# Patient Record
Sex: FEMALE | Race: BLACK OR AFRICAN AMERICAN | Employment: OTHER | URBAN - METROPOLITAN AREA
[De-identification: names, ages, dates, MRNs, and addresses within clinical notes are randomized per-mention and may not be internally consistent; named-entity substitution may affect disease eponyms.]

---

## 2017-01-10 ENCOUNTER — TELEPHONE (OUTPATIENT)
Dept: PULMONOLOGY | Age: 67
End: 2017-01-10

## 2017-01-12 PROBLEM — J44.1 COPD EXACERBATION (HCC): Status: ACTIVE | Noted: 2017-01-12

## 2017-01-16 ENCOUNTER — CARE COORDINATION (OUTPATIENT)
Dept: CARE COORDINATION | Age: 67
End: 2017-01-16

## 2017-01-20 RX ORDER — AMITRIPTYLINE HYDROCHLORIDE 150 MG/1
75 TABLET, FILM COATED ORAL NIGHTLY
Qty: 45 TABLET | Refills: 2 | Status: SHIPPED | OUTPATIENT
Start: 2017-01-20 | End: 2017-10-04 | Stop reason: SDUPTHER

## 2017-02-06 ENCOUNTER — OFFICE VISIT (OUTPATIENT)
Dept: INTERNAL MEDICINE CLINIC | Age: 67
End: 2017-02-06

## 2017-02-06 VITALS
BODY MASS INDEX: 26.17 KG/M2 | WEIGHT: 134 LBS | DIASTOLIC BLOOD PRESSURE: 68 MMHG | OXYGEN SATURATION: 93 % | SYSTOLIC BLOOD PRESSURE: 102 MMHG | HEART RATE: 90 BPM

## 2017-02-06 DIAGNOSIS — J44.9 CHRONIC OBSTRUCTIVE PULMONARY DISEASE, UNSPECIFIED COPD TYPE (HCC): Primary | ICD-10-CM

## 2017-02-06 DIAGNOSIS — D50.0 IRON DEFICIENCY ANEMIA DUE TO CHRONIC BLOOD LOSS: ICD-10-CM

## 2017-02-06 PROBLEM — J44.1 COPD EXACERBATION (HCC): Status: RESOLVED | Noted: 2017-01-12 | Resolved: 2017-02-06

## 2017-02-06 LAB
BASOPHILS ABSOLUTE: 0.1 K/UL (ref 0–0.2)
BASOPHILS RELATIVE PERCENT: 0.7 %
EOSINOPHILS ABSOLUTE: 0.2 K/UL (ref 0–0.6)
EOSINOPHILS RELATIVE PERCENT: 2.9 %
FERRITIN: 463.3 NG/ML (ref 15–150)
HCT VFR BLD CALC: 35.7 % (ref 36–48)
HEMOGLOBIN: 11.1 G/DL (ref 12–16)
LYMPHOCYTES ABSOLUTE: 3.4 K/UL (ref 1–5.1)
LYMPHOCYTES RELATIVE PERCENT: 45.3 %
MCH RBC QN AUTO: 21 PG (ref 26–34)
MCHC RBC AUTO-ENTMCNC: 31.1 G/DL (ref 31–36)
MCV RBC AUTO: 67.6 FL (ref 80–100)
MONOCYTES ABSOLUTE: 0.2 K/UL (ref 0–1.3)
MONOCYTES RELATIVE PERCENT: 2.6 %
NEUTROPHILS ABSOLUTE: 3.6 K/UL (ref 1.7–7.7)
NEUTROPHILS RELATIVE PERCENT: 48.5 %
PDW BLD-RTO: 19 % (ref 12.4–15.4)
PLATELET # BLD: 258 K/UL (ref 135–450)
PMV BLD AUTO: 7.8 FL (ref 5–10.5)
RBC # BLD: 5.28 M/UL (ref 4–5.2)
WBC # BLD: 7.5 K/UL (ref 4–11)

## 2017-02-06 PROCEDURE — 99214 OFFICE O/P EST MOD 30 MIN: CPT | Performed by: INTERNAL MEDICINE

## 2017-02-06 ASSESSMENT — ENCOUNTER SYMPTOMS
SHORTNESS OF BREATH: 1
COUGH: 1

## 2017-02-08 ENCOUNTER — OFFICE VISIT (OUTPATIENT)
Dept: PULMONOLOGY | Age: 67
End: 2017-02-08

## 2017-02-08 VITALS
SYSTOLIC BLOOD PRESSURE: 120 MMHG | HEART RATE: 97 BPM | DIASTOLIC BLOOD PRESSURE: 85 MMHG | HEIGHT: 60 IN | BODY MASS INDEX: 26.31 KG/M2 | WEIGHT: 134 LBS | OXYGEN SATURATION: 95 % | RESPIRATION RATE: 18 BRPM

## 2017-02-08 DIAGNOSIS — J43.2 CENTRILOBULAR EMPHYSEMA (HCC): ICD-10-CM

## 2017-02-08 DIAGNOSIS — J84.10 PULMONARY FIBROSIS (HCC): ICD-10-CM

## 2017-02-08 DIAGNOSIS — J44.9 COPD, SEVERE (HCC): ICD-10-CM

## 2017-02-08 DIAGNOSIS — J96.11 CHRONIC RESPIRATORY FAILURE WITH HYPOXIA (HCC): Primary | ICD-10-CM

## 2017-02-08 DIAGNOSIS — Z09 HOSPITAL DISCHARGE FOLLOW-UP: ICD-10-CM

## 2017-02-08 PROCEDURE — 99214 OFFICE O/P EST MOD 30 MIN: CPT | Performed by: INTERNAL MEDICINE

## 2017-02-08 RX ORDER — ALBUTEROL SULFATE 2.5 MG/3ML
2.5 SOLUTION RESPIRATORY (INHALATION) EVERY 6 HOURS PRN
Qty: 120 EACH | Refills: 3 | Status: SHIPPED | OUTPATIENT
Start: 2017-02-08 | End: 2017-10-25 | Stop reason: SDUPTHER

## 2017-02-08 RX ORDER — ALBUTEROL SULFATE 2.5 MG/3ML
SOLUTION RESPIRATORY (INHALATION)
Qty: 75 ML | Refills: 3 | Status: CANCELLED | OUTPATIENT
Start: 2017-02-08

## 2017-02-08 RX ORDER — BUDESONIDE AND FORMOTEROL FUMARATE DIHYDRATE 160; 4.5 UG/1; UG/1
2 AEROSOL RESPIRATORY (INHALATION) 2 TIMES DAILY
Qty: 1 INHALER | Refills: 6 | Status: SHIPPED | OUTPATIENT
Start: 2017-02-08 | End: 2017-08-23 | Stop reason: SDUPTHER

## 2017-02-08 RX ORDER — ALBUTEROL SULFATE 90 UG/1
2 AEROSOL, METERED RESPIRATORY (INHALATION) EVERY 6 HOURS PRN
Qty: 1 INHALER | Refills: 3 | Status: SHIPPED | OUTPATIENT
Start: 2017-02-08 | End: 2018-03-15 | Stop reason: SDUPTHER

## 2017-02-15 ENCOUNTER — HOSPITAL ENCOUNTER (OUTPATIENT)
Dept: PULMONOLOGY | Age: 67
Discharge: HOME | End: 2017-02-15
Attending: INTERNAL MEDICINE | Admitting: INTERNAL MEDICINE

## 2017-02-15 ENCOUNTER — TELEPHONE (OUTPATIENT)
Dept: INTERNAL MEDICINE CLINIC | Age: 67
End: 2017-02-15

## 2017-02-15 VITALS — HEART RATE: 79 BPM | OXYGEN SATURATION: 98 %

## 2017-02-15 RX ORDER — ALBUTEROL SULFATE 90 UG/1
4 AEROSOL, METERED RESPIRATORY (INHALATION) ONCE
Status: COMPLETED | OUTPATIENT
Start: 2017-02-15 | End: 2017-02-15

## 2017-02-15 RX ADMIN — ALBUTEROL SULFATE 4 PUFF: 90 AEROSOL, METERED RESPIRATORY (INHALATION) at 08:22

## 2017-03-02 DIAGNOSIS — M54.50 CHRONIC BILATERAL LOW BACK PAIN WITHOUT SCIATICA: ICD-10-CM

## 2017-03-02 DIAGNOSIS — G89.29 CHRONIC BILATERAL LOW BACK PAIN WITHOUT SCIATICA: ICD-10-CM

## 2017-03-06 RX ORDER — DULOXETIN HYDROCHLORIDE 30 MG/1
30 CAPSULE, DELAYED RELEASE ORAL DAILY
Qty: 30 CAPSULE | Refills: 3 | Status: SHIPPED | OUTPATIENT
Start: 2017-03-06 | End: 2017-12-18

## 2017-03-16 ENCOUNTER — HOSPITAL ENCOUNTER (OUTPATIENT)
Dept: CARDIAC REHAB | Age: 67
Discharge: HOME | End: 2017-03-31
Admitting: INTERNAL MEDICINE

## 2017-05-08 ENCOUNTER — OFFICE VISIT (OUTPATIENT)
Dept: INTERNAL MEDICINE CLINIC | Age: 67
End: 2017-05-08

## 2017-05-08 VITALS
SYSTOLIC BLOOD PRESSURE: 114 MMHG | HEART RATE: 75 BPM | OXYGEN SATURATION: 93 % | WEIGHT: 128 LBS | DIASTOLIC BLOOD PRESSURE: 68 MMHG | BODY MASS INDEX: 25 KG/M2

## 2017-05-08 DIAGNOSIS — J44.9 CHRONIC OBSTRUCTIVE PULMONARY DISEASE, UNSPECIFIED COPD TYPE (HCC): Primary | ICD-10-CM

## 2017-05-08 DIAGNOSIS — E55.9 VITAMIN D DEFICIENCY: ICD-10-CM

## 2017-05-08 DIAGNOSIS — K50.113 CROHN'S DISEASE OF LARGE INTESTINE WITH FISTULA (HCC): Chronic | ICD-10-CM

## 2017-05-08 DIAGNOSIS — M81.0 OSTEOPOROSIS: ICD-10-CM

## 2017-05-08 DIAGNOSIS — D50.0 IRON DEFICIENCY ANEMIA DUE TO CHRONIC BLOOD LOSS: ICD-10-CM

## 2017-05-08 LAB
BASOPHILS ABSOLUTE: 0 K/UL (ref 0–0.2)
BASOPHILS RELATIVE PERCENT: 0.5 %
EOSINOPHILS ABSOLUTE: 0.2 K/UL (ref 0–0.6)
EOSINOPHILS RELATIVE PERCENT: 1.8 %
FERRITIN: 358.5 NG/ML (ref 15–150)
HCT VFR BLD CALC: 36.3 % (ref 36–48)
HEMOGLOBIN: 11.2 G/DL (ref 12–16)
LYMPHOCYTES ABSOLUTE: 3.8 K/UL (ref 1–5.1)
LYMPHOCYTES RELATIVE PERCENT: 40.7 %
MCH RBC QN AUTO: 19.6 PG (ref 26–34)
MCHC RBC AUTO-ENTMCNC: 30.9 G/DL (ref 31–36)
MCV RBC AUTO: 63.5 FL (ref 80–100)
MONOCYTES ABSOLUTE: 0.4 K/UL (ref 0–1.3)
MONOCYTES RELATIVE PERCENT: 4.7 %
NEUTROPHILS ABSOLUTE: 4.9 K/UL (ref 1.7–7.7)
NEUTROPHILS RELATIVE PERCENT: 52.3 %
PDW BLD-RTO: 16.3 % (ref 12.4–15.4)
PLATELET # BLD: 387 K/UL (ref 135–450)
PMV BLD AUTO: 8.5 FL (ref 5–10.5)
RBC # BLD: 5.72 M/UL (ref 4–5.2)
VITAMIN D 25-HYDROXY: 17.7 NG/ML
WBC # BLD: 9.3 K/UL (ref 4–11)

## 2017-05-08 PROCEDURE — 99214 OFFICE O/P EST MOD 30 MIN: CPT | Performed by: INTERNAL MEDICINE

## 2017-05-08 PROCEDURE — 36415 COLL VENOUS BLD VENIPUNCTURE: CPT | Performed by: INTERNAL MEDICINE

## 2017-05-08 RX ORDER — ERGOCALCIFEROL (VITAMIN D2) 1250 MCG
50000 CAPSULE ORAL WEEKLY
Qty: 12 CAPSULE | Refills: 3 | Status: SHIPPED | OUTPATIENT
Start: 2017-05-08 | End: 2017-06-22 | Stop reason: ALTCHOICE

## 2017-05-08 RX ORDER — ALENDRONATE SODIUM 70 MG/1
70 TABLET ORAL
Qty: 12 TABLET | Refills: 3 | Status: SHIPPED | OUTPATIENT
Start: 2017-05-08 | End: 2018-05-07 | Stop reason: SDUPTHER

## 2017-05-09 LAB — HEMOGLOBIN ELECTROPHORESIS: NORMAL

## 2017-05-10 LAB
HGB ELECTROPHORESIS INTERP: NORMAL
SOLUBLE TRANSFERRIN RECEPT: 2.4 MG/L (ref 1.9–4.4)

## 2017-05-12 PROBLEM — D58.2 HEMOGLOBIN C TRAIT (HCC): Status: ACTIVE | Noted: 2017-05-12

## 2017-05-22 ENCOUNTER — OFFICE VISIT (OUTPATIENT)
Dept: INTERNAL MEDICINE CLINIC | Age: 67
End: 2017-05-22

## 2017-05-22 VITALS
HEART RATE: 92 BPM | DIASTOLIC BLOOD PRESSURE: 82 MMHG | OXYGEN SATURATION: 90 % | BODY MASS INDEX: 25.8 KG/M2 | WEIGHT: 131.4 LBS | HEIGHT: 60 IN | SYSTOLIC BLOOD PRESSURE: 122 MMHG | TEMPERATURE: 98.1 F

## 2017-05-22 DIAGNOSIS — J43.2 CENTRILOBULAR EMPHYSEMA (HCC): Primary | ICD-10-CM

## 2017-05-22 PROCEDURE — 94640 AIRWAY INHALATION TREATMENT: CPT | Performed by: INTERNAL MEDICINE

## 2017-05-22 PROCEDURE — 99214 OFFICE O/P EST MOD 30 MIN: CPT | Performed by: INTERNAL MEDICINE

## 2017-05-22 RX ORDER — PREDNISONE 20 MG/1
20 TABLET ORAL DAILY
Qty: 7 TABLET | Refills: 0 | Status: SHIPPED | OUTPATIENT
Start: 2017-05-22 | End: 2017-05-29

## 2017-05-22 RX ORDER — IPRATROPIUM BROMIDE AND ALBUTEROL SULFATE 2.5; .5 MG/3ML; MG/3ML
1 SOLUTION RESPIRATORY (INHALATION) ONCE
Status: COMPLETED | OUTPATIENT
Start: 2017-05-22 | End: 2017-05-22

## 2017-05-22 RX ADMIN — IPRATROPIUM BROMIDE AND ALBUTEROL SULFATE 1 AMPULE: 2.5; .5 SOLUTION RESPIRATORY (INHALATION) at 15:48

## 2017-05-24 ASSESSMENT — ENCOUNTER SYMPTOMS
STRIDOR: 0
COUGH: 1
EYE REDNESS: 0
SHORTNESS OF BREATH: 1
EYE PAIN: 0

## 2017-06-13 ENCOUNTER — TELEPHONE (OUTPATIENT)
Dept: INTERNAL MEDICINE CLINIC | Age: 67
End: 2017-06-13

## 2017-06-22 ENCOUNTER — OFFICE VISIT (OUTPATIENT)
Dept: INTERNAL MEDICINE CLINIC | Age: 67
End: 2017-06-22

## 2017-06-22 VITALS
OXYGEN SATURATION: 93 % | WEIGHT: 128 LBS | SYSTOLIC BLOOD PRESSURE: 124 MMHG | DIASTOLIC BLOOD PRESSURE: 72 MMHG | HEART RATE: 90 BPM | BODY MASS INDEX: 25 KG/M2

## 2017-06-22 DIAGNOSIS — M81.0 OSTEOPOROSIS OF MULTIPLE SITES: Primary | ICD-10-CM

## 2017-06-22 DIAGNOSIS — K50.113 CROHN'S DISEASE OF LARGE INTESTINE WITH FISTULA (HCC): Chronic | ICD-10-CM

## 2017-06-22 PROCEDURE — 99214 OFFICE O/P EST MOD 30 MIN: CPT | Performed by: INTERNAL MEDICINE

## 2017-06-22 ASSESSMENT — ENCOUNTER SYMPTOMS
COUGH: 0
BACK PAIN: 1
CHOKING: 0

## 2017-08-23 DIAGNOSIS — K50.113 CROHN'S DISEASE OF LARGE INTESTINE WITH FISTULA (HCC): Chronic | ICD-10-CM

## 2017-08-23 RX ORDER — BUDESONIDE AND FORMOTEROL FUMARATE DIHYDRATE 160; 4.5 UG/1; UG/1
2 AEROSOL RESPIRATORY (INHALATION) 2 TIMES DAILY
Qty: 1 INHALER | Refills: 6 | Status: SHIPPED | OUTPATIENT
Start: 2017-08-23 | End: 2018-05-02 | Stop reason: ALTCHOICE

## 2017-08-28 ENCOUNTER — OFFICE VISIT (OUTPATIENT)
Dept: PULMONOLOGY | Age: 67
End: 2017-08-28

## 2017-08-28 VITALS
HEIGHT: 60 IN | RESPIRATION RATE: 18 BRPM | WEIGHT: 132 LBS | OXYGEN SATURATION: 97 % | SYSTOLIC BLOOD PRESSURE: 155 MMHG | HEART RATE: 82 BPM | DIASTOLIC BLOOD PRESSURE: 91 MMHG | BODY MASS INDEX: 25.91 KG/M2

## 2017-08-28 DIAGNOSIS — Z87.891 PERSONAL HISTORY OF TOBACCO USE: ICD-10-CM

## 2017-08-28 DIAGNOSIS — J84.10 PULMONARY FIBROSIS (HCC): ICD-10-CM

## 2017-08-28 DIAGNOSIS — J44.9 COPD, SEVERE (HCC): ICD-10-CM

## 2017-08-28 DIAGNOSIS — J96.11 CHRONIC RESPIRATORY FAILURE WITH HYPOXIA (HCC): Primary | ICD-10-CM

## 2017-08-28 PROCEDURE — 99214 OFFICE O/P EST MOD 30 MIN: CPT | Performed by: INTERNAL MEDICINE

## 2017-10-05 RX ORDER — AMITRIPTYLINE HYDROCHLORIDE 150 MG/1
75 TABLET, FILM COATED ORAL NIGHTLY
Qty: 45 TABLET | Refills: 2 | Status: SHIPPED | OUTPATIENT
Start: 2017-10-05 | End: 2018-03-04 | Stop reason: SDUPTHER

## 2017-10-09 ENCOUNTER — HOSPITAL ENCOUNTER (OUTPATIENT)
Dept: OTHER | Age: 67
Discharge: HOME | End: 2017-10-09
Attending: INTERNAL MEDICINE | Admitting: INTERNAL MEDICINE

## 2017-10-09 LAB
FERRITIN: 353.3 NG/ML (ref 15–150)
IRON SATURATION: 29 % (ref 15–50)
IRON: 101 UG/DL (ref 37–145)
TOTAL IRON BINDING CAPACITY: 347 UG/DL (ref 260–445)

## 2017-10-10 LAB
ATYPICAL LYMPHOCYTE RELATIVE PERCENT: 1 % (ref 0–6)
BANDED NEUTROPHILS RELATIVE PERCENT: 1 % (ref 0–7)
BASOPHILS ABSOLUTE: 0 K/UL (ref 0–0.2)
BASOPHILS RELATIVE PERCENT: 0 %
EOSINOPHILS ABSOLUTE: 0.1 K/UL (ref 0–0.6)
EOSINOPHILS RELATIVE PERCENT: 1 %
HCT VFR BLD CALC: 38.7 % (ref 36–48)
HEMATOLOGY PATH CONSULT: NO
HEMOGLOBIN: 12 G/DL (ref 12–16)
HYPOCHROMIA: ABNORMAL
LYMPHOCYTES ABSOLUTE: 4.5 K/UL (ref 1–5.1)
LYMPHOCYTES RELATIVE PERCENT: 37 %
MCH RBC QN AUTO: 20.4 PG (ref 26–34)
MCHC RBC AUTO-ENTMCNC: 31 G/DL (ref 31–36)
MCV RBC AUTO: 65.7 FL (ref 80–100)
MONOCYTES ABSOLUTE: 0.7 K/UL (ref 0–1.3)
MONOCYTES RELATIVE PERCENT: 6 %
NEUTROPHILS ABSOLUTE: 6.5 K/UL (ref 1.7–7.7)
NEUTROPHILS RELATIVE PERCENT: 54 %
PDW BLD-RTO: 16.2 % (ref 12.4–15.4)
PLATELET # BLD: 294 K/UL (ref 135–450)
PLATELET SLIDE REVIEW: ABNORMAL
PMV BLD AUTO: 9 FL (ref 5–10.5)
RBC # BLD: 5.89 M/UL (ref 4–5.2)
TARGET CELLS: ABNORMAL
TEAR DROP CELLS: ABNORMAL
WBC # BLD: 11.8 K/UL (ref 4–11)

## 2017-10-23 ENCOUNTER — TELEPHONE (OUTPATIENT)
Dept: PULMONOLOGY | Age: 67
End: 2017-10-23

## 2017-10-25 ENCOUNTER — HOSPITAL ENCOUNTER (OUTPATIENT)
Dept: OTHER | Age: 67
Discharge: HOME | End: 2017-10-25
Attending: INTERNAL MEDICINE | Admitting: INTERNAL MEDICINE

## 2017-10-25 ENCOUNTER — OFFICE VISIT (OUTPATIENT)
Dept: PULMONOLOGY | Age: 67
End: 2017-10-25

## 2017-10-25 VITALS
TEMPERATURE: 96.9 F | SYSTOLIC BLOOD PRESSURE: 134 MMHG | HEART RATE: 82 BPM | RESPIRATION RATE: 18 BRPM | DIASTOLIC BLOOD PRESSURE: 84 MMHG | HEIGHT: 60 IN | WEIGHT: 131 LBS | OXYGEN SATURATION: 97 % | BODY MASS INDEX: 25.72 KG/M2

## 2017-10-25 DIAGNOSIS — J21.8 ACUTE BRONCHIOLITIS DUE TO OTHER SPECIFIED ORGANISMS: ICD-10-CM

## 2017-10-25 DIAGNOSIS — J44.1 COPD WITH ACUTE EXACERBATION (HCC): Primary | ICD-10-CM

## 2017-10-25 PROCEDURE — 3017F COLORECTAL CA SCREEN DOC REV: CPT | Performed by: INTERNAL MEDICINE

## 2017-10-25 PROCEDURE — G8399 PT W/DXA RESULTS DOCUMENT: HCPCS | Performed by: INTERNAL MEDICINE

## 2017-10-25 PROCEDURE — G8417 CALC BMI ABV UP PARAM F/U: HCPCS | Performed by: INTERNAL MEDICINE

## 2017-10-25 PROCEDURE — G8427 DOCREV CUR MEDS BY ELIG CLIN: HCPCS | Performed by: INTERNAL MEDICINE

## 2017-10-25 PROCEDURE — 99214 OFFICE O/P EST MOD 30 MIN: CPT | Performed by: INTERNAL MEDICINE

## 2017-10-25 PROCEDURE — G8926 SPIRO NO PERF OR DOC: HCPCS | Performed by: INTERNAL MEDICINE

## 2017-10-25 PROCEDURE — 1123F ACP DISCUSS/DSCN MKR DOCD: CPT | Performed by: INTERNAL MEDICINE

## 2017-10-25 PROCEDURE — G8484 FLU IMMUNIZE NO ADMIN: HCPCS | Performed by: INTERNAL MEDICINE

## 2017-10-25 PROCEDURE — 3014F SCREEN MAMMO DOC REV: CPT | Performed by: INTERNAL MEDICINE

## 2017-10-25 PROCEDURE — 1036F TOBACCO NON-USER: CPT | Performed by: INTERNAL MEDICINE

## 2017-10-25 PROCEDURE — 3023F SPIROM DOC REV: CPT | Performed by: INTERNAL MEDICINE

## 2017-10-25 PROCEDURE — 1090F PRES/ABSN URINE INCON ASSESS: CPT | Performed by: INTERNAL MEDICINE

## 2017-10-25 PROCEDURE — 4040F PNEUMOC VAC/ADMIN/RCVD: CPT | Performed by: INTERNAL MEDICINE

## 2017-10-25 RX ORDER — ALBUTEROL SULFATE 2.5 MG/3ML
2.5 SOLUTION RESPIRATORY (INHALATION) EVERY 6 HOURS PRN
Qty: 120 EACH | Refills: 3 | Status: SHIPPED | OUTPATIENT
Start: 2017-10-25 | End: 2018-02-18 | Stop reason: SDUPTHER

## 2017-10-25 RX ORDER — LEVOFLOXACIN 500 MG/1
500 TABLET, FILM COATED ORAL DAILY
Qty: 10 TABLET | Refills: 0 | Status: SHIPPED | OUTPATIENT
Start: 2017-10-25 | End: 2017-11-04

## 2017-10-25 RX ORDER — PREDNISONE 10 MG/1
TABLET ORAL
Qty: 30 TABLET | Refills: 0 | Status: SHIPPED | OUTPATIENT
Start: 2017-10-25 | End: 2017-12-18

## 2017-10-25 NOTE — PROGRESS NOTES
capsule Take 1 capsule by mouth daily 30 capsule 3    albuterol sulfate  (90 BASE) MCG/ACT inhaler Inhale 2 puffs into the lungs every 6 hours as needed for Wheezing 1 Inhaler 3    albuterol (PROVENTIL) (2.5 MG/3ML) 0.083% nebulizer solution Take 3 mLs by nebulization every 6 hours as needed for Wheezing Dx: COPD      ICD-10: J44.9 120 each 3    ferrous sulfate (DARON-JORGE) 325 (65 FE) MG tablet Take 1 tablet by mouth daily (with breakfast) 30 tablet 0    guaiFENesin (MUCINEX) 600 MG extended release tablet Take 1 tablet by mouth 2 times daily 30 tablet 0    potassium chloride (KLOR-CON M20) 20 MEQ extended release tablet Take 1 tablet by mouth 2 times daily 60 tablet 5    Misc. Devices (ROLLER WALKER) MISC 1 each by Does not apply route daily Please dispense a walker with a seat 1 each 0    lactobacillus (BACID) TABS Take 1 tablet by mouth daily 30 tablet 0    Adalimumab (HUMIRA) 20 MG/0.4ML KIT Inject 1 vial into the skin every 14 days Next dose on Monday December 5, 2016      aspirin 81 MG chewable tablet Take 81 mg by mouth daily. Last dose 3-24 30 tablet 0     No current facility-administered medications for this visit.         Family History   Problem Relation Age of Onset    Heart Disease Mother     High Blood Pressure Mother     High Cholesterol Mother     Early Death Mother 36     MI   [de-identified] / Stillbirths Mother     Heart Disease Father     High Blood Pressure Father     High Cholesterol Father     Stroke Father 79    High Blood Pressure Sister     High Blood Pressure Brother      Social History     Social History    Marital status: Single     Spouse name: N/A    Number of children: 2    Years of education: 15     Occupational History    unemployed      disability [facility] [employer]     Social History Main Topics    Smoking status: Former Smoker     Packs/day: 0.25     Years: 30.00     Types: Cigarettes     Quit date: 2/26/2015    Smokeless tobacco: Never Used      Comment: started smoking at age 26 / smoked up to 9 cigarettes a day     Alcohol use No    Drug use: No    Sexual activity: Not Currently     Other Topics Concern    Not on file     Social History Narrative    No narrative on file         Review of Systems   Constitutional: Negative. Negative for fever, chills, diaphoresis, activity change, appetite change, fatigue and unexpected weight change. HENT: Negative. Negative for hearing loss, ear pain, nosebleeds, congestion, facial swelling, rhinorrhea, sneezing, neck pain, neck stiffness, postnasal drip, sinus pressure and ear discharge. Eyes: Negative. Negative for photophobia, pain, discharge, redness, itching and visual disturbance. Respiratory: As per HPI  Cardiovascular: Negative. Negative for chest pain, palpitations and leg swelling. Gastrointestinal: Negative. Negative for abdominal pain, blood in stool, abdominal distention and anal bleeding. Genitourinary: Negative. Negative for dysuria, urgency, frequency, hematuria, decreased urine volume, enuresis and difficulty urinating. Musculoskeletal: Negative. Negative for myalgias, back pain, joint swelling, arthralgias and gait problem. Skin: Negative. Negative for color change and pallor. Neurological: Negative. Negative for dizziness, tremors, seizures, syncope, facial asymmetry, speech difficulty, weakness, light-headedness, numbness and headaches. Hematological: Negative. Negative for adenopathy. Psychiatric/Behavioral: Negative. Negative for hallucinations, behavioral problems, confusion and agitation. The patient is not nervous/anxious and is not hyperactive. Blood pressure 134/84, pulse 82, temperature 96.9 °F (36.1 °C), temperature source Oral, resp. rate 18, height 5' (1.524 m), weight 131 lb (59.4 kg), SpO2 97 %, not currently breastfeeding. Physical Exam   Constitutional: Oriented. Well-developed and well-nourished. No distress.    HENT:   Head: Normocephalic and

## 2017-10-25 NOTE — COMMUNICATION BODY
Assessment:     Assessment:    1. COPD with acute exacerbation ([facility] [address]     2. Acute bronchiolitis due to other specified organisms             Plan:     Plan:    1. She has an exacerbation and probably bronchitis  2. I will order CXR now and start prednisone taper course with abx  3. Instructed to use neb tx qid, go to ED if not improved  4. Continue current inhaled regimen: Symbicort 160 BID, Spiriva daily and Albuterol.    5. RTC in 1 month

## 2017-10-26 ENCOUNTER — OFFICE VISIT (OUTPATIENT)
Dept: INTERNAL MEDICINE CLINIC | Age: 67
End: 2017-10-26

## 2017-10-26 VITALS
WEIGHT: 135 LBS | SYSTOLIC BLOOD PRESSURE: 138 MMHG | DIASTOLIC BLOOD PRESSURE: 86 MMHG | BODY MASS INDEX: 26.37 KG/M2 | OXYGEN SATURATION: 89 % | HEART RATE: 130 BPM

## 2017-10-26 DIAGNOSIS — D58.2 HEMOGLOBIN C TRAIT (HCC): ICD-10-CM

## 2017-10-26 DIAGNOSIS — J44.9 COPD, SEVERE (HCC): Primary | ICD-10-CM

## 2017-10-26 DIAGNOSIS — D56.3 ALPHA THALASSEMIA MINOR: ICD-10-CM

## 2017-10-26 PROBLEM — D50.0 IRON DEFICIENCY ANEMIA DUE TO CHRONIC BLOOD LOSS: Status: RESOLVED | Noted: 2017-02-06 | Resolved: 2017-10-26

## 2017-10-26 PROCEDURE — G8417 CALC BMI ABV UP PARAM F/U: HCPCS | Performed by: INTERNAL MEDICINE

## 2017-10-26 PROCEDURE — 3014F SCREEN MAMMO DOC REV: CPT | Performed by: INTERNAL MEDICINE

## 2017-10-26 PROCEDURE — 1036F TOBACCO NON-USER: CPT | Performed by: INTERNAL MEDICINE

## 2017-10-26 PROCEDURE — 99214 OFFICE O/P EST MOD 30 MIN: CPT | Performed by: INTERNAL MEDICINE

## 2017-10-26 PROCEDURE — 4040F PNEUMOC VAC/ADMIN/RCVD: CPT | Performed by: INTERNAL MEDICINE

## 2017-10-26 PROCEDURE — G8484 FLU IMMUNIZE NO ADMIN: HCPCS | Performed by: INTERNAL MEDICINE

## 2017-10-26 PROCEDURE — 3017F COLORECTAL CA SCREEN DOC REV: CPT | Performed by: INTERNAL MEDICINE

## 2017-10-26 PROCEDURE — G8427 DOCREV CUR MEDS BY ELIG CLIN: HCPCS | Performed by: INTERNAL MEDICINE

## 2017-10-26 PROCEDURE — 1123F ACP DISCUSS/DSCN MKR DOCD: CPT | Performed by: INTERNAL MEDICINE

## 2017-10-26 PROCEDURE — 1090F PRES/ABSN URINE INCON ASSESS: CPT | Performed by: INTERNAL MEDICINE

## 2017-10-26 PROCEDURE — 3023F SPIROM DOC REV: CPT | Performed by: INTERNAL MEDICINE

## 2017-10-26 PROCEDURE — G8399 PT W/DXA RESULTS DOCUMENT: HCPCS | Performed by: INTERNAL MEDICINE

## 2017-10-26 PROCEDURE — G8926 SPIRO NO PERF OR DOC: HCPCS | Performed by: INTERNAL MEDICINE

## 2017-10-26 ASSESSMENT — ENCOUNTER SYMPTOMS
PHOTOPHOBIA: 0
COUGH: 1
SHORTNESS OF BREATH: 1
EYE REDNESS: 0
EYE PAIN: 0
STRIDOR: 0

## 2017-10-26 NOTE — PROGRESS NOTES
Subjective:      Patient ID: Waleska Culp is a 78 y.o. female. HPI patient comes in for copd and anemia with microcytosis    COPD: Patient is experiencing and exacerbation in her copd. She saw Dr. Darling Hendricks who put her on antibiotics and steroids. Her symptoms started about 1 week ago with cough, wheezing and myalgias. She waited 5 days prior to contacting  Her physicians. She has been taking her symbicort and spiriva without difficulty. She notes some improvement with the antibiotics and steroids. She currently has no fever. Anemia: patient had a history of anemia with microcyctosis. She had an electrophoresis done which showed c-trait and possible thalassemia. She is scheduled to see hematology today. She is currently on iron but would like to stop it. She has an elevated ferriting. Review of Systems   Constitutional: Positive for chills and fatigue. Eyes: Negative for photophobia, pain and redness. Respiratory: Positive for cough and shortness of breath. Negative for stridor. Genitourinary: Negative for enuresis and flank pain.       Past Medical History:   Diagnosis Date    Bullous emphysema ([address]     COPD (chronic obstructive pulmonary disease) ([address]     PFT done 7 years ago   Teena Red Crohn's disease ([address]     Crohn's disease    Depression 1/30/2011    pt states resolved as of 3-26-12    DVT (deep venous thrombosis) ([address]     2012; first ocurrence     Kidney disease     Osteoporosis     Peripheral neuropathy (HCC)     neuropathy in legs    Pneumonia     Postmenopausal     LMP in  40's    Pulmonary embolism ([address]     2012; first ocurrence    Sepsis ([address]     Syringomyelia ([address]      Past Surgical History:   Procedure Laterality Date    ABDOMEN SURGERY      BACK SURGERY      shunt to drain CSF    BRAIN SURGERY      crainotomy- remove fluid    CHOLECYSTECTOMY      COLON SURGERY      resection    COLONOSCOPY  12/5/11    BIOPSY AND POLYPECTOMY    COLONOSCOPY  4-2-2012    and ablation ERBE/APC    SHOULDER SURGERY      L      Family History   Problem Relation Age of Onset    Heart Disease Mother     High Blood Pressure Mother     High Cholesterol Mother     Early Death Mother 36     MI   Guerry Leann / Stillbirths Mother     Heart Disease Father     High Blood Pressure Father     High Cholesterol Father     Stroke Father 79    High Blood Pressure Sister     High Blood Pressure Brother      Social History     Social History    Marital status: Single     Spouse name: N/A    Number of children: 2    Years of education: 15     Occupational History    unemployed      disability [employer]     Social History Main Topics    Smoking status: Former Smoker     Packs/day: 0.25     Years: 30.00     Types: Cigarettes     Quit date: 2/26/2015    Smokeless tobacco: Never Used      Comment: started smoking at age 26 / smoked up to 9 cigarettes a day     Alcohol use No    Drug use: No    Sexual activity: Not Currently     Other Topics Concern    Not on file     Social History Narrative    No narrative on file      Vitals:    10/26/17 0847   BP: 138/86   Pulse: 130   SpO2: (!) 89%   Weight: 135 lb (61.2 kg)      Wt Readings from Last 3 Encounters:   10/26/17 135 lb (61.2 kg)   10/25/17 131 lb (59.4 kg)   08/28/17 132 lb (59.9 kg)     BP Readings from Last 3 Encounters:   10/26/17 138/86   10/25/17 134/84   08/28/17 (!) 155/91     Body mass index is 26.37 kg/m². Facility age limit for growth percentiles is 20 years. Objective:   Physical Exam   Constitutional: She is oriented to person, place, and time. HENT:   Right Ear: External ear normal.   Left Ear: External ear normal.   Mouth/Throat: No oropharyngeal exudate. Eyes: EOM are normal. Pupils are equal, round, and reactive to light. Right eye exhibits no discharge. Left eye exhibits no discharge. Very pale sclera   Neck: Normal range of motion. Neck supple. No JVD present. No tracheal deviation present. No thyromegaly present. Cardiovascular: Normal rate, regular rhythm and normal heart sounds. Exam reveals no friction rub. No murmur heard. Pulmonary/Chest: She has decreased breath sounds in the right upper field and the left upper field. She has wheezes in the right middle field, the right lower field and the left middle field. She has no rhonchi. She has no rales. Abdominal: Soft. Bowel sounds are normal. She exhibits no distension. There is no tenderness. There is no rebound. Musculoskeletal: Normal range of motion. She exhibits no edema. Neurological: She is alert and oriented to person, place, and time. No cranial nerve deficit. Lab Review   Hospital Outpatient Visit on 10/09/2017   Component Date Value    Iron 10/09/2017 101     TIBC 10/09/2017 347     Iron Saturation 10/09/2017 29     WBC 10/10/2017 11.8*    RBC 10/10/2017 5.89*    Hemoglobin 10/10/2017 12.0     Hematocrit 10/10/2017 38.7     MCV 10/10/2017 65.7*    MCH 10/10/2017 20.4*    MCHC 10/10/2017 31.0     RDW 10/10/2017 16.2*    Platelets 85/51/8516 294     MPV 10/10/2017 9.0     PLATELET SLIDE REVIEW 10/10/2017 Clumped     Path Consult 10/10/2017 No     Neutrophils % 10/10/2017 54.0     Lymphocytes % 10/10/2017 37.0     Monocytes % 10/10/2017 6.0     Eosinophils % 10/10/2017 1.0     Basophils % 10/10/2017 0.0     Neutrophils # 10/10/2017 6.5     Lymphocytes # 10/10/2017 4.5     Monocytes # 10/10/2017 0.7     Eosinophils # 10/10/2017 0.1     Basophils # 10/10/2017 0.0     Bands Relative 10/10/2017 1     Atypical Lymphocytes Rel* 10/10/2017 1     Hypochromia 10/10/2017 Occasional*    Target Cells 10/10/2017 Occasional*    Tear Drop Cells 10/10/2017 Occasional*    Ferritin 10/09/2017 353.3*     Assessment:      The primary encounter diagnosis was COPD, severe ([address] Diagnoses of Alpha thalassemia minor and Hemoglobin C trait (HCC) were also pertinent to this visit. microcytosis could be secondary to alpha thalassemia.  She has an elevated ferritin and and normal soluble transferin receptor - doubt if iron deficiency. Copd not at goal      Plan:      1. Continue symbicort and spiriva  2. Follow-up with hematology on microcytosis  3. Continue prednisone and antibiotics  4. Stop the iron  5. Flu shot at next visit  6. Return in about 3 weeks (around 11/16/2017) for copd / flu vaccine.

## 2017-11-01 ENCOUNTER — HOSPITAL ENCOUNTER (OUTPATIENT)
Dept: OTHER | Age: 67
Discharge: HOME | End: 2017-11-30
Attending: INTERNAL MEDICINE | Admitting: INTERNAL MEDICINE

## 2017-11-16 ENCOUNTER — OFFICE VISIT (OUTPATIENT)
Dept: INTERNAL MEDICINE CLINIC | Age: 67
End: 2017-11-16

## 2017-11-16 VITALS
DIASTOLIC BLOOD PRESSURE: 84 MMHG | OXYGEN SATURATION: 97 % | BODY MASS INDEX: 26.17 KG/M2 | WEIGHT: 134 LBS | SYSTOLIC BLOOD PRESSURE: 122 MMHG | HEART RATE: 83 BPM

## 2017-11-16 DIAGNOSIS — Z23 NEED FOR INFLUENZA VACCINATION: ICD-10-CM

## 2017-11-16 DIAGNOSIS — J44.9 COPD, SEVERE (HCC): Primary | ICD-10-CM

## 2017-11-16 PROCEDURE — G8399 PT W/DXA RESULTS DOCUMENT: HCPCS | Performed by: INTERNAL MEDICINE

## 2017-11-16 PROCEDURE — 90662 IIV NO PRSV INCREASED AG IM: CPT | Performed by: INTERNAL MEDICINE

## 2017-11-16 PROCEDURE — G8926 SPIRO NO PERF OR DOC: HCPCS | Performed by: INTERNAL MEDICINE

## 2017-11-16 PROCEDURE — 4040F PNEUMOC VAC/ADMIN/RCVD: CPT | Performed by: INTERNAL MEDICINE

## 2017-11-16 PROCEDURE — G8484 FLU IMMUNIZE NO ADMIN: HCPCS | Performed by: INTERNAL MEDICINE

## 2017-11-16 PROCEDURE — 99213 OFFICE O/P EST LOW 20 MIN: CPT | Performed by: INTERNAL MEDICINE

## 2017-11-16 PROCEDURE — 1036F TOBACCO NON-USER: CPT | Performed by: INTERNAL MEDICINE

## 2017-11-16 PROCEDURE — G8417 CALC BMI ABV UP PARAM F/U: HCPCS | Performed by: INTERNAL MEDICINE

## 2017-11-16 PROCEDURE — 3014F SCREEN MAMMO DOC REV: CPT | Performed by: INTERNAL MEDICINE

## 2017-11-16 PROCEDURE — G8427 DOCREV CUR MEDS BY ELIG CLIN: HCPCS | Performed by: INTERNAL MEDICINE

## 2017-11-16 PROCEDURE — 1090F PRES/ABSN URINE INCON ASSESS: CPT | Performed by: INTERNAL MEDICINE

## 2017-11-16 PROCEDURE — 3017F COLORECTAL CA SCREEN DOC REV: CPT | Performed by: INTERNAL MEDICINE

## 2017-11-16 PROCEDURE — 1123F ACP DISCUSS/DSCN MKR DOCD: CPT | Performed by: INTERNAL MEDICINE

## 2017-11-16 PROCEDURE — G0008 ADMIN INFLUENZA VIRUS VAC: HCPCS | Performed by: INTERNAL MEDICINE

## 2017-11-16 PROCEDURE — 3023F SPIROM DOC REV: CPT | Performed by: INTERNAL MEDICINE

## 2017-11-16 NOTE — PATIENT INSTRUCTIONS
Vaccine Information Sheet, \"Influenza - Inactivated\"  given to Shane Wilkerson, or parent/legal guardian of  Shane Wilkerson and verbalized understanding. Patient responses:    Have you ever had a reaction to a flu vaccine? No  Are you able to eat eggs without adverse effects? Yes  Do you have any current illness? No  Have you ever had Guillian [place] Syndrome? No    Flu vaccine given per order. Please see immunization tab.

## 2017-11-16 NOTE — PROGRESS NOTES
Subjective:      Patient ID: Mandi Corado is a 78 y.o. female. HPI patient comes in for copd:    COPD:  Current treatment includes combined beta agonist/steroid inhaler, anticholinergic inhaler. Residual symptoms: dyspnea after 1 blocks. She denies cough, purulent sputum, wheezing. She requires her rescue inhaler 1 time(s) per month.       Review of Systems   Past Medical History:   Diagnosis Date    Bullous emphysema ([address]     COPD (chronic obstructive pulmonary disease) ([address]     PFT done 7 years ago   [facility] Crohn's disease ([address]     Crohn's disease    Depression 1/30/2011    pt states resolved as of 3-26-12    DVT (deep venous thrombosis) ([facility] [address]     2012; first ocurrence     Kidney disease     Osteoporosis     Peripheral neuropathy (HCC)     neuropathy in legs    Pneumonia     Postmenopausal     LMP in  40's    Pulmonary embolism ([address]     2012; first ocurrence    Sepsis ([address]     Syringomyelia ([address]      Past Surgical History:   Procedure Laterality Date    ABDOMEN SURGERY      BACK SURGERY      shunt to drain CSF    BRAIN SURGERY      crainotomy- remove fluid    CHOLECYSTECTOMY      COLON SURGERY      resection    COLONOSCOPY  12/5/11    BIOPSY AND POLYPECTOMY    COLONOSCOPY  4-2-2012    and ablation ERBE/APC    SHOULDER SURGERY      L      Family History   Problem Relation Age of Onset    Heart Disease Mother     High Blood Pressure Mother     High Cholesterol Mother     Early Death Mother 36     MI   [facility] Miscarriages / Stillbirths Mother     Heart Disease Father     High Blood Pressure Father     High Cholesterol Father     Stroke Father 79    High Blood Pressure Sister     High Blood Pressure Brother      Social History     Social History    Marital status: Single     Spouse name: N/A    Number of children: 2    Years of education: 15     Occupational History    unemployed      disability [employer]     Social History Main Topics    Smoking status: Former Smoker

## 2017-11-26 PROBLEM — J44.1 COPD EXACERBATION (HCC): Status: ACTIVE | Noted: 2017-11-26

## 2017-12-01 ENCOUNTER — HOSPITAL ENCOUNTER (OUTPATIENT)
Dept: OTHER | Age: 67
Discharge: HOME | End: 2017-12-31
Attending: INTERNAL MEDICINE | Admitting: INTERNAL MEDICINE

## 2017-12-07 ENCOUNTER — OFFICE VISIT (OUTPATIENT)
Dept: PULMONOLOGY | Age: 67
End: 2017-12-07

## 2017-12-07 ENCOUNTER — HOSPITAL ENCOUNTER (OUTPATIENT)
Dept: MAMMOGRAPHY | Age: 67
Discharge: HOME | End: 2017-12-07
Attending: INTERNAL MEDICINE | Admitting: INTERNAL MEDICINE

## 2017-12-07 VITALS
RESPIRATION RATE: 18 BRPM | BODY MASS INDEX: 27.09 KG/M2 | HEIGHT: 60 IN | DIASTOLIC BLOOD PRESSURE: 83 MMHG | WEIGHT: 138 LBS | HEART RATE: 87 BPM | OXYGEN SATURATION: 97 % | SYSTOLIC BLOOD PRESSURE: 151 MMHG

## 2017-12-07 DIAGNOSIS — Z12.39 BREAST CANCER SCREENING: ICD-10-CM

## 2017-12-07 DIAGNOSIS — Z09 HOSPITAL DISCHARGE FOLLOW-UP: Primary | ICD-10-CM

## 2017-12-07 DIAGNOSIS — K44.9 HIATAL HERNIA: ICD-10-CM

## 2017-12-07 DIAGNOSIS — J96.11 CHRONIC RESPIRATORY FAILURE WITH HYPOXIA (HCC): ICD-10-CM

## 2017-12-07 DIAGNOSIS — J44.9 COPD, SEVERE (HCC): ICD-10-CM

## 2017-12-07 PROCEDURE — 3014F SCREEN MAMMO DOC REV: CPT | Performed by: INTERNAL MEDICINE

## 2017-12-07 PROCEDURE — 4040F PNEUMOC VAC/ADMIN/RCVD: CPT | Performed by: INTERNAL MEDICINE

## 2017-12-07 PROCEDURE — 1036F TOBACCO NON-USER: CPT | Performed by: INTERNAL MEDICINE

## 2017-12-07 PROCEDURE — 3017F COLORECTAL CA SCREEN DOC REV: CPT | Performed by: INTERNAL MEDICINE

## 2017-12-07 PROCEDURE — 1123F ACP DISCUSS/DSCN MKR DOCD: CPT | Performed by: INTERNAL MEDICINE

## 2017-12-07 PROCEDURE — 99214 OFFICE O/P EST MOD 30 MIN: CPT | Performed by: INTERNAL MEDICINE

## 2017-12-07 PROCEDURE — G8417 CALC BMI ABV UP PARAM F/U: HCPCS | Performed by: INTERNAL MEDICINE

## 2017-12-07 PROCEDURE — G8484 FLU IMMUNIZE NO ADMIN: HCPCS | Performed by: INTERNAL MEDICINE

## 2017-12-07 PROCEDURE — 1090F PRES/ABSN URINE INCON ASSESS: CPT | Performed by: INTERNAL MEDICINE

## 2017-12-07 PROCEDURE — 1111F DSCHRG MED/CURRENT MED MERGE: CPT | Performed by: INTERNAL MEDICINE

## 2017-12-07 PROCEDURE — G8427 DOCREV CUR MEDS BY ELIG CLIN: HCPCS | Performed by: INTERNAL MEDICINE

## 2017-12-07 PROCEDURE — G8399 PT W/DXA RESULTS DOCUMENT: HCPCS | Performed by: INTERNAL MEDICINE

## 2017-12-07 PROCEDURE — G8926 SPIRO NO PERF OR DOC: HCPCS | Performed by: INTERNAL MEDICINE

## 2017-12-07 PROCEDURE — 3023F SPIROM DOC REV: CPT | Performed by: INTERNAL MEDICINE

## 2017-12-07 NOTE — LETTER
Pulmonary, Critical Care & Sleep  [address], [address] [facility] [address] [address]  Phone: [phone]  Fax: [phone]      December 7, 2017       Patient: Lucy Blas   MR Number: [id number]   YOB: 1950   Date of Visit: 12/7/2017       Dear  [address]:    I saw Mrs. Karen Moore today for [place] visit. Below are the relevant portions of my assessment and plan of care. Assessment:    1. Hospital discharge follow-up     2. Chronic respiratory failure with hypoxia (HCC)     3. COPD, severe ([address]     4. Hiatal hernia       Plan:    1. I reviewed her hospital stay and treatment provided  2. I reviewed her CAT scan of the chest and explained findings on the monitor  3. I think her recurrent episodes are mainly secondary to aspiration related to her large hiatal hernia  4. I gave her instructions again how to follow and prevent hiatal hernia related to aspiration  5. Continue current inhaled regimen: Symbicort 160 BID, Spiriva daily and Albuterol. 6. If her prednisone tapered dose as well  7. To restart pulmonary rehab  8. RTC in 1 month    If you have questions, please do not hesitate to call me. I look forward to following Misti Bey along with you.       Sincerely,        Krystyna Srivastava MD    CC providers:  Cyril Rich, [address] [facility] [address]  VIA [facility] [address]

## 2017-12-07 NOTE — PROGRESS NOTES
Luis Antonio García is 78 y.o. female here for follow-up visit from recent hospital admission with history of COPD and chronic respiratory failure  Was admitted last month since her last visit with another episode of COPD exacerbation and acute bronchitis  She continues to have recurrent chest congestion and productive cough but denies any fever, chills, hemoptysis or diaphoresis  She is finishing her prednisone taper still and done with her antibiotic course  She is on 2 L of oxygen that she uses on an as-needed basis    She was seen initially for Pulmonary Medicine consultation referred by Dr. Mali Chinchilla for evaluation of COPD  She has hx of COPD and continued to smoke until 1-2015   She is on Symbicort 160 BID, Spiriva daily and Albuterol. She was unable to stay on Daliresp due to insurance coverage  She is still with significant second hand smoking exposure  Her last PFT 2-2017 showed significant improvement  Her CT chest during hospital stay was read as:  EXAMINATION:   CTA OF THE CHEST 11/27/2017 2:38 pm       TECHNIQUE:   CTA of the chest was performed after the administration of intravenous   contrast.  Multiplanar reformatted images are provided for review.  MIP   images are provided for review.  Dose modulation, iterative reconstruction,   and/or weight based adjustment of the mA/kV was utilized to reduce the   radiation dose to as low as reasonably achievable.       COMPARISON:   01/12/2017       HISTORY:   ORDERING PHYSICIAN PROVIDED HISTORY: sob, hx blood clot, pain in left back   and shoulder, rule out PE   TECHNOLOGIST PROVIDED HISTORY:   Technologist Provided Reason for Exam: sob, hx blood clot, pain in left back   and shoulder, rule out PE   Acuity: Unknown   Type of Encounter: Unknown       FINDINGS:   Pulmonary Arteries: Pulmonary arteries are adequately opacified for   evaluation.  No evidence of intraluminal filling defect to suggest pulmonary   embolism.  Main pulmonary artery is normal in caliber.     Mediastinum: Normal cardiac size.  No significant mediastinal, hilar or   axillary lymphadenopathy.  Large densely calcified mediastinal and right   hilar lymph nodes noted.  Sliding hiatal hernia noted.  Thyroid gland grossly   normal.       Lungs/pleura: Moderate generalized centrilobular emphysema.  Some reticular   interstitial densities at lung bases representing fibrosis.  No focal   consolidation.  There are no significant nodules or masses.  No pleural   effusion or pneumothorax.       Upper Abdomen: Cholecystectomy and left renal cysts noted.       Soft Tissues/Bones: No acute abnormality of the bones.  The superficial soft   tissues show no significant abnormalities.           Impression   1. No evidence for acute pulmonary embolism. 2. Moderate centrilobular emphysema with bibasilar fibrotic changes stable.    3. No evidence for pneumonia.               Past Medical History:   Diagnosis Date    Bullous emphysema (HCC)     COPD (chronic obstructive pulmonary disease) ([facility] [address]     PFT done 7 years ago   [place] Crohn's disease ([facility] [address]     Crohn's disease    Depression 1/30/2011    pt states resolved as of 3-26-12    DVT (deep venous thrombosis) ([facility] [address]     2012; first ocurrence     Kidney disease     Osteoporosis     Peripheral neuropathy (HCC)     neuropathy in legs    Pneumonia     Postmenopausal     LMP in  40's    Pulmonary embolism ([address]     2012; first ocurrence    Sepsis ([address]     Syringomyelia ([address]      Current Outpatient Prescriptions   Medication Sig Dispense Refill    predniSONE (DELTASONE) 10 MG tablet Take 1 tablet by mouth See Admin Instructions for 12 days 4 tabs daily for 3 days, 3 tabs daily for 3 days,  2 tabs daily for 3 tabs and then 1 tab daily x 3 days 30 tablet 0    omeprazole (PRILOSEC) 20 MG delayed release capsule Take 20 mg by mouth Daily      predniSONE (DELTASONE) 10 MG tablet Take 40 mg daily x 3 days, 30 mg daily x 3 days, 20 mg daily x 3 days, 10 mg daily x 3 days then stop 30 tablet 0    albuterol (PROVENTIL) (2.5 MG/3ML) 0.083% nebulizer solution Take 3 mLs by nebulization every 6 hours as needed for Wheezing Dx: COPD      ICD-10: J44.9 120 each 3    amitriptyline (ELAVIL) 150 MG tablet Take 0.5 tablets by mouth nightly 45 tablet 2    tiotropium (SPIRIVA RESPIMAT) 2.5 MCG/ACT AERS inhaler Inhale 2 puffs into the lungs daily Please instruct on use. 1 Inhaler 6    budesonide-formoterol (SYMBICORT) 160-4.5 MCG/ACT AERO Inhale 2 puffs into the lungs 2 times daily 1 Inhaler 6    Calcium Carb-Cholecalciferol (CALTRATE 600+D) 600-800 MG-UNIT TABS Take 2 tablets by mouth daily Osteoporosis and Crohn Disease 60 tablet 11    Lift Chair MISC by Does not apply route Please dispense a Lift Chair 1 each 0    alendronate (FOSAMAX) 70 MG tablet Take 1 tablet by mouth every 7 days 12 tablet 3    DULoxetine (CYMBALTA) 30 MG extended release capsule Take 1 capsule by mouth daily 30 capsule 3    albuterol sulfate  (90 BASE) MCG/ACT inhaler Inhale 2 puffs into the lungs every 6 hours as needed for Wheezing 1 Inhaler 3    guaiFENesin (MUCINEX) 600 MG extended release tablet Take 1 tablet by mouth 2 times daily 30 tablet 0    potassium chloride (KLOR-CON M20) 20 MEQ extended release tablet Take 1 tablet by mouth 2 times daily 60 tablet 5    Misc. Devices (ROLLER WALKER) MISC 1 each by Does not apply route daily Please dispense a walker with a seat 1 each 0    lactobacillus (BACID) TABS Take 1 tablet by mouth daily 30 tablet 0    Adalimumab (HUMIRA) 20 MG/0.4ML KIT Inject 1 vial into the skin every 14 days Next dose on Monday December 5, 2016      aspirin 81 MG chewable tablet Take 81 mg by mouth daily. Last dose 3-24 30 tablet 0     No current facility-administered medications for this visit.         Family History   Problem Relation Age of Onset    Heart Disease Mother     High Blood Pressure Mother     High Cholesterol Mother     Early Death Mother 36     MI    Miscarriages

## 2017-12-18 ENCOUNTER — TELEPHONE (OUTPATIENT)
Dept: PULMONOLOGY | Age: 67
End: 2017-12-18

## 2017-12-18 PROBLEM — A41.9 SEPSIS (HCC): Status: ACTIVE | Noted: 2017-12-18

## 2017-12-18 NOTE — TELEPHONE ENCOUNTER
Patient nurse called back, patient is getting worse and doesn't feel like she can wait and doesn't want to go to er.

## 2017-12-18 NOTE — TELEPHONE ENCOUNTER
Patient called stated she is having difficulty breathing, she has pain in rib cage, inhalers not working and nebulizer not working, patient can't completes entences without having sob

## 2017-12-20 ENCOUNTER — TELEPHONE (OUTPATIENT)
Dept: INTERNAL MEDICINE CLINIC | Age: 67
End: 2017-12-20

## 2017-12-20 RX ORDER — POTASSIUM CHLORIDE 20 MEQ/1
20 TABLET, EXTENDED RELEASE ORAL 2 TIMES DAILY
Qty: 60 TABLET | Refills: 5 | Status: CANCELLED | OUTPATIENT
Start: 2017-12-20

## 2017-12-29 PROBLEM — I26.99 PULMONARY EMBOLISM ON RIGHT (HCC): Status: ACTIVE | Noted: 2017-12-29

## 2017-12-30 PROBLEM — I82.4Z3 ACUTE DEEP VEIN THROMBOSIS (DVT) OF DISTAL VEIN OF BOTH LOWER EXTREMITIES (HCC): Status: ACTIVE | Noted: 2017-12-30

## 2017-12-30 PROBLEM — I26.99 PULMONARY EMBOLISM ON RIGHT (HCC): Status: RESOLVED | Noted: 2017-12-29 | Resolved: 2017-12-30

## 2018-01-01 ENCOUNTER — HOSPITAL ENCOUNTER (OUTPATIENT)
Dept: OTHER | Age: 68
Discharge: HOME | End: 2018-01-31
Attending: INTERNAL MEDICINE | Admitting: INTERNAL MEDICINE

## 2018-01-04 ENCOUNTER — OFFICE VISIT (OUTPATIENT)
Dept: INTERNAL MEDICINE CLINIC | Age: 68
End: 2018-01-04

## 2018-01-04 VITALS
DIASTOLIC BLOOD PRESSURE: 78 MMHG | SYSTOLIC BLOOD PRESSURE: 124 MMHG | TEMPERATURE: 97.5 F | WEIGHT: 136 LBS | HEART RATE: 94 BPM | BODY MASS INDEX: 26.56 KG/M2 | OXYGEN SATURATION: 95 %

## 2018-01-04 DIAGNOSIS — I26.99 OTHER ACUTE PULMONARY EMBOLISM WITHOUT ACUTE COR PULMONALE (HCC): Primary | ICD-10-CM

## 2018-01-04 PROCEDURE — 3017F COLORECTAL CA SCREEN DOC REV: CPT | Performed by: NURSE PRACTITIONER

## 2018-01-04 PROCEDURE — 99214 OFFICE O/P EST MOD 30 MIN: CPT | Performed by: NURSE PRACTITIONER

## 2018-01-04 PROCEDURE — 4040F PNEUMOC VAC/ADMIN/RCVD: CPT | Performed by: NURSE PRACTITIONER

## 2018-01-04 PROCEDURE — 1123F ACP DISCUSS/DSCN MKR DOCD: CPT | Performed by: NURSE PRACTITIONER

## 2018-01-04 PROCEDURE — 1111F DSCHRG MED/CURRENT MED MERGE: CPT | Performed by: NURSE PRACTITIONER

## 2018-01-04 PROCEDURE — G8484 FLU IMMUNIZE NO ADMIN: HCPCS | Performed by: NURSE PRACTITIONER

## 2018-01-04 PROCEDURE — 1090F PRES/ABSN URINE INCON ASSESS: CPT | Performed by: NURSE PRACTITIONER

## 2018-01-04 PROCEDURE — 3014F SCREEN MAMMO DOC REV: CPT | Performed by: NURSE PRACTITIONER

## 2018-01-04 PROCEDURE — G8399 PT W/DXA RESULTS DOCUMENT: HCPCS | Performed by: NURSE PRACTITIONER

## 2018-01-04 PROCEDURE — 1036F TOBACCO NON-USER: CPT | Performed by: NURSE PRACTITIONER

## 2018-01-04 PROCEDURE — G8417 CALC BMI ABV UP PARAM F/U: HCPCS | Performed by: NURSE PRACTITIONER

## 2018-01-04 PROCEDURE — G8427 DOCREV CUR MEDS BY ELIG CLIN: HCPCS | Performed by: NURSE PRACTITIONER

## 2018-01-04 ASSESSMENT — PATIENT HEALTH QUESTIONNAIRE - PHQ9
2. FEELING DOWN, DEPRESSED OR HOPELESS: 0
1. LITTLE INTEREST OR PLEASURE IN DOING THINGS: 0
SUM OF ALL RESPONSES TO PHQ QUESTIONS 1-9: 0
1. LITTLE INTEREST OR PLEASURE IN DOING THINGS: 0
SUM OF ALL RESPONSES TO PHQ QUESTIONS 1-9: 0
SUM OF ALL RESPONSES TO PHQ9 QUESTIONS 1 & 2: 0
SUM OF ALL RESPONSES TO PHQ9 QUESTIONS 1 & 2: 0
2. FEELING DOWN, DEPRESSED OR HOPELESS: 0

## 2018-01-04 NOTE — PROGRESS NOTES
\[facility] [place] [facility]\"" Keenan TENORIO   Home Medication Instructions HAROON:    Printed on:01/04/18 1214   Medication Information                      Adalimumab (HUMIRA) 20 MG/0.4ML KIT  Inject 1 vial into the skin every 14 days              albuterol (PROVENTIL) (2.5 MG/3ML) 0.083% nebulizer solution  Take 3 mLs by nebulization every 6 hours as needed for Wheezing Dx: COPD      ICD-10: J44.9             albuterol sulfate  (90 BASE) MCG/ACT inhaler  Inhale 2 puffs into the lungs every 6 hours as needed for Wheezing             alendronate (FOSAMAX) 70 MG tablet  Take 1 tablet by mouth every 7 days             amitriptyline (ELAVIL) 150 MG tablet  Take 0.5 tablets by mouth nightly             aspirin 81 MG chewable tablet  Take 81 mg by mouth daily. Last dose 3-24             budesonide-formoterol (SYMBICORT) 160-4.5 MCG/ACT AERO  Inhale 2 puffs into the lungs 2 times daily             guaiFENesin (MUCINEX) 600 MG extended release tablet  Take 1 tablet by mouth 2 times daily             lactobacillus (BACID) TABS  Take 1 tablet by mouth daily             Lift Chair MISC  by Does not apply route Please dispense a Lift Chair             Misc. Devices (ROLLER WALKER) MISC  1 each by Does not apply route daily Please dispense a walker with a seat             omeprazole (PRILOSEC) 20 MG delayed release capsule  Take 20 mg by mouth Daily             potassium chloride (KLOR-CON M20) 20 MEQ extended release tablet  Take 1 tablet by mouth 2 times daily             rivaroxaban 15 & 20 MG Starter Pack  15 mg PO bid X 21 days then 20 mg daily on Day 22             tiotropium (SPIRIVA RESPIMAT) 2.5 MCG/ACT AERS inhaler  Inhale 2 puffs into the lungs daily Please instruct on use.              vitamin D (ERGOCALCIFEROL) 04829 units capsule  Take 1 capsule by mouth once a week                   Medications marked \"taking\" at this time  Outpatient Prescriptions Marked as Taking for the 1/4/18 encounter (Office Visit) with Stefanie Nichole Silvana, CNP   Medication Sig Dispense Refill    rivaroxaban 15 & 20 MG Starter Pack 15 mg PO bid X 21 days then 20 mg daily on Day 22 1 Package 0    vitamin D (ERGOCALCIFEROL) 86383 units capsule Take 1 capsule by mouth once a week 30 capsule 0    omeprazole (PRILOSEC) 20 MG delayed release capsule Take 20 mg by mouth Daily      albuterol (PROVENTIL) (2.5 MG/3ML) 0.083% nebulizer solution Take 3 mLs by nebulization every 6 hours as needed for Wheezing Dx: COPD      ICD-10: J44.9 120 each 3    amitriptyline (ELAVIL) 150 MG tablet Take 0.5 tablets by mouth nightly 45 tablet 2    tiotropium (SPIRIVA RESPIMAT) 2.5 MCG/ACT AERS inhaler Inhale 2 puffs into the lungs daily Please instruct on use. 1 Inhaler 6    budesonide-formoterol (SYMBICORT) 160-4.5 MCG/ACT AERO Inhale 2 puffs into the lungs 2 times daily 1 Inhaler 6    Lift Chair MISC by Does not apply route Please dispense a Lift Chair 1 each 0    alendronate (FOSAMAX) 70 MG tablet Take 1 tablet by mouth every 7 days 12 tablet 3    albuterol sulfate  (90 BASE) MCG/ACT inhaler Inhale 2 puffs into the lungs every 6 hours as needed for Wheezing 1 Inhaler 3    guaiFENesin (MUCINEX) 600 MG extended release tablet Take 1 tablet by mouth 2 times daily 30 tablet 0    potassium chloride (KLOR-CON M20) 20 MEQ extended release tablet Take 1 tablet by mouth 2 times daily 60 tablet 5    Misc. Devices (ROLLER WALKER) MISC 1 each by Does not apply route daily Please dispense a walker with a seat 1 each 0    lactobacillus (BACID) TABS Take 1 tablet by mouth daily 30 tablet 0    Adalimumab (HUMIRA) 20 MG/0.4ML KIT Inject 1 vial into the skin every 14 days       aspirin 81 MG chewable tablet Take 81 mg by mouth daily.  Last dose 3-24 30 tablet 0        Medications patient taking as of now reconciled against medications ordered at time of hospital discharge ASA removed    Vitals:    01/04/18 1142   BP: 124/78   Pulse: 94   Temp: 97.5 °F (36.4 °C)   TempSrc: Oral

## 2018-01-04 NOTE — PATIENT INSTRUCTIONS
Start exercising walk hallway twice a day, increase as fatigue decreases  Increase water intake  Eat 3 meals a day

## 2018-01-15 ENCOUNTER — OFFICE VISIT (OUTPATIENT)
Dept: INTERNAL MEDICINE CLINIC | Age: 68
End: 2018-01-15

## 2018-01-15 VITALS
HEART RATE: 84 BPM | BODY MASS INDEX: 26.83 KG/M2 | RESPIRATION RATE: 16 BRPM | SYSTOLIC BLOOD PRESSURE: 122 MMHG | WEIGHT: 137.4 LBS | DIASTOLIC BLOOD PRESSURE: 68 MMHG | OXYGEN SATURATION: 88 %

## 2018-01-15 DIAGNOSIS — J96.11 CHRONIC RESPIRATORY FAILURE WITH HYPOXIA (HCC): ICD-10-CM

## 2018-01-15 DIAGNOSIS — J44.9 COPD, SEVERE (HCC): Primary | ICD-10-CM

## 2018-01-15 DIAGNOSIS — I26.99 BILATERAL PULMONARY EMBOLISM (HCC): ICD-10-CM

## 2018-01-15 PROBLEM — J44.1 COPD EXACERBATION (HCC): Status: RESOLVED | Noted: 2017-11-26 | Resolved: 2018-01-15

## 2018-01-15 PROCEDURE — G8399 PT W/DXA RESULTS DOCUMENT: HCPCS | Performed by: INTERNAL MEDICINE

## 2018-01-15 PROCEDURE — 99214 OFFICE O/P EST MOD 30 MIN: CPT | Performed by: INTERNAL MEDICINE

## 2018-01-15 PROCEDURE — G8417 CALC BMI ABV UP PARAM F/U: HCPCS | Performed by: INTERNAL MEDICINE

## 2018-01-15 PROCEDURE — 3014F SCREEN MAMMO DOC REV: CPT | Performed by: INTERNAL MEDICINE

## 2018-01-15 PROCEDURE — 1090F PRES/ABSN URINE INCON ASSESS: CPT | Performed by: INTERNAL MEDICINE

## 2018-01-15 PROCEDURE — 1123F ACP DISCUSS/DSCN MKR DOCD: CPT | Performed by: INTERNAL MEDICINE

## 2018-01-15 PROCEDURE — 4040F PNEUMOC VAC/ADMIN/RCVD: CPT | Performed by: INTERNAL MEDICINE

## 2018-01-15 PROCEDURE — G8427 DOCREV CUR MEDS BY ELIG CLIN: HCPCS | Performed by: INTERNAL MEDICINE

## 2018-01-15 PROCEDURE — 3023F SPIROM DOC REV: CPT | Performed by: INTERNAL MEDICINE

## 2018-01-15 PROCEDURE — G8926 SPIRO NO PERF OR DOC: HCPCS | Performed by: INTERNAL MEDICINE

## 2018-01-15 PROCEDURE — G8484 FLU IMMUNIZE NO ADMIN: HCPCS | Performed by: INTERNAL MEDICINE

## 2018-01-15 PROCEDURE — 1111F DSCHRG MED/CURRENT MED MERGE: CPT | Performed by: INTERNAL MEDICINE

## 2018-01-15 PROCEDURE — 1036F TOBACCO NON-USER: CPT | Performed by: INTERNAL MEDICINE

## 2018-01-15 PROCEDURE — 3017F COLORECTAL CA SCREEN DOC REV: CPT | Performed by: INTERNAL MEDICINE

## 2018-01-15 ASSESSMENT — ENCOUNTER SYMPTOMS
SHORTNESS OF BREATH: 1
EYE REDNESS: 0
EYE PAIN: 0
CHOKING: 0
COUGH: 0
WHEEZING: 1

## 2018-01-15 NOTE — PROGRESS NOTES
crainotomy- remove fluid    CHOLECYSTECTOMY      COLON SURGERY      resection    COLONOSCOPY  12/5/11    BIOPSY AND POLYPECTOMY    COLONOSCOPY  4-2-2012    and ablation ERBE/APC    SHOULDER SURGERY      L      Family History   Problem Relation Age of Onset    Heart Disease Mother     High Blood Pressure Mother     High Cholesterol Mother     Early Death Mother 36     MI   [de-identified] / Stillbirths Mother     Heart Disease Father     High Blood Pressure Father     High Cholesterol Father     Stroke Father 79    High Blood Pressure Sister     High Blood Pressure Brother      Social History     Social History    Marital status: Single     Spouse name: N/A    Number of children: 2    Years of education: 15     Occupational History    unemployed      disability [employer]     Social History Main Topics    Smoking status: Former Smoker     Packs/day: 0.25     Years: 30.00     Types: Cigarettes     Quit date: 2/26/2015    Smokeless tobacco: Never Used      Comment: started smoking at age 26 / smoked up to 9 cigarettes a day     Alcohol use No    Drug use: No    Sexual activity: Not Currently     Other Topics Concern    Not on file     Social History Narrative    No narrative on file      Vitals:    01/15/18 0955   BP: 122/68   Site: Left Arm   Position: Sitting   Pulse: 84   Resp: 16   SpO2: (!) 88%   Weight: 137 lb 6.4 oz (62.3 kg)      Wt Readings from Last 3 Encounters:   01/15/18 137 lb 6.4 oz (62.3 kg)   01/04/18 136 lb (61.7 kg)   12/31/17 140 lb 12.8 oz (63.9 kg)     BP Readings from Last 3 Encounters:   01/15/18 122/68   01/04/18 124/78   12/31/17 99/65     Body mass index is 26.83 kg/m². Facility age limit for growth percentiles is 20 years. Objective:   Physical Exam   Constitutional: She appears well-nourished. HENT:   Right Ear: External ear normal.   Left Ear: External ear normal.   Mouth/Throat: No oropharyngeal exudate.    Eyes: EOM are normal. Pupils are equal, round, and reactive to light. Right eye exhibits no discharge. Left eye exhibits no discharge. Neck: Normal range of motion. No JVD present. No tracheal deviation present. No thyromegaly present. Cardiovascular: Normal rate, regular rhythm and normal heart sounds. No murmur heard. Pulmonary/Chest: She has decreased breath sounds in the right upper field, the right middle field, the left upper field and the left middle field. She has wheezes in the right upper field and the left upper field. She has no rhonchi. She has no rales. Musculoskeletal: Normal range of motion. She exhibits no edema. Assessment:      The primary encounter diagnosis was COPD, severe ([address] Diagnoses of Bilateral pulmonary embolism (HCC) and Chronic respiratory failure with hypoxia ([address] were also pertinent to this visit. Plan:      1. Follow-up with Dr. Wilfred Hinkle  2. Get ct scan of chest prior to visit with dr. Breanna Chase  3. Take the spiriva and symbicort  4. Use albuterol as needed  5.   Continue the xarelto 20 mg (we are out of samples)

## 2018-01-17 ENCOUNTER — HOSPITAL ENCOUNTER (OUTPATIENT)
Dept: CT IMAGING | Age: 68
Discharge: HOME | End: 2018-01-17
Attending: INTERNAL MEDICINE | Admitting: INTERNAL MEDICINE

## 2018-01-17 DIAGNOSIS — Z87.891 PERSONAL HISTORY OF NICOTINE DEPENDENCE: ICD-10-CM

## 2018-01-17 DIAGNOSIS — Z87.891 PERSONAL HISTORY OF TOBACCO USE: ICD-10-CM

## 2018-01-18 ENCOUNTER — OFFICE VISIT (OUTPATIENT)
Dept: PULMONOLOGY | Age: 68
End: 2018-01-18

## 2018-01-18 VITALS
RESPIRATION RATE: 18 BRPM | BODY MASS INDEX: 26.9 KG/M2 | WEIGHT: 137 LBS | OXYGEN SATURATION: 91 % | SYSTOLIC BLOOD PRESSURE: 135 MMHG | HEART RATE: 92 BPM | HEIGHT: 60 IN | DIASTOLIC BLOOD PRESSURE: 83 MMHG

## 2018-01-18 DIAGNOSIS — Z09 HOSPITAL DISCHARGE FOLLOW-UP: Primary | ICD-10-CM

## 2018-01-18 DIAGNOSIS — R91.1 PULMONARY NODULE: ICD-10-CM

## 2018-01-18 DIAGNOSIS — J96.11 CHRONIC RESPIRATORY FAILURE WITH HYPOXIA (HCC): ICD-10-CM

## 2018-01-18 DIAGNOSIS — J44.9 COPD, SEVERE (HCC): ICD-10-CM

## 2018-01-18 DIAGNOSIS — I26.99 BILATERAL PULMONARY EMBOLISM (HCC): ICD-10-CM

## 2018-01-18 PROCEDURE — 3023F SPIROM DOC REV: CPT | Performed by: INTERNAL MEDICINE

## 2018-01-18 PROCEDURE — 4040F PNEUMOC VAC/ADMIN/RCVD: CPT | Performed by: INTERNAL MEDICINE

## 2018-01-18 PROCEDURE — G8399 PT W/DXA RESULTS DOCUMENT: HCPCS | Performed by: INTERNAL MEDICINE

## 2018-01-18 PROCEDURE — 1090F PRES/ABSN URINE INCON ASSESS: CPT | Performed by: INTERNAL MEDICINE

## 2018-01-18 PROCEDURE — 1036F TOBACCO NON-USER: CPT | Performed by: INTERNAL MEDICINE

## 2018-01-18 PROCEDURE — G8427 DOCREV CUR MEDS BY ELIG CLIN: HCPCS | Performed by: INTERNAL MEDICINE

## 2018-01-18 PROCEDURE — 99214 OFFICE O/P EST MOD 30 MIN: CPT | Performed by: INTERNAL MEDICINE

## 2018-01-18 PROCEDURE — 1123F ACP DISCUSS/DSCN MKR DOCD: CPT | Performed by: INTERNAL MEDICINE

## 2018-01-18 PROCEDURE — 3014F SCREEN MAMMO DOC REV: CPT | Performed by: INTERNAL MEDICINE

## 2018-01-18 PROCEDURE — 1111F DSCHRG MED/CURRENT MED MERGE: CPT | Performed by: INTERNAL MEDICINE

## 2018-01-18 PROCEDURE — G8484 FLU IMMUNIZE NO ADMIN: HCPCS | Performed by: INTERNAL MEDICINE

## 2018-01-18 PROCEDURE — G8417 CALC BMI ABV UP PARAM F/U: HCPCS | Performed by: INTERNAL MEDICINE

## 2018-01-18 PROCEDURE — G8926 SPIRO NO PERF OR DOC: HCPCS | Performed by: INTERNAL MEDICINE

## 2018-01-18 PROCEDURE — 3017F COLORECTAL CA SCREEN DOC REV: CPT | Performed by: INTERNAL MEDICINE

## 2018-01-18 NOTE — PROGRESS NOTES
 vitamin D (ERGOCALCIFEROL) 20548 units capsule Take 1 capsule by mouth once a week 30 capsule 0    omeprazole (PRILOSEC) 20 MG delayed release capsule Take 20 mg by mouth Daily      albuterol (PROVENTIL) (2.5 MG/3ML) 0.083% nebulizer solution Take 3 mLs by nebulization every 6 hours as needed for Wheezing Dx: COPD      ICD-10: J44.9 120 each 3    amitriptyline (ELAVIL) 150 MG tablet Take 0.5 tablets by mouth nightly 45 tablet 2    tiotropium (SPIRIVA RESPIMAT) 2.5 MCG/ACT AERS inhaler Inhale 2 puffs into the lungs daily Please instruct on use. 1 Inhaler 6    budesonide-formoterol (SYMBICORT) 160-4.5 MCG/ACT AERO Inhale 2 puffs into the lungs 2 times daily 1 Inhaler 6    Lift Chair MISC by Does not apply route Please dispense a Lift Chair 1 each 0    alendronate (FOSAMAX) 70 MG tablet Take 1 tablet by mouth every 7 days 12 tablet 3    albuterol sulfate  (90 BASE) MCG/ACT inhaler Inhale 2 puffs into the lungs every 6 hours as needed for Wheezing 1 Inhaler 3    guaiFENesin (MUCINEX) 600 MG extended release tablet Take 1 tablet by mouth 2 times daily 30 tablet 0    potassium chloride (KLOR-CON M20) 20 MEQ extended release tablet Take 1 tablet by mouth 2 times daily 60 tablet 5    Misc. Devices (ROLLER WALKER) MISC 1 each by Does not apply route daily Please dispense a walker with a seat 1 each 0    lactobacillus (BACID) TABS Take 1 tablet by mouth daily 30 tablet 0    Adalimumab (HUMIRA) 20 MG/0.4ML KIT Inject 1 vial into the skin every 14 days        No current facility-administered medications for this visit.         Family History   Problem Relation Age of Onset    Heart Disease Mother     High Blood Pressure Mother     High Cholesterol Mother     Early Death Mother 36     MI   [de-identified] / Stillbirths Mother     Heart Disease Father     High Blood Pressure Father     High Cholesterol Father     Stroke Father 79    High Blood Pressure Sister     High Blood Pressure Brother repeat CT scan of the chest in 6 months  4. She is on Xarelto she probably will stay on it indefinitely due to recurrent episodes of PE, explained to her side effects and risk of bleeding  5. Continue current inhaled regimen: Symbicort 160 BID, Spiriva daily and Albuterol. 6.  I reminded her to follow hiatal hernia instructions  7. To restart pulmonary rehab  8.  RTC in 6 months, sooner if needed

## 2018-01-18 NOTE — COMMUNICATION BODY
Assessment:     Assessment:    1. Hospital discharge follow-up     2. Chronic respiratory failure with hypoxia (HCC)     3. COPD, severe ([address]     4. Bilateral pulmonary embolism ([address]     5. Pulmonary nodule  CT Chest WO Contrast           Plan:     Plan:    1. I reviewed her hospital stay and treatment provided  2. I reviewed her Last CAT scan of the chest and explained findings on the monitor  3. Will plan repeat CT scan of the chest in 6 months  4. She is on Xarelto she probably will stay on it indefinitely due to recurrent episodes of PE, explained to her side effects and risk of bleeding  5. Continue current inhaled regimen: Symbicort 160 BID, Spiriva daily and Albuterol. 6.  I reminded her to follow hiatal hernia instructions  7. To restart pulmonary rehab  8.  RTC in 6 months, sooner if needed

## 2018-01-22 ENCOUNTER — TELEPHONE (OUTPATIENT)
Dept: PULMONOLOGY | Age: 68
End: 2018-01-22

## 2018-01-22 NOTE — TELEPHONE ENCOUNTER
Mrs. Keaton Cintron called from [facility] regarding a pre-authorization for Low Dose Ct scan for cancer screen ing.  [phone]

## 2018-01-24 ENCOUNTER — TELEPHONE (OUTPATIENT)
Dept: PULMONOLOGY | Age: 68
End: 2018-01-24

## 2018-02-01 ENCOUNTER — HOSPITAL ENCOUNTER (OUTPATIENT)
Dept: OTHER | Age: 68
Discharge: HOME | End: 2018-02-28
Attending: INTERNAL MEDICINE | Admitting: INTERNAL MEDICINE

## 2018-02-15 ENCOUNTER — TELEPHONE (OUTPATIENT)
Dept: PULMONOLOGY | Age: 68
End: 2018-02-15

## 2018-02-19 RX ORDER — ALBUTEROL SULFATE 2.5 MG/3ML
2.5 SOLUTION RESPIRATORY (INHALATION) EVERY 6 HOURS PRN
Qty: 360 ML | Refills: 6 | Status: SHIPPED | OUTPATIENT
Start: 2018-02-19 | End: 2019-08-13 | Stop reason: SDUPTHER

## 2018-03-01 ENCOUNTER — HOSPITAL ENCOUNTER (OUTPATIENT)
Dept: OTHER | Age: 68
Discharge: HOME | End: 2018-03-31
Attending: INTERNAL MEDICINE | Admitting: INTERNAL MEDICINE

## 2018-03-05 RX ORDER — AMITRIPTYLINE HYDROCHLORIDE 150 MG/1
75 TABLET, FILM COATED ORAL NIGHTLY
Qty: 45 TABLET | Refills: 2 | Status: SHIPPED | OUTPATIENT
Start: 2018-03-05 | End: 2018-11-25 | Stop reason: SDUPTHER

## 2018-03-15 ENCOUNTER — OFFICE VISIT (OUTPATIENT)
Dept: INTERNAL MEDICINE CLINIC | Age: 68
End: 2018-03-15

## 2018-03-15 VITALS
TEMPERATURE: 97.9 F | SYSTOLIC BLOOD PRESSURE: 142 MMHG | DIASTOLIC BLOOD PRESSURE: 92 MMHG | OXYGEN SATURATION: 95 % | BODY MASS INDEX: 26.76 KG/M2 | WEIGHT: 137 LBS | HEART RATE: 79 BPM

## 2018-03-15 DIAGNOSIS — J44.1 ACUTE EXACERBATION OF CHRONIC OBSTRUCTIVE PULMONARY DISEASE (COPD) (HCC): Primary | ICD-10-CM

## 2018-03-15 PROCEDURE — G8427 DOCREV CUR MEDS BY ELIG CLIN: HCPCS | Performed by: INTERNAL MEDICINE

## 2018-03-15 PROCEDURE — G8399 PT W/DXA RESULTS DOCUMENT: HCPCS | Performed by: INTERNAL MEDICINE

## 2018-03-15 PROCEDURE — 3017F COLORECTAL CA SCREEN DOC REV: CPT | Performed by: INTERNAL MEDICINE

## 2018-03-15 PROCEDURE — 1036F TOBACCO NON-USER: CPT | Performed by: INTERNAL MEDICINE

## 2018-03-15 PROCEDURE — 1123F ACP DISCUSS/DSCN MKR DOCD: CPT | Performed by: INTERNAL MEDICINE

## 2018-03-15 PROCEDURE — G8417 CALC BMI ABV UP PARAM F/U: HCPCS | Performed by: INTERNAL MEDICINE

## 2018-03-15 PROCEDURE — G8926 SPIRO NO PERF OR DOC: HCPCS | Performed by: INTERNAL MEDICINE

## 2018-03-15 PROCEDURE — 4040F PNEUMOC VAC/ADMIN/RCVD: CPT | Performed by: INTERNAL MEDICINE

## 2018-03-15 PROCEDURE — 3023F SPIROM DOC REV: CPT | Performed by: INTERNAL MEDICINE

## 2018-03-15 PROCEDURE — G8482 FLU IMMUNIZE ORDER/ADMIN: HCPCS | Performed by: INTERNAL MEDICINE

## 2018-03-15 PROCEDURE — 3014F SCREEN MAMMO DOC REV: CPT | Performed by: INTERNAL MEDICINE

## 2018-03-15 PROCEDURE — 99214 OFFICE O/P EST MOD 30 MIN: CPT | Performed by: INTERNAL MEDICINE

## 2018-03-15 PROCEDURE — 1090F PRES/ABSN URINE INCON ASSESS: CPT | Performed by: INTERNAL MEDICINE

## 2018-03-15 RX ORDER — PREDNISONE 20 MG/1
20 TABLET ORAL DAILY
Qty: 5 TABLET | Refills: 0 | Status: SHIPPED | OUTPATIENT
Start: 2018-03-15 | End: 2018-03-20

## 2018-03-15 RX ORDER — ALBUTEROL SULFATE 90 UG/1
2 AEROSOL, METERED RESPIRATORY (INHALATION) EVERY 6 HOURS PRN
Qty: 1 INHALER | Refills: 3 | Status: SHIPPED | OUTPATIENT
Start: 2018-03-15 | End: 2020-09-14

## 2018-03-15 ASSESSMENT — ENCOUNTER SYMPTOMS
DIFFICULTY BREATHING: 1
WHEEZING: 1

## 2018-03-15 ASSESSMENT — COPD QUESTIONNAIRES: COPD: 1

## 2018-03-15 NOTE — PROGRESS NOTES
 High Blood Pressure Sister     High Blood Pressure Brother      Social History     Social History    Marital status: Single     Spouse name: N/A    Number of children: 2    Years of education: 15     Occupational History    unemployed      disability [employer]     Social History Main Topics    Smoking status: Former Smoker     Packs/day: 0.25     Years: 30.00     Types: Cigarettes     Quit date: 2/26/2015    Smokeless tobacco: Never Used      Comment: started smoking at age 26 / smoked up to 9 cigarettes a day     Alcohol use No    Drug use: No    Sexual activity: Not Currently     Other Topics Concern    Not on file     Social History Narrative    No narrative on file      Vitals:    03/15/18 0840 03/15/18 0845   BP: (!) 144/90 (!) 142/92   Site: Right Arm Right Arm   Position: Sitting Sitting   Cuff Size: Medium Adult Medium Adult   Pulse: 79    Temp: 97.9 °F (36.6 °C)    TempSrc: Oral    SpO2: 95%    Weight: 137 lb (62.1 kg)       Wt Readings from Last 3 Encounters:   03/15/18 137 lb (62.1 kg)   01/18/18 137 lb (62.1 kg)   01/15/18 137 lb 6.4 oz (62.3 kg)     BP Readings from Last 3 Encounters:   03/15/18 (!) 142/92   01/18/18 135/83   01/15/18 122/68     Body mass index is 26.76 kg/m². Facility age limit for growth percentiles is 20 years. Objective:   Physical Exam   Constitutional: She appears well-nourished. HENT:   Right Ear: External ear normal.   Left Ear: External ear normal.   Eyes: EOM are normal. Pupils are equal, round, and reactive to light. Right eye exhibits no discharge. Left eye exhibits no discharge. Neck: Normal range of motion. No JVD present. No tracheal deviation present. No thyromegaly present. Cardiovascular: Normal rate, regular rhythm and normal heart sounds. Exam reveals no friction rub. No murmur heard. Pulmonary/Chest: She has no decreased breath sounds. She has no wheezes. She has rhonchi in the right lower field and the left lower field.

## 2018-03-27 ENCOUNTER — OFFICE VISIT (OUTPATIENT)
Dept: INTERNAL MEDICINE CLINIC | Age: 68
End: 2018-03-27

## 2018-03-27 VITALS
DIASTOLIC BLOOD PRESSURE: 88 MMHG | OXYGEN SATURATION: 90 % | SYSTOLIC BLOOD PRESSURE: 138 MMHG | HEART RATE: 82 BPM | BODY MASS INDEX: 27.15 KG/M2 | WEIGHT: 139 LBS

## 2018-03-27 DIAGNOSIS — J44.9 COPD, SEVERE (HCC): Primary | ICD-10-CM

## 2018-03-27 DIAGNOSIS — S39.012D STRAIN OF LUMBAR REGION, SUBSEQUENT ENCOUNTER: ICD-10-CM

## 2018-03-27 PROCEDURE — G8399 PT W/DXA RESULTS DOCUMENT: HCPCS | Performed by: INTERNAL MEDICINE

## 2018-03-27 PROCEDURE — G8482 FLU IMMUNIZE ORDER/ADMIN: HCPCS | Performed by: INTERNAL MEDICINE

## 2018-03-27 PROCEDURE — G8926 SPIRO NO PERF OR DOC: HCPCS | Performed by: INTERNAL MEDICINE

## 2018-03-27 PROCEDURE — 1090F PRES/ABSN URINE INCON ASSESS: CPT | Performed by: INTERNAL MEDICINE

## 2018-03-27 PROCEDURE — 99214 OFFICE O/P EST MOD 30 MIN: CPT | Performed by: INTERNAL MEDICINE

## 2018-03-27 PROCEDURE — 1036F TOBACCO NON-USER: CPT | Performed by: INTERNAL MEDICINE

## 2018-03-27 PROCEDURE — 1123F ACP DISCUSS/DSCN MKR DOCD: CPT | Performed by: INTERNAL MEDICINE

## 2018-03-27 PROCEDURE — 3023F SPIROM DOC REV: CPT | Performed by: INTERNAL MEDICINE

## 2018-03-27 PROCEDURE — G8417 CALC BMI ABV UP PARAM F/U: HCPCS | Performed by: INTERNAL MEDICINE

## 2018-03-27 PROCEDURE — 3017F COLORECTAL CA SCREEN DOC REV: CPT | Performed by: INTERNAL MEDICINE

## 2018-03-27 PROCEDURE — 3014F SCREEN MAMMO DOC REV: CPT | Performed by: INTERNAL MEDICINE

## 2018-03-27 PROCEDURE — 4040F PNEUMOC VAC/ADMIN/RCVD: CPT | Performed by: INTERNAL MEDICINE

## 2018-03-27 PROCEDURE — G8427 DOCREV CUR MEDS BY ELIG CLIN: HCPCS | Performed by: INTERNAL MEDICINE

## 2018-03-27 RX ORDER — TIZANIDINE 4 MG/1
4 TABLET ORAL 3 TIMES DAILY
Qty: 30 TABLET | Refills: 0 | Status: SHIPPED | OUTPATIENT
Start: 2018-03-27 | End: 2018-08-01 | Stop reason: SDUPTHER

## 2018-03-27 NOTE — PROGRESS NOTES
Subjective:      Patient ID: Ethel Dow is a 78 y.o. female. HPI  Patient comes in for copd:    COPD:  Current treatment includes combined beta agonist/steroid inhaler, anticholinergic inhaler. Residual symptoms: dyspnea after 1/2 blocks and dyspnea after 5 flights of stairs. She denies fever, nasal congestion, clear nasal discharge, sneezing. She requires her rescue inhaler 2 time(s) per day. Back:  Patient has lumbar paraspinal tenderness. The pain has been going on about 1 month. Back pain was better on the steroids. Now, states pain is slightly improved. She is on a blood thinner and cannot take nsaids.       Review of Systems   Past Medical History:   Diagnosis Date    Bullous emphysema ([address]     COPD (chronic obstructive pulmonary disease) ([address]     PFT done 7 years ago   [facility] Crohn's disease ([facility]     Crohn's disease    Depression 1/30/2011    pt states resolved as of 3-26-12    DVT (deep venous thrombosis) ([address]     2012; first ocurrence     Hiatal hernia     Kidney disease     Osteoporosis     Peripheral neuropathy (HCC)     neuropathy in legs    Pneumonia     Postmenopausal     LMP in  40's    Pulmonary embolism ([address]     2012; first ocurrence    Sepsis ([address]     Syringomyelia ([address]      Past Surgical History:   Procedure Laterality Date    ABDOMEN SURGERY      BACK SURGERY      shunt to drain CSF    BRAIN SURGERY      crainotomy- remove fluid    CHOLECYSTECTOMY      COLON SURGERY      resection    COLONOSCOPY  12/5/11    BIOPSY AND POLYPECTOMY    COLONOSCOPY  4-2-2012    and ablation ERBE/APC    SHOULDER SURGERY      L      Family History   Problem Relation Age of Onset    Heart Disease Mother     High Blood Pressure Mother     High Cholesterol Mother     Early Death Mother 36     [facility] Miscarriages / Stillbirths Mother     Heart Disease Father     High Blood Pressure Father     High Cholesterol Father     Stroke Father 79    High Blood Pressure Sister     High Blood Pressure Brother      Social History     Social History    Marital status: Single     Spouse name: N/A    Number of children: 2    Years of education: 15     Occupational History    unemployed      disability [employer]     Social History Main Topics    Smoking status: Former Smoker     Packs/day: 0.25     Years: 30.00     Types: Cigarettes     Quit date: 2/26/2015    Smokeless tobacco: Never Used      Comment: started smoking at age 26 / smoked up to 9 cigarettes a day     Alcohol use No    Drug use: No    Sexual activity: Not Currently     Other Topics Concern    Not on file     Social History Narrative    No narrative on file      Vitals:    03/27/18 0819   BP: 138/88   Site: Left Arm   Position: Sitting   Cuff Size: Medium Adult   Pulse: 82   SpO2: 90%   Weight: 139 lb (63 kg)      Wt Readings from Last 3 Encounters:   03/27/18 139 lb (63 kg)   03/15/18 137 lb (62.1 kg)   01/18/18 137 lb (62.1 kg)     BP Readings from Last 3 Encounters:   03/27/18 138/88   03/15/18 (!) 142/92   01/18/18 135/83     Body mass index is 27.15 kg/m². Facility age limit for growth percentiles is 20 years. Objective:   Physical Exam   Constitutional: She is oriented to person, place, and time. She appears well-nourished. No distress. HENT:   Right Ear: External ear normal.   Left Ear: External ear normal.   Eyes: EOM are normal. Pupils are equal, round, and reactive to light. Right eye exhibits no discharge. Left eye exhibits no discharge. Neck: Normal range of motion. Neck supple. No JVD present. No tracheal deviation present. No thyromegaly present. Cardiovascular: Normal rate, regular rhythm and normal heart sounds. No murmur heard. Pulmonary/Chest: She has decreased breath sounds in the right upper field, the right middle field, the left upper field and the left middle field. She has no wheezes. She has no rhonchi. Neurological: She is alert and oriented to person, place, and time.  No cranial nerve

## 2018-04-01 ENCOUNTER — HOSPITAL ENCOUNTER (OUTPATIENT)
Dept: OTHER | Age: 68
Discharge: HOME | End: 2018-04-30
Attending: INTERNAL MEDICINE | Admitting: INTERNAL MEDICINE

## 2018-04-20 DIAGNOSIS — K50.113 CROHN'S DISEASE OF LARGE INTESTINE WITH FISTULA (HCC): Chronic | ICD-10-CM

## 2018-04-24 PROBLEM — J44.1 COPD EXACERBATION (HCC): Status: ACTIVE | Noted: 2018-04-24

## 2018-04-26 ENCOUNTER — TELEPHONE (OUTPATIENT)
Dept: PULMONOLOGY | Age: 68
End: 2018-04-26

## 2018-05-01 ENCOUNTER — HOSPITAL ENCOUNTER (OUTPATIENT)
Dept: OTHER | Age: 68
Discharge: HOME | End: 2018-05-31
Attending: INTERNAL MEDICINE | Admitting: INTERNAL MEDICINE

## 2018-05-02 ENCOUNTER — OFFICE VISIT (OUTPATIENT)
Dept: PULMONOLOGY | Age: 68
End: 2018-05-02

## 2018-05-02 ENCOUNTER — TELEPHONE (OUTPATIENT)
Dept: PULMONOLOGY | Age: 68
End: 2018-05-02

## 2018-05-02 VITALS
SYSTOLIC BLOOD PRESSURE: 145 MMHG | DIASTOLIC BLOOD PRESSURE: 90 MMHG | WEIGHT: 140 LBS | RESPIRATION RATE: 18 BRPM | BODY MASS INDEX: 27.48 KG/M2 | OXYGEN SATURATION: 92 % | HEART RATE: 94 BPM | HEIGHT: 60 IN

## 2018-05-02 DIAGNOSIS — Z09 HOSPITAL DISCHARGE FOLLOW-UP: Primary | ICD-10-CM

## 2018-05-02 DIAGNOSIS — J43.2 CENTRILOBULAR EMPHYSEMA (HCC): ICD-10-CM

## 2018-05-02 DIAGNOSIS — R06.02 SHORTNESS OF BREATH: ICD-10-CM

## 2018-05-02 DIAGNOSIS — J44.9 COPD, SEVERE (HCC): ICD-10-CM

## 2018-05-02 DIAGNOSIS — K44.9 HIATAL HERNIA: ICD-10-CM

## 2018-05-02 PROCEDURE — G8399 PT W/DXA RESULTS DOCUMENT: HCPCS | Performed by: INTERNAL MEDICINE

## 2018-05-02 PROCEDURE — 99214 OFFICE O/P EST MOD 30 MIN: CPT | Performed by: INTERNAL MEDICINE

## 2018-05-02 PROCEDURE — 1123F ACP DISCUSS/DSCN MKR DOCD: CPT | Performed by: INTERNAL MEDICINE

## 2018-05-02 PROCEDURE — 1036F TOBACCO NON-USER: CPT | Performed by: INTERNAL MEDICINE

## 2018-05-02 PROCEDURE — G8417 CALC BMI ABV UP PARAM F/U: HCPCS | Performed by: INTERNAL MEDICINE

## 2018-05-02 PROCEDURE — 3023F SPIROM DOC REV: CPT | Performed by: INTERNAL MEDICINE

## 2018-05-02 PROCEDURE — 1090F PRES/ABSN URINE INCON ASSESS: CPT | Performed by: INTERNAL MEDICINE

## 2018-05-02 PROCEDURE — 4040F PNEUMOC VAC/ADMIN/RCVD: CPT | Performed by: INTERNAL MEDICINE

## 2018-05-02 PROCEDURE — G8427 DOCREV CUR MEDS BY ELIG CLIN: HCPCS | Performed by: INTERNAL MEDICINE

## 2018-05-02 PROCEDURE — 1111F DSCHRG MED/CURRENT MED MERGE: CPT | Performed by: INTERNAL MEDICINE

## 2018-05-02 PROCEDURE — 3017F COLORECTAL CA SCREEN DOC REV: CPT | Performed by: INTERNAL MEDICINE

## 2018-05-02 PROCEDURE — G8926 SPIRO NO PERF OR DOC: HCPCS | Performed by: INTERNAL MEDICINE

## 2018-05-02 RX ORDER — ARFORMOTEROL TARTRATE 15 UG/2ML
1 SOLUTION RESPIRATORY (INHALATION) 2 TIMES DAILY
Qty: 120 ML | Refills: 6 | Status: SHIPPED | OUTPATIENT
Start: 2018-05-02 | End: 2018-09-25

## 2018-05-02 RX ORDER — BUDESONIDE 0.5 MG/2ML
500 INHALANT ORAL 2 TIMES DAILY
Qty: 60 AMPULE | Refills: 6 | Status: SHIPPED | OUTPATIENT
Start: 2018-05-02 | End: 2019-01-28 | Stop reason: ALTCHOICE

## 2018-05-04 ENCOUNTER — TELEPHONE (OUTPATIENT)
Dept: PULMONOLOGY | Age: 68
End: 2018-05-04

## 2018-05-07 DIAGNOSIS — M81.0 OSTEOPOROSIS: ICD-10-CM

## 2018-05-07 RX ORDER — ALENDRONATE SODIUM 70 MG/1
70 TABLET ORAL
Qty: 12 TABLET | Refills: 3 | Status: SHIPPED | OUTPATIENT
Start: 2018-05-07 | End: 2019-04-02

## 2018-05-25 ENCOUNTER — HOSPITAL ENCOUNTER (OUTPATIENT)
Dept: NON INVASIVE DIAGNOSTICS | Age: 68
Discharge: HOME | End: 2018-05-25

## 2018-05-25 DIAGNOSIS — R06.02 SHORTNESS OF BREATH: ICD-10-CM

## 2018-05-25 LAB
LV EF: 55 %
LVEF MODALITY: NORMAL

## 2018-06-01 ENCOUNTER — HOSPITAL ENCOUNTER (OUTPATIENT)
Dept: OTHER | Age: 68
Discharge: HOME | End: 2018-06-30
Attending: INTERNAL MEDICINE | Admitting: INTERNAL MEDICINE

## 2018-07-16 ENCOUNTER — HOSPITAL ENCOUNTER (OUTPATIENT)
Dept: OTHER | Age: 68
Discharge: HOME | End: 2018-07-31
Attending: INTERNAL MEDICINE | Admitting: INTERNAL MEDICINE

## 2018-07-30 ENCOUNTER — OFFICE VISIT (OUTPATIENT)
Dept: PULMONOLOGY | Age: 68
End: 2018-07-30

## 2018-07-30 VITALS
OXYGEN SATURATION: 95 % | WEIGHT: 133 LBS | BODY MASS INDEX: 26.11 KG/M2 | HEART RATE: 86 BPM | SYSTOLIC BLOOD PRESSURE: 125 MMHG | DIASTOLIC BLOOD PRESSURE: 87 MMHG | HEIGHT: 60 IN | RESPIRATION RATE: 18 BRPM

## 2018-07-30 DIAGNOSIS — J43.2 CENTRILOBULAR EMPHYSEMA (HCC): ICD-10-CM

## 2018-07-30 DIAGNOSIS — K44.9 HIATAL HERNIA: ICD-10-CM

## 2018-07-30 DIAGNOSIS — J44.9 COPD, SEVERE (HCC): Primary | ICD-10-CM

## 2018-07-30 PROCEDURE — 4040F PNEUMOC VAC/ADMIN/RCVD: CPT | Performed by: INTERNAL MEDICINE

## 2018-07-30 PROCEDURE — 3023F SPIROM DOC REV: CPT | Performed by: INTERNAL MEDICINE

## 2018-07-30 PROCEDURE — G8399 PT W/DXA RESULTS DOCUMENT: HCPCS | Performed by: INTERNAL MEDICINE

## 2018-07-30 PROCEDURE — 3017F COLORECTAL CA SCREEN DOC REV: CPT | Performed by: INTERNAL MEDICINE

## 2018-07-30 PROCEDURE — 1036F TOBACCO NON-USER: CPT | Performed by: INTERNAL MEDICINE

## 2018-07-30 PROCEDURE — G8417 CALC BMI ABV UP PARAM F/U: HCPCS | Performed by: INTERNAL MEDICINE

## 2018-07-30 PROCEDURE — 1101F PT FALLS ASSESS-DOCD LE1/YR: CPT | Performed by: INTERNAL MEDICINE

## 2018-07-30 PROCEDURE — 1123F ACP DISCUSS/DSCN MKR DOCD: CPT | Performed by: INTERNAL MEDICINE

## 2018-07-30 PROCEDURE — 1090F PRES/ABSN URINE INCON ASSESS: CPT | Performed by: INTERNAL MEDICINE

## 2018-07-30 PROCEDURE — 99213 OFFICE O/P EST LOW 20 MIN: CPT | Performed by: INTERNAL MEDICINE

## 2018-07-30 PROCEDURE — G8926 SPIRO NO PERF OR DOC: HCPCS | Performed by: INTERNAL MEDICINE

## 2018-07-30 PROCEDURE — G8427 DOCREV CUR MEDS BY ELIG CLIN: HCPCS | Performed by: INTERNAL MEDICINE

## 2018-07-30 RX ORDER — BUDESONIDE AND FORMOTEROL FUMARATE DIHYDRATE 160; 4.5 UG/1; UG/1
2 AEROSOL RESPIRATORY (INHALATION) 2 TIMES DAILY
COMMUNITY
End: 2018-09-21 | Stop reason: SDUPTHER

## 2018-07-30 NOTE — LETTER
[facility], [address] [facility], [address] [address]  Phone: [phone]  Fax: [phone]      July 30, 2018       Patient: Jose Cardenas   MR Number: [id number]   YOB: 1950   Date of Visit: 7/30/2018       Dear Severo Mannan, MD      I saw Mrs. Cris Magaña today for follow-up visit. Below are the relevant portions of my assessment and plan of care. Assessment:     Diagnosis Orders   1. COPD, severe ([address]     2. Centrilobular emphysema ([address]     3. Hiatal hernia           Plan:    1. She has been doing well over the last 3 months  2. She was unable to switch Symbicort to Pulmicort/Perforomist due to insurance coverage  3. Plan for repeat CT scan of the chest in December 2018 in regard to the nodule  4. She is on Xarelto she probably will stay on it indefinitely due to recurrent episodes of PE  5. Her inhaled regimen will be Symbicort 160 twice a day, Spiriva daily and Albuterol HFA when necessary  6. I encouraged her to follow hiatal hernia instructions  7. Use albuterol nebulizer treatment twice a day and as needed  8. RTC in 6 months      If you have questions, please do not hesitate to call me. I look forward to following Kathy Estrella along with you.     Sincerely,        Jasbir Chavez MD    CC providers:  Jacky Samson, [address] [place] [id number]  VIA [facility] [place]

## 2018-07-30 NOTE — PROGRESS NOTES
Jose Roche is 78 y.o. female here for follow-up visit, COPD and chronic respiratory failure  She denies any hospitalization due to respiratory illness or exacerbation since last visit 3 months ago  She was unable to m  She denies any productive cough, fever, chills, diaphoresis or hemoptysis  Her CT scan of the chest showed no active pneumonia or PE  CT was read as  EXAMINATION:   CT OF THE CHEST WITHOUT CONTRAST 4/24/2018 4:35 pm       TECHNIQUE:   CT of the chest was performed without the administration of intravenous   contrast. Multiplanar reformatted images are provided for review. Dose   modulation, iterative reconstruction, and/or weight based adjustment of the   mA/kV was utilized to reduce the radiation dose to as low as reasonably   achievable.       COMPARISON:   01/17/2018       HISTORY:   ORDERING PHYSICIAN PROVIDED HISTORY: sob   TECHNOLOGIST PROVIDED HISTORY:   Technologist Provided Reason for Exam: sob   Acuity: Unknown   Type of Encounter: Unknown       FINDINGS:   Mediastinum: Visualized portions of the thyroid gland are unremarkable. There is a conventional branching pattern to the great vessels.  Densely   calcified mediastinal and hilar nodes are identified.  The heart is normal in   size without pericardial fluid collection.       Lungs/pleura: Centrilobular and paraseptal emphysematous changes are   identified with bilateral upper lobe predominance.  Mild subpleural   atelectasis/scarring predominantly within the right lung base.  No discrete   pleural effusion, focal airspace consolidation, or pneumothorax.  Central   airways are patent and clear.  1.1 cm juxtapleural nodule within the lingula   remains essentially unchanged as compared to prior examination.       Upper Abdomen: Limited evaluation of the upper abdomen is unremarkable.       Soft Tissues/Bones: Exaggerated kyphotic curvature of the thoracic spine.    Linear hyperdense structure within the spinal canal at the midthoracic spine,   possibly representing retained lead from spinal stimulator device placement.           Impression   Stable 1.1 cm lobe juxtapleural lingular nodular opacity.  Follow-up as   described on previous CT report of 01/17/2018.       Paraseptal and centrilobular emphysematous changes.  No focal airspace   consolidation, pleural effusion, or pneumothorax.       Previous granulomatous exposure.       Linear hyperdense structure within the spinal canal within the mid thoracic   spine, similar to previous examination, likely representing retained L   catheter from previous device placement.  Correlation for clinical history of   spinal device placement suggested.           He has been on Xarelto for recurrent pulmonary embolus  She was seen initially for Pulmonary Medicine consultation referred by Dr. Chencho Srivastava for evaluation of COPD  She has hx of COPD and continued to smoke until 1-2015   She is on Spiriva daily and Albuterol.  She was unable to stay on Daliresp due to insurance coverage  She is still with significant second hand smoking exposure  Her last PFT 2-2017 showed significant improvement    Past Medical History:   Diagnosis Date    Bullous emphysema ([address]     COPD (chronic obstructive pulmonary disease) ([address]     PFT done 7 years ago   Gianna Loja Crohn's disease ([address]     Crohn's disease    Depression 1/30/2011    pt states resolved as of 3-26-12    DVT (deep venous thrombosis) ([address]     2012; first ocurrence     Hiatal hernia     Kidney disease     Osteoporosis     Peripheral neuropathy ([address]     neuropathy in legs    Pneumonia     Postmenopausal     LMP in  42's    Pulmonary embolism ([address]     2012; first ocurrence    Sepsis ([address]     Syringomyelia ([address]      Current Outpatient Prescriptions   Medication Sig Dispense Refill    alendronate (FOSAMAX) 70 MG tablet TAKE 1 TABLET BY MOUTH EVERY 7 DAYS 12 tablet 3    budesonide (PULMICORT) 0.5 MG/2ML nebulizer suspension Take 2 mLs by nebulization 2 times daily Dx: COPD   ICD-10: J44.9 60 ampule 6    Arformoterol Tartrate (BROVANA) 15 MCG/2ML NEBU Take 1 ampule by nebulization 2 times daily Dx: COPD   ICD-10: J44.9 120 mL 6    tiotropium (SPIRIVA RESPIMAT) 2.5 MCG/ACT AERS inhaler Inhale 2 puffs into the lungs daily Please instruct on use. 1 Inhaler 5    tiZANidine (ZANAFLEX) 4 MG tablet Take 1 tablet by mouth 3 times daily 30 tablet 0    albuterol sulfate  (90 Base) MCG/ACT inhaler Inhale 2 puffs into the lungs every 6 hours as needed for Wheezing 1 Inhaler 3    amitriptyline (ELAVIL) 150 MG tablet TAKE 0.5 TABLETS BY MOUTH NIGHTLY 45 tablet 2    albuterol (PROVENTIL) (2.5 MG/3ML) 0.083% nebulizer solution TAKE 3 MLS BY NEBULIZATION EVERY 6 HOURS AS NEEDED FOR WHEEZING DX: COPD ICD-10: J44.9 360 mL 6    rivaroxaban (XARELTO) 20 MG TABS tablet Take 1 tablet by mouth every 24 hours Patient is not a coumadin candidate. 30 tablet 11    vitamin D (ERGOCALCIFEROL) 05474 units capsule Take 1 capsule by mouth once a week 30 capsule 0    omeprazole (PRILOSEC) 20 MG delayed release capsule Take 20 mg by mouth Daily      Lift Chair MISC by Does not apply route Please dispense a Lift Chair 1 each 0    guaiFENesin (MUCINEX) 600 MG extended release tablet Take 1 tablet by mouth 2 times daily 30 tablet 0    potassium chloride (KLOR-CON M20) 20 MEQ extended release tablet Take 1 tablet by mouth 2 times daily 60 tablet 5    Misc. Devices (ROLLER WALKER) MISC 1 each by Does not apply route daily Please dispense a walker with a seat 1 each 0    lactobacillus (BACID) TABS Take 1 tablet by mouth daily 30 tablet 0    Adalimumab (HUMIRA) 20 MG/0.4ML KIT Inject 1 vial into the skin every 14 days        No current facility-administered medications for this visit.         Family History   Problem Relation Age of Onset    Heart Disease Mother     High Blood Pressure Mother     High Cholesterol Mother     Early Death Mother 36        MI    Hood Starr / Mauro

## 2018-08-01 ENCOUNTER — TELEPHONE (OUTPATIENT)
Dept: INTERNAL MEDICINE CLINIC | Age: 68
End: 2018-08-01

## 2018-08-01 ENCOUNTER — OFFICE VISIT (OUTPATIENT)
Dept: INTERNAL MEDICINE CLINIC | Age: 68
End: 2018-08-01

## 2018-08-01 ENCOUNTER — HOSPITAL ENCOUNTER (OUTPATIENT)
Dept: OTHER | Age: 68
Discharge: HOME | End: 2018-08-31
Attending: INTERNAL MEDICINE | Admitting: INTERNAL MEDICINE

## 2018-08-01 VITALS
WEIGHT: 133 LBS | HEART RATE: 86 BPM | OXYGEN SATURATION: 93 % | BODY MASS INDEX: 25.97 KG/M2 | SYSTOLIC BLOOD PRESSURE: 130 MMHG | DIASTOLIC BLOOD PRESSURE: 76 MMHG

## 2018-08-01 DIAGNOSIS — Z23 NEED FOR SHINGLES VACCINE: ICD-10-CM

## 2018-08-01 DIAGNOSIS — J43.2 CENTRILOBULAR EMPHYSEMA (HCC): Primary | ICD-10-CM

## 2018-08-01 DIAGNOSIS — S39.012D STRAIN OF LUMBAR REGION, SUBSEQUENT ENCOUNTER: ICD-10-CM

## 2018-08-01 DIAGNOSIS — Z23 NEED FOR DIPHTHERIA-TETANUS-PERTUSSIS (TDAP) VACCINE, ADULT/ADOLESCENT: ICD-10-CM

## 2018-08-01 DIAGNOSIS — M81.0 OSTEOPOROSIS OF MULTIPLE SITES: ICD-10-CM

## 2018-08-01 PROBLEM — J44.1 COPD EXACERBATION (HCC): Status: RESOLVED | Noted: 2018-04-24 | Resolved: 2018-08-01

## 2018-08-01 PROCEDURE — G8427 DOCREV CUR MEDS BY ELIG CLIN: HCPCS | Performed by: INTERNAL MEDICINE

## 2018-08-01 PROCEDURE — 1090F PRES/ABSN URINE INCON ASSESS: CPT | Performed by: INTERNAL MEDICINE

## 2018-08-01 PROCEDURE — G8926 SPIRO NO PERF OR DOC: HCPCS | Performed by: INTERNAL MEDICINE

## 2018-08-01 PROCEDURE — 1123F ACP DISCUSS/DSCN MKR DOCD: CPT | Performed by: INTERNAL MEDICINE

## 2018-08-01 PROCEDURE — 1036F TOBACCO NON-USER: CPT | Performed by: INTERNAL MEDICINE

## 2018-08-01 PROCEDURE — 1101F PT FALLS ASSESS-DOCD LE1/YR: CPT | Performed by: INTERNAL MEDICINE

## 2018-08-01 PROCEDURE — 3023F SPIROM DOC REV: CPT | Performed by: INTERNAL MEDICINE

## 2018-08-01 PROCEDURE — 4040F PNEUMOC VAC/ADMIN/RCVD: CPT | Performed by: INTERNAL MEDICINE

## 2018-08-01 PROCEDURE — G8399 PT W/DXA RESULTS DOCUMENT: HCPCS | Performed by: INTERNAL MEDICINE

## 2018-08-01 PROCEDURE — 99214 OFFICE O/P EST MOD 30 MIN: CPT | Performed by: INTERNAL MEDICINE

## 2018-08-01 PROCEDURE — G8417 CALC BMI ABV UP PARAM F/U: HCPCS | Performed by: INTERNAL MEDICINE

## 2018-08-01 PROCEDURE — 3017F COLORECTAL CA SCREEN DOC REV: CPT | Performed by: INTERNAL MEDICINE

## 2018-08-01 RX ORDER — ERGOCALCIFEROL (VITAMIN D2) 1250 MCG
50000 CAPSULE ORAL WEEKLY
Qty: 30 CAPSULE | Refills: 0 | Status: SHIPPED | OUTPATIENT
Start: 2018-08-01 | End: 2018-08-01 | Stop reason: ALTCHOICE

## 2018-08-01 ASSESSMENT — ENCOUNTER SYMPTOMS
BACK PAIN: 1
COUGH: 1
SHORTNESS OF BREATH: 1

## 2018-08-01 NOTE — TELEPHONE ENCOUNTER
tiZANidine (ZANAFLEX) 4 MG tablet     Pt ask for a refill. Pharmacy [facility]    Pt states Dr. Maryuri Cintron wanted her to get her shingles shot at [facility] but they are out of the medication.

## 2018-08-01 NOTE — PROGRESS NOTES
Subjective:      Patient ID: Jessica Benavides is a 78 y.o. female. HPI  Patient comes in for copd and osteoporosis:    COPD:  Current treatment includes combined beta agonist/steroid inhaler, anticholinergic inhaler. Residual symptoms: chronic dyspnea and dyspnea after 1/2 blocks. She denies cough, fever, sinus pressure, sore throat. She requires her rescue inhaler 2 time(s) per day. Osteoporosis/osteopenia:  Current pharmacologic therapy and date started Fosamax (alendronate)- 70 mg. Medication side effects: GI symptoms- reflux. Current calcium intake is at least 1200 mg/day from diet and supplements: no - 0. She is currently taking 0IU/day of supplemental vitamin D. Regular weight-bearing exercise: no.    Vit D, 25-Hydroxy (ng/mL)   Date Value   05/08/2017 17.7 (L)       Review of Systems   Respiratory: Positive for cough and shortness of breath. Musculoskeletal: Positive for back pain.       Past Medical History:   Diagnosis Date    Bullous emphysema (HCC)     COPD (chronic obstructive pulmonary disease) ([facility]     PFT done 7 years ago   [address] [place] [address] Crohn's disease ([address]     Crohn's disease    Depression 1/30/2011    pt states resolved as of 3-26-12    DVT (deep venous thrombosis) ([address]     2012; first ocurrence     Hiatal hernia     Kidney disease     Osteoporosis     Peripheral neuropathy (HCC)     neuropathy in legs    Pneumonia     Postmenopausal     LMP in  40's    Pulmonary embolism ([address]     2012; first ocurrence    Sepsis ([address]     Syringomyelia ([address]      Past Surgical History:   Procedure Laterality Date    ABDOMEN SURGERY      BACK SURGERY      shunt to drain CSF    BRAIN SURGERY      crainotomy- remove fluid    CHOLECYSTECTOMY      COLON SURGERY      resection    COLONOSCOPY  12/5/11    BIOPSY AND POLYPECTOMY    COLONOSCOPY  4-2-2012    and ablation ERBE/APC    SHOULDER SURGERY      L      Family History   Problem Relation Age of Onset    Heart Disease Mother     High Blood decreased breath sounds in the right upper field, the right middle field, the left upper field, the left middle field and the left lower field. Abdominal: Soft. Bowel sounds are normal. She exhibits no distension. There is no tenderness. There is no rebound and no guarding. DEXA scan, most current DEXA scan T-score is minus 3.1 and 3.6 in both hips and -2.8 in the lumbar spine  (reviewed from 201)      Assessment/Plan:  Shade Bernabe was seen today for copd. Diagnoses and all orders for this visit:    Centrilobular emphysema ([address]- stable  -  Continue symbicort and spiriva  -  Appreciate input from Dr. Lacy Ennis  -  Follow-up ct scan for nodule  -    Osteoporosis of multiple sites-worsening  -     Discontinue: ergocalciferol (ERGOCALCIFEROL) 17259 units capsule; Take 1 capsule by mouth once a week  -     DEXA BONE DENSITY 2 SITES; Future  -     vitamin D (CHOLECALCIFEROL) 1000 UNIT TABS tablet; Take 2 tablets by mouth daily  -     Recommend resistance exercises    Need for diphtheria-tetanus-pertussis (Tdap) vaccine, adult/adolescent  -     Tdap (ADACEL) 5-2-15.5 LF-MCG/0.5 injection; Inject 0.5 mLs into the muscle once for 1 dose    Need for shingles vaccine  -     zoster recombinant adjuvanted vaccine (SHINGRIX) 50 MCG SUSR injection; Inject 0.5 mLs into the muscle once for 1 dose      Return in about 3 months (around 11/1/2018) for copd / chronic anticoagulation 30 min.

## 2018-08-02 RX ORDER — TIZANIDINE 4 MG/1
4 TABLET ORAL 3 TIMES DAILY
Qty: 30 TABLET | Refills: 0 | Status: SHIPPED | OUTPATIENT
Start: 2018-08-02 | End: 2018-11-05 | Stop reason: SDUPTHER

## 2018-08-06 NOTE — TELEPHONE ENCOUNTER
Pt notified she can check other pharmacies for the vaccine and her medication was sent to her pharmacy.

## 2018-08-13 ENCOUNTER — HOSPITAL ENCOUNTER (OUTPATIENT)
Dept: GENERAL RADIOLOGY | Age: 68
Discharge: HOME | End: 2018-08-13
Attending: INTERNAL MEDICINE | Admitting: INTERNAL MEDICINE

## 2018-08-13 DIAGNOSIS — M81.0 AGE-RELATED OSTEOPOROSIS WITHOUT CURRENT PATHOLOGICAL FRACTURE: ICD-10-CM

## 2018-08-13 DIAGNOSIS — M81.0 OSTEOPOROSIS OF MULTIPLE SITES: ICD-10-CM

## 2018-08-22 ENCOUNTER — TELEPHONE (OUTPATIENT)
Dept: INTERNAL MEDICINE CLINIC | Age: 68
End: 2018-08-22

## 2018-08-23 ENCOUNTER — OFFICE VISIT (OUTPATIENT)
Dept: INTERNAL MEDICINE CLINIC | Age: 68
End: 2018-08-23

## 2018-08-23 VITALS
BODY MASS INDEX: 25.78 KG/M2 | DIASTOLIC BLOOD PRESSURE: 88 MMHG | OXYGEN SATURATION: 94 % | SYSTOLIC BLOOD PRESSURE: 134 MMHG | HEART RATE: 85 BPM | WEIGHT: 132 LBS

## 2018-08-23 DIAGNOSIS — R07.9 CHEST PAIN, UNSPECIFIED TYPE: ICD-10-CM

## 2018-08-23 DIAGNOSIS — M25.511 ACUTE PAIN OF RIGHT SHOULDER: ICD-10-CM

## 2018-08-23 DIAGNOSIS — J44.9 COPD, SEVERE (HCC): Primary | ICD-10-CM

## 2018-08-23 PROCEDURE — 4040F PNEUMOC VAC/ADMIN/RCVD: CPT | Performed by: INTERNAL MEDICINE

## 2018-08-23 PROCEDURE — G8427 DOCREV CUR MEDS BY ELIG CLIN: HCPCS | Performed by: INTERNAL MEDICINE

## 2018-08-23 PROCEDURE — G8399 PT W/DXA RESULTS DOCUMENT: HCPCS | Performed by: INTERNAL MEDICINE

## 2018-08-23 PROCEDURE — 1036F TOBACCO NON-USER: CPT | Performed by: INTERNAL MEDICINE

## 2018-08-23 PROCEDURE — 1123F ACP DISCUSS/DSCN MKR DOCD: CPT | Performed by: INTERNAL MEDICINE

## 2018-08-23 PROCEDURE — 3017F COLORECTAL CA SCREEN DOC REV: CPT | Performed by: INTERNAL MEDICINE

## 2018-08-23 PROCEDURE — 93000 ELECTROCARDIOGRAM COMPLETE: CPT | Performed by: INTERNAL MEDICINE

## 2018-08-23 PROCEDURE — S8110 PEAK EXPIRATORY FLOW RATE (P: HCPCS | Performed by: INTERNAL MEDICINE

## 2018-08-23 PROCEDURE — G8417 CALC BMI ABV UP PARAM F/U: HCPCS | Performed by: INTERNAL MEDICINE

## 2018-08-23 PROCEDURE — 1090F PRES/ABSN URINE INCON ASSESS: CPT | Performed by: INTERNAL MEDICINE

## 2018-08-23 PROCEDURE — 1101F PT FALLS ASSESS-DOCD LE1/YR: CPT | Performed by: INTERNAL MEDICINE

## 2018-08-23 PROCEDURE — 94640 AIRWAY INHALATION TREATMENT: CPT | Performed by: INTERNAL MEDICINE

## 2018-08-23 PROCEDURE — 99214 OFFICE O/P EST MOD 30 MIN: CPT | Performed by: INTERNAL MEDICINE

## 2018-08-23 PROCEDURE — 3023F SPIROM DOC REV: CPT | Performed by: INTERNAL MEDICINE

## 2018-08-23 PROCEDURE — G8926 SPIRO NO PERF OR DOC: HCPCS | Performed by: INTERNAL MEDICINE

## 2018-08-23 RX ORDER — PREDNISONE 50 MG/1
50 TABLET ORAL DAILY
Qty: 5 TABLET | Refills: 0 | Status: SHIPPED | OUTPATIENT
Start: 2018-08-23 | End: 2018-09-17 | Stop reason: SDUPTHER

## 2018-08-23 RX ORDER — ACETAMINOPHEN AND CODEINE PHOSPHATE 300; 30 MG/1; MG/1
1 TABLET ORAL EVERY 8 HOURS PRN
Qty: 9 TABLET | Refills: 0 | Status: SHIPPED | OUTPATIENT
Start: 2018-08-23 | End: 2018-08-26

## 2018-08-23 RX ORDER — IPRATROPIUM BROMIDE AND ALBUTEROL SULFATE 2.5; .5 MG/3ML; MG/3ML
1 SOLUTION RESPIRATORY (INHALATION) ONCE
Status: COMPLETED | OUTPATIENT
Start: 2018-08-23 | End: 2018-08-23

## 2018-08-23 RX ADMIN — IPRATROPIUM BROMIDE AND ALBUTEROL SULFATE 1 AMPULE: 2.5; .5 SOLUTION RESPIRATORY (INHALATION) at 15:24

## 2018-08-23 RX ADMIN — IPRATROPIUM BROMIDE AND ALBUTEROL SULFATE 1 AMPULE: 2.5; .5 SOLUTION RESPIRATORY (INHALATION) at 15:29

## 2018-08-23 ASSESSMENT — ENCOUNTER SYMPTOMS
SWOLLEN GLANDS: 0
SHORTNESS OF BREATH: 1

## 2018-08-23 NOTE — PROGRESS NOTES
nebulization 2 times daily Dx: COPD   ICD-10: J44.9 Yes Enrrique Denise MD   tiotropium (SPIRIVA RESPIMAT) 2.5 MCG/ACT AERS inhaler Inhale 2 puffs into the lungs daily Please instruct on use. Yes Enrrique Denise MD   albuterol sulfate  (90 Base) MCG/ACT inhaler Inhale 2 puffs into the lungs every 6 hours as needed for Wheezing Yes Jose Paez MD   amitriptyline (ELAVIL) 150 MG tablet TAKE 0.5 TABLETS BY MOUTH NIGHTLY Yes Jose Paez MD   albuterol (PROVENTIL) (2.5 MG/3ML) 0.083% nebulizer solution TAKE 3 MLS BY NEBULIZATION EVERY 6 HOURS AS NEEDED FOR WHEEZING DX: COPD ICD-10: J44.9 Yes Enrrique Denise MD   rivaroxaban (XARELTO) 20 MG TABS tablet Take 1 tablet by mouth every 24 hours Patient is not a coumadin candidate. Yes Jose Paez MD   omeprazole (PRILOSEC) 20 MG delayed release capsule Take 20 mg by mouth Daily Yes Historical Provider, MD   Lift Chair [id number] [facility] by Does not apply route Please dispense a Lift Chair Yes Jose Paez MD   guaiFENesin (MUCINEX) 600 MG extended release tablet Take 1 tablet by mouth 2 times daily Yes James Beauchamp MD   potassium chloride (KLOR-CON M20) 20 MEQ extended release tablet Take 1 tablet by mouth 2 times daily Yes Jose Paez MD   Misc.  Devices (ROLLER [place]) MISC 1 each by Does not apply route daily Please dispense a walker with a seat Yes Jose Paez MD   lactobacillus (BACID) TABS Take 1 tablet by mouth daily Yes Corey Koenig MD   Adalimumab (HUMIRA) 20 MG/0.4ML KIT Inject 1 vial into the skin every 14 days  Yes Historical Provider, MD        Social History   Substance Use Topics    Smoking status: Former Smoker     Packs/day: 0.25     Years: 30.00     Types: Cigarettes     Quit date: 2/26/2015    Smokeless tobacco: Never Used      Comment: started smoking at age 26 / smoked up to 9 cigarettes a day     Alcohol use No        Vitals:    08/23/18 1305   BP: 134/88   Site: Left Arm   Position: Sitting   Cuff Size: Medium Adult   Pulse: 85   SpO2: 94%   Weight: 132 lb (59.9 kg)     Estimated body mass index is 25.78 kg/m² as calculated from the following:    Height as of 7/30/18: 5' (1.524 m). Weight as of this encounter: 132 lb (59.9 kg). Physical Exam   Cardiovascular: Normal rate, regular rhythm and normal heart sounds. Pulmonary/Chest: She has decreased breath sounds in the right upper field, the right middle field, the right lower field, the left upper field, the left middle field and the left lower field. She has wheezes in the right upper field and the left upper field. She has no rhonchi. She has no rales. Musculoskeletal:        Left shoulder: She exhibits decreased range of motion, tenderness and spasm. She exhibits no swelling, no effusion and no laceration. Arms:        Ecg: nsr    ASSESSMENT/PLAN:  1. COPD, severe ([address]  worseing  - OH PEAK EXPIRATORY FLOW RATE (P  - ipratropium-albuterol (DUONEB) nebulizer solution 1 ampule; Take 3 mLs by nebulization once    2. Chest pain, unspecified type  new  - EKG 12 Lead      Return in about 5 days (around 8/28/2018) for breathing / shoulder pain. An electronic signature was used to authenticate this note.     --Tobi Servin MD on 8/23/2018 at 3:25 PM

## 2018-08-27 ENCOUNTER — OFFICE VISIT (OUTPATIENT)
Dept: INTERNAL MEDICINE CLINIC | Age: 68
End: 2018-08-27

## 2018-08-27 VITALS
OXYGEN SATURATION: 90 % | HEART RATE: 82 BPM | DIASTOLIC BLOOD PRESSURE: 80 MMHG | WEIGHT: 135 LBS | BODY MASS INDEX: 26.37 KG/M2 | SYSTOLIC BLOOD PRESSURE: 126 MMHG

## 2018-08-27 DIAGNOSIS — J43.2 CENTRILOBULAR EMPHYSEMA (HCC): Primary | ICD-10-CM

## 2018-08-27 PROCEDURE — 4040F PNEUMOC VAC/ADMIN/RCVD: CPT | Performed by: INTERNAL MEDICINE

## 2018-08-27 PROCEDURE — G8926 SPIRO NO PERF OR DOC: HCPCS | Performed by: INTERNAL MEDICINE

## 2018-08-27 PROCEDURE — 1123F ACP DISCUSS/DSCN MKR DOCD: CPT | Performed by: INTERNAL MEDICINE

## 2018-08-27 PROCEDURE — 3017F COLORECTAL CA SCREEN DOC REV: CPT | Performed by: INTERNAL MEDICINE

## 2018-08-27 PROCEDURE — G8399 PT W/DXA RESULTS DOCUMENT: HCPCS | Performed by: INTERNAL MEDICINE

## 2018-08-27 PROCEDURE — 1101F PT FALLS ASSESS-DOCD LE1/YR: CPT | Performed by: INTERNAL MEDICINE

## 2018-08-27 PROCEDURE — G8417 CALC BMI ABV UP PARAM F/U: HCPCS | Performed by: INTERNAL MEDICINE

## 2018-08-27 PROCEDURE — G8427 DOCREV CUR MEDS BY ELIG CLIN: HCPCS | Performed by: INTERNAL MEDICINE

## 2018-08-27 PROCEDURE — 1036F TOBACCO NON-USER: CPT | Performed by: INTERNAL MEDICINE

## 2018-08-27 PROCEDURE — 3023F SPIROM DOC REV: CPT | Performed by: INTERNAL MEDICINE

## 2018-08-27 PROCEDURE — 99212 OFFICE O/P EST SF 10 MIN: CPT | Performed by: INTERNAL MEDICINE

## 2018-08-27 PROCEDURE — 1090F PRES/ABSN URINE INCON ASSESS: CPT | Performed by: INTERNAL MEDICINE

## 2018-08-30 NOTE — PROGRESS NOTES
by mouth Daily Yes Historical Provider, MD   Lift Chair [address] [facility] by Does not apply route Please dispense a Lift Chair Yes Yazmin Davis MD   guaiFENesin (MUCINEX) 600 MG extended release tablet Take 1 tablet by mouth 2 times daily Yes Russ Anne MD   potassium chloride (KLOR-CON M20) 20 MEQ extended release tablet Take 1 tablet by mouth 2 times daily Yes Yazmin Davis MD   Misc. Devices (ROLLER [place]) MISC 1 each by Does not apply route daily Please dispense a walker with a seat Yes Yazmin Davis MD   lactobacillus (BACID) TABS Take 1 tablet by mouth daily Yes Rose Rios MD   Adalimumab (HUMIRA) 20 MG/0.4ML KIT Inject 1 vial into the skin every 14 days  Yes Historical Provider, MD        Social History   Substance Use Topics    Smoking status: Former Smoker     Packs/day: 0.25     Years: 30.00     Types: Cigarettes     Quit date: 2/26/2015    Smokeless tobacco: Never Used      Comment: started smoking at age 26 / smoked up to 9 cigarettes a day     Alcohol use No        Vitals:    08/27/18 1142   BP: 126/80   Site: Left Arm   Position: Sitting   Cuff Size: Medium Adult   Pulse: 82   SpO2: 90%   Weight: 135 lb (61.2 kg)     Estimated body mass index is 26.37 kg/m² as calculated from the following:    Height as of 7/30/18: 5' (1.524 m). Weight as of this encounter: 135 lb (61.2 kg). Physical Exam   Constitutional: She appears well-nourished. No distress. HENT:   Right Ear: External ear normal.   Left Ear: External ear normal.   Mouth/Throat: No oropharyngeal exudate. Eyes: Pupils are equal, round, and reactive to light. EOM are normal. Right eye exhibits no discharge. Left eye exhibits no discharge. Neck: No JVD present. No tracheal deviation present. No thyromegaly present. Cardiovascular: Normal rate, regular rhythm and normal heart sounds. No murmur heard. Pulmonary/Chest: Effort normal and breath sounds normal. No respiratory distress. She has no wheezes. She has no rales. ASSESSMENT/PLAN:  1. Centrilobular emphysema ([address]  Improved  -  Continue medication      Return for Previously scheduled appt. An electronic signature was used to authenticate this note.     --Raisa Ha MD on 8/29/2018 at 10:30 PM

## 2018-09-17 ENCOUNTER — OFFICE VISIT (OUTPATIENT)
Dept: INTERNAL MEDICINE CLINIC | Age: 68
End: 2018-09-17

## 2018-09-17 VITALS
BODY MASS INDEX: 25.72 KG/M2 | DIASTOLIC BLOOD PRESSURE: 74 MMHG | HEART RATE: 95 BPM | SYSTOLIC BLOOD PRESSURE: 110 MMHG | RESPIRATION RATE: 16 BRPM | OXYGEN SATURATION: 97 % | WEIGHT: 131 LBS | HEIGHT: 60 IN

## 2018-09-17 DIAGNOSIS — Z86.711 HISTORY OF PULMONARY EMBOLISM: ICD-10-CM

## 2018-09-17 DIAGNOSIS — I42.9 CARDIOMYOPATHY, UNSPECIFIED TYPE (HCC): ICD-10-CM

## 2018-09-17 DIAGNOSIS — J44.1 COPD WITH ACUTE EXACERBATION (HCC): Primary | ICD-10-CM

## 2018-09-17 PROCEDURE — G8427 DOCREV CUR MEDS BY ELIG CLIN: HCPCS | Performed by: INTERNAL MEDICINE

## 2018-09-17 PROCEDURE — G8399 PT W/DXA RESULTS DOCUMENT: HCPCS | Performed by: INTERNAL MEDICINE

## 2018-09-17 PROCEDURE — 99214 OFFICE O/P EST MOD 30 MIN: CPT | Performed by: INTERNAL MEDICINE

## 2018-09-17 PROCEDURE — 1101F PT FALLS ASSESS-DOCD LE1/YR: CPT | Performed by: INTERNAL MEDICINE

## 2018-09-17 PROCEDURE — 1036F TOBACCO NON-USER: CPT | Performed by: INTERNAL MEDICINE

## 2018-09-17 PROCEDURE — 3017F COLORECTAL CA SCREEN DOC REV: CPT | Performed by: INTERNAL MEDICINE

## 2018-09-17 PROCEDURE — 3023F SPIROM DOC REV: CPT | Performed by: INTERNAL MEDICINE

## 2018-09-17 PROCEDURE — 4040F PNEUMOC VAC/ADMIN/RCVD: CPT | Performed by: INTERNAL MEDICINE

## 2018-09-17 PROCEDURE — G8926 SPIRO NO PERF OR DOC: HCPCS | Performed by: INTERNAL MEDICINE

## 2018-09-17 PROCEDURE — 1090F PRES/ABSN URINE INCON ASSESS: CPT | Performed by: INTERNAL MEDICINE

## 2018-09-17 PROCEDURE — 1123F ACP DISCUSS/DSCN MKR DOCD: CPT | Performed by: INTERNAL MEDICINE

## 2018-09-17 PROCEDURE — G8417 CALC BMI ABV UP PARAM F/U: HCPCS | Performed by: INTERNAL MEDICINE

## 2018-09-17 RX ORDER — MONTELUKAST SODIUM 4 MG/1
TABLET, CHEWABLE ORAL
COMMUNITY
Start: 2018-09-07 | End: 2019-04-02

## 2018-09-17 RX ORDER — PREDNISONE 50 MG/1
50 TABLET ORAL DAILY
Qty: 5 TABLET | Refills: 0 | Status: SHIPPED | OUTPATIENT
Start: 2018-09-17 | End: 2018-09-22

## 2018-09-17 RX ORDER — IPRATROPIUM BROMIDE AND ALBUTEROL SULFATE 2.5; .5 MG/3ML; MG/3ML
1 SOLUTION RESPIRATORY (INHALATION) EVERY 4 HOURS
Qty: 360 ML | Refills: 3 | Status: SHIPPED | OUTPATIENT
Start: 2018-09-17 | End: 2019-01-28 | Stop reason: ALTCHOICE

## 2018-09-17 ASSESSMENT — ENCOUNTER SYMPTOMS
VOMITING: 0
ABDOMINAL PAIN: 0
HEMOPTYSIS: 0
SORE THROAT: 0
SWOLLEN GLANDS: 0
WHEEZING: 1
ORTHOPNEA: 0
SPUTUM PRODUCTION: 0
SHORTNESS OF BREATH: 1

## 2018-09-17 NOTE — PROGRESS NOTES
Subjective:      Patient ID: Cyndy Lee is a 78 y.o. female. Shortness of Breath   This is a new problem. The current episode started in the past 7 days. The problem occurs constantly. The problem has been gradually worsening. Associated symptoms include chest pain, a rash and wheezing. Pertinent negatives include no abdominal pain, claudication, coryza, ear pain, fever, headaches, hemoptysis, leg pain, leg swelling, neck pain, orthopnea, PND, sore throat, sputum production, swollen glands, syncope or vomiting. The symptoms are aggravated by any activity. Associated symptoms comments: Chest tightness and wheezing causing back to ache. The patient has no known risk factors for DVT/PE. She has tried ipratropium inhalers and beta agonist inhalers for the symptoms. Dizziness   Associated symptoms include chest pain and a rash. Pertinent negatives include no abdominal pain, fever, headaches, neck pain, sore throat, swollen glands or vomiting. Pharyngitis   Associated symptoms include chest pain and a rash. Pertinent negatives include no abdominal pain, fever, headaches, neck pain, sore throat, swollen glands or vomiting. no improvement with breathing treatments. She has been unbale to lay flat at night. She reports chest tightness. PAtient has been taking her Xarelto. She has a history of PE. Review of Systems   Constitutional: Negative for fever. HENT: Negative for ear pain and sore throat. Respiratory: Positive for shortness of breath and wheezing. Negative for hemoptysis and sputum production. Cardiovascular: Positive for chest pain. Negative for orthopnea, claudication, leg swelling, syncope and PND. Gastrointestinal: Negative for abdominal pain and vomiting. Musculoskeletal: Negative for neck pain. Skin: Positive for rash. Neurological: Positive for dizziness. Negative for headaches.      @DOS@    No Known Allergies    Current Outpatient Prescriptions   Medication Sig Dispense Refill

## 2018-09-20 ENCOUNTER — TELEPHONE (OUTPATIENT)
Dept: INTERNAL MEDICINE CLINIC | Age: 68
End: 2018-09-20

## 2018-09-20 NOTE — TELEPHONE ENCOUNTER
Patient missed appointment with Dr. Sutton Getting on Tuesday    Patient stated she was waiting on phone call from office informing patient to come in and she never received an call/    Patient states she is still coughing heavy and does she need to be seen    Offered patient appointment and was asked did you get that information from my doctor and that she wants to enjoy her birthday    Please advise

## 2018-09-21 RX ORDER — BUDESONIDE AND FORMOTEROL FUMARATE DIHYDRATE 160; 4.5 UG/1; UG/1
2 AEROSOL RESPIRATORY (INHALATION) 2 TIMES DAILY
Qty: 1 INHALER | Refills: 3 | Status: SHIPPED | OUTPATIENT
Start: 2018-09-21 | End: 2019-11-13 | Stop reason: SDUPTHER

## 2018-09-25 ENCOUNTER — OFFICE VISIT (OUTPATIENT)
Dept: INTERNAL MEDICINE CLINIC | Age: 68
End: 2018-09-25
Payer: MEDICAID

## 2018-09-25 ENCOUNTER — HOSPITAL ENCOUNTER (OUTPATIENT)
Age: 68
Discharge: HOME | End: 2018-09-25
Payer: MEDICAID

## 2018-09-25 VITALS
SYSTOLIC BLOOD PRESSURE: 144 MMHG | HEART RATE: 88 BPM | WEIGHT: 134 LBS | BODY MASS INDEX: 26.17 KG/M2 | TEMPERATURE: 98.1 F | DIASTOLIC BLOOD PRESSURE: 90 MMHG | OXYGEN SATURATION: 92 %

## 2018-09-25 DIAGNOSIS — J44.9 COPD, SEVERE (HCC): ICD-10-CM

## 2018-09-25 DIAGNOSIS — J18.9 PNEUMONIA OF RIGHT LOWER LOBE DUE TO INFECTIOUS ORGANISM: Primary | ICD-10-CM

## 2018-09-25 PROCEDURE — 1036F TOBACCO NON-USER: CPT | Performed by: INTERNAL MEDICINE

## 2018-09-25 PROCEDURE — 3017F COLORECTAL CA SCREEN DOC REV: CPT | Performed by: INTERNAL MEDICINE

## 2018-09-25 PROCEDURE — 3023F SPIROM DOC REV: CPT | Performed by: INTERNAL MEDICINE

## 2018-09-25 PROCEDURE — G8417 CALC BMI ABV UP PARAM F/U: HCPCS | Performed by: INTERNAL MEDICINE

## 2018-09-25 PROCEDURE — 99214 OFFICE O/P EST MOD 30 MIN: CPT | Performed by: INTERNAL MEDICINE

## 2018-09-25 PROCEDURE — G8427 DOCREV CUR MEDS BY ELIG CLIN: HCPCS | Performed by: INTERNAL MEDICINE

## 2018-09-25 PROCEDURE — G8399 PT W/DXA RESULTS DOCUMENT: HCPCS | Performed by: INTERNAL MEDICINE

## 2018-09-25 PROCEDURE — 1123F ACP DISCUSS/DSCN MKR DOCD: CPT | Performed by: INTERNAL MEDICINE

## 2018-09-25 PROCEDURE — G8926 SPIRO NO PERF OR DOC: HCPCS | Performed by: INTERNAL MEDICINE

## 2018-09-25 PROCEDURE — 4040F PNEUMOC VAC/ADMIN/RCVD: CPT | Performed by: INTERNAL MEDICINE

## 2018-09-25 PROCEDURE — 1090F PRES/ABSN URINE INCON ASSESS: CPT | Performed by: INTERNAL MEDICINE

## 2018-09-25 PROCEDURE — 1101F PT FALLS ASSESS-DOCD LE1/YR: CPT | Performed by: INTERNAL MEDICINE

## 2018-09-25 RX ORDER — LEVOFLOXACIN 500 MG/1
500 TABLET, FILM COATED ORAL DAILY
Qty: 7 TABLET | Refills: 0 | Status: SHIPPED | OUTPATIENT
Start: 2018-09-25 | End: 2018-10-02

## 2018-09-25 ASSESSMENT — ENCOUNTER SYMPTOMS
EYE PAIN: 0
COUGH: 1
EYE REDNESS: 0
SHORTNESS OF BREATH: 1

## 2018-09-25 NOTE — PROGRESS NOTES
2018     Amna Youngblood (:  1950) is a 80 y.o. female, here for evaluation of the following medical concerns:    HPI  COPD:  Current treatment includes short-acting beta agonist inhaler, combined beta agonist/steroid inhaler, oral steroid- 50 mg. Residual symptoms: chronic dyspnea, non-productive cough and wheezing. She denies purulent nasal discharge, sinus pressure, sore throat, weight loss. She requires her rescue inhaler 4 time(s) per day. PNA: patient has a persistent cough. She has completed the steroids but still has a cough and fatigue. She does not have a fever. Review of Systems   Eyes: Negative for pain and redness. Respiratory: Positive for cough and shortness of breath. Prior to Visit Medications    Medication Sig Taking? Authorizing Provider   levofloxacin (LEVAQUIN) 500 MG tablet Take 1 tablet by mouth daily for 7 days Yes Janie Hutchison MD   budesonide-formoterol (SYMBICORT) 160-4.5 MCG/ACT AERO Inhale 2 puffs into the lungs 2 times daily Yes Rc Padilla MD   colestipol (COLESTID) 1 g tablet TAKE 1 TABLET BY MOUTH 2 (TWO) TIMES DAILY. Yes Historical Provider, MD   ipratropium-albuterol (DUONEB) 0.5-2.5 (3) MG/3ML SOLN nebulizer solution Inhale 3 mLs into the lungs every 4 hours Yes Redia Dakin, MD   tiZANidine (ZANAFLEX) 4 MG tablet Take 1 tablet by mouth 3 times daily Yes Janie Hutchison MD   vitamin D (CHOLECALCIFEROL) 1000 UNIT TABS tablet Take 2 tablets by mouth daily Yes Janie Hutchison MD   alendronate (FOSAMAX) 70 MG tablet TAKE 1 TABLET BY MOUTH EVERY 7 DAYS Yes Janie Hutchison MD   budesonide (PULMICORT) 0.5 MG/2ML nebulizer suspension Take 2 mLs by nebulization 2 times daily Dx: COPD   ICD-10: J44.9 Yes Rc Padilla MD   tiotropium (SPIRIVA RESPIMAT) 2.5 MCG/ACT AERS inhaler Inhale 2 puffs into the lungs daily Please instruct on use.  Yes Rc Padilla MD   albuterol sulfate  (90 Base) MCG/ACT inhaler Inhale 2 134 lb (60.8 kg). Physical Exam   Constitutional: She appears well-nourished. No distress. HENT:   Right Ear: External ear normal.   Left Ear: External ear normal.   Mouth/Throat: No oropharyngeal exudate. Eyes: Right eye exhibits no discharge. Left eye exhibits no discharge. Neck: Normal range of motion. Neck supple. No JVD present. No tracheal deviation present. No thyromegaly present. Cardiovascular: Normal rate, regular rhythm and normal heart sounds. Exam reveals no friction rub. No murmur heard. Pulmonary/Chest: She has no wheezes. She has rhonchi in the right lower field. She has rales in the right lower field. ASSESSMENT/PLAN:  1. Pneumonia of right lower lobe due to infectious organism ([address]  new  - levofloxacin (LEVAQUIN) 500 MG tablet; Take 1 tablet by mouth daily for 7 days  Dispense: 7 tablet; Refill: 0  - XR CHEST STANDARD (2 VW); Future    2. COPD, severe ([address]  Worsening  -  Continue inhales  -  Hold on steroids for now      Return in about 4 days (around 9/29/2018) for f/u pneumonia. An electronic signature was used to authenticate this note.     --Fayetta Crigler, MD on 9/25/2018 at 6:42 PM

## 2018-09-28 ENCOUNTER — OFFICE VISIT (OUTPATIENT)
Dept: INTERNAL MEDICINE CLINIC | Age: 68
End: 2018-09-28
Payer: MEDICAID

## 2018-09-28 ENCOUNTER — HOSPITAL ENCOUNTER (OUTPATIENT)
Dept: GENERAL RADIOLOGY | Age: 68
Discharge: HOME | End: 2018-09-28
Payer: MEDICAID

## 2018-09-28 ENCOUNTER — HOSPITAL ENCOUNTER (OUTPATIENT)
Age: 68
Discharge: HOME | End: 2018-09-28
Payer: MEDICAID

## 2018-09-28 VITALS
BODY MASS INDEX: 26.56 KG/M2 | SYSTOLIC BLOOD PRESSURE: 124 MMHG | HEART RATE: 92 BPM | OXYGEN SATURATION: 93 % | WEIGHT: 136 LBS | DIASTOLIC BLOOD PRESSURE: 82 MMHG

## 2018-09-28 DIAGNOSIS — J18.9 PNEUMONIA OF RIGHT LOWER LOBE DUE TO INFECTIOUS ORGANISM: ICD-10-CM

## 2018-09-28 DIAGNOSIS — J18.9 PNEUMONIA OF RIGHT LOWER LOBE DUE TO INFECTIOUS ORGANISM: Primary | ICD-10-CM

## 2018-09-28 PROCEDURE — G8399 PT W/DXA RESULTS DOCUMENT: HCPCS | Performed by: INTERNAL MEDICINE

## 2018-09-28 PROCEDURE — 4040F PNEUMOC VAC/ADMIN/RCVD: CPT | Performed by: INTERNAL MEDICINE

## 2018-09-28 PROCEDURE — 99212 OFFICE O/P EST SF 10 MIN: CPT | Performed by: INTERNAL MEDICINE

## 2018-09-28 PROCEDURE — G8427 DOCREV CUR MEDS BY ELIG CLIN: HCPCS | Performed by: INTERNAL MEDICINE

## 2018-09-28 PROCEDURE — 3017F COLORECTAL CA SCREEN DOC REV: CPT | Performed by: INTERNAL MEDICINE

## 2018-09-28 PROCEDURE — 1036F TOBACCO NON-USER: CPT | Performed by: INTERNAL MEDICINE

## 2018-09-28 PROCEDURE — 1101F PT FALLS ASSESS-DOCD LE1/YR: CPT | Performed by: INTERNAL MEDICINE

## 2018-09-28 PROCEDURE — G8417 CALC BMI ABV UP PARAM F/U: HCPCS | Performed by: INTERNAL MEDICINE

## 2018-09-28 PROCEDURE — 71046 X-RAY EXAM CHEST 2 VIEWS: CPT

## 2018-09-28 PROCEDURE — 1123F ACP DISCUSS/DSCN MKR DOCD: CPT | Performed by: INTERNAL MEDICINE

## 2018-09-28 PROCEDURE — 1090F PRES/ABSN URINE INCON ASSESS: CPT | Performed by: INTERNAL MEDICINE

## 2018-10-23 ENCOUNTER — HOSPITAL ENCOUNTER (OUTPATIENT)
Age: 68
Discharge: HOME | End: 2018-10-23
Payer: MEDICAID

## 2018-10-23 LAB
BASOPHILS ABSOLUTE: 0.2 K/UL (ref 0–0.2)
BASOPHILS RELATIVE PERCENT: 2 %
EOSINOPHILS ABSOLUTE: 0.3 K/UL (ref 0–0.6)
EOSINOPHILS RELATIVE PERCENT: 3 %
FERRITIN: 123.9 NG/ML (ref 15–150)
HCT VFR BLD CALC: 35.5 % (ref 36–48)
HEMATOLOGY PATH CONSULT: NO
HEMOGLOBIN: 11.1 G/DL (ref 12–16)
IRON SATURATION: 27 % (ref 15–50)
IRON: 98 UG/DL (ref 37–145)
LYMPHOCYTES ABSOLUTE: 3 K/UL (ref 1–5.1)
LYMPHOCYTES RELATIVE PERCENT: 28 %
MCH RBC QN AUTO: 19.6 PG (ref 26–34)
MCHC RBC AUTO-ENTMCNC: 31.1 G/DL (ref 31–36)
MCV RBC AUTO: 62.9 FL (ref 80–100)
MONOCYTES ABSOLUTE: 0.5 K/UL (ref 0–1.3)
MONOCYTES RELATIVE PERCENT: 5 %
NEUTROPHILS ABSOLUTE: 6.6 K/UL (ref 1.7–7.7)
NEUTROPHILS RELATIVE PERCENT: 62 %
PDW BLD-RTO: 16.3 % (ref 12.4–15.4)
PLATELET # BLD: 401 K/UL (ref 135–450)
PLATELET SLIDE REVIEW: ADEQUATE
PMV BLD AUTO: 7.7 FL (ref 5–10.5)
RBC # BLD: 5.65 M/UL (ref 4–5.2)
SLIDE REVIEW: ABNORMAL
TOTAL IRON BINDING CAPACITY: 359 UG/DL (ref 260–445)
WBC # BLD: 10.6 K/UL (ref 4–11)

## 2018-10-23 PROCEDURE — 83540 ASSAY OF IRON: CPT

## 2018-10-23 PROCEDURE — 82728 ASSAY OF FERRITIN: CPT

## 2018-10-23 PROCEDURE — 83550 IRON BINDING TEST: CPT

## 2018-10-23 PROCEDURE — 85025 COMPLETE CBC W/AUTO DIFF WBC: CPT

## 2018-10-23 PROCEDURE — 36415 COLL VENOUS BLD VENIPUNCTURE: CPT

## 2018-10-28 DIAGNOSIS — K50.113 CROHN'S DISEASE OF LARGE INTESTINE WITH FISTULA (HCC): Chronic | ICD-10-CM

## 2018-10-29 RX ORDER — TIOTROPIUM BROMIDE INHALATION SPRAY 3.12 UG/1
2 SPRAY, METERED RESPIRATORY (INHALATION) DAILY
Qty: 1 INHALER | Refills: 3 | Status: SHIPPED | OUTPATIENT
Start: 2018-10-29 | End: 2018-11-05 | Stop reason: ALTCHOICE

## 2018-11-05 ENCOUNTER — OFFICE VISIT (OUTPATIENT)
Dept: INTERNAL MEDICINE CLINIC | Age: 68
End: 2018-11-05
Payer: MEDICAID

## 2018-11-05 VITALS
BODY MASS INDEX: 26.17 KG/M2 | OXYGEN SATURATION: 95 % | SYSTOLIC BLOOD PRESSURE: 120 MMHG | WEIGHT: 134 LBS | DIASTOLIC BLOOD PRESSURE: 76 MMHG | HEART RATE: 80 BPM

## 2018-11-05 DIAGNOSIS — S39.012D STRAIN OF LUMBAR REGION, SUBSEQUENT ENCOUNTER: ICD-10-CM

## 2018-11-05 DIAGNOSIS — Z23 NEED FOR INFLUENZA VACCINATION: ICD-10-CM

## 2018-11-05 DIAGNOSIS — J43.2 CENTRILOBULAR EMPHYSEMA (HCC): Primary | ICD-10-CM

## 2018-11-05 PROCEDURE — G8427 DOCREV CUR MEDS BY ELIG CLIN: HCPCS | Performed by: INTERNAL MEDICINE

## 2018-11-05 PROCEDURE — 3017F COLORECTAL CA SCREEN DOC REV: CPT | Performed by: INTERNAL MEDICINE

## 2018-11-05 PROCEDURE — G8417 CALC BMI ABV UP PARAM F/U: HCPCS | Performed by: INTERNAL MEDICINE

## 2018-11-05 PROCEDURE — 90662 IIV NO PRSV INCREASED AG IM: CPT | Performed by: INTERNAL MEDICINE

## 2018-11-05 PROCEDURE — 1090F PRES/ABSN URINE INCON ASSESS: CPT | Performed by: INTERNAL MEDICINE

## 2018-11-05 PROCEDURE — 1123F ACP DISCUSS/DSCN MKR DOCD: CPT | Performed by: INTERNAL MEDICINE

## 2018-11-05 PROCEDURE — G0008 ADMIN INFLUENZA VIRUS VAC: HCPCS | Performed by: INTERNAL MEDICINE

## 2018-11-05 PROCEDURE — 99214 OFFICE O/P EST MOD 30 MIN: CPT | Performed by: INTERNAL MEDICINE

## 2018-11-05 PROCEDURE — G8482 FLU IMMUNIZE ORDER/ADMIN: HCPCS | Performed by: INTERNAL MEDICINE

## 2018-11-05 PROCEDURE — 3023F SPIROM DOC REV: CPT | Performed by: INTERNAL MEDICINE

## 2018-11-05 PROCEDURE — 1036F TOBACCO NON-USER: CPT | Performed by: INTERNAL MEDICINE

## 2018-11-05 PROCEDURE — 1101F PT FALLS ASSESS-DOCD LE1/YR: CPT | Performed by: INTERNAL MEDICINE

## 2018-11-05 PROCEDURE — G8926 SPIRO NO PERF OR DOC: HCPCS | Performed by: INTERNAL MEDICINE

## 2018-11-05 PROCEDURE — 4040F PNEUMOC VAC/ADMIN/RCVD: CPT | Performed by: INTERNAL MEDICINE

## 2018-11-05 PROCEDURE — G8399 PT W/DXA RESULTS DOCUMENT: HCPCS | Performed by: INTERNAL MEDICINE

## 2018-11-05 RX ORDER — TIZANIDINE 4 MG/1
4 TABLET ORAL 3 TIMES DAILY
Qty: 30 TABLET | Refills: 0 | Status: SHIPPED | OUTPATIENT
Start: 2018-11-05 | End: 2020-07-13 | Stop reason: SDUPTHER

## 2018-11-05 ASSESSMENT — ENCOUNTER SYMPTOMS
COUGH: 1
SHORTNESS OF BREATH: 1
EYE REDNESS: 0
EYE PAIN: 0

## 2018-11-26 RX ORDER — AMITRIPTYLINE HYDROCHLORIDE 150 MG/1
75 TABLET, FILM COATED ORAL NIGHTLY
Qty: 45 TABLET | Refills: 2 | Status: SHIPPED | OUTPATIENT
Start: 2018-11-26 | End: 2019-08-25 | Stop reason: SDUPTHER

## 2018-12-14 DIAGNOSIS — I26.99 BILATERAL PULMONARY EMBOLISM (HCC): ICD-10-CM

## 2018-12-14 RX ORDER — RIVAROXABAN 20 MG/1
20 TABLET, FILM COATED ORAL EVERY 24 HOURS
Qty: 30 TABLET | Refills: 6 | Status: ON HOLD | OUTPATIENT
Start: 2018-12-14 | End: 2019-01-24 | Stop reason: HOSPADM

## 2019-01-07 ENCOUNTER — TELEPHONE (OUTPATIENT)
Dept: INTERNAL MEDICINE CLINIC | Age: 69
End: 2019-01-07

## 2019-01-11 ENCOUNTER — OFFICE VISIT (OUTPATIENT)
Dept: INTERNAL MEDICINE CLINIC | Age: 69
End: 2019-01-11
Payer: MEDICAID

## 2019-01-11 VITALS
HEART RATE: 80 BPM | BODY MASS INDEX: 25.58 KG/M2 | SYSTOLIC BLOOD PRESSURE: 138 MMHG | WEIGHT: 131 LBS | DIASTOLIC BLOOD PRESSURE: 62 MMHG

## 2019-01-11 DIAGNOSIS — L03.114 CELLULITIS OF LEFT ARM: Primary | ICD-10-CM

## 2019-01-11 PROCEDURE — 99213 OFFICE O/P EST LOW 20 MIN: CPT | Performed by: INTERNAL MEDICINE

## 2019-01-11 PROCEDURE — 1123F ACP DISCUSS/DSCN MKR DOCD: CPT | Performed by: INTERNAL MEDICINE

## 2019-01-11 PROCEDURE — 4040F PNEUMOC VAC/ADMIN/RCVD: CPT | Performed by: INTERNAL MEDICINE

## 2019-01-11 PROCEDURE — G8399 PT W/DXA RESULTS DOCUMENT: HCPCS | Performed by: INTERNAL MEDICINE

## 2019-01-11 PROCEDURE — G8417 CALC BMI ABV UP PARAM F/U: HCPCS | Performed by: INTERNAL MEDICINE

## 2019-01-11 PROCEDURE — G8427 DOCREV CUR MEDS BY ELIG CLIN: HCPCS | Performed by: INTERNAL MEDICINE

## 2019-01-11 PROCEDURE — 1036F TOBACCO NON-USER: CPT | Performed by: INTERNAL MEDICINE

## 2019-01-11 PROCEDURE — 1090F PRES/ABSN URINE INCON ASSESS: CPT | Performed by: INTERNAL MEDICINE

## 2019-01-11 PROCEDURE — 1101F PT FALLS ASSESS-DOCD LE1/YR: CPT | Performed by: INTERNAL MEDICINE

## 2019-01-11 PROCEDURE — 3017F COLORECTAL CA SCREEN DOC REV: CPT | Performed by: INTERNAL MEDICINE

## 2019-01-11 PROCEDURE — G8482 FLU IMMUNIZE ORDER/ADMIN: HCPCS | Performed by: INTERNAL MEDICINE

## 2019-01-11 RX ORDER — SULFAMETHOXAZOLE AND TRIMETHOPRIM 800; 160 MG/1; MG/1
1 TABLET ORAL 2 TIMES DAILY
Qty: 14 TABLET | Refills: 0 | Status: SHIPPED | OUTPATIENT
Start: 2019-01-11 | End: 2019-01-18

## 2019-01-11 ASSESSMENT — PATIENT HEALTH QUESTIONNAIRE - PHQ9
SUM OF ALL RESPONSES TO PHQ QUESTIONS 1-9: 0
1. LITTLE INTEREST OR PLEASURE IN DOING THINGS: 0
2. FEELING DOWN, DEPRESSED OR HOPELESS: 0
SUM OF ALL RESPONSES TO PHQ QUESTIONS 1-9: 0
SUM OF ALL RESPONSES TO PHQ9 QUESTIONS 1 & 2: 0

## 2019-01-11 ASSESSMENT — ENCOUNTER SYMPTOMS
SHORTNESS OF BREATH: 0
SORE THROAT: 0
COUGH: 0

## 2019-01-20 ENCOUNTER — APPOINTMENT (OUTPATIENT)
Dept: CT IMAGING | Age: 69
DRG: 175 | End: 2019-01-20
Payer: MEDICARE

## 2019-01-20 ENCOUNTER — HOSPITAL ENCOUNTER (INPATIENT)
Age: 69
LOS: 4 days | Discharge: NURSING, REHAB OR LONG-TERM CARE | DRG: 175 | End: 2019-01-24
Attending: EMERGENCY MEDICINE | Admitting: INTERNAL MEDICINE
Payer: MEDICARE

## 2019-01-20 ENCOUNTER — APPOINTMENT (OUTPATIENT)
Dept: GENERAL RADIOLOGY | Age: 69
DRG: 175 | End: 2019-01-20
Payer: MEDICARE

## 2019-01-20 DIAGNOSIS — N17.9 ACUTE KIDNEY INJURY (HCC): ICD-10-CM

## 2019-01-20 DIAGNOSIS — R06.09 DOE (DYSPNEA ON EXERTION): Primary | ICD-10-CM

## 2019-01-20 DIAGNOSIS — R07.89 CHEST DISCOMFORT: ICD-10-CM

## 2019-01-20 DIAGNOSIS — Z86.711 HX PULMONARY EMBOLISM: ICD-10-CM

## 2019-01-20 DIAGNOSIS — E87.6 HYPOKALEMIA: ICD-10-CM

## 2019-01-20 PROBLEM — R06.02 SOB (SHORTNESS OF BREATH): Status: ACTIVE | Noted: 2019-01-20

## 2019-01-20 LAB
A/G RATIO: 1 (ref 1.1–2.2)
ALBUMIN SERPL-MCNC: 4.6 G/DL (ref 3.4–5)
ALP BLD-CCNC: 125 U/L (ref 40–129)
ALT SERPL-CCNC: 11 U/L (ref 10–40)
ANION GAP SERPL CALCULATED.3IONS-SCNC: 16 MMOL/L (ref 3–16)
ANISOCYTOSIS: ABNORMAL
APTT: 30.7 SEC (ref 26–36)
AST SERPL-CCNC: 15 U/L (ref 15–37)
BASOPHILS ABSOLUTE: 0 K/UL (ref 0–0.2)
BASOPHILS RELATIVE PERCENT: 0 %
BILIRUB SERPL-MCNC: 0.4 MG/DL (ref 0–1)
BUN BLDV-MCNC: 26 MG/DL (ref 7–20)
CALCIUM SERPL-MCNC: 10 MG/DL (ref 8.3–10.6)
CHLORIDE BLD-SCNC: 108 MMOL/L (ref 99–110)
CO2: 18 MMOL/L (ref 21–32)
CREAT SERPL-MCNC: 2.4 MG/DL (ref 0.6–1.2)
EKG ATRIAL RATE: 84 BPM
EKG DIAGNOSIS: NORMAL
EKG P AXIS: 43 DEGREES
EKG P-R INTERVAL: 124 MS
EKG Q-T INTERVAL: 348 MS
EKG QRS DURATION: 92 MS
EKG QTC CALCULATION (BAZETT): 411 MS
EKG R AXIS: 10 DEGREES
EKG T AXIS: 90 DEGREES
EKG VENTRICULAR RATE: 84 BPM
EOSINOPHILS ABSOLUTE: 0.2 K/UL (ref 0–0.6)
EOSINOPHILS RELATIVE PERCENT: 2 %
GFR AFRICAN AMERICAN: 24
GFR NON-AFRICAN AMERICAN: 20
GLOBULIN: 4.7 G/DL
GLUCOSE BLD-MCNC: 108 MG/DL (ref 70–99)
HCT VFR BLD CALC: 37.7 % (ref 36–48)
HEMOGLOBIN: 12.2 G/DL (ref 12–16)
HYPOCHROMIA: ABNORMAL
INR BLD: 0.91 (ref 0.86–1.14)
LYMPHOCYTES ABSOLUTE: 1.5 K/UL (ref 1–5.1)
LYMPHOCYTES RELATIVE PERCENT: 17 %
MAGNESIUM: 2.5 MG/DL (ref 1.8–2.4)
MCH RBC QN AUTO: 19.7 PG (ref 26–34)
MCHC RBC AUTO-ENTMCNC: 32.3 G/DL (ref 31–36)
MCV RBC AUTO: 61 FL (ref 80–100)
MICROCYTES: ABNORMAL
MONOCYTES ABSOLUTE: 0.5 K/UL (ref 0–1.3)
MONOCYTES RELATIVE PERCENT: 6 %
NEUTROPHILS ABSOLUTE: 6.8 K/UL (ref 1.7–7.7)
NEUTROPHILS RELATIVE PERCENT: 75 %
OVALOCYTES: ABNORMAL
PDW BLD-RTO: 16.2 % (ref 12.4–15.4)
PLATELET # BLD: 484 K/UL (ref 135–450)
PLATELET SLIDE REVIEW: ABNORMAL
PMV BLD AUTO: 7.5 FL (ref 5–10.5)
POLYCHROMASIA: ABNORMAL
POTASSIUM REFLEX MAGNESIUM: 2.4 MMOL/L (ref 3.5–5.1)
PRO-BNP: 272 PG/ML (ref 0–124)
PROTHROMBIN TIME: 10.4 SEC (ref 9.8–13)
RBC # BLD: 6.18 M/UL (ref 4–5.2)
SLIDE REVIEW: ABNORMAL
SODIUM BLD-SCNC: 142 MMOL/L (ref 136–145)
TARGET CELLS: ABNORMAL
TEAR DROP CELLS: ABNORMAL
TOTAL PROTEIN: 9.3 G/DL (ref 6.4–8.2)
TROPONIN: 0.01 NG/ML
TROPONIN: <0.01 NG/ML
TROPONIN: <0.01 NG/ML
WBC # BLD: 9 K/UL (ref 4–11)

## 2019-01-20 PROCEDURE — 83735 ASSAY OF MAGNESIUM: CPT

## 2019-01-20 PROCEDURE — 2580000003 HC RX 258: Performed by: PHYSICIAN ASSISTANT

## 2019-01-20 PROCEDURE — 83880 ASSAY OF NATRIURETIC PEPTIDE: CPT

## 2019-01-20 PROCEDURE — 6370000000 HC RX 637 (ALT 250 FOR IP): Performed by: INTERNAL MEDICINE

## 2019-01-20 PROCEDURE — 2700000000 HC OXYGEN THERAPY PER DAY

## 2019-01-20 PROCEDURE — 6360000002 HC RX W HCPCS: Performed by: PHYSICIAN ASSISTANT

## 2019-01-20 PROCEDURE — 6360000002 HC RX W HCPCS: Performed by: NURSE PRACTITIONER

## 2019-01-20 PROCEDURE — 6370000000 HC RX 637 (ALT 250 FOR IP): Performed by: PHYSICIAN ASSISTANT

## 2019-01-20 PROCEDURE — 96374 THER/PROPH/DIAG INJ IV PUSH: CPT

## 2019-01-20 PROCEDURE — 84484 ASSAY OF TROPONIN QUANT: CPT

## 2019-01-20 PROCEDURE — 6360000002 HC RX W HCPCS: Performed by: INTERNAL MEDICINE

## 2019-01-20 PROCEDURE — 94640 AIRWAY INHALATION TREATMENT: CPT

## 2019-01-20 PROCEDURE — 85610 PROTHROMBIN TIME: CPT

## 2019-01-20 PROCEDURE — 1200000000 HC SEMI PRIVATE

## 2019-01-20 PROCEDURE — 71046 X-RAY EXAM CHEST 2 VIEWS: CPT

## 2019-01-20 PROCEDURE — 99285 EMERGENCY DEPT VISIT HI MDM: CPT

## 2019-01-20 PROCEDURE — 93010 ELECTROCARDIOGRAM REPORT: CPT | Performed by: INTERNAL MEDICINE

## 2019-01-20 PROCEDURE — 85730 THROMBOPLASTIN TIME PARTIAL: CPT

## 2019-01-20 PROCEDURE — 36415 COLL VENOUS BLD VENIPUNCTURE: CPT

## 2019-01-20 PROCEDURE — 94760 N-INVAS EAR/PLS OXIMETRY 1: CPT

## 2019-01-20 PROCEDURE — 93005 ELECTROCARDIOGRAM TRACING: CPT | Performed by: PHYSICIAN ASSISTANT

## 2019-01-20 PROCEDURE — 96375 TX/PRO/DX INJ NEW DRUG ADDON: CPT

## 2019-01-20 PROCEDURE — 85025 COMPLETE CBC W/AUTO DIFF WBC: CPT

## 2019-01-20 PROCEDURE — 80053 COMPREHEN METABOLIC PANEL: CPT

## 2019-01-20 RX ORDER — CHOLESTYRAMINE 4 G/9G
4 POWDER, FOR SUSPENSION ORAL DAILY
Status: DISCONTINUED | OUTPATIENT
Start: 2019-01-20 | End: 2019-01-24 | Stop reason: HOSPADM

## 2019-01-20 RX ORDER — ALBUTEROL SULFATE 2.5 MG/3ML
2.5 SOLUTION RESPIRATORY (INHALATION) EVERY 6 HOURS PRN
Status: DISCONTINUED | OUTPATIENT
Start: 2019-01-20 | End: 2019-01-24 | Stop reason: HOSPADM

## 2019-01-20 RX ORDER — TIZANIDINE 4 MG/1
4 TABLET ORAL 3 TIMES DAILY
Status: DISCONTINUED | OUTPATIENT
Start: 2019-01-20 | End: 2019-01-24 | Stop reason: HOSPADM

## 2019-01-20 RX ORDER — ACETAMINOPHEN 325 MG/1
650 TABLET ORAL EVERY 4 HOURS PRN
Status: DISCONTINUED | OUTPATIENT
Start: 2019-01-20 | End: 2019-01-24 | Stop reason: HOSPADM

## 2019-01-20 RX ORDER — BUDESONIDE 0.5 MG/2ML
500 INHALANT ORAL 2 TIMES DAILY
Status: DISCONTINUED | OUTPATIENT
Start: 2019-01-20 | End: 2019-01-21

## 2019-01-20 RX ORDER — PANTOPRAZOLE SODIUM 40 MG/1
40 TABLET, DELAYED RELEASE ORAL
Status: DISCONTINUED | OUTPATIENT
Start: 2019-01-21 | End: 2019-01-24 | Stop reason: HOSPADM

## 2019-01-20 RX ORDER — ONDANSETRON 2 MG/ML
4 INJECTION INTRAMUSCULAR; INTRAVENOUS ONCE
Status: COMPLETED | OUTPATIENT
Start: 2019-01-20 | End: 2019-01-20

## 2019-01-20 RX ORDER — ALBUTEROL SULFATE 90 UG/1
2 AEROSOL, METERED RESPIRATORY (INHALATION) EVERY 6 HOURS PRN
Status: DISCONTINUED | OUTPATIENT
Start: 2019-01-20 | End: 2019-01-24 | Stop reason: HOSPADM

## 2019-01-20 RX ORDER — ASPIRIN 81 MG/1
324 TABLET, CHEWABLE ORAL ONCE
Status: COMPLETED | OUTPATIENT
Start: 2019-01-20 | End: 2019-01-20

## 2019-01-20 RX ORDER — POTASSIUM CHLORIDE 20 MEQ/1
60 TABLET, EXTENDED RELEASE ORAL ONCE
Status: COMPLETED | OUTPATIENT
Start: 2019-01-20 | End: 2019-01-20

## 2019-01-20 RX ORDER — IPRATROPIUM BROMIDE AND ALBUTEROL SULFATE 2.5; .5 MG/3ML; MG/3ML
1 SOLUTION RESPIRATORY (INHALATION) EVERY 4 HOURS
Status: DISCONTINUED | OUTPATIENT
Start: 2019-01-20 | End: 2019-01-20

## 2019-01-20 RX ORDER — ONDANSETRON 4 MG/1
4 TABLET, ORALLY DISINTEGRATING ORAL EVERY 8 HOURS PRN
Status: DISCONTINUED | OUTPATIENT
Start: 2019-01-20 | End: 2019-01-24 | Stop reason: HOSPADM

## 2019-01-20 RX ORDER — ONDANSETRON 2 MG/ML
4 INJECTION INTRAMUSCULAR; INTRAVENOUS EVERY 6 HOURS PRN
Status: DISCONTINUED | OUTPATIENT
Start: 2019-01-20 | End: 2019-01-20

## 2019-01-20 RX ORDER — SODIUM CHLORIDE 0.9 % (FLUSH) 0.9 %
10 SYRINGE (ML) INJECTION EVERY 12 HOURS SCHEDULED
Status: DISCONTINUED | OUTPATIENT
Start: 2019-01-20 | End: 2019-01-24 | Stop reason: HOSPADM

## 2019-01-20 RX ORDER — IPRATROPIUM BROMIDE AND ALBUTEROL SULFATE 2.5; .5 MG/3ML; MG/3ML
1 SOLUTION RESPIRATORY (INHALATION) 3 TIMES DAILY
Status: DISCONTINUED | OUTPATIENT
Start: 2019-01-20 | End: 2019-01-23

## 2019-01-20 RX ORDER — POTASSIUM CHLORIDE 20 MEQ/1
40 TABLET, EXTENDED RELEASE ORAL ONCE
Status: DISCONTINUED | OUTPATIENT
Start: 2019-01-20 | End: 2019-01-20

## 2019-01-20 RX ORDER — MORPHINE SULFATE 4 MG/ML
4 INJECTION, SOLUTION INTRAMUSCULAR; INTRAVENOUS ONCE
Status: COMPLETED | OUTPATIENT
Start: 2019-01-20 | End: 2019-01-20

## 2019-01-20 RX ORDER — SODIUM CHLORIDE 0.9 % (FLUSH) 0.9 %
10 SYRINGE (ML) INJECTION PRN
Status: DISCONTINUED | OUTPATIENT
Start: 2019-01-20 | End: 2019-01-24 | Stop reason: HOSPADM

## 2019-01-20 RX ADMIN — Medication 2 PUFF: at 21:27

## 2019-01-20 RX ADMIN — VITAMIN D, TAB 1000IU (100/BT) 2000 UNITS: 25 TAB at 14:05

## 2019-01-20 RX ADMIN — BUDESONIDE 500 MCG: 0.5 SUSPENSION RESPIRATORY (INHALATION) at 21:27

## 2019-01-20 RX ADMIN — APIXABAN 10 MG: 5 TABLET, FILM COATED ORAL at 21:29

## 2019-01-20 RX ADMIN — AMITRIPTYLINE HYDROCHLORIDE 75 MG: 25 TABLET, FILM COATED ORAL at 21:29

## 2019-01-20 RX ADMIN — NITROGLYCERIN 0.5 INCH: 20 OINTMENT TOPICAL at 08:18

## 2019-01-20 RX ADMIN — ONDANSETRON 4 MG: 4 TABLET, ORALLY DISINTEGRATING ORAL at 21:49

## 2019-01-20 RX ADMIN — TIZANIDINE 4 MG: 4 TABLET ORAL at 14:02

## 2019-01-20 RX ADMIN — ASPIRIN 81 MG 324 MG: 81 TABLET ORAL at 08:19

## 2019-01-20 RX ADMIN — ACETAMINOPHEN 650 MG: 325 TABLET, FILM COATED ORAL at 14:02

## 2019-01-20 RX ADMIN — MORPHINE SULFATE 4 MG: 4 INJECTION INTRAVENOUS at 08:35

## 2019-01-20 RX ADMIN — IPRATROPIUM BROMIDE AND ALBUTEROL SULFATE 3 ML: .5; 3 SOLUTION RESPIRATORY (INHALATION) at 16:10

## 2019-01-20 RX ADMIN — CHOLESTYRAMINE 4 G: 4 POWDER, FOR SUSPENSION ORAL at 14:02

## 2019-01-20 RX ADMIN — IPRATROPIUM BROMIDE AND ALBUTEROL SULFATE 3 ML: .5; 3 SOLUTION RESPIRATORY (INHALATION) at 21:27

## 2019-01-20 RX ADMIN — TIZANIDINE 4 MG: 4 TABLET ORAL at 21:29

## 2019-01-20 RX ADMIN — POTASSIUM CHLORIDE: 2 INJECTION, SOLUTION, CONCENTRATE INTRAVENOUS at 10:01

## 2019-01-20 RX ADMIN — APIXABAN 10 MG: 5 TABLET, FILM COATED ORAL at 10:42

## 2019-01-20 RX ADMIN — POTASSIUM CHLORIDE 60 MEQ: 1500 TABLET, EXTENDED RELEASE ORAL at 15:15

## 2019-01-20 RX ADMIN — ONDANSETRON 4 MG: 2 INJECTION INTRAMUSCULAR; INTRAVENOUS at 08:35

## 2019-01-20 ASSESSMENT — PAIN DESCRIPTION - LOCATION
LOCATION: BACK
LOCATION: BACK

## 2019-01-20 ASSESSMENT — ENCOUNTER SYMPTOMS
DIARRHEA: 0
CHEST TIGHTNESS: 0
NAUSEA: 0
COUGH: 1
CONSTIPATION: 0
COLOR CHANGE: 0
STRIDOR: 0
SHORTNESS OF BREATH: 1
WHEEZING: 0
VOMITING: 0
ABDOMINAL PAIN: 0

## 2019-01-20 ASSESSMENT — PAIN SCALES - GENERAL
PAINLEVEL_OUTOF10: 8
PAINLEVEL_OUTOF10: 5
PAINLEVEL_OUTOF10: 9
PAINLEVEL_OUTOF10: 7

## 2019-01-20 ASSESSMENT — PAIN DESCRIPTION - PAIN TYPE: TYPE: ACUTE PAIN

## 2019-01-20 ASSESSMENT — PAIN DESCRIPTION - PROGRESSION: CLINICAL_PROGRESSION: GRADUALLY IMPROVING

## 2019-01-21 ENCOUNTER — APPOINTMENT (OUTPATIENT)
Dept: ULTRASOUND IMAGING | Age: 69
DRG: 175 | End: 2019-01-21
Payer: MEDICARE

## 2019-01-21 LAB
ANION GAP SERPL CALCULATED.3IONS-SCNC: 13 MMOL/L (ref 3–16)
BUN BLDV-MCNC: 29 MG/DL (ref 7–20)
C DIFFICILE TOXIN, EIA: NORMAL
CALCIUM SERPL-MCNC: 8.5 MG/DL (ref 8.3–10.6)
CHLORIDE BLD-SCNC: 114 MMOL/L (ref 99–110)
CO2: 15 MMOL/L (ref 21–32)
CREAT SERPL-MCNC: 2.3 MG/DL (ref 0.6–1.2)
CREATININE URINE: 86.5 MG/DL (ref 28–259)
EOSINOPHIL,URINE: NORMAL
GFR AFRICAN AMERICAN: 26
GFR NON-AFRICAN AMERICAN: 21
GLUCOSE BLD-MCNC: 91 MG/DL (ref 70–99)
LEFT VENTRICULAR EJECTION FRACTION HIGH VALUE: 60 %
LEFT VENTRICULAR EJECTION FRACTION MODE: NORMAL
LV EF: 60 %
LV EF: 60 %
LVEF MODALITY: NORMAL
MAGNESIUM: 2.3 MG/DL (ref 1.8–2.4)
POTASSIUM REFLEX MAGNESIUM: 3.2 MMOL/L (ref 3.5–5.1)
PROCALCITONIN: 0.27 NG/ML (ref 0–0.15)
PROTEIN PROTEIN: 39 MG/DL
SODIUM BLD-SCNC: 142 MMOL/L (ref 136–145)
SODIUM URINE: 68 MMOL/L

## 2019-01-21 PROCEDURE — 87493 C DIFF AMPLIFIED PROBE: CPT

## 2019-01-21 PROCEDURE — 94640 AIRWAY INHALATION TREATMENT: CPT

## 2019-01-21 PROCEDURE — 6360000002 HC RX W HCPCS: Performed by: INTERNAL MEDICINE

## 2019-01-21 PROCEDURE — 83935 ASSAY OF URINE OSMOLALITY: CPT

## 2019-01-21 PROCEDURE — 1200000000 HC SEMI PRIVATE

## 2019-01-21 PROCEDURE — 97165 OT EVAL LOW COMPLEX 30 MIN: CPT

## 2019-01-21 PROCEDURE — 97530 THERAPEUTIC ACTIVITIES: CPT

## 2019-01-21 PROCEDURE — 87486 CHLMYD PNEUM DNA AMP PROBE: CPT

## 2019-01-21 PROCEDURE — 6370000000 HC RX 637 (ALT 250 FOR IP): Performed by: INTERNAL MEDICINE

## 2019-01-21 PROCEDURE — 84300 ASSAY OF URINE SODIUM: CPT

## 2019-01-21 PROCEDURE — 76770 US EXAM ABDO BACK WALL COMP: CPT

## 2019-01-21 PROCEDURE — 87449 NOS EACH ORGANISM AG IA: CPT

## 2019-01-21 PROCEDURE — 82570 ASSAY OF URINE CREATININE: CPT

## 2019-01-21 PROCEDURE — 87633 RESP VIRUS 12-25 TARGETS: CPT

## 2019-01-21 PROCEDURE — 87798 DETECT AGENT NOS DNA AMP: CPT

## 2019-01-21 PROCEDURE — 83735 ASSAY OF MAGNESIUM: CPT

## 2019-01-21 PROCEDURE — 84156 ASSAY OF PROTEIN URINE: CPT

## 2019-01-21 PROCEDURE — 94760 N-INVAS EAR/PLS OXIMETRY 1: CPT

## 2019-01-21 PROCEDURE — 93306 TTE W/DOPPLER COMPLETE: CPT

## 2019-01-21 PROCEDURE — 87205 SMEAR GRAM STAIN: CPT

## 2019-01-21 PROCEDURE — 80048 BASIC METABOLIC PNL TOTAL CA: CPT

## 2019-01-21 PROCEDURE — 84145 PROCALCITONIN (PCT): CPT

## 2019-01-21 PROCEDURE — 97535 SELF CARE MNGMENT TRAINING: CPT

## 2019-01-21 PROCEDURE — 6370000000 HC RX 637 (ALT 250 FOR IP): Performed by: PHYSICIAN ASSISTANT

## 2019-01-21 PROCEDURE — 87324 CLOSTRIDIUM AG IA: CPT

## 2019-01-21 PROCEDURE — 97161 PT EVAL LOW COMPLEX 20 MIN: CPT

## 2019-01-21 PROCEDURE — 36415 COLL VENOUS BLD VENIPUNCTURE: CPT

## 2019-01-21 PROCEDURE — 2700000000 HC OXYGEN THERAPY PER DAY

## 2019-01-21 PROCEDURE — 99223 1ST HOSP IP/OBS HIGH 75: CPT | Performed by: INTERNAL MEDICINE

## 2019-01-21 PROCEDURE — 87581 M.PNEUMON DNA AMP PROBE: CPT

## 2019-01-21 RX ORDER — POTASSIUM CHLORIDE 20 MEQ/1
60 TABLET, EXTENDED RELEASE ORAL ONCE
Status: COMPLETED | OUTPATIENT
Start: 2019-01-21 | End: 2019-01-21

## 2019-01-21 RX ADMIN — IPRATROPIUM BROMIDE AND ALBUTEROL SULFATE 3 ML: .5; 3 SOLUTION RESPIRATORY (INHALATION) at 10:41

## 2019-01-21 RX ADMIN — CHOLESTYRAMINE 4 G: 4 POWDER, FOR SUSPENSION ORAL at 09:34

## 2019-01-21 RX ADMIN — APIXABAN 10 MG: 5 TABLET, FILM COATED ORAL at 22:27

## 2019-01-21 RX ADMIN — ACETAMINOPHEN 650 MG: 325 TABLET, FILM COATED ORAL at 15:25

## 2019-01-21 RX ADMIN — VITAMIN D, TAB 1000IU (100/BT) 2000 UNITS: 25 TAB at 09:34

## 2019-01-21 RX ADMIN — POTASSIUM CHLORIDE 60 MEQ: 1500 TABLET, EXTENDED RELEASE ORAL at 15:26

## 2019-01-21 RX ADMIN — TIZANIDINE 4 MG: 4 TABLET ORAL at 15:18

## 2019-01-21 RX ADMIN — APIXABAN 10 MG: 5 TABLET, FILM COATED ORAL at 09:34

## 2019-01-21 RX ADMIN — AMITRIPTYLINE HYDROCHLORIDE 75 MG: 25 TABLET, FILM COATED ORAL at 22:27

## 2019-01-21 RX ADMIN — TIZANIDINE 4 MG: 4 TABLET ORAL at 09:34

## 2019-01-21 RX ADMIN — Medication 2 PUFF: at 10:42

## 2019-01-21 RX ADMIN — TIZANIDINE 4 MG: 4 TABLET ORAL at 22:27

## 2019-01-21 RX ADMIN — BUDESONIDE 500 MCG: 0.5 SUSPENSION RESPIRATORY (INHALATION) at 10:41

## 2019-01-21 RX ADMIN — PANTOPRAZOLE SODIUM 40 MG: 40 TABLET, DELAYED RELEASE ORAL at 06:08

## 2019-01-21 ASSESSMENT — PAIN DESCRIPTION - PAIN TYPE
TYPE: ACUTE PAIN
TYPE: ACUTE PAIN

## 2019-01-21 ASSESSMENT — PAIN SCALES - GENERAL
PAINLEVEL_OUTOF10: 0
PAINLEVEL_OUTOF10: 6
PAINLEVEL_OUTOF10: 6
PAINLEVEL_OUTOF10: 0
PAINLEVEL_OUTOF10: 8

## 2019-01-21 ASSESSMENT — PAIN DESCRIPTION - LOCATION
LOCATION: BACK;SHOULDER
LOCATION: BACK;SHOULDER

## 2019-01-21 ASSESSMENT — PAIN DESCRIPTION - ORIENTATION
ORIENTATION: LEFT
ORIENTATION: LEFT

## 2019-01-22 LAB
ANION GAP SERPL CALCULATED.3IONS-SCNC: 14 MMOL/L (ref 3–16)
BUN BLDV-MCNC: 28 MG/DL (ref 7–20)
CALCIUM SERPL-MCNC: 8.9 MG/DL (ref 8.3–10.6)
CHLORIDE BLD-SCNC: 112 MMOL/L (ref 99–110)
CLOSTRIDIUM DIFFICILE DNA AMPLIFICATION: ABNORMAL
CLOSTRIDIUM DIFFICILE DNA AMPLIFICATION: ABNORMAL
CO2: 15 MMOL/L (ref 21–32)
CREAT SERPL-MCNC: 2 MG/DL (ref 0.6–1.2)
GFR AFRICAN AMERICAN: 30
GFR NON-AFRICAN AMERICAN: 25
GLUCOSE BLD-MCNC: 126 MG/DL (ref 70–99)
MAGNESIUM: 2.1 MG/DL (ref 1.8–2.4)
ORGANISM: ABNORMAL
OSMOLALITY URINE: 336 MOSM/KG (ref 390–1070)
POTASSIUM SERPL-SCNC: 3 MMOL/L (ref 3.5–5.1)
REPORT: NORMAL
RESPIRATORY PANEL PCR: NORMAL
SODIUM BLD-SCNC: 141 MMOL/L (ref 136–145)

## 2019-01-22 PROCEDURE — APPSS45 APP SPLIT SHARED TIME 31-45 MINUTES: Performed by: NURSE PRACTITIONER

## 2019-01-22 PROCEDURE — 99233 SBSQ HOSP IP/OBS HIGH 50: CPT | Performed by: INTERNAL MEDICINE

## 2019-01-22 PROCEDURE — 2700000000 HC OXYGEN THERAPY PER DAY

## 2019-01-22 PROCEDURE — 6360000002 HC RX W HCPCS: Performed by: NURSE PRACTITIONER

## 2019-01-22 PROCEDURE — 6370000000 HC RX 637 (ALT 250 FOR IP): Performed by: INTERNAL MEDICINE

## 2019-01-22 PROCEDURE — 36415 COLL VENOUS BLD VENIPUNCTURE: CPT

## 2019-01-22 PROCEDURE — 1200000000 HC SEMI PRIVATE

## 2019-01-22 PROCEDURE — 6370000000 HC RX 637 (ALT 250 FOR IP): Performed by: PHYSICIAN ASSISTANT

## 2019-01-22 PROCEDURE — 94640 AIRWAY INHALATION TREATMENT: CPT

## 2019-01-22 PROCEDURE — 80048 BASIC METABOLIC PNL TOTAL CA: CPT

## 2019-01-22 PROCEDURE — 83735 ASSAY OF MAGNESIUM: CPT

## 2019-01-22 PROCEDURE — 2580000003 HC RX 258: Performed by: INTERNAL MEDICINE

## 2019-01-22 PROCEDURE — APPNB30 APP NON BILLABLE TIME 0-30 MINS: Performed by: NURSE PRACTITIONER

## 2019-01-22 PROCEDURE — 99222 1ST HOSP IP/OBS MODERATE 55: CPT | Performed by: SURGERY

## 2019-01-22 RX ORDER — METHYLPREDNISOLONE SODIUM SUCCINATE 40 MG/ML
40 INJECTION, POWDER, LYOPHILIZED, FOR SOLUTION INTRAMUSCULAR; INTRAVENOUS EVERY 6 HOURS
Status: ACTIVE | OUTPATIENT
Start: 2019-01-22 | End: 2019-01-23

## 2019-01-22 RX ORDER — ACETAMINOPHEN 80 MG
TABLET,CHEWABLE ORAL
Status: COMPLETED
Start: 2019-01-22 | End: 2019-01-22

## 2019-01-22 RX ORDER — POTASSIUM CHLORIDE 20 MEQ/1
60 TABLET, EXTENDED RELEASE ORAL ONCE
Status: COMPLETED | OUTPATIENT
Start: 2019-01-22 | End: 2019-01-22

## 2019-01-22 RX ORDER — PREDNISONE 20 MG/1
20 TABLET ORAL 2 TIMES DAILY
Status: DISCONTINUED | OUTPATIENT
Start: 2019-01-23 | End: 2019-01-24 | Stop reason: HOSPADM

## 2019-01-22 RX ORDER — LEVOFLOXACIN 500 MG/1
500 TABLET, FILM COATED ORAL DAILY
Status: DISCONTINUED | OUTPATIENT
Start: 2019-01-22 | End: 2019-01-24 | Stop reason: HOSPADM

## 2019-01-22 RX ORDER — METHYLPREDNISOLONE SODIUM SUCCINATE 40 MG/ML
40 INJECTION, POWDER, LYOPHILIZED, FOR SOLUTION INTRAMUSCULAR; INTRAVENOUS EVERY 6 HOURS
Status: DISCONTINUED | OUTPATIENT
Start: 2019-01-22 | End: 2019-01-22

## 2019-01-22 RX ADMIN — TIZANIDINE 4 MG: 4 TABLET ORAL at 09:52

## 2019-01-22 RX ADMIN — TIZANIDINE 4 MG: 4 TABLET ORAL at 21:16

## 2019-01-22 RX ADMIN — Medication: at 22:05

## 2019-01-22 RX ADMIN — IPRATROPIUM BROMIDE AND ALBUTEROL SULFATE 3 ML: .5; 3 SOLUTION RESPIRATORY (INHALATION) at 08:35

## 2019-01-22 RX ADMIN — Medication 10 ML: at 21:16

## 2019-01-22 RX ADMIN — LEVOFLOXACIN 500 MG: 500 TABLET, FILM COATED ORAL at 14:14

## 2019-01-22 RX ADMIN — APIXABAN 10 MG: 5 TABLET, FILM COATED ORAL at 09:52

## 2019-01-22 RX ADMIN — APIXABAN 10 MG: 5 TABLET, FILM COATED ORAL at 21:16

## 2019-01-22 RX ADMIN — VANCOMYCIN HYDROCHLORIDE 125 MG: KIT at 22:05

## 2019-01-22 RX ADMIN — VANCOMYCIN HYDROCHLORIDE 125 MG: KIT at 15:45

## 2019-01-22 RX ADMIN — PANTOPRAZOLE SODIUM 40 MG: 40 TABLET, DELAYED RELEASE ORAL at 07:28

## 2019-01-22 RX ADMIN — POTASSIUM CHLORIDE 60 MEQ: 1500 TABLET, EXTENDED RELEASE ORAL at 14:14

## 2019-01-22 RX ADMIN — IPRATROPIUM BROMIDE AND ALBUTEROL SULFATE 3 ML: .5; 3 SOLUTION RESPIRATORY (INHALATION) at 20:22

## 2019-01-22 RX ADMIN — VITAMIN D, TAB 1000IU (100/BT) 2000 UNITS: 25 TAB at 09:52

## 2019-01-22 RX ADMIN — Medication 2 PUFF: at 08:35

## 2019-01-22 RX ADMIN — Medication 2 PUFF: at 20:22

## 2019-01-22 RX ADMIN — IPRATROPIUM BROMIDE AND ALBUTEROL SULFATE 3 ML: .5; 3 SOLUTION RESPIRATORY (INHALATION) at 15:12

## 2019-01-22 RX ADMIN — TIZANIDINE 4 MG: 4 TABLET ORAL at 14:14

## 2019-01-22 RX ADMIN — ONDANSETRON 4 MG: 4 TABLET, ORALLY DISINTEGRATING ORAL at 09:55

## 2019-01-22 RX ADMIN — AMITRIPTYLINE HYDROCHLORIDE 75 MG: 25 TABLET, FILM COATED ORAL at 21:17

## 2019-01-22 ASSESSMENT — PAIN SCALES - GENERAL
PAINLEVEL_OUTOF10: 0
PAINLEVEL_OUTOF10: 6
PAINLEVEL_OUTOF10: 0
PAINLEVEL_OUTOF10: 4

## 2019-01-22 ASSESSMENT — PAIN DESCRIPTION - LOCATION
LOCATION: BACK
LOCATION: BACK

## 2019-01-22 ASSESSMENT — PAIN DESCRIPTION - PAIN TYPE
TYPE: ACUTE PAIN
TYPE: ACUTE PAIN

## 2019-01-23 LAB
ANION GAP SERPL CALCULATED.3IONS-SCNC: 11 MMOL/L (ref 3–16)
BUN BLDV-MCNC: 25 MG/DL (ref 7–20)
CALCIUM SERPL-MCNC: 8.7 MG/DL (ref 8.3–10.6)
CHLORIDE BLD-SCNC: 116 MMOL/L (ref 99–110)
CO2: 13 MMOL/L (ref 21–32)
CREAT SERPL-MCNC: 1.9 MG/DL (ref 0.6–1.2)
GFR AFRICAN AMERICAN: 32
GFR NON-AFRICAN AMERICAN: 26
GLUCOSE BLD-MCNC: 95 MG/DL (ref 70–99)
POTASSIUM SERPL-SCNC: 4.1 MMOL/L (ref 3.5–5.1)
SODIUM BLD-SCNC: 140 MMOL/L (ref 136–145)

## 2019-01-23 PROCEDURE — 6370000000 HC RX 637 (ALT 250 FOR IP): Performed by: INTERNAL MEDICINE

## 2019-01-23 PROCEDURE — 94760 N-INVAS EAR/PLS OXIMETRY 1: CPT

## 2019-01-23 PROCEDURE — 6370000000 HC RX 637 (ALT 250 FOR IP): Performed by: PHYSICIAN ASSISTANT

## 2019-01-23 PROCEDURE — 99232 SBSQ HOSP IP/OBS MODERATE 35: CPT | Performed by: INTERNAL MEDICINE

## 2019-01-23 PROCEDURE — APPNB30 APP NON BILLABLE TIME 0-30 MINS: Performed by: NURSE PRACTITIONER

## 2019-01-23 PROCEDURE — 1200000000 HC SEMI PRIVATE

## 2019-01-23 PROCEDURE — APPSS15 APP SPLIT SHARED TIME 0-15 MINUTES: Performed by: NURSE PRACTITIONER

## 2019-01-23 PROCEDURE — 94640 AIRWAY INHALATION TREATMENT: CPT

## 2019-01-23 PROCEDURE — 80048 BASIC METABOLIC PNL TOTAL CA: CPT

## 2019-01-23 PROCEDURE — 99232 SBSQ HOSP IP/OBS MODERATE 35: CPT | Performed by: SURGERY

## 2019-01-23 PROCEDURE — 2700000000 HC OXYGEN THERAPY PER DAY

## 2019-01-23 PROCEDURE — 2580000003 HC RX 258: Performed by: INTERNAL MEDICINE

## 2019-01-23 PROCEDURE — 36415 COLL VENOUS BLD VENIPUNCTURE: CPT

## 2019-01-23 RX ORDER — IPRATROPIUM BROMIDE AND ALBUTEROL SULFATE 2.5; .5 MG/3ML; MG/3ML
1 SOLUTION RESPIRATORY (INHALATION) 2 TIMES DAILY
Status: DISCONTINUED | OUTPATIENT
Start: 2019-01-23 | End: 2019-01-24 | Stop reason: HOSPADM

## 2019-01-23 RX ADMIN — Medication 2 PUFF: at 21:32

## 2019-01-23 RX ADMIN — VANCOMYCIN HYDROCHLORIDE 125 MG: KIT at 16:35

## 2019-01-23 RX ADMIN — IPRATROPIUM BROMIDE AND ALBUTEROL SULFATE 3 ML: .5; 3 SOLUTION RESPIRATORY (INHALATION) at 08:23

## 2019-01-23 RX ADMIN — APIXABAN 10 MG: 5 TABLET, FILM COATED ORAL at 09:33

## 2019-01-23 RX ADMIN — TIZANIDINE 4 MG: 4 TABLET ORAL at 09:33

## 2019-01-23 RX ADMIN — Medication 2 PUFF: at 08:24

## 2019-01-23 RX ADMIN — CHOLESTYRAMINE 4 G: 4 POWDER, FOR SUSPENSION ORAL at 09:33

## 2019-01-23 RX ADMIN — TIZANIDINE 4 MG: 4 TABLET ORAL at 14:36

## 2019-01-23 RX ADMIN — PANTOPRAZOLE SODIUM 40 MG: 40 TABLET, DELAYED RELEASE ORAL at 06:59

## 2019-01-23 RX ADMIN — PREDNISONE 20 MG: 20 TABLET ORAL at 20:49

## 2019-01-23 RX ADMIN — TIZANIDINE 4 MG: 4 TABLET ORAL at 20:49

## 2019-01-23 RX ADMIN — ACETAMINOPHEN 650 MG: 325 TABLET, FILM COATED ORAL at 14:41

## 2019-01-23 RX ADMIN — APIXABAN 10 MG: 5 TABLET, FILM COATED ORAL at 20:49

## 2019-01-23 RX ADMIN — VANCOMYCIN HYDROCHLORIDE 125 MG: KIT at 04:49

## 2019-01-23 RX ADMIN — Medication 10 ML: at 20:50

## 2019-01-23 RX ADMIN — LEVOFLOXACIN 500 MG: 500 TABLET, FILM COATED ORAL at 09:32

## 2019-01-23 RX ADMIN — VANCOMYCIN HYDROCHLORIDE 125 MG: KIT at 21:01

## 2019-01-23 RX ADMIN — VANCOMYCIN HYDROCHLORIDE 125 MG: KIT at 11:01

## 2019-01-23 RX ADMIN — AMITRIPTYLINE HYDROCHLORIDE 75 MG: 25 TABLET, FILM COATED ORAL at 20:49

## 2019-01-23 RX ADMIN — IPRATROPIUM BROMIDE AND ALBUTEROL SULFATE 3 ML: .5; 3 SOLUTION RESPIRATORY (INHALATION) at 21:32

## 2019-01-23 RX ADMIN — PREDNISONE 20 MG: 20 TABLET ORAL at 11:02

## 2019-01-23 RX ADMIN — VITAMIN D, TAB 1000IU (100/BT) 2000 UNITS: 25 TAB at 09:32

## 2019-01-23 ASSESSMENT — PAIN SCALES - GENERAL
PAINLEVEL_OUTOF10: 4
PAINLEVEL_OUTOF10: 0
PAINLEVEL_OUTOF10: 7
PAINLEVEL_OUTOF10: 6
PAINLEVEL_OUTOF10: 7
PAINLEVEL_OUTOF10: 7
PAINLEVEL_OUTOF10: 0
PAINLEVEL_OUTOF10: 0
PAINLEVEL_OUTOF10: 6
PAINLEVEL_OUTOF10: 8

## 2019-01-23 ASSESSMENT — PAIN DESCRIPTION - PAIN TYPE: TYPE: ACUTE PAIN

## 2019-01-23 ASSESSMENT — PAIN DESCRIPTION - LOCATION: LOCATION: BACK

## 2019-01-24 VITALS
DIASTOLIC BLOOD PRESSURE: 69 MMHG | OXYGEN SATURATION: 100 % | TEMPERATURE: 98 F | SYSTOLIC BLOOD PRESSURE: 125 MMHG | RESPIRATION RATE: 16 BRPM | HEIGHT: 60 IN | WEIGHT: 138.4 LBS | BODY MASS INDEX: 27.17 KG/M2 | HEART RATE: 110 BPM

## 2019-01-24 LAB
ANION GAP SERPL CALCULATED.3IONS-SCNC: 13 MMOL/L (ref 3–16)
BUN BLDV-MCNC: 22 MG/DL (ref 7–20)
CALCIUM SERPL-MCNC: 9.2 MG/DL (ref 8.3–10.6)
CHLORIDE BLD-SCNC: 112 MMOL/L (ref 99–110)
CO2: 16 MMOL/L (ref 21–32)
CREAT SERPL-MCNC: 1.6 MG/DL (ref 0.6–1.2)
GFR AFRICAN AMERICAN: 39
GFR NON-AFRICAN AMERICAN: 32
GLUCOSE BLD-MCNC: 175 MG/DL (ref 70–99)
POTASSIUM SERPL-SCNC: 4.1 MMOL/L (ref 3.5–5.1)
SODIUM BLD-SCNC: 141 MMOL/L (ref 136–145)

## 2019-01-24 PROCEDURE — 6370000000 HC RX 637 (ALT 250 FOR IP): Performed by: INTERNAL MEDICINE

## 2019-01-24 PROCEDURE — 94640 AIRWAY INHALATION TREATMENT: CPT

## 2019-01-24 PROCEDURE — 2580000003 HC RX 258: Performed by: INTERNAL MEDICINE

## 2019-01-24 PROCEDURE — 94760 N-INVAS EAR/PLS OXIMETRY 1: CPT

## 2019-01-24 PROCEDURE — 99231 SBSQ HOSP IP/OBS SF/LOW 25: CPT | Performed by: SURGERY

## 2019-01-24 PROCEDURE — 36415 COLL VENOUS BLD VENIPUNCTURE: CPT

## 2019-01-24 PROCEDURE — 2700000000 HC OXYGEN THERAPY PER DAY

## 2019-01-24 PROCEDURE — 80048 BASIC METABOLIC PNL TOTAL CA: CPT

## 2019-01-24 PROCEDURE — 6370000000 HC RX 637 (ALT 250 FOR IP): Performed by: PHYSICIAN ASSISTANT

## 2019-01-24 RX ORDER — LEVOFLOXACIN 500 MG/1
500 TABLET, FILM COATED ORAL DAILY
Qty: 10 TABLET | Refills: 0 | Status: SHIPPED | OUTPATIENT
Start: 2019-01-24 | End: 2019-02-03

## 2019-01-24 RX ORDER — PREDNISONE 10 MG/1
TABLET ORAL
Qty: 18 TABLET | Refills: 0 | Status: SHIPPED | OUTPATIENT
Start: 2019-01-24 | End: 2019-04-02

## 2019-01-24 RX ADMIN — Medication 10 ML: at 10:24

## 2019-01-24 RX ADMIN — PREDNISONE 20 MG: 20 TABLET ORAL at 10:24

## 2019-01-24 RX ADMIN — VANCOMYCIN HYDROCHLORIDE 125 MG: KIT at 04:31

## 2019-01-24 RX ADMIN — TIZANIDINE 4 MG: 4 TABLET ORAL at 10:24

## 2019-01-24 RX ADMIN — VITAMIN D, TAB 1000IU (100/BT) 2000 UNITS: 25 TAB at 10:24

## 2019-01-24 RX ADMIN — LEVOFLOXACIN 500 MG: 500 TABLET, FILM COATED ORAL at 10:24

## 2019-01-24 RX ADMIN — APIXABAN 10 MG: 5 TABLET, FILM COATED ORAL at 10:24

## 2019-01-24 RX ADMIN — PANTOPRAZOLE SODIUM 40 MG: 40 TABLET, DELAYED RELEASE ORAL at 08:01

## 2019-01-24 RX ADMIN — Medication 2 PUFF: at 08:50

## 2019-01-24 RX ADMIN — VANCOMYCIN HYDROCHLORIDE 125 MG: KIT at 10:24

## 2019-01-24 RX ADMIN — ACETAMINOPHEN 650 MG: 325 TABLET, FILM COATED ORAL at 03:12

## 2019-01-24 RX ADMIN — IPRATROPIUM BROMIDE AND ALBUTEROL SULFATE 3 ML: .5; 3 SOLUTION RESPIRATORY (INHALATION) at 08:50

## 2019-01-24 ASSESSMENT — PAIN SCALES - GENERAL
PAINLEVEL_OUTOF10: 7
PAINLEVEL_OUTOF10: 0

## 2019-01-28 ENCOUNTER — OFFICE VISIT (OUTPATIENT)
Dept: PULMONOLOGY | Age: 69
End: 2019-01-28
Payer: MEDICAID

## 2019-01-28 VITALS
OXYGEN SATURATION: 95 % | HEIGHT: 60 IN | WEIGHT: 134 LBS | DIASTOLIC BLOOD PRESSURE: 84 MMHG | SYSTOLIC BLOOD PRESSURE: 133 MMHG | HEART RATE: 88 BPM | BODY MASS INDEX: 26.31 KG/M2 | RESPIRATION RATE: 18 BRPM

## 2019-01-28 DIAGNOSIS — J44.9 COPD, SEVERE (HCC): ICD-10-CM

## 2019-01-28 DIAGNOSIS — R06.02 SOB (SHORTNESS OF BREATH): ICD-10-CM

## 2019-01-28 DIAGNOSIS — Z86.711 HISTORY OF PULMONARY EMBOLISM: ICD-10-CM

## 2019-01-28 DIAGNOSIS — Z09 HOSPITAL DISCHARGE FOLLOW-UP: Primary | ICD-10-CM

## 2019-01-28 PROCEDURE — G8417 CALC BMI ABV UP PARAM F/U: HCPCS | Performed by: INTERNAL MEDICINE

## 2019-01-28 PROCEDURE — 1090F PRES/ABSN URINE INCON ASSESS: CPT | Performed by: INTERNAL MEDICINE

## 2019-01-28 PROCEDURE — 99214 OFFICE O/P EST MOD 30 MIN: CPT | Performed by: INTERNAL MEDICINE

## 2019-01-28 PROCEDURE — G8482 FLU IMMUNIZE ORDER/ADMIN: HCPCS | Performed by: INTERNAL MEDICINE

## 2019-01-28 PROCEDURE — 4040F PNEUMOC VAC/ADMIN/RCVD: CPT | Performed by: INTERNAL MEDICINE

## 2019-01-28 PROCEDURE — 1036F TOBACCO NON-USER: CPT | Performed by: INTERNAL MEDICINE

## 2019-01-28 PROCEDURE — G8926 SPIRO NO PERF OR DOC: HCPCS | Performed by: INTERNAL MEDICINE

## 2019-01-28 PROCEDURE — 1111F DSCHRG MED/CURRENT MED MERGE: CPT | Performed by: INTERNAL MEDICINE

## 2019-01-28 PROCEDURE — 1123F ACP DISCUSS/DSCN MKR DOCD: CPT | Performed by: INTERNAL MEDICINE

## 2019-01-28 PROCEDURE — G8399 PT W/DXA RESULTS DOCUMENT: HCPCS | Performed by: INTERNAL MEDICINE

## 2019-01-28 PROCEDURE — G8427 DOCREV CUR MEDS BY ELIG CLIN: HCPCS | Performed by: INTERNAL MEDICINE

## 2019-01-28 PROCEDURE — 3023F SPIROM DOC REV: CPT | Performed by: INTERNAL MEDICINE

## 2019-01-28 PROCEDURE — 1101F PT FALLS ASSESS-DOCD LE1/YR: CPT | Performed by: INTERNAL MEDICINE

## 2019-01-28 PROCEDURE — 3017F COLORECTAL CA SCREEN DOC REV: CPT | Performed by: INTERNAL MEDICINE

## 2019-01-29 ENCOUNTER — TELEPHONE (OUTPATIENT)
Dept: PULMONOLOGY | Age: 69
End: 2019-01-29

## 2019-02-11 ENCOUNTER — HOSPITAL ENCOUNTER (OUTPATIENT)
Dept: CT IMAGING | Age: 69
Discharge: HOME | End: 2019-02-11
Payer: MEDICAID

## 2019-02-11 DIAGNOSIS — R06.02 SOB (SHORTNESS OF BREATH): ICD-10-CM

## 2019-02-11 PROCEDURE — 71250 CT THORAX DX C-: CPT

## 2019-02-12 ENCOUNTER — OFFICE VISIT (OUTPATIENT)
Dept: SURGERY | Age: 69
End: 2019-02-12
Payer: MEDICAID

## 2019-02-12 VITALS — BODY MASS INDEX: 27.54 KG/M2 | SYSTOLIC BLOOD PRESSURE: 127 MMHG | WEIGHT: 141 LBS | DIASTOLIC BLOOD PRESSURE: 88 MMHG

## 2019-02-12 DIAGNOSIS — L98.492 SKIN ULCER OF SHOULDER WITH FAT LAYER EXPOSED (HCC): Primary | ICD-10-CM

## 2019-02-12 PROCEDURE — 1036F TOBACCO NON-USER: CPT | Performed by: SURGERY

## 2019-02-12 PROCEDURE — 4040F PNEUMOC VAC/ADMIN/RCVD: CPT | Performed by: SURGERY

## 2019-02-12 PROCEDURE — 99213 OFFICE O/P EST LOW 20 MIN: CPT | Performed by: SURGERY

## 2019-02-12 PROCEDURE — G8417 CALC BMI ABV UP PARAM F/U: HCPCS | Performed by: SURGERY

## 2019-02-12 PROCEDURE — 1101F PT FALLS ASSESS-DOCD LE1/YR: CPT | Performed by: SURGERY

## 2019-02-12 PROCEDURE — 3017F COLORECTAL CA SCREEN DOC REV: CPT | Performed by: SURGERY

## 2019-02-12 PROCEDURE — G8427 DOCREV CUR MEDS BY ELIG CLIN: HCPCS | Performed by: SURGERY

## 2019-02-12 PROCEDURE — G8482 FLU IMMUNIZE ORDER/ADMIN: HCPCS | Performed by: SURGERY

## 2019-02-12 PROCEDURE — G8399 PT W/DXA RESULTS DOCUMENT: HCPCS | Performed by: SURGERY

## 2019-02-12 PROCEDURE — 1090F PRES/ABSN URINE INCON ASSESS: CPT | Performed by: SURGERY

## 2019-02-12 PROCEDURE — 1123F ACP DISCUSS/DSCN MKR DOCD: CPT | Performed by: SURGERY

## 2019-02-12 PROCEDURE — 1111F DSCHRG MED/CURRENT MED MERGE: CPT | Performed by: SURGERY

## 2019-02-14 ENCOUNTER — TELEPHONE (OUTPATIENT)
Dept: SURGERY | Age: 69
End: 2019-02-14

## 2019-02-18 ENCOUNTER — TELEPHONE (OUTPATIENT)
Dept: INTERNAL MEDICINE CLINIC | Age: 69
End: 2019-02-18

## 2019-02-18 ENCOUNTER — OFFICE VISIT (OUTPATIENT)
Dept: PULMONOLOGY | Age: 69
End: 2019-02-18
Payer: MEDICAID

## 2019-02-18 ENCOUNTER — TELEPHONE (OUTPATIENT)
Dept: SURGERY | Age: 69
End: 2019-02-18

## 2019-02-18 ENCOUNTER — TELEPHONE (OUTPATIENT)
Dept: PULMONOLOGY | Age: 69
End: 2019-02-18

## 2019-02-18 VITALS
OXYGEN SATURATION: 96 % | HEART RATE: 93 BPM | HEIGHT: 60 IN | WEIGHT: 133 LBS | SYSTOLIC BLOOD PRESSURE: 138 MMHG | RESPIRATION RATE: 18 BRPM | DIASTOLIC BLOOD PRESSURE: 90 MMHG | BODY MASS INDEX: 26.11 KG/M2

## 2019-02-18 DIAGNOSIS — J84.10 PULMONARY FIBROSIS (HCC): ICD-10-CM

## 2019-02-18 DIAGNOSIS — Z01.811 PRE-OPERATIVE RESPIRATORY EXAMINATION: Primary | ICD-10-CM

## 2019-02-18 DIAGNOSIS — J44.9 COPD, SEVERE (HCC): ICD-10-CM

## 2019-02-18 DIAGNOSIS — J43.2 CENTRILOBULAR EMPHYSEMA (HCC): ICD-10-CM

## 2019-02-18 DIAGNOSIS — Z86.711 HX OF PULMONARY EMBOLUS: ICD-10-CM

## 2019-02-18 PROCEDURE — G8399 PT W/DXA RESULTS DOCUMENT: HCPCS | Performed by: INTERNAL MEDICINE

## 2019-02-18 PROCEDURE — G8427 DOCREV CUR MEDS BY ELIG CLIN: HCPCS | Performed by: INTERNAL MEDICINE

## 2019-02-18 PROCEDURE — 1036F TOBACCO NON-USER: CPT | Performed by: INTERNAL MEDICINE

## 2019-02-18 PROCEDURE — 1123F ACP DISCUSS/DSCN MKR DOCD: CPT | Performed by: INTERNAL MEDICINE

## 2019-02-18 PROCEDURE — 3017F COLORECTAL CA SCREEN DOC REV: CPT | Performed by: INTERNAL MEDICINE

## 2019-02-18 PROCEDURE — 1090F PRES/ABSN URINE INCON ASSESS: CPT | Performed by: INTERNAL MEDICINE

## 2019-02-18 PROCEDURE — G8926 SPIRO NO PERF OR DOC: HCPCS | Performed by: INTERNAL MEDICINE

## 2019-02-18 PROCEDURE — G8482 FLU IMMUNIZE ORDER/ADMIN: HCPCS | Performed by: INTERNAL MEDICINE

## 2019-02-18 PROCEDURE — 1101F PT FALLS ASSESS-DOCD LE1/YR: CPT | Performed by: INTERNAL MEDICINE

## 2019-02-18 PROCEDURE — 4040F PNEUMOC VAC/ADMIN/RCVD: CPT | Performed by: INTERNAL MEDICINE

## 2019-02-18 PROCEDURE — 99214 OFFICE O/P EST MOD 30 MIN: CPT | Performed by: INTERNAL MEDICINE

## 2019-02-18 PROCEDURE — 1111F DSCHRG MED/CURRENT MED MERGE: CPT | Performed by: INTERNAL MEDICINE

## 2019-02-18 PROCEDURE — 3023F SPIROM DOC REV: CPT | Performed by: INTERNAL MEDICINE

## 2019-02-18 PROCEDURE — G8417 CALC BMI ABV UP PARAM F/U: HCPCS | Performed by: INTERNAL MEDICINE

## 2019-02-18 RX ORDER — SULFAMETHOXAZOLE AND TRIMETHOPRIM 800; 160 MG/1; MG/1
1 TABLET ORAL 2 TIMES DAILY
Qty: 14 TABLET | Refills: 1 | Status: SHIPPED | OUTPATIENT
Start: 2019-02-18 | End: 2019-02-25

## 2019-02-20 ENCOUNTER — OFFICE VISIT (OUTPATIENT)
Dept: INTERNAL MEDICINE CLINIC | Age: 69
End: 2019-02-20
Payer: MEDICAID

## 2019-02-20 VITALS
WEIGHT: 131 LBS | BODY MASS INDEX: 25.58 KG/M2 | OXYGEN SATURATION: 94 % | DIASTOLIC BLOOD PRESSURE: 70 MMHG | SYSTOLIC BLOOD PRESSURE: 134 MMHG | HEART RATE: 56 BPM

## 2019-02-20 DIAGNOSIS — R22.32 MASS OF SKIN OF LEFT SHOULDER: Primary | ICD-10-CM

## 2019-02-20 DIAGNOSIS — Z01.818 PRE-OP EXAM: ICD-10-CM

## 2019-02-20 PROCEDURE — 4040F PNEUMOC VAC/ADMIN/RCVD: CPT | Performed by: NURSE PRACTITIONER

## 2019-02-20 PROCEDURE — 1101F PT FALLS ASSESS-DOCD LE1/YR: CPT | Performed by: NURSE PRACTITIONER

## 2019-02-20 PROCEDURE — 1123F ACP DISCUSS/DSCN MKR DOCD: CPT | Performed by: NURSE PRACTITIONER

## 2019-02-20 PROCEDURE — 93000 ELECTROCARDIOGRAM COMPLETE: CPT | Performed by: NURSE PRACTITIONER

## 2019-02-20 PROCEDURE — 3017F COLORECTAL CA SCREEN DOC REV: CPT | Performed by: NURSE PRACTITIONER

## 2019-02-20 PROCEDURE — G8427 DOCREV CUR MEDS BY ELIG CLIN: HCPCS | Performed by: NURSE PRACTITIONER

## 2019-02-20 PROCEDURE — 1090F PRES/ABSN URINE INCON ASSESS: CPT | Performed by: NURSE PRACTITIONER

## 2019-02-20 PROCEDURE — 1111F DSCHRG MED/CURRENT MED MERGE: CPT | Performed by: NURSE PRACTITIONER

## 2019-02-20 PROCEDURE — G8417 CALC BMI ABV UP PARAM F/U: HCPCS | Performed by: NURSE PRACTITIONER

## 2019-02-20 PROCEDURE — 99214 OFFICE O/P EST MOD 30 MIN: CPT | Performed by: NURSE PRACTITIONER

## 2019-02-20 PROCEDURE — 1036F TOBACCO NON-USER: CPT | Performed by: NURSE PRACTITIONER

## 2019-02-20 PROCEDURE — G8399 PT W/DXA RESULTS DOCUMENT: HCPCS | Performed by: NURSE PRACTITIONER

## 2019-02-20 PROCEDURE — G8482 FLU IMMUNIZE ORDER/ADMIN: HCPCS | Performed by: NURSE PRACTITIONER

## 2019-02-27 ENCOUNTER — HOSPITAL ENCOUNTER (OUTPATIENT)
Age: 69
Setting detail: OUTPATIENT SURGERY
Discharge: HOME | End: 2019-02-27
Attending: SURGERY | Admitting: SURGERY
Payer: MEDICAID

## 2019-02-27 ENCOUNTER — ANESTHESIA (OUTPATIENT)
Dept: OPERATING ROOM | Age: 69
End: 2019-02-27
Payer: MEDICAID

## 2019-02-27 ENCOUNTER — ANESTHESIA EVENT (OUTPATIENT)
Dept: OPERATING ROOM | Age: 69
End: 2019-02-27
Payer: MEDICAID

## 2019-02-27 VITALS
OXYGEN SATURATION: 97 % | RESPIRATION RATE: 8 BRPM | DIASTOLIC BLOOD PRESSURE: 77 MMHG | SYSTOLIC BLOOD PRESSURE: 113 MMHG

## 2019-02-27 VITALS
HEART RATE: 103 BPM | HEIGHT: 60 IN | OXYGEN SATURATION: 94 % | SYSTOLIC BLOOD PRESSURE: 144 MMHG | BODY MASS INDEX: 24.74 KG/M2 | WEIGHT: 126 LBS | DIASTOLIC BLOOD PRESSURE: 89 MMHG | TEMPERATURE: 98.2 F | RESPIRATION RATE: 16 BRPM

## 2019-02-27 DIAGNOSIS — S41.002A SHOULDER WOUND, LEFT, INITIAL ENCOUNTER: ICD-10-CM

## 2019-02-27 DIAGNOSIS — L98.492 SKIN ULCER OF SHOULDER WITH FAT LAYER EXPOSED (HCC): Primary | ICD-10-CM

## 2019-02-27 LAB
ANION GAP SERPL CALCULATED.3IONS-SCNC: 15 MMOL/L (ref 3–16)
BUN BLDV-MCNC: 22 MG/DL (ref 7–20)
CALCIUM SERPL-MCNC: 8.6 MG/DL (ref 8.3–10.6)
CHLORIDE BLD-SCNC: 108 MMOL/L (ref 99–110)
CO2: 15 MMOL/L (ref 21–32)
CREAT SERPL-MCNC: 1.6 MG/DL (ref 0.6–1.2)
GFR AFRICAN AMERICAN: 39
GFR NON-AFRICAN AMERICAN: 32
GLUCOSE BLD-MCNC: 106 MG/DL (ref 70–99)
POTASSIUM SERPL-SCNC: 3.3 MMOL/L (ref 3.5–5.1)
SODIUM BLD-SCNC: 138 MMOL/L (ref 136–145)

## 2019-02-27 PROCEDURE — 6360000002 HC RX W HCPCS: Performed by: SURGERY

## 2019-02-27 PROCEDURE — 88305 TISSUE EXAM BY PATHOLOGIST: CPT

## 2019-02-27 PROCEDURE — 6360000002 HC RX W HCPCS: Performed by: ANESTHESIOLOGY

## 2019-02-27 PROCEDURE — 11043 DBRDMT MUSC&/FSCA 1ST 20/<: CPT | Performed by: SURGERY

## 2019-02-27 PROCEDURE — 2580000003 HC RX 258: Performed by: ANESTHESIOLOGY

## 2019-02-27 PROCEDURE — 36415 COLL VENOUS BLD VENIPUNCTURE: CPT

## 2019-02-27 PROCEDURE — 2580000003 HC RX 258: Performed by: SURGERY

## 2019-02-27 PROCEDURE — 6360000002 HC RX W HCPCS: Performed by: NURSE ANESTHETIST, CERTIFIED REGISTERED

## 2019-02-27 PROCEDURE — 3700000000 HC ANESTHESIA ATTENDED CARE: Performed by: SURGERY

## 2019-02-27 PROCEDURE — 2709999900 HC NON-CHARGEABLE SUPPLY: Performed by: SURGERY

## 2019-02-27 PROCEDURE — 3700000001 HC ADD 15 MINUTES (ANESTHESIA): Performed by: SURGERY

## 2019-02-27 PROCEDURE — 7100000010 HC PHASE II RECOVERY - FIRST 15 MIN: Performed by: SURGERY

## 2019-02-27 PROCEDURE — 3600000012 HC SURGERY LEVEL 2 ADDTL 15MIN: Performed by: SURGERY

## 2019-02-27 PROCEDURE — 2500000003 HC RX 250 WO HCPCS: Performed by: SURGERY

## 2019-02-27 PROCEDURE — 7100000011 HC PHASE II RECOVERY - ADDTL 15 MIN: Performed by: SURGERY

## 2019-02-27 PROCEDURE — 7100000000 HC PACU RECOVERY - FIRST 15 MIN: Performed by: SURGERY

## 2019-02-27 PROCEDURE — 80048 BASIC METABOLIC PNL TOTAL CA: CPT

## 2019-02-27 PROCEDURE — 2500000003 HC RX 250 WO HCPCS: Performed by: NURSE ANESTHETIST, CERTIFIED REGISTERED

## 2019-02-27 PROCEDURE — 3600000002 HC SURGERY LEVEL 2 BASE: Performed by: SURGERY

## 2019-02-27 PROCEDURE — 11046 DBRDMT MUSC&/FSCA EA ADDL: CPT | Performed by: SURGERY

## 2019-02-27 PROCEDURE — 7100000001 HC PACU RECOVERY - ADDTL 15 MIN: Performed by: SURGERY

## 2019-02-27 RX ORDER — ONDANSETRON 2 MG/ML
INJECTION INTRAMUSCULAR; INTRAVENOUS PRN
Status: DISCONTINUED | OUTPATIENT
Start: 2019-02-27 | End: 2019-02-27 | Stop reason: SDUPTHER

## 2019-02-27 RX ORDER — SODIUM CHLORIDE 0.9 % (FLUSH) 0.9 %
10 SYRINGE (ML) INJECTION EVERY 12 HOURS SCHEDULED
Status: DISCONTINUED | OUTPATIENT
Start: 2019-02-27 | End: 2019-02-27 | Stop reason: HOSPADM

## 2019-02-27 RX ORDER — LABETALOL HYDROCHLORIDE 5 MG/ML
5 INJECTION, SOLUTION INTRAVENOUS EVERY 10 MIN PRN
Status: DISCONTINUED | OUTPATIENT
Start: 2019-02-27 | End: 2019-02-27 | Stop reason: HOSPADM

## 2019-02-27 RX ORDER — DEXAMETHASONE SODIUM PHOSPHATE 4 MG/ML
INJECTION, SOLUTION INTRA-ARTICULAR; INTRALESIONAL; INTRAMUSCULAR; INTRAVENOUS; SOFT TISSUE PRN
Status: DISCONTINUED | OUTPATIENT
Start: 2019-02-27 | End: 2019-02-27 | Stop reason: SDUPTHER

## 2019-02-27 RX ORDER — ONDANSETRON 2 MG/ML
4 INJECTION INTRAMUSCULAR; INTRAVENOUS
Status: DISCONTINUED | OUTPATIENT
Start: 2019-02-27 | End: 2019-02-27 | Stop reason: HOSPADM

## 2019-02-27 RX ORDER — CEFAZOLIN SODIUM 2 G/100ML
2 INJECTION, SOLUTION INTRAVENOUS
Status: COMPLETED | OUTPATIENT
Start: 2019-02-27 | End: 2019-02-27

## 2019-02-27 RX ORDER — MAGNESIUM HYDROXIDE 1200 MG/15ML
LIQUID ORAL CONTINUOUS PRN
Status: COMPLETED | OUTPATIENT
Start: 2019-02-27 | End: 2019-02-27

## 2019-02-27 RX ORDER — SODIUM CHLORIDE 0.9 % (FLUSH) 0.9 %
10 SYRINGE (ML) INJECTION PRN
Status: DISCONTINUED | OUTPATIENT
Start: 2019-02-27 | End: 2019-02-27 | Stop reason: HOSPADM

## 2019-02-27 RX ORDER — HYDROMORPHONE HCL 110MG/55ML
0.3 PATIENT CONTROLLED ANALGESIA SYRINGE INTRAVENOUS EVERY 5 MIN PRN
Status: DISCONTINUED | OUTPATIENT
Start: 2019-02-27 | End: 2019-02-27 | Stop reason: HOSPADM

## 2019-02-27 RX ORDER — BUPIVACAINE HYDROCHLORIDE AND EPINEPHRINE 5; 5 MG/ML; UG/ML
INJECTION, SOLUTION PERINEURAL
Status: COMPLETED | OUTPATIENT
Start: 2019-02-27 | End: 2019-02-27

## 2019-02-27 RX ORDER — SODIUM CHLORIDE, SODIUM LACTATE, POTASSIUM CHLORIDE, CALCIUM CHLORIDE 600; 310; 30; 20 MG/100ML; MG/100ML; MG/100ML; MG/100ML
INJECTION, SOLUTION INTRAVENOUS CONTINUOUS
Status: DISCONTINUED | OUTPATIENT
Start: 2019-02-27 | End: 2019-02-27 | Stop reason: HOSPADM

## 2019-02-27 RX ORDER — LIDOCAINE HYDROCHLORIDE 20 MG/ML
INJECTION, SOLUTION INFILTRATION; PERINEURAL PRN
Status: DISCONTINUED | OUTPATIENT
Start: 2019-02-27 | End: 2019-02-27 | Stop reason: SDUPTHER

## 2019-02-27 RX ORDER — PROPOFOL 10 MG/ML
INJECTION, EMULSION INTRAVENOUS PRN
Status: DISCONTINUED | OUTPATIENT
Start: 2019-02-27 | End: 2019-02-27 | Stop reason: SDUPTHER

## 2019-02-27 RX ORDER — HYDRALAZINE HYDROCHLORIDE 20 MG/ML
5 INJECTION INTRAMUSCULAR; INTRAVENOUS EVERY 10 MIN PRN
Status: DISCONTINUED | OUTPATIENT
Start: 2019-02-27 | End: 2019-02-27 | Stop reason: HOSPADM

## 2019-02-27 RX ORDER — LIDOCAINE HYDROCHLORIDE 10 MG/ML
1 INJECTION, SOLUTION EPIDURAL; INFILTRATION; INTRACAUDAL; PERINEURAL
Status: DISCONTINUED | OUTPATIENT
Start: 2019-02-27 | End: 2019-02-27 | Stop reason: HOSPADM

## 2019-02-27 RX ORDER — FENTANYL CITRATE 50 UG/ML
INJECTION, SOLUTION INTRAMUSCULAR; INTRAVENOUS PRN
Status: DISCONTINUED | OUTPATIENT
Start: 2019-02-27 | End: 2019-02-27 | Stop reason: SDUPTHER

## 2019-02-27 RX ORDER — FENTANYL CITRATE 50 UG/ML
25 INJECTION, SOLUTION INTRAMUSCULAR; INTRAVENOUS EVERY 5 MIN PRN
Status: DISCONTINUED | OUTPATIENT
Start: 2019-02-27 | End: 2019-02-27 | Stop reason: HOSPADM

## 2019-02-27 RX ORDER — HYDROCODONE BITARTRATE AND ACETAMINOPHEN 5; 325 MG/1; MG/1
1 TABLET ORAL EVERY 4 HOURS PRN
Qty: 25 TABLET | Refills: 0 | Status: SHIPPED | OUTPATIENT
Start: 2019-02-27 | End: 2019-03-06

## 2019-02-27 RX ADMIN — DEXAMETHASONE SODIUM PHOSPHATE 4 MG: 4 INJECTION, SOLUTION INTRAMUSCULAR; INTRAVENOUS at 08:34

## 2019-02-27 RX ADMIN — FENTANYL CITRATE 25 MCG: 50 INJECTION INTRAMUSCULAR; INTRAVENOUS at 09:42

## 2019-02-27 RX ADMIN — PHENYLEPHRINE HYDROCHLORIDE 50 MCG: 10 INJECTION INTRAVENOUS at 08:37

## 2019-02-27 RX ADMIN — CEFAZOLIN SODIUM 2 G: 2 INJECTION, SOLUTION INTRAVENOUS at 08:04

## 2019-02-27 RX ADMIN — PHENYLEPHRINE HYDROCHLORIDE 100 MCG: 10 INJECTION INTRAVENOUS at 08:50

## 2019-02-27 RX ADMIN — PHENYLEPHRINE HYDROCHLORIDE 50 MCG: 10 INJECTION INTRAVENOUS at 08:33

## 2019-02-27 RX ADMIN — PHENYLEPHRINE HYDROCHLORIDE 100 MCG: 10 INJECTION INTRAVENOUS at 08:55

## 2019-02-27 RX ADMIN — LIDOCAINE HYDROCHLORIDE 75 MG: 20 INJECTION, SOLUTION INFILTRATION; PERINEURAL at 08:26

## 2019-02-27 RX ADMIN — SODIUM CHLORIDE, POTASSIUM CHLORIDE, SODIUM LACTATE AND CALCIUM CHLORIDE: 600; 310; 30; 20 INJECTION, SOLUTION INTRAVENOUS at 09:04

## 2019-02-27 RX ADMIN — FENTANYL CITRATE 50 MCG: 50 INJECTION, SOLUTION INTRAMUSCULAR; INTRAVENOUS at 08:26

## 2019-02-27 RX ADMIN — PROPOFOL 150 MG: 10 INJECTION, EMULSION INTRAVENOUS at 08:26

## 2019-02-27 RX ADMIN — PHENYLEPHRINE HYDROCHLORIDE 50 MCG: 10 INJECTION INTRAVENOUS at 08:40

## 2019-02-27 RX ADMIN — SODIUM CHLORIDE, POTASSIUM CHLORIDE, SODIUM LACTATE AND CALCIUM CHLORIDE: 600; 310; 30; 20 INJECTION, SOLUTION INTRAVENOUS at 08:09

## 2019-02-27 RX ADMIN — ONDANSETRON 4 MG: 2 INJECTION INTRAMUSCULAR; INTRAVENOUS at 08:34

## 2019-02-27 RX ADMIN — PHENYLEPHRINE HYDROCHLORIDE 100 MCG: 10 INJECTION INTRAVENOUS at 09:02

## 2019-02-27 RX ADMIN — PHENYLEPHRINE HYDROCHLORIDE 100 MCG: 10 INJECTION INTRAVENOUS at 09:04

## 2019-02-27 RX ADMIN — PHENYLEPHRINE HYDROCHLORIDE 50 MCG: 10 INJECTION INTRAVENOUS at 08:45

## 2019-02-27 RX ADMIN — FENTANYL CITRATE 25 MCG: 50 INJECTION INTRAMUSCULAR; INTRAVENOUS at 09:35

## 2019-02-27 RX ADMIN — PHENYLEPHRINE HYDROCHLORIDE 100 MCG: 10 INJECTION INTRAVENOUS at 09:00

## 2019-02-27 ASSESSMENT — PULMONARY FUNCTION TESTS
PIF_VALUE: 2
PIF_VALUE: 2
PIF_VALUE: 15
PIF_VALUE: 2
PIF_VALUE: 1
PIF_VALUE: 7
PIF_VALUE: 1
PIF_VALUE: 38
PIF_VALUE: 3
PIF_VALUE: 14
PIF_VALUE: 2
PIF_VALUE: 15
PIF_VALUE: 2
PIF_VALUE: 16
PIF_VALUE: 15
PIF_VALUE: 1
PIF_VALUE: 15
PIF_VALUE: 2
PIF_VALUE: 14
PIF_VALUE: 1
PIF_VALUE: 15
PIF_VALUE: 15
PIF_VALUE: 26
PIF_VALUE: 1
PIF_VALUE: 27
PIF_VALUE: 2
PIF_VALUE: 22
PIF_VALUE: 2
PIF_VALUE: 0
PIF_VALUE: 15
PIF_VALUE: 21
PIF_VALUE: 12
PIF_VALUE: 14
PIF_VALUE: 38
PIF_VALUE: 1
PIF_VALUE: 16
PIF_VALUE: 22
PIF_VALUE: 2
PIF_VALUE: 2
PIF_VALUE: 4
PIF_VALUE: 41
PIF_VALUE: 1
PIF_VALUE: 15
PIF_VALUE: 1
PIF_VALUE: 15
PIF_VALUE: 16
PIF_VALUE: 16
PIF_VALUE: 15
PIF_VALUE: 16
PIF_VALUE: 1
PIF_VALUE: 3
PIF_VALUE: 2
PIF_VALUE: 21
PIF_VALUE: 2
PIF_VALUE: 14
PIF_VALUE: 1
PIF_VALUE: 36
PIF_VALUE: 17
PIF_VALUE: 15
PIF_VALUE: 15
PIF_VALUE: 16
PIF_VALUE: 17
PIF_VALUE: 2

## 2019-02-27 ASSESSMENT — PAIN SCALES - GENERAL
PAINLEVEL_OUTOF10: 7
PAINLEVEL_OUTOF10: 6

## 2019-02-27 ASSESSMENT — ENCOUNTER SYMPTOMS: SHORTNESS OF BREATH: 1

## 2019-02-27 ASSESSMENT — PAIN DESCRIPTION - PAIN TYPE: TYPE: SURGICAL PAIN

## 2019-03-01 ENCOUNTER — TELEPHONE (OUTPATIENT)
Dept: SURGERY | Age: 69
End: 2019-03-01

## 2019-03-01 DIAGNOSIS — Z09 FOLLOW-UP EXAMINATION: Primary | ICD-10-CM

## 2019-03-01 RX ORDER — OXYCODONE HYDROCHLORIDE 5 MG/1
5 TABLET ORAL EVERY 4 HOURS PRN
Qty: 20 TABLET | Refills: 0 | Status: SHIPPED | OUTPATIENT
Start: 2019-03-01 | End: 2019-03-08

## 2019-03-03 RX ORDER — OXYCODONE HYDROCHLORIDE AND ACETAMINOPHEN 5; 325 MG/1; MG/1
1 TABLET ORAL EVERY 6 HOURS PRN
Qty: 20 TABLET | Refills: 0 | OUTPATIENT
Start: 2019-03-03 | End: 2019-03-08

## 2019-03-06 ENCOUNTER — OFFICE VISIT (OUTPATIENT)
Dept: INTERNAL MEDICINE CLINIC | Age: 69
End: 2019-03-06
Payer: MEDICAID

## 2019-03-06 VITALS
HEART RATE: 90 BPM | DIASTOLIC BLOOD PRESSURE: 60 MMHG | WEIGHT: 129 LBS | SYSTOLIC BLOOD PRESSURE: 102 MMHG | BODY MASS INDEX: 25.19 KG/M2

## 2019-03-06 DIAGNOSIS — K50.811 CROHN'S DISEASE OF BOTH SMALL AND LARGE INTESTINE WITH RECTAL BLEEDING (HCC): Primary | Chronic | ICD-10-CM

## 2019-03-06 DIAGNOSIS — J44.1 COPD WITH ACUTE EXACERBATION (HCC): ICD-10-CM

## 2019-03-06 DIAGNOSIS — J44.9 COPD, SEVERE (HCC): ICD-10-CM

## 2019-03-06 DIAGNOSIS — N18.4 STAGE 4 CHRONIC KIDNEY DISEASE (HCC): ICD-10-CM

## 2019-03-06 PROCEDURE — 99214 OFFICE O/P EST MOD 30 MIN: CPT | Performed by: INTERNAL MEDICINE

## 2019-03-06 PROCEDURE — G8427 DOCREV CUR MEDS BY ELIG CLIN: HCPCS | Performed by: INTERNAL MEDICINE

## 2019-03-06 PROCEDURE — 1123F ACP DISCUSS/DSCN MKR DOCD: CPT | Performed by: INTERNAL MEDICINE

## 2019-03-06 PROCEDURE — G8482 FLU IMMUNIZE ORDER/ADMIN: HCPCS | Performed by: INTERNAL MEDICINE

## 2019-03-06 PROCEDURE — 1090F PRES/ABSN URINE INCON ASSESS: CPT | Performed by: INTERNAL MEDICINE

## 2019-03-06 PROCEDURE — 3017F COLORECTAL CA SCREEN DOC REV: CPT | Performed by: INTERNAL MEDICINE

## 2019-03-06 PROCEDURE — 1036F TOBACCO NON-USER: CPT | Performed by: INTERNAL MEDICINE

## 2019-03-06 PROCEDURE — G8417 CALC BMI ABV UP PARAM F/U: HCPCS | Performed by: INTERNAL MEDICINE

## 2019-03-06 PROCEDURE — G8399 PT W/DXA RESULTS DOCUMENT: HCPCS | Performed by: INTERNAL MEDICINE

## 2019-03-06 PROCEDURE — G8926 SPIRO NO PERF OR DOC: HCPCS | Performed by: INTERNAL MEDICINE

## 2019-03-06 PROCEDURE — 1101F PT FALLS ASSESS-DOCD LE1/YR: CPT | Performed by: INTERNAL MEDICINE

## 2019-03-06 PROCEDURE — 3023F SPIROM DOC REV: CPT | Performed by: INTERNAL MEDICINE

## 2019-03-06 PROCEDURE — 4040F PNEUMOC VAC/ADMIN/RCVD: CPT | Performed by: INTERNAL MEDICINE

## 2019-03-06 RX ORDER — PREDNISONE 20 MG/1
40 TABLET ORAL DAILY
Qty: 10 TABLET | Refills: 0 | Status: SHIPPED | OUTPATIENT
Start: 2019-03-06 | End: 2019-03-11

## 2019-03-06 ASSESSMENT — ENCOUNTER SYMPTOMS
WHEEZING: 1
SHORTNESS OF BREATH: 1
ANAL BLEEDING: 0
EYE REDNESS: 0
EYE PAIN: 0
DIARRHEA: 1

## 2019-03-12 ENCOUNTER — OFFICE VISIT (OUTPATIENT)
Dept: SURGERY | Age: 69
End: 2019-03-12

## 2019-03-12 VITALS — WEIGHT: 141 LBS | BODY MASS INDEX: 27.54 KG/M2 | DIASTOLIC BLOOD PRESSURE: 88 MMHG | SYSTOLIC BLOOD PRESSURE: 127 MMHG

## 2019-03-12 DIAGNOSIS — L98.492 SKIN ULCER OF SHOULDER WITH FAT LAYER EXPOSED (HCC): Primary | ICD-10-CM

## 2019-03-12 PROCEDURE — 99024 POSTOP FOLLOW-UP VISIT: CPT | Performed by: SURGERY

## 2019-03-18 ENCOUNTER — HOSPITAL ENCOUNTER (OUTPATIENT)
Age: 69
Discharge: HOME | End: 2019-03-18
Payer: MEDICAID

## 2019-03-18 LAB
ANION GAP SERPL CALCULATED.3IONS-SCNC: 16 MMOL/L (ref 3–16)
ANISOCYTOSIS: ABNORMAL
BACTERIA: ABNORMAL /HPF
BASOPHILS ABSOLUTE: 0.1 K/UL (ref 0–0.2)
BASOPHILS RELATIVE PERCENT: 1 %
BILIRUBIN URINE: NEGATIVE
BLOOD, URINE: ABNORMAL
BUN BLDV-MCNC: 9 MG/DL (ref 7–20)
C-REACTIVE PROTEIN: 2.1 MG/L (ref 0–5.1)
CALCIUM SERPL-MCNC: 8.2 MG/DL (ref 8.3–10.6)
CHLORIDE BLD-SCNC: 103 MMOL/L (ref 99–110)
CLARITY: ABNORMAL
CO2: 21 MMOL/L (ref 21–32)
COLOR: YELLOW
CREAT SERPL-MCNC: 1.4 MG/DL (ref 0.6–1.2)
CREATININE URINE: 43.3 MG/DL (ref 28–259)
EOSINOPHILS ABSOLUTE: 0.3 K/UL (ref 0–0.6)
EOSINOPHILS RELATIVE PERCENT: 3 %
EPITHELIAL CELLS, UA: 7 /HPF (ref 0–5)
FERRITIN: 106.1 NG/ML (ref 15–150)
GFR AFRICAN AMERICAN: 45
GFR NON-AFRICAN AMERICAN: 37
GLUCOSE BLD-MCNC: 83 MG/DL (ref 70–99)
GLUCOSE URINE: NEGATIVE MG/DL
HCT VFR BLD CALC: 33.4 % (ref 36–48)
HEMATOLOGY PATH CONSULT: NO
HEMOGLOBIN: 10.6 G/DL (ref 12–16)
HYALINE CASTS: 4 /LPF (ref 0–8)
HYPOCHROMIA: ABNORMAL
KETONES, URINE: NEGATIVE MG/DL
LEUKOCYTE ESTERASE, URINE: ABNORMAL
LYMPHOCYTES ABSOLUTE: 3.5 K/UL (ref 1–5.1)
LYMPHOCYTES RELATIVE PERCENT: 35 %
MCH RBC QN AUTO: 20.3 PG (ref 26–34)
MCHC RBC AUTO-ENTMCNC: 31.6 G/DL (ref 31–36)
MCV RBC AUTO: 64.4 FL (ref 80–100)
MICROCYTES: ABNORMAL
MICROSCOPIC EXAMINATION: YES
MONOCYTES ABSOLUTE: 0.5 K/UL (ref 0–1.3)
MONOCYTES RELATIVE PERCENT: 5 %
NEUTROPHILS ABSOLUTE: 5.5 K/UL (ref 1.7–7.7)
NEUTROPHILS RELATIVE PERCENT: 56 %
NITRITE, URINE: NEGATIVE
PDW BLD-RTO: 17.6 % (ref 12.4–15.4)
PH UA: 6.5 (ref 5–8)
PLATELET # BLD: 421 K/UL (ref 135–450)
PMV BLD AUTO: 7.8 FL (ref 5–10.5)
POIKILOCYTES: ABNORMAL
POTASSIUM SERPL-SCNC: 3.2 MMOL/L (ref 3.5–5.1)
PROTEIN PROTEIN: 11 MG/DL
PROTEIN UA: NEGATIVE MG/DL
RBC # BLD: 5.19 M/UL (ref 4–5.2)
RBC UA: 2 /HPF (ref 0–4)
SEDIMENTATION RATE, ERYTHROCYTE: 24 MM/HR (ref 0–30)
SODIUM BLD-SCNC: 140 MMOL/L (ref 136–145)
SPECIFIC GRAVITY UA: 1.01 (ref 1–1.03)
TARGET CELLS: ABNORMAL
TEAR DROP CELLS: ABNORMAL
URINE TYPE: ABNORMAL
UROBILINOGEN, URINE: 0.2 E.U./DL
WBC # BLD: 9.9 K/UL (ref 4–11)
WBC UA: 35 /HPF (ref 0–5)

## 2019-03-18 PROCEDURE — 85025 COMPLETE CBC W/AUTO DIFF WBC: CPT

## 2019-03-18 PROCEDURE — 82728 ASSAY OF FERRITIN: CPT

## 2019-03-18 PROCEDURE — 85652 RBC SED RATE AUTOMATED: CPT

## 2019-03-18 PROCEDURE — 82570 ASSAY OF URINE CREATININE: CPT

## 2019-03-18 PROCEDURE — 36415 COLL VENOUS BLD VENIPUNCTURE: CPT

## 2019-03-18 PROCEDURE — 84156 ASSAY OF PROTEIN URINE: CPT

## 2019-03-18 PROCEDURE — 81001 URINALYSIS AUTO W/SCOPE: CPT

## 2019-03-18 PROCEDURE — 80048 BASIC METABOLIC PNL TOTAL CA: CPT

## 2019-03-18 PROCEDURE — 86140 C-REACTIVE PROTEIN: CPT

## 2019-03-29 ENCOUNTER — TELEPHONE (OUTPATIENT)
Dept: INTERNAL MEDICINE CLINIC | Age: 69
End: 2019-03-29

## 2019-03-29 RX ORDER — CEFDINIR 300 MG/1
300 CAPSULE ORAL DAILY
Qty: 7 CAPSULE | Refills: 0 | Status: SHIPPED | OUTPATIENT
Start: 2019-03-29 | End: 2019-04-02

## 2019-04-02 ENCOUNTER — HOSPITAL ENCOUNTER (INPATIENT)
Age: 69
LOS: 4 days | Discharge: HOME HEALTH | DRG: 871 | End: 2019-04-06
Attending: EMERGENCY MEDICINE | Admitting: INTERNAL MEDICINE
Payer: MEDICAID

## 2019-04-02 ENCOUNTER — APPOINTMENT (OUTPATIENT)
Dept: GENERAL RADIOLOGY | Age: 69
DRG: 871 | End: 2019-04-02
Payer: MEDICAID

## 2019-04-02 ENCOUNTER — APPOINTMENT (OUTPATIENT)
Dept: CT IMAGING | Age: 69
DRG: 871 | End: 2019-04-02
Payer: MEDICAID

## 2019-04-02 DIAGNOSIS — M81.0 OSTEOPOROSIS: ICD-10-CM

## 2019-04-02 DIAGNOSIS — R06.02 SHORTNESS OF BREATH: Primary | ICD-10-CM

## 2019-04-02 DIAGNOSIS — N17.9 AKI (ACUTE KIDNEY INJURY) (HCC): ICD-10-CM

## 2019-04-02 DIAGNOSIS — J44.1 COPD EXACERBATION (HCC): ICD-10-CM

## 2019-04-02 DIAGNOSIS — E87.6 HYPOKALEMIA: ICD-10-CM

## 2019-04-02 DIAGNOSIS — D64.9 ANEMIA, UNSPECIFIED TYPE: ICD-10-CM

## 2019-04-02 PROBLEM — J18.9 HCAP (HEALTHCARE-ASSOCIATED PNEUMONIA): Status: ACTIVE | Noted: 2019-04-02

## 2019-04-02 LAB
A/G RATIO: 0.9 (ref 1.1–2.2)
ALBUMIN SERPL-MCNC: 3.1 G/DL (ref 3.4–5)
ALP BLD-CCNC: 88 U/L (ref 40–129)
ALT SERPL-CCNC: 8 U/L (ref 10–40)
ANION GAP SERPL CALCULATED.3IONS-SCNC: 17 MMOL/L (ref 3–16)
ANISOCYTOSIS: ABNORMAL
APTT: 33.2 SEC (ref 26–36)
AST SERPL-CCNC: 10 U/L (ref 15–37)
BANDED NEUTROPHILS RELATIVE PERCENT: 7 % (ref 0–7)
BASOPHILS ABSOLUTE: 0 K/UL (ref 0–0.2)
BASOPHILS RELATIVE PERCENT: 0 %
BILIRUB SERPL-MCNC: 0.4 MG/DL (ref 0–1)
BUN BLDV-MCNC: 19 MG/DL (ref 7–20)
CALCIUM SERPL-MCNC: 7.9 MG/DL (ref 8.3–10.6)
CHLORIDE BLD-SCNC: 108 MMOL/L (ref 99–110)
CO2: 12 MMOL/L (ref 21–32)
CREAT SERPL-MCNC: 2 MG/DL (ref 0.6–1.2)
EOSINOPHILS ABSOLUTE: 0 K/UL (ref 0–0.6)
EOSINOPHILS RELATIVE PERCENT: 0 %
GFR AFRICAN AMERICAN: 30
GFR NON-AFRICAN AMERICAN: 25
GLOBULIN: 3.4 G/DL
GLUCOSE BLD-MCNC: 110 MG/DL (ref 70–99)
HCT VFR BLD CALC: 34 % (ref 36–48)
HEMATOLOGY PATH CONSULT: NO
HEMOGLOBIN: 10.6 G/DL (ref 12–16)
HYPOCHROMIA: ABNORMAL
INR BLD: 1.32 (ref 0.86–1.14)
LACTIC ACID: 0.8 MMOL/L (ref 0.4–2)
LACTIC ACID: 2.3 MMOL/L (ref 0.4–2)
LYMPHOCYTES ABSOLUTE: 1.1 K/UL (ref 1–5.1)
LYMPHOCYTES RELATIVE PERCENT: 7 %
MAGNESIUM: 1.9 MG/DL (ref 1.8–2.4)
MCH RBC QN AUTO: 20 PG (ref 26–34)
MCHC RBC AUTO-ENTMCNC: 31.1 G/DL (ref 31–36)
MCV RBC AUTO: 64.3 FL (ref 80–100)
MICROCYTES: ABNORMAL
MONOCYTES ABSOLUTE: 0 K/UL (ref 0–1.3)
MONOCYTES RELATIVE PERCENT: 0 %
NEUTROPHILS ABSOLUTE: 14.6 K/UL (ref 1.7–7.7)
NEUTROPHILS RELATIVE PERCENT: 86 %
PDW BLD-RTO: 17.3 % (ref 12.4–15.4)
PLATELET # BLD: 342 K/UL (ref 135–450)
PLATELET SLIDE REVIEW: ADEQUATE
PMV BLD AUTO: 7.7 FL (ref 5–10.5)
POLYCHROMASIA: ABNORMAL
POTASSIUM REFLEX MAGNESIUM: 3.1 MMOL/L (ref 3.5–5.1)
POTASSIUM SERPL-SCNC: 3.4 MMOL/L (ref 3.5–5.1)
PRO-BNP: 545 PG/ML (ref 0–124)
PROTHROMBIN TIME: 15.1 SEC (ref 9.8–13)
RBC # BLD: 5.3 M/UL (ref 4–5.2)
SCHISTOCYTES: ABNORMAL
SLIDE REVIEW: ABNORMAL
SODIUM BLD-SCNC: 137 MMOL/L (ref 136–145)
SPHEROCYTES: ABNORMAL
TARGET CELLS: ABNORMAL
TEAR DROP CELLS: ABNORMAL
TOTAL PROTEIN: 6.5 G/DL (ref 6.4–8.2)
TROPONIN: <0.01 NG/ML
WBC # BLD: 15.7 K/UL (ref 4–11)

## 2019-04-02 PROCEDURE — 2500000003 HC RX 250 WO HCPCS: Performed by: EMERGENCY MEDICINE

## 2019-04-02 PROCEDURE — 6370000000 HC RX 637 (ALT 250 FOR IP): Performed by: INTERNAL MEDICINE

## 2019-04-02 PROCEDURE — 36415 COLL VENOUS BLD VENIPUNCTURE: CPT

## 2019-04-02 PROCEDURE — 94640 AIRWAY INHALATION TREATMENT: CPT

## 2019-04-02 PROCEDURE — APPNB30 APP NON BILLABLE TIME 0-30 MINS: Performed by: NURSE PRACTITIONER

## 2019-04-02 PROCEDURE — 1200000000 HC SEMI PRIVATE

## 2019-04-02 PROCEDURE — 83605 ASSAY OF LACTIC ACID: CPT

## 2019-04-02 PROCEDURE — 6370000000 HC RX 637 (ALT 250 FOR IP): Performed by: EMERGENCY MEDICINE

## 2019-04-02 PROCEDURE — 99232 SBSQ HOSP IP/OBS MODERATE 35: CPT | Performed by: SURGERY

## 2019-04-02 PROCEDURE — 83735 ASSAY OF MAGNESIUM: CPT

## 2019-04-02 PROCEDURE — 85025 COMPLETE CBC W/AUTO DIFF WBC: CPT

## 2019-04-02 PROCEDURE — 84132 ASSAY OF SERUM POTASSIUM: CPT

## 2019-04-02 PROCEDURE — 2580000003 HC RX 258: Performed by: EMERGENCY MEDICINE

## 2019-04-02 PROCEDURE — 6360000002 HC RX W HCPCS: Performed by: EMERGENCY MEDICINE

## 2019-04-02 PROCEDURE — 2700000000 HC OXYGEN THERAPY PER DAY

## 2019-04-02 PROCEDURE — 6360000002 HC RX W HCPCS: Performed by: INTERNAL MEDICINE

## 2019-04-02 PROCEDURE — APPSS30 APP SPLIT SHARED TIME 16-30 MINUTES: Performed by: NURSE PRACTITIONER

## 2019-04-02 PROCEDURE — 85610 PROTHROMBIN TIME: CPT

## 2019-04-02 PROCEDURE — 2580000003 HC RX 258: Performed by: INTERNAL MEDICINE

## 2019-04-02 PROCEDURE — 71045 X-RAY EXAM CHEST 1 VIEW: CPT

## 2019-04-02 PROCEDURE — 80053 COMPREHEN METABOLIC PANEL: CPT

## 2019-04-02 PROCEDURE — 93005 ELECTROCARDIOGRAM TRACING: CPT | Performed by: EMERGENCY MEDICINE

## 2019-04-02 PROCEDURE — 87040 BLOOD CULTURE FOR BACTERIA: CPT

## 2019-04-02 PROCEDURE — 96375 TX/PRO/DX INJ NEW DRUG ADDON: CPT

## 2019-04-02 PROCEDURE — 96361 HYDRATE IV INFUSION ADD-ON: CPT

## 2019-04-02 PROCEDURE — 85730 THROMBOPLASTIN TIME PARTIAL: CPT

## 2019-04-02 PROCEDURE — 94760 N-INVAS EAR/PLS OXIMETRY 1: CPT

## 2019-04-02 PROCEDURE — 83880 ASSAY OF NATRIURETIC PEPTIDE: CPT

## 2019-04-02 PROCEDURE — 74176 CT ABD & PELVIS W/O CONTRAST: CPT

## 2019-04-02 PROCEDURE — 99285 EMERGENCY DEPT VISIT HI MDM: CPT

## 2019-04-02 PROCEDURE — 96374 THER/PROPH/DIAG INJ IV PUSH: CPT

## 2019-04-02 PROCEDURE — 84484 ASSAY OF TROPONIN QUANT: CPT

## 2019-04-02 RX ORDER — OXYCODONE HYDROCHLORIDE AND ACETAMINOPHEN 5; 325 MG/1; MG/1
1 TABLET ORAL ONCE
Status: COMPLETED | OUTPATIENT
Start: 2019-04-02 | End: 2019-04-02

## 2019-04-02 RX ORDER — LIDOCAINE HYDROCHLORIDE 10 MG/ML
5 INJECTION, SOLUTION EPIDURAL; INFILTRATION; INTRACAUDAL; PERINEURAL ONCE
Status: DISCONTINUED | OUTPATIENT
Start: 2019-04-02 | End: 2019-04-06 | Stop reason: HOSPADM

## 2019-04-02 RX ORDER — SODIUM CHLORIDE 0.9 % (FLUSH) 0.9 %
10 SYRINGE (ML) INJECTION PRN
Status: DISCONTINUED | OUTPATIENT
Start: 2019-04-02 | End: 2019-04-06 | Stop reason: HOSPADM

## 2019-04-02 RX ORDER — POTASSIUM CHLORIDE 750 MG/1
40 TABLET, FILM COATED, EXTENDED RELEASE ORAL ONCE
Status: COMPLETED | OUTPATIENT
Start: 2019-04-02 | End: 2019-04-02

## 2019-04-02 RX ORDER — IPRATROPIUM BROMIDE AND ALBUTEROL SULFATE 2.5; .5 MG/3ML; MG/3ML
1 SOLUTION RESPIRATORY (INHALATION) ONCE
Status: COMPLETED | OUTPATIENT
Start: 2019-04-02 | End: 2019-04-02

## 2019-04-02 RX ORDER — SODIUM CHLORIDE 0.9 % (FLUSH) 0.9 %
10 SYRINGE (ML) INJECTION EVERY 12 HOURS SCHEDULED
Status: DISCONTINUED | OUTPATIENT
Start: 2019-04-02 | End: 2019-04-06 | Stop reason: HOSPADM

## 2019-04-02 RX ORDER — GUAIFENESIN 600 MG/1
600 TABLET, EXTENDED RELEASE ORAL 2 TIMES DAILY
Status: DISCONTINUED | OUTPATIENT
Start: 2019-04-02 | End: 2019-04-06 | Stop reason: HOSPADM

## 2019-04-02 RX ORDER — TIZANIDINE 4 MG/1
2 TABLET ORAL EVERY 8 HOURS PRN
Status: DISCONTINUED | OUTPATIENT
Start: 2019-04-02 | End: 2019-04-06 | Stop reason: HOSPADM

## 2019-04-02 RX ORDER — 0.9 % SODIUM CHLORIDE 0.9 %
30 INTRAVENOUS SOLUTION INTRAVENOUS ONCE
Status: COMPLETED | OUTPATIENT
Start: 2019-04-02 | End: 2019-04-02

## 2019-04-02 RX ORDER — 0.9 % SODIUM CHLORIDE 0.9 %
1000 INTRAVENOUS SOLUTION INTRAVENOUS ONCE
Status: COMPLETED | OUTPATIENT
Start: 2019-04-02 | End: 2019-04-02

## 2019-04-02 RX ORDER — METHYLPREDNISOLONE SODIUM SUCCINATE 40 MG/ML
40 INJECTION, POWDER, LYOPHILIZED, FOR SOLUTION INTRAMUSCULAR; INTRAVENOUS EVERY 6 HOURS
Status: DISCONTINUED | OUTPATIENT
Start: 2019-04-02 | End: 2019-04-05

## 2019-04-02 RX ORDER — PANTOPRAZOLE SODIUM 40 MG/1
40 TABLET, DELAYED RELEASE ORAL
Status: DISCONTINUED | OUTPATIENT
Start: 2019-04-03 | End: 2019-04-06 | Stop reason: HOSPADM

## 2019-04-02 RX ORDER — DIPHENHYDRAMINE HYDROCHLORIDE 50 MG/ML
25 INJECTION INTRAMUSCULAR; INTRAVENOUS ONCE
Status: COMPLETED | OUTPATIENT
Start: 2019-04-02 | End: 2019-04-02

## 2019-04-02 RX ORDER — ACETAMINOPHEN 325 MG/1
650 TABLET ORAL EVERY 4 HOURS PRN
Status: DISCONTINUED | OUTPATIENT
Start: 2019-04-02 | End: 2019-04-06 | Stop reason: HOSPADM

## 2019-04-02 RX ORDER — METHYLPREDNISOLONE SODIUM SUCCINATE 125 MG/2ML
125 INJECTION, POWDER, LYOPHILIZED, FOR SOLUTION INTRAMUSCULAR; INTRAVENOUS ONCE
Status: COMPLETED | OUTPATIENT
Start: 2019-04-02 | End: 2019-04-02

## 2019-04-02 RX ORDER — ONDANSETRON 2 MG/ML
4 INJECTION INTRAMUSCULAR; INTRAVENOUS EVERY 6 HOURS PRN
Status: DISCONTINUED | OUTPATIENT
Start: 2019-04-02 | End: 2019-04-06 | Stop reason: HOSPADM

## 2019-04-02 RX ORDER — MORPHINE SULFATE 4 MG/ML
4 INJECTION, SOLUTION INTRAMUSCULAR; INTRAVENOUS ONCE
Status: COMPLETED | OUTPATIENT
Start: 2019-04-02 | End: 2019-04-02

## 2019-04-02 RX ORDER — IPRATROPIUM BROMIDE AND ALBUTEROL SULFATE 2.5; .5 MG/3ML; MG/3ML
1 SOLUTION RESPIRATORY (INHALATION)
Status: DISCONTINUED | OUTPATIENT
Start: 2019-04-02 | End: 2019-04-06 | Stop reason: HOSPADM

## 2019-04-02 RX ORDER — POTASSIUM CHLORIDE 750 MG/1
10 TABLET, FILM COATED, EXTENDED RELEASE ORAL 2 TIMES DAILY
COMMUNITY
Start: 2019-03-21 | End: 2020-01-27 | Stop reason: DRUGHIGH

## 2019-04-02 RX ORDER — VANCOMYCIN HYDROCHLORIDE 1 G/200ML
15 INJECTION, SOLUTION INTRAVENOUS EVERY 24 HOURS
Status: DISCONTINUED | OUTPATIENT
Start: 2019-04-02 | End: 2019-04-04

## 2019-04-02 RX ADMIN — VANCOMYCIN HYDROCHLORIDE 1000 MG: 1 INJECTION, SOLUTION INTRAVENOUS at 14:29

## 2019-04-02 RX ADMIN — TIZANIDINE 2 MG: 4 TABLET ORAL at 14:39

## 2019-04-02 RX ADMIN — IPRATROPIUM BROMIDE AND ALBUTEROL SULFATE 1 AMPULE: .5; 3 SOLUTION RESPIRATORY (INHALATION) at 16:56

## 2019-04-02 RX ADMIN — SODIUM CHLORIDE 1920 ML: 9 INJECTION, SOLUTION INTRAVENOUS at 08:46

## 2019-04-02 RX ADMIN — AMITRIPTYLINE HYDROCHLORIDE 75 MG: 50 TABLET, FILM COATED ORAL at 21:40

## 2019-04-02 RX ADMIN — IPRATROPIUM BROMIDE AND ALBUTEROL SULFATE 1 AMPULE: .5; 3 SOLUTION RESPIRATORY (INHALATION) at 21:10

## 2019-04-02 RX ADMIN — IPRATROPIUM BROMIDE AND ALBUTEROL SULFATE 1 AMPULE: .5; 3 SOLUTION RESPIRATORY (INHALATION) at 07:07

## 2019-04-02 RX ADMIN — METHYLPREDNISOLONE SODIUM SUCCINATE 40 MG: 40 INJECTION, POWDER, FOR SOLUTION INTRAMUSCULAR; INTRAVENOUS at 14:28

## 2019-04-02 RX ADMIN — Medication 10 ML: at 21:42

## 2019-04-02 RX ADMIN — Medication 10 ML: at 14:29

## 2019-04-02 RX ADMIN — ACETAMINOPHEN 650 MG: 325 TABLET, FILM COATED ORAL at 21:40

## 2019-04-02 RX ADMIN — FAMOTIDINE 20 MG: 10 INJECTION, SOLUTION INTRAVENOUS at 06:55

## 2019-04-02 RX ADMIN — METHYLPREDNISOLONE SODIUM SUCCINATE 125 MG: 125 INJECTION, POWDER, FOR SOLUTION INTRAMUSCULAR; INTRAVENOUS at 06:48

## 2019-04-02 RX ADMIN — VITAMIN D, TAB 1000IU (100/BT) 2000 UNITS: 25 TAB at 14:28

## 2019-04-02 RX ADMIN — CEFEPIME HYDROCHLORIDE 2 G: 2 INJECTION, POWDER, FOR SOLUTION INTRAVENOUS at 10:24

## 2019-04-02 RX ADMIN — APIXABAN 5 MG: 5 TABLET, FILM COATED ORAL at 21:41

## 2019-04-02 RX ADMIN — Medication 10 ML: at 14:30

## 2019-04-02 RX ADMIN — APIXABAN 5 MG: 5 TABLET, FILM COATED ORAL at 14:29

## 2019-04-02 RX ADMIN — GUAIFENESIN 600 MG: 600 TABLET, EXTENDED RELEASE ORAL at 14:28

## 2019-04-02 RX ADMIN — OXYCODONE AND ACETAMINOPHEN 1 TABLET: 5; 325 TABLET ORAL at 09:17

## 2019-04-02 RX ADMIN — IPRATROPIUM BROMIDE AND ALBUTEROL SULFATE 1 AMPULE: .5; 3 SOLUTION RESPIRATORY (INHALATION) at 12:53

## 2019-04-02 RX ADMIN — SODIUM CHLORIDE 1000 ML: 9 INJECTION, SOLUTION INTRAVENOUS at 06:51

## 2019-04-02 RX ADMIN — MORPHINE SULFATE 4 MG: 4 INJECTION INTRAVENOUS at 06:05

## 2019-04-02 RX ADMIN — POTASSIUM CHLORIDE 40 MEQ: 750 TABLET, FILM COATED, EXTENDED RELEASE ORAL at 09:17

## 2019-04-02 RX ADMIN — DIPHENHYDRAMINE HYDROCHLORIDE 25 MG: 50 INJECTION, SOLUTION INTRAMUSCULAR; INTRAVENOUS at 06:52

## 2019-04-02 RX ADMIN — GUAIFENESIN 600 MG: 600 TABLET, EXTENDED RELEASE ORAL at 21:41

## 2019-04-02 ASSESSMENT — PAIN SCALES - GENERAL
PAINLEVEL_OUTOF10: 2
PAINLEVEL_OUTOF10: 4
PAINLEVEL_OUTOF10: 9
PAINLEVEL_OUTOF10: 8
PAINLEVEL_OUTOF10: 9
PAINLEVEL_OUTOF10: 5
PAINLEVEL_OUTOF10: 10

## 2019-04-02 ASSESSMENT — PAIN DESCRIPTION - LOCATION
LOCATION: BACK
LOCATION: BACK

## 2019-04-02 ASSESSMENT — PAIN DESCRIPTION - PAIN TYPE
TYPE: ACUTE PAIN
TYPE: ACUTE PAIN

## 2019-04-02 NOTE — CONSULTS
Pharmacy Note  Vancomycin Consult    Lata Evangelista is a 80 y.o. female started on Vancomycin for Pneumonia; consult received from Dr. Mary Cruz to manage therapy. Also receiving the following antibiotics: cefepime. Patient Active Problem List   Diagnosis    Crohn disease ([facility] [address]    Depression    Osteoarthritis    Lupus anticoagulant disorder ([facility] [address]    Dysphagia    Syringomyelia (HCC)    Gastritis and gastroduodenitis    Skin ulcer of shoulder ([facility] [address]    Shortness of breath    Anemia    Centrilobular emphysema (HCC)    Pulmonary fibrosis (HCC)    Acute on chronic respiratory failure with hypoxemia (HCC)    Hiatal hernia    COPD, severe (HCC)    Alpha thalassemia minor    Acute on chronic respiratory failure with hypoxia (HCC)    Bronchiectasis with acute exacerbation (HCC)    Hemoglobin C trait (HCC)    Osteoporosis of multiple sites    Gastroesophageal reflux disease without esophagitis    Sepsis ([facility] [address]    Bilateral pulmonary embolism (HCC)    Acute deep vein thrombosis (DVT) of distal vein of both lower extremities (HCC)    History of pulmonary embolism    SOB (shortness of breath)    Shoulder wound, left, initial encounter    Hx pulmonary embolism    HCAP (healthcare-associated pneumonia)       Allergies:  Patient has no known allergies. Temp max: Afebrile    Recent Labs     04/02/19  0610   BUN 19       Recent Labs     04/02/19  0610   CREATININE 2.0*       Recent Labs     04/02/19  0611   WBC 15.7*       No intake or output data in the 24 hours ending 04/02/19 4426    Culture Date      Source                       Results  pending    Ht Readings from Last 1 Encounters:   04/02/19 5' (1.524 m)        Wt Readings from Last 1 Encounters:   04/02/19 141 lb (64 kg)         Body mass index is 27.54 kg/m². Estimated Creatinine Clearance: 23 mL/min (A) (based on SCr of 2 mg/dL (H)). Goal Trough Level: 15-20 mcg/mL    Assessment/Plan:  Will initiate vancomycin 1000 mg IV every 24 hours.   Timing of trough level will be determined based on culture results, renal function, and clinical response. Thank you for the consult. Will continue to follow.      Niko Pantoja, PharmD   [facility]  [id number]

## 2019-04-02 NOTE — ED NOTES
Dr Verenice Acosta notified lactic 2.3 and pt triggering sepsis screening. States no repeat lactic at this time.      Clyde López RN  04/02/19 7787

## 2019-04-02 NOTE — PROGRESS NOTES
Communication noted in Epic for 5T RN to notify nephrology of IV access need. Please call PICC team when approval is obtained.

## 2019-04-02 NOTE — H&P
not drink alcohol. Family History:       Problem Relation Age of Onset    Heart Disease Mother     High Blood Pressure Mother     High Cholesterol Mother     Early Death Mother 36        MI    Heart Disease Father     High Blood Pressure Father     High Cholesterol Father     Stroke Father 79    High Blood Pressure Sister     High Blood Pressure Brother      REVIEW OF SYSTEMS:  Positive for  Sob and as noted in the HPI. All other systems reviewed and negative. PHYSICAL EXAM:    Vitals:  /73   Pulse 103   Temp 98.9 °F (37.2 °C) (Oral)   Resp 22   Ht 5' (1.524 m)   Wt 141 lb (64 kg)   SpO2 98%   BMI 27.54 kg/m²     General appearance: No apparent distress appears stated age and cooperative. HEENT Normal cephalic, atraumatic without obvious deformity. Pupils equal, round, and reactive to light. Extra ocular muscles intact. Conjunctivae/corneas clear. Neck: Supple, No jugular venous distention/bruits. Trachea midline without thyromegaly or adenopathy with full range of motion. Lungs: Clear to auscultation, bilaterally without Rales/Wheezes/Rhonchi with good respiratory effort. Heart: Regular rate and rhythm with Normal S1/S2 without murmurs, rubs or gallops, point of maximum impulse non-displaced  Abdomen: Soft, non-tender or non-distended without rigidity or guarding and positive bowel sounds all four quadrants. Extremities: No clubbing, cyanosis, or edema bilaterally. Full range of motion without deformity and normal gait intact. Skin: Skin color, texture, turgor normal.  No rashes or lesions. Neurologic: Alert and oriented X 3, neurovascularly intact with sensory/motor intact upper extremities/lower extremities, bilaterally. Cranial nerves: II-XII intact, grossly non-focal.  Mental status: Alert, oriented, thought content appropriate.   Capillary Refill: Acceptable  < 3 seconds  Peripheral Pulses: +3 Easily felt, not easily obliterated with pressure       DATA:  CT Chest abdomen:  Bandlike and consolidative opacity in the lung bases may reflect atelectasis   or pneumonia.  There is no pleural effusion.  Background of emphysema.       No acute findings in the abdomen or pelvis.       Submucosal fat proliferation in the colon, which can be seen in setting of   obesity and chronic inflammatory bowel disease.  No pericolonic fat stranding   to suggest acute inflammation.        ASSESSMENT AND PLAN:      Acute hypoxic resp failure   Acute COPD exacerbation vs. HCAP  Cont nebs, steroids and abx  F/u blood cx, urine legionella and strep antigen     MARIS on CKD  Nephrology consulted     Hx of DVT  Cont eliquis      Muscle spams   Cont tizanidine     DVT Prophylaxis: eliquis  Diet: DIET GENERAL;  Code Status: Full Code  PT/OT Eval Status: eval and treat      Dispo - INPATIENT

## 2019-04-02 NOTE — ED NOTES
Saline lock inserted with morphine given for c/o back and abdominal pain. Some blood drawn and sent to lab. Unsuccessful on straight stick to obtain other lab orders - phlebotomy notified.      Tristan Higgins RN  04/02/19 0577

## 2019-04-02 NOTE — CONSULTS
Consult received. D/w RN   Has MARIS on CKD 3   No PICC line   Check urine lytes   Recommend to dose adjust all medications  based on renal functions  AVOID NSAIDs  Avoid Nephrotoxins  Monitor I/O  Dose vancomycin based on levels.   Full consult to follow

## 2019-04-02 NOTE — ED PROVIDER NOTES
[facility] Emergency Department      Pt Name: Flavia Tee  MRN: [id number]  [place] 1950  Date of evaluation: 4/2/2019  Provider: Maribell Pruett MD  I took over this patient's care from the leaving physician at approximately 7 am.  I was to check the pending results and finalize the disposition. 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 She has a past surgical history that includes shoulder surgery; back surgery; brain surgery; Colon surgery; Colonoscopy (12/5/11); Colonoscopy (4-2-2012); Cholecystectomy; Abdomen surgery; and Biopsy shoulder (Left, 2/27/2019). Vitals:  Blood pressure 110/58, pulse 100, temperature 97.7 °F (36.5 °C), temperature source Infrared, resp. rate 17, height 5' (1.524 m), weight 141 lb (64 kg), SpO2 97 %  Constitutional:  80 y.o. female alert, cooperative  HENT:  Atraumatic, mucous membranes moist  Eyes:   Conjunctiva clear, no icterus  Neck:  Supple, no visible JVD, no signs of injury  Cardiovascular:  Regular, no rubs, no discernible murmur  Thorax & Lungs:  No accessory muscle usage, diminished BS  Abdomen:  Soft, non distended, NT  Back:  No deformity  Genitalia:  Deferred  Rectal:  Deferred  Extremities:  No cyanosis, no edema  Skin:  Warm, dry  Neurologic:  Alert, no slurred speech  Psychiatric:  Affect appropriate    DIAGNOSTIC RESULTS:  Labs resulted at the time of this note reviewed.    Labs Reviewed   LACTIC ACID, PLASMA - Abnormal; Notable for the following components:       Result Value    Lactic Acid 2.3 (*)     All other components within normal limits    Narrative:     Performed at:  [facility] [address], [facility] [employer]   Phone [phone]   CBC WITH AUTO DIFFERENTIAL - Abnormal; Notable for the following components:    WBC 15.7 (*)     RBC 5.30 (*)     Hemoglobin 10.6 (*)     Hematocrit 34.0 (*)     MCV 64.3 (*)     MCH 20.0 (*)     RDW 17.3 (*)     Neutrophils # 14.6 (*)     Anisocytosis 1+ (*)     Microcytes 2+ (*)     Polychromasia Occasional (*)     Hypochromia 1+ (*)     Schistocytes Occasional (*)     Spherocytes Occasional (*)     Target Cells 1+ (*)     Tear Drop Cells Occasional (*)     All other components within normal limits    Narrative:     Performed at:  [facility] [address], [facility] [employer]   Phone [phone]   COMPREHENSIVE METABOLIC PANEL W/ REFLEX TO MG FOR LOW K - Abnormal; Notable for the following components:    Potassium reflex Magnesium 3.1 (*)     CO2 12 (*)     Anion Gap 17 (*)     Glucose 110 (*)     CREATININE 2.0 (*)     GFR Non- 25 (*)     GFR African American 30 (*)     Calcium 7.9 (*)     Alb 3.1 (*)     Albumin/Globulin Ratio 0.9 (*)     ALT 8 (*)     AST 10 (*)     All other components within normal limits    Narrative:     CALL  [facility] tel. [phone],  Chemistry results called to and read back by Lucien Blas RN, 04/02/2019  06:39, by Danitza Benavidez  Chemistry results called to and read back by Dennie,Cahil RN, 04/02/2019  06:37, by Danitza Benavidez  Performed at:  [facility] [address], [facility] [employer]   Phone [phone]   BRAIN NATRIURETIC PEPTIDE - Abnormal; Notable for the following components:    Pro- (*)     All other components within normal limits    Narrative:     Bridegr Auguste  [facility] tel. [phone],  Chemistry results called to and read back by Lucien Blas RN, 04/02/2019  06:39, by Danitza Benavidez  Chemistry results called to and read back by Dennie,Cahil RN, 04/02/2019  06:37, by Dinesh Cortés  Performed at:  [facility] [address], [place], [facility] [employer]   Phone [phone]   PROTIME-INR - Abnormal; Notable for the following components:    Protime 15.1 (*)     INR 1.32 (*)     All other components within normal limits    Narrative:     Performed at:  [facility] [address], [place], [facility] [employer]   Phone [phone]   CULTURE BLOOD #2   CULTURE BLOOD #1   LEGIONELLA ANTIGEN, URINE   STREP PNEUMONIAE ANTIGEN   TROPONIN    Narrative:     Inés Goddard  [facility] tel. [phone],  Chemistry results called to and read back by Adama Hawkins RN, 04/02/2019  06:39, by Dinesh Cortés  Chemistry results called to and read back by Dennie,Cahil RN, 04/02/2019  06:37, by Dinesh Cortés  Performed at:  [facility] [address], [place], [facility] [employer]   Phone [phone]   APTT    Narrative:     Performed at:  [facility] [address], [place], [facility] [employer]   Phone [phone]   MAGNESIUM    Narrative:     Inés Goddard  [facility] tel. [phone],  Chemistry results called to and read back by Adama Hawkins RN, 04/02/2019  06:39, by Dinesh Cortés  Chemistry results called to and read back by Dennie,Cahil RN, 04/02/2019  06:37, by Dinesh Cortés  Performed at:  [facility] [address], [place], [facility] [employer]   Phone [phone]   LACTIC ACID, PLASMA   POTASSIUM     Previous BUN/crea:    BUN   Date/Time Value Ref Range Status   04/02/2019 06:10 AM 19 7 - 20 mg/dL Final   03/18/2019 10:16 AM 9 7 - 20 mg/dL Final   02/27/2019 07:54 AM 22 (H) 7 - 20 mg/dL Final     CREATININE   Date/Time Value Ref Range Status   04/02/2019 06:10 AM 2.0 (H) 0.6 - 1.2 mg/dL Final   03/18/2019 10:16 AM 1.4 (H) 0.6 - 1.2 mg/dL Final   02/27/2019 07:54 AM 1.6 (H) 0.6 - 1.2 mg/dL Final     Previous HGB:    Hemoglobin   Date/Time Value Ref Range Status   04/02/2019 06:11 AM 10.6 (L) 12.0 - 16.0 g/dL Final   03/18/2019 10:16 AM 10.6 (L) 12.0 - 16.0 g/dL Final   01/20/2019 08:04 AM 12.2 12.0 - 16.0 g/dL Final     RADIOLOGY:  Plain x-rays were viewed by me: Xr Chest Portable    Result Date: 4/2/2019  EXAMINATION: SINGLE XRAY VIEW OF THE CHEST 4/2/2019 5:25 am COMPARISON: Frontal and lateral views of the chest 01/20/2019. CT thorax 02/11/2019. HISTORY: ORDERING SYSTEM PROVIDED HISTORY: SOB TECHNOLOGIST PROVIDED HISTORY: Reason for exam:->SOB FINDINGS: The cardiopericardial silhouette is stable in size and configuration. Calcified mediastinal lymph nodes are redemonstrated. The lungs are again noted to be emphysematous without pneumothorax, pleural effusion or new focal airspace opacity. No acute cardiopulmonary disease or significant interval change from prior study 01/20/2019. Ct Chest Abdomen Pelvis Wo Contrast    Result Date: 4/2/2019  EXAMINATION: CT OF THE CHEST, ABDOMEN, AND PELVIS WITHOUT CONTRAST 4/2/2019 7:39 am TECHNIQUE: CT of the chest, abdomen and pelvis was performed without the administration of intravenous contrast. Multiplanar reformatted images are provided for review. Dose modulation, iterative reconstruction, and/or weight based adjustment of the mA/kV was utilized to reduce the radiation dose to as low as reasonably achievable. COMPARISON: CT chest 02/11/2019. CT abdomen and pelvis 02/02/2011. HISTORY: ORDERING SYSTEM PROVIDED HISTORY: SOB with hypoxia, low back and LLQ abd pain TECHNOLOGIST PROVIDED HISTORY: No IV contrast due to renal function Reason for exam:->SOB with hypoxia, low back and LLQ abd pain Additional Contrast?->None Ordering Physician Provided Reason for Exam: SOB with hypoxia, low back and LLQ abd pain Acuity: Unknown Type of Exam: Unknown FINDINGS: Chest: Mediastinum: The thoracic aorta is normal caliber. There is no cardiomegaly or pericardial effusion. There is a small sliding-type hiatal hernia.   The esophagus is otherwise grossly unremarkable. Calcified lymph nodes consistent with healed granulomatous disease. Lungs/pleura: The central airways are patent. There is mild basilar bronchiectasis. Extensive emphysema. Patchy bandlike and consolidative opacity in the lung bases. There is no pleural effusion. Soft Tissues/Bones: Unchanged abandoned catheter within the thoracic spinal canal.  There are no suspicious osseous lesions. Abdomen/Pelvis: Organs: Evaluation of the intra-abdominal solid and hollow viscera is limited without IV contrast. No focal liver lesion is visible. Mild biliary ductal dilation. Previous cholecystectomy. There is no splenomegaly. The adrenal glands are unremarkable without evidence of mass. The pancreas is grossly unremarkable in noncontrast appearance. There is no hydronephrosis, hydroureter, or evidence of urolithiasis. The urinary bladder is grossly unremarkable. GI/Bowel: Submucosal fat proliferation within the colon. Previous right colonic resection. Small sliding-type hiatal hernia. There is no small bowel dilation or evidence of obstruction. Pelvis: The ovaries are grossly unremarkable. The uterus appears surgically absent. There is no evidence of pelvic free fluid. There is no pelvic adenopathy. Peritoneum/Retroperitoneum: There is no ascites. There is no adenopathy. There is no pneumoperitoneum. The abdominal aorta is normal caliber and without evidence of aneurysm. Bones/Soft Tissues: Diffuse osteopenia. Bandlike and consolidative opacity in the lung bases may reflect atelectasis or pneumonia. There is no pleural effusion. Background of emphysema. No acute findings in the abdomen or pelvis. Submucosal fat proliferation in the colon, which can be seen in setting of obesity and chronic inflammatory bowel disease. No pericolonic fat stranding to suggest acute inflammation.       ED COURSE:    Medications administered:  Medications   cefepime (MAXIPIME) 2 g IVPB minibag (has no administration in time range)   vancomycin (VANCOCIN) 1000 mg in dextrose 5% 200 mL IVPB (has no administration in time range)   methylPREDNISolone sodium (SOLU-MEDROL) injection 40 mg (has no administration in time range)   ipratropium-albuterol (DUONEB) nebulizer solution 1 ampule (has no administration in time range)   morphine injection 4 mg (4 mg Intravenous Given 4/2/19 0605)   methylPREDNISolone sodium (SOLU-MEDROL) injection 125 mg (125 mg Intravenous Given 4/2/19 0648)   ipratropium-albuterol (DUONEB) nebulizer solution 1 ampule (1 ampule Inhalation Given 4/2/19 0707)   famotidine (PEPCID) injection 20 mg (20 mg Intravenous Given 4/2/19 0655)   diphenhydrAMINE (BENADRYL) injection 25 mg (25 mg Intravenous Given 4/2/19 0652)   0.9 % sodium chloride bolus (0 mLs Intravenous Stopped 4/2/19 0758)   0.9 % sodium chloride bolus (1,920 mLs Intravenous New Bag 4/2/19 0846)   oxyCODONE-acetaminophen (PERCOCET) 5-325 MG per tablet 1 tablet (1 tablet Oral Given 4/2/19 0917)   potassium chloride (KLOR-CON) extended release tablet 40 mEq (40 mEq Oral Given 4/2/19 0917)     CRITICAL CARE:  Total critical care time of 31 minutes (excludes any time for procedures). This includes but not limited to vital sign monitoring, medication, clinical response to medications, interpretation of diagnostics, review of nursing notes, pertinent record review, discussions about patient condition, consultation time, documentation time. Critical care due to the patient's respiratory distress, possible sepsis.     EKG:  Read by me in the absence of a cardiologist shows:  Sinus rhythm, normal rate, normal conduction intervals, normal axis, no acute injury pattern, NSSTTWA, no major change from prior study     EKG#2 shows Sinus rhythm, normal rate, normal conduction intervals, normal axis, no acute injury pattern, NSSTTWA, more motion artifact than first EKG    Vitals:    04/02/19 0730 04/02/19 0755 04/02/19 0800 04/02/19 0830 BP: 116/67 (!) 106/49 120/70 (!) 110/58   Pulse: 104 104 103 100   Resp: 18 18 17 17   Temp:       TempSrc:       SpO2: 97% 98% 93% 97%   Weight:       Height:         SEP-1 CORE MEASURE DATA  Classification: sepsis  Amount of fluids ordered: at least 30mL/kg based on entered actual weight at time of triage  Time at which sepsis was identified: 0900  Broad-spectrum antibiotics chosen: rocephin/zith based on sepsis order-set for a suspected source of: Pulmonary - Community Acquired  Repeat lactate level: pending  On reassessment after treatment:  Updated vital signs: BP (!) 110/58   Pulse 100   Temp 97.7 °F (36.5 °C) (Infrared)   Resp 17   Ht 5' (1.524 m)   Wt 141 lb (64 kg)   SpO2 97%   BMI 27.54 kg/m²   Cardiopulmonary examination: regular, relaxed wob, Capillary refill: brisk, Peripheral pulses: strong, Skin examination: warm     PROCEDURES:  None    CONSULTATIONS:  Hospitalist     MEDICAL DECISION MAKING: Jackelin Joseph is a 80 y.o. female who presented because of shortness of breath. After treatment, she is breathing more comfortably. She has chronic respiratory failure on home oxygen. She is noted to be acidotic and has worsened kidney function from her baseline chronic kidney disease. She is mentating normally. Her markers were positive for the possibility of sepsis so she is receiving antibiotics for possible pulmonary source and received IV hydration. I discussed the case in full with the admitting physician. FINAL IMPRESSION:    1. Shortness of breath    2. COPD exacerbation ([facility] [address]    3. MARIS (acute kidney injury) ([facility] [address]    4. Hypokalemia    5.  Anemia, unspecified type         Geneva Morrison MD  04/02/19 7680

## 2019-04-02 NOTE — PROGRESS NOTES
Spoke with Dr Carol Ball who doesn't want PICC line at this time unless she needs long term antibiotics. Pt complain of muscle cramps in back PRN flexeril given see MAR.

## 2019-04-02 NOTE — ED NOTES
shunt to drain CSF    BIOPSY SHOULDER Left 2019    EXCISION OF LEFT SHOULDER MASS performed by Rosie Norman MD at [address]      crainotomy- remove fluid ? V-P SHUNT    CHOLECYSTECTOMY      COLON SURGERY      resection X2    COLONOSCOPY  11    BIOPSY AND POLYPECTOMY    COLONOSCOPY  2012    and ablation ERBE/APC    SHOULDER SURGERY      L      Family History   Problem Relation Age of Onset    Heart Disease Mother     High Blood Pressure Mother     High Cholesterol Mother     Early Death Mother 36        MI    Heart Disease Father     High Blood Pressure Father     High Cholesterol Father     Stroke Father 79    High Blood Pressure Sister     High Blood Pressure Brother      Social History     Socioeconomic History    Marital status: Single     Spouse name: Not on file    Number of children: 2    Years of education: 15    Highest education level: Not on file   Occupational History    Occupation: unemployed     Comment: disability syrinogmyelia   Social Needs    Financial resource strain: Not on file    Food insecurity:     Worry: Not on file     Inability: Not on file    Transportation needs:     Medical: Not on file     Non-medical: Not on file   Tobacco Use    Smoking status: Former Smoker     Packs/day: 0.25     Years: 30.00     Pack years: 7.50     Types: Cigarettes     Last attempt to quit: 2015     Years since quittin.0    Smokeless tobacco: Never Used    Tobacco comment: started smoking at age 26 / smoked up to 9 cigarettes a day    Substance and Sexual Activity    Alcohol use: No    Drug use: No    Sexual activity: Not Currently   Lifestyle    Physical activity:     Days per week: Not on file     Minutes per session: Not on file    Stress: Not on file   Relationships    Social connections:     Talks on phone: Not on file     Gets together: Not on file     Attends [employer] service: Not on file     Active member of club or organization: Not on file     Attends meetings of clubs or organizations: Not on file     Relationship status: Not on file    Intimate partner violence:     Fear of current or ex partner: Not on file     Emotionally abused: Not on file     Physically abused: Not on file     Forced sexual activity: Not on file   Other Topics Concern    Not on file   Social History Narrative    Not on file     Current Facility-Administered Medications   Medication Dose Route Frequency Provider Last Rate Last Dose    0.9 % sodium chloride bolus  1,000 mL Intravenous Once Shena Matos MD 1,000 mL/hr at 04/02/19 0651 1,000 mL at 04/02/19 6376     Current Outpatient Medications   Medication Sig Dispense Refill    apixaban (ELIQUIS) 5 MG TABS tablet Take 2 tablets by mouth 2 times daily (Patient taking differently: Take 5 mg by mouth 2 times daily ) 60 tablet 2    tiotropium (SPIRIVA RESPIMAT) 2.5 MCG/ACT AERS inhaler Inhale 2 puffs into the lungs daily 1 Inhaler 6    amitriptyline (ELAVIL) 150 MG tablet TAKE 0.5 TABLETS BY MOUTH NIGHTLY 45 tablet 2    budesonide-formoterol (SYMBICORT) 160-4.5 MCG/ACT AERO Inhale 2 puffs into the lungs 2 times daily 1 Inhaler 3    colestipol (COLESTID) 1 g tablet TAKE 1 TABLET BY MOUTH 2 (TWO) TIMES DAILY.       vitamin D (CHOLECALCIFEROL) 1000 UNIT TABS tablet Take 2 tablets by mouth daily (Patient taking differently: Take 2,000 Units by mouth daily Hasn't been taking) 60 tablet 11    alendronate (FOSAMAX) 70 MG tablet TAKE 1 TABLET BY MOUTH EVERY 7 DAYS 12 tablet 3    albuterol sulfate  (90 Base) MCG/ACT inhaler Inhale 2 puffs into the lungs every 6 hours as needed for Wheezing 1 Inhaler 3    albuterol (PROVENTIL) (2.5 MG/3ML) 0.083% nebulizer solution TAKE 3 MLS BY NEBULIZATION EVERY 6 HOURS AS NEEDED FOR WHEEZING DX: COPD ICD-10: J44.9 360 mL 6    omeprazole (PRILOSEC) 20 MG delayed release capsule Take 20 mg by mouth Daily Takes as needed     Intel Alter Eco Chair MISC by Does not apply route Please dispense a Lift Chair 1 each 0    guaiFENesin (MUCINEX) 600 MG extended release tablet Take 1 tablet by mouth 2 times daily 30 tablet 0    Misc. Devices (ROLLER WALKER) MISC 1 each by Does not apply route daily Please dispense a walker with a seat 1 each 0    Adalimumab (HUMIRA) 20 MG/0.4ML KIT Inject 1 vial into the skin every 14 days       cefdinir (OMNICEF) 300 MG capsule Take 1 capsule by mouth daily for 7 days GFR=45 7 capsule 0    predniSONE (DELTASONE) 10 MG tablet 3 tabs by mouth daily for 3 days, then 2 tabs by mouth daily for 3 days, then 1 tab daily for 3 days then stop (Patient taking differently: 3 tabs by mouth daily for 3 days, then 2 tabs by mouth daily for 3 days, then 1 tab daily for 3 days then stop COMPLETED) 18 tablet 0    tiZANidine (ZANAFLEX) 4 MG tablet Take 1 tablet by mouth 3 times daily 30 tablet 0     No Known Allergies    Nursing Notes Reviewed    ROS:  General: no fevers/chills  HEENT: no congestion, no sore throat, no ear pain  Eyes: no discharge, no vision changes  Cardiac: + chest pain, no palpitations  Pulmonary: + SOB, + cough  GI: no N/V/D, no abdominal pain  : no dysuria  Musculoskeletal: no joint pain or swelling, + low back pain  Skin: no rash or unusual bruising  Neurologic: no headaches, + dizziness    Physical Exam:  ED Triage Vitals   Enc Vitals Group      BP 04/02/19 0520 114/76      Pulse 04/02/19 0520 105      Resp 04/02/19 0520 20      Temp 04/02/19 0515 97.7 °F (36.5 °C)      Temp Source 04/02/19 0515 Infrared      SpO2 04/02/19 0520 100 %      Weight 04/02/19 0520 141 lb (64 kg)      Height 04/02/19 0520 5' (1.524 m)      Head Circumference --       Peak Flow --       Pain Score --       Pain Loc --       Pain Edu? --       Excl. in [address]? --      My pulse oximetry interpretation is which is within the normal range    GENERAL APPEARANCE: Awake and alert. Cooperative. Appears to be in pain  HEAD: Normocephalic. Atraumatic.   EYES: PERRL, normal CO2 12 (LL) 21 - 32 mmol/L    Anion Gap 17 (H) 3 - 16    Glucose 110 (H) 70 - 99 mg/dL    BUN 19 7 - 20 mg/dL    CREATININE 2.0 (H) 0.6 - 1.2 mg/dL    GFR Non-African American 25 (A) >60    GFR  30 (A) >60    Calcium 7.9 (L) 8.3 - 10.6 mg/dL    Total Protein 6.5 6.4 - 8.2 g/dL    Alb 3.1 (L) 3.4 - 5.0 g/dL    Albumin/Globulin Ratio 0.9 (L) 1.1 - 2.2    Total Bilirubin 0.4 0.0 - 1.0 mg/dL    Alkaline Phosphatase 88 40 - 129 U/L    ALT 8 (L) 10 - 40 U/L    AST 10 (L) 15 - 37 U/L    Globulin 3.4 g/dL   Troponin   Result Value Ref Range    Troponin <0.01 <0.01 ng/mL   Brain Natriuretic Peptide   Result Value Ref Range    Pro- (H) 0 - 124 pg/mL   Protime-INR   Result Value Ref Range    Protime 15.1 (H) 9.8 - 13.0 sec    INR 1.32 (H) 0.86 - 1.14   APTT   Result Value Ref Range    aPTT 33.2 26.0 - 36.0 sec   Magnesium   Result Value Ref Range    Magnesium 1.90 1.80 - 2.40 mg/dL   EKG 12 Lead   Result Value Ref Range    Ventricular Rate 109 BPM    Atrial Rate 109 BPM    P-R Interval 100 ms    QRS Duration 74 ms    Q-T Interval 382 ms    QTc Calculation (Bazett) 514 ms    P Axis 118 degrees    R Axis 31 degrees    T Axis 63 degrees    Diagnosis       Sinus tachycardia with short PRST & T wave abnormality, consider lateral ischemiaAbnormal ECG   EKG 12 Lead   Result Value Ref Range    Ventricular Rate 102 BPM    Atrial Rate 102 BPM    P-R Interval 142 ms    QRS Duration 76 ms    Q-T Interval 372 ms    QTc Calculation (Bazett) 484 ms    P Axis 34 degrees    R Axis 14 degrees    T Axis 53 degrees    Diagnosis       Sinus tachycardiaNonspecific ST and T wave abnormalityAbnormal ECG        Radiographs:  [x] Radiologist's Report Reviewed:   XR CHEST PORTABLE (Final result)   Result time 04/02/19 05:36:52   Final result by Ora Velazco MD (04/02/19 05:36:52)                Impression:    No acute cardiopulmonary disease or significant interval change from prior  study 01/20/2019                  EKG: (All EKG's are interpreted by myself in the absence of a cardiologist) sinus tachycardia at 109 bpm.  Short CO interval.  Prolonged QT interval corrected at 514 ms. Significant artifact with nonspecific ST-T wave changes noted. No significant change except increase in rate as compared to January 20, 2019. Repeat EKG shows nonspecific ST-T wave changes and is unchanged. MDM:  79-year-old female who presents with chest pain and shortness of breath. Consider COPD exacerbation, pneumonia, pneumothorax, PE, myocardial infarction. EKG on arrival shows nonspecific changes. She is not in respiratory distress on arrival but does appear uncomfortable. Given morphine for pain control. Chest x-ray is unremarkable. Patient does have acute on chronic renal insufficiency. Also noted have an elevated lactic acid and white blood cell count without any clear evidence of infection. On reevaluation initially patient reported feeling much better. She then later reported to the nurse she was having more trouble breathing. I reassessed the patient. She still has equal breath sounds bilaterally but has minimal amount of upper airway stridor. She is given Solu-Medrol, Pepcid and Benadryl. She does have some erythema to her neck and upper chest as well but no clear urticaria. CT chest, abdomen and pelvis will be performed. This is pending at time of transfer care to Dr. Darryl Hutchinson. Clinical Impression:  1. Shortness of breath          Disposition referral (if applicable):  No follow-up provider specified. Disposition medications (if applicable):  New Prescriptions    No medications on file         Comment: Please note this report has been produced using speech recognition software and may contain errors related to that system including errors in grammar, punctuation, and spelling, as well as words and phrases that may be inappropriate.  If there are any questions or concerns please feel free to contact the dictating physician for clarification      MD Mendy Henriquez MD  04/02/19 0539

## 2019-04-02 NOTE — PROGRESS NOTES
Pt seen in ed, admission completed. Pt seen by dr Darryle Menghini for left posterior wound on shoulder. Per pt she was suppose to see him in office today at [phone]. Notified dr Dora Treviño of wound. Pt is on home o2 at [facility]. Pt is alert and oriented x4.

## 2019-04-02 NOTE — ED NOTES
Pt updated on plan of care. Patient resting comfortably with no signs of distress. Denies any needs at this time. Bed locked and in lowest position with both side rails raised. Call light within reach.  Patient brought warm blanket at patient's request.     Chris Mcpherson RN  04/02/19 0155

## 2019-04-02 NOTE — ED NOTES
Pt back from Ct. Back in bed and back on monitor. Patient resting comfortably with no signs of distress. Denies any needs at this time. Bed locked and in lowest position with both side rails raised. Call light within reach.      Clyde López RN  04/02/19 8929

## 2019-04-02 NOTE — ED NOTES
Report given to [facility]. Pt alert and oriented and shows no signs of distress at time of transfer to [facility]. Pt has cefepime at time of transfer.  Pt taken upstairs via bed by LEILANI campoverde RN  04/02/19 8790

## 2019-04-02 NOTE — ED NOTES
Bed: 02  Expected date:   Expected time:   Means of arrival:   Comments:  [facility]     Valerie Mary, [address] [facility]  04/02/19 7977

## 2019-04-02 NOTE — ED NOTES
Pt difficult stick. Unable to obtain blood culture from [facility]. LEILANI Howard unsuccessful at straight sticking pt for blood. Lab called, states unable to send anyone down to collect blood \"for about an hour. \"     Vi Ward RN  04/02/19 9419

## 2019-04-02 NOTE — PLAN OF CARE
[address] and Laparoscopic [facility]        Progress Note    Patient Name: Georgette Alvarez  MRN: [id number]  YOB: 1950  Date of Evaluation: 2019    Chief Complaint: SOB    Subjective:  Patient presented to the emergency department early this morning with complaints of SOB and was subsequently diagnosed with pneumonia. She underwent an excision of chronic left should wound and debridement of surrounding tissue on 2019 with Dr. Yang Gupta. She was scheduled to see Dr. Yang Gupta in the office today for her second follow up visit since her procedure, thus we were asked to evaluate. She denies any new problems with the wound. She denies pain, drainage, or issues with bleeding. She has a home health nurse come to her house a couple times per week to change the dressings, and a neighbor helps her on the days the nurse is not there. 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 Nonsmoker. No history of diabetes. She does have COPD and reports intermittent steroid use during exacerbations.       Vital Signs:  Patient Vitals for the past 24 hrs:   BP Temp Temp src Pulse Resp SpO2 Height Weight   19 1057 118/73 98.9 °F (37.2 °C) Oral 103 22 98 % -- --   19 1030 (!) 118/56 -- -- 106 22 -- -- --   19 1000 120/73 -- -- 104 17 97 % -- --   19 0830 (!) 110/58 -- -- 100 17 97 % -- --   19 0800 120/70 -- -- 103 17 93 % -- --   19 0755 (!) 106/49 -- -- 104 18 98 % -- --   19 0730 116/67 -- -- 104 18 97 % -- --   19 0707 -- -- -- -- 19 97 % -- --   19 0700 94/61 -- -- 99 19 98 % -- --   19 0630 90/70 -- -- 101 17 97 % -- --   19 0520 114/76 -- -- 105 20 100 % 5' (1.524 m) 141 lb (64 kg)   19 0515 -- 97.7 °F (36.5 °C) Infrared -- -- -- -- --      TEMPERATURE HISTORY 24H: Temp (24hrs), Av.3 °F (36.8 °C), Min:97.7 °F (36.5 °C), Max:98.9 °F (37.2 °C)    BLOOD PRESSURE HISTORY: Systolic (28FIY), ECA:771 , Min:90 , Max:120 Diastolic (70VDZ), VQU:65, Min:49, Max:76      Intake/Output:  No intake/output data recorded. No intake/output data recorded. Drain/tube Output:       Physical Exam:  General: awake, alert, oriented to  person, place, time  Lungs: mild expiratory wheeze more prominent on the left than right, unlabored  Heart: RRR  Abdomen: soft, nontender, nondistended, no masses or organomegaly   Skin/Wound: left should wound healing well, no drainage, no bleeding, no signs of infection, nontender    Labs:  CBC:    Recent Labs     04/02/19  0611   WBC 15.7*   HGB 10.6*   HCT 34.0*        BMP:    Recent Labs     04/02/19  0610 04/02/19  1248     --    K 3.1* 3.4*     --    CO2 12*  --    BUN 19  --    CREATININE 2.0*  --    GLUCOSE 110*  --      Hepatic:   Recent Labs     04/02/19  0610   AST 10*   ALT 8*   BILITOT 0.4   ALKPHOS 88     Amylase: No results for input(s): AMYLASE in the last 72 hours. Lipase: No results for input(s): LIPASE in the last 72 hours. Mag:      Recent Labs     04/02/19  0610   MG 1.90      Phos:   No results for input(s): PHOS in the last 72 hours. Coags:   Recent Labs     04/02/19  0630   INR 1.32*   APTT 33.2       Cultures:  Anaerobic culture  No results for input(s): LABANAE in the last 72 hours. Blood culture  No results for input(s): BC in the last 72 hours. Blood culture 2  No results for input(s): BLOODCULT2 in the last 72 hours. Body fluid culture  No results for input(s): BLOODCULT2 in the last 72 hours. Surgical culture  No results for input(s): CXSURG in the last 72 hours. Fecal occult  No results for input(s): OCCULTBLDFEC in the last 72 hours. Gram stain  No results for input(s): LABGRAM in the last 72 hours. Stool culture 1  No results for input(s): CXST in the last 72 hours. Stool culture 2  No results for input(s): STOOLCULT2 in the last 72 hours. Urine culture  No results for input(s): LABURIN in the last 72 hours.     Wound abscess  No results for input(s): WNDABS in the last 72 hours. Pathology:  NA    Imaging:  I have personally reviewed the following films:    Xr Chest Portable    Result Date: 4/2/2019  EXAMINATION: SINGLE XRAY VIEW OF THE CHEST 4/2/2019 5:25 am COMPARISON: Frontal and lateral views of the chest 01/20/2019. CT thorax 02/11/2019. HISTORY: ORDERING SYSTEM PROVIDED HISTORY: SOB TECHNOLOGIST PROVIDED HISTORY: Reason for exam:->SOB FINDINGS: The cardiopericardial silhouette is stable in size and configuration. Calcified mediastinal lymph nodes are redemonstrated. The lungs are again noted to be emphysematous without pneumothorax, pleural effusion or new focal airspace opacity. No acute cardiopulmonary disease or significant interval change from prior study 01/20/2019. Ct Chest Abdomen Pelvis Wo Contrast    Result Date: 4/2/2019  EXAMINATION: CT OF THE CHEST, ABDOMEN, AND PELVIS WITHOUT CONTRAST 4/2/2019 7:39 am TECHNIQUE: CT of the chest, abdomen and pelvis was performed without the administration of intravenous contrast. Multiplanar reformatted images are provided for review. Dose modulation, iterative reconstruction, and/or weight based adjustment of the mA/kV was utilized to reduce the radiation dose to as low as reasonably achievable. COMPARISON: CT chest 02/11/2019. CT abdomen and pelvis 02/02/2011. HISTORY: ORDERING SYSTEM PROVIDED HISTORY: SOB with hypoxia, low back and LLQ abd pain TECHNOLOGIST PROVIDED HISTORY: No IV contrast due to renal function Reason for exam:->SOB with hypoxia, low back and LLQ abd pain Additional Contrast?->None Ordering Physician Provided Reason for Exam: SOB with hypoxia, low back and LLQ abd pain Acuity: Unknown Type of Exam: Unknown FINDINGS: Chest: Mediastinum: The thoracic aorta is normal caliber. There is no cardiomegaly or pericardial effusion. There is a small sliding-type hiatal hernia. The esophagus is otherwise grossly unremarkable.   Calcified lymph nodes consistent with healed granulomatous disease. Lungs/pleura: The central airways are patent. There is mild basilar bronchiectasis. Extensive emphysema. Patchy bandlike and consolidative opacity in the lung bases. There is no pleural effusion. Soft Tissues/Bones: Unchanged abandoned catheter within the thoracic spinal canal.  There are no suspicious osseous lesions. Abdomen/Pelvis: Organs: Evaluation of the intra-abdominal solid and hollow viscera is limited without IV contrast. No focal liver lesion is visible. Mild biliary ductal dilation. Previous cholecystectomy. There is no splenomegaly. The adrenal glands are unremarkable without evidence of mass. The pancreas is grossly unremarkable in noncontrast appearance. There is no hydronephrosis, hydroureter, or evidence of urolithiasis. The urinary bladder is grossly unremarkable. GI/Bowel: Submucosal fat proliferation within the colon. Previous right colonic resection. Small sliding-type hiatal hernia. There is no small bowel dilation or evidence of obstruction. Pelvis: The ovaries are grossly unremarkable. The uterus appears surgically absent. There is no evidence of pelvic free fluid. There is no pelvic adenopathy. Peritoneum/Retroperitoneum: There is no ascites. There is no adenopathy. There is no pneumoperitoneum. The abdominal aorta is normal caliber and without evidence of aneurysm. Bones/Soft Tissues: Diffuse osteopenia. Bandlike and consolidative opacity in the lung bases may reflect atelectasis or pneumonia. There is no pleural effusion. Background of emphysema. No acute findings in the abdomen or pelvis. Submucosal fat proliferation in the colon, which can be seen in setting of obesity and chronic inflammatory bowel disease. No pericolonic fat stranding to suggest acute inflammation.        Scheduled Meds:   amitriptyline  75 mg Oral Nightly    apixaban  5 mg Oral BID    guaiFENesin  600 mg Oral BID    [START ON 4/3/2019] pantoprazole  40 mg Oral QAM AC  vitamin D  2,000 Units Oral Daily    sodium chloride flush  10 mL Intravenous 2 times per day    cefepime  2 g Intravenous Q24H    vancomycin  15 mg/kg Intravenous Q24H    methylPREDNISolone  40 mg Intravenous Q6H    ipratropium-albuterol  1 ampule Inhalation Q4H WA    lidocaine 1 % injection  5 mL Intradermal Once    sodium chloride flush  10 mL Intravenous 2 times per day     Continuous Infusions:  PRN Meds:.tiZANidine, sodium chloride flush, magnesium hydroxide, ondansetron, sodium chloride flush      Assessment:  80 y.o. female admitted with   1. Shortness of breath    2. COPD exacerbation ([facility] [address]    3. MARIS (acute kidney injury) ([facility] [address]    4. Hypokalemia    5. Anemia, unspecified type        Status-post excision of chronic wound and debridement of surrounding tissue, left shoulder, skin, subcutaneous tissue, muscle and fascial layer, 10 cm x 11 cm in size on 2/27/2019 for left shoulder mass, chronic open wound of shoulder region      Plan:  1. No signs of infection, wound healing well; continue local care with wet-to-dry dressing changes daily and PRN  2. Diet as tolerated  3. Antibiotics for pneumonia, not needed for left should wound  4. Activity as tolerated  5. Pulmonary toilet, incentive spirometry  6. PRN analgesics and antiemetics--minimizing narcotics as tolerated  7. DVT prophylaxis defer to primary team  8. Management of medical comorbid etiologies per primary team and consulting services    EDUCATION:  Educated patient on plan of care and disease process--all questions answered. Plans discussed with patient and nursing. Reviewed and discussed with Dr. Erasmo Diaz. Signed:  GUS Mueller - CNP  4/2/2019 3:21 PM     Pt seen and examined with NP  See full note above  Wound on left shoulder is looking good, site without infection , drainage, or cellulitis. Has filled to surface, and is beginning to get epidermis around the edges. Will continue local care; Kumar Diop / Marivel daily.    Danita Ousmane

## 2019-04-03 LAB
ANION GAP SERPL CALCULATED.3IONS-SCNC: 16 MMOL/L (ref 3–16)
BASOPHILS ABSOLUTE: 0 K/UL (ref 0–0.2)
BASOPHILS RELATIVE PERCENT: 0.3 %
BUN BLDV-MCNC: 24 MG/DL (ref 7–20)
CALCIUM SERPL-MCNC: 7.6 MG/DL (ref 8.3–10.6)
CHLORIDE BLD-SCNC: 113 MMOL/L (ref 99–110)
CO2: 14 MMOL/L (ref 21–32)
CREAT SERPL-MCNC: 2.2 MG/DL (ref 0.6–1.2)
EKG ATRIAL RATE: 102 BPM
EKG ATRIAL RATE: 109 BPM
EKG DIAGNOSIS: NORMAL
EKG DIAGNOSIS: NORMAL
EKG P AXIS: 118 DEGREES
EKG P AXIS: 34 DEGREES
EKG P-R INTERVAL: 100 MS
EKG P-R INTERVAL: 142 MS
EKG Q-T INTERVAL: 372 MS
EKG Q-T INTERVAL: 382 MS
EKG QRS DURATION: 74 MS
EKG QRS DURATION: 76 MS
EKG QTC CALCULATION (BAZETT): 484 MS
EKG QTC CALCULATION (BAZETT): 514 MS
EKG R AXIS: 14 DEGREES
EKG R AXIS: 31 DEGREES
EKG T AXIS: 53 DEGREES
EKG T AXIS: 63 DEGREES
EKG VENTRICULAR RATE: 102 BPM
EKG VENTRICULAR RATE: 109 BPM
EOSINOPHILS ABSOLUTE: 0 K/UL (ref 0–0.6)
EOSINOPHILS RELATIVE PERCENT: 0.2 %
GFR AFRICAN AMERICAN: 27
GFR NON-AFRICAN AMERICAN: 22
GLUCOSE BLD-MCNC: 120 MG/DL (ref 70–99)
HCT VFR BLD CALC: 25.6 % (ref 36–48)
HEMATOLOGY PATH CONSULT: NO
HEMOGLOBIN: 8.1 G/DL (ref 12–16)
LYMPHOCYTES ABSOLUTE: 0.9 K/UL (ref 1–5.1)
LYMPHOCYTES RELATIVE PERCENT: 5.4 %
MCH RBC QN AUTO: 20 PG (ref 26–34)
MCHC RBC AUTO-ENTMCNC: 31.8 G/DL (ref 31–36)
MCV RBC AUTO: 62.9 FL (ref 80–100)
MONOCYTES ABSOLUTE: 0.4 K/UL (ref 0–1.3)
MONOCYTES RELATIVE PERCENT: 2.2 %
NEUTROPHILS ABSOLUTE: 15 K/UL (ref 1.7–7.7)
NEUTROPHILS RELATIVE PERCENT: 91.9 %
PDW BLD-RTO: 17.4 % (ref 12.4–15.4)
PLATELET # BLD: 355 K/UL (ref 135–450)
PMV BLD AUTO: 7.4 FL (ref 5–10.5)
POTASSIUM REFLEX MAGNESIUM: 3.7 MMOL/L (ref 3.5–5.1)
RBC # BLD: 4.06 M/UL (ref 4–5.2)
SODIUM BLD-SCNC: 143 MMOL/L (ref 136–145)
WBC # BLD: 16.3 K/UL (ref 4–11)

## 2019-04-03 PROCEDURE — 85025 COMPLETE CBC W/AUTO DIFF WBC: CPT

## 2019-04-03 PROCEDURE — 6370000000 HC RX 637 (ALT 250 FOR IP): Performed by: INTERNAL MEDICINE

## 2019-04-03 PROCEDURE — 2700000000 HC OXYGEN THERAPY PER DAY

## 2019-04-03 PROCEDURE — 94760 N-INVAS EAR/PLS OXIMETRY 1: CPT

## 2019-04-03 PROCEDURE — 99231 SBSQ HOSP IP/OBS SF/LOW 25: CPT | Performed by: SURGERY

## 2019-04-03 PROCEDURE — 36415 COLL VENOUS BLD VENIPUNCTURE: CPT

## 2019-04-03 PROCEDURE — 97535 SELF CARE MNGMENT TRAINING: CPT

## 2019-04-03 PROCEDURE — 97161 PT EVAL LOW COMPLEX 20 MIN: CPT

## 2019-04-03 PROCEDURE — APPSS15 APP SPLIT SHARED TIME 0-15 MINUTES: Performed by: NURSE PRACTITIONER

## 2019-04-03 PROCEDURE — 80048 BASIC METABOLIC PNL TOTAL CA: CPT

## 2019-04-03 PROCEDURE — 2500000003 HC RX 250 WO HCPCS: Performed by: INTERNAL MEDICINE

## 2019-04-03 PROCEDURE — 94640 AIRWAY INHALATION TREATMENT: CPT

## 2019-04-03 PROCEDURE — APPNB30 APP NON BILLABLE TIME 0-30 MINS: Performed by: NURSE PRACTITIONER

## 2019-04-03 PROCEDURE — 2580000003 HC RX 258: Performed by: INTERNAL MEDICINE

## 2019-04-03 PROCEDURE — 97530 THERAPEUTIC ACTIVITIES: CPT

## 2019-04-03 PROCEDURE — 6360000002 HC RX W HCPCS: Performed by: INTERNAL MEDICINE

## 2019-04-03 PROCEDURE — 6370000000 HC RX 637 (ALT 250 FOR IP): Performed by: NURSE PRACTITIONER

## 2019-04-03 PROCEDURE — 1200000000 HC SEMI PRIVATE

## 2019-04-03 PROCEDURE — 97165 OT EVAL LOW COMPLEX 30 MIN: CPT

## 2019-04-03 PROCEDURE — 87449 NOS EACH ORGANISM AG IA: CPT

## 2019-04-03 PROCEDURE — 93010 ELECTROCARDIOGRAM REPORT: CPT | Performed by: INTERNAL MEDICINE

## 2019-04-03 RX ORDER — HYDROXYZINE HYDROCHLORIDE 10 MG/1
25 TABLET, FILM COATED ORAL 3 TIMES DAILY PRN
Status: DISCONTINUED | OUTPATIENT
Start: 2019-04-03 | End: 2019-04-06 | Stop reason: HOSPADM

## 2019-04-03 RX ORDER — POTASSIUM CHLORIDE 20 MEQ/1
20 TABLET, EXTENDED RELEASE ORAL ONCE
Status: COMPLETED | OUTPATIENT
Start: 2019-04-03 | End: 2019-04-03

## 2019-04-03 RX ORDER — DIAPER,BRIEF,INFANT-TODD,DISP
EACH MISCELLANEOUS 2 TIMES DAILY PRN
Status: DISCONTINUED | OUTPATIENT
Start: 2019-04-03 | End: 2019-04-06 | Stop reason: HOSPADM

## 2019-04-03 RX ORDER — ALBUTEROL SULFATE 90 UG/1
2 AEROSOL, METERED RESPIRATORY (INHALATION) EVERY 6 HOURS PRN
Status: DISCONTINUED | OUTPATIENT
Start: 2019-04-03 | End: 2019-04-06 | Stop reason: HOSPADM

## 2019-04-03 RX ORDER — OXYCODONE HYDROCHLORIDE 5 MG/1
5 TABLET ORAL EVERY 4 HOURS PRN
Status: DISCONTINUED | OUTPATIENT
Start: 2019-04-03 | End: 2019-04-06 | Stop reason: HOSPADM

## 2019-04-03 RX ORDER — BACITRACIN ZINC AND POLYMYXIN B SULFATE 500; 1000 [USP'U]/G; [USP'U]/G
OINTMENT TOPICAL DAILY
Status: DISCONTINUED | OUTPATIENT
Start: 2019-04-03 | End: 2019-04-06 | Stop reason: HOSPADM

## 2019-04-03 RX ADMIN — PANTOPRAZOLE SODIUM 40 MG: 40 TABLET, DELAYED RELEASE ORAL at 06:42

## 2019-04-03 RX ADMIN — VANCOMYCIN HYDROCHLORIDE 1000 MG: 1 INJECTION, SOLUTION INTRAVENOUS at 11:32

## 2019-04-03 RX ADMIN — IPRATROPIUM BROMIDE AND ALBUTEROL SULFATE 1 AMPULE: .5; 3 SOLUTION RESPIRATORY (INHALATION) at 20:54

## 2019-04-03 RX ADMIN — HYDROCORTISONE: 1 CREAM TOPICAL at 21:30

## 2019-04-03 RX ADMIN — Medication 10 ML: at 09:11

## 2019-04-03 RX ADMIN — GUAIFENESIN 600 MG: 600 TABLET, EXTENDED RELEASE ORAL at 09:11

## 2019-04-03 RX ADMIN — METHYLPREDNISOLONE SODIUM SUCCINATE 40 MG: 40 INJECTION, POWDER, FOR SOLUTION INTRAMUSCULAR; INTRAVENOUS at 02:33

## 2019-04-03 RX ADMIN — BACITRACIN ZINC AND POLYMYXIN B SULFATE 28.3 G: at 10:40

## 2019-04-03 RX ADMIN — SODIUM BICARBONATE: 84 INJECTION, SOLUTION INTRAVENOUS at 11:32

## 2019-04-03 RX ADMIN — APIXABAN 5 MG: 5 TABLET, FILM COATED ORAL at 09:11

## 2019-04-03 RX ADMIN — GUAIFENESIN 600 MG: 600 TABLET, EXTENDED RELEASE ORAL at 21:34

## 2019-04-03 RX ADMIN — IPRATROPIUM BROMIDE AND ALBUTEROL SULFATE 1 AMPULE: .5; 3 SOLUTION RESPIRATORY (INHALATION) at 12:08

## 2019-04-03 RX ADMIN — Medication 10 ML: at 21:36

## 2019-04-03 RX ADMIN — CEFEPIME HYDROCHLORIDE 2 G: 2 INJECTION, POWDER, FOR SOLUTION INTRAVENOUS at 09:11

## 2019-04-03 RX ADMIN — Medication 10 ML: at 00:20

## 2019-04-03 RX ADMIN — VITAMIN D, TAB 1000IU (100/BT) 2000 UNITS: 25 TAB at 09:11

## 2019-04-03 RX ADMIN — IPRATROPIUM BROMIDE AND ALBUTEROL SULFATE 1 AMPULE: .5; 3 SOLUTION RESPIRATORY (INHALATION) at 16:02

## 2019-04-03 RX ADMIN — METHYLPREDNISOLONE SODIUM SUCCINATE 40 MG: 40 INJECTION, POWDER, FOR SOLUTION INTRAMUSCULAR; INTRAVENOUS at 21:34

## 2019-04-03 RX ADMIN — POTASSIUM CHLORIDE 20 MEQ: 1500 TABLET, EXTENDED RELEASE ORAL at 11:40

## 2019-04-03 RX ADMIN — HYDROCORTISONE: 1 CREAM TOPICAL at 11:33

## 2019-04-03 RX ADMIN — Medication 10 ML: at 21:44

## 2019-04-03 RX ADMIN — IPRATROPIUM BROMIDE AND ALBUTEROL SULFATE 1 AMPULE: .5; 3 SOLUTION RESPIRATORY (INHALATION) at 08:22

## 2019-04-03 RX ADMIN — Medication 10 ML: at 09:12

## 2019-04-03 RX ADMIN — METHYLPREDNISOLONE SODIUM SUCCINATE 40 MG: 40 INJECTION, POWDER, FOR SOLUTION INTRAMUSCULAR; INTRAVENOUS at 14:44

## 2019-04-03 RX ADMIN — OXYCODONE HYDROCHLORIDE 5 MG: 5 TABLET ORAL at 10:39

## 2019-04-03 RX ADMIN — AMITRIPTYLINE HYDROCHLORIDE 75 MG: 50 TABLET, FILM COATED ORAL at 21:34

## 2019-04-03 RX ADMIN — Medication 10 ML: at 21:35

## 2019-04-03 RX ADMIN — METHYLPREDNISOLONE SODIUM SUCCINATE 40 MG: 40 INJECTION, POWDER, FOR SOLUTION INTRAMUSCULAR; INTRAVENOUS at 09:10

## 2019-04-03 RX ADMIN — Medication 2 PUFF: at 20:54

## 2019-04-03 RX ADMIN — HYDROXYZINE HYDROCHLORIDE 25 MG: 10 TABLET ORAL at 11:33

## 2019-04-03 RX ADMIN — Medication 2 PUFF: at 12:08

## 2019-04-03 RX ADMIN — APIXABAN 5 MG: 5 TABLET, FILM COATED ORAL at 21:34

## 2019-04-03 ASSESSMENT — PAIN DESCRIPTION - PAIN TYPE
TYPE: ACUTE PAIN

## 2019-04-03 ASSESSMENT — PAIN SCALES - GENERAL
PAINLEVEL_OUTOF10: 9
PAINLEVEL_OUTOF10: 8
PAINLEVEL_OUTOF10: 7
PAINLEVEL_OUTOF10: 9
PAINLEVEL_OUTOF10: 9
PAINLEVEL_OUTOF10: 8

## 2019-04-03 ASSESSMENT — PAIN DESCRIPTION - ORIENTATION
ORIENTATION: LOWER
ORIENTATION: LOWER

## 2019-04-03 ASSESSMENT — PAIN DESCRIPTION - LOCATION
LOCATION: BACK

## 2019-04-03 NOTE — CARE COORDINATION
Discharge Planning Assessment  RN/SW discharge planner met with patient/(and family member) to discuss reason for admission, current living situation, and potential needs at the time of discharge    Demographics/Insurance verified Yes    Current type of dwellinnd floor apt    Living arrangements:home alone-neighbors check on her frequently    Level of function/Support: has assist from aides for housekeeping, otherwise independent w/ADL's    PCP: Merrell Goltz    Last Visit to PCP:/03/15/19-has April appt scheduled    DME: has walker and cane that she uses occassionally    Active with any community resources/agencies/skilled home care: pt is active w/[facility] for [employer] for [employer], [facility] and [facility]. Pt is also active w/[facility] for [facility].    Medication compliance issues: stated no issues-compliant    Financial issues that could impact healthcare: stated no issues    Transportation at the time of discharge:likely family-will notify if needs assist    Tentative discharge plan: home w/services resumed. [facility] following and aware of pt's admit. [facility] CM wishes to be notified also of pt's [id number]. Pt will notify Sw if she needs assist getting transportation back home.     Electronically signed by NAEEM Miller on 4/3/2019 at 4:01 PM

## 2019-04-03 NOTE — PROGRESS NOTES
Bedside report received from [facility]. Pt lying in bed with no s/s pain or distress. Pt reminded to put on oxygen cannula. No needs at this time. Call light within reach.   .Electronically signed by Yahaira Bhakta RN on 4/2/2019 at 8:08 PM

## 2019-04-03 NOTE — PROGRESS NOTES
Bedside report received from Asuncion Lam, [address] [place] [address]. Pt lying in bed with no s/s pain or distress. Pt on phone and smiling. No needs at this time. Call light within reach.   .Electronically signed by Lindy Nolan RN on 4/3/2019 at 7:43 PM
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
## 2019-04-03 NOTE — CONSULTS
Nephrology Consult Note      Patient's Name: Ankit Ortiz  9:51 AM  4/3/2019    Reason for Consult:  MARIS on CKD 3 , metabolic disorders      Requesting Physician:  Alexa Gaona MD      Chief Complaint:    Chief Complaint   Patient presents with    Shortness of Breath     [facility] transported pt stating that pt has had trouble breathing today, did a breathing treatment today at 130 no relief, EMS states pt was 90% RA, other VSS. EMS gave another breathing treatment, pt states she got a little relief from it. History of Present iIlness:    Ankit Ortiz is a 80 y.o. female with h/o emphysema, CKD 3, HTN , Crohns ds , PE, peripheral neuropathy who was having difficulty breathing at home and called EMS. Got breathing treatment by EMS and then in Er. She felt slightly better . She has been admitted now for treatment of pneumonia. She has h/o crohns ds and watery diarrhea. No dysuria    Creatinine is elevated above her baseline   No nausea     On humira for crohns ds.     Past Medical History:   Diagnosis Date    Bullous emphysema (HCC)     CKD (chronic kidney disease) stage 3, GFR 30-59 ml/min [facility])     Clostridium difficile carrier 01/21/2019    COPD (chronic obstructive pulmonary disease) ([address]     PFT done 7 years ago   Amanda Chu Crohn's disease ([address]     Crohn's disease    Depression 1/30/2011    pt states resolved as of 3-26-12    DVT (deep venous thrombosis) ([address]     2012; first ocurrence     Hiatal hernia     Hx of blood clots     Kidney disease     Kidney insufficiency     Osteoporosis     Peripheral neuropathy     neuropathy in legs    Pneumonia     Postmenopausal     LMP in  40's    Pulmonary embolism ([address]     2012; first ocurrence    Sepsis ([address]     Syringomyelia ([address]        Past Surgical History:   Procedure Laterality Date    ABDOMEN SURGERY      BACK SURGERY      shunt to drain CSF    BIOPSY SHOULDER Left 2/27/2019    EXCISION OF LEFT SHOULDER MASS performed by Yadiel Baker MD at [facility]      crainotomy- remove fluid ? V-P SHUNT    CHOLECYSTECTOMY      COLON SURGERY      resection X2    COLONOSCOPY  12/5/11    BIOPSY AND POLYPECTOMY    COLONOSCOPY  4-2-2012    and ablation ERBE/APC    SHOULDER SURGERY      L        Family History   Problem Relation Age of Onset    Heart Disease Mother     High Blood Pressure Mother     High Cholesterol Mother     Early Death Mother 36        MI    Heart Disease Father     High Blood Pressure Father     High Cholesterol Father     Stroke Father 79    High Blood Pressure Sister     High Blood Pressure Brother         reports that she quit smoking about 4 years ago. Her smoking use included cigarettes. She has a 7.50 pack-year smoking history. She has never used smokeless tobacco. She reports that she does not drink alcohol or use drugs. Allergies:  Patient has no known allergies.     Current Medications:      bacitracin-polymyxin b (POLYSPORIN) ointment Daily   oxyCODONE (ROXICODONE) immediate release tablet 5 mg Q4H PRN   tiZANidine (ZANAFLEX) tablet 2 mg Q8H PRN   amitriptyline (ELAVIL) tablet 75 mg Nightly   apixaban (ELIQUIS) tablet 5 mg BID   guaiFENesin (MUCINEX) extended release tablet 600 mg BID   pantoprazole (PROTONIX) tablet 40 mg QAM AC   vitamin D (CHOLECALCIFEROL) tablet 2,000 Units Daily   sodium chloride flush 0.9 % injection 10 mL 2 times per day   sodium chloride flush 0.9 % injection 10 mL PRN   magnesium hydroxide (MILK OF MAGNESIA) 400 MG/5ML suspension 30 mL Daily PRN   ondansetron (ZOFRAN) injection 4 mg Q6H PRN   cefepime (MAXIPIME) 2 g IVPB minibag Q24H   vancomycin (VANCOCIN) 1000 mg in dextrose 5% 200 mL IVPB Q24H   methylPREDNISolone sodium (SOLU-MEDROL) injection 40 mg Q6H   ipratropium-albuterol (DUONEB) nebulizer solution 1 ampule Q4H WA   lidocaine PF 1 % injection 5 mL Once   sodium chloride flush 0.9 % injection 10 mL 2 times per day   sodium chloride flush 0.9 % injection 10 mL PRN   acetaminophen (TYLENOL) tablet 650 mg Q4H PRN       Review of Systems:   14 point ROS obtained but were negative except mentioned in HPI      Physical exam:     Vitals:  BP (!) 164/69   Pulse 106   Temp 97.8 °F (36.6 °C) (Oral)   Resp 18   Ht 5' (1.524 m)   Wt 133 lb 4.8 oz (60.5 kg)   SpO2 100%   BMI 26.03 kg/m²   Constitutional:  OAA X3 NAD  Skin: no rash, turgor wnl  Heent:  eomi, mmm  Neck: no bruits or jvd noted  Cardiovascular:  S1, S2 without m/r/g  Respiratory: CTA B without w/r/r  Abdomen:  +bs, soft, nt, nd  Ext: no lower extremity edema  Psychiatric: mood and affect appropriate  Musculoskeletal:  Rom, muscular strength intact    Labs:  CBC:   Recent Labs     04/02/19  0611 04/03/19  0546   WBC 15.7* 16.3*   HGB 10.6* 8.1*    355     BMP:  Recent Labs     04/02/19  0610 04/02/19  1248 04/03/19  0546     --  143   K 3.1* 3.4* 3.7     --  113*   CO2 12*  --  14*   BUN 19  --  24*   CREATININE 2.0*  --  2.2*   GLUCOSE 110*  --  120*     Ca/Mg/Phos: Recent Labs     04/02/19  0610 04/03/19  0546   CALCIUM 7.9* 7.6*   MG 1.90  --      Hepatic: Recent Labs     04/02/19  0610   AST 10*   ALT 8*   BILITOT 0.4   ALKPHOS 88     Troponin:   Recent Labs     04/02/19  0610   TROPONINI <0.01     BNP: No results for input(s): BNP in the last 72 hours. Lipids: No results for input(s): CHOL, TRIG, HDL, LDLCALC, LABVLDL in the last 72 hours. ABGs: No results for input(s): PHART, PO2ART, UFH9EFO in the last 72 hours. INR:   Recent Labs     04/02/19  0630   INR 1.32*     UA:No results for input(s): Lisseth Head, GLUCOSEU, BILIRUBINUR, Cheron Doll, BLOODU, PHUR, PROTEINU, UROBILINOGEN, NITRU, LEUKOCYTESUR, Lizzette Crest in the last 72 hours. Urine Microscopic: No results for input(s): LABCAST, BACTERIA, COMU, HYALCAST, WBCUA, RBCUA, EPIU in the last 72 hours. Urine Culture: No results for input(s): LABURIN in the last 72 hours.   Urine Chemistry: No results for

## 2019-04-03 NOTE — PROGRESS NOTES
[facility] and Laparoscopic Surgery        Progress Note    Patient Name: Jorge Mario  MRN: [id number]  YOB: 1950  Date of Evaluation: 4/3/2019    Chief Complaint: Left shoulder wound    Subjective:  No acute events overnight  Denies pain to left shoulder wound  No significant drainage or bleeding reported      Vital Signs:  Patient Vitals for the past 24 hrs:   BP Temp Temp src Pulse Resp SpO2 Weight   19 1209 -- -- -- -- 18 100 % --   19 0907 (!) 164/69 97.8 °F (36.6 °C) Oral 106 18 100 % --   19 0822 -- -- -- -- 18 100 % --   19 0608 -- -- -- -- -- -- 133 lb 4.8 oz (60.5 kg)   19 0055 108/68 97.7 °F (36.5 °C) Oral 106 18 97 % --   19 2110 -- -- -- -- 20 100 % --   19 2030 128/83 97.5 °F (36.4 °C) Oral 104 21 100 % --   19 1746 135/70 97.5 °F (36.4 °C) Oral 101 22 100 % --      TEMPERATURE HISTORY 24H: Temp (24hrs), Av.6 °F (36.4 °C), Min:97.5 °F (36.4 °C), Max:97.8 °F (36.6 °C)    BLOOD PRESSURE HISTORY: Systolic (49IWF), TKS:241 , Min:90 , PPV:673    Diastolic (34MUX), CWV:08, Min:49, Max:83      Intake/Output:  No intake/output data recorded. No intake/output data recorded. Drain/tube Output:       Physical Exam:  General: awake, alert, oriented to  person, place, time  Lungs: unlabored respirations  Skin/Wound: left shoulder wound healing well, no drainage, no bleeding, no signs of infection, nontender    Labs:  CBC:    Recent Labs     19  0611 19  0546   WBC 15.7* 16.3*   HGB 10.6* 8.1*   HCT 34.0* 25.6*    355     BMP:    Recent Labs     19  0610 19  1248 19  0546     --  143   K 3.1* 3.4* 3.7     --  113*   CO2 12*  --  14*   BUN 19  --  24*   CREATININE 2.0*  --  2.2*   GLUCOSE 110*  --  120*     Hepatic:   Recent Labs     19  0610   AST 10*   ALT 8*   BILITOT 0.4   ALKPHOS 88     Amylase: No results for input(s): AMYLASE in the last 72 hours. Lipase:  No results for input(s): LIPASE in the last 72 hours. Mag:      Recent Labs     04/02/19  0610   MG 1.90      Phos:   No results for input(s): PHOS in the last 72 hours. Coags:   Recent Labs     04/02/19  0630   INR 1.32*   APTT 33.2       Cultures:  Anaerobic culture  No results for input(s): LABANAE in the last 72 hours. Blood culture  Recent Labs     04/02/19  1000   BC No Growth to date. Any change in status will be called. Blood culture 2  Recent Labs     04/02/19  1018   BLOODCULT2 No Growth to date. Any change in status will be called. Body fluid culture  Recent Labs     04/02/19  1018   BLOODCULT2 No Growth to date. Any change in status will be called. Surgical culture  No results for input(s): CXSURG in the last 72 hours. Fecal occult  No results for input(s): OCCULTBLDFEC in the last 72 hours. Gram stain  No results for input(s): LABGRAM in the last 72 hours. Stool culture 1  No results for input(s): CXST in the last 72 hours. Stool culture 2  No results for input(s): STOOLCULT2 in the last 72 hours. Urine culture  No results for input(s): LABURIN in the last 72 hours. Wound abscess  No results for input(s): WNDABS in the last 72 hours. Pathology:  NA    Imaging:  I have personally reviewed the following films:    Xr Chest Portable    Result Date: 4/2/2019  EXAMINATION: SINGLE XRAY VIEW OF THE CHEST 4/2/2019 5:25 am COMPARISON: Frontal and lateral views of the chest 01/20/2019. CT thorax 02/11/2019. HISTORY: ORDERING SYSTEM PROVIDED HISTORY: SOB TECHNOLOGIST PROVIDED HISTORY: Reason for exam:->SOB FINDINGS: The cardiopericardial silhouette is stable in size and configuration. Calcified mediastinal lymph nodes are redemonstrated. The lungs are again noted to be emphysematous without pneumothorax, pleural effusion or new focal airspace opacity. No acute cardiopulmonary disease or significant interval change from prior study 01/20/2019.      Ct Chest Abdomen Pelvis Wo Contrast    Result Date: 4/2/2019  EXAMINATION: CT OF THE CHEST, ABDOMEN, AND PELVIS WITHOUT CONTRAST 4/2/2019 7:39 am TECHNIQUE: CT of the chest, abdomen and pelvis was performed without the administration of intravenous contrast. Multiplanar reformatted images are provided for review. Dose modulation, iterative reconstruction, and/or weight based adjustment of the mA/kV was utilized to reduce the radiation dose to as low as reasonably achievable. COMPARISON: CT chest 02/11/2019. CT abdomen and pelvis 02/02/2011. HISTORY: ORDERING SYSTEM PROVIDED HISTORY: SOB with hypoxia, low back and LLQ abd pain TECHNOLOGIST PROVIDED HISTORY: No IV contrast due to renal function Reason for exam:->SOB with hypoxia, low back and LLQ abd pain Additional Contrast?->None Ordering Physician Provided Reason for Exam: SOB with hypoxia, low back and LLQ abd pain Acuity: Unknown Type of Exam: Unknown FINDINGS: Chest: Mediastinum: The thoracic aorta is normal caliber. There is no cardiomegaly or pericardial effusion. There is a small sliding-type hiatal hernia. The esophagus is otherwise grossly unremarkable. Calcified lymph nodes consistent with healed granulomatous disease. Lungs/pleura: The central airways are patent. There is mild basilar bronchiectasis. Extensive emphysema. Patchy bandlike and consolidative opacity in the lung bases. There is no pleural effusion. Soft Tissues/Bones: Unchanged abandoned catheter within the thoracic spinal canal.  There are no suspicious osseous lesions. Abdomen/Pelvis: Organs: Evaluation of the intra-abdominal solid and hollow viscera is limited without IV contrast. No focal liver lesion is visible. Mild biliary ductal dilation. Previous cholecystectomy. There is no splenomegaly. The adrenal glands are unremarkable without evidence of mass. The pancreas is grossly unremarkable in noncontrast appearance. There is no hydronephrosis, hydroureter, or evidence of urolithiasis.  The breath    2. COPD exacerbation ([facility]    3. MARIS (acute kidney injury) ([facility]    4. Hypokalemia    5. Anemia, unspecified type        Status-post excision of chronic wound and debridement of surrounding tissue, left shoulder, skin, subcutaneous tissue, muscle and fascial layer, 10 cm x 11 cm in size on 2/27/2019 for left shoulder mass, chronic open wound of shoulder region  Pneumonia       Plan:  1. Continue local wound care to left shoulder with polysporin and adaptic daily and PRN  2. Diet as tolerated  3. Antibiotics for pneumonia, not needed for left shoulder wound  4. Activity as tolerated  5. Pulmonary toilet, incentive spirometry  6. PRN analgesics and antiemetics--minimizing narcotics as tolerated  7. DVT prophylaxis defer to primary team  8. Management of medical comorbid etiologies per primary team and consulting services    EDUCATION:  Educated patient on plan of care and disease process--all questions answered. Plans discussed with patient and nursing. Reviewed and discussed with Dr. Yung Shah.       Signed:  GUS Garcia - CNP  4/3/2019 1:49 PM     Surg Staff:  Pt seen and examined with NP  See full note above  Doing well with O2  Wound clean, will change to PSO, Adaptic daily    Floridalma Martinez

## 2019-04-03 NOTE — PROGRESS NOTES
Spoke with patient regarding home care. Patient agreeable to resume home care with [facility] @ discharge.

## 2019-04-03 NOTE — DISCHARGE INSTR - COC
Continuity of Care Form    Patient Name: Michelet Payne   :  1950  MRN:  [id number]    Admit date:  2019  Discharge date:  2019    Code Status Order: Full Code   Advance Directives:   Advance Care Flowsheet Documentation     Date/Time Healthcare Directive Type of Healthcare Directive Copy in [address] Agent's Name Healthcare Agent's Phone Number    [phone]  Yes, patient has an advance directive for healthcare treatment  Durable power of  for health care;Living will  Yes, copy in chart  --  Inés Humphrey  --          Admitting Physician:  Nenita Lacey MD  PCP: Kate Parra MD    Discharging Nurse: [facility] Unit/Room#: 0HY-7272/8399-83  Discharging Unit Phone Number: [phone]    Emergency Contact:   Extended Emergency Contact Information  Primary Emergency Contact: Haydee Simonnash [address] Phone: [phone]  Relation: Brother/Sister  Secondary Emergency Contact: Leonid Pratima [address] Phone: [phone]  Relation: Domestic Partner    Past Surgical History:  Past Surgical History:   Procedure Laterality Date    ABDOMEN SURGERY      BACK SURGERY      shunt to drain CSF    BIOPSY SHOULDER Left 2019    EXCISION OF LEFT SHOULDER MASS performed by Mily Durham MD at [address] [place] [address]      crainotomy- remove fluid ? V-P SHUNT    CHOLECYSTECTOMY      COLON SURGERY      resection X2    COLONOSCOPY  11    BIOPSY AND POLYPECTOMY    COLONOSCOPY  2012    and ablation ERBE/APC    SHOULDER SURGERY      L        Immunization History:   Immunization History   Administered Date(s) Administered    DT 2010    Influenza Virus Vaccine 10/01/2012, 10/20/2015    Influenza Whole 10/01/2011    Influenza, High Dose (Fluzone 65 yrs and older) 10/06/2014, 10/19/2016, 2017, 2018    Pneumococcal 13-valent Conjugate (Amberly Heaps) 2015  Pneumococcal Conjugate 7-valent 02/07/2005, 10/01/2011    Pneumococcal Polysaccharide ([id number]) 06/15/2016    Zoster Live (Zostavax) 07/01/2013       Active Problems:  Patient Active Problem List   Diagnosis Code    Crohn disease ([facility] K50.90    Depression F32.9    Osteoarthritis M19.90    Lupus anticoagulant disorder ([facility] D68.62    Dysphagia R13.10    Syringomyelia ([facility] G95.0    Gastritis and gastroduodenitis K29.70, K29.90    Skin ulcer of shoulder ([facility] L98.499    Shortness of breath R06.02    Anemia D64.9    Centrilobular emphysema ([facility]) J43.2    Pulmonary fibrosis ([facility]) J84.10    Acute on chronic respiratory failure with hypoxemia ([facility]) J96.21    Hiatal hernia K44.9    COPD, severe ([facility]) J44.9    Alpha thalassemia minor D56.3    Acute on chronic respiratory failure with hypoxia ([facility]) J96.21    Bronchiectasis with acute exacerbation ([facility]) J47.1    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    Osteoporosis of multiple sites M81.0    Gastroesophageal reflux disease without esophagitis K21.9    Sepsis ([facility]) A41.9    Bilateral pulmonary embolism ([facility]) I26.99    Acute deep vein thrombosis (DVT) of distal vein of both lower extremities ([facility]) I82.4Z3    History of pulmonary embolism Z86.711    SOB (shortness of breath) R06.02    Wound of left shoulder S41.002A    Hx pulmonary embolism Z86.711    HCAP (healthcare-associated pneumonia) J18.9       Isolation/Infection:   Isolation          C Diff Contact            Nurse Assessment:  Last Vital Signs: /68   Pulse 106   Temp 97.7 °F (36.5 °C) (Oral)   Resp 18   Ht 5' (1.524 m)   Wt 133 lb 4.8 oz (60.5 kg)   SpO2 100%   BMI 26.03 kg/m²     Last documented pain score (0-10 scale): Pain Level: 8  Last Weight:   Wt Readings from Last 1 Encounters:   04/03/19 133 lb 4.8 oz (60.5 kg)     Mental Status:  oriented and alert    IV Access:  - None    Nursing Mobility/ADLs:  Walking   Independent  Transfer  Independent  Bathing Independent  Dressing  Independent  Toileting  Independent  Feeding  Independent  Med Admin  Independent  Med Delivery   whole    Wound Care Documentation and Therapy:        Elimination:  Continence:   · Bowel: Yes  · Bladder: Yes  Urinary Catheter: None   Colostomy/Ileostomy/Ileal Conduit: No       Date of Last BM: 04/05/2019  No intake or output data in the 24 hours ending 04/03/19 0836  No intake/output data recorded. Safety Concerns:     None    Impairments/Disabilities:      None    Nutrition Therapy:  Current Nutrition Therapy:   - Oral Diet:  General    Routes of Feeding: Oral  Liquids: No Restrictions  Daily Fluid Restriction: no  Last Modified Barium Swallow with Video (Video Swallowing Test): not done    Treatments at the Time of Hospital Discharge:   Oxygen Therapy:  is on oxygen at 2 L/min per nasal cannula. Ventilator:    - No ventilator support      Patient's personal belongings (please select all that are sent with patient):  please see belongings list    RN SIGNATURE:  Electronically signed by Christ Winkler RN on 4/6/19 at 1:47 PM    CASE MANAGEMENT/SOCIAL WORK SECTION    Inpatient Status Date: 04/02/19    Readmission Risk Assessment Score:  Readmission Risk              Risk of Unplanned Readmission:        27         Discharging to Facility/ Agency   Name: Elda Rosado will call for Appointment  Phone: [phone]  Fax: [phone]    / signature: Electronically signed by NAEEM Adam on 4/3/2019 at 4:02 PM      PHYSICIAN SECTION    Prognosis: Fair    Condition at Discharge: Stable    Rehab Potential (if transferring to Rehab): Fair    Recommended Labs or Other Treatments After Discharge: none    Physician Certification: I certify the above information and transfer of Jodi Collet  is necessary for the continuing treatment of the diagnosis listed and that she requires Home Care for less 30 days.      Update Admission H&P: No change in H&P    PHYSICIAN SIGNATURE:  Electronically signed by Rizwana Josue MD on 4/6/19 at 2:10 PM

## 2019-04-03 NOTE — PROGRESS NOTES
[address] [phone] was active with this pt prior to admit. Discharge planner notified. Will follow.
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
## 2019-04-04 LAB
ANION GAP SERPL CALCULATED.3IONS-SCNC: 14 MMOL/L (ref 3–16)
ANISOCYTOSIS: ABNORMAL
BASOPHILS ABSOLUTE: 0 K/UL (ref 0–0.2)
BASOPHILS RELATIVE PERCENT: 0 %
BUN BLDV-MCNC: 26 MG/DL (ref 7–20)
CALCIUM SERPL-MCNC: 7.9 MG/DL (ref 8.3–10.6)
CHLORIDE BLD-SCNC: 112 MMOL/L (ref 99–110)
CO2: 17 MMOL/L (ref 21–32)
CREAT SERPL-MCNC: 2.3 MG/DL (ref 0.6–1.2)
DOHLE BODIES: PRESENT
EOSINOPHILS ABSOLUTE: 0 K/UL (ref 0–0.6)
EOSINOPHILS RELATIVE PERCENT: 0 %
GFR AFRICAN AMERICAN: 26
GFR NON-AFRICAN AMERICAN: 21
GLUCOSE BLD-MCNC: 123 MG/DL (ref 70–99)
HCT VFR BLD CALC: 25.5 % (ref 36–48)
HEMATOLOGY PATH CONSULT: NO
HEMOGLOBIN: 8 G/DL (ref 12–16)
HYPOCHROMIA: ABNORMAL
L. PNEUMOPHILA SEROGP 1 UR AG: NORMAL
LYMPHOCYTES ABSOLUTE: 0.7 K/UL (ref 1–5.1)
LYMPHOCYTES RELATIVE PERCENT: 4 %
MCH RBC QN AUTO: 19.7 PG (ref 26–34)
MCHC RBC AUTO-ENTMCNC: 31.5 G/DL (ref 31–36)
MCV RBC AUTO: 62.4 FL (ref 80–100)
MICROCYTES: ABNORMAL
MONOCYTES ABSOLUTE: 0.3 K/UL (ref 0–1.3)
MONOCYTES RELATIVE PERCENT: 2 %
NEUTROPHILS ABSOLUTE: 16.3 K/UL (ref 1.7–7.7)
NEUTROPHILS RELATIVE PERCENT: 94 %
PDW BLD-RTO: 17.2 % (ref 12.4–15.4)
PLATELET # BLD: 397 K/UL (ref 135–450)
PLATELET SLIDE REVIEW: ADEQUATE
PMV BLD AUTO: 7.6 FL (ref 5–10.5)
POTASSIUM SERPL-SCNC: 3.8 MMOL/L (ref 3.5–5.1)
RBC # BLD: 4.08 M/UL (ref 4–5.2)
SCHISTOCYTES: ABNORMAL
SLIDE REVIEW: ABNORMAL
SODIUM BLD-SCNC: 143 MMOL/L (ref 136–145)
STREP PNEUMONIAE ANTIGEN, URINE: NORMAL
TARGET CELLS: ABNORMAL
TEAR DROP CELLS: ABNORMAL
TOXIC GRANULATION: PRESENT
VANCOMYCIN TROUGH: 31.1 UG/ML (ref 10–20)
WBC # BLD: 17.3 K/UL (ref 4–11)

## 2019-04-04 PROCEDURE — 36415 COLL VENOUS BLD VENIPUNCTURE: CPT

## 2019-04-04 PROCEDURE — 2580000003 HC RX 258: Performed by: INTERNAL MEDICINE

## 2019-04-04 PROCEDURE — 80048 BASIC METABOLIC PNL TOTAL CA: CPT

## 2019-04-04 PROCEDURE — 6370000000 HC RX 637 (ALT 250 FOR IP): Performed by: INTERNAL MEDICINE

## 2019-04-04 PROCEDURE — 6360000002 HC RX W HCPCS: Performed by: INTERNAL MEDICINE

## 2019-04-04 PROCEDURE — 94760 N-INVAS EAR/PLS OXIMETRY 1: CPT

## 2019-04-04 PROCEDURE — 85025 COMPLETE CBC W/AUTO DIFF WBC: CPT

## 2019-04-04 PROCEDURE — 99231 SBSQ HOSP IP/OBS SF/LOW 25: CPT | Performed by: SURGERY

## 2019-04-04 PROCEDURE — 80202 ASSAY OF VANCOMYCIN: CPT

## 2019-04-04 PROCEDURE — 97116 GAIT TRAINING THERAPY: CPT

## 2019-04-04 PROCEDURE — 2500000003 HC RX 250 WO HCPCS: Performed by: INTERNAL MEDICINE

## 2019-04-04 PROCEDURE — 94640 AIRWAY INHALATION TREATMENT: CPT

## 2019-04-04 PROCEDURE — 97530 THERAPEUTIC ACTIVITIES: CPT

## 2019-04-04 PROCEDURE — 1200000000 HC SEMI PRIVATE

## 2019-04-04 PROCEDURE — 2700000000 HC OXYGEN THERAPY PER DAY

## 2019-04-04 RX ORDER — VANCOMYCIN HYDROCHLORIDE 1 G/200ML
15 INJECTION, SOLUTION INTRAVENOUS EVERY 24 HOURS
Status: DISCONTINUED | OUTPATIENT
Start: 2019-04-05 | End: 2019-04-05

## 2019-04-04 RX ORDER — FUROSEMIDE 10 MG/ML
20 INJECTION INTRAMUSCULAR; INTRAVENOUS ONCE
Status: COMPLETED | OUTPATIENT
Start: 2019-04-04 | End: 2019-04-05

## 2019-04-04 RX ADMIN — Medication 10 ML: at 04:09

## 2019-04-04 RX ADMIN — SODIUM BICARBONATE: 84 INJECTION, SOLUTION INTRAVENOUS at 07:14

## 2019-04-04 RX ADMIN — Medication 2 PUFF: at 21:20

## 2019-04-04 RX ADMIN — IPRATROPIUM BROMIDE AND ALBUTEROL SULFATE 1 AMPULE: .5; 3 SOLUTION RESPIRATORY (INHALATION) at 21:20

## 2019-04-04 RX ADMIN — VANCOMYCIN HYDROCHLORIDE 1000 MG: 1 INJECTION, SOLUTION INTRAVENOUS at 08:30

## 2019-04-04 RX ADMIN — GUAIFENESIN 600 MG: 600 TABLET, EXTENDED RELEASE ORAL at 21:56

## 2019-04-04 RX ADMIN — Medication 10 ML: at 21:58

## 2019-04-04 RX ADMIN — APIXABAN 5 MG: 5 TABLET, FILM COATED ORAL at 21:56

## 2019-04-04 RX ADMIN — Medication 10 ML: at 21:57

## 2019-04-04 RX ADMIN — IPRATROPIUM BROMIDE AND ALBUTEROL SULFATE 1 AMPULE: .5; 3 SOLUTION RESPIRATORY (INHALATION) at 12:00

## 2019-04-04 RX ADMIN — METHYLPREDNISOLONE SODIUM SUCCINATE 40 MG: 40 INJECTION, POWDER, FOR SOLUTION INTRAMUSCULAR; INTRAVENOUS at 08:23

## 2019-04-04 RX ADMIN — PANTOPRAZOLE SODIUM 40 MG: 40 TABLET, DELAYED RELEASE ORAL at 05:51

## 2019-04-04 RX ADMIN — METHYLPREDNISOLONE SODIUM SUCCINATE 40 MG: 40 INJECTION, POWDER, FOR SOLUTION INTRAMUSCULAR; INTRAVENOUS at 21:56

## 2019-04-04 RX ADMIN — Medication 10 ML: at 08:23

## 2019-04-04 RX ADMIN — IPRATROPIUM BROMIDE AND ALBUTEROL SULFATE 1 AMPULE: .5; 3 SOLUTION RESPIRATORY (INHALATION) at 08:36

## 2019-04-04 RX ADMIN — APIXABAN 5 MG: 5 TABLET, FILM COATED ORAL at 08:22

## 2019-04-04 RX ADMIN — VITAMIN D, TAB 1000IU (100/BT) 2000 UNITS: 25 TAB at 08:22

## 2019-04-04 RX ADMIN — METHYLPREDNISOLONE SODIUM SUCCINATE 40 MG: 40 INJECTION, POWDER, FOR SOLUTION INTRAMUSCULAR; INTRAVENOUS at 04:09

## 2019-04-04 RX ADMIN — CEFEPIME HYDROCHLORIDE 2 G: 2 INJECTION, POWDER, FOR SOLUTION INTRAVENOUS at 11:26

## 2019-04-04 RX ADMIN — AMITRIPTYLINE HYDROCHLORIDE 75 MG: 50 TABLET, FILM COATED ORAL at 21:56

## 2019-04-04 RX ADMIN — Medication 2 PUFF: at 08:35

## 2019-04-04 RX ADMIN — IPRATROPIUM BROMIDE AND ALBUTEROL SULFATE 1 AMPULE: .5; 3 SOLUTION RESPIRATORY (INHALATION) at 16:29

## 2019-04-04 RX ADMIN — GUAIFENESIN 600 MG: 600 TABLET, EXTENDED RELEASE ORAL at 08:22

## 2019-04-04 RX ADMIN — HYDROCORTISONE: 1 CREAM TOPICAL at 06:33

## 2019-04-04 RX ADMIN — BACITRACIN ZINC AND POLYMYXIN B SULFATE 28.3 G: at 08:17

## 2019-04-04 RX ADMIN — METHYLPREDNISOLONE SODIUM SUCCINATE 40 MG: 40 INJECTION, POWDER, FOR SOLUTION INTRAMUSCULAR; INTRAVENOUS at 16:02

## 2019-04-04 ASSESSMENT — PAIN SCALES - GENERAL
PAINLEVEL_OUTOF10: 0

## 2019-04-04 NOTE — PROGRESS NOTES
Bedside rounding completed with Pancho Feldman RN. Pt denies needs at this time. White board updated. Call light in hand and pt verbalizes correct use. All needs within reach. Will continue to monitor.  Electronically signed by Cali Alfred RN on 4/4/2019 at 7:14 AM

## 2019-04-04 NOTE — PROGRESS NOTES
Department of Internal Medicine  General Internal Medicine   Progress Note    CC: sob     SUBJECTIVE:   Pt is feeling better. SOB has improved. Bringing up some mucus. No new complaints.      OBJECTIVE      Medications    Current Facility-Administered Medications: [START ON 4/5/2019] vancomycin (VANCOCIN) 1000 mg in dextrose 5% 200 mL IVPB, 15 mg/kg, Intravenous, Q24H  bacitracin-polymyxin b (POLYSPORIN) ointment, , Topical, Daily  oxyCODONE (ROXICODONE) immediate release tablet 5 mg, 5 mg, Oral, Q4H PRN  sodium bicarbonate 75 mEq in sodium chloride 0.45 % 1,000 mL infusion, , Intravenous, Continuous  hydrocortisone 1 % cream, , Topical, BID PRN  hydrOXYzine (ATARAX) tablet 25 mg, 25 mg, Oral, TID PRN  albuterol sulfate  (90 Base) MCG/ACT inhaler 2 puff, 2 puff, Inhalation, Q6H PRN  mometasone-formoterol (DULERA) 100-5 MCG/ACT inhaler 2 puff, 2 puff, Inhalation, BID  tiZANidine (ZANAFLEX) tablet 2 mg, 2 mg, Oral, Q8H PRN  amitriptyline (ELAVIL) tablet 75 mg, 75 mg, Oral, Nightly  apixaban (ELIQUIS) tablet 5 mg, 5 mg, Oral, BID  guaiFENesin (MUCINEX) extended release tablet 600 mg, 600 mg, Oral, BID  pantoprazole (PROTONIX) tablet 40 mg, 40 mg, Oral, QAM AC  vitamin D (CHOLECALCIFEROL) tablet 2,000 Units, 2,000 Units, Oral, Daily  sodium chloride flush 0.9 % injection 10 mL, 10 mL, Intravenous, 2 times per day  sodium chloride flush 0.9 % injection 10 mL, 10 mL, Intravenous, PRN  magnesium hydroxide (MILK OF MAGNESIA) 400 MG/5ML suspension 30 mL, 30 mL, Oral, Daily PRN  ondansetron (ZOFRAN) injection 4 mg, 4 mg, Intravenous, Q6H PRN  cefepime (MAXIPIME) 2 g IVPB minibag, 2 g, Intravenous, Q24H  methylPREDNISolone sodium (SOLU-MEDROL) injection 40 mg, 40 mg, Intravenous, Q6H  ipratropium-albuterol (DUONEB) nebulizer solution 1 ampule, 1 ampule, Inhalation, Q4H WA  lidocaine PF 1 % injection 5 mL, 5 mL, Intradermal, Once  sodium chloride flush 0.9 % injection 10 mL, 10 mL, Intravenous, 2 times per day  sodium chloride flush 0.9 % injection 10 mL, 10 mL, Intravenous, PRN  acetaminophen (TYLENOL) tablet 650 mg, 650 mg, Oral, Q4H PRN  Physical    VITALS:  /65   Pulse 104   Temp 98.2 °F (36.8 °C) (Oral)   Resp 18   Ht 5' (1.524 m)   Wt 137 lb 12.8 oz (62.5 kg)   SpO2 98%   BMI 26.91 kg/m²   General appearance: No apparent distress appears stated age and cooperative. HEENT Normal cephalic, atraumatic without obvious deformity.  Pupils equal, round, and reactive to light.  Extra ocular muscles intact.  Conjunctivae/corneas clear. Neck: Supple, No jugular venous distention/bruits.  Trachea midline without thyromegaly or adenopathy with full range of motion. Lungs: Clear to auscultation, bilaterally without Rales/Wheezes/Rhonchi with good respiratory effort. Heart: Regular rate and rhythm with Normal S1/S2 without murmurs, rubs or gallops, point of maximum impulse non-displaced  Abdomen: Soft, non-tender or non-distended without rigidity or guarding and positive bowel sounds all four quadrants. Extremities: No clubbing, cyanosis, or edema bilaterally.  Full range of motion without deformity and normal gait intact. Skin: Skin color, texture, turgor normal.  No rashes or lesions. Neurologic: Alert and oriented X 3, neurovascularly intact with sensory/motor intact upper extremities/lower extremities, bilaterally.  Cranial nerves: II-XII intact, grossly non-focal.  Mental status: Alert, oriented, thought content appropriate.   Capillary Refill: Acceptable  < 3 seconds  Peripheral Pulses: +3 Easily felt, not easily obliterated with pressure     DATA:  CT Chest abdomen:  Bandlike and consolidative opacity in the lung bases may reflect atelectasis   or pneumonia.  There is no pleural effusion.  Background of emphysema.       No acute findings in the abdomen or pelvis.       Submucosal fat proliferation in the colon, which can be seen in setting of   obesity and chronic inflammatory bowel disease.  No pericolonic fat stranding   to suggest acute inflammation.         ASSESSMENT AND PLAN:       Acute hypoxic resp failure   Acute COPD exacerbation vs. HCAP  Cont nebs, steroids and abx  F/u blood cx, urine legionella and strep antigen      MARIS on CKD stage 3   Cr stable   Urine lytes ordered   Nephrology following      Hx of DVT  Cont eliquis      Muscle spams   Cont tizanidine      DVT Prophylaxis: eliquis  Diet: DIET GENERAL   Code Status: Full Code  PT/OT Eval Status: eval and treat

## 2019-04-04 NOTE — PROGRESS NOTES
Bedside report received from [place], [facility]. Pt sitting up in bed, SOB, as she just returned from bathroom. O2 increased to 3L for recovery. Family at bedside. No needs at this time. Call light within reach.   .Electronically signed by Amberly Valera RN on 4/4/2019 at 7:23 PM

## 2019-04-04 NOTE — PROGRESS NOTES
Nephrology Note          History of Present iIlness:    Tra Dow is a 80 y.o. female with h/o emphysema, CKD 3, HTN , Crohns ds , PE, peripheral neuropathy who was having difficulty breathing at home and called EMS. Got breathing treatment by EMS and then in Er. She felt slightly better . She has been admitted now for treatment of pneumonia. She has h/o crohns ds and watery diarrhea. No dysuria    Creatinine is elevated above her baseline   No nausea     On humira for crohns ds. Interval HX     Feels slightly better   No SOB   Good UO  Creat stable      Past Medical History:   Diagnosis Date    Bullous emphysema (HCC)     CKD (chronic kidney disease) stage 3, GFR 30-59 ml/min ([facility])     Clostridium difficile carrier 01/21/2019    COPD (chronic obstructive pulmonary disease) ([address]     PFT done 7 years ago   Carmela Cameron Crohn's disease ([address]     Crohn's disease    Depression 1/30/2011    pt states resolved as of 3-26-12    DVT (deep venous thrombosis) ([address]     2012; first ocurrence     Hiatal hernia     Hx of blood clots     Kidney disease     Kidney insufficiency     Osteoporosis     Peripheral neuropathy     neuropathy in legs    Pneumonia     Postmenopausal     LMP in  40's    Pulmonary embolism ([address]     2012; first ocurrence    Sepsis ([address]     Syringomyelia ([address]        Past Surgical History:   Procedure Laterality Date    ABDOMEN SURGERY      BACK SURGERY      shunt to drain CSF    BIOPSY SHOULDER Left 2/27/2019    EXCISION OF LEFT SHOULDER MASS performed by Fe Garcia MD at [address]      crainotomy- remove fluid ? V-P SHUNT    CHOLECYSTECTOMY      COLON SURGERY      resection X2    COLONOSCOPY  12/5/11    BIOPSY AND POLYPECTOMY    COLONOSCOPY  4-2-2012    and ablation ERBE/APC    SHOULDER SURGERY      L        Family History   Problem Relation Age of Onset    Heart Disease Mother     High Blood Pressure Mother     High Cholesterol Mother    Carmela Cameron Early Death Mother 36        MI    Heart Disease Father     High Blood Pressure Father     High Cholesterol Father     Stroke Father 79    High Blood Pressure Sister     High Blood Pressure Brother        Current Medications:      bacitracin-polymyxin b (POLYSPORIN) ointment Daily   oxyCODONE (ROXICODONE) immediate release tablet 5 mg Q4H PRN   sodium bicarbonate 75 mEq in sodium chloride 0.45 % 1,000 mL infusion Continuous   hydrocortisone 1 % cream BID PRN   hydrOXYzine (ATARAX) tablet 25 mg TID PRN   albuterol sulfate  (90 Base) MCG/ACT inhaler 2 puff Q6H PRN   mometasone-formoterol (DULERA) 100-5 MCG/ACT inhaler 2 puff BID   tiZANidine (ZANAFLEX) tablet 2 mg Q8H PRN   amitriptyline (ELAVIL) tablet 75 mg Nightly   apixaban (ELIQUIS) tablet 5 mg BID   guaiFENesin (MUCINEX) extended release tablet 600 mg BID   pantoprazole (PROTONIX) tablet 40 mg QAM AC   vitamin D (CHOLECALCIFEROL) tablet 2,000 Units Daily   sodium chloride flush 0.9 % injection 10 mL 2 times per day   sodium chloride flush 0.9 % injection 10 mL PRN   magnesium hydroxide (MILK OF MAGNESIA) 400 MG/5ML suspension 30 mL Daily PRN   ondansetron (ZOFRAN) injection 4 mg Q6H PRN   cefepime (MAXIPIME) 2 g IVPB minibag Q24H   vancomycin (VANCOCIN) 1000 mg in dextrose 5% 200 mL IVPB Q24H   methylPREDNISolone sodium (SOLU-MEDROL) injection 40 mg Q6H   ipratropium-albuterol (DUONEB) nebulizer solution 1 ampule Q4H WA   lidocaine PF 1 % injection 5 mL Once   sodium chloride flush 0.9 % injection 10 mL 2 times per day   sodium chloride flush 0.9 % injection 10 mL PRN   acetaminophen (TYLENOL) tablet 650 mg Q4H PRN         Physical exam:     Vitals:  BP (!) 149/84   Pulse 104   Temp 97.6 °F (36.4 °C) (Oral)   Resp 18   Ht 5' (1.524 m)   Wt 137 lb 12.8 oz (62.5 kg)   SpO2 98%   BMI 26.91 kg/m²   Constitutional:  OAA X3 NAD  Skin: no rash, turgor wnl  Heent:  eomi, mmm  Neck: no bruits or jvd noted  Cardiovascular:  S1, S2 without m/r/g  Respiratory: CTA B without w/r/r  Abdomen:  +bs, soft, nt, nd  Ext: no lower extremity edema  Psychiatric: mood and affect appropriate  Musculoskeletal:  Rom, muscular strength intact    Labs:  CBC:   Recent Labs     04/02/19  0611 04/03/19  0546 04/04/19  0549   WBC 15.7* 16.3* 17.3*   HGB 10.6* 8.1* 8.0*    355 397     BMP:    Recent Labs     04/02/19  0610 04/02/19  1248 04/03/19  0546 04/04/19  0549     --  143 143   K 3.1* 3.4* 3.7 3.8     --  113* 112*   CO2 12*  --  14* 17*   BUN 19  --  24* 26*   CREATININE 2.0*  --  2.2* 2.3*   GLUCOSE 110*  --  120* 123*     Ca/Mg/Phos:   Recent Labs     04/02/19  0610 04/03/19  0546 04/04/19  0549   CALCIUM 7.9* 7.6* 7.9*   MG 1.90  --   --      Hepatic:   Recent Labs     04/02/19  0610   AST 10*   ALT 8*   BILITOT 0.4   ALKPHOS 88     Troponin:   Recent Labs     04/02/19  0610   TROPONINI <0.01     BNP: No results for input(s): BNP in the last 72 hours. Lipids: No results for input(s): CHOL, TRIG, HDL, LDLCALC, LABVLDL in the last 72 hours. ABGs: No results for input(s): PHART, PO2ART, [id number] in the last 72 hours. INR:   Recent Labs     04/02/19  0630   INR 1.32*     UA:No results for input(s): Keshav Paradise, GLUCOSEU, BILIRUBINUR, José Miguel Caity, BLOODU, PHUR, PROTEINU, UROBILINOGEN, NITRU, LEUKOCYTESUR, Kendell Dimple in the last 72 hours. Urine Microscopic: No results for input(s): LABCAST, BACTERIA, COMU, HYALCAST, WBCUA, RBCUA, EPIU in the last 72 hours. Urine Culture: No results for input(s): LABURIN in the last 72 hours. Urine Chemistry: No results for input(s): Pedro Mote, PROTEINUR, NAUR in the last 72 hours. IMAGING:  CT CHEST ABDOMEN PELVIS WO CONTRAST   Final Result   Bandlike and consolidative opacity in the lung bases may reflect atelectasis   or pneumonia. There is no pleural effusion. Background of emphysema. No acute findings in the abdomen or pelvis.       Submucosal fat proliferation in the colon, which can be seen in setting of   obesity and chronic inflammatory bowel disease. No pericolonic fat stranding   to suggest acute inflammation. XR CHEST PORTABLE   Final Result   No acute cardiopulmonary disease or significant interval change from prior   study 01/20/2019. I/O last 3 completed shifts: In: 697 [I.V.:697]  Out: -       Assessment/Plan :      1. MARIS on CKD stage 3  Creatinine stable   MARIS likely ATN 2/2 hemodynamic changes    BP better now   Maintain SBP > 100 mm HG. Will get urine studies       2. Anion gap acidosis . HCO level improved    Will add bicarb drip. 3.  crohns ds. Has chronic diarrhea     4.  Pneumonia/ COPD. abx per primary attending     Monitor electrolytes and replace as needed                Thank you for allowing us to participate in care of Snow Abrams         Electronically signed by: Thuy Robin MD, 4/4/2019, 8:46 AM      [facility] [address]  Office : [phone]  Fax :[phone]

## 2019-04-04 NOTE — PROGRESS NOTES
Head to toe assessment complete. Vital signs obtained. Pt resting in bed at this time. Drsg to left shoulder changed. Pt tolerated well. AM medication administered. Pt tolerated well. Pt denies pain. Call light in hand. Pt verbalizes correct use. Will continue to monitor.  Electronically signed by Erma Patel RN on 4/4/2019 at 8:41 AM

## 2019-04-04 NOTE — PROGRESS NOTES
Physical Therapy  Facility/Department: [address] ONCOLOGY  Daily Treatment Note  NAME: Bronson Aceves  : 1950  MRN: [id number]    Date of Service: 2019    Discharge Recommendations:    Bronson Aceves scored a 19/ on the AM-PAC short mobility form. Current research shows that an AM-PAC score of 18 or greater is typically associated with a discharge to the patient's home setting. Based on the patients AM-PAC score and their current functional mobility deficits, it is recommended that the patient have 2-3 sessions per week of Physical Therapy at d/c to increase the patients independence. HOME HEALTH CARE: LEVEL 1 STANDARD    - Initial home health evaluation to occur within 24-48 hours, in patient home   - Therapy to evaluate with goal of regaining prior level of functioning   - Therapy to evaluate if patient has [address] needs for personal care    PT Equipment Recommendations  Equipment Needed: Yes  Other: Reacher, shower chair    Patient Diagnosis(es): The primary encounter diagnosis was Shortness of breath. Diagnoses of COPD exacerbation ([address] MARIS (acute kidney injury) ([address] Hypokalemia, and Anemia, unspecified type were also pertinent to this visit. 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 has a past surgical history that includes shoulder surgery; back surgery; brain surgery; Colon surgery; Colonoscopy (11); Colonoscopy (2012); Cholecystectomy;  Abdomen surgery; and Biopsy shoulder (Left, returning to room, pt reported feeling SOB and wanting her O2 back on. After seated rest and with 2L of O2 back on, SpO2 was 96%. Stairs/Curb  Stairs?: No     Balance  Posture: Good  Sitting - Static: Good  Sitting - Dynamic: Good  Standing - Static: Good  Standing - Dynamic: Good;-  Exercises  Hip Flexion: x10; given handout and instructed to do throughout day   Knee Long Arc Quad: given handout and instructed to do throughout day   Ankle Pumps: given handout and instructed to do throughout day   Comments: x10 scapular squeezes: given handout and instructed to do throughout day; pillow hip adduction squeezes: given handout and instructed to do throughout day                         Assessment   Body structures, Functions, Activity limitations: Decreased balance;Decreased endurance;Decreased strength  Assessment: Pt presented with improved tolerance for exercise today. Pt still has decreased endurance and was very fatigued after ambulation and 2 exercises. Pt would continue to benefit from skilled physical therapy to continue to return to baseline function. Treatment Diagnosis: Decreased endurance   Prognosis: Good  Patient Education: reason for PT eval, discharge planning  REQUIRES PT FOLLOW UP: Yes  Activity Tolerance  Activity Tolerance: Patient Tolerated treatment well;Patient limited by fatigue       AM-PAC Score  AM-PAC Inpatient Mobility Raw Score : 19  AM-PAC Inpatient T-Scale Score : 45.44  Mobility Inpatient CMS 0-100% Score: 41.77  Mobility Inpatient CMS G-Code Modifier : CK          Goals  Short term goals  Time Frame for Short term goals: To achieve by discharge   Short term goal 1: Pt will complete bed mobility with mod I  Short term goal 2: Pt will transfer with mod I  Short term goal 3: Pt will ambulate 100 ft with LRAD with SBA: goal met 4/4  Short term goal 4: Pt will complete 1 flight of stairs with SBA  Short term goal 5: Pt to ambulate 150 ft with no AD and supervision.    Patient Goals Patient goals : To return home   **1 goal met this date    Plan    Plan  Times per week: 3-5x/wk  Times per day: Daily  Current Treatment Recommendations: Strengthening, ROM, Balance Training, Functional Mobility Training, Transfer Training, Endurance Training, Gait Training, Stair training  Safety Devices  Type of devices: All fall risk precautions in place, Left in chair, Call light within reach(Pt refused chair alarm; it was not placed upon arrival)  Restraints  Initially in place: No     Therapy Time   Individual Concurrent Group Co-treatment   Time In 1336         Time Out 1359         Minutes 23         Timed Code Treatment Minutes: [place], SPT  PT providing direct supervision during session and assisting in making skilled judgements throughout session.   Xiomy Beavers, PT, DPT, [id number]

## 2019-04-04 NOTE — PROGRESS NOTES
04/02/19  1248 04/03/19  0546 04/04/19  0549     --  143 143   K 3.1* 3.4* 3.7 3.8     --  113* 112*   CO2 12*  --  14* 17*   BUN 19  --  24* 26*   CREATININE 2.0*  --  2.2* 2.3*   GLUCOSE 110*  --  120* 123*     Hepatic:   Recent Labs     04/02/19  0610   AST 10*   ALT 8*   BILITOT 0.4   ALKPHOS 88     Amylase: No results for input(s): AMYLASE in the last 72 hours. Lipase: No results for input(s): LIPASE in the last 72 hours. Mag:      Recent Labs     04/02/19  0610   MG 1.90      Phos:   No results for input(s): PHOS in the last 72 hours. Coags:   Recent Labs     04/02/19  0630   INR 1.32*   APTT 33.2       Cultures:  Anaerobic culture  No results for input(s): LABANAE in the last 72 hours. Blood culture  Recent Labs     04/02/19  1000   BC No Growth to date. Any change in status will be called. Blood culture 2  Recent Labs     04/02/19  1018   BLOODCULT2 No Growth to date. Any change in status will be called. Body fluid culture  Recent Labs     04/02/19  1018   BLOODCULT2 No Growth to date. Any change in status will be called. Surgical culture  No results for input(s): CXSURG in the last 72 hours. Fecal occult  No results for input(s): OCCULTBLDFEC in the last 72 hours. Gram stain  No results for input(s): LABGRAM in the last 72 hours. Stool culture 1  No results for input(s): CXST in the last 72 hours. Stool culture 2  No results for input(s): STOOLCULT2 in the last 72 hours. Urine culture  No results for input(s): LABURIN in the last 72 hours. Wound abscess  No results for input(s): WNDABS in the last 72 hours. Pathology:  NA    Imaging:  I have personally reviewed the following films:    Xr Chest Portable    Result Date: 4/2/2019  EXAMINATION: SINGLE XRAY VIEW OF THE CHEST 4/2/2019 5:25 am COMPARISON: Frontal and lateral views of the chest 01/20/2019. CT thorax 02/11/2019.  HISTORY: ORDERING SYSTEM PROVIDED HISTORY: SOB TECHNOLOGIST PROVIDED HISTORY: Reason for exam:->SOB FINDINGS: The cardiopericardial silhouette is stable in size and configuration. Calcified mediastinal lymph nodes are redemonstrated. The lungs are again noted to be emphysematous without pneumothorax, pleural effusion or new focal airspace opacity. No acute cardiopulmonary disease or significant interval change from prior study 01/20/2019. Ct Chest Abdomen Pelvis Wo Contrast    Result Date: 4/2/2019  EXAMINATION: CT OF THE CHEST, ABDOMEN, AND PELVIS WITHOUT CONTRAST 4/2/2019 7:39 am TECHNIQUE: CT of the chest, abdomen and pelvis was performed without the administration of intravenous contrast. Multiplanar reformatted images are provided for review. Dose modulation, iterative reconstruction, and/or weight based adjustment of the mA/kV was utilized to reduce the radiation dose to as low as reasonably achievable. COMPARISON: CT chest 02/11/2019. CT abdomen and pelvis 02/02/2011. HISTORY: ORDERING SYSTEM PROVIDED HISTORY: SOB with hypoxia, low back and LLQ abd pain TECHNOLOGIST PROVIDED HISTORY: No IV contrast due to renal function Reason for exam:->SOB with hypoxia, low back and LLQ abd pain Additional Contrast?->None Ordering Physician Provided Reason for Exam: SOB with hypoxia, low back and LLQ abd pain Acuity: Unknown Type of Exam: Unknown FINDINGS: Chest: Mediastinum: The thoracic aorta is normal caliber. There is no cardiomegaly or pericardial effusion. There is a small sliding-type hiatal hernia. The esophagus is otherwise grossly unremarkable. Calcified lymph nodes consistent with healed granulomatous disease. Lungs/pleura: The central airways are patent. There is mild basilar bronchiectasis. Extensive emphysema. Patchy bandlike and consolidative opacity in the lung bases. There is no pleural effusion. Soft Tissues/Bones: Unchanged abandoned catheter within the thoracic spinal canal.  There are no suspicious osseous lesions.  Abdomen/Pelvis: Organs: Evaluation of the intra-abdominal solid and hollow viscera is limited without IV contrast. No focal liver lesion is visible. Mild biliary ductal dilation. Previous cholecystectomy. There is no splenomegaly. The adrenal glands are unremarkable without evidence of mass. The pancreas is grossly unremarkable in noncontrast appearance. There is no hydronephrosis, hydroureter, or evidence of urolithiasis. The urinary bladder is grossly unremarkable. GI/Bowel: Submucosal fat proliferation within the colon. Previous right colonic resection. Small sliding-type hiatal hernia. There is no small bowel dilation or evidence of obstruction. Pelvis: The ovaries are grossly unremarkable. The uterus appears surgically absent. There is no evidence of pelvic free fluid. There is no pelvic adenopathy. Peritoneum/Retroperitoneum: There is no ascites. There is no adenopathy. There is no pneumoperitoneum. The abdominal aorta is normal caliber and without evidence of aneurysm. Bones/Soft Tissues: Diffuse osteopenia. Bandlike and consolidative opacity in the lung bases may reflect atelectasis or pneumonia. There is no pleural effusion. Background of emphysema. No acute findings in the abdomen or pelvis. Submucosal fat proliferation in the colon, which can be seen in setting of obesity and chronic inflammatory bowel disease. No pericolonic fat stranding to suggest acute inflammation.        Scheduled Meds:   [START ON 4/5/2019] vancomycin  15 mg/kg Intravenous Q24H    bacitracin-polymyxin b   Topical Daily    mometasone-formoterol  2 puff Inhalation BID    amitriptyline  75 mg Oral Nightly    apixaban  5 mg Oral BID    guaiFENesin  600 mg Oral BID    pantoprazole  40 mg Oral QAM AC    vitamin D  2,000 Units Oral Daily    sodium chloride flush  10 mL Intravenous 2 times per day    cefepime  2 g Intravenous Q24H    methylPREDNISolone  40 mg Intravenous Q6H    ipratropium-albuterol  1 ampule Inhalation Q4H WA    lidocaine 1 % injection

## 2019-04-04 NOTE — PROGRESS NOTES
Message sent to hospitalist to request spiriva be added as pt takes this at home and is requesting it to be added.   .Electronically signed by Tobi Vernon RN on 4/3/2019 at 9:16 PM

## 2019-04-04 NOTE — PROGRESS NOTES
Informed pharmacy of elevated vanc trough.  Electronically signed by Asiya Hernandez RN on 4/4/2019 at 11:29 AM

## 2019-04-05 LAB
ANION GAP SERPL CALCULATED.3IONS-SCNC: 15 MMOL/L (ref 3–16)
ANISOCYTOSIS: ABNORMAL
BANDED NEUTROPHILS RELATIVE PERCENT: 3 % (ref 0–7)
BASOPHILS ABSOLUTE: 0 K/UL (ref 0–0.2)
BASOPHILS RELATIVE PERCENT: 0 %
BUN BLDV-MCNC: 27 MG/DL (ref 7–20)
CALCIUM SERPL-MCNC: 8 MG/DL (ref 8.3–10.6)
CHLORIDE BLD-SCNC: 104 MMOL/L (ref 99–110)
CO2: 20 MMOL/L (ref 21–32)
CREAT SERPL-MCNC: 2 MG/DL (ref 0.6–1.2)
DOHLE BODIES: PRESENT
EOSINOPHILS ABSOLUTE: 0 K/UL (ref 0–0.6)
EOSINOPHILS RELATIVE PERCENT: 0 %
GFR AFRICAN AMERICAN: 30
GFR NON-AFRICAN AMERICAN: 25
GLUCOSE BLD-MCNC: 117 MG/DL (ref 70–99)
HCT VFR BLD CALC: 26.1 % (ref 36–48)
HEMOGLOBIN: 8.3 G/DL (ref 12–16)
HYPOCHROMIA: ABNORMAL
LYMPHOCYTES ABSOLUTE: 2.4 K/UL (ref 1–5.1)
LYMPHOCYTES RELATIVE PERCENT: 13 %
MCH RBC QN AUTO: 19.6 PG (ref 26–34)
MCHC RBC AUTO-ENTMCNC: 31.7 G/DL (ref 31–36)
MCV RBC AUTO: 61.8 FL (ref 80–100)
MICROCYTES: ABNORMAL
MONOCYTES ABSOLUTE: 0.2 K/UL (ref 0–1.3)
MONOCYTES RELATIVE PERCENT: 1 %
MYELOCYTE PERCENT: 2 %
NEUTROPHILS ABSOLUTE: 15.9 K/UL (ref 1.7–7.7)
NEUTROPHILS RELATIVE PERCENT: 81 %
NUCLEATED RED BLOOD CELLS: 3 /100 WBC
PDW BLD-RTO: 17.1 % (ref 12.4–15.4)
PLATELET # BLD: 421 K/UL (ref 135–450)
PLATELET SLIDE REVIEW: ADEQUATE
PMV BLD AUTO: 7.5 FL (ref 5–10.5)
POTASSIUM SERPL-SCNC: 3.4 MMOL/L (ref 3.5–5.1)
RBC # BLD: 4.23 M/UL (ref 4–5.2)
SCHISTOCYTES: ABNORMAL
SLIDE REVIEW: ABNORMAL
SODIUM BLD-SCNC: 139 MMOL/L (ref 136–145)
TARGET CELLS: ABNORMAL
VANCOMYCIN TROUGH: 26.4 UG/ML (ref 10–20)
WBC # BLD: 18.5 K/UL (ref 4–11)

## 2019-04-05 PROCEDURE — 2580000003 HC RX 258: Performed by: INTERNAL MEDICINE

## 2019-04-05 PROCEDURE — 1200000000 HC SEMI PRIVATE

## 2019-04-05 PROCEDURE — 80202 ASSAY OF VANCOMYCIN: CPT

## 2019-04-05 PROCEDURE — 36415 COLL VENOUS BLD VENIPUNCTURE: CPT

## 2019-04-05 PROCEDURE — 6370000000 HC RX 637 (ALT 250 FOR IP): Performed by: INTERNAL MEDICINE

## 2019-04-05 PROCEDURE — 2580000003 HC RX 258

## 2019-04-05 PROCEDURE — 6360000002 HC RX W HCPCS: Performed by: INTERNAL MEDICINE

## 2019-04-05 PROCEDURE — 94760 N-INVAS EAR/PLS OXIMETRY 1: CPT

## 2019-04-05 PROCEDURE — 85025 COMPLETE CBC W/AUTO DIFF WBC: CPT

## 2019-04-05 PROCEDURE — 94640 AIRWAY INHALATION TREATMENT: CPT

## 2019-04-05 PROCEDURE — 2700000000 HC OXYGEN THERAPY PER DAY

## 2019-04-05 PROCEDURE — 80048 BASIC METABOLIC PNL TOTAL CA: CPT

## 2019-04-05 RX ORDER — PREDNISONE 20 MG/1
40 TABLET ORAL DAILY
Status: DISCONTINUED | OUTPATIENT
Start: 2019-04-05 | End: 2019-04-06 | Stop reason: HOSPADM

## 2019-04-05 RX ORDER — POTASSIUM CHLORIDE 20 MEQ/1
20 TABLET, EXTENDED RELEASE ORAL ONCE
Status: COMPLETED | OUTPATIENT
Start: 2019-04-05 | End: 2019-04-05

## 2019-04-05 RX ORDER — SODIUM CHLORIDE 9 MG/ML
INJECTION, SOLUTION INTRAVENOUS
Status: COMPLETED
Start: 2019-04-05 | End: 2019-04-05

## 2019-04-05 RX ADMIN — PREDNISONE 40 MG: 20 TABLET ORAL at 10:33

## 2019-04-05 RX ADMIN — SODIUM CHLORIDE: 9 INJECTION, SOLUTION INTRAVENOUS at 11:15

## 2019-04-05 RX ADMIN — OXYCODONE HYDROCHLORIDE 5 MG: 5 TABLET ORAL at 21:22

## 2019-04-05 RX ADMIN — Medication 10 ML: at 10:34

## 2019-04-05 RX ADMIN — METHYLPREDNISOLONE SODIUM SUCCINATE 40 MG: 40 INJECTION, POWDER, FOR SOLUTION INTRAMUSCULAR; INTRAVENOUS at 03:41

## 2019-04-05 RX ADMIN — VITAMIN D, TAB 1000IU (100/BT) 2000 UNITS: 25 TAB at 10:33

## 2019-04-05 RX ADMIN — GUAIFENESIN 600 MG: 600 TABLET, EXTENDED RELEASE ORAL at 21:22

## 2019-04-05 RX ADMIN — ACETAMINOPHEN 650 MG: 325 TABLET, FILM COATED ORAL at 09:15

## 2019-04-05 RX ADMIN — CEFEPIME HYDROCHLORIDE 2 G: 2 INJECTION, POWDER, FOR SOLUTION INTRAVENOUS at 10:34

## 2019-04-05 RX ADMIN — Medication 2 PUFF: at 19:27

## 2019-04-05 RX ADMIN — APIXABAN 5 MG: 5 TABLET, FILM COATED ORAL at 10:33

## 2019-04-05 RX ADMIN — IPRATROPIUM BROMIDE AND ALBUTEROL SULFATE 1 AMPULE: .5; 3 SOLUTION RESPIRATORY (INHALATION) at 15:12

## 2019-04-05 RX ADMIN — IPRATROPIUM BROMIDE AND ALBUTEROL SULFATE 1 AMPULE: .5; 3 SOLUTION RESPIRATORY (INHALATION) at 08:03

## 2019-04-05 RX ADMIN — APIXABAN 5 MG: 5 TABLET, FILM COATED ORAL at 21:22

## 2019-04-05 RX ADMIN — FUROSEMIDE 20 MG: 10 INJECTION, SOLUTION INTRAMUSCULAR; INTRAVENOUS at 00:55

## 2019-04-05 RX ADMIN — AMITRIPTYLINE HYDROCHLORIDE 75 MG: 50 TABLET, FILM COATED ORAL at 21:22

## 2019-04-05 RX ADMIN — Medication 1 DROP: at 17:51

## 2019-04-05 RX ADMIN — ACETAMINOPHEN 650 MG: 325 TABLET, FILM COATED ORAL at 03:47

## 2019-04-05 RX ADMIN — Medication 2 PUFF: at 08:03

## 2019-04-05 RX ADMIN — POTASSIUM CHLORIDE 20 MEQ: 1500 TABLET, EXTENDED RELEASE ORAL at 13:42

## 2019-04-05 RX ADMIN — Medication 10 ML: at 21:23

## 2019-04-05 RX ADMIN — PANTOPRAZOLE SODIUM 40 MG: 40 TABLET, DELAYED RELEASE ORAL at 05:24

## 2019-04-05 RX ADMIN — Medication 10 ML: at 00:56

## 2019-04-05 RX ADMIN — OXYCODONE HYDROCHLORIDE 5 MG: 5 TABLET ORAL at 13:42

## 2019-04-05 RX ADMIN — HYDROCORTISONE: 1 CREAM TOPICAL at 05:25

## 2019-04-05 RX ADMIN — IPRATROPIUM BROMIDE AND ALBUTEROL SULFATE 1 AMPULE: .5; 3 SOLUTION RESPIRATORY (INHALATION) at 19:27

## 2019-04-05 RX ADMIN — BACITRACIN ZINC AND POLYMYXIN B SULFATE 28.3 G: at 10:43

## 2019-04-05 RX ADMIN — GUAIFENESIN 600 MG: 600 TABLET, EXTENDED RELEASE ORAL at 10:33

## 2019-04-05 RX ADMIN — IPRATROPIUM BROMIDE AND ALBUTEROL SULFATE 1 AMPULE: .5; 3 SOLUTION RESPIRATORY (INHALATION) at 11:39

## 2019-04-05 ASSESSMENT — PAIN SCALES - GENERAL
PAINLEVEL_OUTOF10: 8
PAINLEVEL_OUTOF10: 8
PAINLEVEL_OUTOF10: 7
PAINLEVEL_OUTOF10: 8
PAINLEVEL_OUTOF10: 8
PAINLEVEL_OUTOF10: 0
PAINLEVEL_OUTOF10: 0

## 2019-04-05 NOTE — PROGRESS NOTES
Patient given oxycodone for pain. Potassium replaced per orders. Patient complaining of \"my eyes being dry and crusting a lot. \" Page sent to attending provider. No further needs expressed. Call light within reach. Will continue to monitor.

## 2019-04-05 NOTE — PROGRESS NOTES
Department of Internal Medicine  General Internal Medicine   Progress Note    CC: sob     SUBJECTIVE:   Pt is feeling better. SOB has improved. Bringing up some mucus. No new complaints.      OBJECTIVE      Medications    Current Facility-Administered Medications: predniSONE (DELTASONE) tablet 40 mg, 40 mg, Oral, Daily  vancomycin (VANCOCIN) intermittent dosing (placeholder), , Other, RX Placeholder  bacitracin-polymyxin b (POLYSPORIN) ointment, , Topical, Daily  oxyCODONE (ROXICODONE) immediate release tablet 5 mg, 5 mg, Oral, Q4H PRN  hydrocortisone 1 % cream, , Topical, BID PRN  hydrOXYzine (ATARAX) tablet 25 mg, 25 mg, Oral, TID PRN  albuterol sulfate  (90 Base) MCG/ACT inhaler 2 puff, 2 puff, Inhalation, Q6H PRN  mometasone-formoterol (DULERA) 100-5 MCG/ACT inhaler 2 puff, 2 puff, Inhalation, BID  tiZANidine (ZANAFLEX) tablet 2 mg, 2 mg, Oral, Q8H PRN  amitriptyline (ELAVIL) tablet 75 mg, 75 mg, Oral, Nightly  apixaban (ELIQUIS) tablet 5 mg, 5 mg, Oral, BID  guaiFENesin (MUCINEX) extended release tablet 600 mg, 600 mg, Oral, BID  pantoprazole (PROTONIX) tablet 40 mg, 40 mg, Oral, QAM AC  vitamin D (CHOLECALCIFEROL) tablet 2,000 Units, 2,000 Units, Oral, Daily  sodium chloride flush 0.9 % injection 10 mL, 10 mL, Intravenous, 2 times per day  sodium chloride flush 0.9 % injection 10 mL, 10 mL, Intravenous, PRN  magnesium hydroxide (MILK OF MAGNESIA) 400 MG/5ML suspension 30 mL, 30 mL, Oral, Daily PRN  ondansetron (ZOFRAN) injection 4 mg, 4 mg, Intravenous, Q6H PRN  cefepime (MAXIPIME) 2 g IVPB minibag, 2 g, Intravenous, Q24H  ipratropium-albuterol (DUONEB) nebulizer solution 1 ampule, 1 ampule, Inhalation, Q4H WA  lidocaine PF 1 % injection 5 mL, 5 mL, Intradermal, Once  sodium chloride flush 0.9 % injection 10 mL, 10 mL, Intravenous, 2 times per day  sodium chloride flush 0.9 % injection 10 mL, 10 mL, Intravenous, PRN  acetaminophen (TYLENOL) tablet 650 mg, 650 mg, Oral, Q4H PRN  Physical    VITALS:  BP nebs and abx  Switch iv solumedrol to po prednisone today   F/u blood cx, urine legionella and strep antigen      MARIS on CKD stage 3   Cr stable   Urine lytes ordered   Nephrology following     Elevated Bp  Discussed with Dr. Ana Rosa Randhawa. Might be 2/2 solumedrol.  Will monitor on po prednisone today  Consider starting antihypertensive tomorrow if continues to be high      Hx of DVT  Cont eliquis      Muscle spams   Cont tizanidine     Sepsis present on admission  Now resolved, suspected causative organism unknown at this point     DVT Prophylaxis: eliquis  Diet: DIET GENERAL   Code Status: Full Code  PT/OT Eval Status: eval and treat

## 2019-04-05 NOTE — PROGRESS NOTES
Routine VSS. Visitor at bedside. Scheduled medications administered. Dinner set up for patient. No further needs expressed. Call light within reach. Will continue to monitor.

## 2019-04-05 NOTE — PROGRESS NOTES
Nephrology Note          History of Present iIlness:    Israel Chand is a 80 y.o. female with h/o emphysema, CKD 3, HTN , Crohns ds , PE, peripheral neuropathy who was having difficulty breathing at home and called EMS. Got breathing treatment by EMS and then in Er. She felt slightly better . She has been admitted now for treatment of pneumonia. She has h/o crohns ds and watery diarrhea. No dysuria    Creatinine is elevated above her baseline   No nausea     On humira for crohns ds. Interval HX     last night had more SOB   Gave lasix 20 mg iv x 1 last night   No SOB now   BP is elevated   Good UO  Creatinine better       Past Medical History:   Diagnosis Date    Bullous emphysema (HCC)     CKD (chronic kidney disease) stage 3, GFR 30-59 ml/min ([facility])     Clostridium difficile carrier 01/21/2019    COPD (chronic obstructive pulmonary disease) ([address]     PFT done 7 years ago   [facility] Crohn's disease ([address]     Crohn's disease    Depression 1/30/2011    pt states resolved as of 3-26-12    DVT (deep venous thrombosis) ([address]     2012; first ocurrence     Hiatal hernia     Hx of blood clots     Kidney disease     Kidney insufficiency     Osteoporosis     Peripheral neuropathy     neuropathy in legs    Pneumonia     Postmenopausal     LMP in  40's    Pulmonary embolism ([address]     2012; first ocurrence    Sepsis ([address]     Syringomyelia ([address]        Past Surgical History:   Procedure Laterality Date    ABDOMEN SURGERY      BACK SURGERY      shunt to drain CSF    BIOPSY SHOULDER Left 2/27/2019    EXCISION OF LEFT SHOULDER MASS performed by Cory Brock MD at [address]      crainotomy- remove fluid ? V-P SHUNT    CHOLECYSTECTOMY      COLON SURGERY      resection X2    COLONOSCOPY  12/5/11    BIOPSY AND POLYPECTOMY    COLONOSCOPY  4-2-2012    and ablation ERBE/APC    SHOULDER SURGERY      L        Family History   Problem Relation Age of Onset    Heart Disease Mother  High Blood Pressure Mother     High Cholesterol Mother     Early Death Mother 36        MI    Heart Disease Father     High Blood Pressure Father     High Cholesterol Father     Stroke Father 79    High Blood Pressure Sister     High Blood Pressure Brother        Current Medications:      predniSONE (DELTASONE) tablet 40 mg Daily   vancomycin (VANCOCIN) 1000 mg in dextrose 5% 200 mL IVPB Q24H   bacitracin-polymyxin b (POLYSPORIN) ointment Daily   oxyCODONE (ROXICODONE) immediate release tablet 5 mg Q4H PRN   hydrocortisone 1 % cream BID PRN   hydrOXYzine (ATARAX) tablet 25 mg TID PRN   albuterol sulfate  (90 Base) MCG/ACT inhaler 2 puff Q6H PRN   mometasone-formoterol (DULERA) 100-5 MCG/ACT inhaler 2 puff BID   tiZANidine (ZANAFLEX) tablet 2 mg Q8H PRN   amitriptyline (ELAVIL) tablet 75 mg Nightly   apixaban (ELIQUIS) tablet 5 mg BID   guaiFENesin (MUCINEX) extended release tablet 600 mg BID   pantoprazole (PROTONIX) tablet 40 mg QAM AC   vitamin D (CHOLECALCIFEROL) tablet 2,000 Units Daily   sodium chloride flush 0.9 % injection 10 mL 2 times per day   sodium chloride flush 0.9 % injection 10 mL PRN   magnesium hydroxide (MILK OF MAGNESIA) 400 MG/5ML suspension 30 mL Daily PRN   ondansetron (ZOFRAN) injection 4 mg Q6H PRN   cefepime (MAXIPIME) 2 g IVPB minibag Q24H   ipratropium-albuterol (DUONEB) nebulizer solution 1 ampule Q4H WA   lidocaine PF 1 % injection 5 mL Once   sodium chloride flush 0.9 % injection 10 mL 2 times per day   sodium chloride flush 0.9 % injection 10 mL PRN   acetaminophen (TYLENOL) tablet 650 mg Q4H PRN         Physical exam:     Vitals:  BP (!) 168/99   Pulse 93   Temp 97.8 °F (36.6 °C) (Oral)   Resp 16   Ht 5' (1.524 m)   Wt 138 lb 11.2 oz (62.9 kg)   SpO2 100%   BMI 27.09 kg/m²   Constitutional:  OAA X3 NAD  Skin: no rash, turgor wnl  Heent:  eomi, mmm  Neck: no bruits or jvd noted  Cardiovascular:  S1, S2 without m/r/g  Respiratory: CTA B without w/r/r  Abdomen: +bs, soft, nt, nd  Ext: no lower extremity edema  Psychiatric: mood and affect appropriate  Musculoskeletal:  Rom, muscular strength intact    Labs:  CBC:   Recent Labs     04/03/19  0546 04/04/19  0549 04/05/19  0613   WBC 16.3* 17.3* 18.5*   HGB 8.1* 8.0* 8.3*    397 421     BMP:    Recent Labs     04/03/19  0546 04/04/19  0549 04/05/19  0613    143 139   K 3.7 3.8 3.4*   * 112* 104   CO2 14* 17* 20*   BUN 24* 26* 27*   CREATININE 2.2* 2.3* 2.0*   GLUCOSE 120* 123* 117*     Ca/Mg/Phos:   Recent Labs     04/03/19  0546 04/04/19  0549 04/05/19  0613   CALCIUM 7.6* 7.9* 8.0*     Hepatic:   No results for input(s): AST, ALT, ALB, BILITOT, ALKPHOS in the last 72 hours. Troponin:   No results for input(s): TROPONINI in the last 72 hours. BNP: No results for input(s): BNP in the last 72 hours. Lipids: No results for input(s): CHOL, TRIG, HDL, LDLCALC, LABVLDL in the last 72 hours. ABGs: No results for input(s): PHART, PO2ART, KAZ3HQX in the last 72 hours. INR:   No results for input(s): INR in the last 72 hours. UA:No results for input(s): Marcine Leyland, GLUCOSEU, BILIRUBINUR, [place], BLOODU, PHUR, PROTEINU, UROBILINOGEN, NITRU, LEUKOCYTESUR, LABMICR, URINETYPE in the last 72 hours. Urine Microscopic: No results for input(s): LABCAST, BACTERIA, COMU, HYALCAST, WBCUA, RBCUA, EPIU in the last 72 hours. Urine Culture: No results for input(s): LABURIN in the last 72 hours. Urine Chemistry: No results for input(s): Edwin Gema, PROTEINUR, NAUR in the last 72 hours. IMAGING:  CT CHEST ABDOMEN PELVIS WO CONTRAST   Final Result   Bandlike and consolidative opacity in the lung bases may reflect atelectasis   or pneumonia. There is no pleural effusion. Background of emphysema. No acute findings in the abdomen or pelvis. Submucosal fat proliferation in the colon, which can be seen in setting of   obesity and chronic inflammatory bowel disease.   No pericolonic fat stranding   to suggest acute inflammation. XR CHEST PORTABLE   Final Result   No acute cardiopulmonary disease or significant interval change from prior   study 01/20/2019. I/O last 3 completed shifts: In: 8971 [P.O.:480; I.V.:881]  Out: 1400 [Urine:1400]      Assessment/Plan :      1. MARIS on CKD stage 3  Creatinine trended down. MARIS likely ATN 2/2 hemodynamic changes    BP better now   Maintain SBP > 100 mm HG. Will get urine studies       2. Anion gap acidosis . HCO level improved   Monitor     3.  crohns ds. Has chronic diarrhea     4. Pneumonia/ COPD. abx per primary attending     5. HTN. likely BP elevated from solumedrol. D/w Dr. Holly Bragg .  Consider decreasing dose of steroids       Monitor electrolytes and replace as needed                Thank you for allowing us to participate in care of Maxine Lemos         Electronically signed by: Rachell Parry MD, 4/5/2019, 9:47 AM      [facility] [address]  Office : [phone]  Fax :[phone]

## 2019-04-05 NOTE — PROGRESS NOTES
Pt BP very high this AM (181/113). Pt checked multiple times on same arm normally taken with same cuff. Pt BP checked manually and 180/115. Message sent to hospitalist.  [facility] said to give tylenol and check manually (tylenol given at 0345) and manual BP as above, 180/115. Message sent to hosp regarding manual BP and no new orders. Will continue to monitor.  .Electronically signed by Mounika Cameron RN on 4/5/2019 at 6:05 AM

## 2019-04-05 NOTE — PROGRESS NOTES
Patient asleep. Resting in bed, does not appear to be in distress. No further needs expressed. Call light within reach. Will continue to monitor.

## 2019-04-05 NOTE — PROGRESS NOTES
Pt reporting pain a 7/10 in back of her head, but refused oxycodone. Requesting tylenol. Tylenol administered. Patient resting in bed. Stable at this time. No further needs expressed. Call light within reach. Will continue to monitor.    Gown and new underwear given to patient per request.

## 2019-04-05 NOTE — PROGRESS NOTES
Bedside report completed with Pancho Feldman RN. Call light within reach,pt resting in bed. Oxygen in place. Does not appear to be in distress.

## 2019-04-05 NOTE — PROGRESS NOTES
Pt complaining of increased shortness of breath. B/P of 158/88. Spoke with nephro, Dr Mira Gonzalez. He ordered sodium bicarb stopped and 1 dose of 20 mg lasix. Will continue to monitor.

## 2019-04-05 NOTE — CONSULTS
Pharmacy dosing vancomycin for pneumonia. Patient currently receiving vancomycin 1000mg q24h. Trough 26.4. Dose held appropriately by RN. Hold vancomycin for now. Check random level tomorrow am labs. Resume vancomycin when appropriate, placeholder ordered.      Theresa SagastumeD, BCPS

## 2019-04-06 VITALS
WEIGHT: 136.6 LBS | HEART RATE: 115 BPM | DIASTOLIC BLOOD PRESSURE: 86 MMHG | RESPIRATION RATE: 20 BRPM | TEMPERATURE: 98.6 F | SYSTOLIC BLOOD PRESSURE: 136 MMHG | OXYGEN SATURATION: 95 % | BODY MASS INDEX: 26.82 KG/M2 | HEIGHT: 60 IN

## 2019-04-06 LAB
ANION GAP SERPL CALCULATED.3IONS-SCNC: 15 MMOL/L (ref 3–16)
ANISOCYTOSIS: ABNORMAL
BASOPHILS ABSOLUTE: 0 K/UL (ref 0–0.2)
BASOPHILS RELATIVE PERCENT: 0 %
BUN BLDV-MCNC: 28 MG/DL (ref 7–20)
CALCIUM SERPL-MCNC: 8 MG/DL (ref 8.3–10.6)
CHLORIDE BLD-SCNC: 104 MMOL/L (ref 99–110)
CO2: 23 MMOL/L (ref 21–32)
CREAT SERPL-MCNC: 1.8 MG/DL (ref 0.6–1.2)
DOHLE BODIES: PRESENT
EOSINOPHILS ABSOLUTE: 0 K/UL (ref 0–0.6)
EOSINOPHILS RELATIVE PERCENT: 0 %
GFR AFRICAN AMERICAN: 34
GFR NON-AFRICAN AMERICAN: 28
GLUCOSE BLD-MCNC: 84 MG/DL (ref 70–99)
HCT VFR BLD CALC: 27.3 % (ref 36–48)
HEMATOLOGY PATH CONSULT: NO
HEMOGLOBIN: 8.6 G/DL (ref 12–16)
HYPOCHROMIA: ABNORMAL
LYMPHOCYTES ABSOLUTE: 3 K/UL (ref 1–5.1)
LYMPHOCYTES RELATIVE PERCENT: 16 %
MCH RBC QN AUTO: 19.6 PG (ref 26–34)
MCHC RBC AUTO-ENTMCNC: 31.4 G/DL (ref 31–36)
MCV RBC AUTO: 62.4 FL (ref 80–100)
MICROCYTES: ABNORMAL
MONOCYTES ABSOLUTE: 2.1 K/UL (ref 0–1.3)
MONOCYTES RELATIVE PERCENT: 11 %
NEUTROPHILS ABSOLUTE: 13.7 K/UL (ref 1.7–7.7)
NEUTROPHILS RELATIVE PERCENT: 73 %
PDW BLD-RTO: 17.2 % (ref 12.4–15.4)
PLATELET # BLD: 413 K/UL (ref 135–450)
PLATELET SLIDE REVIEW: ADEQUATE
PMV BLD AUTO: 7.1 FL (ref 5–10.5)
POTASSIUM SERPL-SCNC: 3.8 MMOL/L (ref 3.5–5.1)
RBC # BLD: 4.38 M/UL (ref 4–5.2)
SCHISTOCYTES: ABNORMAL
SLIDE REVIEW: ABNORMAL
SODIUM BLD-SCNC: 142 MMOL/L (ref 136–145)
TARGET CELLS: ABNORMAL
TEAR DROP CELLS: ABNORMAL
VANCOMYCIN RANDOM: 17.8 UG/ML
WBC # BLD: 18.7 K/UL (ref 4–11)

## 2019-04-06 PROCEDURE — 6370000000 HC RX 637 (ALT 250 FOR IP): Performed by: INTERNAL MEDICINE

## 2019-04-06 PROCEDURE — 94640 AIRWAY INHALATION TREATMENT: CPT

## 2019-04-06 PROCEDURE — 6360000002 HC RX W HCPCS: Performed by: INTERNAL MEDICINE

## 2019-04-06 PROCEDURE — 94760 N-INVAS EAR/PLS OXIMETRY 1: CPT

## 2019-04-06 PROCEDURE — 85025 COMPLETE CBC W/AUTO DIFF WBC: CPT

## 2019-04-06 PROCEDURE — 2580000003 HC RX 258: Performed by: INTERNAL MEDICINE

## 2019-04-06 PROCEDURE — 80202 ASSAY OF VANCOMYCIN: CPT

## 2019-04-06 PROCEDURE — 80048 BASIC METABOLIC PNL TOTAL CA: CPT

## 2019-04-06 PROCEDURE — 2700000000 HC OXYGEN THERAPY PER DAY

## 2019-04-06 PROCEDURE — 36415 COLL VENOUS BLD VENIPUNCTURE: CPT

## 2019-04-06 RX ORDER — PREDNISONE 20 MG/1
40 TABLET ORAL DAILY
Qty: 8 TABLET | Refills: 0 | Status: SHIPPED | OUTPATIENT
Start: 2019-04-07 | End: 2019-04-11

## 2019-04-06 RX ORDER — FUROSEMIDE 20 MG/1
20 TABLET ORAL DAILY
Status: DISCONTINUED | OUTPATIENT
Start: 2019-04-06 | End: 2019-04-06

## 2019-04-06 RX ORDER — DIAPER,BRIEF,INFANT-TODD,DISP
EACH MISCELLANEOUS
Qty: 1 TUBE | Refills: 1 | Status: SHIPPED | OUTPATIENT
Start: 2019-04-06 | End: 2019-04-13

## 2019-04-06 RX ORDER — AMLODIPINE BESYLATE 5 MG/1
5 TABLET ORAL DAILY
Qty: 30 TABLET | Refills: 3 | Status: SHIPPED | OUTPATIENT
Start: 2019-04-07 | End: 2019-04-15 | Stop reason: SINTOL

## 2019-04-06 RX ORDER — BACITRACIN ZINC AND POLYMYXIN B SULFATE 500; 1000 [USP'U]/G; [USP'U]/G
OINTMENT TOPICAL
Qty: 1 TUBE | Refills: 1 | Status: SHIPPED | OUTPATIENT
Start: 2019-04-06 | End: 2019-04-13

## 2019-04-06 RX ORDER — AMLODIPINE BESYLATE 5 MG/1
5 TABLET ORAL DAILY
Status: DISCONTINUED | OUTPATIENT
Start: 2019-04-06 | End: 2019-04-06 | Stop reason: HOSPADM

## 2019-04-06 RX ORDER — LEVOFLOXACIN 500 MG/1
500 TABLET, FILM COATED ORAL DAILY
Qty: 7 TABLET | Refills: 0 | Status: SHIPPED | OUTPATIENT
Start: 2019-04-06 | End: 2019-04-11

## 2019-04-06 RX ORDER — ALENDRONATE SODIUM 70 MG/1
70 TABLET ORAL
Qty: 12 TABLET | Refills: 3 | Status: SHIPPED | OUTPATIENT
Start: 2019-04-06 | End: 2021-07-28 | Stop reason: SDUPTHER

## 2019-04-06 RX ADMIN — Medication 1 DROP: at 09:15

## 2019-04-06 RX ADMIN — CEFEPIME HYDROCHLORIDE 2 G: 2 INJECTION, POWDER, FOR SOLUTION INTRAVENOUS at 10:38

## 2019-04-06 RX ADMIN — Medication 1 DROP: at 13:05

## 2019-04-06 RX ADMIN — IPRATROPIUM BROMIDE AND ALBUTEROL SULFATE 1 AMPULE: .5; 3 SOLUTION RESPIRATORY (INHALATION) at 11:56

## 2019-04-06 RX ADMIN — VITAMIN D, TAB 1000IU (100/BT) 2000 UNITS: 25 TAB at 09:12

## 2019-04-06 RX ADMIN — PREDNISONE 40 MG: 20 TABLET ORAL at 09:11

## 2019-04-06 RX ADMIN — APIXABAN 5 MG: 5 TABLET, FILM COATED ORAL at 09:11

## 2019-04-06 RX ADMIN — Medication 2 PUFF: at 07:30

## 2019-04-06 RX ADMIN — BACITRACIN ZINC AND POLYMYXIN B SULFATE 28.3 G: at 13:05

## 2019-04-06 RX ADMIN — GUAIFENESIN 600 MG: 600 TABLET, EXTENDED RELEASE ORAL at 09:11

## 2019-04-06 RX ADMIN — AMLODIPINE BESYLATE 5 MG: 5 TABLET ORAL at 09:12

## 2019-04-06 RX ADMIN — Medication 10 ML: at 09:15

## 2019-04-06 RX ADMIN — VANCOMYCIN HYDROCHLORIDE 750 MG: 750 INJECTION, POWDER, LYOPHILIZED, FOR SOLUTION INTRAVENOUS at 13:05

## 2019-04-06 RX ADMIN — IPRATROPIUM BROMIDE AND ALBUTEROL SULFATE 1 AMPULE: .5; 3 SOLUTION RESPIRATORY (INHALATION) at 07:30

## 2019-04-06 RX ADMIN — Medication 10 ML: at 13:06

## 2019-04-06 NOTE — DISCHARGE SUMMARY
Neurovascularly intact without any focal sensory/motor deficits. Cranial nerves: II-XII intact, grossly non-focal.  Psychiatric:  Alert and oriented, thought content appropriate, normal insight  Capillary Refill: Brisk,< 3 seconds   Peripheral Pulses: +2 palpable, equal bilaterally       Labs: For convenience and continuity at follow-up the following most recent labs are provided:      CBC:    Lab Results   Component Value Date    WBC 18.7 04/06/2019    HGB 8.6 04/06/2019    HCT 27.3 04/06/2019     04/06/2019       Renal:    Lab Results   Component Value Date     04/06/2019    K 3.8 04/06/2019    K 3.7 04/03/2019     04/06/2019    CO2 23 04/06/2019    BUN 28 04/06/2019    CREATININE 1.8 04/06/2019    CALCIUM 8.0 04/06/2019    PHOS 3.4 11/17/2016         Significant Diagnostic Studies    Radiology:   CT CHEST ABDOMEN PELVIS WO CONTRAST   Final Result   Bandlike and consolidative opacity in the lung bases may reflect atelectasis   or pneumonia. There is no pleural effusion. Background of emphysema. No acute findings in the abdomen or pelvis. Submucosal fat proliferation in the colon, which can be seen in setting of   obesity and chronic inflammatory bowel disease. No pericolonic fat stranding   to suggest acute inflammation. XR CHEST PORTABLE   Final Result   No acute cardiopulmonary disease or significant interval change from prior   study 01/20/2019.                 Consults:     IP CONSULT TO HOSPITALIST  IP CONSULT TO PHARMACY  IP CONSULT TO GENERAL SURGERY  IP CONSULT TO SOCIAL WORK  IP CONSULT TO NEPHROLOGY  IP CONSULT TO NEPHROLOGY  IP CONSULT TO HOME CARE NEEDS    Disposition:  home    Condition at Discharge: Stable    Discharge Instructions/Follow-up:  PCP in one week     Code Status:  Full Code     Activity: activity as tolerated    Diet: cardiac diet      Discharge Medications:     Current Discharge Medication List           Details   amLODIPine (NORVASC) 5 MG tablet by Does not apply route Please dispense a Lift Chair  Qty: 1 each, Refills: 0      guaiFENesin (MUCINEX) 600 MG extended release tablet Take 1 tablet by mouth 2 times daily  Qty: 30 tablet, Refills: 0      Misc. Devices ([employer]) MISC 1 each by Does not apply route daily Please dispense a walker with a seat  Qty: 1 each, Refills: 0      Adalimumab (HUMIRA) 20 MG/0.4ML KIT Inject 1 vial into the skin every 14 days       tiZANidine (ZANAFLEX) 4 MG tablet Take 1 tablet by mouth 3 times daily  Qty: 30 tablet, Refills: 0    Associated Diagnoses: Strain of lumbar region, subsequent encounter             Time Spent on discharge is more than 24  in the examination, evaluation, counseling and review of medications and discharge plan. Signed:    Candida Lei MD   4/6/2019      Thank you Meet Willis MD for the opportunity to be involved in this patient's care. If you have any questions or concerns please feel free to contact me at [phone].

## 2019-04-06 NOTE — PROGRESS NOTES
Nephrology Note      Chief Complaint  Patient presents with  Shortness of Breath   EMS transported pt stating that pt has had trouble breathing today, did a breathing treatment today at 130 no relief, EMS states pt was 90% RA, other VSS. History of Present iIlness:    Tra Dow is a 80 y.o. female with h/o emphysema, CKD 3, HTN , Crohns ds , PE, peripheral neuropathy who was having difficulty breathing at home and called EMS. Got breathing treatment by EMS and then in Er. She felt slightly better . She has been admitted now for treatment of pneumonia. She has h/o crohns ds and watery diarrhea. No dysuria    Creatinine is elevated above her baseline   No nausea     On humira for crohns ds. Interval HX        No SOB now   BP is better controlled but still elevated   Good UO  Creatinine better       Past Medical History:   Diagnosis Date    Bullous emphysema (HCC)     CKD (chronic kidney disease) stage 3, GFR 30-59 ml/min [facility]     Clostridium difficile carrier 01/21/2019    COPD (chronic obstructive pulmonary disease) ([address]     PFT done 7 years ago   Carmela Cameron Crohn's disease ([address]     Crohn's disease    Depression 1/30/2011    pt states resolved as of 3-26-12    DVT (deep venous thrombosis) ([address]     2012; first ocurrence     Hiatal hernia     Hx of blood clots     Kidney disease     Kidney insufficiency     Osteoporosis     Peripheral neuropathy     neuropathy in legs    Pneumonia     Postmenopausal     LMP in  40's    Pulmonary embolism ([address]     2012; first ocurrence    Sepsis ([address]     Syringomyelia ([address]        Past Surgical History:   Procedure Laterality Date    ABDOMEN SURGERY      BACK SURGERY      shunt to drain CSF    BIOPSY SHOULDER Left 2/27/2019    EXCISION OF LEFT SHOULDER MASS performed by Fe Garcia MD at [address]      crainotomy- remove fluid ? V-P SHUNT    CHOLECYSTECTOMY      COLON SURGERY      resection X2    COLONOSCOPY 12/5/11    BIOPSY AND POLYPECTOMY    COLONOSCOPY  4-2-2012    and ablation ERBE/APC    SHOULDER SURGERY      L        Family History   Problem Relation Age of Onset    Heart Disease Mother     High Blood Pressure Mother     High Cholesterol Mother     Early Death Mother 36        MI    Heart Disease Father     High Blood Pressure Father     High Cholesterol Father     Stroke Father 79    High Blood Pressure Sister     High Blood Pressure Brother        Current Medications:      amLODIPine (NORVASC) tablet 5 mg Daily   predniSONE (DELTASONE) tablet 40 mg Daily   vancomycin (VANCOCIN) intermittent dosing (placeholder) RX Placeholder   naphazoline-glycerin (CLEAR EYES REDNESS RELIEF) 0.012-0.2 % ophthalmic solution 1 drop TID   bacitracin-polymyxin b (POLYSPORIN) ointment Daily   oxyCODONE (ROXICODONE) immediate release tablet 5 mg Q4H PRN   hydrocortisone 1 % cream BID PRN   hydrOXYzine (ATARAX) tablet 25 mg TID PRN   albuterol sulfate  (90 Base) MCG/ACT inhaler 2 puff Q6H PRN   mometasone-formoterol (DULERA) 100-5 MCG/ACT inhaler 2 puff BID   tiZANidine (ZANAFLEX) tablet 2 mg Q8H PRN   amitriptyline (ELAVIL) tablet 75 mg Nightly   apixaban (ELIQUIS) tablet 5 mg BID   guaiFENesin (MUCINEX) extended release tablet 600 mg BID   pantoprazole (PROTONIX) tablet 40 mg QAM AC   vitamin D (CHOLECALCIFEROL) tablet 2,000 Units Daily   sodium chloride flush 0.9 % injection 10 mL 2 times per day   sodium chloride flush 0.9 % injection 10 mL PRN   magnesium hydroxide (MILK OF MAGNESIA) 400 MG/5ML suspension 30 mL Daily PRN   ondansetron (ZOFRAN) injection 4 mg Q6H PRN   cefepime (MAXIPIME) 2 g IVPB minibag Q24H   ipratropium-albuterol (DUONEB) nebulizer solution 1 ampule Q4H WA   lidocaine PF 1 % injection 5 mL Once   sodium chloride flush 0.9 % injection 10 mL 2 times per day   sodium chloride flush 0.9 % injection 10 mL PRN   acetaminophen (TYLENOL) tablet 650 mg Q4H PRN         Physical exam:     Vitals:  BP (!) 147/87   Pulse 110   Temp 97.8 °F (36.6 °C) (Oral)   Resp 18   Ht 5' (1.524 m)   Wt 136 lb 9.6 oz (62 kg)   SpO2 98%   BMI 26.68 kg/m²   Constitutional:  OAA X3 NAD  Skin: no rash, turgor wnl  Heent:  eomi, mmm  Neck: no bruits or jvd noted  Cardiovascular:  S1, S2 without m/r/g  Respiratory: CTA B without w/r/r  Abdomen:  +bs, soft, nt, nd  Ext: no lower extremity edema  Psychiatric: mood and affect appropriate  Musculoskeletal:  Rom, muscular strength intact    Labs:  CBC:   Recent Labs     04/04/19  0549 04/05/19  0613 04/06/19  0708   WBC 17.3* 18.5* 18.7*   HGB 8.0* 8.3* 8.6*    421 413     BMP:    Recent Labs     04/04/19  0549 04/05/19  0613 04/06/19  0708    139 142   K 3.8 3.4* 3.8   * 104 104   CO2 17* 20* 23   BUN 26* 27* 28*   CREATININE 2.3* 2.0* 1.8*   GLUCOSE 123* 117* 84     Ca/Mg/Phos:   Recent Labs     04/04/19  0549 04/05/19  0613 04/06/19  0708   CALCIUM 7.9* 8.0* 8.0*     Hepatic:   No results for input(s): AST, ALT, ALB, BILITOT, ALKPHOS in the last 72 hours. Troponin:   No results for input(s): TROPONINI in the last 72 hours. BNP: No results for input(s): BNP in the last 72 hours. Lipids: No results for input(s): CHOL, TRIG, HDL, LDLCALC, LABVLDL in the last 72 hours. ABGs: No results for input(s): PHART, PO2ART, AKB4XSO in the last 72 hours. INR:   No results for input(s): INR in the last 72 hours. UA:No results for input(s): Forestine Loffler, GLUCOSEU, BILIRUBINUR, David Collie, BLOODU, PHUR, PROTEINU, UROBILINOGEN, NITRU, LEUKOCYTESUR, LABMICR, URINETYPE in the last 72 hours. Urine Microscopic: No results for input(s): LABCAST, BACTERIA, COMU, HYALCAST, WBCUA, RBCUA, EPIU in the last 72 hours. Urine Culture: No results for input(s): LABURIN in the last 72 hours. Urine Chemistry: No results for input(s): Suellen Current, PROTEINUR, NAUR in the last 72 hours.              IMAGING:  CT CHEST ABDOMEN PELVIS WO CONTRAST   Final Result   Bandlike and consolidative opacity in the lung bases may reflect atelectasis   or pneumonia. There is no pleural effusion. Background of emphysema. No acute findings in the abdomen or pelvis. Submucosal fat proliferation in the colon, which can be seen in setting of   obesity and chronic inflammatory bowel disease. No pericolonic fat stranding   to suggest acute inflammation. XR CHEST PORTABLE   Final Result   No acute cardiopulmonary disease or significant interval change from prior   study 01/20/2019. I/O last 3 completed shifts: In: 600 [P.O.:600]  Out: -       Assessment/Plan :      1. MARIS on CKD stage 3  Creatinine trended down. MARIS likely ATN 2/2 hemodynamic changes    BP better now   Maintain SBP > 100 mm HG. Will get urine studies       2. Anion gap acidosis . HCO level improved   Monitor     3.  crohns ds. Has chronic diarrhea     4. Pneumonia/ COPD. abx per primary attending     5. HTN. BP elevated from solumedrol. Changed to prednisone now   BP better then yetserday       Will add low dose amlodipine 5 mg po daily         Stable from nephrology standpoint.  Will f/u in office in 2 weeks                Thank you for allowing us to participate in care of Israel Chand         Electronically signed by: Connor Ramirez MD, 4/6/2019, 9:55 AM      [facility] [address]  Office : [phone]  Fax :[phone]

## 2019-04-06 NOTE — PROGRESS NOTES
IV flushed, abx infusing per orders. Pt resting in bed. No further needs expressed. Call light within reach. Will continue to monitor.

## 2019-04-06 NOTE — CARE COORDINATION
Discharge Planning. SW reviewed patients chart, pt has a discharge order. Pt is active w/[facility] [employer] [facility] for nonskilled services, MOW and life alert. Pt is also active w/[facility]. This worker notified [address] of discharge plans. [facility] was informed and Orders/EMMANUELLE were faxed. Social work will follow through discharge. D/C Plan: Home w/[facility] [employer] for nonskilled services, MOW and life alert. Home w/[facility]. EMMANUELLE & Orders faxed.      Electronically signed by NAEEM Burkett LSW on 4/6/19 at 2:30 PM

## 2019-04-06 NOTE — PROGRESS NOTES
Patient has been discharged per MD orders. Patient verbalizes understanding of all instructions, medications, and follow up. IV has been removed, catheter intact, patent tolerated well. All belongings have been gathered and packed. Family in route to transport patient from hospital. Transport has been notified of discharge.

## 2019-04-07 LAB
BLOOD CULTURE, ROUTINE: NORMAL
CULTURE, BLOOD 2: NORMAL

## 2019-04-08 ENCOUNTER — TELEPHONE (OUTPATIENT)
Dept: INTERNAL MEDICINE CLINIC | Age: 69
End: 2019-04-08

## 2019-04-08 LAB — HEMATOLOGY PATH CONSULT: NORMAL

## 2019-04-08 NOTE — TELEPHONE ENCOUNTER
Patient released from hospital and requesting a shower chair    Patient states she needs assistance in the shower    Please send RX to pharmacy    Please advise

## 2019-04-08 NOTE — TELEPHONE ENCOUNTER
[facility] Trans[facility] Initial Follow Up Call    Outreach made within 2 business days of discharge: Yes    Patient: Mukund Mary Patient : 1950   MRN: [id number]  Reason for Admission: There are no discharge diagnoses documented for the most recent discharge. Discharge Date: 19       Spoke with: Attempted to call patient and left a voice mail for her to back to schedule an appointment.     Discharge department/facility: [place], [place]

## 2019-04-10 NOTE — PROGRESS NOTES
Occupational Therapy Discharge Summary    Name: Emir Schneider  : 1950    The pt was evaluated by OT on 4/3/2019 and seen for 0 treatment sessions prior to DC to home on 2019 per MD order. The pt's acute therapy goals were:  Short term goals  Time Frame for Short term goals: discharge  Short term goal 1: bed mobility I  Short term goal 2: functional mobility mod I with cane  Short term goal 3: increased standing for ADL ax at sink mod I ~5-7 minutes  Short term goal 4: UB/LB ADLs mod I  Short term goal 5: functional ADL transfer [place]        Patient met 0 goals during stay. Number of Refusals:0  Number of Holds: 0  During this hospitalization, the patient was educated on:  Patient Education: OT eval, POC, discharge, OOB ax, energy conservation, LB exercises for strengthening    DC pt from OT caseload at this time.   Thank you, Monica Sal, [address]

## 2019-04-15 ENCOUNTER — OFFICE VISIT (OUTPATIENT)
Dept: INTERNAL MEDICINE CLINIC | Age: 69
End: 2019-04-15
Payer: MEDICAID

## 2019-04-15 VITALS
OXYGEN SATURATION: 94 % | SYSTOLIC BLOOD PRESSURE: 120 MMHG | WEIGHT: 130 LBS | BODY MASS INDEX: 25.39 KG/M2 | DIASTOLIC BLOOD PRESSURE: 80 MMHG | HEART RATE: 93 BPM

## 2019-04-15 DIAGNOSIS — J18.9 PNEUMONIA OF BOTH LOWER LOBES DUE TO INFECTIOUS ORGANISM: ICD-10-CM

## 2019-04-15 DIAGNOSIS — J44.9 COPD, SEVERE (HCC): Primary | ICD-10-CM

## 2019-04-15 PROCEDURE — 3017F COLORECTAL CA SCREEN DOC REV: CPT | Performed by: INTERNAL MEDICINE

## 2019-04-15 PROCEDURE — 3023F SPIROM DOC REV: CPT | Performed by: INTERNAL MEDICINE

## 2019-04-15 PROCEDURE — G8417 CALC BMI ABV UP PARAM F/U: HCPCS | Performed by: INTERNAL MEDICINE

## 2019-04-15 PROCEDURE — 1123F ACP DISCUSS/DSCN MKR DOCD: CPT | Performed by: INTERNAL MEDICINE

## 2019-04-15 PROCEDURE — G8926 SPIRO NO PERF OR DOC: HCPCS | Performed by: INTERNAL MEDICINE

## 2019-04-15 PROCEDURE — 99214 OFFICE O/P EST MOD 30 MIN: CPT | Performed by: INTERNAL MEDICINE

## 2019-04-15 PROCEDURE — 1036F TOBACCO NON-USER: CPT | Performed by: INTERNAL MEDICINE

## 2019-04-15 PROCEDURE — 4040F PNEUMOC VAC/ADMIN/RCVD: CPT | Performed by: INTERNAL MEDICINE

## 2019-04-15 PROCEDURE — G8399 PT W/DXA RESULTS DOCUMENT: HCPCS | Performed by: INTERNAL MEDICINE

## 2019-04-15 PROCEDURE — G8427 DOCREV CUR MEDS BY ELIG CLIN: HCPCS | Performed by: INTERNAL MEDICINE

## 2019-04-15 PROCEDURE — 1090F PRES/ABSN URINE INCON ASSESS: CPT | Performed by: INTERNAL MEDICINE

## 2019-04-15 PROCEDURE — 1111F DSCHRG MED/CURRENT MED MERGE: CPT | Performed by: INTERNAL MEDICINE

## 2019-04-15 ASSESSMENT — ENCOUNTER SYMPTOMS
CHOKING: 0
COUGH: 0
EYE REDNESS: 0
EYE PAIN: 0

## 2019-04-15 NOTE — PROGRESS NOTES
4/15/2019     Sherice Fong (:  1950) is a 80 y.o. female, here for evaluation of the following medical concerns:    HPI  Patient comes in for follow-up in pneumonia. She had a ct scan of her chest that showed  Band like opacities consistent with pneumonia. She was treated levofloxacin for 7 days. She was doing well with this. She is currently and home. She is taking spiriva and symbicort  Without difficulty. Her breathing is not quite back a baseline. Review of Systems   Eyes: Negative for pain and redness. Respiratory: Negative for cough and choking. Prior to Visit Medications    Medication Sig Taking? Authorizing Provider   apixaban (ELIQUIS) 5 MG TABS tablet Take 1 tablet by mouth 2 times daily Yes Corry Cardona MD   Misc.  Devices (BATH/SHOWER SEAT) MISC Use as directed Yes Corry Cardona MD   alendronate (FOSAMAX) 70 MG tablet Take 1 tablet by mouth every 7 days Yes Erica Cervantes MD   potassium chloride (KLOR-CON) 10 MEQ extended release tablet Take 10 mEq by mouth 2 times daily Yes Historical Provider, MD   tiotropium (SPIRIVA RESPIMAT) 2.5 MCG/ACT AERS inhaler Inhale 2 puffs into the lungs daily Yes Brianne Hein MD   amitriptyline (ELAVIL) 150 MG tablet TAKE 0.5 TABLETS BY MOUTH NIGHTLY Yes Corry Cardona MD   budesonide-formoterol (SYMBICORT) 160-4.5 MCG/ACT AERO Inhale 2 puffs into the lungs 2 times daily Yes Brianne Hein MD   albuterol sulfate  (90 Base) MCG/ACT inhaler Inhale 2 puffs into the lungs every 6 hours as needed for Wheezing Yes Corry Cardona MD   albuterol (PROVENTIL) (2.5 MG/3ML) 0.083% nebulizer solution TAKE 3 MLS BY NEBULIZATION EVERY 6 HOURS AS NEEDED FOR WHEEZING DX: COPD ICD-10: J44.9 Yes Brianne Hein MD   omeprazole (PRILOSEC) 20 MG delayed release capsule Take 20 mg by mouth Daily Takes as needed Yes Historical Provider, MD   Lift Chair MISC by Does not apply route Please dispense a Lift Chair Yes Narda Keith Johana Kussmaul, MD   guaiFENesin (MUCINEX) 600 MG extended release tablet Take 1 tablet by mouth 2 times daily Yes Corby Ware MD   Misc. Devices (ROLLER [place]) MISC 1 each by Does not apply route daily Please dispense a walker with a seat Yes Lynsey Douglas MD   Adalimumab (HUMIRA) 20 MG/0.4ML KIT Inject 1 vial into the skin every 14 days  Yes Historical Provider, MD   tiZANidine (ZANAFLEX) 4 MG tablet Take 1 tablet by mouth 3 times daily  Lynsey Douglas MD        Social History     Tobacco Use    Smoking status: Former Smoker     Packs/day: 0.25     Years: 30.00     Pack years: 7.50     Types: Cigarettes     Last attempt to quit: 2015     Years since quittin.1    Smokeless tobacco: Never Used    Tobacco comment: started smoking at age 26 / smoked up to 9 cigarettes a day    Substance Use Topics    Alcohol use: No        Vitals:    04/15/19 1111   BP: 120/80   Site: Left Upper Arm   Position: Sitting   Cuff Size: Large Adult   Pulse: 93   SpO2: 94%   Weight: 130 lb (59 kg)     Estimated body mass index is 25.39 kg/m² as calculated from the following:    Height as of 19: 5' (1.524 m). Weight as of this encounter: 130 lb (59 kg). Physical Exam   Constitutional: She appears well-nourished. No distress. HENT:   Head: Normocephalic. Right Ear: External ear normal.   Left Ear: External ear normal.   Mouth/Throat: Oropharynx is clear and moist. No oropharyngeal exudate. Eyes: Pupils are equal, round, and reactive to light. Conjunctivae are normal. Right eye exhibits no discharge. Left eye exhibits no discharge. No scleral icterus. Neck: Normal range of motion. Neck supple. No JVD present. No tracheal deviation present. No thyromegaly present. Cardiovascular: Normal rate, regular rhythm and normal heart sounds. Exam reveals no friction rub. No murmur heard. Pulmonary/Chest: No apnea. No respiratory distress.  She has decreased breath sounds in the right upper field, the right middle field, the left upper field and the left middle field. She has no wheezes. She has no rhonchi. She has no rales. ASSESSMENT/PLAN:  1. COPD, severe ([address]  Stable  - continue spiriva and symbicort  - albuterol as needed    2. Pneumonia of both lower lobes due to infectious organism [facility])  Completed antibiotics  Follow-up with pulmonary      Return in about 3 weeks (around 5/6/2019) for copd 30 min. An electronic signature was used to authenticate this note.     --Desire Pandey MD on 4/16/2019 at 7:35 AM

## 2019-04-23 ENCOUNTER — OFFICE VISIT (OUTPATIENT)
Dept: SURGERY | Age: 69
End: 2019-04-23

## 2019-04-23 VITALS — DIASTOLIC BLOOD PRESSURE: 82 MMHG | WEIGHT: 131 LBS | BODY MASS INDEX: 25.58 KG/M2 | SYSTOLIC BLOOD PRESSURE: 140 MMHG

## 2019-04-23 DIAGNOSIS — L98.492 SKIN ULCER OF SHOULDER WITH FAT LAYER EXPOSED (HCC): Primary | ICD-10-CM

## 2019-04-23 PROCEDURE — 99024 POSTOP FOLLOW-UP VISIT: CPT | Performed by: SURGERY

## 2019-04-23 NOTE — PROGRESS NOTES
[facility] GENERAL AND LAPAROSCOPIC SURGERY          PATIENT NAME: Sylvia Perry DATE: 4/23/2019    SUBJECTIVE:    Pt without concerns. OBJECTIVE:  VITALS:  BP (!) 140/82   Wt 131 lb (59.4 kg)   BMI 25.58 kg/m²     CONSTITUTIONAL:  awake and alert  SKIN: shoulder site healing over.  New epidermal present on 70%      ASSESSMENT AND PLAN:  S/P excision  Progressing with daily dressing at home [facility] / [facility]  See back in 3 weks    Mily Durham

## 2019-05-02 ENCOUNTER — HOSPITAL ENCOUNTER (OUTPATIENT)
Age: 69
Discharge: HOME | End: 2019-05-02
Payer: MEDICAID

## 2019-05-02 DIAGNOSIS — N18.30 CKD (CHRONIC KIDNEY DISEASE), STAGE III (HCC): ICD-10-CM

## 2019-05-02 LAB
ANION GAP SERPL CALCULATED.3IONS-SCNC: 15 MMOL/L (ref 3–16)
ANISOCYTOSIS: ABNORMAL
BANDED NEUTROPHILS RELATIVE PERCENT: 1 % (ref 0–7)
BASOPHILS ABSOLUTE: 0 K/UL (ref 0–0.2)
BASOPHILS RELATIVE PERCENT: 0 %
BUN BLDV-MCNC: 12 MG/DL (ref 7–20)
CALCIUM SERPL-MCNC: 8.7 MG/DL (ref 8.3–10.6)
CHLORIDE BLD-SCNC: 107 MMOL/L (ref 99–110)
CO2: 17 MMOL/L (ref 21–32)
CREAT SERPL-MCNC: 1.7 MG/DL (ref 0.6–1.2)
EOSINOPHILS ABSOLUTE: 0.5 K/UL (ref 0–0.6)
EOSINOPHILS RELATIVE PERCENT: 6 %
FERRITIN: 77.9 NG/ML (ref 15–150)
GFR AFRICAN AMERICAN: 36
GFR NON-AFRICAN AMERICAN: 30
GLUCOSE BLD-MCNC: 92 MG/DL (ref 70–99)
HCT VFR BLD CALC: 31.9 % (ref 36–48)
HEMATOLOGY PATH CONSULT: NO
HEMOGLOBIN: 10.1 G/DL (ref 12–16)
HYPOCHROMIA: ABNORMAL
IRON SATURATION: 26 % (ref 15–50)
IRON: 87 UG/DL (ref 37–145)
LYMPHOCYTES ABSOLUTE: 4.1 K/UL (ref 1–5.1)
LYMPHOCYTES RELATIVE PERCENT: 48 %
MCH RBC QN AUTO: 20.2 PG (ref 26–34)
MCHC RBC AUTO-ENTMCNC: 31.8 G/DL (ref 31–36)
MCV RBC AUTO: 63.6 FL (ref 80–100)
MICROCYTES: ABNORMAL
MONOCYTES ABSOLUTE: 0.2 K/UL (ref 0–1.3)
MONOCYTES RELATIVE PERCENT: 2 %
NEUTROPHILS ABSOLUTE: 3.7 K/UL (ref 1.7–7.7)
NEUTROPHILS RELATIVE PERCENT: 43 %
PDW BLD-RTO: 17.3 % (ref 12.4–15.4)
PLATELET # BLD: 381 K/UL (ref 135–450)
PMV BLD AUTO: 8.8 FL (ref 5–10.5)
POIKILOCYTES: ABNORMAL
POTASSIUM SERPL-SCNC: 3.5 MMOL/L (ref 3.5–5.1)
RBC # BLD: 5.01 M/UL (ref 4–5.2)
SLIDE REVIEW: ABNORMAL
SODIUM BLD-SCNC: 139 MMOL/L (ref 136–145)
TOTAL IRON BINDING CAPACITY: 339 UG/DL (ref 260–445)
WBC # BLD: 8.5 K/UL (ref 4–11)

## 2019-05-02 PROCEDURE — 85025 COMPLETE CBC W/AUTO DIFF WBC: CPT

## 2019-05-02 PROCEDURE — 36415 COLL VENOUS BLD VENIPUNCTURE: CPT

## 2019-05-02 PROCEDURE — 83550 IRON BINDING TEST: CPT

## 2019-05-02 PROCEDURE — 80048 BASIC METABOLIC PNL TOTAL CA: CPT

## 2019-05-02 PROCEDURE — 82728 ASSAY OF FERRITIN: CPT

## 2019-05-02 PROCEDURE — 83540 ASSAY OF IRON: CPT

## 2019-05-07 ENCOUNTER — OFFICE VISIT (OUTPATIENT)
Dept: INTERNAL MEDICINE CLINIC | Age: 69
End: 2019-05-07
Payer: MEDICAID

## 2019-05-07 VITALS
DIASTOLIC BLOOD PRESSURE: 100 MMHG | BODY MASS INDEX: 25.78 KG/M2 | WEIGHT: 132 LBS | HEART RATE: 73 BPM | SYSTOLIC BLOOD PRESSURE: 160 MMHG

## 2019-05-07 DIAGNOSIS — J44.9 COPD, SEVERE (HCC): Primary | ICD-10-CM

## 2019-05-07 PROCEDURE — 1036F TOBACCO NON-USER: CPT | Performed by: INTERNAL MEDICINE

## 2019-05-07 PROCEDURE — 3017F COLORECTAL CA SCREEN DOC REV: CPT | Performed by: INTERNAL MEDICINE

## 2019-05-07 PROCEDURE — G8399 PT W/DXA RESULTS DOCUMENT: HCPCS | Performed by: INTERNAL MEDICINE

## 2019-05-07 PROCEDURE — 99213 OFFICE O/P EST LOW 20 MIN: CPT | Performed by: INTERNAL MEDICINE

## 2019-05-07 PROCEDURE — 4040F PNEUMOC VAC/ADMIN/RCVD: CPT | Performed by: INTERNAL MEDICINE

## 2019-05-07 PROCEDURE — G8417 CALC BMI ABV UP PARAM F/U: HCPCS | Performed by: INTERNAL MEDICINE

## 2019-05-07 PROCEDURE — 1123F ACP DISCUSS/DSCN MKR DOCD: CPT | Performed by: INTERNAL MEDICINE

## 2019-05-07 PROCEDURE — G8926 SPIRO NO PERF OR DOC: HCPCS | Performed by: INTERNAL MEDICINE

## 2019-05-07 PROCEDURE — 3023F SPIROM DOC REV: CPT | Performed by: INTERNAL MEDICINE

## 2019-05-07 PROCEDURE — 1090F PRES/ABSN URINE INCON ASSESS: CPT | Performed by: INTERNAL MEDICINE

## 2019-05-07 PROCEDURE — G8427 DOCREV CUR MEDS BY ELIG CLIN: HCPCS | Performed by: INTERNAL MEDICINE

## 2019-05-07 ASSESSMENT — ENCOUNTER SYMPTOMS
COUGH: 0
CHOKING: 0
EYE REDNESS: 0
EYE PAIN: 0

## 2019-05-07 NOTE — PROGRESS NOTES
2019     Oskar Durham (:  1950) is a 80 y.o. female, here for evaluation of the following medical concerns:    HPI  Patient comes in for f/u of copd exacerbation. She completed her antibiotics and is taking symbicort and  Spiriva. She is tolerating these medications well. She notes not problems with this. She states that the  Cost on the medication is good. She has no concerns with this. Review of Systems   Constitutional: Negative for chills and diaphoresis. Eyes: Negative for pain and redness. Respiratory: Negative for cough and choking. Prior to Visit Medications    Medication Sig Taking? Authorizing Provider   apixaban (ELIQUIS) 5 MG TABS tablet Take 1 tablet by mouth 2 times daily Yes Ashley Burch MD   Misc.  Devices (BATH/SHOWER SEAT) MISC Use as directed Yes Ashley Burch MD   alendronate (FOSAMAX) 70 MG tablet Take 1 tablet by mouth every 7 days Yes Seb Car MD   potassium chloride (KLOR-CON) 10 MEQ extended release tablet Take 10 mEq by mouth 2 times daily Yes Historical Provider, MD   tiotropium (SPIRIVA RESPIMAT) 2.5 MCG/ACT AERS inhaler Inhale 2 puffs into the lungs daily Yes Minus MD Sonia   amitriptyline (ELAVIL) 150 MG tablet TAKE 0.5 TABLETS BY MOUTH NIGHTLY Yes Ashley Burch MD   tiZANidine (ZANAFLEX) 4 MG tablet Take 1 tablet by mouth 3 times daily Yes Ashley Burch MD   budesonide-formoterol (SYMBICORT) 160-4.5 MCG/ACT AERO Inhale 2 puffs into the lungs 2 times daily Yes Minus MD Sonia   albuterol sulfate  (90 Base) MCG/ACT inhaler Inhale 2 puffs into the lungs every 6 hours as needed for Wheezing Yes Ashley Burch MD   albuterol (PROVENTIL) (2.5 MG/3ML) 0.083% nebulizer solution TAKE 3 MLS BY NEBULIZATION EVERY 6 HOURS AS NEEDED FOR WHEEZING DX: COPD ICD-10: J44.9 Yes Minus MD Sonia   omeprazole (PRILOSEC) 20 MG delayed release capsule Take 20 mg by mouth Daily Takes as needed Yes Historical Provider, MD Lift Chair MISC by Does not apply route Please dispense a Lift Chair Yes [place] MD Jessy   guaiFENesin [facility]) 600 MG extended release tablet Take 1 tablet by mouth 2 times daily Yes Mario Heart MD   [facility]. Devices (ROLLER [place]) MISC 1 each by Does not apply route daily Please dispense a walker with a seat Yes [place] MD Jessy   Adalimumab (HUMIRA) 20 MG/0.4ML KIT Inject 1 vial into the skin every 14 days  Yes Historical Provider, MD        Social History     Tobacco Use    Smoking status: Former Smoker     Packs/day: 0.25     Years: 30.00     Pack years: 7.50     Types: Cigarettes     Last attempt to quit: 2015     Years since quittin.1    Smokeless tobacco: Never Used    Tobacco comment: started smoking at age 26 / smoked up to 9 cigarettes a day    Substance Use Topics    Alcohol use: No        Vitals:    19 1003   BP: (!) 160/100   Site: Left Upper Arm   Position: Sitting   Cuff Size: Medium Adult   Pulse: 73   Weight: 132 lb (59.9 kg)     Estimated body mass index is 25.78 kg/m² as calculated from the following:    Height as of 19: 5' (1.524 m). Weight as of this encounter: 132 lb (59.9 kg). Physical Exam   Constitutional: She appears well-nourished. HENT:   Head: Normocephalic. Right Ear: External ear normal.   Left Ear: External ear normal.   Mouth/Throat: No oropharyngeal exudate. Eyes: Pupils are equal, round, and reactive to light. Right eye exhibits no discharge. Left eye exhibits no discharge. Neck: Normal range of motion. Neck supple. No JVD present. No tracheal deviation present. No thyromegaly present. Cardiovascular: Normal rate, regular rhythm and normal heart sounds. Exam reveals no friction rub. No murmur heard. Pulmonary/Chest: Effort normal and breath sounds normal. No stridor. No respiratory distress. She has no wheezes. ASSESSMENT/PLAN:  1.  COPD, severe ([address]  Improved  -  Call Pulmonary to get patient in with

## 2019-05-14 ENCOUNTER — OFFICE VISIT (OUTPATIENT)
Dept: SURGERY | Age: 69
End: 2019-05-14

## 2019-05-14 VITALS — DIASTOLIC BLOOD PRESSURE: 86 MMHG | SYSTOLIC BLOOD PRESSURE: 129 MMHG | BODY MASS INDEX: 25.97 KG/M2 | WEIGHT: 133 LBS

## 2019-05-14 DIAGNOSIS — L98.492 SKIN ULCER OF SHOULDER WITH FAT LAYER EXPOSED (HCC): Primary | ICD-10-CM

## 2019-05-14 PROCEDURE — 99024 POSTOP FOLLOW-UP VISIT: CPT | Performed by: SURGERY

## 2019-05-14 NOTE — PROGRESS NOTES
[facility] GENERAL AND LAPAROSCOPIC SURGERY          PATIENT NAME: Jackelin Joseph     TODAY'S DATE: 5/14/2019    SUBJECTIVE:      Pt returns post excision.      OBJECTIVE:  VITALS:  /86   Wt 133 lb (60.3 kg)   BMI 25.97 kg/m²     CONSTITUTIONAL:  awake and alert  SKIN: site healed, new epidermis present    ASSESSMENT AND PLAN:    Left posterior shoulder excision healed, site closed, has new epidermis over area  No further wound care needed  BID lotion on the site   See me prn        Ivy Caraballo

## 2019-05-17 ENCOUNTER — TELEPHONE (OUTPATIENT)
Dept: INTERNAL MEDICINE CLINIC | Age: 69
End: 2019-05-17

## 2019-05-17 RX ORDER — AZITHROMYCIN 250 MG/1
250 TABLET, FILM COATED ORAL SEE ADMIN INSTRUCTIONS
Qty: 6 TABLET | Refills: 0 | Status: SHIPPED | OUTPATIENT
Start: 2019-05-17 | End: 2019-05-22

## 2019-05-22 ENCOUNTER — OFFICE VISIT (OUTPATIENT)
Dept: INTERNAL MEDICINE CLINIC | Age: 69
End: 2019-05-22
Payer: MEDICARE

## 2019-05-22 VITALS
DIASTOLIC BLOOD PRESSURE: 60 MMHG | SYSTOLIC BLOOD PRESSURE: 102 MMHG | OXYGEN SATURATION: 96 % | BODY MASS INDEX: 27.15 KG/M2 | HEART RATE: 97 BPM | WEIGHT: 139 LBS | TEMPERATURE: 98.2 F

## 2019-05-22 DIAGNOSIS — J44.9 COPD, SEVERE (HCC): Primary | ICD-10-CM

## 2019-05-22 DIAGNOSIS — J44.1 COPD EXACERBATION (HCC): ICD-10-CM

## 2019-05-22 PROCEDURE — 3023F SPIROM DOC REV: CPT | Performed by: INTERNAL MEDICINE

## 2019-05-22 PROCEDURE — 1090F PRES/ABSN URINE INCON ASSESS: CPT | Performed by: INTERNAL MEDICINE

## 2019-05-22 PROCEDURE — 1123F ACP DISCUSS/DSCN MKR DOCD: CPT | Performed by: INTERNAL MEDICINE

## 2019-05-22 PROCEDURE — 3017F COLORECTAL CA SCREEN DOC REV: CPT | Performed by: INTERNAL MEDICINE

## 2019-05-22 PROCEDURE — G8417 CALC BMI ABV UP PARAM F/U: HCPCS | Performed by: INTERNAL MEDICINE

## 2019-05-22 PROCEDURE — G8926 SPIRO NO PERF OR DOC: HCPCS | Performed by: INTERNAL MEDICINE

## 2019-05-22 PROCEDURE — 99213 OFFICE O/P EST LOW 20 MIN: CPT | Performed by: INTERNAL MEDICINE

## 2019-05-22 PROCEDURE — G8427 DOCREV CUR MEDS BY ELIG CLIN: HCPCS | Performed by: INTERNAL MEDICINE

## 2019-05-22 PROCEDURE — 1036F TOBACCO NON-USER: CPT | Performed by: INTERNAL MEDICINE

## 2019-05-22 PROCEDURE — G8399 PT W/DXA RESULTS DOCUMENT: HCPCS | Performed by: INTERNAL MEDICINE

## 2019-05-22 PROCEDURE — 4040F PNEUMOC VAC/ADMIN/RCVD: CPT | Performed by: INTERNAL MEDICINE

## 2019-05-22 RX ORDER — PREDNISONE 10 MG/1
TABLET ORAL
Qty: 30 TABLET | Refills: 0 | Status: SHIPPED | OUTPATIENT
Start: 2019-05-22 | End: 2019-06-06 | Stop reason: SDUPTHER

## 2019-05-22 RX ORDER — LEVOFLOXACIN 500 MG/1
500 TABLET, FILM COATED ORAL DAILY
Qty: 10 TABLET | Refills: 0 | Status: SHIPPED | OUTPATIENT
Start: 2019-05-22 | End: 2019-06-01

## 2019-05-22 NOTE — PROGRESS NOTES
SUBJECTIVE:    Patient comes to the office complaining of cough, congestion, drainage that has been present for the last several days. Her cough is productive of clear sputum. Patient does not have associated fever or chills. Patient denies gastrointestinal symptoms related to Her present condition. OBJECTIVE:  Vitals:    05/22/19 1553   BP: 102/60   Pulse: 97   Temp: 98.2 °F (36.8 °C)   SpO2: 96%       Review of Systems    Physical Exam   Constitutional: She is oriented to person, place, and time. She appears well-developed and well-nourished. No distress. HENT:   Head: Normocephalic and atraumatic. Right Ear: External ear normal.   Left Ear: External ear normal.   Nose: Nose normal.   Mouth/Throat: Oropharynx is clear and moist. No oropharyngeal exudate. Eyes: Pupils are equal, round, and reactive to light. EOM are normal.   Pulmonary/Chest: Effort normal. No stridor. No respiratory distress. She has wheezes. She has no rales. She exhibits no tenderness. Neurological: She is alert and oriented to person, place, and time. No cranial nerve deficit. Skin: She is not diaphoretic. Psychiatric: She has a normal mood and affect. Her behavior is normal. Judgment and thought content normal.   Vitals reviewed. Vitals:    05/22/19 1553   BP: 102/60   Pulse: 97   Temp: 98.2 °F (36.8 °C)   SpO2: 96%         Current Outpatient Medications:     azithromycin (ZITHROMAX) 250 MG tablet, Take 1 tablet by mouth See Admin Instructions for 5 days 500mg on day 1 followed by 250mg on days 2 - 5, Disp: 6 tablet, Rfl: 0    apixaban (ELIQUIS) 5 MG TABS tablet, Take 1 tablet by mouth 2 times daily, Disp: 60 tablet, Rfl: 5    Misc.  Devices (BATH/SHOWER SEAT) MISC, Use as directed, Disp: 1 each, Rfl: 0    alendronate (FOSAMAX) 70 MG tablet, Take 1 tablet by mouth every 7 days, Disp: 12 tablet, Rfl: 3    potassium chloride (KLOR-CON) 10 MEQ extended release tablet, Take 10 mEq by mouth 2 times daily, Disp: , Rfl:    tiotropium (SPIRIVA RESPIMAT) 2.5 MCG/ACT AERS inhaler, Inhale 2 puffs into the lungs daily, Disp: 1 Inhaler, Rfl: 6    amitriptyline (ELAVIL) 150 MG tablet, TAKE 0.5 TABLETS BY MOUTH NIGHTLY, Disp: 45 tablet, Rfl: 2    tiZANidine (ZANAFLEX) 4 MG tablet, Take 1 tablet by mouth 3 times daily, Disp: 30 tablet, Rfl: 0    budesonide-formoterol (SYMBICORT) 160-4.5 MCG/ACT AERO, Inhale 2 puffs into the lungs 2 times daily, Disp: 1 Inhaler, Rfl: 3    albuterol sulfate  (90 Base) MCG/ACT inhaler, Inhale 2 puffs into the lungs every 6 hours as needed for Wheezing, Disp: 1 Inhaler, Rfl: 3    albuterol (PROVENTIL) (2.5 MG/3ML) 0.083% nebulizer solution, TAKE 3 MLS BY NEBULIZATION EVERY 6 HOURS AS NEEDED FOR WHEEZING DX: COPD ICD-10: J44.9, Disp: 360 mL, Rfl: 6    omeprazole (PRILOSEC) 20 MG delayed release capsule, Take 20 mg by mouth Daily Takes as needed, Disp: , Rfl:     Lift Chair MISC, by Does not apply route Please dispense a Lift Chair, Disp: 1 each, Rfl: 0    guaiFENesin (MUCINEX) 600 MG extended release tablet, Take 1 tablet by mouth 2 times daily, Disp: 30 tablet, Rfl: 0    Misc. Devices (ROLLER WALKER) MISC, 1 each by Does not apply route daily Please dispense a walker with a seat, Disp: 1 each, Rfl: 0    Adalimumab (HUMIRA) 20 MG/0.4ML KIT, Inject 1 vial into the skin every 14 days , Disp: , Rfl:     Assessment/Plan:  Faby Holland was seen today for congestion and headache. Diagnoses and all orders for this visit:    COPD, severe ([address]  -     predniSONE (DELTASONE) 10 MG tablet; Take 4 tablets daily for 3 days, then 3 tablets daily for 3 days, then 2 tablets daily for 3 days, then 1 tablet daily for 3 days. COPD exacerbation (HCC)  -     predniSONE (DELTASONE) 10 MG tablet; Take 4 tablets daily for 3 days, then 3 tablets daily for 3 days, then 2 tablets daily for 3 days, then 1 tablet daily for 3 days. -     levofloxacin (LEVAQUIN) 500 MG tablet;  Take 1 tablet by mouth daily for 10 days      Amaya Bolden MD

## 2019-05-30 ENCOUNTER — TELEPHONE (OUTPATIENT)
Dept: INTERNAL MEDICINE CLINIC | Age: 69
End: 2019-05-30

## 2019-05-30 NOTE — TELEPHONE ENCOUNTER
Lung Dr was prescribing her blood thinners. The mercedes العراقي has left the practice. Pt is asking if you will prescribe them now.  Please send refill to [facility]

## 2019-06-06 ENCOUNTER — OFFICE VISIT (OUTPATIENT)
Dept: PULMONOLOGY | Age: 69
End: 2019-06-06
Payer: MEDICARE

## 2019-06-06 VITALS
SYSTOLIC BLOOD PRESSURE: 112 MMHG | BODY MASS INDEX: 25.91 KG/M2 | HEIGHT: 60 IN | DIASTOLIC BLOOD PRESSURE: 70 MMHG | HEART RATE: 91 BPM | WEIGHT: 132 LBS | OXYGEN SATURATION: 98 % | RESPIRATION RATE: 18 BRPM

## 2019-06-06 DIAGNOSIS — J44.1 COPD EXACERBATION (HCC): ICD-10-CM

## 2019-06-06 DIAGNOSIS — J96.11 CHRONIC RESPIRATORY FAILURE WITH HYPOXIA (HCC): ICD-10-CM

## 2019-06-06 DIAGNOSIS — J18.9 PNEUMONIA OF BOTH LOWER LOBES DUE TO INFECTIOUS ORGANISM: Primary | ICD-10-CM

## 2019-06-06 PROCEDURE — 1036F TOBACCO NON-USER: CPT | Performed by: NURSE PRACTITIONER

## 2019-06-06 PROCEDURE — 4040F PNEUMOC VAC/ADMIN/RCVD: CPT | Performed by: NURSE PRACTITIONER

## 2019-06-06 PROCEDURE — 1123F ACP DISCUSS/DSCN MKR DOCD: CPT | Performed by: NURSE PRACTITIONER

## 2019-06-06 PROCEDURE — G8926 SPIRO NO PERF OR DOC: HCPCS | Performed by: NURSE PRACTITIONER

## 2019-06-06 PROCEDURE — G8417 CALC BMI ABV UP PARAM F/U: HCPCS | Performed by: NURSE PRACTITIONER

## 2019-06-06 PROCEDURE — G8427 DOCREV CUR MEDS BY ELIG CLIN: HCPCS | Performed by: NURSE PRACTITIONER

## 2019-06-06 PROCEDURE — 3023F SPIROM DOC REV: CPT | Performed by: NURSE PRACTITIONER

## 2019-06-06 PROCEDURE — 1090F PRES/ABSN URINE INCON ASSESS: CPT | Performed by: NURSE PRACTITIONER

## 2019-06-06 PROCEDURE — 3017F COLORECTAL CA SCREEN DOC REV: CPT | Performed by: NURSE PRACTITIONER

## 2019-06-06 PROCEDURE — G8399 PT W/DXA RESULTS DOCUMENT: HCPCS | Performed by: NURSE PRACTITIONER

## 2019-06-06 PROCEDURE — 99214 OFFICE O/P EST MOD 30 MIN: CPT | Performed by: NURSE PRACTITIONER

## 2019-06-06 RX ORDER — PREDNISONE 10 MG/1
TABLET ORAL
Qty: 10 TABLET | Refills: 0 | Status: SHIPPED | OUTPATIENT
Start: 2019-06-06 | End: 2019-09-23 | Stop reason: ALTCHOICE

## 2019-06-06 ASSESSMENT — ENCOUNTER SYMPTOMS
COUGH: 0
COLOR CHANGE: 0
ABDOMINAL PAIN: 0
SHORTNESS OF BREATH: 1
CONSTIPATION: 0

## 2019-07-01 ENCOUNTER — HOSPITAL ENCOUNTER (OUTPATIENT)
Dept: CT IMAGING | Age: 69
Discharge: HOME | End: 2019-07-01
Payer: MEDICARE

## 2019-07-01 ENCOUNTER — HOSPITAL ENCOUNTER (OUTPATIENT)
Dept: PULMONOLOGY | Age: 69
Discharge: HOME | End: 2019-07-01
Payer: MEDICARE

## 2019-07-01 VITALS — RESPIRATION RATE: 16 BRPM | OXYGEN SATURATION: 98 % | HEART RATE: 73 BPM

## 2019-07-01 DIAGNOSIS — J18.9 PNEUMONIA OF BOTH LOWER LOBES DUE TO INFECTIOUS ORGANISM: ICD-10-CM

## 2019-07-01 LAB
DLCO %PRED: 27 %
DLCO PRED: NORMAL ML/MIN/MMHG
DLCO/VA %PRED: NORMAL %
DLCO/VA PRED: NORMAL ML/MIN/MMHG
DLCO/VA: NORMAL ML/MIN/MMHG
DLCO: NORMAL ML/MIN/MMHG
EXPIRATORY TIME: NORMAL SEC
FEF 25-75% %PRED-PRE: NORMAL L/SEC
FEF 25-75% PRED: NORMAL L/SEC
FEF 25-75%-PRE: NORMAL L/SEC
FEV1 %PRED-PRE: 48 %
FEV1 PRED: NORMAL L
FEV1/FVC %PRED-PRE: NORMAL %
FEV1/FVC PRED: NORMAL %
FEV1/FVC: 83 %
FEV1: NORMAL L
FVC %PRED-PRE: NORMAL %
FVC PRED: NORMAL L
FVC: NORMAL L
GAW %PRED: NORMAL %
GAW PRED: NORMAL L/S/CMH2O
GAW: NORMAL L/S/CMH2O
IC %PRED: NORMAL %
IC PRED: NORMAL L
IC: NORMAL L
MVV %PRED-PRE: NORMAL %
MVV PRED: NORMAL L/MIN
MVV-PRE: NORMAL L/MIN
PEF %PRED-PRE: NORMAL L/SEC
PEF PRED: NORMAL L/SEC
PEF-PRE: NORMAL L/SEC
RAW %PRED: NORMAL %
RAW PRED: NORMAL CMH2O/L/S
RAW: NORMAL CMH2O/L/S
RV %PRED: NORMAL %
RV PRED: NORMAL L
RV: NORMAL L
SVC %PRED: NORMAL %
SVC PRED: NORMAL L
SVC: NORMAL L
TLC %PRED: 99 %
TLC PRED: NORMAL L
TLC: NORMAL L
VA %PRED: NORMAL %
VA PRED: NORMAL L
VA: NORMAL L
VTG %PRED: NORMAL %
VTG PRED: NORMAL L
VTG: NORMAL L

## 2019-07-01 PROCEDURE — 94729 DIFFUSING CAPACITY: CPT

## 2019-07-01 PROCEDURE — 94010 BREATHING CAPACITY TEST: CPT

## 2019-07-01 PROCEDURE — 94200 LUNG FUNCTION TEST (MBC/MVV): CPT

## 2019-07-01 PROCEDURE — 94760 N-INVAS EAR/PLS OXIMETRY 1: CPT

## 2019-07-01 PROCEDURE — 94726 PLETHYSMOGRAPHY LUNG VOLUMES: CPT

## 2019-07-01 PROCEDURE — 71250 CT THORAX DX C-: CPT

## 2019-07-01 ASSESSMENT — PULMONARY FUNCTION TESTS
FEV1/FVC: 83
FEV1_PERCENT_PREDICTED_PRE: 48

## 2019-07-17 ENCOUNTER — OFFICE VISIT (OUTPATIENT)
Dept: PULMONOLOGY | Age: 69
End: 2019-07-17
Payer: MEDICARE

## 2019-07-17 VITALS
RESPIRATION RATE: 18 BRPM | DIASTOLIC BLOOD PRESSURE: 87 MMHG | HEART RATE: 84 BPM | WEIGHT: 133 LBS | SYSTOLIC BLOOD PRESSURE: 151 MMHG | OXYGEN SATURATION: 95 % | BODY MASS INDEX: 26.11 KG/M2 | HEIGHT: 60 IN

## 2019-07-17 DIAGNOSIS — J44.9 COPD, SEVERE (HCC): ICD-10-CM

## 2019-07-17 DIAGNOSIS — J96.11 CHRONIC RESPIRATORY FAILURE WITH HYPOXIA (HCC): Primary | ICD-10-CM

## 2019-07-17 DIAGNOSIS — R06.02 SHORTNESS OF BREATH: ICD-10-CM

## 2019-07-17 PROCEDURE — 4040F PNEUMOC VAC/ADMIN/RCVD: CPT | Performed by: NURSE PRACTITIONER

## 2019-07-17 PROCEDURE — 3017F COLORECTAL CA SCREEN DOC REV: CPT | Performed by: NURSE PRACTITIONER

## 2019-07-17 PROCEDURE — 1036F TOBACCO NON-USER: CPT | Performed by: NURSE PRACTITIONER

## 2019-07-17 PROCEDURE — 99214 OFFICE O/P EST MOD 30 MIN: CPT | Performed by: NURSE PRACTITIONER

## 2019-07-17 PROCEDURE — G8926 SPIRO NO PERF OR DOC: HCPCS | Performed by: NURSE PRACTITIONER

## 2019-07-17 PROCEDURE — 3023F SPIROM DOC REV: CPT | Performed by: NURSE PRACTITIONER

## 2019-07-17 PROCEDURE — G8417 CALC BMI ABV UP PARAM F/U: HCPCS | Performed by: NURSE PRACTITIONER

## 2019-07-17 PROCEDURE — 1123F ACP DISCUSS/DSCN MKR DOCD: CPT | Performed by: NURSE PRACTITIONER

## 2019-07-17 PROCEDURE — G8427 DOCREV CUR MEDS BY ELIG CLIN: HCPCS | Performed by: NURSE PRACTITIONER

## 2019-07-17 PROCEDURE — 1090F PRES/ABSN URINE INCON ASSESS: CPT | Performed by: NURSE PRACTITIONER

## 2019-07-17 PROCEDURE — G8399 PT W/DXA RESULTS DOCUMENT: HCPCS | Performed by: NURSE PRACTITIONER

## 2019-07-17 ASSESSMENT — ENCOUNTER SYMPTOMS
ABDOMINAL PAIN: 0
SHORTNESS OF BREATH: 1
COLOR CHANGE: 0
CONSTIPATION: 0
COUGH: 0

## 2019-07-17 NOTE — PROGRESS NOTES
[facility]    Subjective:   CHIEF COMPLAINT / HPI: COPD, hospitalization in April for HCAP   The patient is 80 y.o. female who presents today for a routine follow up visit related to the above mentioned issues. There is a PMH significant for CKD, COPD, Crohn's disease, DVT/PE on Eliquis and depression. She was hospitalized the beginning of April and treated for pneumonia with Levaquin and systemic steroids. She was seen by her PCP at the end of last month and put back on antibiotics and steroids (Levaquin / Prednisone). She felt better for a time but at last visit reported feeling worse again. She was provided with low slow Prednisone taper. Additionally, CT chest and PFT were repeated. Currently she reports breathing which is much improved since her last visit. She denies worsening cough. There are no fevers or chills. She reports compliance with Spiriva, Symbicort and is using Proventil as needed up to q4. She uses O2 with exertion most of the time but struggles to hold it d/t arthritis. O2 saturations are acceptable on RA in the office today. She has completed Prednisone.         Past Medical History:   Diagnosis Date    Bullous emphysema (HCC)     CKD (chronic kidney disease) stage 3, GFR 30-59 ml/min ([facility])     Clostridium difficile carrier 01/21/2019    COPD (chronic obstructive pulmonary disease) ([address]     PFT done 7 years ago   [facility] Crohn's disease ([address]     Crohn's disease    Depression 1/30/2011    pt states resolved as of 3-26-12    DVT (deep venous thrombosis) ([address]     2012; first ocurrence     Hiatal hernia     Hx of blood clots     Kidney disease     Kidney insufficiency     Osteoporosis     Peripheral neuropathy     neuropathy in legs    Pneumonia     Postmenopausal     LMP in  40's    Pulmonary embolism ([address]     2012; first ocurrence    Sepsis ([address]     Syringomyelia ([address]      Social History:    Social History     Tobacco Use   Smoking Status Former Smoker    Packs/day: 0.25    Years: 30.00    Pack years: 7.50    Types: Cigarettes    Last attempt to quit: 2015    Years since quittin.3   Smokeless Tobacco Never Used   Tobacco Comment    started smoking at age 26 / smoked up to 9 cigarettes a day      Current Medications:     Current Outpatient Medications on File Prior to Visit   Medication Sig Dispense Refill    predniSONE (DELTASONE) 10 MG tablet Take 2 tabs x 7 days, then take 1 tab for 7 days 10 tablet 0    apixaban (ELIQUIS) 5 MG TABS tablet Take 1 tablet by mouth 2 times daily 60 tablet 5    Misc. Devices (BATH/SHOWER SEAT) MISC Use as directed 1 each 0    alendronate (FOSAMAX) 70 MG tablet Take 1 tablet by mouth every 7 days 12 tablet 3    potassium chloride (KLOR-CON) 10 MEQ extended release tablet Take 10 mEq by mouth 2 times daily      tiotropium (SPIRIVA RESPIMAT) 2.5 MCG/ACT AERS inhaler Inhale 2 puffs into the lungs daily 1 Inhaler 6    amitriptyline (ELAVIL) 150 MG tablet TAKE 0.5 TABLETS BY MOUTH NIGHTLY 45 tablet 2    tiZANidine (ZANAFLEX) 4 MG tablet Take 1 tablet by mouth 3 times daily 30 tablet 0    budesonide-formoterol (SYMBICORT) 160-4.5 MCG/ACT AERO Inhale 2 puffs into the lungs 2 times daily 1 Inhaler 3    albuterol sulfate  (90 Base) MCG/ACT inhaler Inhale 2 puffs into the lungs every 6 hours as needed for Wheezing 1 Inhaler 3    albuterol (PROVENTIL) (2.5 MG/3ML) 0.083% nebulizer solution TAKE 3 MLS BY NEBULIZATION EVERY 6 HOURS AS NEEDED FOR WHEEZING DX: COPD ICD-10: J44.9 360 mL 6    omeprazole (PRILOSEC) 20 MG delayed release capsule Take 20 mg by mouth Daily Takes as needed      Lift Chair MISC by Does not apply route Please dispense a Lift Chair 1 each 0    guaiFENesin (MUCINEX) 600 MG extended release tablet Take 1 tablet by mouth 2 times daily 30 tablet 0    Misc.  Devices (ROLLER WALKER) MISC 1 each by Does not apply route daily Please dispense a walker with a seat 1 each 0    Adalimumab erythema. Psychiatric: She has a normal mood and affect. Thought content normal.     DATA:      Radiology Review:  Pertinent images / reports were reviewed as a part of this visit. CT chest done 7/1/19:  Mediastinum: There are large calcified mediastinal and right hilar lymph nodes. There is no evidence for pericardial effusion and the heart size is normal.   Lungs/pleura: There are severe emphysematous changes bilaterally and small amount of bibasilar atelectasis. The previously seen bibasilar infiltrate has resolved. No pleural effusion is seen. Upper Abdomen: Visualized upper abdominal organs are stable in the patient is status post cholecystectomy   Soft Tissues/Bones: Degenerative change in the spine     CT chest and abdomen done 4/2/19 reveals the following:  Bandlike and consolidative opacity in the lung bases may reflect atelectasis or pneumonia. There is no pleural effusion. Background of emphysema. No acute findings in the abdomen or pelvis. Submucosal fat proliferation in the colon, which can be seen in setting of obesity and chronic inflammatory bowel disease. No pericolonic fat stranding to suggest acute inflammation. Last PFTs done July 2019:  Spirometry reveals decreased FVC at 1.49 liters, which  is 57% predicted. FEV1 is decreased at 0.94 liters, which is 48%  predicted. FEV1/FVC ratio is reduced at 63%. Postbronchodilator study  is not performed. Lung volumes reveal normal total lung capacity. Residual volume is increased at 159% predicted. Diffusion capacity is  decreased at 27% predicted. In comparison to a study from 02/2017, FVC  has decreased by 21%. FEV1 is increased by 19% and residual volume is  increased by 17%. Diffusion capacity is decreased by 37%. IMPRESSION:  Severe obstructive lung disease with air trapping. There  has been significant worsening since the preceding study. Assessment / Plan:   1.  Chronic respiratory failure with hypoxia (HCC)  - She usually uses it with activity  - Acceptable saturations on RA at rest today    2. COPD, severe ([address]  - Significant worsening of disease on most recent PFT  - This is c/w with frequent flares and difficulty returning to baseline  - We discussed the progressive nature of COPD  - We also discussed increasing ANTONIA and early recognition of symptoms worsening during her appointment today  - Continue Symbicort / Spiriva  - Flu shot in the Fall  - Could consider Daliresp vs MWF Zithromax if has another near term flare     3. Shortness of breath  - I do think this is multifactorial  - She would benefit from increasing activity   - CT showed resolution of previously seen pneumonia    Return in about 3 months (around 10/17/2019). RTC sooner if symptoms worsen acutely.     Darling Aden MSN APRN-ACNP CCRN

## 2019-07-25 ENCOUNTER — OFFICE VISIT (OUTPATIENT)
Dept: SURGERY | Age: 69
End: 2019-07-25
Payer: MEDICARE

## 2019-07-25 VITALS — DIASTOLIC BLOOD PRESSURE: 73 MMHG | SYSTOLIC BLOOD PRESSURE: 124 MMHG | WEIGHT: 132 LBS | BODY MASS INDEX: 25.78 KG/M2

## 2019-07-25 DIAGNOSIS — L98.492 SKIN ULCER OF SHOULDER WITH FAT LAYER EXPOSED (HCC): Primary | ICD-10-CM

## 2019-07-25 DIAGNOSIS — L91.8 HYPERTROPHIC GRANULATION TISSUE: ICD-10-CM

## 2019-07-25 PROCEDURE — 17250 CHEM CAUT OF GRANLTJ TISSUE: CPT | Performed by: SURGERY

## 2019-08-13 ENCOUNTER — OFFICE VISIT (OUTPATIENT)
Dept: PULMONOLOGY | Age: 69
End: 2019-08-13
Payer: MEDICARE

## 2019-08-13 VITALS
HEIGHT: 60 IN | RESPIRATION RATE: 24 BRPM | DIASTOLIC BLOOD PRESSURE: 93 MMHG | OXYGEN SATURATION: 95 % | BODY MASS INDEX: 26.11 KG/M2 | SYSTOLIC BLOOD PRESSURE: 155 MMHG | HEART RATE: 77 BPM | WEIGHT: 133 LBS

## 2019-08-13 DIAGNOSIS — J96.11 CHRONIC HYPOXEMIC RESPIRATORY FAILURE (HCC): ICD-10-CM

## 2019-08-13 DIAGNOSIS — R06.02 SOB (SHORTNESS OF BREATH): Primary | ICD-10-CM

## 2019-08-13 DIAGNOSIS — J44.1 COPD EXACERBATION (HCC): ICD-10-CM

## 2019-08-13 DIAGNOSIS — J44.9 COPD, SEVERE (HCC): ICD-10-CM

## 2019-08-13 DIAGNOSIS — J43.2 CENTRILOBULAR EMPHYSEMA (HCC): ICD-10-CM

## 2019-08-13 PROCEDURE — 1036F TOBACCO NON-USER: CPT | Performed by: INTERNAL MEDICINE

## 2019-08-13 PROCEDURE — 4040F PNEUMOC VAC/ADMIN/RCVD: CPT | Performed by: INTERNAL MEDICINE

## 2019-08-13 PROCEDURE — G8417 CALC BMI ABV UP PARAM F/U: HCPCS | Performed by: INTERNAL MEDICINE

## 2019-08-13 PROCEDURE — 3023F SPIROM DOC REV: CPT | Performed by: INTERNAL MEDICINE

## 2019-08-13 PROCEDURE — 1123F ACP DISCUSS/DSCN MKR DOCD: CPT | Performed by: INTERNAL MEDICINE

## 2019-08-13 PROCEDURE — 99214 OFFICE O/P EST MOD 30 MIN: CPT | Performed by: INTERNAL MEDICINE

## 2019-08-13 PROCEDURE — G8926 SPIRO NO PERF OR DOC: HCPCS | Performed by: INTERNAL MEDICINE

## 2019-08-13 PROCEDURE — G8399 PT W/DXA RESULTS DOCUMENT: HCPCS | Performed by: INTERNAL MEDICINE

## 2019-08-13 PROCEDURE — G8427 DOCREV CUR MEDS BY ELIG CLIN: HCPCS | Performed by: INTERNAL MEDICINE

## 2019-08-13 PROCEDURE — 1090F PRES/ABSN URINE INCON ASSESS: CPT | Performed by: INTERNAL MEDICINE

## 2019-08-13 PROCEDURE — 3017F COLORECTAL CA SCREEN DOC REV: CPT | Performed by: INTERNAL MEDICINE

## 2019-08-13 RX ORDER — ALBUTEROL SULFATE 2.5 MG/3ML
2.5 SOLUTION RESPIRATORY (INHALATION) EVERY 6 HOURS PRN
Qty: 360 ML | Refills: 6 | Status: SHIPPED | OUTPATIENT
Start: 2019-08-13 | End: 2021-01-13

## 2019-08-13 RX ORDER — PREDNISONE 10 MG/1
TABLET ORAL
Qty: 30 TABLET | Refills: 0 | Status: SHIPPED | OUTPATIENT
Start: 2019-08-13 | End: 2019-08-23

## 2019-08-13 RX ORDER — DOXYCYCLINE HYCLATE 100 MG/1
100 CAPSULE ORAL DAILY
Qty: 10 CAPSULE | Refills: 3 | Status: SHIPPED | OUTPATIENT
Start: 2019-08-13 | End: 2019-08-23

## 2019-08-13 NOTE — PROGRESS NOTES
acceptable on supplemental O2. The patient benefits from the use of supplemental O2.    6 months      Chief Complaint   Patient presents with    Shortness of Breath     Acute Visit - pt started with increased sob on Thursday       [facility]  The patient presents with a chief complaint of shortness of breath. She has been worse over the last 4-5 days. She is congested but not coughing up mucus. No fever or chill reported. She is known to have  Severe COPD/Emphysema based on PFT and CT chest. She normally takes Symbicort, Spiriva and Albuterol HHN. No Chest pain, Nausea or vomiting reported      Review of Systems  As reviewed in HPI    History  I have reviewed past medical, surgical, social and family history. This is documented elsewhere in the medical record. Physical Exam:  Well developed, well nourished  Alert and oriented  Sclera is clear  No cervical adenopathy  No JVD. Chest examination is clear. Cardiac examination reveals regular rate and rhythm without murmur, gallop or rub. The abdomen is soft, nontender and nondistended. There is no clubbing, cyanosis or edema of the extremities. There is no obvious skin rash. No focal neuro deficicts  Normal mood and affect    No Known Allergies  Prior to Visit Medications    Medication Sig Taking? Authorizing Provider   sodium bicarbonate 650 MG tablet Take 1 tablet by mouth 2 times daily  Roldan Queen MD   calcium carbonate 648 MG TABS Take 1 tablet by mouth 2 times daily  Patient not taking: Reported on 7/25/2019  Roldan Queen MD   predniSONE (DELTASONE) 10 MG tablet Take 2 tabs x 7 days, then take 1 tab for 7 days  Jason Guevara, APRN - CNP   apixaban (ELIQUIS) 5 MG TABS tablet Take 1 tablet by mouth 2 times daily  Wayne Sears MD   [facility].  Devices (BATH/SHOWER SEAT) MISC Use as directed  Wayne Sears MD   alendronate (FOSAMAX) 70 MG tablet Take 1 tablet by mouth every 7 days  Bebo Pastor MD   potassium chloride (KLOR-CON) 10

## 2019-08-27 RX ORDER — AMITRIPTYLINE HYDROCHLORIDE 150 MG/1
75 TABLET, FILM COATED ORAL NIGHTLY
Qty: 45 TABLET | Refills: 3 | Status: SHIPPED | OUTPATIENT
Start: 2019-08-27 | End: 2020-08-19

## 2019-09-11 ENCOUNTER — OFFICE VISIT (OUTPATIENT)
Dept: INTERNAL MEDICINE CLINIC | Age: 69
End: 2019-09-11
Payer: MEDICARE

## 2019-09-11 VITALS
HEART RATE: 86 BPM | OXYGEN SATURATION: 91 % | DIASTOLIC BLOOD PRESSURE: 66 MMHG | HEIGHT: 59 IN | WEIGHT: 130 LBS | BODY MASS INDEX: 26.21 KG/M2 | SYSTOLIC BLOOD PRESSURE: 112 MMHG

## 2019-09-11 DIAGNOSIS — Z23 NEED FOR SHINGLES VACCINE: Primary | ICD-10-CM

## 2019-09-11 DIAGNOSIS — Z00.00 ROUTINE GENERAL MEDICAL EXAMINATION AT A HEALTH CARE FACILITY: ICD-10-CM

## 2019-09-11 PROCEDURE — 3017F COLORECTAL CA SCREEN DOC REV: CPT | Performed by: INTERNAL MEDICINE

## 2019-09-11 PROCEDURE — 1123F ACP DISCUSS/DSCN MKR DOCD: CPT | Performed by: INTERNAL MEDICINE

## 2019-09-11 PROCEDURE — 4040F PNEUMOC VAC/ADMIN/RCVD: CPT | Performed by: INTERNAL MEDICINE

## 2019-09-11 PROCEDURE — G0439 PPPS, SUBSEQ VISIT: HCPCS | Performed by: INTERNAL MEDICINE

## 2019-09-11 RX ORDER — AZITHROMYCIN 250 MG/1
250 TABLET, FILM COATED ORAL SEE ADMIN INSTRUCTIONS
Qty: 6 TABLET | Refills: 0 | Status: SHIPPED | OUTPATIENT
Start: 2019-09-11 | End: 2019-09-16

## 2019-09-11 ASSESSMENT — PATIENT HEALTH QUESTIONNAIRE - PHQ9
SUM OF ALL RESPONSES TO PHQ QUESTIONS 1-9: 0
SUM OF ALL RESPONSES TO PHQ QUESTIONS 1-9: 0

## 2019-09-11 ASSESSMENT — VISUAL ACUITY
OD_CC: 20/20
OS_CC: 20/20

## 2019-09-11 ASSESSMENT — LIFESTYLE VARIABLES: HOW OFTEN DO YOU HAVE A DRINK CONTAINING ALCOHOL: 0

## 2019-09-11 NOTE — PROGRESS NOTES
following? Laundry, housekeeping, banking/finances, shopping, telephone use, food preparation, transportation, or taking medications?: (!) Laundry, Banking/Finances, Telephone Use, Food Preparation, Taking Medications  ADL Interventions:  · Patient declines any further evaluation/treatment for this issue  · patient has some help 3 times per week from [place], she is doing well. Personalized Preventive Plan   Current Health Maintenance Status  Immunization History   Administered Date(s) Administered    DT (pediatric) 09/25/2010    Influenza Virus Vaccine 10/01/2012, 10/20/2015    Influenza Whole 10/01/2011    Influenza, High Dose (Fluzone 65 yrs and older) 10/06/2014, 10/19/2016, 11/16/2017, 11/05/2018    Pneumococcal Conjugate 13-valent (Hksifzt81) 06/11/2015    Pneumococcal Conjugate 7-valent (Prevnar7) 02/07/2005, 10/01/2011    Pneumococcal Polysaccharide (Ssrkvhgqt10) 06/15/2016    Zoster Live (Zostavax) 07/01/2013        Health Maintenance   Topic Date Due    Shingles Vaccine (2 of 3) 08/26/2013    Annual Wellness Visit (AWV)  05/29/2019    Flu vaccine (1) 09/01/2019    Breast cancer screen  12/07/2019    Lipid screen  01/21/2020    DTaP/Tdap/Td vaccine (2 - Tdap) 09/25/2020    Pneumococcal 65+ years Vaccine (2 of 2 - PPSV23) 06/15/2021    Colon cancer screen colonoscopy  04/19/2027    DEXA (modify frequency per FRAX score)  Completed    Hepatitis C screen  Completed     Recommendations for Preventive Services Due: see orders and patient instructions/AVS.  . Recommended screening schedule for the next 5-10 years is provided to the patient in written form: see Patient Instructions/AVS.    Onur Breg was seen today for medicare awv. Diagnoses and all orders for this visit:    Need for shingles vaccine  -     zoster recombinant adjuvanted vaccine [facility]) 50 MCG/0.5ML SUSR injection;  Inject 0.5 mLs into the muscle See Admin Instructions 1 dose now and repeat in 2-6 months    Routine general medical examination at a health care facility    Other orders  -     azithromycin (ZITHROMAX) 250 MG tablet;  Take 1 tablet by mouth See Admin Instructions for 5 days 500mg on day 1 followed by 250mg on days 2 - 5

## 2019-09-11 NOTE — PATIENT INSTRUCTIONS
muffins, bagels, or delaney bread Rye bread Whole-wheat crackers or crisp breads Whole-grain or bran cereals Oatmeal, oat bran, or grits Wheat germ Whole-wheat pasta and brown rice   Read the ingredients list on food labels. Look for products that list \"whole\" as the first ingredient (eg, whole-wheat, whole oats). Choose cereals with at least 2 grams of fiber per serving. Vegetables   All vegetables, especially asparagus, bean sprouts, broccoli, [place] sprouts, cabbage, carrots, cauliflower, celery, corn, greens, green beans, green pepper, onions, peas, potatoes (with skin), snow peas, spinach, squash, sweet potatoes, tomatoes, zucchini   For maximum fiber intake, eat the peels of fruits and vegetablesjust be sure to wash them well first.   Fruits   All fruits, especially apples, berries, grapefruits, mangoes, nectarines, oranges, peaches, pears, dried fruits (figs, dates, prunes, raisins)   Choose raw fruits and vegetables over juice, cooked, or cannedraw fruit has more fiber. Dried fruit is also a good source of fiber. Milk   With the exception of yogurt containing inulin (a type of fiber), dairy foods provide little fiber. Add more fiber by topping your yogurt or cottage cheese with fresh fruit, whole grain or bran cereals, nuts, or seeds. Meats and Beans   All beans and peas, especially Garbanzo beans, kidney beans, lentils, lima beans, split peas, and shaffer beans All nuts and seeds, especially almonds, peanuts, [place] nuts, cashews, peanut butter, walnuts, sesame and sunflower seeds All meat, poultry, fish, and eggs   Increase fiber in meat dishes by adding shaffer beans, kidney beans, black-eyed peas, bran, or oatmeal. If you are following a low-fat diet, use nuts and seeds only in moderation.    Fats and Oils   All in moderation   Fats and oils do not provide fiber   Snacks, Sweets, and Condiments   Fruit Nuts Popcorn, whole-wheat pretzels, or trail mix made with dried fruits, nuts, and seeds Cakes,

## 2019-09-13 ENCOUNTER — TELEPHONE (OUTPATIENT)
Dept: INTERNAL MEDICINE CLINIC | Age: 69
End: 2019-09-13

## 2019-09-13 RX ORDER — PREDNISONE 10 MG/1
TABLET ORAL
Qty: 40 TABLET | Refills: 0 | Status: SHIPPED | OUTPATIENT
Start: 2019-09-13 | End: 2019-09-29

## 2019-09-13 NOTE — TELEPHONE ENCOUNTER
Patient called and stated that after two days of medication she is not feeling any different and can barely walk from one room to the other without getting short of breath. She had a hard time this morning to take a shower without her O2.

## 2019-09-23 ENCOUNTER — OFFICE VISIT (OUTPATIENT)
Dept: PULMONOLOGY | Age: 69
End: 2019-09-23
Payer: MEDICARE

## 2019-09-23 VITALS — DIASTOLIC BLOOD PRESSURE: 83 MMHG | HEART RATE: 85 BPM | OXYGEN SATURATION: 95 % | SYSTOLIC BLOOD PRESSURE: 125 MMHG

## 2019-09-23 DIAGNOSIS — J43.2 CENTRILOBULAR EMPHYSEMA (HCC): ICD-10-CM

## 2019-09-23 DIAGNOSIS — R06.02 SOB (SHORTNESS OF BREATH): Primary | ICD-10-CM

## 2019-09-23 DIAGNOSIS — J44.1 COPD EXACERBATION (HCC): ICD-10-CM

## 2019-09-23 DIAGNOSIS — J44.9 COPD, SEVERE (HCC): ICD-10-CM

## 2019-09-23 DIAGNOSIS — J96.11 CHRONIC HYPOXEMIC RESPIRATORY FAILURE (HCC): ICD-10-CM

## 2019-09-23 PROCEDURE — 1123F ACP DISCUSS/DSCN MKR DOCD: CPT | Performed by: INTERNAL MEDICINE

## 2019-09-23 PROCEDURE — 4040F PNEUMOC VAC/ADMIN/RCVD: CPT | Performed by: INTERNAL MEDICINE

## 2019-09-23 PROCEDURE — 1036F TOBACCO NON-USER: CPT | Performed by: INTERNAL MEDICINE

## 2019-09-23 PROCEDURE — 1090F PRES/ABSN URINE INCON ASSESS: CPT | Performed by: INTERNAL MEDICINE

## 2019-09-23 PROCEDURE — 3017F COLORECTAL CA SCREEN DOC REV: CPT | Performed by: INTERNAL MEDICINE

## 2019-09-23 PROCEDURE — G8417 CALC BMI ABV UP PARAM F/U: HCPCS | Performed by: INTERNAL MEDICINE

## 2019-09-23 PROCEDURE — 3023F SPIROM DOC REV: CPT | Performed by: INTERNAL MEDICINE

## 2019-09-23 PROCEDURE — G8399 PT W/DXA RESULTS DOCUMENT: HCPCS | Performed by: INTERNAL MEDICINE

## 2019-09-23 PROCEDURE — G8427 DOCREV CUR MEDS BY ELIG CLIN: HCPCS | Performed by: INTERNAL MEDICINE

## 2019-09-23 PROCEDURE — G8926 SPIRO NO PERF OR DOC: HCPCS | Performed by: INTERNAL MEDICINE

## 2019-09-23 PROCEDURE — 99214 OFFICE O/P EST MOD 30 MIN: CPT | Performed by: INTERNAL MEDICINE

## 2019-09-23 NOTE — PROGRESS NOTES
Haider Olson MD   albuterol (PROVENTIL) (2.5 MG/3ML) 0.083% nebulizer solution Take 3 mLs by nebulization every 6 hours as needed for Wheezing Dx: COPD      ICD-10: J44.9  Liborio Gutierres MD   tiotropium (SPIRIVA RESPIMAT) 2.5 MCG/ACT AERS inhaler Inhale 2 puffs into the lungs daily  Liborio Gutierres MD   calcium carbonate 648 MG TABS Take 1 tablet by mouth 2 times daily  Haider Olson MD   apixaban (ELIQUIS) 5 MG TABS tablet Take 1 tablet by mouth 2 times daily  Shashi Powell MD   Misc. Devices (BATH/SHOWER SEAT) MISC Use as directed  Shashi Powell MD   alendronate (FOSAMAX) 70 MG tablet Take 1 tablet by mouth every 7 days  Temi Xavier MD   potassium chloride (KLOR-CON) 10 MEQ extended release tablet Take 10 mEq by mouth 2 times daily  Historical Provider, MD   tiZANidine (ZANAFLEX) 4 MG tablet Take 1 tablet by mouth 3 times daily  Shashi Powell MD   budesonide-formoterol (SYMBICORT) 160-4.5 MCG/ACT AERO Inhale 2 puffs into the lungs 2 times daily  Jayme Cabral MD   albuterol sulfate  (90 Base) MCG/ACT inhaler Inhale 2 puffs into the lungs every 6 hours as needed for Wheezing  Shashi Powell MD   omeprazole (PRILOSEC) 20 MG delayed release capsule Take 20 mg by mouth Daily Takes as needed  Historical Provider, MD   Lift Chair MISC by Does not apply route Please dispense a Lift Chair  Shashi Powell MD   guaiFENesin (MUCINEX) 600 MG extended release tablet Take 1 tablet by mouth 2 times daily  Mauro Lenz MD   Misc. Devices (ROLLER [place]) MISC 1 each by Does not apply route daily Please dispense a walker with a seat  Shashi Powell MD   Adalimumab (HUMIRA) 20 MG/0.4ML KIT Inject 1 vial into the skin every 14 days   Historical Provider, MD       Vitals:    09/23/19 1318   BP: 125/83   Pulse: 85   SpO2: 95%     There is no height or weight on file to calculate BMI.      Wt Readings from Last 3 Encounters:   09/11/19 130 lb (59 kg)   08/13/19 133

## 2019-10-14 ENCOUNTER — HOSPITAL ENCOUNTER (OUTPATIENT)
Dept: WOMENS IMAGING | Age: 69
Discharge: HOME | End: 2019-10-14
Payer: MEDICARE

## 2019-10-14 ENCOUNTER — HOSPITAL ENCOUNTER (OUTPATIENT)
Age: 69
Discharge: HOME | End: 2019-10-14
Payer: MEDICARE

## 2019-10-14 DIAGNOSIS — Z12.31 ENCOUNTER FOR SCREENING MAMMOGRAM FOR BREAST CANCER: ICD-10-CM

## 2019-10-14 LAB
ANISOCYTOSIS: ABNORMAL
ATYPICAL LYMPHOCYTE RELATIVE PERCENT: 2 % (ref 0–6)
BANDED NEUTROPHILS RELATIVE PERCENT: 3 % (ref 0–7)
BASOPHILS ABSOLUTE: 0.1 K/UL (ref 0–0.2)
BASOPHILS RELATIVE PERCENT: 1 %
EOSINOPHILS ABSOLUTE: 0.4 K/UL (ref 0–0.6)
EOSINOPHILS RELATIVE PERCENT: 4 %
FERRITIN: 585.9 NG/ML (ref 15–150)
HCT VFR BLD CALC: 35.7 % (ref 36–48)
HEMATOLOGY PATH CONSULT: NO
HEMOGLOBIN: 11.2 G/DL (ref 12–16)
HYPOCHROMIA: ABNORMAL
IRON SATURATION: 34 % (ref 15–50)
IRON: 115 UG/DL (ref 37–145)
LYMPHOCYTES ABSOLUTE: 3.1 K/UL (ref 1–5.1)
LYMPHOCYTES RELATIVE PERCENT: 31 %
MCH RBC QN AUTO: 20.7 PG (ref 26–34)
MCHC RBC AUTO-ENTMCNC: 31.3 G/DL (ref 31–36)
MCV RBC AUTO: 66.1 FL (ref 80–100)
MICROCYTES: ABNORMAL
MONOCYTES ABSOLUTE: 0.3 K/UL (ref 0–1.3)
MONOCYTES RELATIVE PERCENT: 3 %
NEUTROPHILS ABSOLUTE: 5.5 K/UL (ref 1.7–7.7)
NEUTROPHILS RELATIVE PERCENT: 56 %
OVALOCYTES: ABNORMAL
PDW BLD-RTO: 17.2 % (ref 12.4–15.4)
PLATELET # BLD: 315 K/UL (ref 135–450)
PMV BLD AUTO: 8 FL (ref 5–10.5)
RBC # BLD: 5.39 M/UL (ref 4–5.2)
SCHISTOCYTES: ABNORMAL
TARGET CELLS: ABNORMAL
TOTAL IRON BINDING CAPACITY: 339 UG/DL (ref 260–445)
WBC # BLD: 9.3 K/UL (ref 4–11)

## 2019-10-14 PROCEDURE — 83540 ASSAY OF IRON: CPT

## 2019-10-14 PROCEDURE — 85025 COMPLETE CBC W/AUTO DIFF WBC: CPT

## 2019-10-14 PROCEDURE — 82728 ASSAY OF FERRITIN: CPT

## 2019-10-14 PROCEDURE — 83550 IRON BINDING TEST: CPT

## 2019-10-14 PROCEDURE — 36415 COLL VENOUS BLD VENIPUNCTURE: CPT

## 2019-10-14 PROCEDURE — 77063 BREAST TOMOSYNTHESIS BI: CPT

## 2019-11-13 ENCOUNTER — OFFICE VISIT (OUTPATIENT)
Dept: INTERNAL MEDICINE CLINIC | Age: 69
End: 2019-11-13
Payer: MEDICARE

## 2019-11-13 DIAGNOSIS — J44.9 COPD, SEVERE (HCC): Primary | ICD-10-CM

## 2019-11-13 DIAGNOSIS — Z23 NEED FOR INFLUENZA VACCINATION: ICD-10-CM

## 2019-11-13 PROCEDURE — G0008 ADMIN INFLUENZA VIRUS VAC: HCPCS | Performed by: INTERNAL MEDICINE

## 2019-11-13 PROCEDURE — G8926 SPIRO NO PERF OR DOC: HCPCS | Performed by: INTERNAL MEDICINE

## 2019-11-13 PROCEDURE — 3017F COLORECTAL CA SCREEN DOC REV: CPT | Performed by: INTERNAL MEDICINE

## 2019-11-13 PROCEDURE — 1090F PRES/ABSN URINE INCON ASSESS: CPT | Performed by: INTERNAL MEDICINE

## 2019-11-13 PROCEDURE — G8417 CALC BMI ABV UP PARAM F/U: HCPCS | Performed by: INTERNAL MEDICINE

## 2019-11-13 PROCEDURE — G8399 PT W/DXA RESULTS DOCUMENT: HCPCS | Performed by: INTERNAL MEDICINE

## 2019-11-13 PROCEDURE — G8482 FLU IMMUNIZE ORDER/ADMIN: HCPCS | Performed by: INTERNAL MEDICINE

## 2019-11-13 PROCEDURE — 90653 IIV ADJUVANT VACCINE IM: CPT | Performed by: INTERNAL MEDICINE

## 2019-11-13 PROCEDURE — 4040F PNEUMOC VAC/ADMIN/RCVD: CPT | Performed by: INTERNAL MEDICINE

## 2019-11-13 PROCEDURE — 1036F TOBACCO NON-USER: CPT | Performed by: INTERNAL MEDICINE

## 2019-11-13 PROCEDURE — 1123F ACP DISCUSS/DSCN MKR DOCD: CPT | Performed by: INTERNAL MEDICINE

## 2019-11-13 PROCEDURE — G8427 DOCREV CUR MEDS BY ELIG CLIN: HCPCS | Performed by: INTERNAL MEDICINE

## 2019-11-13 PROCEDURE — 3023F SPIROM DOC REV: CPT | Performed by: INTERNAL MEDICINE

## 2019-11-13 PROCEDURE — 99214 OFFICE O/P EST MOD 30 MIN: CPT | Performed by: INTERNAL MEDICINE

## 2019-11-13 RX ORDER — BUDESONIDE AND FORMOTEROL FUMARATE DIHYDRATE 160; 4.5 UG/1; UG/1
2 AEROSOL RESPIRATORY (INHALATION) 2 TIMES DAILY
Qty: 3 INHALER | Refills: 3 | Status: SHIPPED | OUTPATIENT
Start: 2019-11-13 | End: 2021-01-11

## 2019-11-13 RX ORDER — PREDNISONE 50 MG/1
50 TABLET ORAL DAILY
Qty: 5 TABLET | Refills: 0 | Status: SHIPPED | OUTPATIENT
Start: 2019-11-13 | End: 2019-11-18

## 2019-11-13 ASSESSMENT — ENCOUNTER SYMPTOMS
EYE PAIN: 0
WHEEZING: 1
EYE ITCHING: 0
COUGH: 1
SHORTNESS OF BREATH: 1

## 2019-11-18 ENCOUNTER — TELEPHONE (OUTPATIENT)
Dept: INTERNAL MEDICINE CLINIC | Age: 69
End: 2019-11-18

## 2019-12-09 ENCOUNTER — TELEPHONE (OUTPATIENT)
Dept: INTERNAL MEDICINE CLINIC | Age: 69
End: 2019-12-09

## 2019-12-09 RX ORDER — KETOCONAZOLE 20 MG/G
CREAM TOPICAL
Qty: 30 G | Refills: 1 | Status: SHIPPED | OUTPATIENT
Start: 2019-12-09 | End: 2019-12-16

## 2019-12-16 ENCOUNTER — NURSE TRIAGE (OUTPATIENT)
Dept: OTHER | Facility: CLINIC | Age: 69
End: 2019-12-16

## 2019-12-17 ENCOUNTER — APPOINTMENT (OUTPATIENT)
Dept: CT IMAGING | Age: 69
End: 2019-12-17
Payer: MEDICARE

## 2019-12-17 ENCOUNTER — HOSPITAL ENCOUNTER (EMERGENCY)
Age: 69
Discharge: LEFT BEFORE DISCHARGE | End: 2019-12-17
Attending: EMERGENCY MEDICINE
Payer: MEDICARE

## 2019-12-17 ENCOUNTER — APPOINTMENT (OUTPATIENT)
Dept: GENERAL RADIOLOGY | Age: 69
End: 2019-12-17
Payer: MEDICARE

## 2019-12-17 VITALS
HEIGHT: 60 IN | WEIGHT: 140 LBS | OXYGEN SATURATION: 97 % | SYSTOLIC BLOOD PRESSURE: 127 MMHG | TEMPERATURE: 97.1 F | DIASTOLIC BLOOD PRESSURE: 81 MMHG | RESPIRATION RATE: 17 BRPM | HEART RATE: 91 BPM | BODY MASS INDEX: 27.48 KG/M2

## 2019-12-17 DIAGNOSIS — R07.9 CHEST PAIN, UNSPECIFIED TYPE: ICD-10-CM

## 2019-12-17 DIAGNOSIS — N39.0 BACTERIAL URINARY TRACT INFECTION: ICD-10-CM

## 2019-12-17 DIAGNOSIS — Z79.01 ADEQUATE ANTICOAGULATION ON ANTICOAGULANT THERAPY: ICD-10-CM

## 2019-12-17 DIAGNOSIS — E87.6 HYPOKALEMIA: ICD-10-CM

## 2019-12-17 DIAGNOSIS — Z86.711 HISTORY OF PULMONARY EMBOLISM: ICD-10-CM

## 2019-12-17 DIAGNOSIS — J44.1 ACUTE EXACERBATION OF CHRONIC OBSTRUCTIVE PULMONARY DISEASE (COPD) (HCC): Primary | ICD-10-CM

## 2019-12-17 DIAGNOSIS — A49.9 BACTERIAL URINARY TRACT INFECTION: ICD-10-CM

## 2019-12-17 LAB
A/G RATIO: 1.1 (ref 1.1–2.2)
ALBUMIN SERPL-MCNC: 4 G/DL (ref 3.4–5)
ALP BLD-CCNC: 86 U/L (ref 40–129)
ALT SERPL-CCNC: 11 U/L (ref 10–40)
ANION GAP SERPL CALCULATED.3IONS-SCNC: 17 MMOL/L (ref 3–16)
AST SERPL-CCNC: 14 U/L (ref 15–37)
BASOPHILS ABSOLUTE: 0.1 K/UL (ref 0–0.2)
BASOPHILS RELATIVE PERCENT: 1.3 %
BILIRUB SERPL-MCNC: <0.2 MG/DL (ref 0–1)
BILIRUBIN URINE: NEGATIVE
BLOOD, URINE: NEGATIVE
BUN BLDV-MCNC: 20 MG/DL (ref 7–20)
CALCIUM SERPL-MCNC: 8.2 MG/DL (ref 8.3–10.6)
CHLORIDE BLD-SCNC: 106 MMOL/L (ref 99–110)
CLARITY: CLEAR
CO2: 16 MMOL/L (ref 21–32)
COLOR: YELLOW
CREAT SERPL-MCNC: 1.7 MG/DL (ref 0.6–1.2)
EKG ATRIAL RATE: 89 BPM
EKG DIAGNOSIS: NORMAL
EKG P AXIS: 42 DEGREES
EKG P-R INTERVAL: 128 MS
EKG Q-T INTERVAL: 388 MS
EKG QRS DURATION: 78 MS
EKG QTC CALCULATION (BAZETT): 472 MS
EKG R AXIS: 1 DEGREES
EKG T AXIS: 71 DEGREES
EKG VENTRICULAR RATE: 89 BPM
EOSINOPHILS ABSOLUTE: 0.3 K/UL (ref 0–0.6)
EOSINOPHILS RELATIVE PERCENT: 3.2 %
EPITHELIAL CELLS, UA: 5 /HPF (ref 0–5)
GFR AFRICAN AMERICAN: 36
GFR NON-AFRICAN AMERICAN: 30
GLOBULIN: 3.6 G/DL
GLUCOSE BLD-MCNC: 100 MG/DL (ref 70–99)
GLUCOSE URINE: NEGATIVE MG/DL
HCT VFR BLD CALC: 33.8 % (ref 36–48)
HEMOGLOBIN: 10.8 G/DL (ref 12–16)
HYALINE CASTS: 0 /LPF (ref 0–8)
KETONES, URINE: NEGATIVE MG/DL
LEUKOCYTE ESTERASE, URINE: ABNORMAL
LYMPHOCYTES ABSOLUTE: 4.7 K/UL (ref 1–5.1)
LYMPHOCYTES RELATIVE PERCENT: 45 %
MAGNESIUM: 1.9 MG/DL (ref 1.8–2.4)
MCH RBC QN AUTO: 20.5 PG (ref 26–34)
MCHC RBC AUTO-ENTMCNC: 32 G/DL (ref 31–36)
MCV RBC AUTO: 64.1 FL (ref 80–100)
MICROSCOPIC EXAMINATION: YES
MONOCYTES ABSOLUTE: 0.4 K/UL (ref 0–1.3)
MONOCYTES RELATIVE PERCENT: 3.9 %
NEUTROPHILS ABSOLUTE: 4.8 K/UL (ref 1.7–7.7)
NEUTROPHILS RELATIVE PERCENT: 46.6 %
NITRITE, URINE: NEGATIVE
PDW BLD-RTO: 16 % (ref 12.4–15.4)
PH UA: 6 (ref 5–8)
PLATELET # BLD: 388 K/UL (ref 135–450)
PMV BLD AUTO: 7.7 FL (ref 5–10.5)
POTASSIUM REFLEX MAGNESIUM: 2.7 MMOL/L (ref 3.5–5.1)
PRO-BNP: 265 PG/ML (ref 0–124)
PROTEIN UA: NEGATIVE MG/DL
RBC # BLD: 5.27 M/UL (ref 4–5.2)
RBC UA: 1 /HPF (ref 0–4)
SODIUM BLD-SCNC: 139 MMOL/L (ref 136–145)
SPECIFIC GRAVITY UA: 1.01 (ref 1–1.03)
TOTAL PROTEIN: 7.6 G/DL (ref 6.4–8.2)
TROPONIN: 0.01 NG/ML
URINE REFLEX TO CULTURE: YES
URINE TYPE: ABNORMAL
UROBILINOGEN, URINE: 0.2 E.U./DL
WBC # BLD: 10.3 K/UL (ref 4–11)
WBC UA: 17 /HPF (ref 0–5)

## 2019-12-17 PROCEDURE — 71046 X-RAY EXAM CHEST 2 VIEWS: CPT

## 2019-12-17 PROCEDURE — 6370000000 HC RX 637 (ALT 250 FOR IP): Performed by: PHYSICIAN ASSISTANT

## 2019-12-17 PROCEDURE — 81001 URINALYSIS AUTO W/SCOPE: CPT

## 2019-12-17 PROCEDURE — 6360000002 HC RX W HCPCS: Performed by: PHYSICIAN ASSISTANT

## 2019-12-17 PROCEDURE — 94640 AIRWAY INHALATION TREATMENT: CPT

## 2019-12-17 PROCEDURE — 85025 COMPLETE CBC W/AUTO DIFF WBC: CPT

## 2019-12-17 PROCEDURE — 80053 COMPREHEN METABOLIC PANEL: CPT

## 2019-12-17 PROCEDURE — 83735 ASSAY OF MAGNESIUM: CPT

## 2019-12-17 PROCEDURE — 93005 ELECTROCARDIOGRAM TRACING: CPT | Performed by: EMERGENCY MEDICINE

## 2019-12-17 PROCEDURE — 96374 THER/PROPH/DIAG INJ IV PUSH: CPT

## 2019-12-17 PROCEDURE — 84484 ASSAY OF TROPONIN QUANT: CPT

## 2019-12-17 PROCEDURE — 99285 EMERGENCY DEPT VISIT HI MDM: CPT

## 2019-12-17 PROCEDURE — 83880 ASSAY OF NATRIURETIC PEPTIDE: CPT

## 2019-12-17 PROCEDURE — 6360000004 HC RX CONTRAST MEDICATION: Performed by: EMERGENCY MEDICINE

## 2019-12-17 PROCEDURE — 93010 ELECTROCARDIOGRAM REPORT: CPT | Performed by: INTERNAL MEDICINE

## 2019-12-17 PROCEDURE — 87086 URINE CULTURE/COLONY COUNT: CPT

## 2019-12-17 PROCEDURE — 94760 N-INVAS EAR/PLS OXIMETRY 1: CPT

## 2019-12-17 PROCEDURE — 71260 CT THORAX DX C+: CPT

## 2019-12-17 RX ORDER — METHYLPREDNISOLONE SODIUM SUCCINATE 125 MG/2ML
125 INJECTION, POWDER, LYOPHILIZED, FOR SOLUTION INTRAMUSCULAR; INTRAVENOUS ONCE
Status: COMPLETED | OUTPATIENT
Start: 2019-12-17 | End: 2019-12-17

## 2019-12-17 RX ORDER — ASPIRIN 81 MG/1
324 TABLET, CHEWABLE ORAL ONCE
Status: COMPLETED | OUTPATIENT
Start: 2019-12-17 | End: 2019-12-17

## 2019-12-17 RX ORDER — POTASSIUM CHLORIDE 20 MEQ/1
40 TABLET, EXTENDED RELEASE ORAL ONCE
Status: COMPLETED | OUTPATIENT
Start: 2019-12-17 | End: 2019-12-17

## 2019-12-17 RX ORDER — PREDNISONE 10 MG/1
TABLET ORAL
Qty: 30 TABLET | Refills: 0 | Status: SHIPPED | OUTPATIENT
Start: 2019-12-17 | End: 2019-12-27

## 2019-12-17 RX ORDER — ALBUTEROL SULFATE 2.5 MG/3ML
5 SOLUTION RESPIRATORY (INHALATION) ONCE
Status: COMPLETED | OUTPATIENT
Start: 2019-12-17 | End: 2019-12-17

## 2019-12-17 RX ORDER — POTASSIUM CHLORIDE 7.45 MG/ML
10 INJECTION INTRAVENOUS
Status: DISCONTINUED | OUTPATIENT
Start: 2019-12-17 | End: 2019-12-17

## 2019-12-17 RX ORDER — CEPHALEXIN 500 MG/1
500 CAPSULE ORAL 2 TIMES DAILY
Qty: 10 CAPSULE | Refills: 0 | Status: SHIPPED | OUTPATIENT
Start: 2019-12-17 | End: 2019-12-22

## 2019-12-17 RX ORDER — IPRATROPIUM BROMIDE AND ALBUTEROL SULFATE 2.5; .5 MG/3ML; MG/3ML
1 SOLUTION RESPIRATORY (INHALATION) ONCE
Status: COMPLETED | OUTPATIENT
Start: 2019-12-17 | End: 2019-12-17

## 2019-12-17 RX ORDER — PREDNISONE 20 MG/1
60 TABLET ORAL ONCE
Status: DISCONTINUED | OUTPATIENT
Start: 2019-12-17 | End: 2019-12-17

## 2019-12-17 RX ADMIN — IPRATROPIUM BROMIDE AND ALBUTEROL SULFATE 1 AMPULE: .5; 3 SOLUTION RESPIRATORY (INHALATION) at 07:16

## 2019-12-17 RX ADMIN — ASPIRIN 81 MG 324 MG: 81 TABLET ORAL at 09:15

## 2019-12-17 RX ADMIN — METHYLPREDNISOLONE SODIUM SUCCINATE 125 MG: 125 INJECTION, POWDER, FOR SOLUTION INTRAMUSCULAR; INTRAVENOUS at 09:16

## 2019-12-17 RX ADMIN — IOPAMIDOL 75 ML: 755 INJECTION, SOLUTION INTRAVENOUS at 09:47

## 2019-12-17 RX ADMIN — ALBUTEROL SULFATE 5 MG: 2.5 SOLUTION RESPIRATORY (INHALATION) at 07:18

## 2019-12-17 RX ADMIN — POTASSIUM CHLORIDE 40 MEQ: 20 TABLET, EXTENDED RELEASE ORAL at 11:05

## 2019-12-17 ASSESSMENT — ENCOUNTER SYMPTOMS
DIARRHEA: 0
CHEST TIGHTNESS: 0
COLOR CHANGE: 0
VOMITING: 0
BACK PAIN: 0
NAUSEA: 0
SHORTNESS OF BREATH: 1
WHEEZING: 1
ABDOMINAL PAIN: 0

## 2019-12-17 ASSESSMENT — PAIN SCALES - GENERAL: PAINLEVEL_OUTOF10: 8

## 2019-12-18 LAB — URINE CULTURE, ROUTINE: NORMAL

## 2020-01-05 ENCOUNTER — APPOINTMENT (OUTPATIENT)
Dept: CT IMAGING | Age: 70
End: 2020-01-05
Payer: MEDICARE

## 2020-01-05 ENCOUNTER — APPOINTMENT (OUTPATIENT)
Dept: GENERAL RADIOLOGY | Age: 70
End: 2020-01-05
Payer: MEDICARE

## 2020-01-05 ENCOUNTER — HOSPITAL ENCOUNTER (EMERGENCY)
Age: 70
Discharge: HOME | End: 2020-01-06
Attending: EMERGENCY MEDICINE
Payer: MEDICARE

## 2020-01-05 LAB
ALBUMIN SERPL-MCNC: 3.3 G/DL (ref 3.4–5)
ALP BLD-CCNC: 82 U/L (ref 40–129)
ALT SERPL-CCNC: 10 U/L (ref 10–40)
ANION GAP SERPL CALCULATED.3IONS-SCNC: 15 MMOL/L (ref 3–16)
ANISOCYTOSIS: ABNORMAL
AST SERPL-CCNC: 24 U/L (ref 15–37)
ATYPICAL LYMPHOCYTE RELATIVE PERCENT: 5 % (ref 0–6)
BACTERIA: ABNORMAL /HPF
BASOPHILS ABSOLUTE: 0 K/UL (ref 0–0.2)
BASOPHILS RELATIVE PERCENT: 0 %
BILIRUB SERPL-MCNC: 0.3 MG/DL (ref 0–1)
BILIRUBIN DIRECT: <0.2 MG/DL (ref 0–0.3)
BILIRUBIN URINE: NEGATIVE
BILIRUBIN, INDIRECT: ABNORMAL MG/DL (ref 0–1)
BLOOD, URINE: ABNORMAL
BUN BLDV-MCNC: 12 MG/DL (ref 7–20)
CALCIUM SERPL-MCNC: 8.7 MG/DL (ref 8.3–10.6)
CHLORIDE BLD-SCNC: 103 MMOL/L (ref 99–110)
CLARITY: ABNORMAL
CO2: 15 MMOL/L (ref 21–32)
COLOR: YELLOW
COMMENT UA: ABNORMAL
CREAT SERPL-MCNC: 1.7 MG/DL (ref 0.6–1.2)
EOSINOPHILS ABSOLUTE: 0.1 K/UL (ref 0–0.6)
EOSINOPHILS RELATIVE PERCENT: 1 %
EPITHELIAL CELLS, UA: 10 /HPF (ref 0–5)
GFR AFRICAN AMERICAN: 36
GFR NON-AFRICAN AMERICAN: 30
GLUCOSE BLD-MCNC: 91 MG/DL (ref 70–99)
GLUCOSE URINE: NEGATIVE MG/DL
HCT VFR BLD CALC: 37.3 % (ref 36–48)
HEMATOLOGY PATH CONSULT: NO
HEMOGLOBIN: 11.6 G/DL (ref 12–16)
KETONES, URINE: NEGATIVE MG/DL
LEUKOCYTE ESTERASE, URINE: ABNORMAL
LIPASE: 30 U/L (ref 13–60)
LYMPHOCYTES ABSOLUTE: 3.1 K/UL (ref 1–5.1)
LYMPHOCYTES RELATIVE PERCENT: 28 %
MAGNESIUM: 2.1 MG/DL (ref 1.8–2.4)
MCH RBC QN AUTO: 20.7 PG (ref 26–34)
MCHC RBC AUTO-ENTMCNC: 31.2 G/DL (ref 31–36)
MCV RBC AUTO: 66.5 FL (ref 80–100)
MICROCYTES: ABNORMAL
MICROSCOPIC EXAMINATION: YES
MONOCYTES ABSOLUTE: 0.4 K/UL (ref 0–1.3)
MONOCYTES RELATIVE PERCENT: 4 %
NEUTROPHILS ABSOLUTE: 5.8 K/UL (ref 1.7–7.7)
NEUTROPHILS RELATIVE PERCENT: 62 %
NITRITE, URINE: NEGATIVE
PDW BLD-RTO: 17.1 % (ref 12.4–15.4)
PH UA: 5.5 (ref 5–8)
PLATELET # BLD: 364 K/UL (ref 135–450)
PLATELET SLIDE REVIEW: ADEQUATE
PMV BLD AUTO: 7.4 FL (ref 5–10.5)
POTASSIUM SERPL-SCNC: 3 MMOL/L (ref 3.5–5.1)
PRO-BNP: 250 PG/ML (ref 0–124)
PROTEIN UA: 30 MG/DL
RBC # BLD: 5.62 M/UL (ref 4–5.2)
RBC UA: ABNORMAL /HPF (ref 0–2)
SLIDE REVIEW: ABNORMAL
SODIUM BLD-SCNC: 133 MMOL/L (ref 136–145)
SPECIFIC GRAVITY UA: 1.01 (ref 1–1.03)
TOTAL PROTEIN: 7.6 G/DL (ref 6.4–8.2)
TROPONIN: 0.01 NG/ML
TROPONIN: <0.01 NG/ML
URINE REFLEX TO CULTURE: YES
URINE TYPE: ABNORMAL
UROBILINOGEN, URINE: 0.2 E.U./DL
WBC # BLD: 9.4 K/UL (ref 4–11)
WBC UA: 597 /HPF (ref 0–5)

## 2020-01-05 PROCEDURE — 96374 THER/PROPH/DIAG INJ IV PUSH: CPT

## 2020-01-05 PROCEDURE — 94640 AIRWAY INHALATION TREATMENT: CPT

## 2020-01-05 PROCEDURE — 99285 EMERGENCY DEPT VISIT HI MDM: CPT

## 2020-01-05 PROCEDURE — 6370000000 HC RX 637 (ALT 250 FOR IP): Performed by: PHYSICIAN ASSISTANT

## 2020-01-05 PROCEDURE — 80048 BASIC METABOLIC PNL TOTAL CA: CPT

## 2020-01-05 PROCEDURE — 83880 ASSAY OF NATRIURETIC PEPTIDE: CPT

## 2020-01-05 PROCEDURE — 94760 N-INVAS EAR/PLS OXIMETRY 1: CPT

## 2020-01-05 PROCEDURE — 74176 CT ABD & PELVIS W/O CONTRAST: CPT

## 2020-01-05 PROCEDURE — 71250 CT THORAX DX C-: CPT

## 2020-01-05 PROCEDURE — 84484 ASSAY OF TROPONIN QUANT: CPT

## 2020-01-05 PROCEDURE — 71046 X-RAY EXAM CHEST 2 VIEWS: CPT

## 2020-01-05 PROCEDURE — 81001 URINALYSIS AUTO W/SCOPE: CPT

## 2020-01-05 PROCEDURE — 85025 COMPLETE CBC W/AUTO DIFF WBC: CPT

## 2020-01-05 PROCEDURE — 96375 TX/PRO/DX INJ NEW DRUG ADDON: CPT

## 2020-01-05 PROCEDURE — 83690 ASSAY OF LIPASE: CPT

## 2020-01-05 PROCEDURE — 6360000002 HC RX W HCPCS: Performed by: PHYSICIAN ASSISTANT

## 2020-01-05 PROCEDURE — 80076 HEPATIC FUNCTION PANEL: CPT

## 2020-01-05 PROCEDURE — 93005 ELECTROCARDIOGRAM TRACING: CPT | Performed by: EMERGENCY MEDICINE

## 2020-01-05 PROCEDURE — 87086 URINE CULTURE/COLONY COUNT: CPT

## 2020-01-05 PROCEDURE — 83735 ASSAY OF MAGNESIUM: CPT

## 2020-01-05 RX ORDER — POTASSIUM CHLORIDE 750 MG/1
40 TABLET, FILM COATED, EXTENDED RELEASE ORAL ONCE
Status: COMPLETED | OUTPATIENT
Start: 2020-01-05 | End: 2020-01-05

## 2020-01-05 RX ORDER — ONDANSETRON 2 MG/ML
4 INJECTION INTRAMUSCULAR; INTRAVENOUS ONCE
Status: COMPLETED | OUTPATIENT
Start: 2020-01-05 | End: 2020-01-05

## 2020-01-05 RX ORDER — CEFUROXIME AXETIL 250 MG/1
250 TABLET ORAL 2 TIMES DAILY
Qty: 14 TABLET | Refills: 0 | Status: SHIPPED | OUTPATIENT
Start: 2020-01-05 | End: 2020-01-12

## 2020-01-05 RX ORDER — METHYLPREDNISOLONE SODIUM SUCCINATE 125 MG/2ML
125 INJECTION, POWDER, LYOPHILIZED, FOR SOLUTION INTRAMUSCULAR; INTRAVENOUS ONCE
Status: COMPLETED | OUTPATIENT
Start: 2020-01-05 | End: 2020-01-05

## 2020-01-05 RX ORDER — ONDANSETRON 4 MG/1
4 TABLET, ORALLY DISINTEGRATING ORAL EVERY 8 HOURS PRN
Qty: 20 TABLET | Refills: 0 | Status: SHIPPED | OUTPATIENT
Start: 2020-01-05 | End: 2020-09-14 | Stop reason: ALTCHOICE

## 2020-01-05 RX ORDER — MORPHINE SULFATE 4 MG/ML
4 INJECTION, SOLUTION INTRAMUSCULAR; INTRAVENOUS ONCE
Status: DISCONTINUED | OUTPATIENT
Start: 2020-01-05 | End: 2020-01-06 | Stop reason: HOSPADM

## 2020-01-05 RX ORDER — HYDROCODONE BITARTRATE AND ACETAMINOPHEN 5; 325 MG/1; MG/1
1 TABLET ORAL EVERY 6 HOURS PRN
Qty: 10 TABLET | Refills: 0 | Status: SHIPPED | OUTPATIENT
Start: 2020-01-05 | End: 2020-01-08

## 2020-01-05 RX ORDER — IPRATROPIUM BROMIDE AND ALBUTEROL SULFATE 2.5; .5 MG/3ML; MG/3ML
1 SOLUTION RESPIRATORY (INHALATION) ONCE
Status: COMPLETED | OUTPATIENT
Start: 2020-01-05 | End: 2020-01-05

## 2020-01-05 RX ADMIN — ONDANSETRON 4 MG: 2 INJECTION INTRAMUSCULAR; INTRAVENOUS at 20:50

## 2020-01-05 RX ADMIN — METHYLPREDNISOLONE SODIUM SUCCINATE 125 MG: 125 INJECTION, POWDER, FOR SOLUTION INTRAMUSCULAR; INTRAVENOUS at 20:50

## 2020-01-05 RX ADMIN — IPRATROPIUM BROMIDE AND ALBUTEROL SULFATE 1 AMPULE: .5; 3 SOLUTION RESPIRATORY (INHALATION) at 20:10

## 2020-01-05 RX ADMIN — POTASSIUM CHLORIDE 40 MEQ: 750 TABLET, FILM COATED, EXTENDED RELEASE ORAL at 20:50

## 2020-01-05 ASSESSMENT — ENCOUNTER SYMPTOMS
NAUSEA: 0
SHORTNESS OF BREATH: 1
CHEST TIGHTNESS: 1
SINUS PAIN: 0
COLOR CHANGE: 0
SORE THROAT: 0
BACK PAIN: 0
VOMITING: 0
APNEA: 0
EYE REDNESS: 0
STRIDOR: 0
ABDOMINAL PAIN: 0
DIARRHEA: 0
CONSTIPATION: 0
CHOKING: 0
TROUBLE SWALLOWING: 0
SINUS PRESSURE: 0
COUGH: 1
RHINORRHEA: 1
EYE DISCHARGE: 0
WHEEZING: 0
VOICE CHANGE: 0

## 2020-01-05 ASSESSMENT — PAIN DESCRIPTION - PAIN TYPE: TYPE: ACUTE PAIN

## 2020-01-05 ASSESSMENT — PAIN SCALES - GENERAL: PAINLEVEL_OUTOF10: 9

## 2020-01-05 ASSESSMENT — PAIN DESCRIPTION - LOCATION: LOCATION: CHEST

## 2020-01-06 VITALS
HEIGHT: 60 IN | HEART RATE: 94 BPM | BODY MASS INDEX: 26.7 KG/M2 | RESPIRATION RATE: 22 BRPM | SYSTOLIC BLOOD PRESSURE: 132 MMHG | WEIGHT: 136 LBS | DIASTOLIC BLOOD PRESSURE: 76 MMHG | TEMPERATURE: 98.5 F | OXYGEN SATURATION: 94 %

## 2020-01-06 LAB
EKG ATRIAL RATE: 98 BPM
EKG DIAGNOSIS: NORMAL
EKG P AXIS: 79 DEGREES
EKG P-R INTERVAL: 206 MS
EKG Q-T INTERVAL: 340 MS
EKG QRS DURATION: 76 MS
EKG QTC CALCULATION (BAZETT): 434 MS
EKG R AXIS: 11 DEGREES
EKG T AXIS: 62 DEGREES
EKG VENTRICULAR RATE: 98 BPM

## 2020-01-06 PROCEDURE — 93010 ELECTROCARDIOGRAM REPORT: CPT | Performed by: INTERNAL MEDICINE

## 2020-01-06 RX ORDER — PREDNISONE 10 MG/1
40 TABLET ORAL DAILY
Qty: 20 TABLET | Refills: 0 | Status: SHIPPED | OUTPATIENT
Start: 2020-01-06 | End: 2020-01-11

## 2020-01-06 NOTE — ED PROVIDER NOTES
addressed in the HPI. REVIEW OF SYSTEMS    (2-9 systems for level 4, 10 or more for level 5)     Review of Systems   Constitutional: Negative for activity change, appetite change, chills and fever. HENT: Positive for congestion, postnasal drip and rhinorrhea. Negative for ear discharge, ear pain, sinus pressure, sinus pain, sore throat, trouble swallowing and voice change. Eyes: Negative for discharge and redness. Respiratory: Positive for cough, chest tightness and shortness of breath. Negative for apnea, choking, wheezing and stridor. Cardiovascular: Positive for chest pain. Negative for palpitations and leg swelling. Gastrointestinal: Negative for abdominal pain, constipation, diarrhea, nausea and vomiting. Genitourinary: Negative for decreased urine volume, difficulty urinating, dysuria, flank pain, frequency, hematuria and urgency. Musculoskeletal: Negative for arthralgias, back pain, myalgias, neck pain and neck stiffness. Skin: Negative for color change, pallor, rash and wound. Neurological: Negative for dizziness, light-headedness and headaches. Positives and Pertinent negatives as per HPI. Except as noted above in the ROS, all other systems were reviewed and negative.        PAST MEDICAL HISTORY     Past Medical History:   Diagnosis Date    Bullous emphysema [facility]     CKD (chronic kidney disease) stage 3, GFR 30-59 ml/min ([facility])     Clostridium difficile carrier 01/21/2019    COPD (chronic obstructive pulmonary disease) ([facility]     PFT done 7 years ago   Sherren Kehr Crohn's disease ([facility]     Crohn's disease    Depression 1/30/2011    pt states resolved as of 3-26-12    DVT (deep venous thrombosis) ([address]     2012; first ocurrence     Hiatal hernia     Hx of blood clots     Kidney disease     Kidney insufficiency     Osteoporosis     Peripheral neuropathy     neuropathy in legs    Pneumonia     Postmenopausal     LMP in  40's    Pulmonary embolism ([address]     2012; first ocurrence albuterol sulfate  (90 Base) MCG/ACT inhaler Inhale 2 puffs into the lungs every 6 hours as needed for Wheezing, Disp-1 Inhaler, R-3Normal      omeprazole (PRILOSEC) 20 MG delayed release capsule Take 20 mg by mouth Daily Takes as neededHistorical Med      Lift Chair MISC Starting 2017, Until Discontinued, Disp-1 each, R-0, PrintPlease dispense a Lift Chair      guaiFENesin (MUCINEX) 600 MG extended release tablet Take 1 tablet by mouth 2 times daily, Disp-30 tablet, R-0      !! Misc. Devices (ROLLER WALKER) MISC DAILY Starting 2016, Until Discontinued, Disp-1 each, R-0, PrintPlease dispense a walker with a seat      Adalimumab (HUMIRA) 20 MG/0.4ML KIT Inject 1 vial into the skin every 14 days Historical Med       !! - Potential duplicate medications found. Please discuss with provider. ALLERGIES     Patient has no known allergies.     FAMILYHISTORY       Family History   Problem Relation Age of Onset    Heart Disease Mother     High Blood Pressure Mother     High Cholesterol Mother     Early Death Mother 36        MI    Heart Disease Father     High Blood Pressure Father     High Cholesterol Father     Stroke Father 79    High Blood Pressure Sister     High Blood Pressure Brother           SOCIAL HISTORY       Social History     Tobacco Use    Smoking status: Former Smoker     Packs/day: 0.25     Years: 30.00     Pack years: 7.50     Types: Cigarettes     Last attempt to quit: 2015     Years since quittin.8    Smokeless tobacco: Never Used    Tobacco comment: started smoking at age 26 / smoked up to 9 cigarettes a day    Substance Use Topics    Alcohol use: No    Drug use: No       SCREENINGS             PHYSICAL EXAM    (up to 7 for level 4, 8 or more for level 5)     ED Triage Vitals [20 1314]   BP Temp Temp Source Pulse Resp SpO2 Height Weight   103/69 98.5 °F (36.9 °C) Oral 100 24 95 % 5' (1.524 m) 136 lb (61.7 kg)       Physical Exam  Constitutional: General: She is not in acute distress. Appearance: Normal appearance. She is well-developed. She is not ill-appearing, toxic-appearing or diaphoretic. HENT:      Head: Normocephalic and atraumatic. Right Ear: Tympanic membrane, ear canal and external ear normal.      Left Ear: Tympanic membrane, ear canal and external ear normal.      Nose: Nose normal. No congestion or rhinorrhea. Mouth/Throat:      Mouth: Mucous membranes are moist.      Pharynx: No oropharyngeal exudate or posterior oropharyngeal erythema. Eyes:      General:         Right eye: No discharge. Left eye: No discharge. Conjunctiva/sclera: Conjunctivae normal.   Neck:      Musculoskeletal: Normal range of motion and neck supple. No neck rigidity or muscular tenderness. Cardiovascular:      Rate and Rhythm: Normal rate and regular rhythm. Pulses: Normal pulses. Heart sounds: Normal heart sounds. No murmur. No friction rub. No gallop. Comments: 2+ radial pulses bilaterally. No pedal edema. No calf tenderness. No JVD. Pulmonary:      Effort: Pulmonary effort is normal. No respiratory distress. Breath sounds: Normal breath sounds. No stridor. No wheezing, rhonchi or rales. Comments: Patient has reproducible tenderness to palpation over the left-sided chest wall. Diminished aeration with faint expiratory wheezing noted. Chest:      Chest wall: Tenderness present. Abdominal:      General: Abdomen is flat. Bowel sounds are normal. There is no distension. Palpations: Abdomen is soft. There is no mass. Tenderness: There is no tenderness. There is no right CVA tenderness, left CVA tenderness, guarding or rebound. Hernia: No hernia is present. Musculoskeletal: Normal range of motion. Lymphadenopathy:      Cervical: No cervical adenopathy. Skin:     General: Skin is warm and dry. Coloration: Skin is not pale. Findings: No erythema or rash.    Neurological: Mental Status: She is alert and oriented to person, place, and time.    Psychiatric:         Behavior: Behavior normal.         DIAGNOSTIC RESULTS   LABS:    Labs Reviewed   BASIC METABOLIC PANEL - Abnormal; Notable for the following components:       Result Value    Sodium 133 (*)     Potassium 3.0 (*)     CO2 15 (*)     CREATININE 1.7 (*)     GFR Non- 30 (*)     GFR  36 (*)     All other components within normal limits    Narrative:     Performed at:  [facility] [address]   Phone [phone]   BRAIN NATRIURETIC PEPTIDE - Abnormal; Notable for the following components:    Pro- (*)     All other components within normal limits    Narrative:     Performed at:  [facility] [address]   Phone [phone]   CBC WITH AUTO DIFFERENTIAL - Abnormal; Notable for the following components:    RBC 5.62 (*)     Hemoglobin 11.6 (*)     MCV 66.5 (*)     MCH 20.7 (*)     RDW 17.1 (*)     Anisocytosis 1+ (*)     Microcytes 1+ (*)     All other components within normal limits    Narrative:     Performed at:  [facility] [address]   Phone [phone]   HEPATIC FUNCTION PANEL - Abnormal; Notable for the following components:    Alb 3.3 (*)     All other components within normal limits    Narrative:     Performed at:  [facility] [address]   Phone [phone]   URINE RT REFLEX TO CULTURE - Abnormal; Notable for the following components:    Clarity, UA TURBID (*)     Blood, Urine TRACE (*)     Protein, UA 30 (*)     Leukocyte Esterase, Urine LARGE (*)     All other components within normal limits    Narrative:     Performed at:  [facility] [address]   Phone [phone]   MICROSCOPIC advanced emphysematous changes. XR CHEST STANDARD (2 VW)   Final Result   No acute process. Stable emphysema in the upper lobes           Xr Chest Standard (2 Vw)    Result Date: 1/5/2020  EXAMINATION: TWO XRAY VIEWS OF THE CHEST 1/5/2020 1:23 pm COMPARISON: 12/17/2019 HISTORY: ORDERING SYSTEM PROVIDED HISTORY: sob/cp TECHNOLOGIST PROVIDED HISTORY: Reason for exam:->sob/cp Reason for Exam: shortness of breath, chest pain Acuity: Acute Type of Exam: Initial FINDINGS: The lungs are without acute focal process. Emphysematous changes again noted in the upper lobes there is no effusion or pneumothorax. The cardiomediastinal silhouette is stable. The osseous structures are stable. No acute process. Stable emphysema in the upper lobes           PROCEDURES   Unless otherwise noted below, none     Procedures    CRITICAL CARE TIME   N/A    CONSULTS:  None      EMERGENCY DEPARTMENT COURSE and DIFFERENTIAL DIAGNOSIS/MDM:   Vitals:    Vitals:    01/05/20 2200 01/05/20 2230 01/05/20 2332 01/06/20 0001   BP: 115/74 115/65 (!) 154/30 132/76   Pulse: 94 91 100 94   Resp: 17 17 22 22   Temp:       TempSrc:       SpO2:       Weight:       Height:           Patient was given thefollowing medications:  Medications   morphine injection 4 mg (has no administration in time range)   methylPREDNISolone sodium (SOLU-MEDROL) injection 125 mg (125 mg Intravenous Given 1/5/20 2050)   ipratropium-albuterol (DUONEB) nebulizer solution 1 ampule (1 ampule Inhalation Given 1/5/20 2010)   ondansetron (ZOFRAN) injection 4 mg (4 mg Intravenous Given 1/5/20 2050)   potassium chloride (KLOR-CON) extended release tablet 40 mEq (40 mEq Oral Given 1/5/20 2050)       Patient is a 60-year-old female who presents to the ED implant of left flank/chest pain. States associated shortness of breath. Does have what appears to be some slight wheezing on exam and given DuoNeb, Solu-Medrol treatments here in the ED.   Given morphine and Zofran for symptom control. IV established and blood work obtained. Troponin normal.  EKG interpreted by attending. Lipase normal.  Hepatic function unremarkable. Magnesium 2.1. BMP showed potassium of 3 with creatinine of 1.7, CO2 15 and GFR of 36. Has history of some baseline chronic kidney dysfunction and appears similar to previous results. Potassium replaced orally here in the ED. . CBC showed normal white count and platelets. Hemoglobin 11.6. Delta troponin unremarkable. Chest x-ray showed emphysema but otherwise unremarkable. CT of the chest obtained given patient's continued symptoms for evaluate of underlying pneumonia versus edema. Chest x-ray showed minimal dependent opacity at the left lung base favored to represent atelectasis. Ammonia considered less likely. Patient has no fever or white count here in the ED. Do not believe this represents pneumonia at this time. Concern for shortness of breath secondary to COPD exacerbation with associated cough and rib pain which potentially could be musculoskeletal.  Upon repeat evaluation patient states she is having much more pain in her flank and upon exam appeared to have some CVA tenderness. CT of the abdomen pelvis obtained without contrast given patient's chronic kidney dysfunction. CT showed no acute abnormality. Patient's urinalysis obtained as well and showed 1+ bacteria with 597 white blood cells concerning for acute cystitis and potential pyelonephritis. Patient doing better upon repeat exam and believe can be discharged home with outpatient management and close return precautions. Will give prednisone for potential COPD exacerbation. Given Ceftin, Zofran and Norco for symptom control for home. Follow-up with PCP. Return the ED for new or worsening symptoms.   Low sufficient for ACS, PE, dissection, AAA, pneumonia, pneumothorax, respiratory stress, GERD, anxiety, CHF, surgical abdomen, obstruction, peritonitis, perforation, pancreatitis, cholecystitis, appendicitis, diverticulitis, colitis, mesenteric ischemia, nephrolithiasis or other emergent etiology at this time. FINAL IMPRESSION      1. Left flank pain    2. Acute cystitis without hematuria    3. Hypokalemia    4. COPD exacerbation [facility])          DISPOSITION/PLAN   DISPOSITION Decision To Discharge 01/05/2020 11:52:18 PM      PATIENT REFERREDTO:  Diana Rader, [address] [id number] [facility],[address] [facility]  [phone]    Schedule an appointment as soon as possible for a visit   For a Re-check in 3-5     days. ALL [facility] Emergency Department  [address]  [address] [facility] [id number] [phone]  Go to   As needed, If symptoms worsen      DISCHARGE MEDICATIONS:  Discharge Medication List as of 1/6/2020 12:24 AM      START taking these medications    Details   predniSONE (DELTASONE) 10 MG tablet Take 4 tablets by mouth daily for 5 doses, Disp-20 tablet, R-0Print      cefUROXime (CEFTIN) 250 MG tablet Take 1 tablet by mouth 2 times daily for 7 days, Disp-14 tablet, R-0Print      HYDROcodone-acetaminophen (NORCO) 5-325 MG per tablet Take 1 tablet by mouth every 6 hours as needed for Pain for up to 3 days. , Disp-10 tablet, R-0Print      ondansetron (ZOFRAN ODT) 4 MG disintegrating tablet Take 1 tablet by mouth every 8 hours as needed for Nausea, Disp-20 tablet, R-0Print             DISCONTINUED MEDICATIONS:  Discharge Medication List as of 1/6/2020 12:24 AM                 (Please note that portions of this note were completed with a voice recognition program.  Efforts were made to edit the dictations but occasionally words are mis-transcribed.)    JAIME Gomez (electronically signed)          JAIME Lopez  01/06/20 [id number]

## 2020-01-06 NOTE — ED PROVIDER NOTES
tablets by mouth daily for 5 doses, Disp-20 tablet, R-0Print      cefUROXime (CEFTIN) 250 MG tablet Take 1 tablet by mouth 2 times daily for 7 days, Disp-14 tablet, R-0Print      HYDROcodone-acetaminophen (NORCO) 5-325 MG per tablet Take 1 tablet by mouth every 6 hours as needed for Pain for up to 3 days. , Disp-10 tablet, R-0Print      ondansetron (ZOFRAN ODT) 4 MG disintegrating tablet Take 1 tablet by mouth every 8 hours as needed for Nausea, Disp-20 tablet, R-0Print             FINAL IMPRESSION  1. Left flank pain    2. Acute cystitis without hematuria    3. Hypokalemia    4. COPD exacerbation ([facility])        Blood pressure 132/76, pulse 94, temperature 98.5 °F (36.9 °C), temperature source Oral, resp. rate 22, height 5' (1.524 m), weight 136 lb (61.7 kg), SpO2 94 %, not currently breastfeeding. For further details of [facility] emergency department encounter, please see documentation by advanced practice provider.      Abelino Baird MD  01/06/20 2472

## 2020-01-07 LAB — URINE CULTURE, ROUTINE: NORMAL

## 2020-01-13 ENCOUNTER — OFFICE VISIT (OUTPATIENT)
Dept: INTERNAL MEDICINE CLINIC | Age: 70
End: 2020-01-13
Payer: MEDICARE

## 2020-01-13 VITALS
OXYGEN SATURATION: 85 % | HEART RATE: 102 BPM | DIASTOLIC BLOOD PRESSURE: 80 MMHG | WEIGHT: 137 LBS | BODY MASS INDEX: 26.76 KG/M2 | SYSTOLIC BLOOD PRESSURE: 118 MMHG

## 2020-01-13 LAB
BILIRUBIN, POC: ABNORMAL
BLOOD URINE, POC: ABNORMAL
CLARITY, POC: ABNORMAL
COLOR, POC: ABNORMAL
GLUCOSE URINE, POC: ABNORMAL
KETONES, POC: ABNORMAL
LEUKOCYTE EST, POC: ABNORMAL
NITRITE, POC: ABNORMAL
PH, POC: 5.5
PROTEIN, POC: ABNORMAL
SPECIFIC GRAVITY, POC: 1.01
UROBILINOGEN, POC: 0.2

## 2020-01-13 PROCEDURE — 1090F PRES/ABSN URINE INCON ASSESS: CPT | Performed by: INTERNAL MEDICINE

## 2020-01-13 PROCEDURE — 81002 URINALYSIS NONAUTO W/O SCOPE: CPT | Performed by: INTERNAL MEDICINE

## 2020-01-13 PROCEDURE — 4040F PNEUMOC VAC/ADMIN/RCVD: CPT | Performed by: INTERNAL MEDICINE

## 2020-01-13 PROCEDURE — G8399 PT W/DXA RESULTS DOCUMENT: HCPCS | Performed by: INTERNAL MEDICINE

## 2020-01-13 PROCEDURE — 99213 OFFICE O/P EST LOW 20 MIN: CPT | Performed by: INTERNAL MEDICINE

## 2020-01-13 PROCEDURE — 1036F TOBACCO NON-USER: CPT | Performed by: INTERNAL MEDICINE

## 2020-01-13 PROCEDURE — G8427 DOCREV CUR MEDS BY ELIG CLIN: HCPCS | Performed by: INTERNAL MEDICINE

## 2020-01-13 PROCEDURE — 3017F COLORECTAL CA SCREEN DOC REV: CPT | Performed by: INTERNAL MEDICINE

## 2020-01-13 PROCEDURE — G8417 CALC BMI ABV UP PARAM F/U: HCPCS | Performed by: INTERNAL MEDICINE

## 2020-01-13 PROCEDURE — 3023F SPIROM DOC REV: CPT | Performed by: INTERNAL MEDICINE

## 2020-01-13 PROCEDURE — 1123F ACP DISCUSS/DSCN MKR DOCD: CPT | Performed by: INTERNAL MEDICINE

## 2020-01-13 PROCEDURE — G8926 SPIRO NO PERF OR DOC: HCPCS | Performed by: INTERNAL MEDICINE

## 2020-01-13 PROCEDURE — G8482 FLU IMMUNIZE ORDER/ADMIN: HCPCS | Performed by: INTERNAL MEDICINE

## 2020-01-13 RX ORDER — PREDNISONE 10 MG/1
TABLET ORAL
Qty: 30 TABLET | Refills: 0 | Status: SHIPPED | OUTPATIENT
Start: 2020-01-13 | End: 2020-01-30

## 2020-01-13 RX ORDER — LEVOFLOXACIN 500 MG/1
500 TABLET, FILM COATED ORAL DAILY
Qty: 10 TABLET | Refills: 0 | Status: SHIPPED | OUTPATIENT
Start: 2020-01-13 | End: 2020-01-23

## 2020-01-13 NOTE — PROGRESS NOTES
SUBJECTIVE:    Patient comes to the office complaining of cough, congestion, drainage that has been present for the last several days. Her cough is productive of yellow sputum. Patient does not have associated fever or chills. Patient denies gastrointestinal symptoms related to Her present condition. Patient has a history of CHF and recently has been short of breath. She also is oxygen dependent with COPD but is not wearing her oxygen today. She went to the emergency room several days ago for shortness of breath. Exacerbating her obstructive lung disease is pulmonary fibrosis. She was also noted to have acute cystitis and was treated with antibiotics. She is completing her final dose of Ceftin for treatment of her UTI and bronchitis. OBJECTIVE:  Vitals:    01/13/20 1416   BP: 118/80   Pulse: 102   SpO2: (!) 85%       Review of Systems    Physical Exam  Vitals:    01/13/20 1416   BP: 118/80   Pulse: 102   SpO2: (!) 85%         Current Outpatient Medications:     predniSONE (DELTASONE) 10 MG tablet, Take 4 tablets daily for 3 days, then 3 tablets daily for 3 days, then 2 tablets daily for 3 days, then 1 tablet daily for 3 days. , Disp: 30 tablet, Rfl: 0    levofloxacin (LEVAQUIN) 500 MG tablet, Take 1 tablet by mouth daily for 10 days, Disp: 10 tablet, Rfl: 0    ondansetron (ZOFRAN ODT) 4 MG disintegrating tablet, Take 1 tablet by mouth every 8 hours as needed for Nausea, Disp: 20 tablet, Rfl: 0    budesonide-formoterol (SYMBICORT) 160-4.5 MCG/ACT AERO, Inhale 2 puffs into the lungs 2 times daily, Disp: 3 Inhaler, Rfl: 3    tiotropium (SPIRIVA RESPIMAT) 2.5 MCG/ACT AERS inhaler, Inhale 2 puffs into the lungs daily, Disp: 3 Inhaler, Rfl: 3    apixaban (ELIQUIS) 5 MG TABS tablet, Take 1 tablet by mouth 2 times daily, Disp: 60 tablet, Rfl: 6    zoster recombinant adjuvanted vaccine (SHINGRIX) 50 MCG/0.5ML SUSR injection, Inject 0.5 mLs into the muscle See Admin Instructions 1 dose now and repeat in tablets daily for 3 days, then 2 tablets daily for 3 days, then 1 tablet daily for 3 days. Pulmonary fibrosis (HCC)  Comments:  Chronic. Oxygen dependent.       Acute cystitis without hematuria  -     POCT Urinalysis no Micro  -     URINE CULTURE      Kiana Monsivais MD

## 2020-01-16 LAB
ORGANISM: ABNORMAL
URINE CULTURE, ROUTINE: ABNORMAL
URINE CULTURE, ROUTINE: ABNORMAL

## 2020-01-16 RX ORDER — NITROFURANTOIN 25; 75 MG/1; MG/1
100 CAPSULE ORAL 2 TIMES DAILY
Qty: 14 CAPSULE | Refills: 0 | Status: SHIPPED | OUTPATIENT
Start: 2020-01-16 | End: 2020-01-23

## 2020-01-30 ENCOUNTER — APPOINTMENT (OUTPATIENT)
Dept: NUCLEAR MEDICINE | Age: 70
End: 2020-01-30
Payer: MEDICARE

## 2020-01-30 ENCOUNTER — HOSPITAL ENCOUNTER (EMERGENCY)
Age: 70
Discharge: HOME | End: 2020-01-30
Attending: EMERGENCY MEDICINE
Payer: MEDICARE

## 2020-01-30 ENCOUNTER — APPOINTMENT (OUTPATIENT)
Dept: GENERAL RADIOLOGY | Age: 70
End: 2020-01-30
Payer: MEDICARE

## 2020-01-30 VITALS
WEIGHT: 137 LBS | BODY MASS INDEX: 26.9 KG/M2 | DIASTOLIC BLOOD PRESSURE: 73 MMHG | SYSTOLIC BLOOD PRESSURE: 161 MMHG | RESPIRATION RATE: 18 BRPM | HEART RATE: 101 BPM | TEMPERATURE: 98.2 F | HEIGHT: 60 IN | OXYGEN SATURATION: 95 %

## 2020-01-30 LAB
A/G RATIO: 1 (ref 1.1–2.2)
ALBUMIN SERPL-MCNC: 3.9 G/DL (ref 3.4–5)
ALP BLD-CCNC: 80 U/L (ref 40–129)
ALT SERPL-CCNC: 12 U/L (ref 10–40)
ANION GAP SERPL CALCULATED.3IONS-SCNC: 12 MMOL/L (ref 3–16)
AST SERPL-CCNC: 15 U/L (ref 15–37)
BANDED NEUTROPHILS RELATIVE PERCENT: 2 % (ref 0–7)
BASOPHILS ABSOLUTE: 0 K/UL (ref 0–0.2)
BASOPHILS RELATIVE PERCENT: 0 %
BILIRUB SERPL-MCNC: 0.3 MG/DL (ref 0–1)
BUN BLDV-MCNC: 13 MG/DL (ref 7–20)
CALCIUM SERPL-MCNC: 8.6 MG/DL (ref 8.3–10.6)
CHLORIDE BLD-SCNC: 99 MMOL/L (ref 99–110)
CO2: 21 MMOL/L (ref 21–32)
CREAT SERPL-MCNC: 1.5 MG/DL (ref 0.6–1.2)
EKG ATRIAL RATE: 91 BPM
EKG ATRIAL RATE: 98 BPM
EKG DIAGNOSIS: NORMAL
EKG DIAGNOSIS: NORMAL
EKG P AXIS: -11 DEGREES
EKG P AXIS: 42 DEGREES
EKG P-R INTERVAL: 134 MS
EKG P-R INTERVAL: 144 MS
EKG Q-T INTERVAL: 352 MS
EKG Q-T INTERVAL: 366 MS
EKG QRS DURATION: 68 MS
EKG QRS DURATION: 74 MS
EKG QTC CALCULATION (BAZETT): 449 MS
EKG QTC CALCULATION (BAZETT): 450 MS
EKG R AXIS: 14 DEGREES
EKG R AXIS: 5 DEGREES
EKG T AXIS: 104 DEGREES
EKG T AXIS: 30 DEGREES
EKG VENTRICULAR RATE: 91 BPM
EKG VENTRICULAR RATE: 98 BPM
EOSINOPHILS ABSOLUTE: 0.3 K/UL (ref 0–0.6)
EOSINOPHILS RELATIVE PERCENT: 3 %
GFR AFRICAN AMERICAN: 42
GFR NON-AFRICAN AMERICAN: 34
GLOBULIN: 3.9 G/DL
GLUCOSE BLD-MCNC: 98 MG/DL (ref 70–99)
HCT VFR BLD CALC: 35.3 % (ref 36–48)
HEMOGLOBIN: 11.5 G/DL (ref 12–16)
LYMPHOCYTES ABSOLUTE: 4 K/UL (ref 1–5.1)
LYMPHOCYTES RELATIVE PERCENT: 41 %
MCH RBC QN AUTO: 20.9 PG (ref 26–34)
MCHC RBC AUTO-ENTMCNC: 32.5 G/DL (ref 31–36)
MCV RBC AUTO: 64.4 FL (ref 80–100)
MONOCYTES ABSOLUTE: 0.5 K/UL (ref 0–1.3)
MONOCYTES RELATIVE PERCENT: 5 %
NEUTROPHILS ABSOLUTE: 5 K/UL (ref 1.7–7.7)
NEUTROPHILS RELATIVE PERCENT: 49 %
OVALOCYTES: ABNORMAL
PDW BLD-RTO: 17.2 % (ref 12.4–15.4)
PLATELET # BLD: 383 K/UL (ref 135–450)
PLATELET SLIDE REVIEW: ADEQUATE
PMV BLD AUTO: 8.3 FL (ref 5–10.5)
POTASSIUM SERPL-SCNC: 3.7 MMOL/L (ref 3.5–5.1)
PRO-BNP: 126 PG/ML (ref 0–124)
RBC # BLD: 5.49 M/UL (ref 4–5.2)
SCHISTOCYTES: ABNORMAL
SLIDE REVIEW: ABNORMAL
SODIUM BLD-SCNC: 132 MMOL/L (ref 136–145)
TARGET CELLS: ABNORMAL
TOTAL PROTEIN: 7.8 G/DL (ref 6.4–8.2)
TROPONIN: <0.01 NG/ML
TROPONIN: <0.01 NG/ML
WBC # BLD: 9.8 K/UL (ref 4–11)

## 2020-01-30 PROCEDURE — 78582 LUNG VENTILAT&PERFUS IMAGING: CPT

## 2020-01-30 PROCEDURE — 85025 COMPLETE CBC W/AUTO DIFF WBC: CPT

## 2020-01-30 PROCEDURE — 2580000003 HC RX 258: Performed by: EMERGENCY MEDICINE

## 2020-01-30 PROCEDURE — 6360000002 HC RX W HCPCS: Performed by: NURSE PRACTITIONER

## 2020-01-30 PROCEDURE — 3430000000 HC RX DIAGNOSTIC RADIOPHARMACEUTICAL: Performed by: EMERGENCY MEDICINE

## 2020-01-30 PROCEDURE — 93005 ELECTROCARDIOGRAM TRACING: CPT | Performed by: EMERGENCY MEDICINE

## 2020-01-30 PROCEDURE — 84484 ASSAY OF TROPONIN QUANT: CPT

## 2020-01-30 PROCEDURE — 83880 ASSAY OF NATRIURETIC PEPTIDE: CPT

## 2020-01-30 PROCEDURE — 94761 N-INVAS EAR/PLS OXIMETRY MLT: CPT

## 2020-01-30 PROCEDURE — 99285 EMERGENCY DEPT VISIT HI MDM: CPT

## 2020-01-30 PROCEDURE — 96374 THER/PROPH/DIAG INJ IV PUSH: CPT

## 2020-01-30 PROCEDURE — 80053 COMPREHEN METABOLIC PANEL: CPT

## 2020-01-30 PROCEDURE — A9540 TC99M MAA: HCPCS | Performed by: EMERGENCY MEDICINE

## 2020-01-30 PROCEDURE — 6370000000 HC RX 637 (ALT 250 FOR IP): Performed by: EMERGENCY MEDICINE

## 2020-01-30 PROCEDURE — 6370000000 HC RX 637 (ALT 250 FOR IP): Performed by: NURSE PRACTITIONER

## 2020-01-30 PROCEDURE — 94640 AIRWAY INHALATION TREATMENT: CPT

## 2020-01-30 PROCEDURE — 93010 ELECTROCARDIOGRAM REPORT: CPT | Performed by: INTERNAL MEDICINE

## 2020-01-30 PROCEDURE — 71046 X-RAY EXAM CHEST 2 VIEWS: CPT

## 2020-01-30 PROCEDURE — A9558 XE133 XENON 10MCI: HCPCS | Performed by: EMERGENCY MEDICINE

## 2020-01-30 RX ORDER — 0.9 % SODIUM CHLORIDE 0.9 %
500 INTRAVENOUS SOLUTION INTRAVENOUS ONCE
Status: COMPLETED | OUTPATIENT
Start: 2020-01-30 | End: 2020-01-30

## 2020-01-30 RX ORDER — ALBUTEROL SULFATE 2.5 MG/3ML
5 SOLUTION RESPIRATORY (INHALATION) ONCE
Status: COMPLETED | OUTPATIENT
Start: 2020-01-30 | End: 2020-01-30

## 2020-01-30 RX ORDER — LIDOCAINE 4 G/G
1 PATCH TOPICAL ONCE
Status: DISCONTINUED | OUTPATIENT
Start: 2020-01-30 | End: 2020-01-30 | Stop reason: HOSPADM

## 2020-01-30 RX ORDER — PREDNISONE 10 MG/1
TABLET ORAL
Qty: 20 TABLET | Refills: 0 | Status: SHIPPED | OUTPATIENT
Start: 2020-01-30 | End: 2020-02-09

## 2020-01-30 RX ORDER — SODIUM CHLORIDE 0.9 % (FLUSH) 0.9 %
10 SYRINGE (ML) INJECTION PRN
Status: DISCONTINUED | OUTPATIENT
Start: 2020-01-30 | End: 2020-01-30 | Stop reason: HOSPADM

## 2020-01-30 RX ORDER — NYSTATIN 10B UNIT
1 POWDER (EA) MISCELLANEOUS 2 TIMES DAILY
Qty: 1 EACH | Refills: 0 | Status: SHIPPED | OUTPATIENT
Start: 2020-01-30 | End: 2020-02-06

## 2020-01-30 RX ORDER — OXYCODONE HYDROCHLORIDE AND ACETAMINOPHEN 5; 325 MG/1; MG/1
1 TABLET ORAL ONCE
Status: COMPLETED | OUTPATIENT
Start: 2020-01-30 | End: 2020-01-30

## 2020-01-30 RX ORDER — XENON XE-133 10 MCI/1
10.7 GAS RESPIRATORY (INHALATION)
Status: COMPLETED | OUTPATIENT
Start: 2020-01-30 | End: 2020-01-30

## 2020-01-30 RX ORDER — IPRATROPIUM BROMIDE AND ALBUTEROL SULFATE 2.5; .5 MG/3ML; MG/3ML
1 SOLUTION RESPIRATORY (INHALATION) ONCE
Status: COMPLETED | OUTPATIENT
Start: 2020-01-30 | End: 2020-01-30

## 2020-01-30 RX ORDER — METHYLPREDNISOLONE SODIUM SUCCINATE 125 MG/2ML
125 INJECTION, POWDER, LYOPHILIZED, FOR SOLUTION INTRAMUSCULAR; INTRAVENOUS ONCE
Status: COMPLETED | OUTPATIENT
Start: 2020-01-30 | End: 2020-01-30

## 2020-01-30 RX ADMIN — METHYLPREDNISOLONE SODIUM SUCCINATE 125 MG: 125 INJECTION, POWDER, FOR SOLUTION INTRAMUSCULAR; INTRAVENOUS at 10:12

## 2020-01-30 RX ADMIN — Medication 5.87 MILLICURIE: at 11:51

## 2020-01-30 RX ADMIN — Medication 10 ML: at 11:51

## 2020-01-30 RX ADMIN — IPRATROPIUM BROMIDE AND ALBUTEROL SULFATE 1 AMPULE: .5; 3 SOLUTION RESPIRATORY (INHALATION) at 10:08

## 2020-01-30 RX ADMIN — SODIUM CHLORIDE 500 ML: 9 INJECTION, SOLUTION INTRAVENOUS at 13:08

## 2020-01-30 RX ADMIN — OXYCODONE HYDROCHLORIDE AND ACETAMINOPHEN 1 TABLET: 5; 325 TABLET ORAL at 13:09

## 2020-01-30 RX ADMIN — XENON XE-133 10.7 MILLICURIE: 10 GAS RESPIRATORY (INHALATION) at 11:51

## 2020-01-30 RX ADMIN — ALBUTEROL SULFATE 5 MG: 2.5 SOLUTION RESPIRATORY (INHALATION) at 10:08

## 2020-01-30 ASSESSMENT — PAIN SCALES - GENERAL
PAINLEVEL_OUTOF10: 8
PAINLEVEL_OUTOF10: 7
PAINLEVEL_OUTOF10: 9

## 2020-01-30 ASSESSMENT — PAIN DESCRIPTION - LOCATION: LOCATION: CHEST

## 2020-01-30 ASSESSMENT — ENCOUNTER SYMPTOMS
SHORTNESS OF BREATH: 1
ABDOMINAL PAIN: 0
VOMITING: 0
CHEST TIGHTNESS: 0
DIARRHEA: 0
NAUSEA: 0

## 2020-01-30 ASSESSMENT — HEART SCORE: ECG: 0

## 2020-01-30 ASSESSMENT — PAIN DESCRIPTION - PAIN TYPE: TYPE: ACUTE PAIN

## 2020-01-30 ASSESSMENT — PAIN DESCRIPTION - PROGRESSION: CLINICAL_PROGRESSION: GRADUALLY IMPROVING

## 2020-01-30 NOTE — ED PROVIDER NOTES
[facility]        Pt Name: Cody Abbott  MRN: [id number]  [place] 1950  Date of evaluation: 1/30/2020  Provider: GUS Bronson - CNP  PCP: Hunter Grimes MD    This patient was seen and evaluated by the attending physician Timoteo Pedersen       Chief Complaint   Patient presents with    Shortness of Breath     SOB for 2 days. States cannot walk across the room without feeling SOB, unable to lay flat. States she tried her oxygen and breathing treatments wihtout relief. hx of blood clots and PE's. HISTORY OF PRESENT ILLNESS   (Location/Symptom, Timing/Onset, Context/Setting, Quality, Duration, Modifying Factors, Severity)  Note limiting factors. Cody Abbott is a 77 y.o. female presents to the emergency department complaining of feeling short winded. The patient reports that she has unable to walk even 15 feet without having to pause to catch her breath. She reports history of COPD and chronic kidney disease. She did see the kidney doctor on Monday, unremarkable appointment. She does wear oxygen as well as use breathing treatments at home, she has not gotten any relief. Also complaining of sharp/stabbing chest pain. Patient has history of clot, she is on Eliquis. She denies clots since starting the Eliquis last year. She was on steroids recently. Denies any headache, fever, lightheadedness, dizziness, visual disturbances. No neck or back pain. No abdominal pain, nausea, vomiting, diarrhea, constipation, or dysuria. No rash. Nursing Notes were all reviewed and agreed with or any disagreements were addressed in the HPI. REVIEW OF SYSTEMS    (2-9 systems for level 4, 10 or more for level 5)     Review of Systems   Constitutional: Positive for fatigue. Negative for activity change, chills and fever. Respiratory: Positive for shortness of breath.  Negative J44.9    ALBUTEROL SULFATE  (90 BASE) MCG/ACT INHALER    Inhale 2 puffs into the lungs every 6 hours as needed for Wheezing    ALENDRONATE (FOSAMAX) 70 MG TABLET    Take 1 tablet by mouth every 7 days    AMITRIPTYLINE (ELAVIL) 150 MG TABLET    TAKE 0.5 TABLETS BY MOUTH NIGHTLY    APIXABAN (ELIQUIS) 5 MG TABS TABLET    Take 1 tablet by mouth 2 times daily    BUDESONIDE-FORMOTEROL (SYMBICORT) 160-4.5 MCG/ACT AERO    Inhale 2 puffs into the lungs 2 times daily    GUAIFENESIN (MUCINEX) 600 MG EXTENDED RELEASE TABLET    Take 1 tablet by mouth 2 times daily    LIFT CHAIR MISC    by Does not apply route Please dispense a Lift Chair    MISC. DEVICES (BATH/SHOWER SEAT) MISC    Use as directed    MISC. DEVICES (ROLLER WALKER) MISC    1 each by Does not apply route daily Please dispense a walker with a seat    OMEPRAZOLE (PRILOSEC) 20 MG DELAYED RELEASE CAPSULE    Take 20 mg by mouth Daily Takes as needed    ONDANSETRON (ZOFRAN ODT) 4 MG DISINTEGRATING TABLET    Take 1 tablet by mouth every 8 hours as needed for Nausea    POTASSIUM CHLORIDE (KLOR-CON) 10 MEQ EXTENDED RELEASE TABLET    Take 1 tablet by mouth 3 times daily    TIOTROPIUM (SPIRIVA RESPIMAT) 2.5 MCG/ACT AERS INHALER    Inhale 2 puffs into the lungs daily    TIZANIDINE (ZANAFLEX) 4 MG TABLET    Take 1 tablet by mouth 3 times daily    ZOSTER RECOMBINANT ADJUVANTED VACCINE (SHINGRIX) 50 MCG/0.5ML SUSR INJECTION    Inject 0.5 mLs into the muscle See Admin Instructions 1 dose now and repeat in 2-6 months         ALLERGIES     Patient has no known allergies.     FAMILYHISTORY       Family History   Problem Relation Age of Onset    Heart Disease Mother     High Blood Pressure Mother     High Cholesterol Mother     Early Death Mother 36        MI    Heart Disease Father     High Blood Pressure Father     High Cholesterol Father     Stroke Father 79    High Blood Pressure Sister     High Blood Pressure Brother           SOCIAL HISTORY       Social History Tobacco Use    Smoking status: Former Smoker     Packs/day: 0.25     Years: 30.00     Pack years: 7.50     Types: Cigarettes     Last attempt to quit: 2015     Years since quittin.9    Smokeless tobacco: Never Used    Tobacco comment: started smoking at age 26 / smoked up to 9 cigarettes a day    Substance Use Topics    Alcohol use: No    Drug use: No       SCREENINGS      Heart Score for chest pain patients  History: Slightly Suspicious  ECG: Normal  Patient Age: > 65 years  Risk Factors: No risk factors known  Troponin: < 1X normal limit  Heart Score Total: 2      PHYSICAL EXAM    (up to 7 for level 4, 8 or more for level 5)     ED Triage Vitals [20 0750]   BP Temp Temp Source Pulse Resp SpO2 Height Weight   (!) 169/90 98.2 °F (36.8 °C) Infrared 94 28 98 % 5' (1.524 m) 137 lb (62.1 kg)       Physical Exam  Vitals signs and nursing note reviewed. Constitutional:       Appearance: She is well-developed. She is not diaphoretic. HENT:      Head: Normocephalic and atraumatic. Right Ear: External ear normal.      Left Ear: External ear normal.   Eyes:      General:         Right eye: No discharge. Left eye: No discharge. Neck:      Musculoskeletal: Normal range of motion and neck supple. Vascular: No JVD. Cardiovascular:      Rate and Rhythm: Normal rate and regular rhythm. Pulses: Normal pulses. Heart sounds: Normal heart sounds. Pulmonary:      Effort: Pulmonary effort is normal. No respiratory distress. Breath sounds: Wheezing present. Abdominal:      Palpations: Abdomen is soft. Tenderness: There is no abdominal tenderness. Musculoskeletal: Normal range of motion. Skin:     General: Skin is warm and dry. Coloration: Skin is not pale. Neurological:      Mental Status: She is alert and oriented to person, place, and time.    Psychiatric:         Behavior: Behavior normal.         DIAGNOSTIC RESULTS   LABS:    Labs Reviewed   CBC WITH AUTO DIFFERENTIAL - Abnormal; Notable for the following components:       Result Value    RBC 5.49 (*)     Hemoglobin 11.5 (*)     Hematocrit 35.3 (*)     MCV 64.4 (*)     MCH 20.9 (*)     RDW 17.2 (*)     Schistocytes Occasional (*)     Ovalocytes Occasional (*)     Target Cells Occasional (*)     All other components within normal limits    Narrative:     Performed at:  [facility] [address] [employer], [place]   Phone [phone]   COMPREHENSIVE METABOLIC PANEL - Abnormal; Notable for the following components:    Sodium 132 (*)     CREATININE 1.5 (*)     GFR Non- 34 (*)     GFR African American 42 (*)     Albumin/Globulin Ratio 1.0 (*)     All other components within normal limits    Narrative:     Performed at:  [facility] [address], [address]   Phone [phone]   BRAIN NATRIURETIC PEPTIDE - Abnormal; Notable for the following components:    Pro- (*)     All other components within normal limits    Narrative:     Performed at:  [facility] [address] [address], [address]   Phone [phone]   TROPONIN    Narrative:     Performed at:  [facility] [address], [place] [employer]   Phone [phone]   TROPONIN    Narrative:     Performed at:  [facility] [address] [employer], [employer]   Phone [phone]       All other labs were within normal range or not returned as of this dictation. EKG: All EKG's are interpreted by the Emergency Department Physician in the absence of a cardiologist.  Please see their note for interpretation of EKG.       RADIOLOGY:   Non-plain film images such as CT, Ultrasound and MRI are read by the radiologist. Plain radiographic images are visualized and preliminarily interpreted by the  ED Provider with the below Oral Given 1/30/20 1309)   0.9 % sodium chloride bolus (0 mLs Intravenous Stopped 1/30/20 1339)   xenon xe 462 gas 90.9 millicurie (12.5 millicuries Inhalation Given 1/30/20 1151)   technetium albumin aggregated (MAA) solution 5.78 millicurie (3.23 millicuries Intravenous Given 1/30/20 1151)       Briefly, this is a 77year old female that  presents to the emergency department complaining of feeling short winded. The patient reports that she has unable to walk even 15 feet without having to pause to catch her breath. She reports history of COPD and chronic kidney disease. She did see the kidney doctor on Monday, unremarkable appointment. She does wear oxygen as well as use breathing treatments at home, she has not gotten any relief. Patient has history of clot, she is on Eliquis. She denies clots since starting the Eliquis last year. She was on steroids recently. XR CHEST STANDARD (2 VW) (Final result)   Result time 01/30/20 08:35:13   Final result by Lane Torres MD (01/30/20 08:35:13)                Impression:    No acute pulmonary disease. Troponin negative. BMP unremarkable. CMP showed a creatinine of 1.5, the patient does have history of CAD and sees nephrology regularly. CBC is unremarkable. She was given Solu-Medrol, DuoNeb and albuterol. She be sent for a VQ scan as she has chronic kidney disease and were concerned using CT scan and possibly further worsening her kidney failure. NM LUNG VENT/PERFUSION (VQ) (Final result)   Result time 01/30/20 12:39:07   Final result by Tylor Barnhart MD (01/30/20 12:39:07)                Impression:    Low probability for pulmonary embolism. Obstructive lung disease. She will be discharged home with prednisone. Follow up with primary care. Patient's oxygen saturations are good.   I do not believe the patient's symptoms today are due to pulmonary embolism, pulmonary edema, pneumonia, pneumothorax, ACS, CHF, status asthmaticus, acute respiratory failure, profound anemia or metabolic abnormality, amongst other more emergent diagnostic considerations. Patient was advised to immediately return to emergency department if symptoms worsen. FINAL IMPRESSION      1. COPD exacerbation (HCC)    2. Chest pain, unspecified type    3. Yeast dermatitis          DISPOSITION/PLAN   DISPOSITION        PATIENT REFERREDTO:  Cynthia Pride, [address] [facility] [address] [facility] [facility]  [phone]    Schedule an appointment as soon as possible for a visit       [facility] Emergency Department  [address] [place] [address]  [phone]    If symptoms worsen      DISCHARGE MEDICATIONS:  New Prescriptions    NYSTATIN (MYCOSTATIN) POWD POWDER    Apply 1 each topically 2 times daily for 7 days    PREDNISONE (DELTASONE) 10 MG TABLET    Take 4 tablets by mouth once daily for 5 days       DISCONTINUED MEDICATIONS:  Discontinued Medications    PREDNISONE (DELTASONE) 10 MG TABLET    Take 4 tablets daily for 3 days, then 3 tablets daily for 3 days, then 2 tablets daily for 3 days, then 1 tablet daily for 3 days.               (Please note that portions of this note were completed with a voice recognition program.  Efforts were made to edit the dictations but occasionally words are mis-transcribed.)    GUS Cordoba CNP (electronically signed)           GUS Cordoba CNP  01/30/20 0889

## 2020-01-30 NOTE — ED PROVIDER NOTES
I personally evaluated and examined the patient in conjunction with the ROSE and agree with the assessment, treatment plan and disposition of the patient as recorded by the ROSE. I reviewed pertinent nursing notes, triage notes, vital signs, past medical history, family and social history, medications, and allergies. Complete review of systems was conducted by the ROSE and/or myself. Review of systems is negative except as documented in the history of present illness. Brief HPI: Next-year-old female presents the emergency department chief complaint of shortness of breath and chest pain. Chest pain is central without radiation. Worse with deep inspiration. Also reports shortness of breath. She has a history of COPD wears oxygen only at night but reports shortness of breath with exertion and now is wearing her oxygen more. Cough is dry. No fever. Physical Exam: General: Patient is in no acute distress   Head: Normocephalic, atraumatic, pupils are equal and reactive to light. EOMI. Neck: Neck is supple. No JVD noted. Heart: RRR no murmurs, rubs, or gallops   Lungs: No respiratory distress. Pulse ox is 100% on room air. Diminished breath sounds bilaterally. Abdomen: soft, non-tender, non-distended   Extremities: no lower extremity edema. Capillary refill is less than 2 seconds   Skin: no cyanosis or pallor; no rashes noted. Yeast dermatitis underneath the left breast.  Neuro: CN's 2-12 are grossly intact. No focal neurologic deficit appreciated. Cardiac work-up, breathing treatments, steroids ordered. Patient reports history of PE and is concerned about PE. She does have chronic kidney disease and I do not recommend PE study as there is risk of contrast-induced nephropathy. VQ scan was ordered. Percocet ordered for her chest pain. Her heart score is 2. Plan for reassessment,  3 hr delta trop/ekg. Delta troponin/EKG negative. VQ scan negative.   Clinically suspect

## 2020-01-30 NOTE — ED NOTES
Pt c/o yeast infection under breasts. States she is using OTC athletes foot medication but it is not helping. Under both breast red and excoriation, worse under left breast. Will discuss w DR Vivek Tompkins.       Benji Banerjee RN  01/30/20 4886

## 2020-02-17 ENCOUNTER — TELEPHONE (OUTPATIENT)
Dept: INTERNAL MEDICINE CLINIC | Age: 70
End: 2020-02-17

## 2020-02-17 RX ORDER — FLUCONAZOLE 100 MG/1
100 TABLET ORAL DAILY
Qty: 7 TABLET | Refills: 0 | Status: SHIPPED | OUTPATIENT
Start: 2020-02-17 | End: 2020-02-24

## 2020-02-17 NOTE — TELEPHONE ENCOUNTER
Pt states she has a yeast infection under both breasts. Pt was seen at Ed and was given powder and it has not resolved any symptoms. Pt would like to know if something else can be called in.   Please call back at  [phone]  Pt pharmacy [facility] [address] [phone]

## 2020-02-28 ENCOUNTER — OFFICE VISIT (OUTPATIENT)
Dept: PULMONOLOGY | Age: 70
End: 2020-02-28
Payer: MEDICARE

## 2020-02-28 VITALS — OXYGEN SATURATION: 98 % | DIASTOLIC BLOOD PRESSURE: 74 MMHG | HEART RATE: 81 BPM | SYSTOLIC BLOOD PRESSURE: 137 MMHG

## 2020-02-28 PROCEDURE — G8417 CALC BMI ABV UP PARAM F/U: HCPCS | Performed by: INTERNAL MEDICINE

## 2020-02-28 PROCEDURE — 3017F COLORECTAL CA SCREEN DOC REV: CPT | Performed by: INTERNAL MEDICINE

## 2020-02-28 PROCEDURE — G8399 PT W/DXA RESULTS DOCUMENT: HCPCS | Performed by: INTERNAL MEDICINE

## 2020-02-28 PROCEDURE — 99214 OFFICE O/P EST MOD 30 MIN: CPT | Performed by: INTERNAL MEDICINE

## 2020-02-28 PROCEDURE — 1090F PRES/ABSN URINE INCON ASSESS: CPT | Performed by: INTERNAL MEDICINE

## 2020-02-28 PROCEDURE — G8482 FLU IMMUNIZE ORDER/ADMIN: HCPCS | Performed by: INTERNAL MEDICINE

## 2020-02-28 PROCEDURE — G8926 SPIRO NO PERF OR DOC: HCPCS | Performed by: INTERNAL MEDICINE

## 2020-02-28 PROCEDURE — G8427 DOCREV CUR MEDS BY ELIG CLIN: HCPCS | Performed by: INTERNAL MEDICINE

## 2020-02-28 PROCEDURE — 3023F SPIROM DOC REV: CPT | Performed by: INTERNAL MEDICINE

## 2020-02-28 PROCEDURE — 1123F ACP DISCUSS/DSCN MKR DOCD: CPT | Performed by: INTERNAL MEDICINE

## 2020-02-28 PROCEDURE — 4040F PNEUMOC VAC/ADMIN/RCVD: CPT | Performed by: INTERNAL MEDICINE

## 2020-02-28 PROCEDURE — 1036F TOBACCO NON-USER: CPT | Performed by: INTERNAL MEDICINE

## 2020-02-28 RX ORDER — PREDNISONE 10 MG/1
10 TABLET ORAL DAILY
Qty: 30 TABLET | Refills: 11 | Status: SHIPPED | OUTPATIENT
Start: 2020-02-28 | End: 2020-03-09

## 2020-02-28 NOTE — PROGRESS NOTES
3 mLs by nebulization every 6 hours as needed for Wheezing Dx: COPD      ICD-10: J44.9  Jono Martinez MD   Mis. Devices (BATH/SHOWER SEAT) MISC Use as directed  Neal Pedraza MD   alendronate (FOSAMAX) 70 MG tablet Take 1 tablet by mouth every 7 days  Francisco Carlin MD   tiZANidine (ZANAFLEX) 4 MG tablet Take 1 tablet by mouth 3 times daily  Neal Pedraza MD   albuterol sulfate  (90 Base) MCG/ACT inhaler Inhale 2 puffs into the lungs every 6 hours as needed for Wheezing  Neal Pedraza MD   omeprazole (PRILOSEC) 20 MG delayed release capsule Take 20 mg by mouth Daily Takes as needed  Historical Provider, MD   Lift Chair MISC by Does not apply route Please dispense a Lift Chair  Neal Pedraza MD   guaiFENesin (MUCINEX) 600 MG extended release tablet Take 1 tablet by mouth 2 times daily  Juan Luis Hewitt MD   [facility]. Devices (ROLLER [place]) MISC 1 each by Does not apply route daily Please dispense a walker with a seat  Neal Pedraza MD   Adalimumab (HUMIRA) 20 MG/0.4ML KIT Inject 1 vial into the skin every 14 days   Historical Provider, MD       Vitals:    20 1338   BP: 137/74   Pulse: 81   SpO2: 98%     There is no height or weight on file to calculate BMI.      Wt Readings from Last 3 Encounters:   20 137 lb (62.1 kg)   20 136 lb 3.2 oz (61.8 kg)   20 137 lb (62.1 kg)     BP Readings from Last 3 Encounters:   20 137/74   20 (!) 161/73   20 130/82        Social History     Tobacco Use   Smoking Status Former Smoker    Packs/day: 0.25    Years: 30.00    Pack years: 7.50    Types: Cigarettes    Last attempt to quit: 2015    Years since quittin.0   Smokeless Tobacco Never Used   Tobacco Comment    started smoking at age 26 / smoked up to 9 cigarettes a day

## 2020-03-02 ENCOUNTER — OFFICE VISIT (OUTPATIENT)
Dept: INTERNAL MEDICINE CLINIC | Age: 70
End: 2020-03-02
Payer: MEDICARE

## 2020-03-02 VITALS
HEART RATE: 90 BPM | BODY MASS INDEX: 25.78 KG/M2 | SYSTOLIC BLOOD PRESSURE: 132 MMHG | DIASTOLIC BLOOD PRESSURE: 86 MMHG | WEIGHT: 132 LBS

## 2020-03-02 DIAGNOSIS — E87.6 HYPOKALEMIA: ICD-10-CM

## 2020-03-02 DIAGNOSIS — N18.30 CKD (CHRONIC KIDNEY DISEASE), STAGE III (HCC): ICD-10-CM

## 2020-03-02 DIAGNOSIS — E87.20 METABOLIC ACIDOSIS: ICD-10-CM

## 2020-03-02 LAB
ANION GAP SERPL CALCULATED.3IONS-SCNC: 23 MMOL/L (ref 3–16)
BUN BLDV-MCNC: 21 MG/DL (ref 7–20)
CALCIUM SERPL-MCNC: 8.6 MG/DL (ref 8.3–10.6)
CHLORIDE BLD-SCNC: 97 MMOL/L (ref 99–110)
CO2: 17 MMOL/L (ref 21–32)
CREAT SERPL-MCNC: 1.7 MG/DL (ref 0.6–1.2)
GFR AFRICAN AMERICAN: 36
GFR NON-AFRICAN AMERICAN: 30
GLUCOSE BLD-MCNC: 64 MG/DL (ref 70–99)
POTASSIUM SERPL-SCNC: 3.9 MMOL/L (ref 3.5–5.1)
SODIUM BLD-SCNC: 137 MMOL/L (ref 136–145)

## 2020-03-02 PROCEDURE — 1123F ACP DISCUSS/DSCN MKR DOCD: CPT | Performed by: INTERNAL MEDICINE

## 2020-03-02 PROCEDURE — G8427 DOCREV CUR MEDS BY ELIG CLIN: HCPCS | Performed by: INTERNAL MEDICINE

## 2020-03-02 PROCEDURE — 3023F SPIROM DOC REV: CPT | Performed by: INTERNAL MEDICINE

## 2020-03-02 PROCEDURE — 1090F PRES/ABSN URINE INCON ASSESS: CPT | Performed by: INTERNAL MEDICINE

## 2020-03-02 PROCEDURE — G8482 FLU IMMUNIZE ORDER/ADMIN: HCPCS | Performed by: INTERNAL MEDICINE

## 2020-03-02 PROCEDURE — 4040F PNEUMOC VAC/ADMIN/RCVD: CPT | Performed by: INTERNAL MEDICINE

## 2020-03-02 PROCEDURE — 3017F COLORECTAL CA SCREEN DOC REV: CPT | Performed by: INTERNAL MEDICINE

## 2020-03-02 PROCEDURE — 1036F TOBACCO NON-USER: CPT | Performed by: INTERNAL MEDICINE

## 2020-03-02 PROCEDURE — G8399 PT W/DXA RESULTS DOCUMENT: HCPCS | Performed by: INTERNAL MEDICINE

## 2020-03-02 PROCEDURE — G8417 CALC BMI ABV UP PARAM F/U: HCPCS | Performed by: INTERNAL MEDICINE

## 2020-03-02 PROCEDURE — G8926 SPIRO NO PERF OR DOC: HCPCS | Performed by: INTERNAL MEDICINE

## 2020-03-02 PROCEDURE — 99214 OFFICE O/P EST MOD 30 MIN: CPT | Performed by: INTERNAL MEDICINE

## 2020-03-02 RX ORDER — FLUCONAZOLE 150 MG/1
150 TABLET ORAL WEEKLY
Qty: 6 TABLET | Refills: 0 | Status: SHIPPED | OUTPATIENT
Start: 2020-03-02 | End: 2020-07-24

## 2020-03-02 RX ORDER — NITROFURANTOIN 25; 75 MG/1; MG/1
100 CAPSULE ORAL 2 TIMES DAILY
Qty: 28 CAPSULE | Refills: 0 | Status: SHIPPED | OUTPATIENT
Start: 2020-03-02 | End: 2020-03-11 | Stop reason: SINTOL

## 2020-03-02 ASSESSMENT — ENCOUNTER SYMPTOMS
COUGH: 1
EYE PAIN: 0
EYE REDNESS: 0
FACIAL SWELLING: 0
SHORTNESS OF BREATH: 1

## 2020-03-02 NOTE — PROGRESS NOTES
3/2/2020     Abhi Silva (:  1950) is a 77 y.o. female, here for evaluation of the following medical concerns:    HPI  COPD:  Current treatment includes combined beta agonist/steroid inhaler, anticholinergic inhaler, oral steroid- 10 mg, home 02- 2 liter. Residual symptoms: chronic dyspnea, chest tightness, inability to climb stairs, non-productive cough and wheezing. She denies any other symptoms. She requires her rescue inhaler 4 time(s) per day. Patient reports some intertrigo under both breasts. She is tried multiple creams without benefit. She would like to try some pills. She notes that the skin is quite macerated. Review of Systems   Constitutional: Positive for fatigue. HENT: Negative for facial swelling and hearing loss. Eyes: Negative for pain and redness. Respiratory: Positive for cough and shortness of breath. Cardiovascular: Negative for chest pain and leg swelling. Prior to Visit Medications    Medication Sig Taking?  Authorizing Provider   fluconazole (DIFLUCAN) 150 MG tablet Take 1 tablet by mouth once a week for 6 doses Yes Mu Serra MD   nitrofurantoin, macrocrystal-monohydrate, (MACROBID) 100 MG capsule Take 1 capsule by mouth 2 times daily for 14 days Yes Mu Serra MD   predniSONE (DELTASONE) 10 MG tablet Take 1 tablet by mouth daily for 10 days Yes Jermaine Gross MD   potassium chloride (KLOR-CON) 10 MEQ extended release tablet Take 1 tablet by mouth 3 times daily Yes Shelly Lam MD   ondansetron (ZOFRAN ODT) 4 MG disintegrating tablet Take 1 tablet by mouth every 8 hours as needed for Nausea Yes JAIME Sims   budesonide-formoterol (SYMBICORT) 160-4.5 MCG/ACT AERO Inhale 2 puffs into the lungs 2 times daily Yes Mu Serra MD   tiotropium (SPIRIVA RESPIMAT) 2.5 MCG/ACT AERS inhaler Inhale 2 puffs into the lungs daily Yes Mu Serra MD   apixaban (ELIQUIS) 5 MG TABS tablet Take 1 tablet by mouth authenticate this note.     --Radha Giang MD on 3/2/2020 at 1:33 PM

## 2020-03-09 ENCOUNTER — TELEPHONE (OUTPATIENT)
Dept: INTERNAL MEDICINE CLINIC | Age: 70
End: 2020-03-09

## 2020-03-09 ENCOUNTER — NURSE TRIAGE (OUTPATIENT)
Dept: OTHER | Facility: CLINIC | Age: 70
End: 2020-03-09

## 2020-03-09 NOTE — TELEPHONE ENCOUNTER
Reason for Disposition   Patient wants to be seen    Protocols used: EYE - PUS OR DISCHARGE-ADULT-[place]    Patient called pre-service center [facility]) [place] to schedule appointment, with red flag complaint, transferred to RN access for triage. Pt states she was in the office last week and placed on abx for UTI as well as diflucan for a rash under her breast. She started the medications on Tuesday. On Friday when she awoke, she noticed that her R eye was swollen and had a little bit of crust drainage. No pain. No redness. No fever. She states eye is only swollen in the morning. During the day it returns to normal. Saturday she also noticed that the bridge of her nose was slightly swollen as her eyeglasses were leaving a nikki. Triage indicates for pt to be seen in the office today. Pt is declining that recommendation due to transportation. She would like to make an appointment for Wed. Pt instructed to call back for any new or worsening symptoms. Patient verbalized understanding. Patient denies any other questions or concerns. Writer provided warm transfer to PCP office for appointment scheduling. Staff will also leave a message to PCP to advise pt on whether or not to continue abx. Please do not respond to the triage nurse through this encounter. Any subsequent communication should be directly with the patient.

## 2020-03-11 ENCOUNTER — OFFICE VISIT (OUTPATIENT)
Dept: INTERNAL MEDICINE CLINIC | Age: 70
End: 2020-03-11
Payer: MEDICARE

## 2020-03-11 VITALS
DIASTOLIC BLOOD PRESSURE: 88 MMHG | SYSTOLIC BLOOD PRESSURE: 130 MMHG | HEART RATE: 79 BPM | BODY MASS INDEX: 26.17 KG/M2 | WEIGHT: 134 LBS

## 2020-03-11 PROCEDURE — 4040F PNEUMOC VAC/ADMIN/RCVD: CPT | Performed by: INTERNAL MEDICINE

## 2020-03-11 PROCEDURE — G8399 PT W/DXA RESULTS DOCUMENT: HCPCS | Performed by: INTERNAL MEDICINE

## 2020-03-11 PROCEDURE — 99213 OFFICE O/P EST LOW 20 MIN: CPT | Performed by: INTERNAL MEDICINE

## 2020-03-11 PROCEDURE — 1123F ACP DISCUSS/DSCN MKR DOCD: CPT | Performed by: INTERNAL MEDICINE

## 2020-03-11 PROCEDURE — 1036F TOBACCO NON-USER: CPT | Performed by: INTERNAL MEDICINE

## 2020-03-11 PROCEDURE — G8417 CALC BMI ABV UP PARAM F/U: HCPCS | Performed by: INTERNAL MEDICINE

## 2020-03-11 PROCEDURE — 1090F PRES/ABSN URINE INCON ASSESS: CPT | Performed by: INTERNAL MEDICINE

## 2020-03-11 PROCEDURE — 3017F COLORECTAL CA SCREEN DOC REV: CPT | Performed by: INTERNAL MEDICINE

## 2020-03-11 PROCEDURE — G8427 DOCREV CUR MEDS BY ELIG CLIN: HCPCS | Performed by: INTERNAL MEDICINE

## 2020-03-11 PROCEDURE — G8482 FLU IMMUNIZE ORDER/ADMIN: HCPCS | Performed by: INTERNAL MEDICINE

## 2020-03-11 RX ORDER — OLOPATADINE HYDROCHLORIDE 2 MG/ML
1 SOLUTION/ DROPS OPHTHALMIC DAILY
Qty: 2.5 ML | Refills: 0 | Status: SHIPPED | OUTPATIENT
Start: 2020-03-11 | End: 2020-03-21

## 2020-03-11 RX ORDER — CLINDAMYCIN HYDROCHLORIDE 300 MG/1
300 CAPSULE ORAL 3 TIMES DAILY
Qty: 30 CAPSULE | Refills: 0 | Status: SHIPPED | OUTPATIENT
Start: 2020-03-11 | End: 2020-03-21

## 2020-03-11 ASSESSMENT — ENCOUNTER SYMPTOMS
EYE DISCHARGE: 1
DOUBLE VISION: 0
EYE ITCHING: 0
EYE REDNESS: 0
NAUSEA: 0

## 2020-03-11 NOTE — PROGRESS NOTES
3/11/2020     Francine Wills (:  1950) is a 77 y.o. female, here for evaluation of the following medical concerns:    HPI  {Visit Chronic CHP (Optional):15416}    Review of Systems    Prior to Visit Medications    Medication Sig Taking? Authorizing Provider   fluconazole (DIFLUCAN) 150 MG tablet Take 1 tablet by mouth once a week for 6 doses Yes Wilhelm Mortimer, MD   nitrofurantoin, macrocrystal-monohydrate, (MACROBID) 100 MG capsule Take 1 capsule by mouth 2 times daily for 14 days Yes Wilhelm Mortimer, MD   potassium chloride (KLOR-CON) 10 MEQ extended release tablet Take 1 tablet by mouth 3 times daily Yes Koko Powell MD   ondansetron (ZOFRAN ODT) 4 MG disintegrating tablet Take 1 tablet by mouth every 8 hours as needed for Nausea Yes JAIME Kimball   budesonide-formoterol (SYMBICORT) 160-4.5 MCG/ACT AERO Inhale 2 puffs into the lungs 2 times daily Yes Wilhelm Mortimer, MD   tiotropium (SPIRIVA RESPIMAT) 2.5 MCG/ACT AERS inhaler Inhale 2 puffs into the lungs daily Yes Wilhelm Mortimer, MD   apixaban (ELIQUIS) 5 MG TABS tablet Take 1 tablet by mouth 2 times daily Yes Peggy Chun MD   amitriptyline (ELAVIL) 150 MG tablet TAKE 0.5 TABLETS BY MOUTH NIGHTLY Yes Wilhelm Mortimer, MD   albuterol (PROVENTIL) (2.5 MG/3ML) 0.083% nebulizer solution Take 3 mLs by nebulization every 6 hours as needed for Wheezing Dx: COPD      ICD-10: J44.9 Yes Peggy Chun MD   Misc.  Devices (BATH/SHOWER SEAT) MISC Use as directed Yes Wilhelm Mortimer, MD   alendronate (FOSAMAX) 70 MG tablet Take 1 tablet by mouth every 7 days Yes Eden Livingston MD   tiZANidine (ZANAFLEX) 4 MG tablet Take 1 tablet by mouth 3 times daily Yes Wilhelm Mortimer, MD   albuterol sulfate  (90 Base) MCG/ACT inhaler Inhale 2 puffs into the lungs every 6 hours as needed for Wheezing Yes Wilhelm Mortimer, MD   omeprazole (PRILOSEC) 20 MG delayed release capsule Take 20 mg by mouth Daily

## 2020-03-11 NOTE — PROGRESS NOTES
3/11/2020     Hair Rubin (:  1950) is a 77 y.o. female, here for evaluation of the following medical concerns:    Eye Problem    The right eye is affected. The current episode started in the past 7 days. The problem occurs constantly. The problem has been gradually worsening. The pain is mild. There is no known exposure to pink eye. She does not wear contacts. Associated symptoms include an eye discharge and a recent URI. Pertinent negatives include no double vision, eye redness, itching or nausea. She has tried commercial eye wash for the symptoms. The treatment provided mild relief. Patient comes in for left breast tenderness that has been there for 4 months. She was treated  With diflucan with minimal improvement. she notes some erythema, warmth and pain. She would  Like to try some antibiotics. Review of Systems   Eyes: Positive for discharge. Negative for double vision, redness and itching. Gastrointestinal: Negative for nausea. Prior to Visit Medications    Medication Sig Taking?  Authorizing Provider   clindamycin (CLEOCIN) 300 MG capsule Take 1 capsule by mouth 3 times daily for 10 days Yes Niall Florez MD   olopatadine (PATADAY) 0.2 % SOLN ophthalmic solution Place 1 drop into both eyes daily for 10 days Yes Niall Florez MD   fluconazole (DIFLUCAN) 150 MG tablet Take 1 tablet by mouth once a week for 6 doses Yes Niall Florez MD   potassium chloride (KLOR-CON) 10 MEQ extended release tablet Take 1 tablet by mouth 3 times daily Yes Ashley Foster MD   ondansetron (ZOFRAN ODT) 4 MG disintegrating tablet Take 1 tablet by mouth every 8 hours as needed for Nausea Yes JAIME De León   budesonide-formoterol (SYMBICORT) 160-4.5 MCG/ACT AERO Inhale 2 puffs into the lungs 2 times daily Yes Niall Florez MD   tiotropium (SPIRIVA RESPIMAT) 2.5 MCG/ACT AERS inhaler Inhale 2 puffs into the lungs daily Yes Niall Florez MD   apixaban [place]) 5

## 2020-03-25 ENCOUNTER — TELEPHONE (OUTPATIENT)
Dept: INTERNAL MEDICINE CLINIC | Age: 70
End: 2020-03-25

## 2020-03-25 ENCOUNTER — OFFICE VISIT (OUTPATIENT)
Dept: INTERNAL MEDICINE CLINIC | Age: 70
End: 2020-03-25
Payer: MEDICARE

## 2020-03-25 VITALS
HEART RATE: 90 BPM | WEIGHT: 133 LBS | BODY MASS INDEX: 25.97 KG/M2 | SYSTOLIC BLOOD PRESSURE: 130 MMHG | DIASTOLIC BLOOD PRESSURE: 80 MMHG

## 2020-03-25 PROCEDURE — G8427 DOCREV CUR MEDS BY ELIG CLIN: HCPCS | Performed by: INTERNAL MEDICINE

## 2020-03-25 PROCEDURE — 1090F PRES/ABSN URINE INCON ASSESS: CPT | Performed by: INTERNAL MEDICINE

## 2020-03-25 PROCEDURE — 1123F ACP DISCUSS/DSCN MKR DOCD: CPT | Performed by: INTERNAL MEDICINE

## 2020-03-25 PROCEDURE — 3017F COLORECTAL CA SCREEN DOC REV: CPT | Performed by: INTERNAL MEDICINE

## 2020-03-25 PROCEDURE — 1036F TOBACCO NON-USER: CPT | Performed by: INTERNAL MEDICINE

## 2020-03-25 PROCEDURE — 99213 OFFICE O/P EST LOW 20 MIN: CPT | Performed by: INTERNAL MEDICINE

## 2020-03-25 PROCEDURE — G8482 FLU IMMUNIZE ORDER/ADMIN: HCPCS | Performed by: INTERNAL MEDICINE

## 2020-03-25 PROCEDURE — G8417 CALC BMI ABV UP PARAM F/U: HCPCS | Performed by: INTERNAL MEDICINE

## 2020-03-25 PROCEDURE — 4040F PNEUMOC VAC/ADMIN/RCVD: CPT | Performed by: INTERNAL MEDICINE

## 2020-03-25 PROCEDURE — G8399 PT W/DXA RESULTS DOCUMENT: HCPCS | Performed by: INTERNAL MEDICINE

## 2020-03-25 RX ORDER — CLOTRIMAZOLE AND BETAMETHASONE DIPROPIONATE 10; .64 MG/G; MG/G
CREAM TOPICAL
Qty: 45 G | Refills: 0 | Status: SHIPPED | OUTPATIENT
Start: 2020-03-25 | End: 2020-04-04

## 2020-03-25 NOTE — PROGRESS NOTES
Amadou Araujo MD   albuterol sulfate  (90 Base) MCG/ACT inhaler Inhale 2 puffs into the lungs every 6 hours as needed for Wheezing Yes Dominique Mobley MD   omeprazole (PRILOSEC) 20 MG delayed release capsule Take 20 mg by mouth Daily Takes as needed Yes Historical Provider, MD   [address] [address] by Does not apply route Please dispense a Lift Chair Yes Dominique Mobley MD   guaiFENesin (MUCINEX) 600 MG extended release tablet Take 1 tablet by mouth 2 times daily Yes Adrianna Siemens, MD   Misc. Devices (ROLLER [place]) MISC 1 each by Does not apply route daily Please dispense a walker with a seat Yes Dominique Mobley MD   Adalimumab (HUMIRA) 20 MG/0.4ML KIT Inject 1 vial into the skin every 14 days  Yes Historical Provider, MD        Social History     Tobacco Use    Smoking status: Former Smoker     Packs/day: 0.25     Years: 30.00     Pack years: 7.50     Types: Cigarettes     Last attempt to quit: 2015     Years since quittin.0    Smokeless tobacco: Never Used    Tobacco comment: started smoking at age 26 / smoked up to 9 cigarettes a day    Substance Use Topics    Alcohol use: No        Vitals:    20 1403   BP: 130/80   Site: Right Upper Arm   Position: Sitting   Cuff Size: Large Adult   Pulse: 90   Weight: 133 lb (60.3 kg)     Estimated body mass index is 25.97 kg/m² as calculated from the following:    Height as of 20: 5' (1.524 m). Weight as of this encounter: 133 lb (60.3 kg). Physical Exam  Constitutional:       General: She is not in acute distress. Appearance: Normal appearance. She is not ill-appearing. HENT:      Nose: Nose normal. No congestion or rhinorrhea. Mouth/Throat:      Mouth: Mucous membranes are moist.      Pharynx: No oropharyngeal exudate or posterior oropharyngeal erythema. Neck:      Musculoskeletal: Normal range of motion. No neck rigidity or muscular tenderness. Chest:      Chest wall: Swelling and tenderness present. Breasts:         Left: Swelling present. No nipple discharge. Neurological:      Mental Status: She is alert. ASSESSMENT/PLAN:  1. Cellulitis of left breast  improving  - clotrimazole-betamethasone (LOTRISONE) 1-0.05 % cream; Apply topically 2 times daily. Dispense: 45 g; Refill: 0      Return in about 2 months (around 5/25/2020) for copd 30 min. An electronic signature was used to authenticate this note.     --Chapin Howe MD on 3/25/2020 at 2:21 PM

## 2020-05-29 ENCOUNTER — TELEPHONE (OUTPATIENT)
Dept: INTERNAL MEDICINE CLINIC | Age: 70
End: 2020-05-29

## 2020-06-23 RX ORDER — APIXABAN 5 MG/1
TABLET, FILM COATED ORAL
Qty: 60 TABLET | Refills: 6 | Status: SHIPPED | OUTPATIENT
Start: 2020-06-23 | End: 2021-01-15

## 2020-07-10 ENCOUNTER — TELEPHONE (OUTPATIENT)
Dept: INTERNAL MEDICINE CLINIC | Age: 70
End: 2020-07-10

## 2020-07-13 RX ORDER — TIZANIDINE 4 MG/1
4 TABLET ORAL 3 TIMES DAILY
Qty: 30 TABLET | Refills: 1 | Status: ON HOLD | OUTPATIENT
Start: 2020-07-13 | End: 2020-08-28

## 2020-07-13 NOTE — TELEPHONE ENCOUNTER
Pt states that it isn't a rash. Under left breast and mis back pain. Hard to wear a bra and bend over. Pt feels it is more muscle related or her COPD.  Please advise

## 2020-07-13 NOTE — TELEPHONE ENCOUNTER
Please see if patient would like to try a muscle relaxer. I will send to her pharmacy. If no improvement, we will do a video visit or have her come in for an appointment.

## 2020-07-24 RX ORDER — FLUCONAZOLE 150 MG/1
150 TABLET ORAL WEEKLY
Qty: 6 TABLET | Refills: 0 | Status: ON HOLD | OUTPATIENT
Start: 2020-07-24 | End: 2020-08-28

## 2020-07-28 ENCOUNTER — OFFICE VISIT (OUTPATIENT)
Dept: PULMONOLOGY | Age: 70
End: 2020-07-28
Payer: MEDICARE

## 2020-07-28 VITALS — DIASTOLIC BLOOD PRESSURE: 88 MMHG | HEART RATE: 100 BPM | OXYGEN SATURATION: 97 % | SYSTOLIC BLOOD PRESSURE: 138 MMHG

## 2020-07-28 PROCEDURE — 1036F TOBACCO NON-USER: CPT | Performed by: INTERNAL MEDICINE

## 2020-07-28 PROCEDURE — G8926 SPIRO NO PERF OR DOC: HCPCS | Performed by: INTERNAL MEDICINE

## 2020-07-28 PROCEDURE — 3017F COLORECTAL CA SCREEN DOC REV: CPT | Performed by: INTERNAL MEDICINE

## 2020-07-28 PROCEDURE — 1090F PRES/ABSN URINE INCON ASSESS: CPT | Performed by: INTERNAL MEDICINE

## 2020-07-28 PROCEDURE — 1123F ACP DISCUSS/DSCN MKR DOCD: CPT | Performed by: INTERNAL MEDICINE

## 2020-07-28 PROCEDURE — 4040F PNEUMOC VAC/ADMIN/RCVD: CPT | Performed by: INTERNAL MEDICINE

## 2020-07-28 PROCEDURE — G8399 PT W/DXA RESULTS DOCUMENT: HCPCS | Performed by: INTERNAL MEDICINE

## 2020-07-28 PROCEDURE — 99214 OFFICE O/P EST MOD 30 MIN: CPT | Performed by: INTERNAL MEDICINE

## 2020-07-28 PROCEDURE — 3023F SPIROM DOC REV: CPT | Performed by: INTERNAL MEDICINE

## 2020-07-28 PROCEDURE — G8417 CALC BMI ABV UP PARAM F/U: HCPCS | Performed by: INTERNAL MEDICINE

## 2020-07-28 PROCEDURE — G8427 DOCREV CUR MEDS BY ELIG CLIN: HCPCS | Performed by: INTERNAL MEDICINE

## 2020-07-28 RX ORDER — ALBUTEROL SULFATE 90 UG/1
2 AEROSOL, METERED RESPIRATORY (INHALATION) EVERY 6 HOURS PRN
Qty: 1 INHALER | Refills: 11 | Status: SHIPPED | OUTPATIENT
Start: 2020-07-28 | End: 2021-08-05 | Stop reason: ALTCHOICE

## 2020-07-28 NOTE — PROGRESS NOTES
[facility]  Follow Up Note  MD Alyssia Fermin   YOB: 1950    Date of Visit:  7/28/2020    Assessment/Plan:  1. SOB (shortness of breath)  Prednisone helped and she has not been in the ER  She took extra today to \"get here\" because her [facility] went out at home last night. 2. COPD, severe (Jewels Vasquez)  PFT 7/19:  INTERPRETATION:  Spirometry reveals decreased FVC at 1.49 liters, which  is 57% predicted. FEV1 is decreased at 0.94 liters, which is 48%  predicted. FEV1/FVC ratio is reduced at 63%. Postbronchodilator study  is not performed. Lung volumes reveal normal total lung capacity. Residual volume is increased at 159% predicted. Diffusion capacity is  decreased at 27% predicted. In comparison to a study from 02/2017, FVC  has decreased by 21%. FEV1 is increased by 19% and residual volume is  increased by 17%. Diffusion capacity is decreased by 37%.     IMPRESSION:  Severe obstructive lung disease with air trapping. There  has been significant worsening since the preceding study. Symbicort  Spiriva  Albuterol HHN    She feels like she cannot stop the Prednisone at the moment due to the heat and humidity. Will continue 10 mg per day for now and hope to taper her in the fall. 3. Centrilobular emphysema (Jewels Vasquez)  CT Chest 1/5/20:  Impression   Minimal dependent opacity at the left lung base favored to represent   atelectasis.  Pneumonia considered less likely. .       COPD with advanced emphysematous changes. 4. Chronic hypoxemic respiratory failure (HCC)  O2 sats are acceptable on supplemental O2. The patient benefits from the use of supplemental O2.    3 months      Chief Complaint   Patient presents with    Shortness of Breath     air went out of her apartment last night so she took 30 mg of Prednisone today just to get here.  Could not handle her O2 tanks and mask so did not bring her O2 with her today       HPI  The patient presents with a chief complaint of moderate shortness of breath related to severe COPD of many years duration. He has mild associated cough. Exertion is a modifying factor. Her AC went out and she has trouble breathing when it is hot. She normally takes Symbicort, Spiriva and Albuterol HHN. We started her on Prednisone 10 mg per day at her last visit and that helped her stay out of the ER. She is O2 dependent. No Chest pain, Nausea or vomiting reported      Review of Systems  As reviewed in HPI    History  I have reviewed past medical, surgical, social and family history. This is documented elsewhere in the medical record. Physical Exam:  Well developed, well nourished  Alert and oriented  Sclera is clear  No cervical adenopathy  No JVD. Chest examination is clear. Cardiac examination reveals regular rate and rhythm without murmur, gallop or rub. The abdomen is soft, nontender and nondistended. There is no clubbing, cyanosis or edema of the extremities. There is no obvious skin rash. No focal neuro deficicts  Normal mood and affect    No Known Allergies  Prior to Visit Medications    Medication Sig Taking?  Authorizing Provider   fluconazole (DIFLUCAN) 150 MG tablet TAKE 1 TABLET BY MOUTH ONCE A WEEK FOR 6 DOSES  Efrain Chavez MD   tiZANidine (ZANAFLEX) 4 MG tablet Take 1 tablet by mouth 3 times daily  Efrain Chavez MD   ELIQUIS 5 MG TABS tablet TAKE 1 TABLET BY MOUTH TWICE A DAY  Mervat Soto MD   Lift Chair [address] [facility] by Other route Dx:J44.1, N18.4  Efrain Chavez MD   potassium chloride (KLOR-CON) 10 MEQ extended release tablet Take 1 tablet by mouth 3 times daily  Brenda Ellison MD   ondansetron (ZOFRAN ODT) 4 MG disintegrating tablet Take 1 tablet by mouth every 8 hours as needed for Nausea  JAIME Roldan   budesonide-formoterol (SYMBICORT) 160-4.5 MCG/ACT AERO Inhale 2 puffs into the lungs 2 times daily  Efrain Chavez MD   tiotropium (Eva Densie) 2. 5 MCG/ACT AERS inhaler Inhale 2 puffs into the lungs daily  Bhakti Arroyo MD   amitriptyline (ELAVIL) 150 MG tablet TAKE 0.5 TABLETS BY MOUTH NIGHTLY  Bhakti rAroyo MD   albuterol (PROVENTIL) (2.5 MG/3ML) 0.083% nebulizer solution Take 3 mLs by nebulization every 6 hours as needed for Wheezing Dx: COPD      ICD-10: J44.9  Toya Dempsey MD   Misc. Devices (BATH/SHOWER SEAT) MISC Use as directed  Bhakti Arroyo MD   alendronate (FOSAMAX) 70 MG tablet Take 1 tablet by mouth every 7 days  Pauline Manriquez MD   albuterol sulfate  (90 Base) MCG/ACT inhaler Inhale 2 puffs into the lungs every 6 hours as needed for Wheezing  Bhakti Arroyo MD   omeprazole (PRILOSEC) 20 MG delayed release capsule Take 20 mg by mouth Daily Takes as needed  Historical Provider, MD   Lift Chair MISC by Does not apply route Please dispense a Lift Chair  Bhakti Arroyo MD   guaiFENesin (MUCINEX) 600 MG extended release tablet Take 1 tablet by mouth 2 times daily  Bora Marroquin MD   Misc. Devices (ROLLER [place]) MISC 1 each by Does not apply route daily Please dispense a walker with a seat  Bhakti Arroyo MD   Adalimumab (HUMIRA) 20 MG/0.4ML KIT Inject 1 vial into the skin every 14 days   Historical Provider, MD       Vitals:    20 1633   BP: 138/88   Pulse: 100   SpO2: 97%     There is no height or weight on file to calculate BMI.      Wt Readings from Last 3 Encounters:   20 133 lb (60.3 kg)   20 134 lb (60.8 kg)   20 132 lb (59.9 kg)     BP Readings from Last 3 Encounters:   20 138/88   20 130/80   20 130/88        Social History     Tobacco Use   Smoking Status Former Smoker    Packs/day: 0.25    Years: 30.00    Pack years: 7.50    Types: Cigarettes    Last attempt to quit: 2015    Years since quittin.4   Smokeless Tobacco Never Used   Tobacco Comment    started smoking at age 26 / smoked up to 9 cigarettes a day

## 2020-08-13 ENCOUNTER — OFFICE VISIT (OUTPATIENT)
Dept: INTERNAL MEDICINE CLINIC | Age: 70
End: 2020-08-13
Payer: MEDICARE

## 2020-08-13 VITALS
SYSTOLIC BLOOD PRESSURE: 126 MMHG | HEART RATE: 94 BPM | WEIGHT: 147 LBS | DIASTOLIC BLOOD PRESSURE: 82 MMHG | BODY MASS INDEX: 28.71 KG/M2 | OXYGEN SATURATION: 97 % | TEMPERATURE: 96.8 F

## 2020-08-13 DIAGNOSIS — N18.30 CKD (CHRONIC KIDNEY DISEASE), STAGE III (HCC): ICD-10-CM

## 2020-08-13 LAB
ANION GAP SERPL CALCULATED.3IONS-SCNC: 14 MMOL/L (ref 3–16)
BUN BLDV-MCNC: 16 MG/DL (ref 7–20)
CALCIUM SERPL-MCNC: 8.1 MG/DL (ref 8.3–10.6)
CHLORIDE BLD-SCNC: 104 MMOL/L (ref 99–110)
CO2: 15 MMOL/L (ref 21–32)
CREAT SERPL-MCNC: 1.8 MG/DL (ref 0.6–1.2)
GFR AFRICAN AMERICAN: 34
GFR NON-AFRICAN AMERICAN: 28
GLUCOSE BLD-MCNC: 94 MG/DL (ref 70–99)
POTASSIUM SERPL-SCNC: 4.3 MMOL/L (ref 3.5–5.1)
SODIUM BLD-SCNC: 133 MMOL/L (ref 136–145)

## 2020-08-13 PROCEDURE — 1123F ACP DISCUSS/DSCN MKR DOCD: CPT | Performed by: INTERNAL MEDICINE

## 2020-08-13 PROCEDURE — 1036F TOBACCO NON-USER: CPT | Performed by: INTERNAL MEDICINE

## 2020-08-13 PROCEDURE — 4040F PNEUMOC VAC/ADMIN/RCVD: CPT | Performed by: INTERNAL MEDICINE

## 2020-08-13 PROCEDURE — G8926 SPIRO NO PERF OR DOC: HCPCS | Performed by: INTERNAL MEDICINE

## 2020-08-13 PROCEDURE — 1090F PRES/ABSN URINE INCON ASSESS: CPT | Performed by: INTERNAL MEDICINE

## 2020-08-13 PROCEDURE — 3017F COLORECTAL CA SCREEN DOC REV: CPT | Performed by: INTERNAL MEDICINE

## 2020-08-13 PROCEDURE — G8417 CALC BMI ABV UP PARAM F/U: HCPCS | Performed by: INTERNAL MEDICINE

## 2020-08-13 PROCEDURE — 99213 OFFICE O/P EST LOW 20 MIN: CPT | Performed by: INTERNAL MEDICINE

## 2020-08-13 PROCEDURE — G8427 DOCREV CUR MEDS BY ELIG CLIN: HCPCS | Performed by: INTERNAL MEDICINE

## 2020-08-13 PROCEDURE — 3023F SPIROM DOC REV: CPT | Performed by: INTERNAL MEDICINE

## 2020-08-13 PROCEDURE — G8399 PT W/DXA RESULTS DOCUMENT: HCPCS | Performed by: INTERNAL MEDICINE

## 2020-08-13 RX ORDER — TIZANIDINE 2 MG/1
2 TABLET ORAL 3 TIMES DAILY PRN
Qty: 30 TABLET | Refills: 1 | Status: ON HOLD | OUTPATIENT
Start: 2020-08-13 | End: 2020-08-28

## 2020-08-13 RX ORDER — PREDNISONE 10 MG/1
TABLET ORAL
Status: ON HOLD | COMMUNITY
Start: 2020-07-23 | End: 2020-09-05 | Stop reason: HOSPADM

## 2020-08-13 ASSESSMENT — ENCOUNTER SYMPTOMS
COUGH: 1
SHORTNESS OF BREATH: 1

## 2020-08-13 NOTE — PROGRESS NOTES
2020     Heladio Mata (:  1950) is a 77 y.o. female, here for evaluation of the following medical concerns:    HPI  COPD:  Current treatment includes combined beta agonist/steroid inhaler, anticholinergic inhaler,prednisone 10 mg and oxygen 2 liters. Residual symptoms: chronic dyspnea and non-productive cough. She denies any other symptoms. She requires her rescue inhaler 4 time(s) per day. Review of Systems   Constitutional: Negative for chills and diaphoresis. Respiratory: Positive for cough and shortness of breath. Cardiovascular: Negative for chest pain and leg swelling. Prior to Visit Medications    Medication Sig Taking?  Authorizing Provider   albuterol sulfate  (90 Base) MCG/ACT inhaler Inhale 2 puffs into the lungs every 6 hours as needed for Wheezing Yes Amanda Ponce MD   fluconazole (DIFLUCAN) 150 MG tablet TAKE 1 TABLET BY MOUTH ONCE A WEEK FOR 6 DOSES Yes Meghana Menendez MD   tiZANidine (ZANAFLEX) 4 MG tablet Take 1 tablet by mouth 3 times daily Yes Meghana Menendez MD   ELIQUIS 5 MG TABS tablet TAKE 1 TABLET BY MOUTH TWICE A DAY Yes Amanda Ponce MD   Lift Chair [address] [facility] by Other route Dx:J44.1, N18.4 Yes Meghana Menendez MD   potassium chloride (KLOR-CON) 10 MEQ extended release tablet Take 1 tablet by mouth 3 times daily Yes Murali Carpenter MD   ondansetron (ZOFRAN ODT) 4 MG disintegrating tablet Take 1 tablet by mouth every 8 hours as needed for Nausea Yes JAIME Shields   budesonide-formoterol (SYMBICORT) 160-4.5 MCG/ACT AERO Inhale 2 puffs into the lungs 2 times daily Yes Meghana Menendez MD   tiotropium (SPIRIVA RESPIMAT) 2.5 MCG/ACT AERS inhaler Inhale 2 puffs into the lungs daily Yes Meghana Menendez MD   amitriptyline (ELAVIL) 150 MG tablet TAKE 0.5 TABLETS BY MOUTH NIGHTLY Yes Meghana Menendez MD   albuterol (PROVENTIL) (2.5 MG/3ML) 0.083% nebulizer solution Take 3 mLs by nebulization every 6 hours as needed for Wheezing Dx: COPD      ICD-10: J44.9 Yes Tomer Gallo MD   Misc. Devices (BATH/SHOWER SEAT) MISC Use as directed Yes Emilie Guaman MD   alendronate (FOSAMAX) 70 MG tablet Take 1 tablet by mouth every 7 days Yes Caitie Torres MD   albuterol sulfate  (90 Base) MCG/ACT inhaler Inhale 2 puffs into the lungs every 6 hours as needed for Wheezing Yes Emilie Guaman MD   omeprazole (PRILOSEC) 20 MG delayed release capsule Take 20 mg by mouth Daily Takes as needed Yes Historical Provider, MD   Lift Chair MISC by Does not apply route Please dispense a Lift Chair Yes Emilie Guaman MD   guaiFENesin (MUCINEX) 600 MG extended release tablet Take 1 tablet by mouth 2 times daily Yes Emiliano Stanley MD   Misc. Devices (ROLLER [place]) MISC 1 each by Does not apply route daily Please dispense a walker with a seat Yes Emilie Guaman MD   Adalimumab (HUMIRA) 20 MG/0.4ML KIT Inject 1 vial into the skin every 14 days  Yes Historical Provider, MD   predniSONE (DELTASONE) 10 MG tablet TAKE 1 TABLET BY MOUTH EVERY DAY FOR 10 DAYS  Historical Provider, MD        Social History     Tobacco Use    Smoking status: Former Smoker     Packs/day: 0.25     Years: 30.00     Pack years: 7.50     Types: Cigarettes     Last attempt to quit: 2015     Years since quittin.4    Smokeless tobacco: Never Used    Tobacco comment: started smoking at age 26 / smoked up to 9 cigarettes a day    Substance Use Topics    Alcohol use: No        Vitals:    20 1250 20 1310   BP: (!) 150/90 126/82   Site: Right Upper Arm    Position: Sitting    Cuff Size: Medium Adult    Pulse: 94    Temp: 96.8 °F (36 °C)    TempSrc: Infrared    SpO2: 97%    Weight: 147 lb (66.7 kg)      Estimated body mass index is 28.71 kg/m² as calculated from the following:    Height as of 20: 5' (1.524 m). Weight as of this encounter: 147 lb (66.7 kg).     Physical Exam  Constitutional:       Appearance: Normal appearance. She is not ill-appearing. HENT:      Right Ear: Tympanic membrane and ear canal normal. There is no impacted cerumen. Left Ear: Tympanic membrane and ear canal normal.      Nose: Nose normal. No congestion or rhinorrhea. Mouth/Throat:      Mouth: Mucous membranes are moist.      Pharynx: No oropharyngeal exudate or posterior oropharyngeal erythema. Eyes:      General:         Right eye: No discharge. Left eye: No discharge. Pupils: Pupils are equal, round, and reactive to light. Neck:      Musculoskeletal: Normal range of motion. No neck rigidity or muscular tenderness. Cardiovascular:      Rate and Rhythm: Normal rate and regular rhythm. Pulmonary:      Effort: Prolonged expiration present. No bradypnea or accessory muscle usage. Breath sounds: Examination of the right-upper field reveals wheezing. Examination of the right-lower field reveals decreased breath sounds. Examination of the left-lower field reveals decreased breath sounds. Decreased breath sounds and wheezing present. No rhonchi or rales. Neurological:      Mental Status: She is alert. ASSESSMENT/PLAN:  1. COPD, severe (HCC)  Stable  -  Continue symbicort, spiriva, Oxygent and prednisone  -  Follow-up with Dr. Brionna Drew  -  Flu shot in the fall  -  Reduce risk of exposure to coronavirus      No follow-ups on file. An electronic signature was used to authenticate this note.     --Hi Joshua MD on 8/13/2020 at 1:12 PM

## 2020-08-19 RX ORDER — AMITRIPTYLINE HYDROCHLORIDE 150 MG/1
75 TABLET, FILM COATED ORAL NIGHTLY
Qty: 45 TABLET | Refills: 3 | Status: ON HOLD | OUTPATIENT
Start: 2020-08-19 | End: 2020-09-20 | Stop reason: HOSPADM

## 2020-08-27 ENCOUNTER — NURSE TRIAGE (OUTPATIENT)
Dept: OTHER | Facility: CLINIC | Age: 70
End: 2020-08-27

## 2020-08-27 ENCOUNTER — TELEPHONE (OUTPATIENT)
Dept: INTERNAL MEDICINE CLINIC | Age: 70
End: 2020-08-27

## 2020-08-27 NOTE — TELEPHONE ENCOUNTER
----- Message from Rosana Alford sent at 8/27/2020  8:43 AM EDT -----  Subject: Appointment Request    Reason for Call: Immediate Return from RN Triage    QUESTIONS  Type of Appointment? Established Patient  Reason for appointment request? Available appointments did not meet   patient need  Additional Information for Provider? Patient was transferred from Nurse   Triage and she is having SOB   Cough   dizzy and has a headache. Nurse recommend to call 911   patient declined would like a call back. ---------------------------------------------------------------------------  --------------  Konrad WARE  What is the best way for the office to contact you? OK to leave message on   voicemail  Preferred Call Back Phone Number? [phone]  ---------------------------------------------------------------------------  --------------  SCRIPT ANSWERS  Patient needs to be seen today? Yes  Patient needs to be seen today or tomorrow? Yes  Patient needs to be seen within 5 days? Yes  Patient can be seen for a routine visit? Yes  Nurse Name? [facility]  (Did RN indicate the need for [employer]?)?  Yes

## 2020-08-27 NOTE — TELEPHONE ENCOUNTER
Reason for Disposition   SEVERE difficulty breathing (e.g., struggling for each breath, speaks in single words)    Answer Assessment - Initial Assessment Questions  1. DESCRIPTION: \"Describe your dizziness. \"      \" When standing up or walking to the bathroom, I feel lightheaded. \" Pt states air has been off in the apartment for about a month. Pt states she is not currently having trouble breathing but has since turned her oxygen up to 3L . Pt states she has started to get a cough but denies fever. 2. LIGHTHEADED: \"Do you feel lightheaded? \" (e.g., somewhat faint, woozy, weak upon standing)      Yes, Pt feels lightheaded when getting up or walking. 3. VERTIGO: \"Do you feel like either you or the room is spinning or tilting? \" (i.e. vertigo)    Pt states \"It seems like the room is spinning and I have to plop back down\"  4. SEVERITY: \"How bad is it? \"  \"Do you feel like you are going to faint? \" \"Can you stand and walk? \"    - MILD - walking normally    - MODERATE - interferes with normal activities (e.g., work, school)     - SEVERE - unable to stand, requires support to walk, feels like passing out now. SEVERE  5. ONSET:  \"When did the dizziness begin? \"      This all began Tuesday when pt went to bed. 6. AGGRAVATING FACTORS: \"Does anything make it worse? \" (e.g., standing, change in head position)      Getting up and standing or moving from one room to another. 7. HEART RATE: \"Can you tell me your heart rate? \" \"How many beats in 15 seconds? \"  (Note: not all patients can do this)    Per pt HR is 92 and SPO2 is 92      8. CAUSE: \"What do you think is causing the dizziness? \"      Pt believes dizziness is due to air being out. 9. RECURRENT SYMPTOM: \"Have you had dizziness before? \" If so, ask: \"When was the last time? \" \"What happened that time? \"  One time when pt had Pneumonia         10. OTHER SYMPTOMS: \"Do you have any other symptoms? \" (e.g., fever, chest pain, vomiting, diarrhea, bleeding)        No  11. PREGNANCY: \"Is there any chance you are pregnant? \" \"When was your last menstrual period? \"        NA    Protocols used: [id number]    Pt refuses to call 911 or go to ED, pt wants appointment with PCP.

## 2020-08-28 ENCOUNTER — HOSPITAL ENCOUNTER (INPATIENT)
Age: 70
LOS: 9 days | Discharge: HOME | DRG: 871 | End: 2020-09-06
Attending: EMERGENCY MEDICINE | Admitting: INTERNAL MEDICINE
Payer: MEDICARE

## 2020-08-28 ENCOUNTER — APPOINTMENT (OUTPATIENT)
Dept: GENERAL RADIOLOGY | Age: 70
DRG: 871 | End: 2020-08-28
Payer: MEDICARE

## 2020-08-28 ENCOUNTER — APPOINTMENT (OUTPATIENT)
Dept: CT IMAGING | Age: 70
DRG: 871 | End: 2020-08-28
Payer: MEDICARE

## 2020-08-28 PROBLEM — J15.9 BACTERIAL PNEUMONIA: Status: ACTIVE | Noted: 2020-08-28

## 2020-08-28 LAB
A/G RATIO: 0.8 (ref 1.1–2.2)
ALBUMIN SERPL-MCNC: 3 G/DL (ref 3.4–5)
ALP BLD-CCNC: 99 U/L (ref 40–129)
ALT SERPL-CCNC: 7 U/L (ref 10–40)
ANION GAP SERPL CALCULATED.3IONS-SCNC: 15 MMOL/L (ref 3–16)
AST SERPL-CCNC: 8 U/L (ref 15–37)
BACTERIA: ABNORMAL /HPF
BASE EXCESS VENOUS: -5.5 MMOL/L (ref -3–3)
BILIRUB SERPL-MCNC: 0.7 MG/DL (ref 0–1)
BILIRUBIN URINE: NEGATIVE
BLOOD, URINE: NEGATIVE
BUN BLDV-MCNC: 31 MG/DL (ref 7–20)
CALCIUM SERPL-MCNC: 8.9 MG/DL (ref 8.3–10.6)
CARBOXYHEMOGLOBIN: 4 % (ref 0–1.5)
CHLORIDE BLD-SCNC: 99 MMOL/L (ref 99–110)
CLARITY: ABNORMAL
CO2: 18 MMOL/L (ref 21–32)
COLOR: YELLOW
CREAT SERPL-MCNC: 2.2 MG/DL (ref 0.6–1.2)
EKG ATRIAL RATE: 126 BPM
EKG DIAGNOSIS: NORMAL
EKG P AXIS: 14 DEGREES
EKG P-R INTERVAL: 122 MS
EKG Q-T INTERVAL: 314 MS
EKG QRS DURATION: 70 MS
EKG QTC CALCULATION (BAZETT): 454 MS
EKG R AXIS: 14 DEGREES
EKG T AXIS: 66 DEGREES
EKG VENTRICULAR RATE: 126 BPM
EPITHELIAL CELLS, UA: 4 /HPF (ref 0–5)
GFR AFRICAN AMERICAN: 27
GFR NON-AFRICAN AMERICAN: 22
GLOBULIN: 3.7 G/DL
GLUCOSE BLD-MCNC: 113 MG/DL (ref 70–99)
GLUCOSE URINE: 100 MG/DL
HCO3 VENOUS: 17.2 MMOL/L (ref 23–29)
HYALINE CASTS: ABNORMAL /LPF (ref 0–2)
INR BLD: 1.4 (ref 0.86–1.14)
KETONES, URINE: NEGATIVE MG/DL
LACTIC ACID: 1.3 MMOL/L (ref 0.4–2)
LEUKOCYTE ESTERASE, URINE: ABNORMAL
MAGNESIUM: 2.1 MG/DL (ref 1.8–2.4)
METHEMOGLOBIN VENOUS: 0.2 %
MICROSCOPIC EXAMINATION: YES
NITRITE, URINE: NEGATIVE
O2 CONTENT, VEN: 15 VOL %
O2 SAT, VEN: 100 %
O2 THERAPY: ABNORMAL
PCO2, VEN: 24.8 MMHG (ref 40–50)
PH UA: 5 (ref 5–8)
PH VENOUS: 7.45 (ref 7.35–7.45)
PO2, VEN: 146 MMHG (ref 25–40)
POTASSIUM REFLEX MAGNESIUM: 3.2 MMOL/L (ref 3.5–5.1)
PRO-BNP: 560 PG/ML (ref 0–124)
PROCALCITONIN: 2.17 NG/ML (ref 0–0.15)
PROTEIN UA: 30 MG/DL
PROTHROMBIN TIME: 16.3 SEC (ref 10–13.2)
RBC UA: 2 /HPF (ref 0–4)
SODIUM BLD-SCNC: 132 MMOL/L (ref 136–145)
SPECIFIC GRAVITY UA: 1.02 (ref 1–1.03)
TCO2 CALC VENOUS: 40 MMOL/L
TOTAL PROTEIN: 6.7 G/DL (ref 6.4–8.2)
TROPONIN: 0.02 NG/ML
TROPONIN: <0.01 NG/ML
URINE REFLEX TO CULTURE: YES
URINE TYPE: ABNORMAL
UROBILINOGEN, URINE: 0.2 E.U./DL
WBC UA: 70 /HPF (ref 0–5)

## 2020-08-28 PROCEDURE — 84145 PROCALCITONIN (PCT): CPT

## 2020-08-28 PROCEDURE — 6370000000 HC RX 637 (ALT 250 FOR IP): Performed by: EMERGENCY MEDICINE

## 2020-08-28 PROCEDURE — 83880 ASSAY OF NATRIURETIC PEPTIDE: CPT

## 2020-08-28 PROCEDURE — 94761 N-INVAS EAR/PLS OXIMETRY MLT: CPT

## 2020-08-28 PROCEDURE — 81001 URINALYSIS AUTO W/SCOPE: CPT

## 2020-08-28 PROCEDURE — 2700000000 HC OXYGEN THERAPY PER DAY

## 2020-08-28 PROCEDURE — 6370000000 HC RX 637 (ALT 250 FOR IP): Performed by: INTERNAL MEDICINE

## 2020-08-28 PROCEDURE — 85025 COMPLETE CBC W/AUTO DIFF WBC: CPT

## 2020-08-28 PROCEDURE — 36415 COLL VENOUS BLD VENIPUNCTURE: CPT

## 2020-08-28 PROCEDURE — 93010 ELECTROCARDIOGRAM REPORT: CPT | Performed by: INTERNAL MEDICINE

## 2020-08-28 PROCEDURE — 6360000002 HC RX W HCPCS: Performed by: EMERGENCY MEDICINE

## 2020-08-28 PROCEDURE — 96374 THER/PROPH/DIAG INJ IV PUSH: CPT

## 2020-08-28 PROCEDURE — 82803 BLOOD GASES ANY COMBINATION: CPT

## 2020-08-28 PROCEDURE — 2580000003 HC RX 258: Performed by: EMERGENCY MEDICINE

## 2020-08-28 PROCEDURE — 99285 EMERGENCY DEPT VISIT HI MDM: CPT

## 2020-08-28 PROCEDURE — 87449 NOS EACH ORGANISM AG IA: CPT

## 2020-08-28 PROCEDURE — 93005 ELECTROCARDIOGRAM TRACING: CPT | Performed by: EMERGENCY MEDICINE

## 2020-08-28 PROCEDURE — 94760 N-INVAS EAR/PLS OXIMETRY 1: CPT

## 2020-08-28 PROCEDURE — U0003 INFECTIOUS AGENT DETECTION BY NUCLEIC ACID (DNA OR RNA); SEVERE ACUTE RESPIRATORY SYNDROME CORONAVIRUS 2 (SARS-COV-2) (CORONAVIRUS DISEASE [COVID-19]), AMPLIFIED PROBE TECHNIQUE, MAKING USE OF HIGH THROUGHPUT TECHNOLOGIES AS DESCRIBED BY CMS-2020-01-R: HCPCS

## 2020-08-28 PROCEDURE — 83735 ASSAY OF MAGNESIUM: CPT

## 2020-08-28 PROCEDURE — 70450 CT HEAD/BRAIN W/O DYE: CPT

## 2020-08-28 PROCEDURE — 6360000002 HC RX W HCPCS: Performed by: INTERNAL MEDICINE

## 2020-08-28 PROCEDURE — 85610 PROTHROMBIN TIME: CPT

## 2020-08-28 PROCEDURE — 1200000000 HC SEMI PRIVATE

## 2020-08-28 PROCEDURE — 71045 X-RAY EXAM CHEST 1 VIEW: CPT

## 2020-08-28 PROCEDURE — 94640 AIRWAY INHALATION TREATMENT: CPT

## 2020-08-28 PROCEDURE — 87040 BLOOD CULTURE FOR BACTERIA: CPT

## 2020-08-28 PROCEDURE — 84484 ASSAY OF TROPONIN QUANT: CPT

## 2020-08-28 PROCEDURE — 83605 ASSAY OF LACTIC ACID: CPT

## 2020-08-28 PROCEDURE — 96375 TX/PRO/DX INJ NEW DRUG ADDON: CPT

## 2020-08-28 PROCEDURE — 2580000003 HC RX 258: Performed by: INTERNAL MEDICINE

## 2020-08-28 PROCEDURE — 87086 URINE CULTURE/COLONY COUNT: CPT

## 2020-08-28 PROCEDURE — 71250 CT THORAX DX C-: CPT

## 2020-08-28 PROCEDURE — 80053 COMPREHEN METABOLIC PANEL: CPT

## 2020-08-28 RX ORDER — IPRATROPIUM BROMIDE AND ALBUTEROL SULFATE 2.5; .5 MG/3ML; MG/3ML
1 SOLUTION RESPIRATORY (INHALATION) ONCE
Status: COMPLETED | OUTPATIENT
Start: 2020-08-28 | End: 2020-08-28

## 2020-08-28 RX ORDER — ACETAMINOPHEN 650 MG/1
650 SUPPOSITORY RECTAL EVERY 6 HOURS PRN
Status: DISCONTINUED | OUTPATIENT
Start: 2020-08-28 | End: 2020-09-06 | Stop reason: HOSPADM

## 2020-08-28 RX ORDER — IPRATROPIUM BROMIDE AND ALBUTEROL SULFATE 2.5; .5 MG/3ML; MG/3ML
SOLUTION RESPIRATORY (INHALATION)
Status: DISPENSED
Start: 2020-08-28 | End: 2020-08-29

## 2020-08-28 RX ORDER — OXYCODONE HYDROCHLORIDE 5 MG/1
2.5 TABLET ORAL EVERY 6 HOURS PRN
Status: DISPENSED | OUTPATIENT
Start: 2020-08-28 | End: 2020-08-29

## 2020-08-28 RX ORDER — ASPIRIN 325 MG
325 TABLET ORAL ONCE
Status: COMPLETED | OUTPATIENT
Start: 2020-08-28 | End: 2020-08-28

## 2020-08-28 RX ORDER — SODIUM CHLORIDE, SODIUM LACTATE, POTASSIUM CHLORIDE, CALCIUM CHLORIDE 600; 310; 30; 20 MG/100ML; MG/100ML; MG/100ML; MG/100ML
INJECTION, SOLUTION INTRAVENOUS ONCE
Status: COMPLETED | OUTPATIENT
Start: 2020-08-28 | End: 2020-08-28

## 2020-08-28 RX ORDER — LINEZOLID 2 MG/ML
600 INJECTION, SOLUTION INTRAVENOUS EVERY 12 HOURS
Status: DISCONTINUED | OUTPATIENT
Start: 2020-08-28 | End: 2020-09-03

## 2020-08-28 RX ORDER — ALBUTEROL SULFATE 2.5 MG/3ML
2.5 SOLUTION RESPIRATORY (INHALATION)
Status: DISCONTINUED | OUTPATIENT
Start: 2020-08-28 | End: 2020-08-28

## 2020-08-28 RX ORDER — ATORVASTATIN CALCIUM 40 MG/1
40 TABLET, FILM COATED ORAL NIGHTLY
Status: DISCONTINUED | OUTPATIENT
Start: 2020-08-28 | End: 2020-09-06 | Stop reason: HOSPADM

## 2020-08-28 RX ORDER — 0.9 % SODIUM CHLORIDE 0.9 %
30 INTRAVENOUS SOLUTION INTRAVENOUS ONCE
Status: DISCONTINUED | OUTPATIENT
Start: 2020-08-28 | End: 2020-09-06 | Stop reason: HOSPADM

## 2020-08-28 RX ORDER — SODIUM CHLORIDE 0.9 % (FLUSH) 0.9 %
10 SYRINGE (ML) INJECTION PRN
Status: DISCONTINUED | OUTPATIENT
Start: 2020-08-28 | End: 2020-09-06 | Stop reason: HOSPADM

## 2020-08-28 RX ORDER — HYDROCODONE BITARTRATE AND ACETAMINOPHEN 5; 325 MG/1; MG/1
1 TABLET ORAL ONCE
Status: COMPLETED | OUTPATIENT
Start: 2020-08-28 | End: 2020-08-28

## 2020-08-28 RX ORDER — SODIUM CHLORIDE 0.9 % (FLUSH) 0.9 %
10 SYRINGE (ML) INJECTION EVERY 12 HOURS SCHEDULED
Status: DISCONTINUED | OUTPATIENT
Start: 2020-08-28 | End: 2020-09-06 | Stop reason: HOSPADM

## 2020-08-28 RX ORDER — BUDESONIDE AND FORMOTEROL FUMARATE DIHYDRATE 160; 4.5 UG/1; UG/1
2 AEROSOL RESPIRATORY (INHALATION) 2 TIMES DAILY
Status: DISCONTINUED | OUTPATIENT
Start: 2020-08-28 | End: 2020-08-30 | Stop reason: SDUPTHER

## 2020-08-28 RX ORDER — PROMETHAZINE HYDROCHLORIDE 25 MG/1
12.5 TABLET ORAL EVERY 6 HOURS PRN
Status: DISCONTINUED | OUTPATIENT
Start: 2020-08-28 | End: 2020-09-06 | Stop reason: HOSPADM

## 2020-08-28 RX ORDER — POLYETHYLENE GLYCOL 3350 17 G/17G
17 POWDER, FOR SOLUTION ORAL DAILY PRN
Status: DISCONTINUED | OUTPATIENT
Start: 2020-08-28 | End: 2020-09-06 | Stop reason: HOSPADM

## 2020-08-28 RX ORDER — METHYLPREDNISOLONE SODIUM SUCCINATE 125 MG/2ML
125 INJECTION, POWDER, LYOPHILIZED, FOR SOLUTION INTRAMUSCULAR; INTRAVENOUS ONCE
Status: COMPLETED | OUTPATIENT
Start: 2020-08-28 | End: 2020-08-28

## 2020-08-28 RX ORDER — AMITRIPTYLINE HYDROCHLORIDE 25 MG/1
75 TABLET, FILM COATED ORAL NIGHTLY
Status: DISCONTINUED | OUTPATIENT
Start: 2020-08-28 | End: 2020-09-06 | Stop reason: HOSPADM

## 2020-08-28 RX ORDER — LABETALOL HYDROCHLORIDE 5 MG/ML
10 INJECTION, SOLUTION INTRAVENOUS EVERY 4 HOURS PRN
Status: DISCONTINUED | OUTPATIENT
Start: 2020-08-28 | End: 2020-09-06 | Stop reason: HOSPADM

## 2020-08-28 RX ORDER — MORPHINE SULFATE 2 MG/ML
1 INJECTION, SOLUTION INTRAMUSCULAR; INTRAVENOUS EVERY 4 HOURS PRN
Status: DISPENSED | OUTPATIENT
Start: 2020-08-28 | End: 2020-08-29

## 2020-08-28 RX ORDER — SODIUM CHLORIDE 9 MG/ML
INJECTION, SOLUTION INTRAVENOUS CONTINUOUS
Status: ACTIVE | OUTPATIENT
Start: 2020-08-28 | End: 2020-08-29

## 2020-08-28 RX ORDER — POTASSIUM BICARBONATE 25 MEQ/1
50 TABLET, EFFERVESCENT ORAL ONCE
Status: COMPLETED | OUTPATIENT
Start: 2020-08-28 | End: 2020-08-28

## 2020-08-28 RX ORDER — ALBUTEROL SULFATE 90 UG/1
2 AEROSOL, METERED RESPIRATORY (INHALATION)
Status: DISCONTINUED | OUTPATIENT
Start: 2020-08-29 | End: 2020-08-30

## 2020-08-28 RX ORDER — ONDANSETRON 2 MG/ML
4 INJECTION INTRAMUSCULAR; INTRAVENOUS EVERY 6 HOURS PRN
Status: DISCONTINUED | OUTPATIENT
Start: 2020-08-28 | End: 2020-09-06 | Stop reason: HOSPADM

## 2020-08-28 RX ORDER — GUAIFENESIN/DEXTROMETHORPHAN 100-10MG/5
5 SYRUP ORAL EVERY 6 HOURS PRN
Status: DISCONTINUED | OUTPATIENT
Start: 2020-08-28 | End: 2020-08-29

## 2020-08-28 RX ORDER — ACETAMINOPHEN 325 MG/1
650 TABLET ORAL EVERY 6 HOURS PRN
Status: DISCONTINUED | OUTPATIENT
Start: 2020-08-28 | End: 2020-09-06 | Stop reason: HOSPADM

## 2020-08-28 RX ORDER — ALBUTEROL SULFATE 90 UG/1
2 AEROSOL, METERED RESPIRATORY (INHALATION)
Status: DISCONTINUED | OUTPATIENT
Start: 2020-08-28 | End: 2020-09-06 | Stop reason: HOSPADM

## 2020-08-28 RX ORDER — IPRATROPIUM BROMIDE AND ALBUTEROL SULFATE 2.5; .5 MG/3ML; MG/3ML
1 SOLUTION RESPIRATORY (INHALATION)
Status: DISCONTINUED | OUTPATIENT
Start: 2020-08-28 | End: 2020-08-28

## 2020-08-28 RX ADMIN — DESMOPRESSIN ACETATE 40 MG: 0.2 TABLET ORAL at 22:31

## 2020-08-28 RX ADMIN — POTASSIUM BICARBONATE 50 MEQ: 978 TABLET, EFFERVESCENT ORAL at 20:21

## 2020-08-28 RX ADMIN — AMITRIPTYLINE HYDROCHLORIDE 75 MG: 25 TABLET, FILM COATED ORAL at 22:31

## 2020-08-28 RX ADMIN — HYDROCODONE BITARTRATE AND ACETAMINOPHEN 1 TABLET: 5; 325 TABLET ORAL at 13:09

## 2020-08-28 RX ADMIN — ASPIRIN 325 MG ORAL TABLET 325 MG: 325 PILL ORAL at 14:31

## 2020-08-28 RX ADMIN — AZITHROMYCIN MONOHYDRATE 500 MG: 500 INJECTION, POWDER, LYOPHILIZED, FOR SOLUTION INTRAVENOUS at 14:37

## 2020-08-28 RX ADMIN — SODIUM CHLORIDE: 9 INJECTION, SOLUTION INTRAVENOUS at 22:55

## 2020-08-28 RX ADMIN — METHYLPREDNISOLONE SODIUM SUCCINATE 125 MG: 125 INJECTION, POWDER, FOR SOLUTION INTRAMUSCULAR; INTRAVENOUS at 10:53

## 2020-08-28 RX ADMIN — IPRATROPIUM BROMIDE AND ALBUTEROL SULFATE 1 AMPULE: .5; 3 SOLUTION RESPIRATORY (INHALATION) at 10:56

## 2020-08-28 RX ADMIN — SODIUM CHLORIDE, POTASSIUM CHLORIDE, SODIUM LACTATE AND CALCIUM CHLORIDE: 600; 310; 30; 20 INJECTION, SOLUTION INTRAVENOUS at 14:24

## 2020-08-28 RX ADMIN — Medication 1 G: at 14:31

## 2020-08-28 RX ADMIN — IPRATROPIUM BROMIDE AND ALBUTEROL SULFATE 1 AMPULE: .5; 3 SOLUTION RESPIRATORY (INHALATION) at 17:14

## 2020-08-28 RX ADMIN — APIXABAN 5 MG: 5 TABLET, FILM COATED ORAL at 22:31

## 2020-08-28 RX ADMIN — MORPHINE SULFATE 1 MG: 2 INJECTION, SOLUTION INTRAMUSCULAR; INTRAVENOUS at 18:24

## 2020-08-28 RX ADMIN — LINEZOLID 600 MG: 600 INJECTION, SOLUTION INTRAVENOUS at 20:25

## 2020-08-28 ASSESSMENT — PAIN SCALES - GENERAL
PAINLEVEL_OUTOF10: 7
PAINLEVEL_OUTOF10: 7
PAINLEVEL_OUTOF10: 6
PAINLEVEL_OUTOF10: 9
PAINLEVEL_OUTOF10: 9
PAINLEVEL_OUTOF10: 6

## 2020-08-28 ASSESSMENT — PAIN DESCRIPTION - PAIN TYPE
TYPE: ACUTE PAIN

## 2020-08-28 NOTE — ED NOTES
Pt medicated per orders. updated on plan of care. Pt given second pillow at request. Patient resting comfortably with no signs of distress. Denies any needs at this time. Bed locked and in lowest position with both side rails raised. Call light within reach.      Mac Martinez RN  08/28/20 [id number]

## 2020-08-28 NOTE — ED NOTES
Pt assisted to bedpan. Pt medicated per orders. Updated on plan of care. Patient resting comfortably with no signs of distress. Denies any needs at this time. Bed locked and in lowest position with both side rails raised. Call light within reach. Meal tray ordered for patient.      Gladis Harrell RN  08/28/20 6086

## 2020-08-28 NOTE — ED NOTES
Patient resting comfortably with no signs of distress. Denies any needs at this time. Bed locked and in lowest position with both side rails raised. Call light within reach.  Patient brought warm blanket at patient's request.     Nasra Dobbs RN  08/28/20 [id number]

## 2020-08-28 NOTE — ED NOTES
Pt states still unable to urinate at this time, requesting something to eat. Patient resting comfortably with no signs of distress. Denies any needs at this time. Bed locked and in lowest position with both side rails raised. Call light within reach.      Levar Menezes RN  08/28/20 [id number]

## 2020-08-28 NOTE — ACP (ADVANCE CARE PLANNING)
Advance Care Planning     Advance Care Planning Activator (Inpatient)  Conversation Note      Date of ACP Conversation: 8/28/2020    Conversation Conducted with: Patient with Decision Making Capacity    ACP Activator: Debra De Decision Maker:     Current Designated Health Care Decision Maker:   Primary Decision Maker: Meek Aponte Child - [phone]    Secondary Decision Maker: Tianna Bauer - Child - [phone]    Supplemental (Other) Decision Maker: Brionna Bender - Brother/Sister - [phone]    If no Authorized Decision Maker has previously been identified, then patient chooses Health Care Decision Maker:  \"Who would you like to name as your primary health care decision-maker? \"               Name: Shelly Batres        Relationship: dtr          Phone number: [phone]  \"Can this person be reached easily? \" Yes     \"Who would you like to name as your back-up decision maker? \"   Name: Andrea Gorman        Relationship: [facility]          Phone number: [phone]  \"Can this person be reached easily? \" Yes      Care Preferences    Ventilation: \"If you were in your present state of health and suddenly became very ill and were unable to breathe on your own, what would your preference be about the use of a ventilator (breathing machine) if it were available to you? \"      Would the patient desire the use of ventilator (breathing machine)?: yes    \"If your health worsens and it becomes clear that your chance of recovery is unlikely, what would your preference be about the use of a ventilator (breathing machine) if it were available to you? \"     Would the patient desire the use of ventilator (breathing machine)?: No      Resuscitation  \"CPR works best to restart the heart when there is a sudden event, like a heart attack, in someone who is otherwise healthy. Unfortunately, CPR does not typically restart the heart for people who have serious health conditions or who are very sick. \"    \"In the event your heart stopped as a result of an underlying serious health condition, would you want attempts to be made to restart your heart (answer \"yes\" for attempt to resuscitate) or would you prefer a natural death (answer \"no\" for do not attempt to resuscitate)? \" yes       [] Yes   [] No   Educated Patient / Tori Bonner regarding differences between Advance Directives and portable DNR orders.     Length of ACP Conversation in minutes:      Conversation Outcomes:  [x] ACP discussion completed  [x] Existing advance directive reviewed with patient; no changes to patient's previously recorded wishes  [] New Advance Directive completed  [] Portable Do Not Rescitate prepared for Provider review and signature  [] POLST/POST/MOLST/MOST prepared for Provider review and signature      Follow-up plan:    [] Schedule follow-up conversation to continue planning  [] Referred individual to Provider for additional questions/concerns   [] Advised patient/agent/surrogate to review completed ACP document and update if needed with changes in condition, patient preferences or care setting    [x] This note routed to one or more involved healthcare providers - Dr. Tanya Elliott and Dr. Rock Keating    Electronically signed by Pan Rudd, MSW, LSW on 8/28/2020 at 4:14 PM

## 2020-08-28 NOTE — CARE COORDINATION
Discharge Planning Assessment    SW discharge planner met with patient to discuss reason for admission, current living situation, and potential needs at the time of discharge. Pt in ED d/t pneumonia    Demographics/Insurance verified; Yes    Current type of dwellinnd floor apartment    Living arrangements:  Alone    Level of function/Support:  Pt reported she has been independent with ADLs but weak and SOB. Pt reports she has good support from her dtrs. PCP:  Dr. Mey Araya     Last Visit to PCP:  2 weeks ago    DME:  amado Fisher    Active with any community resources/agencies/skilled home care:  Yes, active with [employer] for home aide 2x/week, home delivered meals and lifeline unit. Medication compliance issues:  No    Financial issues that could impact healthcare:  No    Tentative discharge plan:  Home most likely. PT/OT pending. Pt agreeable to [facility]. Active with [facility]. Pt reported need for air condition unit at her apartment. Stated her central air is out and the one provided by the landlord is not working. Left message for pt's [facility] Bob, informing of this need and informing of admission. Discussed with patient and/or family that on the day of discharge home tentative time of discharge will be between 10 AM and noon. Transportation at the time of discharge:  Dtrs can assist home.     Electronically signed by Lilton Severin, MSW, ESTRELLA on 2020 at 4:10 PM

## 2020-08-28 NOTE — ED NOTES
Pt back from Ct. Back in bed and back on monitor. Dr Lofton Channel states okay to give ABX before urine spec.      Nasra Dobbs RN  08/28/20 5981

## 2020-08-28 NOTE — ED NOTES
Pt alert and oriented, Pt to Er with c/o increased SOB since Tuesday. Pt states hx of COPD, states air conditioner has been broken, states has small space air conditioner, has been hot but then walking into cold car occasionally, states making it hard to breathe. Pt always on 2L, squad states sating 86 on their arrival, placed her on non rebreather in route. Pt stating 97% on 2L at ER arrival. lung sounds diminished, respirations labored with RR 32/min, some use of accessory respiratory muscles. No pursed lip breathing present, pt unable to lay flat, pt able to speak in full sentences with pauses. Pt skin warm, dry, and temp WNL. Heart sounds regular, S1, S2 heard. Pt states pain under left breast into back, states unproductive cough. Pt states pain 8/10 at this time. Pt states has inhalers at home but has had no energy to get up and use them. States has no appetite and fell this morning because of weakness. Denies hitting head or LOC. Pt denies any other problems or needs at this time.      Michelle Odom RN  08/28/20 4516

## 2020-08-28 NOTE — ED PROVIDER NOTES
[facility] Emergency Department      Pt Name: Daysi Willoughby  MRN: [id number]  [place] 1950  Date of evaluation: 8/28/2020  Provider: Korin Tompkins MD  CHIEF COMPLAINT  Chief Complaint   Patient presents with    Shortness of Breath     Pt to Er via [address] squad form home with c/o SOP since tuesday, hx of COPD, no energy. sating 86% on 2L, on non rebreather at ER arrival. sattes took week to take home inhalers. HPI  Daysi Willoughby is a 77 y.o. female who presents because of difficulty breathing. She has a history of chronic respiratory failure and severe COPD. She wears 2 L of oxygen all the time. She says that her air conditioning went out from her home about a month ago. She started using a portable air conditioner but most of her home is still hot. She has needed to sleep in her living room in a chair because her bedroom is too hot. She is able to lie flat. She has had increased cough and congestion for about 3 days. She feels generally weak all over and unsteady on her feet for multiple days. She was too weak this morning to take her home inhalers or her normal medicines. She has been otherwise compliant with her medicines and takes prednisone daily and Eliquis twice daily. She does have a history of blood clots. She denies any leg swelling. Cough is mostly nonproductive. She does not believe she has had a fever and has been checking her temperature. She has been sweating a lot because of the temperature at her home. EMS found her to be hypoxic on her home oxygen 86%. The administer higher flow oxygen and brought her to the hospital.  She does have a headache and has a sensation of the room spinning since sometime yesterday. She does have left-sided chest pain, worse with coughing for several days. She has been unsteady on her feet, walking into walls for several days.     REVIEW OF SYSTEMS:  No fever, no dysuria, no abdominal pain, decreased appetite, no known sick contacts Pertinent positives and negatives as per the HPI. All other review of systems reviewed and negative. Nursing notes reviewed. PAST MEDICAL HISTORY  Past Medical History:   Diagnosis Date    Bullous emphysema (HCC)     CKD (chronic kidney disease) stage 3, GFR 30-59 ml/min (HCC)     Clostridium difficile carrier 01/21/2019    COPD (chronic obstructive pulmonary disease) ([address]     PFT done 7 years ago   [facility] Crohn's disease ([address]     Crohn's disease    Depression 1/30/2011    pt states resolved as of 3-26-12    DVT (deep venous thrombosis) ([address]     2012; first ocurrence     Hiatal hernia     Hx of blood clots     Kidney disease     Kidney insufficiency     Osteoporosis     Peripheral neuropathy     neuropathy in legs    Pneumonia     Postmenopausal     LMP in  40's    Pulmonary embolism ([address]     2012; first ocurrence    Sepsis ([address]     Syringomyelia ([address]      SURGICAL HISTORY  Past Surgical History:   Procedure Laterality Date    ABDOMEN SURGERY      BACK SURGERY      shunt to drain CSF    BIOPSY SHOULDER Left 2/27/2019    EXCISION OF LEFT SHOULDER MASS performed by Leland Garcia MD at [address] [place] [address]      crainotomy- remove fluid ? V-P SHUNT    CHOLECYSTECTOMY      COLON SURGERY      resection X2    COLONOSCOPY  12/5/11    BIOPSY AND POLYPECTOMY    COLONOSCOPY  4-2-2012    and ablation ERBE/APC    SHOULDER SURGERY      L      MEDICATIONS:  No current facility-administered medications on file prior to encounter.       Current Outpatient Medications on File Prior to Encounter   Medication Sig Dispense Refill    amitriptyline (ELAVIL) 150 MG tablet TAKE 0.5 TABLETS BY MOUTH NIGHTLY 45 tablet 3    predniSONE (DELTASONE) 10 MG tablet TAKE 1 TABLET BY MOUTH EVERY DAY FOR 10 DAYS      albuterol sulfate  (90 Base) MCG/ACT inhaler Inhale 2 puffs into the lungs every 6 hours as needed for Wheezing 1 Inhaler 11    ELIQUIS 5 MG TABS tablet TAKE 1 TABLET BY MOUTH TWICE A DAY 60 tablet 6    Lift Chair MISC by Other route Dx:J44.1, N18.4 1 each 0    potassium chloride (KLOR-CON) 10 MEQ extended release tablet Take 1 tablet by mouth 3 times daily  3    budesonide-formoterol (SYMBICORT) 160-4.5 MCG/ACT AERO Inhale 2 puffs into the lungs 2 times daily 3 Inhaler 3    tiotropium (SPIRIVA RESPIMAT) 2.5 MCG/ACT AERS inhaler Inhale 2 puffs into the lungs daily 3 Inhaler 3    albuterol (PROVENTIL) (2.5 MG/3ML) 0.083% nebulizer solution Take 3 mLs by nebulization every 6 hours as needed for Wheezing Dx: COPD      ICD-10: J44.9 360 mL 6    Misc. Devices (BATH/SHOWER SEAT) MISC Use as directed 1 each 0    alendronate (FOSAMAX) 70 MG tablet Take 1 tablet by mouth every 7 days 12 tablet 3    albuterol sulfate  (90 Base) MCG/ACT inhaler Inhale 2 puffs into the lungs every 6 hours as needed for Wheezing 1 Inhaler 3    omeprazole (PRILOSEC) 20 MG delayed release capsule Take 20 mg by mouth Daily Takes as needed      Lift Chair MISC by Does not apply route Please dispense a Lift Chair 1 each 0    guaiFENesin (MUCINEX) 600 MG extended release tablet Take 1 tablet by mouth 2 times daily 30 tablet 0    Misc. Devices (ROLLER WALKER) MISC 1 each by Does not apply route daily Please dispense a walker with a seat 1 each 0    Adalimumab (HUMIRA) 20 MG/0.4ML KIT Inject 1 vial into the skin every 14 days       tiZANidine (ZANAFLEX) 2 MG tablet Take 1 tablet by mouth 3 times daily as needed (muscle spasm) 30 tablet 1    fluconazole (DIFLUCAN) 150 MG tablet TAKE 1 TABLET BY MOUTH ONCE A WEEK FOR 6 DOSES 6 tablet 0    tiZANidine (ZANAFLEX) 4 MG tablet Take 1 tablet by mouth 3 times daily 30 tablet 1    ondansetron (ZOFRAN ODT) 4 MG disintegrating tablet Take 1 tablet by mouth every 8 hours as needed for Nausea 20 tablet 0     ALLERGIES  Patient has no known allergies.   FAMILY HISTORY:  Family History   Problem Relation Age of Onset    Heart Disease Mother     High Blood Pressure Mother  High Cholesterol Mother     Early Death Mother 36        MI    Heart Disease Father     High Blood Pressure Father     High Cholesterol Father     Stroke Father 79    High Blood Pressure Sister     High Blood Pressure Brother      SOCIAL HISTORY:  Social History     Tobacco Use    Smoking status: Former Smoker     Packs/day: 0.25     Years: 30.00     Pack years: 7.50     Types: Cigarettes     Last attempt to quit: 2015     Years since quittin.5    Smokeless tobacco: Never Used    Tobacco comment: started smoking at age 26 / smoked up to 9 cigarettes a day    Substance Use Topics    Alcohol use: No    Drug use: No     IMMUNIZATIONS:    Immunization History   Administered Date(s) Administered    DT (pediatric) 2010    Influenza Virus Vaccine 10/01/2012, 10/20/2015    Influenza Whole 10/01/2011    Influenza, High Dose (Fluzone 65 yrs and older) 10/06/2014, 10/19/2016, 2017, 2018    Influenza, Triv, inactivated, subunit, adjuvanted, IM (Fluad 65 yrs and older) 2019    Pneumococcal Conjugate 13-valent (Hgerpyr55) 2015    Pneumococcal Conjugate 7-valent (Prevnar7) 2005, 10/01/2011    Pneumococcal Polysaccharide ([id number]) 06/15/2016    Zoster Live (Zostavax) 2013       PHYSICAL EXAM  VITAL SIGNS:  BP 98/63   Pulse 129   Temp 98.3 °F (36.8 °C) (Oral)   Resp (!) 34   Ht 5' (1.524 m)   Wt 146 lb (66.2 kg)   SpO2 96%   BMI 28.51 kg/m²   Constitutional:  77 y.o. female alert, appears sob but able to speak clear sentences  HENT:  Atraumatic, mucous membranes moist  Eyes:   Conjunctiva clear, no discharge, no icterus  Neck:  Supple, no adenopathy, no visible JVD, no stridor  Cardiovascular:  Regular, no rubs, no discernible murmur, tachycardic  Thorax & Lungs:   accessory muscle usage, wheezes present  Abdomen:  Soft, non distended, bowel sounds present, nontender  Back:  No deformity  Genitalia:  Deferred  Rectal:  Deferred  Extremities:  No cyanosis, no tenderness, edema negative  Skin:  Warm, dry  Neurologic:  Alert, mentation normal, speech is clear, there is no facial droop, arm lifts are normal, finger-to-nose is intact, heel-to-shin is intact, light touch sensation intact, no visual field deficit noted, gait not tested  Psychiatric:  Affect appropriate    DIAGNOSTIC RESULTS:  Labs resulted at the time of this note reviewed.   Results for orders placed or performed during the hospital encounter of 08/28/20   CBC Auto Differential   Result Value Ref Range    WBC 28.2 (H) 4.0 - 11.0 K/uL    RBC 5.08 4.00 - 5.20 M/uL    Hemoglobin 10.2 (L) 12.0 - 16.0 g/dL    Hematocrit 32.2 (L) 36.0 - 48.0 %    MCV 63.3 (L) 80.0 - 100.0 fL    MCH 20.0 (L) 26.0 - 34.0 pg    MCHC 31.6 31.0 - 36.0 g/dL    RDW 15.1 12.4 - 15.4 %    Platelets 868 918 - 548 K/uL    MPV 7.6 5.0 - 10.5 fL    PLATELET SLIDE REVIEW Adequate     SLIDE REVIEW see below     Neutrophils % 73.0 %    Lymphocytes % 5.0 %    Monocytes % 8.0 %    Eosinophils % 1.0 %    Basophils % 0.0 %    Neutrophils Absolute 24.0 (H) 1.7 - 7.7 K/uL    Lymphocytes Absolute 1.7 1.0 - 5.1 K/uL    Monocytes Absolute 2.3 (H) 0.0 - 1.3 K/uL    Eosinophils Absolute 0.3 0.0 - 0.6 K/uL    Basophils Absolute 0.0 0.0 - 0.2 K/uL    Bands Relative 12 (H) 0 - 7 %    Atypical Lymphocytes Relative 1 0 - 6 %    Microcytes 3+ (A)     Hypochromia 3+ (A)     Poikilocytes Occasional (A)    Comprehensive Metabolic Panel w/ Reflex to MG   Result Value Ref Range    Sodium 132 (L) 136 - 145 mmol/L    Potassium reflex Magnesium 3.2 (L) 3.5 - 5.1 mmol/L    Chloride 99 99 - 110 mmol/L    CO2 18 (L) 21 - 32 mmol/L    Anion Gap 15 3 - 16    Glucose 113 (H) 70 - 99 mg/dL    BUN 31 (H) 7 - 20 mg/dL    CREATININE 2.2 (H) 0.6 - 1.2 mg/dL    GFR Non-African American 22 (A) >60    GFR  27 (A) >60    Calcium 8.9 8.3 - 10.6 mg/dL    Total Protein 6.7 6.4 - 8.2 g/dL    Alb 3.0 (L) 3.4 - 5.0 g/dL    Albumin/Globulin Ratio 0.8 (L) 1.1 - 2.2 Total Bilirubin 0.7 0.0 - 1.0 mg/dL    Alkaline Phosphatase 99 40 - 129 U/L    ALT 7 (L) 10 - 40 U/L    AST 8 (L) 15 - 37 U/L    Globulin 3.7 g/dL   Lactic Acid, Plasma   Result Value Ref Range    Lactic Acid 1.3 0.4 - 2.0 mmol/L   Blood Gas, Venous   Result Value Ref Range    pH, Oliver 7.448 7.350 - 7.450    pCO2, Oliver 24.8 (L) 40.0 - 50.0 mmHg    pO2, Oliver 146.0 (H) 25.0 - 40.0 mmHg    HCO3, Venous 17.2 (L) 23.0 - 29.0 mmol/L    Base Excess, Oliver -5.5 (L) -3.0 - 3.0 mmol/L    O2 Sat, Oliver 100 Not Established %    Carboxyhemoglobin 4.0 (H) 0.0 - 1.5 %    MetHgb, Oliver 0.2 <1.5 %    TC02 (Calc), Oliver 40 Not Established mmol/L    O2 Content, Oliver 15 Not Established VOL %    O2 Therapy Unknown    Troponin   Result Value Ref Range    Troponin 0.02 (H) <0.01 ng/mL   Brain Natriuretic Peptide   Result Value Ref Range    Pro- (H) 0 - 124 pg/mL   Protime-INR   Result Value Ref Range    Protime 16.3 (H) 10.0 - 13.2 sec    INR 1.40 (H) 0.86 - 1.14   Procalcitonin   Result Value Ref Range    Procalcitonin 2.17 (H) 0.00 - 0.15 ng/mL   Magnesium   Result Value Ref Range    Magnesium 2.10 1.80 - 2.40 mg/dL   EKG 12 Lead   Result Value Ref Range    Ventricular Rate 126 BPM    Atrial Rate 126 BPM    P-R Interval 122 ms    QRS Duration 70 ms    Q-T Interval 314 ms    QTc Calculation (Bazett) 454 ms    P Axis 14 degrees    R Axis 14 degrees    T Axis 66 degrees    Diagnosis       Sinus tachycardia with Premature supraventricular complexesPossible Left atrial enlargementNonspecific ST and T wave abnormalityAbnormal ECG     EKG:  Read by me in the absence of a cardiologist shows: Sinus tachycardia, rate 126, PVC, QRS duration normal, Axis XIV degrees, nonspecific ST-T wave abnormality, faster heart rate than prior study from 30 January 2020    RADIOLOGY:    Plain x-rays were viewed by me:   CT CHEST WO CONTRAST   Final Result   1. Left upper lobe pneumonia. Recommend chest radiograph in 8 weeks to   confirm resolution.    2. Trace left pleural effusion. CT HEAD WO CONTRAST   Final Result   1. New age-indeterminate infarct within the right internal capsule. Recommend further evaluation with MRI of the head. XR CHEST PORTABLE   Final Result   Bilateral peripheral airspace disease suggestive of atypical or viral   pneumonia.            ED COURSE:    Medications administered:  Medications   cefTRIAXone (ROCEPHIN) 1 g in sterile water 10 mL IV syringe (has no administration in time range)   azithromycin (ZITHROMAX) 500 mg in D5W 250ml Vial Mate (has no administration in time range)   lactated ringers infusion (has no administration in time range)   ipratropium-albuterol (DUONEB) nebulizer solution 1 ampule (1 ampule Inhalation Given 8/28/20 1056)   methylPREDNISolone sodium (SOLU-MEDROL) injection 125 mg (125 mg Intravenous Given 8/28/20 1053)   HYDROcodone-acetaminophen (NORCO) 5-325 MG per tablet 1 tablet (1 tablet Oral Given 8/28/20 1309)     Vitals:    08/28/20 1645 08/28/20 1700 08/28/20 1715 08/28/20 1730   BP: 123/71 124/77  119/74   Pulse: 96 97  103   Resp: 20 26 18 24   Temp:       TempSrc:       SpO2: 94% 94% 97% 95%   Weight:       Height:         SEP-1 CORE MEASURE DATA  Classification: severe sepsis  Amount of fluids ordered: less than 30mL/kg because no hypotension, concern for aggressive hydration and possible COVID infection  Time at which sepsis was identified: 1400  Broad-spectrum antibiotics chosen: rocephin zithromax based on sepsis order-set for a suspected source of: Pulmonary - Community Acquired  Repeat lactate level: not indicated  On reassessment after treatment:  Updated vital signs: BP (!) 145/73   Pulse 107   Temp 98.3 °F (36.8 °C) (Oral)   Resp 23   Ht 5' (1.524 m)   Wt 146 lb (66.2 kg)   SpO2 94%   BMI 28.51 kg/m²   Cardiopulmonary examination: regular, increased wob, Capillary refill: brisk, Peripheral pulses: fair Skin examination: warm      PROCEDURES:  None    CRITICAL CARE:  Total critical care time of 31 minutes (excludes any time for procedures). This includes but not limited to vital sign monitoring, medication, clinical response to medications, interpretation of diagnostics, review of nursing notes, pertinent record review, discussions about patient condition, consultation time, documentation time. Critical care due to the patient's respiratory distress. CONSULTATIONS:  Hospitalist     MEDICAL DECISION MAKING: Flaco Goldberg is a 77 y.o. female who presented because of breathing difficulty. She has findings of pneumonia. She has MARIS. She has increased oxygen requirement from her baseline. She has generalized weakness. CT shows CVA. Pt reports several days of symptoms so well outside the window to consider TPA. Has MARIS so CT angiogram of head and neck could be more harmful than helpful. COVID testing sent. I have placed consultation to hospitalist for inpatient care. Differential Diagnosis: pneumonia, pneumothorax, PE, ACS, CHF, aspiration, other    FINAL IMPRESSION:    1. Pneumonia due to organism    2. Suspected COVID-19 virus infection    3. Acute respiratory failure with hypoxia (HCC)    4. MARIS (acute kidney injury) ([address]    5. Cerebrovascular accident (CVA), unspecified mechanism ([address]    6. Dizziness      (Please note that I used voice recognition software to generate this note.   Occasionally words are mistranscribed despite my efforts to edit errors.)        Meño Morales MD  08/28/20 3663

## 2020-08-28 NOTE — ED NOTES
Pt given water at request. Dr Cindy Benítez states okay to given. Pt states tightness in chest and back bothering her, requesting something for pain.      Trinh Patel RN  08/28/20 5298

## 2020-08-28 NOTE — H&P
Hospital Medicine History and Physical    8/28/2020    Date of Admission: 8/28/2020    Date of Service: Pt seen/examined on 8/28/2020 and admitted to inpatient. Assessment/plan:  1. Bacterial pneumonia (left upper lobe infiltrate with trace left pleural effusion, likely parapneumonic). Suspect possible aspiration. Speech therapy evaluation. Received a dose of Zithromax and Rocephin in the emergency room; will continue on cefepime and Zyvox. Strep and Legionella antigens pending. Cultures pending. Breathing treatments in place. 2. Sepsis secondary to underlying pneumonia. Qualifiers: tachycardia, leukocytosis, acute kidney injury, elevated procalcitonin level, in the setting of underlying pneumonia. Fluid bolus equivalent to 30 cc/kg ordered. Continue maintenance fluid. Continue antibiotics. Follow cultures. 3. Acute kidney injury on CKD stage III. Could be prerenal azotemia from decreased p.o. intake. Avoid nephrotoxic medications. Start intravenous fluid. If no improvement, consider nephrology consult to assist with evaluation. 4. Suspected CVA, duration currently unclear. CT-head with new age-indeterminate infarct within the right internal capsule. Obtain MRI-brain, echocardiogram, carotid ultrasound. Neurochecks. Neurology consult. Start aspirin, statin. PT/OT/speech therapy consults in place. 5. Fall at home, mechanical.  Likely secondary to underlying medical condition/possible CVA. Maintain on fall precautions. PT/OT. 6. Hypokalemia, mild. Potassium replacement has been ordered. Recheck potassium and magnesium levels in the morning. 7. Chest pain. Could be secondary to underlying pneumonia. Continue aspirin, statin. Trend troponin. Monitor on telemetry. Echocardiogram pending, assess wall motion for abnormalities. Consider cardiology consult if troponin trends up. Patient already on anticoagulation/Eliquis for other indication. 8. Hyponatremia, sodium 132. Could be secondary to decreased p.o. intake. Continue intravenous fluid. Recheck sodium in the morning. 9. Non-anion gap metabolic acidosis. Patient does have metabolic acidosis at baseline. Continue gentle intravenous fluid for management of electrolyte abnormalities/MARIS. Monitor labs/electrolytes closely. 10. Chronic respiratory failure with hypoxia on supplemental oxygen, 2 L/min. Currently requiring 2 L/min supplemental oxygen. Monitor closely, could potentially require more supplemental oxygen, at which point she would have acute on chronic respiratory failure. Monitor on continuous pulse oximeter. 11. Chronic microcytic anemia. Previous iron studies consistent with anemia of chronic disease. Hemoglobin stable, monitor. 12. History of pulmonary embolism on chronic anticoagulation with Eliquis. Continue Eliquis. 13. Other comorbidities: History of COPD, Crohn's disease, depression, osteoporosis, chronic respiratory failure on 2 L/min supplemental oxygen, CKD stage III with baseline creatinine 1.7-1.8. Activities: Up with assist  Prophylaxis: On Eliquis  Code status: Full code    ==========================================================  Chief complaint:  Chief Complaint   Patient presents with    Shortness of Breath     Pt to Er via [address] squad form home with c/o SOP since tuesday, hx of COPD, no energy. sating 86% on 2L, on non rebreather at ER arrival. sattes took week to take home inhalers. History of Presenting Illness: This is a pleasant 77 y.o. female with history of CKD stage III (baseline creatinine around 1.7-1.8), COPD, Crohn's disease, depression, history of DVT/PE (on chronic anticoagulation with Eliquis), osteoporosis, chronic respiratory failure on 2 L/min supplemental oxygen, who presents to the emergency room with complaints of increasing shortness of breath, ongoing for the past 3 days. She has had nonproductive cough. There is no fever or chills.   She has had decreased p.o. intake. She sustained a fall this morning. On initial evaluation in the emergency room, she was hypoxic with oxygen saturation in the 80s on 2 L/min supplemental oxygen. She eventually improved, still requiring 2 L/min supplemental oxygen with oxygen saturation in the 90s. Past Medical History:      Diagnosis Date    Bullous emphysema (HCC)     CKD (chronic kidney disease) stage 3, GFR 30-59 ml/min (HCC)     Clostridium difficile carrier 01/21/2019    COPD (chronic obstructive pulmonary disease) ([address]     PFT done 7 years ago   [facility] Crohn's disease ([address]     Crohn's disease    Depression 1/30/2011    pt states resolved as of 3-26-12    DVT (deep venous thrombosis) ([address]     2012; first ocurrence     Hiatal hernia     Hx of blood clots     Kidney disease     Kidney insufficiency     Osteoporosis     Peripheral neuropathy     neuropathy in legs    Pneumonia     Postmenopausal     LMP in  40's    Pulmonary embolism ([address]     2012; first ocurrence    Sepsis ([address]     Syringomyelia ([address]        Past Surgical History:      Procedure Laterality Date    ABDOMEN SURGERY      BACK SURGERY      shunt to drain CSF    BIOPSY SHOULDER Left 2/27/2019    EXCISION OF LEFT SHOULDER MASS performed by Yesy Mejia MD at [address] [place] [address]      crainotomy- remove fluid ? V-P SHUNT    CHOLECYSTECTOMY      COLON SURGERY      resection X2    COLONOSCOPY  12/5/11    BIOPSY AND POLYPECTOMY    COLONOSCOPY  4-2-2012    and ablation ERBE/APC    SHOULDER SURGERY      L        Medications (prior to admission):  Prior to Admission medications    Medication Sig Start Date End Date Taking?  Authorizing Provider   amitriptyline (ELAVIL) 150 MG tablet TAKE 0.5 TABLETS BY MOUTH NIGHTLY 8/19/20  Yes Janace Epley, MD   predniSONE (DELTASONE) 10 MG tablet TAKE 1 TABLET BY MOUTH EVERY DAY FOR 10 DAYS 7/23/20  Yes Historical Provider, MD   albuterol sulfate  (90 Base) MCG/ACT inhaler Inhale 2 puffs into the lungs every 6 hours as needed for Wheezing 7/28/20 7/28/21 Yes Gilberto Arambula MD   ELIQUIS 5 MG TABS tablet TAKE 1 TABLET BY MOUTH TWICE A DAY 6/23/20  Yes Gilberto Arambula MD   Lift Chair [facility] by Other route Dx:J44.1, N18.4 3/25/20  Yes Anneliese Charles MD   potassium chloride (KLOR-CON) 10 MEQ extended release tablet Take 1 tablet by mouth 3 times daily 1/27/20  Yes Devi Kaur MD   budesonide-formoterol [facility]) 160-4.5 MCG/ACT AERO Inhale 2 puffs into the lungs 2 times daily 11/13/19  Yes Anneliese Charles MD   tiotropium [facility]) 2.5 MCG/ACT AERS inhaler Inhale 2 puffs into the lungs daily 11/13/19  Yes Anneliese Charles MD   albuterol (PROVENTIL) (2.5 MG/3ML) 0.083% nebulizer solution Take 3 mLs by nebulization every 6 hours as needed for Wheezing Dx: COPD      ICD-10: J44.9 8/13/19  Yes Gilberto Arambula MD   Misc. Devices (BATH/SHOWER SEAT) MISC Use as directed 4/8/19  Yes Anneliese Charles MD   alendronate (FOSAMAX) 70 MG tablet Take 1 tablet by mouth every 7 days 4/6/19  Yes Mars Madsen MD   albuterol sulfate  (90 Base) MCG/ACT inhaler Inhale 2 puffs into the lungs every 6 hours as needed for Wheezing 3/15/18  Yes Anneliese Charles MD   omeprazole (PRILOSEC) 20 MG delayed release capsule Take 20 mg by mouth Daily Takes as needed   Yes Luis Hallman MD   Lift Chair [facility] by Does not apply route Please dispense a Lift Chair 6/14/17  Yes Anneliese Charles MD   guaiFENesin [facility]) 600 MG extended release tablet Take 1 tablet by mouth 2 times daily 12/8/16  Yes Irvin Fyae MD   Misc.  Devices (ROLLER [place]) MISC 1 each by Does not apply route daily Please dispense a walker with a seat 6/9/16  Yes Anneliese Charles MD   Adalimumab (HUMIRA) 20 MG/0.4ML KIT Inject 1 vial into the skin every 14 days    Yes Historical Provider, MD   tiZANidine (ZANAFLEX) 2 MG tablet Take 1 tablet by mouth 3 times daily as needed (muscle spasm) 8/13/20   Fadi Herron MD   fluconazole (DIFLUCAN) 150 MG tablet TAKE 1 TABLET BY MOUTH ONCE A WEEK FOR 6 DOSES 7/24/20 8/29/20  Fadi Herron MD   tiZANidine (ZANAFLEX) 4 MG tablet Take 1 tablet by mouth 3 times daily 7/13/20   Fadi Herron MD   ondansetron (ZOFRAN ODT) 4 MG disintegrating tablet Take 1 tablet by mouth every 8 hours as needed for Nausea 1/5/20   JAIME Paredes       Allergy(ies):  Patient has no known allergies. Social History:  TOBACCO:  reports that she quit smoking about 5 years ago. Her smoking use included cigarettes. She has a 7.50 pack-year smoking history. She has never used smokeless tobacco.  ETOH:  reports no history of alcohol use. Family History:      Problem Relation Age of Onset    Heart Disease Mother     High Blood Pressure Mother     High Cholesterol Mother     Early Death Mother 36        MI    Heart Disease Father     High Blood Pressure Father     High Cholesterol Father     Stroke Father 79    High Blood Pressure Sister     High Blood Pressure Brother        Review of Systems:  Pertinent positives are listed in HPI. At least 10-point ROS reviewed and were negative. Vitals and physical examination:  /67   Pulse 109   Temp 98.3 °F (36.8 °C) (Oral)   Resp 25   Ht 5' (1.524 m)   Wt 146 lb (66.2 kg)   SpO2 97%   BMI 28.51 kg/m²   Gen/overall appearance: Not in acute distress. Alert. Oriented. Head: Normocephalic, atraumatic  Eyes: EOMI, good acuity  ENT: Oral mucosa moist  Neck: No JVD, thyromegaly  CVS: Nml S1S2, no MRG, RRR  Pulm: Clear bilaterally. No crackles/wheezes  Gastrointestinal: Soft, NT/ND, +BS  Musculoskeletal: No edema. Warm  Neuro: No focal deficit. Moves extremity spontaneously. Psychiatry: Appropriate affect. Not agitated. Skin: Warm, dry with normal turgor.  No rash  Capillary refill: Brisk,< 3 seconds   Peripheral Pulses: +2 palpable, equal bilaterally       Labs/imaging/EKG:  CBC: Recent Labs     08/28/20  1052   WBC 28.2*   HGB 10.2*        BMP:    Recent Labs     08/28/20  1052   *   K 3.2*   CL 99   CO2 18*   BUN 31*   CREATININE 2.2*   GLUCOSE 113*     Hepatic:   Recent Labs     08/28/20  1052   AST 8*   ALT 7*   BILITOT 0.7   ALKPHOS 99       Ct Head Wo Contrast    Result Date: 8/28/2020  EXAMINATION: CT OF THE HEAD WITHOUT CONTRAST  8/28/2020 1:57 pm TECHNIQUE: CT of the head was performed without the administration of intravenous contrast. Dose modulation, iterative reconstruction, and/or weight based adjustment of the mA/kV was utilized to reduce the radiation dose to as low as reasonably achievable. COMPARISON: 11/13/2012 HISTORY: ORDERING SYSTEM PROVIDED HISTORY: headache, dizzy TECHNOLOGIST PROVIDED HISTORY: Reason for exam:->headache, dizzy Has a \"code stroke\" or \"stroke alert\" been called? ->No Reason for Exam: headache, dizzy; stroke like symptoms Acuity: Unknown Type of Exam: Unknown FINDINGS: BRAIN/VENTRICLES: There is no acute intracerebral hemorrhage or extra-axial fluid collection. There is mild cerebral atrophy with mild periventricular, subcortical and deep white matter small vessel ischemic disease. There is a new age-indeterminate infarct involving the anterior limb of the right internal capsule. ORBITS: The orbits are unremarkable. SINUSES: The visualized paranasal sinuses and mastoid air cells are clear. SOFT TISSUES/SKULL:  Status post suboccipital craniectomy. 1. New age-indeterminate infarct within the right internal capsule. Recommend further evaluation with MRI of the head. Ct Chest Wo Contrast    Result Date: 8/28/2020  EXAMINATION: CT OF THE CHEST WITHOUT CONTRAST 8/28/2020 1:57 pm TECHNIQUE: CT of the chest was performed without the administration of intravenous contrast. Multiplanar reformatted images are provided for review.  Dose modulation, iterative reconstruction, and/or weight based adjustment of the mA/kV was utilized to reduce the radiation dose to as low as reasonably achievable. COMPARISON: 08/28/2020 and 01/05/2020 HISTORY: ORDERING SYSTEM PROVIDED HISTORY: pneumonia TECHNOLOGIST PROVIDED HISTORY: Reason for exam:->pneumonia Reason for Exam: pneumonia Acuity: Unknown Type of Exam: Unknown FINDINGS: Mediastinum: The unenhanced heart is unremarkable. Calcified granulomatous disease is noted. .  There are no enlarged thoracic lymph nodes. Lungs/pleura: The tracheobronchial tree is patent. There is no pneumothorax. There is a trace left pleural effusion with biapical, bibasilar scarring and atelectasis. Moderate to severe emphysema involves the bilateral lungs. However, there is patchy consolidation within the left upper lobe due to pneumonia. Upper Abdomen: The liver is diffusely low in attenuation consistent with hepatic steatosis. Status post cholecystectomy. There are bilateral simple renal cysts. Soft Tissues/Bones: Degenerative changes involve the thoracic spine. The bones are demineralized. No change in the exaggerated kyphosis of the upper thoracic spine nor in the old compression fractures involving the superior endplates of the upper thoracic vertebral bodies. 1. Left upper lobe pneumonia. Recommend chest radiograph in 8 weeks to confirm resolution. 2. Trace left pleural effusion. Xr Chest Portable    Result Date: 8/28/2020  EXAMINATION: ONE XRAY VIEW OF THE CHEST 8/28/2020 10:27 am COMPARISON: Chest radiograph January 30, 2020 and priors. HISTORY: ORDERING SYSTEM PROVIDED HISTORY: CP TECHNOLOGIST PROVIDED HISTORY: Reason for exam:->CP Reason for Exam: Shortness of Breath (Pt to Er via FP squad form home with c/o SOP since tuesday, hx of COPD, no energy. sating 86% on 2L, on non rebreather at ER arrival. sattes took week to take home inhalers.) Acuity: Acute Type of Exam: Initial FINDINGS: Study is limited due to rotation. There are new bilateral airspace opacities which are peripheral in distribution.   No pneumothorax or pleural effusion identified. Evaluation cardiac and mediastinal contours is limited. No acute osseous abnormality. Bilateral peripheral airspace disease suggestive of atypical or viral pneumonia. EKG: Sinus tachycardia, rate 126 beats per minutes. PACs present. Nonspecific ST/T changes present. I reviewed EKG. Discussed with ER provider.       Thank you Fadi Herron MD for the opportunity to be involved in this patient's care.    -----------------------------  Lianne Galvan MD  [facility]

## 2020-08-28 NOTE — ED NOTES
Iv inserted. Blood collected. Pt medicated per orders. covid swab collected. Rt at bedside. Patient resting comfortably with no signs of distress. Denies any needs at this time. Bed locked and in lowest position with both side rails raised. Call light within reach.  Patient brought warm blanket at patient's request.     Nasra Dobbs RN  08/28/20 [id number]

## 2020-08-29 LAB
A/G RATIO: 0.6 (ref 1.1–2.2)
ALBUMIN SERPL-MCNC: 2.5 G/DL (ref 3.4–5)
ALP BLD-CCNC: 102 U/L (ref 40–129)
ALT SERPL-CCNC: 8 U/L (ref 10–40)
ANION GAP SERPL CALCULATED.3IONS-SCNC: 14 MMOL/L (ref 3–16)
ANISOCYTOSIS: ABNORMAL
AST SERPL-CCNC: 9 U/L (ref 15–37)
ATYPICAL LYMPHOCYTE RELATIVE PERCENT: 1 % (ref 0–6)
BANDED NEUTROPHILS RELATIVE PERCENT: 12 % (ref 0–7)
BANDED NEUTROPHILS RELATIVE PERCENT: 4 % (ref 0–7)
BASOPHILS ABSOLUTE: 0 K/UL (ref 0–0.2)
BASOPHILS ABSOLUTE: 0 K/UL (ref 0–0.2)
BASOPHILS RELATIVE PERCENT: 0 %
BASOPHILS RELATIVE PERCENT: 0 %
BILIRUB SERPL-MCNC: 0.3 MG/DL (ref 0–1)
BUN BLDV-MCNC: 35 MG/DL (ref 7–20)
CALCIUM SERPL-MCNC: 8.9 MG/DL (ref 8.3–10.6)
CHLORIDE BLD-SCNC: 103 MMOL/L (ref 99–110)
CO2: 17 MMOL/L (ref 21–32)
CREAT SERPL-MCNC: 2 MG/DL (ref 0.6–1.2)
EOSINOPHILS ABSOLUTE: 0 K/UL (ref 0–0.6)
EOSINOPHILS ABSOLUTE: 0.3 K/UL (ref 0–0.6)
EOSINOPHILS RELATIVE PERCENT: 0 %
EOSINOPHILS RELATIVE PERCENT: 1 %
GFR AFRICAN AMERICAN: 30
GFR NON-AFRICAN AMERICAN: 25
GLOBULIN: 4.2 G/DL
GLUCOSE BLD-MCNC: 135 MG/DL (ref 70–99)
HCT VFR BLD CALC: 30.6 % (ref 36–48)
HCT VFR BLD CALC: 32.2 % (ref 36–48)
HEMATOLOGY PATH CONSULT: NO
HEMATOLOGY PATH CONSULT: NO
HEMOGLOBIN: 10.2 G/DL (ref 12–16)
HEMOGLOBIN: 9.8 G/DL (ref 12–16)
HYPOCHROMIA: ABNORMAL
L. PNEUMOPHILA SEROGP 1 UR AG: NORMAL
LYMPHOCYTES ABSOLUTE: 1.7 K/UL (ref 1–5.1)
LYMPHOCYTES ABSOLUTE: 2.8 K/UL (ref 1–5.1)
LYMPHOCYTES RELATIVE PERCENT: 5 %
LYMPHOCYTES RELATIVE PERCENT: 8 %
MAGNESIUM: 2.4 MG/DL (ref 1.8–2.4)
MCH RBC QN AUTO: 20 PG (ref 26–34)
MCH RBC QN AUTO: 20.4 PG (ref 26–34)
MCHC RBC AUTO-ENTMCNC: 31.6 G/DL (ref 31–36)
MCHC RBC AUTO-ENTMCNC: 32 G/DL (ref 31–36)
MCV RBC AUTO: 63.3 FL (ref 80–100)
MCV RBC AUTO: 63.6 FL (ref 80–100)
METAMYELOCYTES RELATIVE PERCENT: 1 %
MICROCYTES: ABNORMAL
MICROCYTES: ABNORMAL
MONOCYTES ABSOLUTE: 2.1 K/UL (ref 0–1.3)
MONOCYTES ABSOLUTE: 2.3 K/UL (ref 0–1.3)
MONOCYTES RELATIVE PERCENT: 6 %
MONOCYTES RELATIVE PERCENT: 8 %
NEUTROPHILS ABSOLUTE: 24 K/UL (ref 1.7–7.7)
NEUTROPHILS ABSOLUTE: 29.8 K/UL (ref 1.7–7.7)
NEUTROPHILS RELATIVE PERCENT: 73 %
NEUTROPHILS RELATIVE PERCENT: 81 %
PDW BLD-RTO: 15.1 % (ref 12.4–15.4)
PDW BLD-RTO: 15.3 % (ref 12.4–15.4)
PHOSPHORUS: 3.3 MG/DL (ref 2.5–4.9)
PLATELET # BLD: 272 K/UL (ref 135–450)
PLATELET # BLD: 279 K/UL (ref 135–450)
PLATELET SLIDE REVIEW: ADEQUATE
PLATELET SLIDE REVIEW: ADEQUATE
PMV BLD AUTO: 7.4 FL (ref 5–10.5)
PMV BLD AUTO: 7.6 FL (ref 5–10.5)
POIKILOCYTES: ABNORMAL
POTASSIUM SERPL-SCNC: 4.4 MMOL/L (ref 3.5–5.1)
RBC # BLD: 4.81 M/UL (ref 4–5.2)
RBC # BLD: 5.08 M/UL (ref 4–5.2)
SARS-COV-2, NAA: NOT DETECTED
SLIDE REVIEW: ABNORMAL
SLIDE REVIEW: ABNORMAL
SODIUM BLD-SCNC: 134 MMOL/L (ref 136–145)
STREP PNEUMONIAE ANTIGEN, URINE: NORMAL
TOTAL PROTEIN: 6.7 G/DL (ref 6.4–8.2)
TROPONIN: <0.01 NG/ML
WBC # BLD: 28.2 K/UL (ref 4–11)
WBC # BLD: 34.7 K/UL (ref 4–11)

## 2020-08-29 PROCEDURE — 94640 AIRWAY INHALATION TREATMENT: CPT

## 2020-08-29 PROCEDURE — 6370000000 HC RX 637 (ALT 250 FOR IP): Performed by: INTERNAL MEDICINE

## 2020-08-29 PROCEDURE — 83735 ASSAY OF MAGNESIUM: CPT

## 2020-08-29 PROCEDURE — 2700000000 HC OXYGEN THERAPY PER DAY

## 2020-08-29 PROCEDURE — 6360000002 HC RX W HCPCS: Performed by: INTERNAL MEDICINE

## 2020-08-29 PROCEDURE — 92610 EVALUATE SWALLOWING FUNCTION: CPT

## 2020-08-29 PROCEDURE — 84484 ASSAY OF TROPONIN QUANT: CPT

## 2020-08-29 PROCEDURE — 2580000003 HC RX 258: Performed by: INTERNAL MEDICINE

## 2020-08-29 PROCEDURE — 84100 ASSAY OF PHOSPHORUS: CPT

## 2020-08-29 PROCEDURE — 80053 COMPREHEN METABOLIC PANEL: CPT

## 2020-08-29 PROCEDURE — 36415 COLL VENOUS BLD VENIPUNCTURE: CPT

## 2020-08-29 PROCEDURE — 92526 ORAL FUNCTION THERAPY: CPT

## 2020-08-29 PROCEDURE — 85025 COMPLETE CBC W/AUTO DIFF WBC: CPT

## 2020-08-29 PROCEDURE — 94761 N-INVAS EAR/PLS OXIMETRY MLT: CPT

## 2020-08-29 PROCEDURE — 2060000000 HC ICU INTERMEDIATE R&B

## 2020-08-29 PROCEDURE — 99223 1ST HOSP IP/OBS HIGH 75: CPT | Performed by: PSYCHIATRY & NEUROLOGY

## 2020-08-29 RX ORDER — GUAIFENESIN/DEXTROMETHORPHAN 100-10MG/5
10 SYRUP ORAL EVERY 6 HOURS PRN
Status: DISCONTINUED | OUTPATIENT
Start: 2020-08-29 | End: 2020-08-30

## 2020-08-29 RX ORDER — FAMOTIDINE 20 MG/1
TABLET, FILM COATED ORAL
Status: DISPENSED
Start: 2020-08-29 | End: 2020-08-29

## 2020-08-29 RX ORDER — PREDNISONE 20 MG/1
20 TABLET ORAL DAILY
Status: DISCONTINUED | OUTPATIENT
Start: 2020-08-29 | End: 2020-09-06 | Stop reason: HOSPADM

## 2020-08-29 RX ADMIN — AMITRIPTYLINE HYDROCHLORIDE 75 MG: 25 TABLET, FILM COATED ORAL at 19:51

## 2020-08-29 RX ADMIN — DESMOPRESSIN ACETATE 40 MG: 0.2 TABLET ORAL at 19:51

## 2020-08-29 RX ADMIN — OXYCODONE 2.5 MG: 5 TABLET ORAL at 04:17

## 2020-08-29 RX ADMIN — CEFEPIME HYDROCHLORIDE 1 G: 1 INJECTION, POWDER, FOR SOLUTION INTRAMUSCULAR; INTRAVENOUS at 02:02

## 2020-08-29 RX ADMIN — SODIUM CHLORIDE: 9 INJECTION, SOLUTION INTRAVENOUS at 11:06

## 2020-08-29 RX ADMIN — SODIUM CHLORIDE: 9 INJECTION, SOLUTION INTRAVENOUS at 06:07

## 2020-08-29 RX ADMIN — Medication 2 PUFF: at 16:57

## 2020-08-29 RX ADMIN — PREDNISONE 20 MG: 20 TABLET ORAL at 12:10

## 2020-08-29 RX ADMIN — Medication 2 PUFF: at 09:38

## 2020-08-29 RX ADMIN — Medication 2 PUFF: at 20:19

## 2020-08-29 RX ADMIN — APIXABAN 5 MG: 5 TABLET, FILM COATED ORAL at 08:14

## 2020-08-29 RX ADMIN — Medication 10 ML: at 10:20

## 2020-08-29 RX ADMIN — Medication 2 PUFF: at 12:53

## 2020-08-29 RX ADMIN — APIXABAN 5 MG: 5 TABLET, FILM COATED ORAL at 19:51

## 2020-08-29 RX ADMIN — CEFEPIME HYDROCHLORIDE 1 G: 1 INJECTION, POWDER, FOR SOLUTION INTRAMUSCULAR; INTRAVENOUS at 15:55

## 2020-08-29 RX ADMIN — Medication 2 PUFF: at 16:58

## 2020-08-29 RX ADMIN — Medication 2 PUFF: at 20:18

## 2020-08-29 RX ADMIN — Medication 10 ML: at 19:52

## 2020-08-29 RX ADMIN — OXYCODONE 2.5 MG: 5 TABLET ORAL at 12:10

## 2020-08-29 RX ADMIN — Medication 2 PUFF: at 09:39

## 2020-08-29 RX ADMIN — LINEZOLID 600 MG: 600 INJECTION, SOLUTION INTRAVENOUS at 08:14

## 2020-08-29 RX ADMIN — GUAIFENESIN AND DEXTROMETHORPHAN 10 ML: 100; 10 SYRUP ORAL at 15:55

## 2020-08-29 RX ADMIN — LINEZOLID 600 MG: 600 INJECTION, SOLUTION INTRAVENOUS at 18:49

## 2020-08-29 ASSESSMENT — PAIN SCALES - GENERAL
PAINLEVEL_OUTOF10: 3
PAINLEVEL_OUTOF10: 7
PAINLEVEL_OUTOF10: 8
PAINLEVEL_OUTOF10: 3
PAINLEVEL_OUTOF10: 3
PAINLEVEL_OUTOF10: 2

## 2020-08-29 ASSESSMENT — PAIN DESCRIPTION - PAIN TYPE: TYPE: ACUTE PAIN

## 2020-08-29 ASSESSMENT — PAIN DESCRIPTION - LOCATION: LOCATION: BACK;CHEST;HEAD

## 2020-08-29 NOTE — ED NOTES
Tried to call report and told I needed to call back. Asked who was the nurse and was told they dont know. Called and asked to send tele. Was told nurse was in a room and to call back again.        Flaquito Haley RN  08/28/20 2007

## 2020-08-29 NOTE — PROGRESS NOTES
Reassessment complete, vitals obtained. Pt resting comfortably at this time. spo2 %, nasal canula weaned down to 2L. Pt repositioning self. Denies needs. Call light in reach. Safety precautions in place.

## 2020-08-29 NOTE — PROGRESS NOTES
Reassessment complete, vitals obtained. Pt remains on 2L NC & tolerating. Pt up to bedside commode. Voided. Returned to bed. C/o pain 8/10 to head, back and chest. PRN oxycodone given, see MAR. Pt denies further needs. Call light in reach.  Safety precautions in place

## 2020-08-29 NOTE — ED NOTES
Gave report to RN on [facility]. Pt. Is alert and oreinted. Pt. Is on tele and on 3L nasal cannula. Pt. Has lactate ringers and Linezolid infusing.        Regis Layne RN  08/28/20 2036

## 2020-08-29 NOTE — PROGRESS NOTES
Introduced self to patient. Initial shift assessment completed, see complex assessment in doc flowsheet. Questions answered. Pt A&O x4. VSS. Pt ambulating to [facility] independently. Pt feels SOB. Pt Is on home dose of O2. SPO2 is 98%. Call light within reach. Bed in low position with wheels locked. Encouraged to call for nurse as needed throughout the shift.

## 2020-08-29 NOTE — PROGRESS NOTES
Hospitalist Progress Note      PCP: Bee Davidson MD    Date of Admission: 8/28/2020    LOS: 1    Chief Complaint:   Chief Complaint   Patient presents with    Shortness of Breath     Pt to Er via [address] squad form home with c/o SOP since tuesday, hx of COPD, no energy. sating 86% on 2L, on non rebreather at ER arrival. sattes took week to take home inhalers. Case Summary:   , Stage III kidney disease who presented with increased shortness of breath and associated nonproductive cough as well as having sustained a fall on the morning of admission. She was noted to be hypoxic with oxygen saturation in mid 80s requiring increased O2 supplementation. 6 chest x-ray was noted for bilateral peripheral airspace disease suggestive of atypical/viral pneumonia. 7year-old lady with history of COPD, Crohn's disease, depression, rheumatoid arthritis, history of DVT/PE, osteoporosis, chronic respiratory failure on home oxygen  She was found to have sepsis due to bilateral pleural pneumonia with questionable aspiration complicated by acute on chronic kidney injury, acute on chronic respiratory failure as well as mechanical fall at home. Active Hospital Problems    Diagnosis Date Noted    Cerebrovascular [facility] (CVA) [facility] [I63.9]     MARIS (acute kidney injury) ([facility] [N17.9]     Suspected COVID-19 virus infection [Z20.828]     Bacterial pneumonia [J15.9] 08/28/2020    Pneumonia due to organism [J18.9] 04/02/2019    Acute on chronic respiratory failure with hypoxia (HCC) [J96.21]          Active Problems:    Acute on chronic respiratory failure with hypoxia [facility]): Per ED: Acute respiratory failure with hypoxia, has 2LNC baseline at home but has needed to increase the last few days. ED NN at 1100: Pt always on 2L, squad states sating 86 on their arrival, placed her on non rebreather in route.   Still with shortness of breath this morning  -Continue O2 supplementation and wean off to baseline as tolerated with target saturations greater than 90%      Sepsis due to pneumonia: Still with tachycardia, leukocytosis, elevated procalcitonin and acute kidney injury. Chest x-ray noted for infiltrates with concern for possible aspiration.  -Continue antibiotics  -Follow-up swallow evaluation      Bacterial pneumonia: Continue IV antibiotics    Chronic steroid use: We will increase prednisone dose to double dose 20 mg daily. If blood pressure becomes persistently low, to switch to IV hydrocortisone for mineralocorticoid activity      Cerebrovascular accident (CVA) [facility]): Patient had a fall at home. CT head was concerning for possible internal capsule stroke. Neurology consulted. -Continue anticoagulation  -Continue statin  -Neurology following with recommendations  -Once ruled out for COVID-19, will consider MRI scan of the brain and carotid Dopplers      MARIS (acute kidney injury) ([address]): In the setting of sepsis. Slowly improving with hydration. Will monitor renal function and electrolytes    Suspected COVID-19 virus infection: Follow-up PCR test  Resolved Problems:    * No resolved hospital problems.  *          Medications:  Reviewed  Infusion Medications    sodium chloride 125 mL/hr at 08/29/20 1106     Scheduled Medications    famotidine        predniSONE  20 mg Oral Daily    cefepime  1 g Intravenous Q12H    linezolid  600 mg Intravenous Q12H    sodium chloride  30 mL/kg Intravenous Once    sodium chloride flush  10 mL Intravenous 2 times per day    amitriptyline  75 mg Oral Nightly    budesonide-formoterol  2 puff Inhalation BID    apixaban  5 mg Oral BID    atorvastatin  40 mg Oral Nightly    albuterol sulfate HFA  2 puff Inhalation Q4H WA    ipratropium  2 puff Inhalation 4x daily     PRN Meds: guaiFENesin-dextromethorphan, sodium chloride flush, acetaminophen **OR** acetaminophen, polyethylene glycol, promethazine **OR** ondansetron, labetalol, oxyCODONE, morphine, albuterol sulfate

## 2020-08-29 NOTE — PROGRESS NOTES
Pt arrived to [address] IVF infusing, pt on 3L NC. Pt up to bedside commode, voided. Sample collected per order. Returned to bed. Pt has  some SOB at rest but increased w/ exertion. Pt states she wears 2L at night but has been wearing 3L all the time for the past few days. Pt A/O x4. Neuro checks complete, pt reports weakness at home. States she lives alone in apartment and has home care come twice a week mainly helping with cleaning. Pt reports headache and some left sided chest discomfort, reports chest discomfort w/ coughing & deep breathing. Ongoing for past few days pt thinks d/t her pneumonia. See doc flow for complex assessment. Orders released to review.

## 2020-08-29 NOTE — CONSULTS
In patient Neurology consult        [facility] Neurology      MD Margot Rodriguez  1950    Date of Service: 8/29/2020    Referring Physician: Makayla Sun MD      Reason for the consult and CC: Acute encephalopathy, falling and possible new stroke. HPI:   The patient is a 77y.o.  years old female with multiple medical problems who was admitted to the hospital yesterday from home with mechanical fall and breathing difficulties. Symptoms started few hours prior to admission. She lives by herself but independent. She describes gradual onset of breathing difficulties with coughing but no fever. Degree was severe and persistent. She fell at home secondary to feeling lightheaded and dizzy but no significant injury or head trauma or passing out. No other leaving or aggravating factors. She was unable to function at home. She called paramedics and she was transported to the ED. Initial work-up in the ED revealed acute kidney injury chronic kidney disease and sepsis secondary to pneumonia. She was placed on antibiotics and was admitted to the ICU for closer to rule out. Initial CT of the head showed possible stroke in the right internal capsule. Neurology has been consulted. Patient today denies any new symptoms. No more chest pain. She denies any headache, neck or back pain or focal weakness. She continues to have breathing difficulties. She denies any recent fever. Blood test reviewed and showed white count of 34 sodium 134 and creatinine of 2.0. CT of the head showed possible new ischemic right capsule stroke. Other review of system was unremarkable at this point.       Family History   Problem Relation Age of Onset    Heart Disease Mother     High Blood Pressure Mother     High Cholesterol Mother     Early Death Mother 36        MI    Heart Disease Father     High Blood Pressure Father     High Cholesterol Father     Stroke Father 79    High Blood Pressure Sister     High Blood Pressure Brother      Past Surgical History:   Procedure Laterality Date    ABDOMEN SURGERY      BACK SURGERY      shunt to drain CSF    BIOPSY SHOULDER Left 2019    EXCISION OF LEFT SHOULDER MASS performed by Josue Berrios MD at [address]      crainotomy- remove fluid ? V-P SHUNT    CHOLECYSTECTOMY      COLON SURGERY      resection X2    COLONOSCOPY  11    BIOPSY AND POLYPECTOMY    COLONOSCOPY  2012    and ablation ERBE/APC    SHOULDER SURGERY      L         Past Medical History:   Diagnosis Date    Bullous emphysema (HCC)     CKD (chronic kidney disease) stage 3, GFR 30-59 ml/min (HCC)     Clostridium difficile carrier 2019    COPD (chronic obstructive pulmonary disease) ([address]     PFT done 7 years ago   [place] Crohn's disease ([facility]     Crohn's disease    Depression 2011    pt states resolved as of 3-26-12    DVT (deep venous thrombosis) ([address]     ; first ocurrence     Hiatal hernia     Hx of blood clots     Kidney disease     Kidney insufficiency     Osteoporosis     Peripheral neuropathy     neuropathy in legs    Pneumonia     Postmenopausal     LMP in  40's    Pulmonary embolism ([address]     ; first ocurrence    Sepsis ([address]     Syringomyelia ([address]      Social History     Tobacco Use    Smoking status: Former Smoker     Packs/day: 0.25     Years: 30.00     Pack years: 7.50     Types: Cigarettes     Last attempt to quit: 2015     Years since quittin.5    Smokeless tobacco: Never Used    Tobacco comment: started smoking at age 26 / smoked up to 9 cigarettes a day    Substance Use Topics    Alcohol use: No    Drug use: No     No Known Allergies  Current Facility-Administered Medications   Medication Dose Route Frequency Provider Last Rate Last Dose    famotidine (PEPCID) 20 MG tablet             predniSONE (DELTASONE) tablet 20 mg  20 mg Oral Daily Esteban Clancy MD   20 mg at 20 1210    guaiFENesin-dextromethorphan (ROBITUSSIN DM) 100-10 MG/5ML syrup 10 mL  10 mL Oral Q6H PRN Esteban Clancy MD        cefepime (MAXIPIME) 1 g IVPB minibag  1 g Intravenous Q12H Scarlet Sidhu MD   Stopped at 08/29/20 0232    linezolid (ZYVOX) IVPB 600 mg  600 mg Intravenous Q12H Scarlet Sidhu MD   Stopped at 08/29/20 1020    0.9 % sodium chloride bolus  30 mL/kg Intravenous Once Scarlet Sidhu MD        sodium chloride flush 0.9 % injection 10 mL  10 mL Intravenous 2 times per day Scarlet Sidhu MD   10 mL at 08/29/20 1020    sodium chloride flush 0.9 % injection 10 mL  10 mL Intravenous PRN Scarlet Sidhu MD        acetaminophen (TYLENOL) tablet 650 mg  650 mg Oral Q6H PRN Scarlet Sidhu MD        Or    acetaminophen (TYLENOL) suppository 650 mg  650 mg Rectal Q6H PRN Scarlet Sidhu MD        polyethylene glycol (GLYCOLAX) packet 17 g  17 g Oral Daily PRN Scarlet Sidhu MD        promethazine (PHENERGAN) tablet 12.5 mg  12.5 mg Oral Q6H PRN Scarlet Sidhu MD        Or    ondansetron (ZOFRAN) injection 4 mg  4 mg Intravenous Q6H PRN Scarlet Sidhu MD        0.9 % sodium chloride infusion   Intravenous Continuous Scarlet Sidhu  mL/hr at 08/29/20 1106      amitriptyline (ELAVIL) tablet 75 mg  75 mg Oral Nightly Scarlet Sidhu MD   75 mg at 08/28/20 2231    budesonide-formoterol (SYMBICORT) 160-4.5 MCG/ACT inhaler 2 puff  2 puff Inhalation BID Scarlet Sidhu MD   2 puff at 08/29/20 0939    apixaban (ELIQUIS) tablet 5 mg  5 mg Oral BID Scarlet Sidhu MD   5 mg at 08/29/20 0814    atorvastatin (LIPITOR) tablet 40 mg  40 mg Oral Nightly Scarlet Sidhu MD   40 mg at 08/28/20 2231    labetalol (NORMODYNE;TRANDATE) injection 10 mg  10 mg Intravenous Q4H PRN Scarlet Sidhu MD        oxyCODONE (ROXICODONE) immediate release tablet 2.5 mg  2.5 mg Oral Q6H PRN Scarlet Sidhu MD   2.5 mg at 08/29/20 1210    morphine (PF) injection 1 mg  1 mg CMP  Telemetry  PPI  Continue Eliquis  Statin  Continue blood pressure monitor for now  We will follow after the above recommendations and clearance from COVID. Discussed with her nurse  MDM: High complexity due to multiorgan involvement and possible new stroke. Thank you for referring such patient. If you have any questions regarding my consult note, please don't hesitate to call me. Rosario Denton MD  [phone]    This dictation was generated by voice recognition computer software.  Although all attempts are made to edit the dictation for accuracy, there may be errors in the  transcription that are not intended

## 2020-08-30 ENCOUNTER — APPOINTMENT (OUTPATIENT)
Dept: CT IMAGING | Age: 70
DRG: 871 | End: 2020-08-30
Payer: MEDICARE

## 2020-08-30 ENCOUNTER — APPOINTMENT (OUTPATIENT)
Dept: GENERAL RADIOLOGY | Age: 70
DRG: 871 | End: 2020-08-30
Payer: MEDICARE

## 2020-08-30 LAB
ANION GAP SERPL CALCULATED.3IONS-SCNC: 12 MMOL/L (ref 3–16)
BASOPHILS ABSOLUTE: 0 K/UL (ref 0–0.2)
BASOPHILS RELATIVE PERCENT: 0.1 %
BUN BLDV-MCNC: 26 MG/DL (ref 7–20)
CALCIUM SERPL-MCNC: 8.7 MG/DL (ref 8.3–10.6)
CHLORIDE BLD-SCNC: 105 MMOL/L (ref 99–110)
CO2: 19 MMOL/L (ref 21–32)
CREAT SERPL-MCNC: 1.8 MG/DL (ref 0.6–1.2)
EOSINOPHILS ABSOLUTE: 0 K/UL (ref 0–0.6)
EOSINOPHILS RELATIVE PERCENT: 0.1 %
GFR AFRICAN AMERICAN: 34
GFR NON-AFRICAN AMERICAN: 28
GLUCOSE BLD-MCNC: 109 MG/DL (ref 70–99)
HCT VFR BLD CALC: 31.2 % (ref 36–48)
HEMOGLOBIN: 9.8 G/DL (ref 12–16)
LYMPHOCYTES ABSOLUTE: 2.9 K/UL (ref 1–5.1)
LYMPHOCYTES RELATIVE PERCENT: 8.8 %
MCH RBC QN AUTO: 20.4 PG (ref 26–34)
MCHC RBC AUTO-ENTMCNC: 31.4 G/DL (ref 31–36)
MCV RBC AUTO: 65 FL (ref 80–100)
MONOCYTES ABSOLUTE: 2 K/UL (ref 0–1.3)
MONOCYTES RELATIVE PERCENT: 6.1 %
NEUTROPHILS ABSOLUTE: 27.8 K/UL (ref 1.7–7.7)
NEUTROPHILS RELATIVE PERCENT: 84.9 %
PDW BLD-RTO: 14.9 % (ref 12.4–15.4)
PLATELET # BLD: 299 K/UL (ref 135–450)
PMV BLD AUTO: 7.2 FL (ref 5–10.5)
POTASSIUM SERPL-SCNC: 4.1 MMOL/L (ref 3.5–5.1)
RBC # BLD: 4.8 M/UL (ref 4–5.2)
SODIUM BLD-SCNC: 136 MMOL/L (ref 136–145)
URINE CULTURE, ROUTINE: NORMAL
WBC # BLD: 32.8 K/UL (ref 4–11)

## 2020-08-30 PROCEDURE — 6360000002 HC RX W HCPCS: Performed by: INTERNAL MEDICINE

## 2020-08-30 PROCEDURE — 2700000000 HC OXYGEN THERAPY PER DAY

## 2020-08-30 PROCEDURE — 36415 COLL VENOUS BLD VENIPUNCTURE: CPT

## 2020-08-30 PROCEDURE — 99233 SBSQ HOSP IP/OBS HIGH 50: CPT | Performed by: PSYCHIATRY & NEUROLOGY

## 2020-08-30 PROCEDURE — 6370000000 HC RX 637 (ALT 250 FOR IP): Performed by: INTERNAL MEDICINE

## 2020-08-30 PROCEDURE — 2580000003 HC RX 258: Performed by: INTERNAL MEDICINE

## 2020-08-30 PROCEDURE — 1200000000 HC SEMI PRIVATE

## 2020-08-30 PROCEDURE — 71250 CT THORAX DX C-: CPT

## 2020-08-30 PROCEDURE — 85025 COMPLETE CBC W/AUTO DIFF WBC: CPT

## 2020-08-30 PROCEDURE — 94640 AIRWAY INHALATION TREATMENT: CPT

## 2020-08-30 PROCEDURE — 80048 BASIC METABOLIC PNL TOTAL CA: CPT

## 2020-08-30 PROCEDURE — 94644 CONT INHLJ TX 1ST HOUR: CPT

## 2020-08-30 PROCEDURE — 2500000003 HC RX 250 WO HCPCS: Performed by: INTERNAL MEDICINE

## 2020-08-30 PROCEDURE — 71046 X-RAY EXAM CHEST 2 VIEWS: CPT

## 2020-08-30 PROCEDURE — 99223 1ST HOSP IP/OBS HIGH 75: CPT | Performed by: INTERNAL MEDICINE

## 2020-08-30 RX ORDER — IPRATROPIUM BROMIDE AND ALBUTEROL SULFATE 2.5; .5 MG/3ML; MG/3ML
1 SOLUTION RESPIRATORY (INHALATION)
Status: DISCONTINUED | OUTPATIENT
Start: 2020-08-30 | End: 2020-09-06 | Stop reason: HOSPADM

## 2020-08-30 RX ORDER — BUDESONIDE AND FORMOTEROL FUMARATE DIHYDRATE 160; 4.5 UG/1; UG/1
2 AEROSOL RESPIRATORY (INHALATION) 2 TIMES DAILY
Status: DISCONTINUED | OUTPATIENT
Start: 2020-08-30 | End: 2020-09-06 | Stop reason: HOSPADM

## 2020-08-30 RX ORDER — CODEINE PHOSPHATE AND GUAIFENESIN 10; 100 MG/5ML; MG/5ML
10 SOLUTION ORAL EVERY 4 HOURS PRN
Status: DISCONTINUED | OUTPATIENT
Start: 2020-08-30 | End: 2020-09-06 | Stop reason: HOSPADM

## 2020-08-30 RX ADMIN — METRONIDAZOLE 500 MG: 500 INJECTION, SOLUTION INTRAVENOUS at 22:36

## 2020-08-30 RX ADMIN — IPRATROPIUM BROMIDE AND ALBUTEROL SULFATE 1 AMPULE: .5; 3 SOLUTION RESPIRATORY (INHALATION) at 15:32

## 2020-08-30 RX ADMIN — DESMOPRESSIN ACETATE 40 MG: 0.2 TABLET ORAL at 20:43

## 2020-08-30 RX ADMIN — APIXABAN 5 MG: 5 TABLET, FILM COATED ORAL at 08:23

## 2020-08-30 RX ADMIN — APIXABAN 5 MG: 5 TABLET, FILM COATED ORAL at 20:43

## 2020-08-30 RX ADMIN — Medication 2 PUFF: at 08:33

## 2020-08-30 RX ADMIN — Medication 10 ML: at 08:23

## 2020-08-30 RX ADMIN — ACETAMINOPHEN 650 MG: 325 TABLET ORAL at 08:23

## 2020-08-30 RX ADMIN — Medication 2 PUFF: at 20:20

## 2020-08-30 RX ADMIN — GUAIFENESIN AND CODEINE PHOSPHATE 10 ML: 100; 10 SOLUTION ORAL at 10:30

## 2020-08-30 RX ADMIN — METRONIDAZOLE 500 MG: 500 INJECTION, SOLUTION INTRAVENOUS at 14:40

## 2020-08-30 RX ADMIN — AMITRIPTYLINE HYDROCHLORIDE 75 MG: 25 TABLET, FILM COATED ORAL at 21:34

## 2020-08-30 RX ADMIN — IPRATROPIUM BROMIDE AND ALBUTEROL SULFATE 1 AMPULE: .5; 3 SOLUTION RESPIRATORY (INHALATION) at 12:24

## 2020-08-30 RX ADMIN — IPRATROPIUM BROMIDE AND ALBUTEROL SULFATE 1 AMPULE: .5; 3 SOLUTION RESPIRATORY (INHALATION) at 20:20

## 2020-08-30 RX ADMIN — IPRATROPIUM BROMIDE AND ALBUTEROL SULFATE 1 AMPULE: .5; 3 SOLUTION RESPIRATORY (INHALATION) at 08:32

## 2020-08-30 RX ADMIN — GUAIFENESIN AND CODEINE PHOSPHATE 10 ML: 100; 10 SOLUTION ORAL at 21:35

## 2020-08-30 RX ADMIN — ALBUTEROL SULFATE 15 MG: 2.5 SOLUTION RESPIRATORY (INHALATION) at 09:10

## 2020-08-30 RX ADMIN — CEFEPIME HYDROCHLORIDE 1 G: 1 INJECTION, POWDER, FOR SOLUTION INTRAMUSCULAR; INTRAVENOUS at 13:55

## 2020-08-30 RX ADMIN — CEFEPIME HYDROCHLORIDE 1 G: 1 INJECTION, POWDER, FOR SOLUTION INTRAMUSCULAR; INTRAVENOUS at 02:37

## 2020-08-30 RX ADMIN — PREDNISONE 20 MG: 20 TABLET ORAL at 08:23

## 2020-08-30 RX ADMIN — LINEZOLID 600 MG: 600 INJECTION, SOLUTION INTRAVENOUS at 06:15

## 2020-08-30 RX ADMIN — Medication 10 ML: at 21:35

## 2020-08-30 RX ADMIN — LINEZOLID 600 MG: 600 INJECTION, SOLUTION INTRAVENOUS at 18:38

## 2020-08-30 ASSESSMENT — PAIN DESCRIPTION - ORIENTATION
ORIENTATION: LEFT
ORIENTATION: LEFT

## 2020-08-30 ASSESSMENT — PAIN SCALES - GENERAL
PAINLEVEL_OUTOF10: 5
PAINLEVEL_OUTOF10: 0
PAINLEVEL_OUTOF10: 7
PAINLEVEL_OUTOF10: 8
PAINLEVEL_OUTOF10: 0
PAINLEVEL_OUTOF10: 3
PAINLEVEL_OUTOF10: 0
PAINLEVEL_OUTOF10: 0

## 2020-08-30 ASSESSMENT — PAIN DESCRIPTION - LOCATION
LOCATION: HEAD;BACK
LOCATION: BACK;CHEST

## 2020-08-30 ASSESSMENT — PAIN DESCRIPTION - PAIN TYPE: TYPE: ACUTE PAIN

## 2020-08-30 NOTE — PROGRESS NOTES
Hospitalist Progress Note      PCP: Janace Epley, MD    Date of Admission: 8/28/2020    LOS: 2    Chief Complaint:   Chief Complaint   Patient presents with    Shortness of Breath     Pt to Er via [address] squad form home with c/o SOP since tuesday, hx of COPD, no energy. sating 86% on 2L, on non rebreather at ER arrival. sattes took week to take home inhalers. Case Summary:   81-year-old lady with history of COPD, Crohn's disease, depression, rheumatoid arthritis, history of DVT/PE, osteoporosis, chronic respiratory failure on home oxygen, Stage III kidney disease who presented with increased shortness of breath and associated nonproductive cough as well as having sustained a fall on the morning of admission. She was noted to be hypoxic with oxygen saturation in mid 80s requiring increased O2 supplementation. 6 chest x-ray was noted for bilateral peripheral airspace disease suggestive of atypical/viral pneumonia. She was found to have sepsis due to bilateral pleural pneumonia with questionable aspiration complicated by acute on chronic kidney injury, acute on chronic respiratory failure as well as mechanical fall at home. Active Hospital Problems    Diagnosis Date Noted    Cerebrovascular accident (CVA) ([facility] [I63.9]     MARIS (acute kidney injury) ([facility] [N17.9]     Suspected COVID-19 virus infection [Z20.828]     Bacterial pneumonia [J15.9] 08/28/2020    Pneumonia due to organism [J18.9] 04/02/2019    Acute on chronic respiratory failure with hypoxia (HCC) [J96.21]          Active Problems:    Acute on chronic respiratory failure with hypoxia [facility]): Per ED: on presentation she had hypoxia, with increased O2 use. She was more wheezy and SOB with diaphoresis this AM which improved with continuous nebs.  COVID-19 not deteceted  -Continue O2 supplementation and wean off to baseline as tolerated with target saturations greater than 90%  -pulm consult  -repeat CXR in 2 days      Sepsis due to Bacterial pneumonia: Still  tachycardia, leukocytosis. CT chest with ADAM infiltrate. Elevated procalcitonin  -Continue antibiotics      Chronic steroid use: We will increase prednisone dose to double dose 20 mg daily. Stable BP and hemodynamics      Cerebrovascular accident (CVA) [facility]): Patient had a fall at home. CT head was concerning for possible internal capsule stroke. Neurology consulted. -Continue anticoagulation, statin  -Neurology following with recommendations  -get MRI scan of the brain and carotid Dopplers      MARIS (acute kidney injury) ([address]): In the setting of sepsis. Slowly improving with hydration. Will monitor renal function and electrolytes    COVID-19 not detected      Social: pt not keen to transfer to ICU at this time and feels better after continuous nebs    Resolved Problems:    * No resolved hospital problems. *          Medications:  Reviewed  Infusion Medications     Scheduled Medications    ipratropium-albuterol  1 ampule Inhalation Q4H WA    predniSONE  20 mg Oral Daily    cefepime  1 g Intravenous Q12H    linezolid  600 mg Intravenous Q12H    sodium chloride  30 mL/kg Intravenous Once    sodium chloride flush  10 mL Intravenous 2 times per day    amitriptyline  75 mg Oral Nightly    budesonide-formoterol  2 puff Inhalation BID    apixaban  5 mg Oral BID    atorvastatin  40 mg Oral Nightly     PRN Meds: guaiFENesin-dextromethorphan, sodium chloride flush, acetaminophen **OR** acetaminophen, polyethylene glycol, promethazine **OR** ondansetron, labetalol, albuterol sulfate HFA      DVT Prophylaxis: On Eliquis  Diet: DIET LOW SODIUM 2 GM;  Code Status: Full Code    Dispo:  Anticipate discharge in the next 72hrs    ____________________________________________________________________________    Subjective:   Overnight Events:   Uneventful overnight  Increased shortness of breath this morning with diaphoresis      Physical Exam:  BP (!) 143/93   Pulse 117   Temp 98.6 °F (37 °C) (Oral)   Resp (!) 33   Ht 5' (1.524 m)   Wt 149 lb 11.1 oz (67.9 kg)   SpO2 94%   BMI 29.23 kg/m²   General appearance: No apparent distress, appears stated age and cooperative. HEENT: Normocephalic, atraumatic, MMM, No sclera icterus/conjuctival palor  Neck: Supple, no thyromegally. No jugular venous distention. Respiratory:  Normal respiratory effort. Clear to auscultation, no Rales/Wheezes/Rhonchi. Cardiovascular: S1/S2 without murmurs, rubs or gallops. RRR  Abdomen: Soft, non-tender, non-distended, bowel sounds present. Musculoskeletal: No clubbing, cyanosis or edema bilaterally. Skin: Skin color, texture, turgor normal.  No rashes or lesions. Neurologic:  Cranial nerves: II-XII intact, ELEANOR, No focal sensory/motor deficits  Psychiatric: Alert and oriented, thought content appropriate  Capillary Refill: Brisk,< 3 seconds   Peripheral Pulses: +2 palpable, equal bilaterally     No intake or output data in the 24 hours ending 08/30/20 1010    Labs:   Recent Labs     08/28/20  1052 08/29/20  0427 08/30/20  0422   WBC 28.2* 34.7* 32.8*   HGB 10.2* 9.8* 9.8*   HCT 32.2* 30.6* 31.2*    272 299      Recent Labs     08/28/20  1052 08/29/20  0427 08/30/20  0422   * 134* 136   K 3.2* 4.4 4.1   CL 99 103 105   CO2 18* 17* 19*   BUN 31* 35* 26*   CREATININE 2.2* 2.0* 1.8*   CALCIUM 8.9 8.9 8.7   PHOS  --  3.3  --    AST 8* 9*  --    ALT 7* 8*  --    BILITOT 0.7 0.3  --    ALKPHOS 99 102  --      Recent Labs     08/28/20  1052 08/28/20  2142 08/29/20  0041   TROPONINI 0.02* <0.01 <0.01       Urinalysis:    Lab Results   Component Value Date    NITRU Negative 08/28/2020    WBCUA 70 08/28/2020    BACTERIA RARE 08/28/2020    RBCUA 2 08/28/2020    BLOODU Negative 08/28/2020    SPECGRAV 1.017 08/28/2020    GLUCOSEU 100 08/28/2020       Radiology:  CT CHEST WO CONTRAST   Final Result   1. Left upper lobe pneumonia. Recommend chest radiograph in 8 weeks to   confirm resolution.    2. Trace left pleural

## 2020-08-30 NOTE — PROGRESS NOTES
Pt transferred to room 3322 from [facility]. IV infiltrated. Disconnected and removed.    Pt has labored

## 2020-08-30 NOTE — PROGRESS NOTES
2000: Pt alert and oriented, admits to dyspnea on exertion, continue to need supplemental oxygen, c/o intercostal pain during cough, otherwise no acute distress noted and will continue to monitor pt during shift.

## 2020-08-30 NOTE — PROGRESS NOTES
Physical Therapy  Patient having breathing difficulties, hold per RN.   Makayla Bishop, [address] [facility], DPT, ATC-R [id number]

## 2020-08-30 NOTE — PROGRESS NOTES
Pt admitted to room 3322 from 5T. Vital signs obtained. Pt on 2L O2 via nasal cannula. Admission Dx: bacterial PNA. Pt is x1 person assist to [facility] per report. Pt oriented to room and updated on POC. Pt understands and has no further questions. Call light and bedside table within reach. Safety socks on and bed in lowest position with 2/4 siderails up. Bed alarm active. Will continue to monitor.

## 2020-08-30 NOTE — CONSULTS
[facility] Pulmonary and Critical Care   Consult Note      Reason for Consult: Pneumonia, COPD, emphysema  Requesting Physician: Dr Kennedy Mc:   [address] [facility]:                The patient is a 77 y.o. female with significant past medical history of:      Diagnosis Date    Bullous emphysema ([address]     CKD (chronic kidney disease) stage 3, GFR 30-59 ml/min ([facility])     Clostridium difficile carrier 01/21/2019    COPD (chronic obstructive pulmonary disease) ([address]     PFT done 7 years ago   [place] Crohn's disease ([address]     Crohn's disease    Depression 1/30/2011    pt states resolved as of 3-26-12    DVT (deep venous thrombosis) ([address]     2012; first ocurrence     Hiatal hernia     Hx of blood clots     Kidney disease     Kidney insufficiency     Osteoporosis     Peripheral neuropathy     neuropathy in legs    Pneumonia     Postmenopausal     LMP in  40's    Pulmonary embolism ([address]     2012; first ocurrence    Sepsis ([address]     Syringomyelia ([address]      The patient presented to the hospital 8/28/2020 with a chief complaint of severe shortness of breath over the preceding 3 days. This was associated with significant but nonproductive cough. Modifying factors she does have a history of severe COPD and emphysema. I normally see her as an outpatient. I last saw her 7/28/2020. She had been having difficulty with shortness of breath at that time as well. She does use oxygen at home. Normally also takes Symbicort and Spiriva. She had been on prednisone at 10 mg/day. During this admission she was diagnosed with left upper lobe pneumonia. Cultures have been negative.   She has struggled to improve over the last couple of days      Past Surgical History:        Procedure Laterality Date    ABDOMEN SURGERY      BACK SURGERY      shunt to drain CSF    BIOPSY SHOULDER Left 2/27/2019    EXCISION OF LEFT SHOULDER MASS performed by Pranay Cruz MD at [address] [place] [address] remove fluid ? V-P SHUNT    CHOLECYSTECTOMY      COLON SURGERY      resection X2    COLONOSCOPY  12/5/11    BIOPSY AND POLYPECTOMY    COLONOSCOPY  4-2-2012    and ablation ERBE/APC    SHOULDER SURGERY      L      Current Medications:    Current Facility-Administered Medications: ipratropium-albuterol (DUONEB) nebulizer solution 1 ampule, 1 ampule, Inhalation, Q4H WA  guaiFENesin-codeine (GUAIFENESIN AC) 100-10 MG/5ML liquid 10 mL, 10 mL, Oral, Q4H PRN  predniSONE (DELTASONE) tablet 20 mg, 20 mg, Oral, Daily  cefepime (MAXIPIME) 1 g IVPB minibag, 1 g, Intravenous, Q12H  linezolid (ZYVOX) IVPB 600 mg, 600 mg, Intravenous, Q12H  0.9 % sodium chloride bolus, 30 mL/kg, Intravenous, Once  sodium chloride flush 0.9 % injection 10 mL, 10 mL, Intravenous, 2 times per day  sodium chloride flush 0.9 % injection 10 mL, 10 mL, Intravenous, PRN  acetaminophen (TYLENOL) tablet 650 mg, 650 mg, Oral, Q6H PRN **OR** acetaminophen (TYLENOL) suppository 650 mg, 650 mg, Rectal, Q6H PRN  polyethylene glycol (GLYCOLAX) packet 17 g, 17 g, Oral, Daily PRN  promethazine (PHENERGAN) tablet 12.5 mg, 12.5 mg, Oral, Q6H PRN **OR** ondansetron (ZOFRAN) injection 4 mg, 4 mg, Intravenous, Q6H PRN  amitriptyline (ELAVIL) tablet 75 mg, 75 mg, Oral, Nightly  budesonide-formoterol (SYMBICORT) 160-4.5 MCG/ACT inhaler 2 puff, 2 puff, Inhalation, BID  apixaban (ELIQUIS) tablet 5 mg, 5 mg, Oral, BID  atorvastatin (LIPITOR) tablet 40 mg, 40 mg, Oral, Nightly  labetalol (NORMODYNE;TRANDATE) injection 10 mg, 10 mg, Intravenous, Q4H PRN  albuterol sulfate  (90 Base) MCG/ACT inhaler 2 puff, 2 puff, Inhalation, Q2H PRN    No Known Allergies    Social History:    TOBACCO:   reports that she quit smoking about 5 years ago. Her smoking use included cigarettes. She has a 7.50 pack-year smoking history. She has never used smokeless tobacco.  ETOH:   reports no history of alcohol use.   Patient currently lives independently  Environmental/chemical exposure: None known    Family History:       Problem Relation Age of Onset    Heart Disease Mother     High Blood Pressure Mother     High Cholesterol Mother     Early Death Mother 36        MI    Heart Disease Father     High Blood Pressure Father     High Cholesterol Father     Stroke Father 79    High Blood Pressure Sister     High Blood Pressure Brother      REVIEW OF SYSTEMS:    CONSTITUTIONAL:  negative for fevers, chills, diaphoresis, activity change, appetite change, fatigue, night sweats and unexpected weight change. EYES:  negative for blurred vision, eye discharge, visual disturbance and icterus  HEENT:  negative for hearing loss, tinnitus, ear drainage, sinus pressure, nasal congestion, epistaxis and snoring  RESPIRATORY:  See HPI  CARDIOVASCULAR:  negative for chest pain, palpitations, exertional chest pressure/discomfort, edema, syncope  GASTROINTESTINAL:  negative for nausea, vomiting, diarrhea, constipation, blood in stool and abdominal pain  GENITOURINARY:  negative for frequency, dysuria, urinary incontinence, decreased urine volume, and hematuria  HEMATOLOGIC/LYMPHATIC:  negative for easy bruising, bleeding and lymphadenopathy  ALLERGIC/IMMUNOLOGIC:  negative for recurrent infections, angioedema, anaphylaxis and drug reactions  ENDOCRINE:  negative for weight changes and diabetic symptoms including polyuria, polydipsia and polyphagia  MUSCULOSKELETAL:  negative for  pain, joint swelling, decreased range of motion and muscle weakness  NEUROLOGICAL:  negative for headaches, slurred speech, unilateral weakness  PSYCHIATRIC/BEHAVIORAL: negative for hallucinations, behavioral problems, confusion and agitation.      Objective:   PHYSICAL EXAM:      VITALS:  /78   Pulse 124   Temp 98.5 °F (36.9 °C) (Oral)   Resp 24   Ht 5' (1.524 m)   Wt 149 lb 11.1 oz (67.9 kg)   SpO2 91%   BMI 29.23 kg/m²      24HR INTAKE/OUTPUT:  No intake or output data in the 24 hours ending 08/30/20 36  CONSTITUTIONAL:  awake, alert, cooperative, no apparent distress, and appears stated age  NECK:  Supple, symmetrical, trachea midline, no adenopathy, thyroid symmetric, not enlarged and no tenderness, skin normal  LUNGS:  no increased work of breathing and diminished to auscultation. No accessory muscle use  CARDIOVASCULAR: S1 and S2, no edema and no JVD  ABDOMEN:  normal bowel sounds, non-distended and no masses palpated, and no tenderness to palpation. No hepatospleenomegaly  LYMPHADENOPATHY:  no axillary or supraclavicular adenopathy. No cervical adnenopathy  PSYCHIATRIC: Oriented to person place and time. No obvious depression or anxiety. MUSCULOSKELETAL: No obvious misalignment or effusion of the joints. No clubbing, cyanosis of the digits. SKIN:  normal skin color, texture, turgor and no redness, warmth, or swelling. No palpable nodules    DATA:    Old records have been reviewed    CBC:  Recent Labs     08/28/20  1052 08/29/20 0427 08/30/20 0422   WBC 28.2* 34.7* 32.8*   RBC 5.08 4.81 4.80   HGB 10.2* 9.8* 9.8*   HCT 32.2* 30.6* 31.2*    272 299   MCV 63.3* 63.6* 65.0*   MCH 20.0* 20.4* 20.4*   MCHC 31.6 32.0 31.4   RDW 15.1 15.3 14.9   BANDSPCT 12* 4  --       BMP:  Recent Labs     08/28/20  1052 08/29/20 0427 08/30/20 0422   * 134* 136   K 3.2* 4.4 4.1   CL 99 103 105   CO2 18* 17* 19*   BUN 31* 35* 26*   CREATININE 2.2* 2.0* 1.8*   CALCIUM 8.9 8.9 8.7   GLUCOSE 113* 135* 109*      ABG:  No results for input(s): PHART, AFQ8AJD, PO2ART, QZY4PUF, S8LJXPOU, BEART in the last 72 hours. Lab Results   Component Value Date    BNP <15 08/06/2011     Lab Results   Component Value Date    CKTOTAL 29 08/07/2011    CKMB 0.88 08/07/2011    TROPONINI <0.01 08/29/2020       Cultures:     Abx:    Radiology Review:  All pertinent images / reports were reviewed as a part of this visit. Assessment:     1. Acute on chronic hypoxemic respiratory failure  2. Left upper lobe pneumonia  3.  COPD severe  4. Centrilobular emphysema  5. GERD    Plan:     1. I have reviewed laboratories, medical records and images for this visit  2. CT imaging from 8/28 reveals significant left upper lobe infiltrate. This is superimposed on a background of moderate to severe centrilobular emphysema  3. Repeat PA/lateral chest x-ray  4. Does give a history of GERD  5. Would recommend covering for anaerobes, add Flagyl  6. Continue Zyvox and cefepime  7. Her white count is going up  8. He is now getting nebulizer treatments which she feels is helping her more than the HFA  9. Continue prednisone 20 mg  10. Bronchoscopy may be helpful but I am concerned that she may not tolerate it well because of her shortness of breath.

## 2020-08-30 NOTE — PROGRESS NOTES
Ronni Martinez  Neurology Follow-up  [facility] Neurology    Date of Service: 8/30/2020    Subjective:   CC: Follow up today regarding: Acute encephalopathy and possible new stroke. Events noted. Chart and lab reviewed. The patient denies any new symptoms today. Feels better compared to yesterday. COVID-19 came back negative. She is on Eliquis. She denies any focal weakness, headache, chest pain, dysphagia or dysarthria. Occasional breathing difficulties but improved compared to yesterday with treatment. No other new symptoms today. Other review of system was unremarkable. ROS : A 10-12 system review obtained and updated today and is unremarkable except as mentioned  in my interval history. family history includes Early Death (age of onset: 36) in her mother; Heart Disease in her father and mother; High Blood Pressure in her brother, father, mother, and sister; High Cholesterol in her father and mother; Stroke (age of onset: 79) in her father.     Past Medical History:   Diagnosis Date    Bullous emphysema ([facility])     CKD (chronic kidney disease) stage 3, GFR 30-59 ml/min ([facility])     Clostridium difficile carrier 01/21/2019    COPD (chronic obstructive pulmonary disease) ([address]     PFT done 7 years ago   Chris Martinez Crohn's disease ([address]     Crohn's disease    Depression 1/30/2011    pt states resolved as of 3-26-12    DVT (deep venous thrombosis) ([address]     2012; first ocurrence     Hiatal hernia     Hx of blood clots     Kidney disease     Kidney insufficiency     Osteoporosis     Peripheral neuropathy     neuropathy in legs    Pneumonia     Postmenopausal     LMP in  40's    Pulmonary embolism ([address]     2012; first ocurrence    Sepsis ([address]     Syringomyelia ([address]      Current Facility-Administered Medications   Medication Dose Route Frequency Provider Last Rate Last Dose    ipratropium-albuterol (DUONEB) nebulizer solution 1 ampule  1 ampule Inhalation Q4H PRIMITIVO Clancy MD   1 No Known Allergies   reports that she quit smoking about 5 years ago. Her smoking use included cigarettes. She has a 7.50 pack-year smoking history. She has never used smokeless tobacco. She reports that she does not drink alcohol or use drugs. Objective:  Exam:   Constitutional:   Vitals:    08/30/20 0845 08/30/20 0914 08/30/20 0945 08/30/20 1032   BP:    120/78   Pulse:    124   Resp:  (!) 33  24   Temp: 100 °F (37.8 °C)  98.6 °F (37 °C) 98.5 °F (36.9 °C)   TempSrc: Oral  Oral Oral   SpO2:  94%  91%   Weight:       Height:         General appearance:  Normal development and appear in no acute distress. Eye: No icterus. Neck: supple  Cardiovascular:  No lower leg edema with good pulsation. Mental Status:   Oriented to person, place, problem, and time. Memory: Good immediate recall. Intact remote memory  Normal attention span and concentration. Language: intact naming, repeating and fluency   Good fund of Knowledge. Cranial Nerves:   II: Visual fields: Full. Pupils: equal, round, reactive to light  III,IV,VI: Extra Ocular Movements are intact. No nystagmus  V: Facial sensation is intact  VII: Facial strength and movements: intact and symmetric  IX: Palate elevation is symmetric  XI: Shoulder shrug is intact  XII: Tongue movements are normal  Musculoskeletal: Generalized diffuse weakness 4/5 with poor effort   tone: Normal tone. Reflexes: 2+ in the arms 1+ in the leg  Coordination: no pronator drift, no dysmetria with FNF. Normal REM. Sensation: normal to all modalities in both arms and legs.   Gait/Posture: Not tested due to generalized weakness      Data:  LABS:   Lab Results   Component Value Date     08/30/2020    K 4.1 08/30/2020    K 3.2 08/28/2020     08/30/2020    CO2 19 08/30/2020    BUN 26 08/30/2020    CREATININE 1.8 08/30/2020    GFRAA 34 08/30/2020    GFRAA >60 04/26/2013    LABGLOM 28 08/30/2020    GLUCOSE 109 08/30/2020    PHOS 3.3 08/29/2020    MG 2.40 08/29/2020    CALCIUM 8.7 08/30/2020     Lab Results   Component Value Date    WBC 32.8 08/30/2020    RBC 4.80 08/30/2020    HGB 9.8 08/30/2020    HCT 31.2 08/30/2020    MCV 65.0 08/30/2020    RDW 14.9 08/30/2020     08/30/2020     Lab Results   Component Value Date    INR 1.40 (H) 08/28/2020    PROTIME 16.3 (H) 08/28/2020       I reviewed blood testing and other test results and discussed results with the patient      Impression:  Acute encephalopathy. Likely multifactorial metabolic encephalopathy  Possible new ischemic right capsule stroke although exam is nonfocal  Sepsis secondary to pneumonia  History of PE on Eliquis  Hyponatremia  Leukocytosis  Hypertension  Hyperlipidemia  Chronic anemia  Acute on chronic respiratory failure, improved      Recommendation  MRI brain  Continue respiratory support  Continue antibiotics  Eliquis  Continue prednisone and follow blood sugar level and insulin sliding scale  Statin  Telemetry  Blood pressure monitor  Statin  Neurochecks  Aspiration precaution  Speech and swallow evaluation  PT and OT  We will follow. Jduy Jacobson MD   [phone]      This dictation was generated by voice recognition computer software. Although all attempts are made to edit the dictation for accuracy, there may be errors in the transcription that are not intended.

## 2020-08-30 NOTE — PROGRESS NOTES
RT recommended continuous albuterol for pt. MD okay with it. RT to manage the continuous albuterol order. Will continue to monitor.

## 2020-08-30 NOTE — PLAN OF CARE
Problem: Falls - Risk of:  Goal: Will remain free from falls  Description: Will remain free from falls  8/30/2020 1151 by Perla Carlson RN  Outcome: Ongoing  Note: Pt assessed for fall. Pt is a high fall risk. Fall precautions in place. Bed alarm is active. Bed locked in lowest position and 2/4 rails up. Nonskid socks on. Call light and personal belongings within reach. Instructed pt to call out for needs. Pt verbalized understanding. Will continue to monitor. Problem: Breathing Pattern - Ineffective: Intervention: Assess risk for ineffective breathing pattern  Note: Pt breathing is labored. MD aware. Pt on 2L O2 via nasal cannula. See Flowsheets for lung assessment. Pt received breathing tx from RT. Pt on IV abx for PNA. Pulmonology consulted. Will continue to monitor.

## 2020-08-31 ENCOUNTER — APPOINTMENT (OUTPATIENT)
Dept: CT IMAGING | Age: 70
DRG: 871 | End: 2020-08-31
Payer: MEDICARE

## 2020-08-31 ENCOUNTER — APPOINTMENT (OUTPATIENT)
Dept: MRI IMAGING | Age: 70
DRG: 871 | End: 2020-08-31
Payer: MEDICARE

## 2020-08-31 LAB
ANION GAP SERPL CALCULATED.3IONS-SCNC: 11 MMOL/L (ref 3–16)
BASOPHILS ABSOLUTE: 0 K/UL (ref 0–0.2)
BASOPHILS RELATIVE PERCENT: 0.1 %
BUN BLDV-MCNC: 21 MG/DL (ref 7–20)
CALCIUM SERPL-MCNC: 8.7 MG/DL (ref 8.3–10.6)
CHLORIDE BLD-SCNC: 103 MMOL/L (ref 99–110)
CO2: 21 MMOL/L (ref 21–32)
CREAT SERPL-MCNC: 1.7 MG/DL (ref 0.6–1.2)
EOSINOPHILS ABSOLUTE: 0.3 K/UL (ref 0–0.6)
EOSINOPHILS RELATIVE PERCENT: 1.3 %
GFR AFRICAN AMERICAN: 36
GFR NON-AFRICAN AMERICAN: 30
GLUCOSE BLD-MCNC: 136 MG/DL (ref 70–99)
HCT VFR BLD CALC: 29.3 % (ref 36–48)
HEMATOLOGY PATH CONSULT: NO
HEMOGLOBIN: 9.1 G/DL (ref 12–16)
LV EF: 65 %
LVEF MODALITY: NORMAL
LYMPHOCYTES ABSOLUTE: 2.2 K/UL (ref 1–5.1)
LYMPHOCYTES RELATIVE PERCENT: 8.6 %
MCH RBC QN AUTO: 19.7 PG (ref 26–34)
MCHC RBC AUTO-ENTMCNC: 31.1 G/DL (ref 31–36)
MCV RBC AUTO: 63.5 FL (ref 80–100)
MONOCYTES ABSOLUTE: 1.6 K/UL (ref 0–1.3)
MONOCYTES RELATIVE PERCENT: 6.3 %
NEUTROPHILS ABSOLUTE: 21.6 K/UL (ref 1.7–7.7)
NEUTROPHILS RELATIVE PERCENT: 83.7 %
PDW BLD-RTO: 14.6 % (ref 12.4–15.4)
PLATELET # BLD: 363 K/UL (ref 135–450)
PMV BLD AUTO: 7.5 FL (ref 5–10.5)
POTASSIUM REFLEX MAGNESIUM: 3.9 MMOL/L (ref 3.5–5.1)
RBC # BLD: 4.62 M/UL (ref 4–5.2)
SODIUM BLD-SCNC: 135 MMOL/L (ref 136–145)
WBC # BLD: 25.8 K/UL (ref 4–11)

## 2020-08-31 PROCEDURE — 70551 MRI BRAIN STEM W/O DYE: CPT

## 2020-08-31 PROCEDURE — 99233 SBSQ HOSP IP/OBS HIGH 50: CPT | Performed by: INTERNAL MEDICINE

## 2020-08-31 PROCEDURE — 93306 TTE W/DOPPLER COMPLETE: CPT

## 2020-08-31 PROCEDURE — 93880 EXTRACRANIAL BILAT STUDY: CPT

## 2020-08-31 PROCEDURE — 6370000000 HC RX 637 (ALT 250 FOR IP): Performed by: INTERNAL MEDICINE

## 2020-08-31 PROCEDURE — 80048 BASIC METABOLIC PNL TOTAL CA: CPT

## 2020-08-31 PROCEDURE — 2700000000 HC OXYGEN THERAPY PER DAY

## 2020-08-31 PROCEDURE — 2580000003 HC RX 258: Performed by: INTERNAL MEDICINE

## 2020-08-31 PROCEDURE — 6360000002 HC RX W HCPCS: Performed by: INTERNAL MEDICINE

## 2020-08-31 PROCEDURE — 99232 SBSQ HOSP IP/OBS MODERATE 35: CPT | Performed by: PSYCHIATRY & NEUROLOGY

## 2020-08-31 PROCEDURE — 74176 CT ABD & PELVIS W/O CONTRAST: CPT

## 2020-08-31 PROCEDURE — 2500000003 HC RX 250 WO HCPCS: Performed by: INTERNAL MEDICINE

## 2020-08-31 PROCEDURE — 2060000000 HC ICU INTERMEDIATE R&B

## 2020-08-31 PROCEDURE — 99223 1ST HOSP IP/OBS HIGH 75: CPT | Performed by: INTERNAL MEDICINE

## 2020-08-31 PROCEDURE — 36415 COLL VENOUS BLD VENIPUNCTURE: CPT

## 2020-08-31 PROCEDURE — 85025 COMPLETE CBC W/AUTO DIFF WBC: CPT

## 2020-08-31 PROCEDURE — 94640 AIRWAY INHALATION TREATMENT: CPT

## 2020-08-31 PROCEDURE — 94761 N-INVAS EAR/PLS OXIMETRY MLT: CPT

## 2020-08-31 RX ORDER — SENNA AND DOCUSATE SODIUM 50; 8.6 MG/1; MG/1
2 TABLET, FILM COATED ORAL DAILY
Status: DISCONTINUED | OUTPATIENT
Start: 2020-08-31 | End: 2020-09-06 | Stop reason: HOSPADM

## 2020-08-31 RX ORDER — BISACODYL 10 MG
10 SUPPOSITORY, RECTAL RECTAL ONCE
Status: COMPLETED | OUTPATIENT
Start: 2020-08-31 | End: 2020-08-31

## 2020-08-31 RX ORDER — BISACODYL 10 MG
10 SUPPOSITORY, RECTAL RECTAL DAILY PRN
Status: DISCONTINUED | OUTPATIENT
Start: 2020-08-31 | End: 2020-09-06 | Stop reason: HOSPADM

## 2020-08-31 RX ADMIN — IPRATROPIUM BROMIDE AND ALBUTEROL SULFATE 1 AMPULE: .5; 3 SOLUTION RESPIRATORY (INHALATION) at 15:00

## 2020-08-31 RX ADMIN — POLYETHYLENE GLYCOL 3350 17 G: 17 POWDER, FOR SOLUTION ORAL at 16:55

## 2020-08-31 RX ADMIN — PIPERACILLIN AND TAZOBACTAM 3.38 G: 3; .375 INJECTION, POWDER, LYOPHILIZED, FOR SOLUTION INTRAVENOUS at 21:54

## 2020-08-31 RX ADMIN — STANDARDIZED SENNA CONCENTRATE AND DOCUSATE SODIUM 2 TABLET: 8.6; 5 TABLET ORAL at 13:20

## 2020-08-31 RX ADMIN — CEFEPIME HYDROCHLORIDE 1 G: 1 INJECTION, POWDER, FOR SOLUTION INTRAMUSCULAR; INTRAVENOUS at 02:01

## 2020-08-31 RX ADMIN — PIPERACILLIN AND TAZOBACTAM 3.38 G: 3; .375 INJECTION, POWDER, LYOPHILIZED, FOR SOLUTION INTRAVENOUS at 13:53

## 2020-08-31 RX ADMIN — Medication 10 ML: at 21:39

## 2020-08-31 RX ADMIN — BISACODYL 10 MG: 10 SUPPOSITORY RECTAL at 23:29

## 2020-08-31 RX ADMIN — METRONIDAZOLE 500 MG: 500 INJECTION, SOLUTION INTRAVENOUS at 06:17

## 2020-08-31 RX ADMIN — Medication 10 ML: at 10:36

## 2020-08-31 RX ADMIN — APIXABAN 5 MG: 5 TABLET, FILM COATED ORAL at 21:10

## 2020-08-31 RX ADMIN — DESMOPRESSIN ACETATE 40 MG: 0.2 TABLET ORAL at 21:10

## 2020-08-31 RX ADMIN — PREDNISONE 20 MG: 20 TABLET ORAL at 10:36

## 2020-08-31 RX ADMIN — LINEZOLID 600 MG: 600 INJECTION, SOLUTION INTRAVENOUS at 16:56

## 2020-08-31 RX ADMIN — GUAIFENESIN AND CODEINE PHOSPHATE 10 ML: 100; 10 SOLUTION ORAL at 10:46

## 2020-08-31 RX ADMIN — IPRATROPIUM BROMIDE AND ALBUTEROL SULFATE 1 AMPULE: .5; 3 SOLUTION RESPIRATORY (INHALATION) at 12:05

## 2020-08-31 RX ADMIN — MAGNESIUM CITRATE 296 ML: 1.75 LIQUID ORAL at 23:29

## 2020-08-31 RX ADMIN — AMITRIPTYLINE HYDROCHLORIDE 75 MG: 25 TABLET, FILM COATED ORAL at 21:10

## 2020-08-31 ASSESSMENT — ENCOUNTER SYMPTOMS
CHOKING: 0
APNEA: 0
CHEST TIGHTNESS: 0
STRIDOR: 0
EYE DISCHARGE: 0
BLOOD IN STOOL: 0
NAUSEA: 0
FACIAL SWELLING: 0
TROUBLE SWALLOWING: 0
DIARRHEA: 0
PHOTOPHOBIA: 0
RHINORRHEA: 0
SHORTNESS OF BREATH: 1
EYE REDNESS: 0
COLOR CHANGE: 0
COUGH: 1
ABDOMINAL PAIN: 0

## 2020-08-31 ASSESSMENT — PAIN DESCRIPTION - LOCATION: LOCATION: ABDOMEN

## 2020-08-31 ASSESSMENT — PAIN DESCRIPTION - DESCRIPTORS: DESCRIPTORS: TENDER;SHARP

## 2020-08-31 ASSESSMENT — PAIN SCALES - GENERAL
PAINLEVEL_OUTOF10: 0
PAINLEVEL_OUTOF10: 0
PAINLEVEL_OUTOF10: 9
PAINLEVEL_OUTOF10: 0
PAINLEVEL_OUTOF10: 9

## 2020-08-31 ASSESSMENT — PAIN DESCRIPTION - PAIN TYPE: TYPE: ACUTE PAIN

## 2020-08-31 ASSESSMENT — PAIN DESCRIPTION - ORIENTATION: ORIENTATION: LEFT;LOWER

## 2020-08-31 NOTE — PROGRESS NOTES
[facility] PROGRESS NOTE    8/31/2020 10:34 AM        Name: Alyssia Fulton . Admitted: 8/28/2020  Primary Care Provider: Rohith Panda MD (Tel: [phone])    Brief Course:   Patient is a 67-year-old -American female with history of COPD, Crohn's disease, CKD-3, depression, rheumatoid arthritis, history of DVT/PE, osteoporosis who presented with increased shortness of breath, dry cough and s/p fall on the morning of admission. She was noted to be hypoxic with oxygen saturation in mid 80s requiring increased O2 supplementation. Chest x-ray was noted for bilateral peripheral airspace disease suggestive of atypical/viral pneumonia. She was found to have sepsis due to bilateral pleural pneumonia, possible aspiration pneumonia, acute on chronic kidney injury, acute on chronic respiratory failure as well as mechanical fall at home.     CC: Worsening shortness of breath, dry cough, s/p fall at home  Subjective: No acute events overnight. Patient continues to report respiratory distress. Constipated for the last 2 to 3 days.      Reviewed interval ancillary notes    Current Medications  ipratropium-albuterol (DUONEB) nebulizer solution 1 ampule, Q4H WA  guaiFENesin-codeine (GUAIFENESIN AC) 100-10 MG/5ML liquid 10 mL, Q4H PRN  budesonide-formoterol (SYMBICORT) 160-4.5 MCG/ACT inhaler 2 puff, BID  tiotropium (SPIRIVA RESPIMAT) 2.5 MCG/ACT inhaler 2 puff, Daily  metronidazole (FLAGYL) 500 mg in NaCl 100 mL IVPB premix, Q8H  predniSONE (DELTASONE) tablet 20 mg, Daily  cefepime (MAXIPIME) 1 g IVPB minibag, Q12H  linezolid (ZYVOX) IVPB 600 mg, Q12H  0.9 % sodium chloride bolus, Once  sodium chloride flush 0.9 % injection 10 mL, 2 times per day  sodium chloride flush 0.9 % injection 10 mL, PRN  acetaminophen (TYLENOL) tablet 650 mg, Q6H PRN    Or  acetaminophen (TYLENOL) suppository 650 mg, Q6H PRN  polyethylene glycol (GLYCOLAX) packet 17 g, Daily PRN  promethazine (PHENERGAN) tablet 12.5 mg, Q6H PRN    Or  ondansetron (ZOFRAN) injection 4 mg, Q6H PRN  amitriptyline (ELAVIL) tablet 75 mg, Nightly  apixaban (ELIQUIS) tablet 5 mg, BID  atorvastatin (LIPITOR) tablet 40 mg, Nightly  labetalol (NORMODYNE;TRANDATE) injection 10 mg, Q4H PRN  albuterol sulfate  (90 Base) MCG/ACT inhaler 2 puff, Q2H PRN        Objective:  BP (!) 131/54   Pulse 109   Temp 99.2 °F (37.3 °C) (Oral)   Resp 22   Ht 5' (1.524 m)   Wt 152 lb 14.4 oz (69.4 kg)   SpO2 95%   BMI 29.86 kg/m²   No intake or output data in the 24 hours ending 08/31/20 1034   Wt Readings from Last 3 Encounters:   08/31/20 152 lb 14.4 oz (69.4 kg)   08/13/20 147 lb (66.7 kg)   03/25/20 133 lb (60.3 kg)       General appearance: Elderly -American female, in mild respiratory distress, on O2 via NC  Cardiovascular: Regular rhythm, normal S1, S2. No murmur. No edema in lower extremities  Respiratory: on 3L O2 via NC, using accessory muscles. Good inspiratory effort. Clear to auscultation bilaterally, no wheeze or crackles. GI: Abdomen soft, no tenderness, not distended, normal bowel sounds  Musculoskeletal: No cyanosis in digits, neck supple  Neurology: CN 2-12 grossly intact. No speech or motor deficits  Psych: Normal affect. Alert and oriented in time, place and person  Skin: Warm, dry, normal turgor  Extremity exam shows brisk capillary refill.   Peripheral pulses are palpable in lower extremities     Labs and Tests:  CBC:   Recent Labs     08/28/20  1052 08/29/20 0427 08/30/20 0422   WBC 28.2* 34.7* 32.8*   HGB 10.2* 9.8* 9.8*    272 299     BMP:    Recent Labs     08/28/20  1052 08/29/20  0427 08/30/20 0422   * 134* 136   K 3.2* 4.4 4.1   CL 99 103 105   CO2 18* 17* 19*   BUN 31* 35* 26*   CREATININE 2.2* 2.0* 1.8*   GLUCOSE 113* 135* 109*     Hepatic:   Recent Labs     08/28/20  1052 08/29/20  0427   AST 8* 9*   ALT 7* 8*   BILITOT 0.7 0.3 ALKPHOS 80 102     VL DUP CAROTID BILATERAL         CT CHEST WO CONTRAST   Final Result   Significant worsening in size of the left upper lobe pneumonia in 2 days. Moderate left pleural effusion now present with mild atelectasis. Severe   emphysematous changes. No CHF. XR CHEST (2 VW)   Final Result   Progressive consolidation within the left upper lobe consistent with   pneumonia. Increased dependent left lower lobe opacification and effusion. Improved aeration at the right lung base. CT CHEST WO CONTRAST   Final Result   1. Left upper lobe pneumonia. Recommend chest radiograph in 8 weeks to   confirm resolution. 2. Trace left pleural effusion. CT HEAD WO CONTRAST   Final Result   1. New age-indeterminate infarct within the right internal capsule. Recommend further evaluation with MRI of the head. XR CHEST PORTABLE   Final Result   Bilateral peripheral airspace disease suggestive of atypical or viral   pneumonia. MRI BRAIN WO CONTRAST    (Results Pending)       Problem List  Active Problems:    Acute on chronic respiratory failure with hypoxia (HCC)    Pneumonia due to organism    Bacterial pneumonia    Cerebrovascular accident (CVA) ([address]    MARIS (acute kidney injury) ([facility]    Suspected COVID-19 virus infection  Resolved Problems:    * No resolved hospital problems. *       Assessment & Plan:   Acute on chronic respiratory failure with hypoxia: COVID-19 not detected. Pulmonology was consulted. On treatment for bacterial/aspiration pneumonia with IV vancomycin, cefepime and Flagyl. On optimal management of COPD -duo nebs, Spiriva, Symbicort, prednisone. Infectious disease service consulted as per pulmonology recommendation. Continue O2 supplementation and wean off to baseline as tolerated with target saturations greater than 90%.      Sepsis due to Bacterial pneumonia:    Sinus tachycardia with underlying pulmonary issues. WBC count trending down.   CT chest with ADAM infiltrate. Elevated procalcitonin. On IV antibiotic therapy as mentioned above.     Chronic steroid use: On prednisone 20 mg po daily. Stable BP and hemodynamics.     Cerebrovascular accident (CVA): Patient had a fall at home. CT head was concerning for possible internal capsule stroke. Neurology consulted. We will continue anticoagulation, statin. Pending MRI brain and carotid Dopplers.      Acute kidney injury: Improving renal function with IV hydration.     IV Access: Peripheral IV  Carreon: None  Diet: DIET GENERAL;  Code:Full Code  DVT PPX on Eliquis  Disposition - pending acute issues.       Sharona Royal MD   8/31/2020 10:34 AM

## 2020-08-31 NOTE — PROGRESS NOTES
 ipratropium-albuterol (DUONEB) nebulizer solution 1 ampule  1 ampule Inhalation Q4H WA Esteban Clancy MD   1 ampule at 08/31/20 1205    guaiFENesin-codeine (GUAIFENESIN AC) 100-10 MG/5ML liquid 10 mL  10 mL Oral Q4H PRN Esteban Clancy MD   10 mL at 08/31/20 1046    budesonide-formoterol (SYMBICORT) 160-4.5 MCG/ACT inhaler 2 puff  2 puff Inhalation BID Ashanti Pineda MD   2 puff at 08/30/20 2020    tiotropium (SPIRIVA RESPIMAT) 2.5 MCG/ACT inhaler 2 puff  2 puff Inhalation Daily Ashanti Pineda MD        metronidazole (FLAGYL) 500 mg in NaCl 100 mL IVPB premix  500 mg Intravenous Q8H Ashanti Pineda MD   Stopped at 08/31/20 3662    predniSONE (DELTASONE) tablet 20 mg  20 mg Oral Daily Esteban Clancy MD   20 mg at 08/31/20 1036    linezolid (ZYVOX) IVPB 600 mg  600 mg Intravenous Q12H Sidney Kruger MD   Stopped at 08/30/20 2042    0.9 % sodium chloride bolus  30 mL/kg Intravenous Once Sidney Kruger MD        sodium chloride flush 0.9 % injection 10 mL  10 mL Intravenous 2 times per day Sidney Kruger MD   10 mL at 08/31/20 1036    sodium chloride flush 0.9 % injection 10 mL  10 mL Intravenous PRN Sidney Kruger MD        acetaminophen (TYLENOL) tablet 650 mg  650 mg Oral Q6H PRN Sidney Kruger MD   650 mg at 08/30/20 4859    Or    acetaminophen (TYLENOL) suppository 650 mg  650 mg Rectal Q6H PRN Sidney Kruger MD        polyethylene glycol (GLYCOLAX) packet 17 g  17 g Oral Daily PRN Sidney Kruger MD        promethazine (PHENERGAN) tablet 12.5 mg  12.5 mg Oral Q6H PRN Sidney Kruger MD        Or    ondansetron (ZOFRAN) injection 4 mg  4 mg Intravenous Q6H PRN Sidney Kruger MD        amitriptyline (ELAVIL) tablet 75 mg  75 mg Oral Nightly Sidney Kruger MD   75 mg at 08/30/20 2134    apixaban (ELIQUIS) tablet 5 mg  5 mg Oral BID Sidney Kruger MD   5 mg at 08/30/20 2043    atorvastatin (LIPITOR) tablet 40 mg  40 mg Oral Nightly Abidemi B Marcela Samson MD   40 mg at 08/30/20 2043    labetalol (NORMODYNE;TRANDATE) injection 10 mg  10 mg Intravenous Q4H PRN Jessie Graham MD        albuterol sulfate  (90 Base) MCG/ACT inhaler 2 puff  2 puff Inhalation Q2H PRN Jessie Graham MD         No Known Allergies   reports that she quit smoking about 5 years ago. Her smoking use included cigarettes. She has a 7.50 pack-year smoking history. She has never used smokeless tobacco. She reports that she does not drink alcohol or use drugs. Objective:  Exam:   Constitutional:   Vitals:    08/31/20 0337 08/31/20 1000 08/31/20 1030 08/31/20 1206   BP: (!) 131/54 (!) 131/54 (!) 131/54    Pulse: 109 117 117    Resp: 22 18 22 22   Temp: 99.2 °F (37.3 °C) 100.1 °F (37.8 °C) 100.1 °F (37.8 °C)    TempSrc: Oral Oral Oral    SpO2: 95% 91% 91% 93%   Weight: 152 lb 14.4 oz (69.4 kg)      Height:         General appearance:  Normal development and appear in no acute distress. Eye: No icterus. Neck: supple  Cardiovascular:  No lower leg edema with good pulsation. Mental Status:   Oriented to person, place, problem, and time. Memory: Good immediate recall. Intact remote memory  Normal attention span and concentration. Language: intact naming, repeating and fluency   Good fund of Knowledge. Cranial Nerves:   II:   Pupils: equal, round, reactive to light  III,IV,VI: Extra Ocular Movements are intact. No nystagmus  V: Facial sensation is intact  VII: Facial strength and movements: intact and symmetric  IX: Palate elevation is symmetric  XI: Shoulder shrug is intact  XII: Tongue movements are normal  Musculoskeletal: Generalized diffuse weakness 4/5 with poor effort   tone: Normal tone. Reflexes: 2+ in the arms 1+ in the leg  Coordination: no pronator drift, no dysmetria with FNF. Normal REM. Sensation: normal to all modalities in both arms and legs.   Gait/Posture: Not tested due to generalized weakness   no change compared to yesterday. Data:  LABS:   Lab Results   Component Value Date     08/31/2020    K 3.9 08/31/2020     08/31/2020    CO2 21 08/31/2020    BUN 21 08/31/2020    CREATININE 1.7 08/31/2020    GFRAA 36 08/31/2020    GFRAA >60 04/26/2013    LABGLOM 30 08/31/2020    GLUCOSE 136 08/31/2020    PHOS 3.3 08/29/2020    MG 2.40 08/29/2020    CALCIUM 8.7 08/31/2020     Lab Results   Component Value Date    WBC 25.8 08/31/2020    RBC 4.62 08/31/2020    HGB 9.1 08/31/2020    HCT 29.3 08/31/2020    MCV 63.5 08/31/2020    RDW 14.6 08/31/2020     08/31/2020     Lab Results   Component Value Date    INR 1.40 (H) 08/28/2020    PROTIME 16.3 (H) 08/28/2020       I reviewed blood testing and other test results and discussed results with the patient      Impression: No change  Acute encephalopathy. Likely multifactorial metabolic encephalopathy  Possible new ischemic right capsule stroke although exam is nonfocal, awaiting MRI. Sepsis secondary to pneumonia  History of PE on Eliquis  Hyponatremia  Leukocytosis  Hypertension  Hyperlipidemia  Chronic anemia  Acute on chronic respiratory failure, improved      Recommendation     Continue current supportive care  Awaiting MRI although less likely new ischemic stroke. Antibiotics  Respiratory support  Statin  Eliquis  Blood pressure monitor and continue current medication  Telemetry  Prednisone and watch blood sugar level with insulin sliding scale  Stroke prevention was discussed with the patient  PT and OT  Speech evaluation  Nothing to add from neurology at this point  We will follow PRN for now. Usama Cespedes MD   [phone]      This dictation was generated by voice recognition computer software. Although all attempts are made to edit the dictation for accuracy, there may be errors in the transcription that are not intended.

## 2020-08-31 NOTE — PROGRESS NOTES
Pt currently in MRI. Pt will be transferred to 3T, 3373. Belongings moved to room 3373 and report given to nurse Qamar Reza.

## 2020-08-31 NOTE — PROGRESS NOTES
P Pulmonary and Critical Care  Progress note      Reason for Consult: Pneumonia, COPD, emphysema  Requesting Physician: Dr Arjun Byrd:   Lázaro Fields / HPI:                The patient is a 77 y.o. female with significant past medical history of:      Diagnosis Date    Bullous emphysema ([address]     CKD (chronic kidney disease) stage 3, GFR 30-59 ml/min ([address]     Clostridium difficile carrier 01/21/2019    COPD (chronic obstructive pulmonary disease) ([address]     PFT done 7 years ago   Melyssaalirioharshad Donatodamian Crohn's disease ([address]     Crohn's disease    Depression 1/30/2011    pt states resolved as of 3-26-12    DVT (deep venous thrombosis) ([address]     2012; first ocurrence     Hiatal hernia     Hx of blood clots     Kidney disease     Kidney insufficiency     Osteoporosis     Peripheral neuropathy     neuropathy in legs    Pneumonia     Postmenopausal     LMP in  42's    Pulmonary embolism ([address]     2012; first ocurrence    Sepsis ([address]     Syringomyelia ([address]      Interval history: Patient continues to have difficulty with shortness of breath and chest congestion. Also having some pleuritic left-sided chest pain especially with coughing. Chest x-ray yesterday showed worsening infiltrate. This is confirmed by repeat CT imaging as well. I did add Flagyl to her routine yesterday. There is no guiding culture data. Past Surgical History:        Procedure Laterality Date    ABDOMEN SURGERY      BACK SURGERY      shunt to drain CSF    BIOPSY SHOULDER Left 2/27/2019    EXCISION OF LEFT SHOULDER MASS performed by Danny Galvan MD at [address] [place] [address]- remove fluid ? V-P SHUNT    CHOLECYSTECTOMY      COLON SURGERY      resection X2    COLONOSCOPY  12/5/11    BIOPSY AND POLYPECTOMY    COLONOSCOPY  4-2-2012    and ablation ERBE/APC    SHOULDER SURGERY      L      Current Medications:    Current Facility-Administered Medications: [START ON 9/1/2020] cefepime (MAXIPIME) 2 g Cholesterol Mother     Early Death Mother 36        MI    Heart Disease Father     High Blood Pressure Father     High Cholesterol Father     Stroke Father 79    High Blood Pressure Sister     High Blood Pressure Brother      REVIEW OF SYSTEMS:    CONSTITUTIONAL:  negative for fevers, chills, diaphoresis, activity change, appetite change, fatigue, night sweats and unexpected weight change. EYES:  negative for blurred vision, eye discharge, visual disturbance and icterus  HEENT:  negative for hearing loss, tinnitus, ear drainage, sinus pressure, nasal congestion, epistaxis and snoring  RESPIRATORY:  See HPI  CARDIOVASCULAR:  negative for chest pain, palpitations, exertional chest pressure/discomfort, edema, syncope  GASTROINTESTINAL:  negative for nausea, vomiting, diarrhea, constipation, blood in stool and abdominal pain  GENITOURINARY:  negative for frequency, dysuria, urinary incontinence, decreased urine volume, and hematuria  HEMATOLOGIC/LYMPHATIC:  negative for easy bruising, bleeding and lymphadenopathy  ALLERGIC/IMMUNOLOGIC:  negative for recurrent infections, angioedema, anaphylaxis and drug reactions  ENDOCRINE:  negative for weight changes and diabetic symptoms including polyuria, polydipsia and polyphagia  MUSCULOSKELETAL:  negative for  pain, joint swelling, decreased range of motion and muscle weakness  NEUROLOGICAL:  negative for headaches, slurred speech, unilateral weakness  PSYCHIATRIC/BEHAVIORAL: negative for hallucinations, behavioral problems, confusion and agitation.      Objective:   PHYSICAL EXAM:      VITALS:  BP (!) 131/54   Pulse 117   Temp (!) 32.2 °F (0.1 °C) (Oral)   Resp 18   Ht 5' (1.524 m)   Wt 152 lb 14.4 oz (69.4 kg)   SpO2 91%   BMI 29.86 kg/m²      24HR INTAKE/OUTPUT:      Intake/Output Summary (Last 24 hours) at 8/31/2020 1152  Last data filed at 8/31/2020 1000  Gross per 24 hour   Intake --   Output 325 ml   Net -325 ml     CONSTITUTIONAL:  awake, alert, cooperative, no apparent distress, and appears stated age  NECK:  Supple, symmetrical, trachea midline, no adenopathy, thyroid symmetric, not enlarged and no tenderness, skin normal  LUNGS:  no increased work of breathing and diminished to auscultation. No accessory muscle use  CARDIOVASCULAR: S1 and S2, no edema and no JVD  ABDOMEN:  normal bowel sounds, non-distended and no masses palpated, and no tenderness to palpation. No hepatospleenomegaly  LYMPHADENOPATHY:  no axillary or supraclavicular adenopathy. No cervical adnenopathy  PSYCHIATRIC: Oriented to person place and time. No obvious depression or anxiety. MUSCULOSKELETAL: No obvious misalignment or effusion of the joints. No clubbing, cyanosis of the digits. SKIN:  normal skin color, texture, turgor and no redness, warmth, or swelling. No palpable nodules    DATA:    Old records have been reviewed    CBC:  Recent Labs     08/29/20 0427 08/30/20 0422   WBC 34.7* 32.8*   RBC 4.81 4.80   HGB 9.8* 9.8*   HCT 30.6* 31.2*    299   MCV 63.6* 65.0*   MCH 20.4* 20.4*   MCHC 32.0 31.4   RDW 15.3 14.9   BANDSPCT 4  --       BMP:  Recent Labs     08/29/20 0427 08/30/20 0422   * 136   K 4.4 4.1    105   CO2 17* 19*   BUN 35* 26*   CREATININE 2.0* 1.8*   CALCIUM 8.9 8.7   GLUCOSE 135* 109*      ABG:  No results for input(s): PHART, RME8TVM, PO2ART, MUX2PNB, Z3AGFEJT, BEART in the last 72 hours. Lab Results   Component Value Date    BNP <15 08/06/2011     Lab Results   Component Value Date    CKTOTAL 29 08/07/2011    CKMB 0.88 08/07/2011    TROPONINI <0.01 08/29/2020       Cultures:     Abx:    Radiology Review:  All pertinent images / reports were reviewed as a part of this visit. Assessment:     1. Acute on chronic hypoxemic respiratory failure  2. Left upper lobe pneumonia  3. COPD severe  4. Centrilobular emphysema  5. GERD    Plan:     1. I have reviewed laboratories, medical records and images for this visit  2.  Repeat imaging yesterday

## 2020-08-31 NOTE — CARE COORDINATION
Received call from pt's COA  Alejandro Zuniga, [phone]. SW called back and left a message for her informing of pt's need for a new window A/C unit in her apartment.     Electronically signed by NAEEM Waggoner, ESTRELLA on 8/31/2020 at 12:29 PM

## 2020-08-31 NOTE — PROGRESS NOTES
Occupational Therapy  Frederick Evans      Attempted to see pt for OT evaluation. Pt currently TRICIA. Will attempt later as schedule allows. Thank you.  Shantanu Lopez, OTR/L, [id number]

## 2020-08-31 NOTE — CONSULTS
Infectious Diseases   Consult Note        Admission Date: 8/28/2020  Hospital Day: Hospital Day: 4   Attending: Regina Acosta MD  Date of service: 8/31/20     Reason for admission: Bacterial pneumonia [J15.9]  Bacterial pneumonia [J15.9]    Chief complaint/ Reason for consult: Sepsis, multifocal left-sided pneumonia    Microbiology:        I have reviewed allavailable micro lab data and cultures    · Blood culture (2/2) - collected on 8/28/2020: in process      Antibiotics and immunizations:       Current antibiotics: All antibiotics and their doses were reviewed by me    Recent Abx Admin                   metronidazole (FLAGYL) 500 mg in NaCl 100 mL IVPB premix (mg) 500 mg New Bag 08/31/20 0617     500 mg New Bag 08/30/20 2236     500 mg New Bag  1440    cefepime (MAXIPIME) 1 g IVPB minibag (g) 1 g New Bag 08/31/20 0201     1 g New Bag 08/30/20 1355    linezolid (ZYVOX) IVPB 600 mg (mg) 600 mg New Bag 08/30/20 1838                  Immunization History: All immunization history was reviewed by me today. Immunization History   Administered Date(s) Administered    DT (pediatric) 09/25/2010    Influenza Virus Vaccine 10/01/2012, 10/20/2015    Influenza Whole 10/01/2011    Influenza, High Dose (Fluzone 65 yrs and older) 10/06/2014, 10/19/2016, 11/16/2017, 11/05/2018    Influenza, Triv, inactivated, subunit, adjuvanted, IM (Fluad 65 yrs and older) 11/13/2019    Pneumococcal Conjugate 13-valent ([id number]) 06/11/2015    Pneumococcal Conjugate 7-valent (Prevnar7) 02/07/2005, 10/01/2011    Pneumococcal Polysaccharide ([id number]) 06/15/2016    Zoster Live (Zostavax) 07/01/2013       Known drug allergies: All allergies were reviewed and updated    No Known Allergies    Social history:     Social History:  All social andepidemiologic history was reviewed and updated by me today as needed. · Tobacco use:   reports that she quit smoking about 5 years ago. Her smoking use included cigarettes.  She has a 7.50 pack-year smoking history. She has never used smokeless tobacco.  · Alcohol use:   reports no history of alcohol use. · Currently lives in: [place] [id number]  ·  reports no history of drug use. Assessment:     The patient is a 77 y.o. old female who  has a past medical history of Bullous emphysema ([facility] CKD (chronic kidney disease) stage 3, GFR 30-59 ml/min ([facility] Clostridium difficile carrier (01/21/2019), COPD (chronic obstructive pulmonary disease) ([facility] Crohn's disease ([facility] Depression (1/30/2011), DVT (deep venous thrombosis) ([facility] Hiatal hernia, blood clots, Kidney disease, Kidney insufficiency, Osteoporosis, Peripheral neuropathy, Pneumonia, Postmenopausal, Pulmonary embolism ([facility] Sepsis ([facility] and Syringomyelia ([facility] with following problems:    · Sepsis with worsening fever, persistent bandemia, acute kidney injury on chronic kidney disease  · Left-sided multifocal pneumonia with necrotizing infiltrate in the left upper lobe  · Elevated procalcitonin of 2.17 on 8/28/2020  · Chronic respiratory failure with hypoxia  · Bilateral chronic emphysema  · Crohn's disease  · Long-term steroid use  · History of DVT  · Chronic small vessel CNS disease on MRI of the brain  · History of depression  · Overweight due to excess calorie intake : Body mass index is 29.86 kg/m². · Ex-smoker      Discussion:      The patient is currently on 3 to oxygen via nasal cannula. I reviewed the images of CT scan of her chest.  She has bilateral emphysema and has dense infiltrates in the left upper lobe and left lower lobe area. She has some honeycombing in the left upper lobe area along with necrotizing infiltrate in the left upper lobe. Does not look like a typical cavity. She had a COVID 19 PCR test done on admission which was negative. Urine Legionella and pneumococcal antigens were negative. Blood cultures were done on admission and was negative.   No sputum culture in process at this time    Plan: Diagnostic Workup:    · Will order nasal MRSA screen  · Please see if her sputum can be sent for culture. Otherwise, may need a diagnostic bronchoscopy  · Respiratory or fungal process appears less likely. However, cannot be ruled out completely  · Will order urine and serum histoplasma antigen, fungal serologies for thoroughness. · We will order sputum pneumocystis PCR given long-term steroid use  · Continue to follow fever curve, WBC count and blood cultures  · Follow up on liverand renal functions closely    Antimicrobials:    · Will continue IV linezolid 600 mg every 12 hours. Platelet count 138,000  · Will stop cefepime and Flagyl and start IV Zosyn 3.375 g every 8 hour  · Continue oxygen support to maintain oxygen saturation above 92%  · Start oral probiotics twice daily  · Aspiration precautions  · We will follow-up on the culture results and clinical progress and will make further recommendations accordingly  · Will likely need several weeks of antibiotics, may need a diagnostic bronchoscopy  · DVT prophylaxis  · Continue to monitor her vitals closely  · Discussed the above plan with patient and RN       Drug Monitoring:    · Continue serial monitoring for antibiotic toxicity as follows: CBC, CMP  · Continue to watch for following: new or worsening fever, hypotension, hives, lip swelling and redness or purulence at vascular access sites. I/v access Management:    · Continue to monitor i.v access sites for erythema, induration, discharge or tenderness. · As always, continue efforts to minimizetubes/lines/drains as clinically appropriate to reduce chances of line associated infections. Current isolation precautions: There are no current isolations documented for this patient. Level of complexity of consult: High     Risk of Complications/Morbidity: High     · Illness(es)/ Infection present that pose threat to life/bodily function.    · There is potential for severe exacerbation of infection/side effects of treatment. · Therapy requires intensive monitoring for antimicrobial agent toxicity. Thank you for involving me in the care of your patient. I will continue to follow. If you have any additional questions, please do not hesitate to contact me. Subjective:     Presenting complaint in ER:     Chief Complaint   Patient presents with    Shortness of Breath     Pt to Er via FP squad form home with c/o SOP since tuesday, hx of COPD, no energy. sating 86% on 2L, on non rebreather at ER arrival. sattes took week to take home inhalers. HPI: Elisha Terrell is a 77 y.o. female patient, who was seen at the request of Dr. Dale Parsons MD.    History was obtained from chart review and the patient. The patient was admitted on 8/28/2020. I have been consulted to see the patient for above mentioned reason(s). The patient has multiple medical comorbidities, and presented to the ER for shortness of breath, COPD. Reportedly, her air conditioning went out but a month ago and she was using a portable air conditioner but most of her home was too hot. She was finding her bedroom to be hot enough to be able to sleep when she was sleeping in her living room in a chair. She started have increasing cough and congestion. Her symptoms were worsening and she decided to come to the ER. The patient is on long-term prednisone and is also on long-term anticoagulation with Eliquis. He was noted to be hypoxic by EMS with saturation of 86% on room air. She was put on high flow oxygen and was brought to the ER. She has a having pleuritic chest pain on the left side. In the ER, she was afebrile. White cell count was 28,200 that went up to 24,700 yesterday. The patient was given linezolid and cefepime and azithromycin admission and has been on linezolid and cefepime since then. She was started on IV Flagyl empirically yesterday. Her fever curve asleep at 200.1 today.   White cell reviewed. Medications Prior to Admission: amitriptyline (ELAVIL) 150 MG tablet, TAKE 0.5 TABLETS BY MOUTH NIGHTLY  predniSONE (DELTASONE) 10 MG tablet, TAKE 1 TABLET BY MOUTH EVERY DAY FOR 10 DAYS  albuterol sulfate  (90 Base) MCG/ACT inhaler, Inhale 2 puffs into the lungs every 6 hours as needed for Wheezing  ELIQUIS 5 MG TABS tablet, TAKE 1 TABLET BY MOUTH TWICE A DAY  Lift Chair MISC, by Other route Dx:J44.1, N18.4  potassium chloride (KLOR-CON) 10 MEQ extended release tablet, Take 1 tablet by mouth 3 times daily  ondansetron (ZOFRAN ODT) 4 MG disintegrating tablet, Take 1 tablet by mouth every 8 hours as needed for Nausea  budesonide-formoterol (SYMBICORT) 160-4.5 MCG/ACT AERO, Inhale 2 puffs into the lungs 2 times daily  tiotropium (SPIRIVA RESPIMAT) 2.5 MCG/ACT AERS inhaler, Inhale 2 puffs into the lungs daily  albuterol (PROVENTIL) (2.5 MG/3ML) 0.083% nebulizer solution, Take 3 mLs by nebulization every 6 hours as needed for Wheezing Dx: COPD      ICD-10: J44.9  Misc. Devices (BATH/SHOWER SEAT) MISC, Use as directed  alendronate (FOSAMAX) 70 MG tablet, Take 1 tablet by mouth every 7 days  albuterol sulfate  (90 Base) MCG/ACT inhaler, Inhale 2 puffs into the lungs every 6 hours as needed for Wheezing  omeprazole (PRILOSEC) 20 MG delayed release capsule, Take 20 mg by mouth Daily Takes as needed  Lift Chair MISC, by Does not apply route Please dispense a Lift Chair  guaiFENesin (MUCINEX) 600 MG extended release tablet, Take 1 tablet by mouth 2 times daily  Misc.  Devices (ROLLER WALKER) MISC, 1 each by Does not apply route daily Please dispense a walker with a seat  Adalimumab (HUMIRA) 20 MG/0.4ML KIT, Inject 1 vial into the skin every 14 days   [DISCONTINUED] tiZANidine (ZANAFLEX) 2 MG tablet, Take 1 tablet by mouth 3 times daily as needed (muscle spasm)  [DISCONTINUED] fluconazole (DIFLUCAN) 150 MG tablet, TAKE 1 TABLET BY MOUTH ONCE A WEEK FOR 6 DOSES  [DISCONTINUED] tiZANidine (ZANAFLEX) 4 4. 81 4.80 4.62   HGB 9.8* 9.8* 9.1*   HCT 30.6* 31.2* 29.3*    299 363   MCV 63.6* 65.0* 63.5*   MCH 20.4* 20.4* 19.7*   MCHC 32.0 31.4 31.1   RDW 15.3 14.9 14.6   BANDSPCT 4  --   --         BMP:  Recent Labs     08/29/20  0427 08/30/20  0422 08/31/20  1145   * 136 135*   K 4.4 4.1 3.9    105 103   CO2 17* 19* 21   BUN 35* 26* 21*   CREATININE 2.0* 1.8* 1.7*   CALCIUM 8.9 8.7 8.7   GLUCOSE 135* 109* 136*        Hepatic FunctionPanel:   Lab Results   Component Value Date    ALKPHOS 102 08/29/2020    ALT 8 08/29/2020    AST 9 08/29/2020    PROT 6.7 08/29/2020    PROT 7.1 11/13/2012    BILITOT 0.3 08/29/2020    BILIDIR <0.2 01/05/2020    IBILI see below 01/05/2020    LABALBU 2.5 08/29/2020       CPK:   Lab Results   Component Value Date    CKTOTAL 29 08/07/2011     ESR:   Lab Results   Component Value Date    SEDRATE 24 03/18/2019     CRP:   Lab Results   Component Value Date    CRP 2.1 03/18/2019         Imaging: All pertinent images and reports for the current visit were reviewed by meduring this visit. MRI BRAIN WO CONTRAST   Final Result   Mild chronic small vessel ischemic changes. No acute stroke, midline shift   or mass effect. VL DUP CAROTID BILATERAL   Final Result      CT CHEST WO CONTRAST   Final Result   Significant worsening in size of the left upper lobe pneumonia in 2 days. Moderate left pleural effusion now present with mild atelectasis. Severe   emphysematous changes. No CHF. XR CHEST (2 VW)   Final Result   Progressive consolidation within the left upper lobe consistent with   pneumonia. Increased dependent left lower lobe opacification and effusion. Improved aeration at the right lung base. CT CHEST WO CONTRAST   Final Result   1. Left upper lobe pneumonia. Recommend chest radiograph in 8 weeks to   confirm resolution. 2. Trace left pleural effusion. CT HEAD WO CONTRAST   Final Result   1.  New age-indeterminate infarct within the right internal capsule. Recommend further evaluation with MRI of the head. XR CHEST PORTABLE   Final Result   Bilateral peripheral airspace disease suggestive of atypical or viral   pneumonia. Outside records:    Labs, Microbiology, Radiology and pertinent results from Care everywhere, if available, were reviewed as a part ofthe consultation.       Problem list:       Patient Active Problem List   Diagnosis Code    Crohn disease ([address] K50.90    Depression F32.9    Osteoarthritis M19.90    Lupus anticoagulant disorder ([facility] D68.62    Dysphagia R13.10    Syringomyelia ([facility] G95.0    Gastritis and gastroduodenitis K29.70, K29.90    Skin ulcer of shoulder ([facility] L98.499    Shortness of breath R06.02    Anemia D64.9    Chronic emphysema syndrome ([facility] J43.9    Pulmonary fibrosis ([facility] J84.10    Chronic hypoxemic respiratory failure (HCC) J96.11    Hiatal hernia K44.9    COPD, severe (HCC) J44.9    Alpha thalassemia minor D56.3    Necrotizing pneumonia ([facility]) J85.0    Acute respiratory failure with hypoxia ([facility]) J96.01    Bronchiectasis with acute exacerbation ([facility]) J47.1    Hemoglobin C trait ([facility]) 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.2    Osteoporosis of multiple sites M81.0    Gastroesophageal reflux disease without esophagitis K21.9    Sepsis ([facility]) A41.9    Bilateral pulmonary embolism ([facility]) I26.99    Acute deep vein thrombosis (DVT) of distal vein of both lower extremities ([facility]) I82.4Z3    History of pulmonary embolism Z86.711    SOB (shortness of breath) R06.02    Wound of left shoulder S41.002A    Hx pulmonary embolism Z86.711    Multifocal pneumonia J18.9    COPD exacerbation (HCC) J44.1    Bacterial pneumonia J15.9    Cerebrovascular accident (CVA) ([facility] I63.9    MARIS (acute kidney injury) ([facility] N17.9    Suspected COVID-19 virus infection Z20.828    Dizziness R42    Elevated procalcitonin R79.89    Long term systemic steroid user Z79.52    History of DVT (deep vein thrombosis) Z86.718   

## 2020-09-01 ENCOUNTER — ANESTHESIA EVENT (OUTPATIENT)
Dept: ENDOSCOPY | Age: 70
DRG: 871 | End: 2020-09-01
Payer: MEDICARE

## 2020-09-01 LAB
ANION GAP SERPL CALCULATED.3IONS-SCNC: 15 MMOL/L (ref 3–16)
ANISOCYTOSIS: ABNORMAL
BANDED NEUTROPHILS RELATIVE PERCENT: 3 % (ref 0–7)
BASOPHILS ABSOLUTE: 0 K/UL (ref 0–0.2)
BASOPHILS RELATIVE PERCENT: 0 %
BLOOD CULTURE, ROUTINE: NORMAL
BUN BLDV-MCNC: 20 MG/DL (ref 7–20)
CALCIUM SERPL-MCNC: 8.9 MG/DL (ref 8.3–10.6)
CHLORIDE BLD-SCNC: 103 MMOL/L (ref 99–110)
CO2: 16 MMOL/L (ref 21–32)
CREAT SERPL-MCNC: 1.6 MG/DL (ref 0.6–1.2)
CULTURE, BLOOD 2: NORMAL
EOSINOPHILS ABSOLUTE: 0.3 K/UL (ref 0–0.6)
EOSINOPHILS RELATIVE PERCENT: 1 %
GFR AFRICAN AMERICAN: 39
GFR NON-AFRICAN AMERICAN: 32
GLUCOSE BLD-MCNC: 87 MG/DL (ref 70–99)
HCT VFR BLD CALC: 31.5 % (ref 36–48)
HEMOGLOBIN: 9.8 G/DL (ref 12–16)
HYPOCHROMIA: ABNORMAL
LYMPHOCYTES ABSOLUTE: 2.5 K/UL (ref 1–5.1)
LYMPHOCYTES RELATIVE PERCENT: 9 %
MCH RBC QN AUTO: 20 PG (ref 26–34)
MCHC RBC AUTO-ENTMCNC: 31 G/DL (ref 31–36)
MCV RBC AUTO: 64.7 FL (ref 80–100)
MICROCYTES: ABNORMAL
MONOCYTES ABSOLUTE: 0 K/UL (ref 0–1.3)
MONOCYTES RELATIVE PERCENT: 0 %
NEUTROPHILS ABSOLUTE: 25.1 K/UL (ref 1.7–7.7)
NEUTROPHILS RELATIVE PERCENT: 87 %
NUCLEATED RED BLOOD CELLS: 1 /100 WBC
NUCLEATED RED BLOOD CELLS: 1 /100 WBC
OVALOCYTES: ABNORMAL
PDW BLD-RTO: 15.3 % (ref 12.4–15.4)
PLATELET # BLD: 382 K/UL (ref 135–450)
PLATELET SLIDE REVIEW: ADEQUATE
PMV BLD AUTO: 7.3 FL (ref 5–10.5)
POLYCHROMASIA: ABNORMAL
POTASSIUM REFLEX MAGNESIUM: 4.6 MMOL/L (ref 3.5–5.1)
PROCALCITONIN: 1.48 NG/ML (ref 0–0.15)
RBC # BLD: 4.87 M/UL (ref 4–5.2)
SCHISTOCYTES: ABNORMAL
SLIDE REVIEW: ABNORMAL
SODIUM BLD-SCNC: 134 MMOL/L (ref 136–145)
WBC # BLD: 27.9 K/UL (ref 4–11)

## 2020-09-01 PROCEDURE — 6370000000 HC RX 637 (ALT 250 FOR IP): Performed by: INTERNAL MEDICINE

## 2020-09-01 PROCEDURE — 36415 COLL VENOUS BLD VENIPUNCTURE: CPT

## 2020-09-01 PROCEDURE — 99232 SBSQ HOSP IP/OBS MODERATE 35: CPT | Performed by: PSYCHIATRY & NEUROLOGY

## 2020-09-01 PROCEDURE — 86698 HISTOPLASMA ANTIBODY: CPT

## 2020-09-01 PROCEDURE — 94640 AIRWAY INHALATION TREATMENT: CPT

## 2020-09-01 PROCEDURE — 6360000002 HC RX W HCPCS: Performed by: INTERNAL MEDICINE

## 2020-09-01 PROCEDURE — 86606 ASPERGILLUS ANTIBODY: CPT

## 2020-09-01 PROCEDURE — 86635 COCCIDIOIDES ANTIBODY: CPT

## 2020-09-01 PROCEDURE — 99233 SBSQ HOSP IP/OBS HIGH 50: CPT | Performed by: INTERNAL MEDICINE

## 2020-09-01 PROCEDURE — 97535 SELF CARE MNGMENT TRAINING: CPT

## 2020-09-01 PROCEDURE — 85025 COMPLETE CBC W/AUTO DIFF WBC: CPT

## 2020-09-01 PROCEDURE — 94761 N-INVAS EAR/PLS OXIMETRY MLT: CPT

## 2020-09-01 PROCEDURE — 2060000000 HC ICU INTERMEDIATE R&B

## 2020-09-01 PROCEDURE — 2580000003 HC RX 258: Performed by: INTERNAL MEDICINE

## 2020-09-01 PROCEDURE — 84145 PROCALCITONIN (PCT): CPT

## 2020-09-01 PROCEDURE — 2700000000 HC OXYGEN THERAPY PER DAY

## 2020-09-01 PROCEDURE — 86612 BLASTOMYCES ANTIBODY: CPT

## 2020-09-01 PROCEDURE — 97165 OT EVAL LOW COMPLEX 30 MIN: CPT

## 2020-09-01 PROCEDURE — 87385 HISTOPLASMA CAPSUL AG IA: CPT

## 2020-09-01 PROCEDURE — 97116 GAIT TRAINING THERAPY: CPT

## 2020-09-01 PROCEDURE — 97530 THERAPEUTIC ACTIVITIES: CPT

## 2020-09-01 PROCEDURE — 97161 PT EVAL LOW COMPLEX 20 MIN: CPT

## 2020-09-01 PROCEDURE — 80048 BASIC METABOLIC PNL TOTAL CA: CPT

## 2020-09-01 RX ADMIN — LINEZOLID 600 MG: 600 INJECTION, SOLUTION INTRAVENOUS at 02:04

## 2020-09-01 RX ADMIN — PIPERACILLIN AND TAZOBACTAM 3.38 G: 3; .375 INJECTION, POWDER, LYOPHILIZED, FOR SOLUTION INTRAVENOUS at 05:42

## 2020-09-01 RX ADMIN — IPRATROPIUM BROMIDE AND ALBUTEROL SULFATE 1 AMPULE: .5; 3 SOLUTION RESPIRATORY (INHALATION) at 13:04

## 2020-09-01 RX ADMIN — PIPERACILLIN AND TAZOBACTAM 3.38 G: 3; .375 INJECTION, POWDER, LYOPHILIZED, FOR SOLUTION INTRAVENOUS at 21:44

## 2020-09-01 RX ADMIN — POLYETHYLENE GLYCOL 3350 17 G: 17 POWDER, FOR SOLUTION ORAL at 15:34

## 2020-09-01 RX ADMIN — Medication 10 ML: at 09:44

## 2020-09-01 RX ADMIN — Medication 2 PUFF: at 21:10

## 2020-09-01 RX ADMIN — TIOTROPIUM BROMIDE INHALATION SPRAY 2 PUFF: 3.12 SPRAY, METERED RESPIRATORY (INHALATION) at 13:04

## 2020-09-01 RX ADMIN — PIPERACILLIN AND TAZOBACTAM 3.38 G: 3; .375 INJECTION, POWDER, LYOPHILIZED, FOR SOLUTION INTRAVENOUS at 15:09

## 2020-09-01 RX ADMIN — LINEZOLID 600 MG: 600 INJECTION, SOLUTION INTRAVENOUS at 13:25

## 2020-09-01 RX ADMIN — Medication 2 PUFF: at 13:04

## 2020-09-01 RX ADMIN — STANDARDIZED SENNA CONCENTRATE AND DOCUSATE SODIUM 2 TABLET: 8.6; 5 TABLET ORAL at 09:44

## 2020-09-01 RX ADMIN — APIXABAN 5 MG: 5 TABLET, FILM COATED ORAL at 09:44

## 2020-09-01 RX ADMIN — PREDNISONE 20 MG: 20 TABLET ORAL at 09:44

## 2020-09-01 RX ADMIN — APIXABAN 5 MG: 5 TABLET, FILM COATED ORAL at 20:34

## 2020-09-01 RX ADMIN — IPRATROPIUM BROMIDE AND ALBUTEROL SULFATE 1 AMPULE: .5; 3 SOLUTION RESPIRATORY (INHALATION) at 21:10

## 2020-09-01 RX ADMIN — AMITRIPTYLINE HYDROCHLORIDE 75 MG: 25 TABLET, FILM COATED ORAL at 20:34

## 2020-09-01 RX ADMIN — Medication 10 ML: at 20:36

## 2020-09-01 RX ADMIN — IPRATROPIUM BROMIDE AND ALBUTEROL SULFATE 1 AMPULE: .5; 3 SOLUTION RESPIRATORY (INHALATION) at 17:03

## 2020-09-01 RX ADMIN — DESMOPRESSIN ACETATE 40 MG: 0.2 TABLET ORAL at 20:34

## 2020-09-01 ASSESSMENT — ENCOUNTER SYMPTOMS
DIARRHEA: 0
APNEA: 0
SHORTNESS OF BREATH: 1
STRIDOR: 0
PHOTOPHOBIA: 0
COLOR CHANGE: 0
SHORTNESS OF BREATH: 1
BLOOD IN STOOL: 0
EYE DISCHARGE: 0
RHINORRHEA: 0
COUGH: 1
TROUBLE SWALLOWING: 0
ABDOMINAL PAIN: 0
NAUSEA: 0
CHOKING: 0
EYE REDNESS: 0
CHEST TIGHTNESS: 0
FACIAL SWELLING: 0

## 2020-09-01 ASSESSMENT — PAIN SCALES - GENERAL: PAINLEVEL_OUTOF10: 0

## 2020-09-01 NOTE — PROGRESS NOTES
Pt complaining of sharp 9/10 abdominal pain in the LLQ, pt had small bowel movement today but pt stated she hasn't had a regular bowel movement since 8/26, pt's abdomen is distended, sent a secure message to night shift hospitalist, awaiting a response.

## 2020-09-01 NOTE — PROGRESS NOTES
Zayda Velazco  Neurology Follow-up  [facility] Neurology    Date of Service: 9/1/2020    Subjective:   CC: Follow up today regarding: Acute encephalopathy and possible new stroke. Events noted. Chart and lab reviewed. The patient denies any new symptoms today. She was somewhat upset for personal hygiene issues. MRI of the brain showed no acute stroke or abnormal lesions. Slightly febrile today but no headache, chest pain, neck or back pain or focal weakness. No other new symptoms. Other review of system was unremarkable. ROS : A 10-12 system review obtained and updated today and is unremarkable except as mentioned  in my interval history. family history includes Early Death (age of onset: 36) in her mother; Heart Disease in her father and mother; High Blood Pressure in her brother, father, mother, and sister; High Cholesterol in her father and mother; Stroke (age of onset: 79) in her father.     Past Medical History:   Diagnosis Date    [facility]sema ([facility]     CKD (chronic kidney disease) stage 3, GFR 30-59 ml/min ([facility])     Clostridium difficile carrier 01/21/2019    COPD (chronic obstructive pulmonary disease) ([address]     PFT done 7 years ago   Renita Saucedo Crohn's disease ([address]     Crohn's disease    Depression 1/30/2011    pt states resolved as of 3-26-12    DVT (deep venous thrombosis) ([address]     2012; first ocurrence     Hiatal hernia     Hx of blood clots     Kidney disease     Kidney insufficiency     Osteoporosis     Peripheral neuropathy     neuropathy in legs    Pneumonia     Postmenopausal     LMP in  40's    Pulmonary embolism ([address]     2012; first ocurrence    Sepsis ([address]     Syringomyelia ([address]      Current Facility-Administered Medications   Medication Dose Route Frequency Provider Last Rate Last Dose    sennosides-docusate sodium (SENOKOT-S) 8.6-50 MG tablet 2 tablet  2 tablet Oral Daily Regina Acosta MD   2 tablet at 09/01/20 0961    piperacillin-tazobactam (Jessy Jj) 3.375 g in dextrose 5 % 50 mL IVPB extended infusion (mini-bag)  3.375 g Intravenous Q8H Naveen Trent MD 12.5 mL/hr at 09/01/20 0542 3.375 g at 09/01/20 0542    bisacodyl (DULCOLAX) suppository 10 mg  10 mg Rectal Daily PRN Randall Cranker, MD        ipratropium-albuterol (DUONEB) nebulizer solution 1 ampule  1 ampule Inhalation Q4H WA Esteban Clancy MD   Stopped at 08/31/20 2129    guaiFENesin-codeine (GUAIFENESIN AC) 100-10 MG/5ML liquid 10 mL  10 mL Oral Q4H PRN Esteban Clancy MD   10 mL at 08/31/20 1046    budesonide-formoterol (SYMBICORT) 160-4.5 MCG/ACT inhaler 2 puff  2 puff Inhalation BID Mary Beth Gilbert MD   Stopped at 08/31/20 2128    tiotropium (SPIRIVA RESPIMAT) 2.5 MCG/ACT inhaler 2 puff  2 puff Inhalation Daily Mary Beth Gilbert MD   Stopped at 09/01/20 0939    predniSONE (DELTASONE) tablet 20 mg  20 mg Oral Daily Esteban Clancy MD   20 mg at 09/01/20 0944    linezolid (ZYVOX) IVPB 600 mg  600 mg Intravenous Q12H Randall Cranker, MD   Stopped at 09/01/20 0436    0.9 % sodium chloride bolus  30 mL/kg Intravenous Once Randall Cranker, MD        sodium chloride flush 0.9 % injection 10 mL  10 mL Intravenous 2 times per day Randall Cranker, MD   10 mL at 09/01/20 0944    sodium chloride flush 0.9 % injection 10 mL  10 mL Intravenous PRN Randall Cranker, MD        acetaminophen (TYLENOL) tablet 650 mg  650 mg Oral Q6H PRN Randall Cranker, MD   650 mg at 08/30/20 5129    Or    acetaminophen (TYLENOL) suppository 650 mg  650 mg Rectal Q6H PRN Randall Cranker, MD        polyethylene glycol (GLYCOLAX) packet 17 g  17 g Oral Daily PRN Randall Cranker, MD   17 g at 08/31/20 1655    promethazine (PHENERGAN) tablet 12.5 mg  12.5 mg Oral Q6H PRN Randall Cranker, MD        Or    ondansetron (ZOFRAN) injection 4 mg  4 mg Intravenous Q6H PRN Randall Cranker, MD        amitriptyline (ELAVIL) tablet 75 mg  75 mg Oral Nightly Randall Cranker, MD   75 mg at 08/31/20 9930    apixaban (ELIQUIS) tablet 5 mg  5 mg Oral BID Lyndsay Rush MD   5 mg at 09/01/20 0944    atorvastatin (LIPITOR) tablet 40 mg  40 mg Oral Nightly Lyndsay Rush MD   40 mg at 08/31/20 2110    labetalol (NORMODYNE;TRANDATE) injection 10 mg  10 mg Intravenous Q4H PRN Lyndsay Rush MD        albuterol sulfate  (90 Base) MCG/ACT inhaler 2 puff  2 puff Inhalation Q2H PRN Lyndsay Rush MD         No Known Allergies   reports that she quit smoking about 5 years ago. Her smoking use included cigarettes. She has a 7.50 pack-year smoking history. She has never used smokeless tobacco. She reports that she does not drink alcohol or use drugs. Objective:  Exam:   Constitutional:   Vitals:    08/31/20 2350 09/01/20 0404 09/01/20 0906 09/01/20 0930   BP: (!) 148/93 (!) 144/82 131/88 (!) 155/82   Pulse: 98 96 120 117   Resp: 24 22  22   Temp: 98.2 °F (36.8 °C) 97.8 °F (36.6 °C)  97.9 °F (36.6 °C)   TempSrc: Oral Oral  Oral   SpO2: 95% 97%  94%   Weight:       Height:         General appearance: Mildly distressed today. Eye: No icterus. Neck: supple  Cardiovascular:  No lower leg edema with good pulsation. Mental Status:   Oriented to person, place, problem, and time. Memory: Good immediate recall. Intact remote memory  Normal attention span and concentration. Language: intact naming, repeating and fluency   Good fund of Knowledge. Cranial Nerves:   II:   Pupils: equal, round, reactive to light  III,IV,VI: Extra Ocular Movements are intact. No nystagmus  VII: Facial strength and movements: intact and symmetric  XI: Shoulder shrug is intact  XII: Tongue movements are normal  Musculoskeletal: Generalized diffuse weakness 4/5 with poor effort   tone: Normal tone. Reflexes: 2+ in the arms 1+ in the leg  Coordination: No tremors or drift  Sensation: normal to all modalities in both arms and legs.   Gait/Posture: Not tested due to generalized weakness   Exam unchanged      Data:  LABS:   Lab Results   Component Value Date     09/01/2020    K 4.6 09/01/2020     09/01/2020    CO2 16 09/01/2020    BUN 20 09/01/2020    CREATININE 1.6 09/01/2020    GFRAA 39 09/01/2020    GFRAA >60 04/26/2013    LABGLOM 32 09/01/2020    GLUCOSE 87 09/01/2020    PHOS 3.3 08/29/2020    MG 2.40 08/29/2020    CALCIUM 8.9 09/01/2020     Lab Results   Component Value Date    WBC 27.9 09/01/2020    RBC 4.87 09/01/2020    HGB 9.8 09/01/2020    HCT 31.5 09/01/2020    MCV 64.7 09/01/2020    RDW 15.3 09/01/2020     09/01/2020     Lab Results   Component Value Date    INR 1.40 (H) 08/28/2020    PROTIME 16.3 (H) 08/28/2020       I reviewed blood testing and other test results and discussed results with the patient. MRI brain reviewed and showed no evidence of acute stroke. Impression: The same  Acute encephalopathy. Likely multifactorial metabolic encephalopathy  Chronic lacunar strokes. Sepsis secondary to pneumonia  History of PE on Eliquis  Hyponatremia  Leukocytosis  Hypertension  Hyperlipidemia  Chronic anemia  Acute on chronic respiratory failure, improved      Recommendation     Continue current supportive care  Continue sepsis work-up  PT and OT  Speech evaluation  Antibiotics  Hydration  Continue Eliquis  Insulin sliding scale  Blood sugar monitor and control  Statin  Telemetry  PPI  Respiratory support  Continue follow-up CBC and CMP  Neurology will sign off at this point giving unremarkable MRI    Kavon Meredith MD   [phone]      This dictation was generated by voice recognition computer software. Although all attempts are made to edit the dictation for accuracy, there may be errors in the transcription that are not intended.

## 2020-09-01 NOTE — PROGRESS NOTES
Spoke with daughter. She has concerns regarding the bronchoscopy due to high risk for her other. Message sent to Dr Shanice Hutton to see if he could call her for update on her care.

## 2020-09-01 NOTE — PROGRESS NOTES
status, endurance and strength associated w/ bacterial PNA  Prognosis: Good  Decision Making: Low Complexity  History: Pt 76 y/o, lives alone, apartment w/ 5 DINESH, good social support, independent ADLs, ambulation w/4ww, gets assist for IADLs, 1 recent fall. PMH: COPD, Crohn's disease, CKD  Exam: ROM, MMT, 6-clicks, 4 performance deficits/impairments, evolving  Assistance / Modification: CGA transfers w/RW, max A toileting, min A LB dressing  OT Education: OT Role;Plan of Care;Transfer Training;Equipment; Energy Conservation  Patient Education: d/c recommendation, pt demonstrated and verbalized understanding, needs reinforcement w/transfers  Barriers to Learning: none  REQUIRES OT FOLLOW UP: Yes  Activity Tolerance  Activity Tolerance: Treatment limited secondary to medical complications (free text)  Activity Tolerance: limited by SOB, need for frequent rest breaks. /88 seated EOB, at end of session 100/64,  bpm, SpO2 89-94% on 3L O2  Safety Devices  Safety Devices in place: Yes  Type of devices: All fall risk precautions in place; Chair alarm in place;Call light within reach; Left in chair;Nurse notified;Gait belt           Patient Diagnosis(es): The primary encounter diagnosis was Pneumonia due to organism. Diagnoses of Suspected COVID-19 virus infection, Acute respiratory failure with hypoxia ([address] MARIS (acute kidney injury) ([address] Cerebrovascular accident (CVA), unspecified mechanism ([address] and Dizziness were also pertinent to this visit.      has a past medical history of Bullous emphysema ([address] CKD (chronic kidney disease) stage 3, GFR 30-59 ml/min ([facility]), Clostridium difficile carrier, COPD (chronic obstructive pulmonary disease) ([address] Crohn's disease ([address] Depression, DVT (deep venous thrombosis) ([address] Hiatal hernia, Hx of blood clots, Kidney disease, Kidney insufficiency, Osteoporosis, Peripheral neuropathy, Pneumonia, Postmenopausal, Pulmonary embolism ([address] Sepsis ([address] and Syringomyelia into walls for several days. Subjective   General  Chart Reviewed: Yes  Family / Caregiver Present: No  Diagnosis: bacterial PNA  Subjective  Subjective: Pt found supine in bed, was tearful stating she hadn't been cleaned up and felt dirty, pt reassured. Pt denied pain  Patient Currently in Pain: Denies  Pre Treatment Pain Screening  Intervention List: Patient able to continue with treatment  Vital Signs  Pulse: 120  Heart Rate Source: Monitor  BP: 132/88  BP Location: Right upper arm  Patient Position: Seated EOB  Level of Consciousness: Alert  Patient Currently in Pain: Denies  Oxygen Therapy  SpO2: 94 %  O2 Device: Nasal cannula  O2 Flow Rate (L/min): 3 L/min  Social/Functional History  Social/Functional History  Lives With: Alone  Type of Home: Apartment  Home Layout: One level  Home Access: Stairs to enter with rails(4-5)  Entrance Stairs - Number of Steps: 1 step in  Entrance Stairs - Rails: None  Bathroom Shower/Tub: Tub/Shower unit  Bathroom Toilet: Standard  Bathroom Equipment: Grab bars in shower, Tub transfer bench  Bathroom Accessibility: Not accessible  Home Equipment: 4 wheeled walker, Cane, Alert Button, Oxygen(2L oxygen)  Receives Help From: Family, Neighbor, Home health(home aid x2/wk)  ADL Assistance: Independent  Homemaking Assistance: Needs assistance  Meal Prep:  Other (comment)(MOW)  Laundry: (home health)  Homemaking Responsibilities: No(MOW, home care x2/wk for laundry and cleaning)  Ambulation Assistance: Independent(with 4ww)  Transfer Assistance: Independent  Active : No  Patient's  Info: family, friends, neighbors assist with transport  Mode of Transportation: Family, Friends  Leisure & Hobbies: liana, jaleel  IADL Comments: assistance from family and friends  Additional Comments: fall before coming into hospital - got dizzy and fell in bathroom; life alert       Objective   Vision: Impaired  Vision Exceptions: Wears glasses at all times  Hearing: Within functional limits WFL  Right Hand AROM (degrees)  Right Hand AROM: Exceptions  Right Hand General AROM: lacks full ROM d/t arthritis, contractures at PIP joints  LUE Strength  Gross LUE Strength: Exceptions to [facility]  L Hand General: 4/5  LUE Strength Comment: shoulder grossly 3+/5, elbow grossly 3+/5  RUE Strength  Gross RUE Strength: Exceptions to [facility]  R Hand General: 4/5  RUE Strength Comment: shoulder grossly 3/5, elbow grossly 3+/5     Hand Dominance  Hand Dominance: Right             Plan   Plan  Times per week: 3-5  Times per day: Daily  Current Treatment Recommendations: Strengthening, Functional Mobility Training, Endurance Training, Safety Education & Training, Self-Care / ADL             AM-PAC Score        AM-[facility] Inpatient Daily Activity Raw Score: 17 (09/01/20 1037)  AM-PAC Inpatient ADL T-Scale Score : 37.26 (09/01/20 1037)  ADL Inpatient CMS 0-100% Score: 50.11 (09/01/20 1037)  ADL Inpatient CMS G-Code Modifier : CK (09/01/20 1037)    Goals  Short term goals  Time Frame for Short term goals: Discharge  Short term goal 1: Supervision for funtional transfers to ADL surfaces w/RW  Short term goal 2: Supervision for functional mobility for ADL ax w/RW  Short term goal 3: Supervision for LB dressing and bathing  Short term goal 4: Supervision for toileting  Short term goal 5: Pt to tolerate standing for 5-10 min for ADL ax/functional mobility       Therapy Time   Individual Concurrent Group Co-treatment   Time In 0838         Time Out 0936         Minutes 58             Timed Code Treatment Minutes:  43 Minutes    Total Treatment Minutes:  58 minutes       C/ Sonia 66, OT     Angie Bello, S/OT  I have directly observed this treatment and have read and approve this note.    Franki Reid, OTR/L, [id number]

## 2020-09-01 NOTE — PROGRESS NOTES
P Pulmonary and Critical Care  Progress note      Reason for Consult: Pneumonia, COPD, emphysema  Requesting Physician: Dr Braydon Dawson:   [facility] / hospitals:                The patient is a 77 y.o. female with significant past medical history of:      Diagnosis Date    Bullous emphysema ([address]     CKD (chronic kidney disease) stage 3, GFR 30-59 ml/min ([address]     Clostridium difficile carrier 01/21/2019    COPD (chronic obstructive pulmonary disease) ([address]     PFT done 7 years ago   Marcie Yoder Crohn's disease ([address]     Crohn's disease    Depression 1/30/2011    pt states resolved as of 3-26-12    DVT (deep venous thrombosis) ([address]     2012; first ocurrence     Hiatal hernia     Hx of blood clots     Kidney disease     Kidney insufficiency     Osteoporosis     Peripheral neuropathy     neuropathy in legs    Pneumonia     Postmenopausal     LMP in  42's    Pulmonary embolism ([address]     2012; first ocurrence    Sepsis ([address]     Syringomyelia ([address]      Interval history: Patient continues to feel poorly. Appreciate ID consult. She would benefit from bronchoscopy though the procedure carries increased risk in her due to underlying lung disease and dyspnea. I discussed that with the patient this morning. Past Surgical History:        Procedure Laterality Date    ABDOMEN SURGERY      BACK SURGERY      shunt to drain CSF    BIOPSY SHOULDER Left 2/27/2019    EXCISION OF LEFT SHOULDER MASS performed by Andrés Hernandez MD at [facility]      crainotomy- remove fluid ? V-P SHUNT    CHOLECYSTECTOMY      COLON SURGERY      resection X2    COLONOSCOPY  12/5/11    BIOPSY AND POLYPECTOMY    COLONOSCOPY  4-2-2012    and ablation ERBE/APC    SHOULDER SURGERY      L      Current Medications:    Current Facility-Administered Medications: sennosides-docusate sodium (SENOKOT-S) 8.6-50 MG tablet 2 tablet, 2 tablet, Oral, Daily  piperacillin-tazobactam (ZOSYN) 3.375 g in dextrose 5 % High Cholesterol Mother     Early Death Mother 36        MI    Heart Disease Father     High Blood Pressure Father     High Cholesterol Father     Stroke Father 79    High Blood Pressure Sister     High Blood Pressure Brother      REVIEW OF SYSTEMS:    CONSTITUTIONAL:  negative for fevers, chills, diaphoresis, activity change, appetite change, fatigue, night sweats and unexpected weight change. EYES:  negative for blurred vision, eye discharge, visual disturbance and icterus  HEENT:  negative for hearing loss, tinnitus, ear drainage, sinus pressure, nasal congestion, epistaxis and snoring  RESPIRATORY:  See HPI  CARDIOVASCULAR:  negative for chest pain, palpitations, exertional chest pressure/discomfort, edema, syncope  GASTROINTESTINAL:  negative for nausea, vomiting, diarrhea, constipation, blood in stool and abdominal pain  GENITOURINARY:  negative for frequency, dysuria, urinary incontinence, decreased urine volume, and hematuria  HEMATOLOGIC/LYMPHATIC:  negative for easy bruising, bleeding and lymphadenopathy  ALLERGIC/IMMUNOLOGIC:  negative for recurrent infections, angioedema, anaphylaxis and drug reactions  ENDOCRINE:  negative for weight changes and diabetic symptoms including polyuria, polydipsia and polyphagia  MUSCULOSKELETAL:  negative for  pain, joint swelling, decreased range of motion and muscle weakness  NEUROLOGICAL:  negative for headaches, slurred speech, unilateral weakness  PSYCHIATRIC/BEHAVIORAL: negative for hallucinations, behavioral problems, confusion and agitation.      Objective:   PHYSICAL EXAM:      VITALS:  BP (!) 155/82   Pulse 117   Temp 97.9 °F (36.6 °C) (Oral)   Resp 22   Ht 5' (1.524 m)   Wt 152 lb 14.4 oz (69.4 kg)   SpO2 94%   BMI 29.86 kg/m²      24HR INTAKE/OUTPUT:      Intake/Output Summary (Last 24 hours) at 9/1/2020 1104  Last data filed at 9/1/2020 0935  Gross per 24 hour   Intake 300 ml   Output --   Net 300 ml     CONSTITUTIONAL:  awake, alert, cooperative, no apparent distress, and appears stated age  NECK:  Supple, symmetrical, trachea midline, no adenopathy, thyroid symmetric, not enlarged and no tenderness, skin normal  LUNGS:  no increased work of breathing and diminished to auscultation. No accessory muscle use  CARDIOVASCULAR: S1 and S2, no edema and no JVD  ABDOMEN:  normal bowel sounds, non-distended and no masses palpated, and no tenderness to palpation. No hepatospleenomegaly  LYMPHADENOPATHY:  no axillary or supraclavicular adenopathy. No cervical adnenopathy  PSYCHIATRIC: Oriented to person place and time. No obvious depression or anxiety. MUSCULOSKELETAL: No obvious misalignment or effusion of the joints. No clubbing, cyanosis of the digits. SKIN:  normal skin color, texture, turgor and no redness, warmth, or swelling. No palpable nodules    DATA:    Old records have been reviewed    CBC:  Recent Labs     08/30/20 0422 08/31/20  1145 09/01/20  0514   WBC 32.8* 25.8* 27.9*   RBC 4.80 4.62 4.87   HGB 9.8* 9.1* 9.8*   HCT 31.2* 29.3* 31.5*    363 382   MCV 65.0* 63.5* 64.7*   MCH 20.4* 19.7* 20.0*   MCHC 31.4 31.1 31.0   RDW 14.9 14.6 15.3   NRBC  --   --  1*  1*   BANDSPCT  --   --  3      BMP:  Recent Labs     08/30/20 0422 08/31/20  1145 09/01/20  0515    135* 134*   K 4.1 3.9 4.6    103 103   CO2 19* 21 16*   BUN 26* 21* 20   CREATININE 1.8* 1.7* 1.6*   CALCIUM 8.7 8.7 8.9   GLUCOSE 109* 136* 87      ABG:  No results for input(s): PHART, SOM3JXN, PO2ART, IOP8YTA, N1ITFKTR, BEART in the last 72 hours. Lab Results   Component Value Date    BNP <15 08/06/2011     Lab Results   Component Value Date    CKTOTAL 29 08/07/2011    CKMB 0.88 08/07/2011    TROPONINI <0.01 08/29/2020       Cultures:     Abx:    Radiology Review:  All pertinent images / reports were reviewed as a part of this visit. Assessment:     1. Acute on chronic hypoxemic respiratory failure  2. Left upper lobe pneumonia  3.  COPD severe  4. Centrilobular emphysema  5. GERD    Plan:     1. I have reviewed laboratories, medical records and images for this visit  2. Continue temperature throughout the day yesterday. Today so far afebrile  3. Significant leukocytosis persists  4. No guiding culture data  5. Plan bronchoscopy with BAL in the a.m.  6. Discussed the procedure and its risks including prolonged respiratory failure, bleeding, pneumothorax and death. 7. The patient understand and is willing to proceed  8.  ID is following and managing antibiotics

## 2020-09-01 NOTE — PROGRESS NOTES
Infectious Diseases   Progress Note      Admission Date: 8/28/2020  Hospital Day: Hospital Day: 5   Attending: Laisha Collins MD  Date of service: 9/1/2020     Chief complaint/ Reason for consult:     · Sepsis with worsening fever, persistent bandemia, acute kidney injury on chronic kidney disease  · Left-sided multifocal pneumonia with necrotizing infiltrate in the left upper lobe  · Elevated procalcitonin of 2.17 on 8/28/2020  · Chronic respiratory failure with hypoxia  · Bilateral chronic emphysema  · Crohn's disease    Microbiology:        I have reviewed allavailable micro lab data and cultures    · Blood culture (2/2) - collected on 8/28/2020: Negative    · Urine culture  - collected on 8/28/2020: Negative      Antibiotics and immunizations:       Current antibiotics: All antibiotics and their doses were reviewed by me    Recent Abx Admin                   piperacillin-tazobactam (ZOSYN) 3.375 g in dextrose 5 % 50 mL IVPB extended infusion (mini-bag) (g) 3.375 g New Bag 09/01/20 1509     3.375 g New Bag  0542     3.375 g New Bag 08/31/20 2154    linezolid (ZYVOX) IVPB 600 mg (mg) 600 mg New Bag 09/01/20 1325     600 mg New Bag  0204                  Immunization History: All immunization history was reviewed by me today. Immunization History   Administered Date(s) Administered    DT (pediatric) 09/25/2010    Influenza Virus Vaccine 10/01/2012, 10/20/2015    Influenza Whole 10/01/2011    Influenza, High Dose (Fluzone 65 yrs and older) 10/06/2014, 10/19/2016, 11/16/2017, 11/05/2018    Influenza, Triv, inactivated, subunit, adjuvanted, IM (Fluad 65 yrs and older) 11/13/2019    Pneumococcal Conjugate 13-valent (Engwqyt14) 06/11/2015    Pneumococcal Conjugate 7-valent (Prevnar7) 02/07/2005, 10/01/2011    Pneumococcal Polysaccharide (Hwuvisldo80) 06/15/2016    Zoster Live (Zostavax) 07/01/2013       Known drug allergies:      All allergies were reviewed and updated    No Known Allergies    Social and/or diarrheal disease with moderate amount of stool and fluid in the large bowel. She is now on 3 L oxygen via nasal cannula. Plan:     Diagnostic Workup:    · Agree with plans for bronchoscopy for tomorrow  · Continue to follow  fever curve, WBC count and blood cultures  · Follow up on liver and renal function  · Follow-up on nasal MRSA screen    Antimicrobials:    · Will continue empiric IV linezolid 600 mg every 12 hour  · Continue IV Zosyn 3.375 g every 8 hour  · Continue to monitor vitals closely  · We will follow-up on the culture results, BAL results and clinical progress and will make further recommendations accordingly  · Continue to watch for new fever or diarrhea  · Start oral probiotics twice daily  · Aspiration precautions  · Cough and deep breathing exercises  · Continue oxygen support to maintain oxygen saturation above 92%  · DVT prophylaxis  · Discussed the above plan with patient and RN       Drug Monitoring:    · Continue monitoring for antibiotic toxicity as follows: CBC, CMP  · Continue to watch for following: new or worsening fever, new hypotension, hives, lip swelling and redness or purulence at vascular access sites. Current isolation precautions: There are no current isolations documented for this patient. I/v access Management:    · Continue to monitor i.v access sites for erythema, induration,discharge or tenderness. · As always, continue efforts to minimize tubes/ lines/ drains as clinically appropriate to reduce chances of line associated infections. Patient education and counseling:     · The patient was educated in detail about the side-effects of various antibiotics and things to watch for like new rashes, lip swelling, severe reaction, worsening diarrhea, break through fever etc.  · Discussed patient's condition and what to expect. All of the patient's questions were addressed in a satisfactory manner and patient verbalized understanding all instructions. Level of complexity of consult: High     Risk of Complications/Morbidity: High     · Illness(es)/ Infection present that pose threat to life/bodily function. · There is potential for severe exacerbation of infection/side effects of treatment. · Therapy requires intensive monitoring for antimicrobial agent toxicity. Thank you for involving me in the care of your patient. I will continue to follow. If you have any additional questions, please do not hesitate to contact me. Subjective: Interval history: Interval history was obtained from chart review and RN. The patient is afebrile today. She is on 3 L oxygen via nasal cannula. Tolerating antibiotics okay. REVIEW OF SYSTEMS:      Review of Systems   Constitutional: Positive for fatigue. Negative for chills, diaphoresis, fever and unexpected weight change. HENT: Negative for congestion, ear discharge, ear pain, facial swelling, hearing loss, rhinorrhea and trouble swallowing. Eyes: Negative for photophobia, discharge, redness and visual disturbance. Respiratory: Positive for cough and shortness of breath. Negative for apnea, choking, chest tightness and stridor. Cardiovascular: Negative for chest pain and palpitations. Gastrointestinal: Negative for abdominal pain, blood in stool, diarrhea and nausea. Endocrine: Negative for polydipsia, polyphagia and polyuria. Genitourinary: Negative for difficulty urinating, dysuria, frequency, hematuria, menstrual problem and vaginal discharge. Musculoskeletal: Negative for arthralgias, joint swelling, myalgias and neck stiffness. Skin: Negative for color change and rash. Allergic/Immunologic: Negative for immunocompromised state. Neurological: Negative for dizziness, seizures, speech difficulty, light-headedness and headaches. Hematological: Negative for adenopathy. Psychiatric/Behavioral: Negative for agitation, hallucinations and suicidal ideas.          Past Medical History: All past medical history reviewed today. Past Medical History:   Diagnosis Date    Bullous emphysema (HCC)     CKD (chronic kidney disease) stage 3, GFR 30-59 ml/min (HCC)     Clostridium difficile carrier 01/21/2019    COPD (chronic obstructive pulmonary disease) ([facility]     PFT done 7 years ago   Marcie Yoder Crohn's disease ([facility]     Crohn's disease    Depression 1/30/2011    pt states resolved as of 3-26-12    DVT (deep venous thrombosis) ([address]     2012; first ocurrence     Hiatal hernia     Hx of blood clots     Kidney disease     Kidney insufficiency     Osteoporosis     Peripheral neuropathy     neuropathy in legs    Pneumonia     Postmenopausal     LMP in  40's    Pulmonary embolism ([address]     2012; first ocurrence    Sepsis ([address]     Syringomyelia ([address]        Past Surgical History: All past surgical history was reviewed today. Past Surgical History:   Procedure Laterality Date    ABDOMEN SURGERY      BACK SURGERY      shunt to drain CSF    BIOPSY SHOULDER Left 2/27/2019    EXCISION OF LEFT SHOULDER MASS performed by Willie Chan MD at [address] [facility]      crainotomy- remove fluid ? V-P SHUNT    CHOLECYSTECTOMY      COLON SURGERY      resection X2    COLONOSCOPY  12/5/11    BIOPSY AND POLYPECTOMY    COLONOSCOPY  4-2-2012    and ablation ERBE/APC    SHOULDER SURGERY      L        Family History: All family history was reviewed today.         Problem Relation Age of Onset    Heart Disease Mother     High Blood Pressure Mother     High Cholesterol Mother     Early Death Mother 36        MI    Heart Disease Father     High Blood Pressure Father     High Cholesterol Father     Stroke Father 79    High Blood Pressure Sister     High Blood Pressure Brother        Objective:       PHYSICAL EXAM:      Vitals:   Vitals:    09/01/20 1304 09/01/20 1305 09/01/20 1306 09/01/20 1500   BP:    122/73   Pulse:    115   Resp: 24 24 24 22   Temp:    98.1 °F (36.7 °C)   TempSrc: 31.2* 29.3* 31.5*    363 382   MCV 65.0* 63.5* 64.7*   MCH 20.4* 19.7* 20.0*   MCHC 31.4 31.1 31.0   RDW 14.9 14.6 15.3   NRBC  --   --  1*  1*   BANDSPCT  --   --  3        BMP:  Recent Labs     08/30/20  0422 08/31/20  1145 09/01/20  0515    135* 134*   K 4.1 3.9 4.6    103 103   CO2 19* 21 16*   BUN 26* 21* 20   CREATININE 1.8* 1.7* 1.6*   CALCIUM 8.7 8.7 8.9   GLUCOSE 109* 136* 87        Hepatic Function Panel:   Lab Results   Component Value Date    ALKPHOS 102 08/29/2020    ALT 8 08/29/2020    AST 9 08/29/2020    PROT 6.7 08/29/2020    PROT 7.1 11/13/2012    BILITOT 0.3 08/29/2020    BILIDIR <0.2 01/05/2020    IBILI see below 01/05/2020    LABALBU 2.5 08/29/2020       CPK:   Lab Results   Component Value Date    CKTOTAL 29 08/07/2011     ESR:   Lab Results   Component Value Date    SEDRATE 24 03/18/2019     CRP:   Lab Results   Component Value Date    CRP 2.1 03/18/2019           Imaging: All pertinent images and reports for the current visit were reviewed by me during this visit. CT ABDOMEN PELVIS WO CONTRAST Additional Contrast? None   Final Result   Moderate amount of stool and fluid within the large bowel, which may reflect   ileus and/or diarrheal disease. Mild fluid-filled distention of small bowel, which also likely represents   ileus. Obstruction does not appear to be present. Small left pleural effusion with adjacent airspace disease, likely passive   atelectasis. Infiltrate in the posterior aspect of the lingula is noted,   incompletely included on the field of view, most compatible with pneumonia. MRI BRAIN WO CONTRAST   Final Result   Mild chronic small vessel ischemic changes. No acute stroke, midline shift   or mass effect. VL DUP CAROTID BILATERAL   Final Result      CT CHEST WO CONTRAST   Final Result   Significant worsening in size of the left upper lobe pneumonia in 2 days.    Moderate left pleural effusion now present with mild atelectasis. Severe   emphysematous changes. No CHF. XR CHEST (2 VW)   Final Result   Progressive consolidation within the left upper lobe consistent with   pneumonia. Increased dependent left lower lobe opacification and effusion. Improved aeration at the right lung base. CT CHEST WO CONTRAST   Final Result   1. Left upper lobe pneumonia. Recommend chest radiograph in 8 weeks to   confirm resolution. 2. Trace left pleural effusion. CT HEAD WO CONTRAST   Final Result   1. New age-indeterminate infarct within the right internal capsule. Recommend further evaluation with MRI of the head. XR CHEST PORTABLE   Final Result   Bilateral peripheral airspace disease suggestive of atypical or viral   pneumonia. Medications: All current and past medications were reviewed.      sennosides-docusate sodium  2 tablet Oral Daily    piperacillin-tazobactam  3.375 g Intravenous Q8H    ipratropium-albuterol  1 ampule Inhalation Q4H WA    budesonide-formoterol  2 puff Inhalation BID    tiotropium  2 puff Inhalation Daily    predniSONE  20 mg Oral Daily    linezolid  600 mg Intravenous Q12H    sodium chloride  30 mL/kg Intravenous Once    sodium chloride flush  10 mL Intravenous 2 times per day    amitriptyline  75 mg Oral Nightly    apixaban  5 mg Oral BID    atorvastatin  40 mg Oral Nightly           bisacodyl, guaiFENesin-codeine, sodium chloride flush, acetaminophen **OR** acetaminophen, polyethylene glycol, promethazine **OR** ondansetron, labetalol, albuterol sulfate HFA      Problem list:       Patient Active Problem List   Diagnosis Code    Crohn disease ([facility] K50.90    Depression F32.9    Osteoarthritis M19.90    Lupus anticoagulant disorder ([facility] D68.62    Dysphagia R13.10    Syringomyelia ([facility] G95.0    Gastritis and gastroduodenitis K29.70, K29.90    Skin ulcer of shoulder (HCC) L98.499    Shortness of breath R06.02    Anemia D64.9    Chronic emphysema syndrome (HCC) J43.9    Pulmonary fibrosis (HCC) J84.10    Chronic hypoxemic respiratory failure (HCC) J96.11    Hiatal hernia K44.9    COPD, severe (HCC) J44.9    Alpha thalassemia minor D56.3    Necrotizing pneumonia (HCC) J85.0    Acute on chronic respiratory failure with hypoxia (HCC) J96.21    Bronchiectasis with acute exacerbation (HCC) J47.1    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    Osteoporosis of multiple sites M81.0    Gastroesophageal reflux disease without esophagitis K21.9    Sepsis (HCC) A41.9    Bilateral pulmonary embolism (HCC) I26.99    Acute deep vein thrombosis (DVT) of distal vein of both lower extremities ([facility] I82.4Z3    History of pulmonary embolism Z86.711    SOB (shortness of breath) R06.02    Wound of left shoulder S41.002A    Hx pulmonary embolism Z86.711    Pneumonia due to organism J18.9    COPD exacerbation (HCC) J44.1    Bacterial pneumonia J15.9    Cerebrovascular accident (CVA) ([facility] I63.9    MARIS (acute kidney injury) ([facility] N17.9    Suspected COVID-19 virus infection Z20.828    Dizziness R42    Elevated procalcitonin R79.89    Long term systemic steroid user Z79.52    History of DVT (deep vein thrombosis) Z86.718    Anticoagulated Z79.01    Small vessel disease, cerebrovascular I67.9    Ex-smoker Z87.891    History of depression Z86.59    Overweight (BMI 25.0-29. 9) E66.3       Please note that this chart was generated using Dragon dictation software. Although every effort was made to ensure the accuracy of this automated transcription, some errors in transcription may have occurred inadvertently. If you may need any clarification, please do not hesitate to contact me through EP[employer] or at the phone number provided below with my electronic signature. Any pictures or media included in this note were obtained after taking informed verbal consent from the patient and with their approval to include those in the patient's medical record.     Naveen Klaus Holguin MD, MPH  9/1/2020 , 5:04 PM   [facility]   Office: [phone]  Fax: [phone]  Tuesday AM clinic:   [address] [place] [address], [facility] [address]  Thursday AM [id number] [place], [facility]

## 2020-09-01 NOTE — ANESTHESIA PRE PROCEDURE
Department of Anesthesiology  Preprocedure Note       Name:  Joi Shetty   Age:  77 y.o.  :  1950                                          MRN:  [id number]         Date:  2020      Surgeon: Venu Alan):  Martell Priest MD    Procedure: BRONCHOSCOPY DIAGNOSTIC OR CELL [address] ONLY (N/A Abdomen)    Medications prior to admission:   Prior to Admission medications    Medication Sig Start Date End Date Taking? Authorizing Provider   amitriptyline (ELAVIL) 150 MG tablet TAKE 0.5 TABLETS BY MOUTH NIGHTLY 20   Kyle Rich MD   predniSONE (DELTASONE) 10 MG tablet TAKE 1 TABLET BY MOUTH EVERY DAY FOR 10 DAYS 20   Historical Provider, MD   albuterol sulfate  (90 Base) MCG/ACT inhaler Inhale 2 puffs into the lungs every 6 hours as needed for Wheezing 20  Martell Priest MD   ELIQUIS 5 MG TABS tablet TAKE 1 TABLET BY MOUTH TWICE A DAY 20   Martell Priest MD   Lift Chair MISC by Other route Dx:J44.1, N18.4 3/25/20   Kyle Rich MD   potassium chloride (KLOR-CON) 10 MEQ extended release tablet Take 1 tablet by mouth 3 times daily 20   Rosana Lee MD   ondansetron (ZOFRAN ODT) 4 MG disintegrating tablet Take 1 tablet by mouth every 8 hours as needed for Nausea 20   JAIME Drummond   budesonide-formoterol [facility]) 160-4.5 MCG/ACT AERO Inhale 2 puffs into the lungs 2 times daily 19   Kyle Rich MD   tiotropium (SPIRIVA RESPIMAT) 2.5 MCG/ACT AERS inhaler Inhale 2 puffs into the lungs daily 19   Kyle Rich MD   albuterol (PROVENTIL) (2.5 MG/3ML) 0.083% nebulizer solution Take 3 mLs by nebulization every 6 hours as needed for Wheezing Dx: COPD      ICD-10: J44.9 19   Martell Priest MD   Misc.  Devices (BATH/SHOWER SEAT) MISC Use as directed 19   Kyle Rich MD   alendronate (FOSAMAX) 70 MG tablet Take 1 tablet by mouth every 7 days 19   Safia Somers MD   albuterol sulfate HFA 108 (90 Base) MCG/ACT inhaler Inhale 2 puffs into the lungs every 6 hours as needed for Wheezing 3/15/18   Rosa Emmanuel MD   omeprazole (PRILOSEC) 20 MG delayed release capsule Take 20 mg by mouth Daily Takes as needed    Historical Provider, MD   Lift Chair [address] [facility] by Does not apply route Please dispense a Lift Chair 6/14/17   Rosa Emmanuel MD   guaiFENesin [facility]) 600 MG extended release tablet Take 1 tablet by mouth 2 times daily 12/8/16   Basilia Short MD   [facility]. Devices (ROLLER [place]) MISC 1 each by Does not apply route daily Please dispense a walker with a seat 6/9/16   Rosa Emmanuel MD   Adalimumab (HUMIRA) 20 MG/0.4ML KIT Inject 1 vial into the skin every 14 days     Historical Provider, MD       Current medications:    No current facility-administered medications for this visit. No current outpatient medications on file.      Facility-Administered Medications Ordered in Other Visits   Medication Dose Route Frequency Provider Last Rate Last Dose    sennosides-docusate sodium (SENOKOT-S) 8.6-50 MG tablet 2 tablet  2 tablet Oral Daily Graham More MD   2 tablet at 09/01/20 0944    piperacillin-tazobactam (ZOSYN) 3.375 g in dextrose 5 % 50 mL IVPB extended infusion (mini-bag)  3.375 g Intravenous Q8H Sabi Medellin MD   Stopped at 09/01/20 1056    bisacodyl (DULCOLAX) suppository 10 mg  10 mg Rectal Daily PRN Anyi Boyce MD        ipratropium-albuterol (DUONEB) nebulizer solution 1 ampule  1 ampule Inhalation Q4H WA Esteban Clanyc MD   Stopped at 08/31/20 2129    guaiFENesin-codeine (GUAIFENESIN AC) 100-10 MG/5ML liquid 10 mL  10 mL Oral Q4H PRN Esteban Clancy MD   10 mL at 08/31/20 1046    budesonide-formoterol (SYMBICORT) 160-4.5 MCG/ACT inhaler 2 puff  2 puff Inhalation BID Alvis Severin, MD   Stopped at 08/31/20 2128    tiotropium (SPIRIVA RESPIMAT) 2.5 MCG/ACT inhaler 2 puff  2 puff Inhalation Daily Alvis Severin, MD   Stopped at 09/01/20 0939    predniSONE (DELTASONE) tablet 20 mg  20 mg Oral Daily Esteban Clancy MD   20 mg at 09/01/20 0944    linezolid (ZYVOX) IVPB 600 mg  600 mg Intravenous Q12H Lianne Galvan MD   Stopped at 09/01/20 0436    0.9 % sodium chloride bolus  30 mL/kg Intravenous Once Lianne Galvan MD        sodium chloride flush 0.9 % injection 10 mL  10 mL Intravenous 2 times per day Lianne Galvan MD   10 mL at 09/01/20 0944    sodium chloride flush 0.9 % injection 10 mL  10 mL Intravenous PRN Lianne Galvan MD        acetaminophen (TYLENOL) tablet 650 mg  650 mg Oral Q6H PRN Lianne Galvan MD   650 mg at 08/30/20 5660    Or    acetaminophen (TYLENOL) suppository 650 mg  650 mg Rectal Q6H PRN Lianne Galvan MD        polyethylene glycol (GLYCOLAX) packet 17 g  17 g Oral Daily PRN Lianne Galvan MD   17 g at 08/31/20 1655    promethazine (PHENERGAN) tablet 12.5 mg  12.5 mg Oral Q6H PRN Lianne Galvan MD        Or    ondansetron (ZOFRAN) injection 4 mg  4 mg Intravenous Q6H PRN Lianne Galvan MD        amitriptyline (ELAVIL) tablet 75 mg  75 mg Oral Nightly Lianne Galvan MD   75 mg at 08/31/20 2110    apixaban (ELIQUIS) tablet 5 mg  5 mg Oral BID Lianne Galvan MD   5 mg at 09/01/20 0944    atorvastatin (LIPITOR) tablet 40 mg  40 mg Oral Nightly Lianne Galvan MD   40 mg at 08/31/20 2110    labetalol (NORMODYNE;TRANDATE) injection 10 mg  10 mg Intravenous Q4H PRN Lianne Galvan MD        albuterol sulfate  (90 Base) MCG/ACT inhaler 2 puff  2 puff Inhalation Q2H PRN Lianne Galvan MD           Allergies:  No Known Allergies    Problem List:    Patient Active Problem List   Diagnosis Code    Crohn disease ([facility] K50.90    Depression F32.9    Osteoarthritis M19.90    Lupus anticoagulant disorder ([facility] D68.62    Dysphagia R13.10    Syringomyelia ([facility] G95.0    Gastritis and gastroduodenitis K29.70, K29.90    Skin ulcer of shoulder ([facility] L98.499    Shortness of breath R06.02    Anemia D64.9    Chronic emphysema syndrome (HCC) J43.9    Pulmonary fibrosis (HCC) J84.10    Chronic hypoxemic respiratory failure ([facility]) J96.11    Hiatal hernia K44.9    COPD, severe ([facility]) J44.9    Alpha thalassemia minor D56.3    Necrotizing pneumonia ([facility]) J85.0    Acute respiratory failure with hypoxia ([facility]) J96.01    Bronchiectasis with acute exacerbation ([facility]) J47.1    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    Osteoporosis of multiple sites M81.0    Gastroesophageal reflux disease without esophagitis K21.9    Sepsis ([facility]) A41.9    Bilateral pulmonary embolism ([facility]) I26.99    Acute deep vein thrombosis (DVT) of distal vein of both lower extremities ([facility]) I82.4Z3    History of pulmonary embolism Z86.711    SOB (shortness of breath) R06.02    Wound of left shoulder S41.002A    Hx pulmonary embolism Z86.711    Multifocal pneumonia J18.9    COPD exacerbation ([facility]) J44.1    Bacterial pneumonia J15.9    Cerebrovascular accident (CVA) ([facility] I63.9    MARIS (acute kidney injury) ([facility] N17.9    Suspected COVID-19 virus infection Z20.828    Dizziness R42    Elevated procalcitonin R79.89    Long term systemic steroid user Z79.52    History of DVT (deep vein thrombosis) Z86.718    Anticoagulated Z79.01    Small vessel disease, cerebrovascular I67.9    Ex-smoker Z87.891    History of depression Z86.59    Overweight (BMI 25.0-29. 9) E66.3       Past Medical History:        Diagnosis Date    Bullous emphysema ([facility])     CKD (chronic kidney disease) stage 3, GFR 30-59 ml/min ([facility])     Clostridium difficile carrier 01/21/2019    COPD (chronic obstructive pulmonary disease) ([facility]     PFT done 7 years ago   [facility] Crohn's disease ([facility]     Crohn's disease    Depression 1/30/2011    pt states resolved as of 3-26-12    DVT (deep venous thrombosis) ([address]     2012; first ocurrence     Hiatal hernia     Hx of blood clots     Kidney disease     Kidney insufficiency     Lab Results   Component Value Date     09/01/2020    K 4.6 09/01/2020     09/01/2020    CO2 16 09/01/2020    BUN 20 09/01/2020    CREATININE 1.6 09/01/2020    GFRAA 39 09/01/2020    GFRAA >60 04/26/2013    AGRATIO 0.6 08/29/2020    LABGLOM 32 09/01/2020    GLUCOSE 87 09/01/2020    PROT 6.7 08/29/2020    PROT 7.1 11/13/2012    CALCIUM 8.9 09/01/2020    BILITOT 0.3 08/29/2020    ALKPHOS 102 08/29/2020    AST 9 08/29/2020    ALT 8 08/29/2020       POC Tests: No results for input(s): POCGLU, POCNA, POCK, POCCL, POCBUN, POCHEMO, POCHCT in the last 72 hours. Coags:   Lab Results   Component Value Date    PROTIME 16.3 08/28/2020    PROTIME 12.9 02/01/2011    INR 1.40 08/28/2020    APTT 33.2 04/02/2019       HCG (If Applicable): No results found for: PREGTESTUR, PREGSERUM, HCG, HCGQUANT     ABGs:   Lab Results   Component Value Date    PHART 7.471 05/26/2015    PO2ART 92.7 05/26/2015    XCG6QBQ 40.4 05/26/2015    ROT5RCW 28.8 05/26/2015    BEART 4.8 05/26/2015    O7KANOLE 97.8 05/26/2015        Type & Screen (If Applicable):  Lab Results   Component Value Date    LABABO B 01/30/2011    [address] Positive 01/30/2011       Anesthesia Evaluation  Patient summary reviewed and Nursing notes reviewed no history of anesthetic complications:   Airway: Mallampati: I  TM distance: >3 FB   Neck ROM: full  Mouth opening: > = 3 FB Dental: normal exam         Pulmonary:   (+) pneumonia: unresolved,  COPD (Hx of 2L O2 at night, currently on 3L O2):  shortness of breath: chronic and new,  rhonchi,  decreased breath sounds,      (-) asthma                          ROS comment: CT shows bilateral emphysema and has dense infiltrates in the left upper lobe and left lower lobe area. She has some honeycombing in the left upper lobe area along with necrotizing infiltrate in the left upper lobe.    Cardiovascular:  Exercise tolerance: poor (<4 METS),       (-) hypertension and  angina      Rhythm: regular  Rate: normal  Echocardiogram reviewed               ROS comment:  Summary   *Technically difficult study due to poor acoustical windows. *Left ventricle - normal size, thickness and function with EF of 65%   *Bubble study - negative   *Tricuspid valve - trivial regurgitation     Sinus tachycardia with Premature supraventricular complexesPossible Left atrial enlargementNonspecific ST and T wave abnormalityAbnormal ECGConfirmed by Ivy Waters MD, Shira Hubbard ([id number]) on 8/28/2020 4:31:20 PM      Neuro/Psych:   (+) CVA:, neuromuscular disease:, psychiatric history:depression/anxiety             GI/Hepatic/Renal:   (+) hiatal hernia, GERD:, renal disease: CRI,          ROS comment: Crohns, gastritis, abd distension, possible ileus. .   Endo/Other:    (+) blood dyscrasia (on Eliquis, H/H 9.8/31.2, ): anticoagulation therapy and anemia:., electrolyte abnormalities, .    (-) diabetes mellitus                ROS comment: Long term steroid use   Abdominal:           Vascular:   + DVT, PE. Anesthesia Plan      general     ASA 3     (D/t patient's underlying lung issues, will consider GETA. Pt has hx of long term steroid use, will ensure she receives steroids on DOS.)  Induction: intravenous. MIPS: Postoperative opioids intended, Prophylactic antiemetics administered and Postoperative trial extubation. Plan discussed with attending. Plan for GETA with standard ASA monitoring. Additional monitoring and lines as dictated by intra-op course. Risks/benefits reviewed with patient and all anesthestic questions answered prior to procedure. NPO appropriate.       GUS Yo - CRNA   9/1/2020

## 2020-09-01 NOTE — CARE COORDINATION
Contact pt's [employer] ,  [facility], [phone], at discharge. Stated they could assist with the A/C unit but would take a couple weeks to get approved and install.       Electronically signed by NAEEM Mcmanus, LSW on 9/1/2020 at 3:43 PM

## 2020-09-01 NOTE — DISCHARGE INSTR - COC
Continuity of Care Form    Patient Name: Alyssia Fulton   :  1950  MRN:  [id number]    Admit date:  2020  Discharge date:  2020    Code Status Order: Full Code   Advance Directives:   Advance Care Flowsheet Documentation     Date/Time Healthcare Directive Type of Healthcare Directive Copy in [address] Agent's Name Healthcare Agent's Phone Number    20 4813  Yes, patient has an advance directive for healthcare treatment  --  No, copy requested from family  --  --  --    20 6629  Yes, patient has an advance directive for healthcare treatment  Health care treatment directive; Living will  No, copy requested from family  Spouse  --  --          Admitting Physician:  Jessie Graham MD  PCP: Rohith Panda MD    Discharging Nurse: Mindi Medina Unit/Room#: 0FO-3847/7022-29  Discharging Unit Phone Number: [phone]    Emergency Contact:   Extended Emergency Contact Information  Primary Emergency Contact: Halima Garcia [address] Phone: [phone]  Relation: Brother/Sister  Secondary Emergency Contact: Elizabeth Tesfaye [address] Phone: [phone]  Relation: Domestic Partner    Past Surgical History:  Past Surgical History:   Procedure Laterality Date    ABDOMEN SURGERY      BACK SURGERY      shunt to drain CSF    BIOPSY SHOULDER Left 2019    EXCISION OF LEFT SHOULDER MASS performed by Ramos Aranda MD at [address] [place] [address]      crainotomy- remove fluid ? V-P SHUNT    BRONCHOSCOPY N/A 2020    BRONCHOSCOPY ALVEOLAR LAVAGE performed by Yoni Navarro MD at [place] [address]      resection X2    COLONOSCOPY  11    BIOPSY AND POLYPECTOMY    COLONOSCOPY  2012    and ablation ERBE/APC    SHOULDER SURGERY      L        Immunization History:   Immunization History   Administered Date(s) Administered    DT (pediatric) 09/25/2010    Influenza Virus Vaccine 10/01/2012, 10/20/2015    Influenza Whole 10/01/2011    Influenza, High Dose (Fluzone 65 yrs and older) 10/06/2014, 10/19/2016, 11/16/2017, 11/05/2018    Influenza, Triv, inactivated, subunit, adjuvanted, IM (Fluad 65 yrs and older) 11/13/2019    Pneumococcal Conjugate 13-valent ([id number]) 06/11/2015    Pneumococcal Conjugate 7-valent (Prevnar7) 02/07/2005, 10/01/2011    Pneumococcal Polysaccharide ([id number]) 06/15/2016    Zoster Live (Zostavax) 07/01/2013       Active Problems:  Patient Active Problem List   Diagnosis Code    Crohn disease ([facility] K50.90    Depression F32.9    Osteoarthritis M19.90    Lupus anticoagulant disorder ([facility] D68.62    Dysphagia R13.10    Syringomyelia ([facility] G95.0    Gastritis and gastroduodenitis K29.70, K29.90    Skin ulcer of shoulder ([facility] L98.499    Shortness of breath R06.02    Anemia D64.9    Chronic emphysema syndrome ([facility] J43.9    Pulmonary fibrosis ([facility]) J84.10    Chronic hypoxemic respiratory failure ([facility]) J96.11    Hiatal hernia K44.9    COPD, severe ([facility]) J44.9    Alpha thalassemia minor D56.3    Necrotizing pneumonia ([facility]) J85.0    Acute respiratory failure with hypoxia ([facility]) J96.01    Bronchiectasis with acute exacerbation ([facility]) J47.1    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    Osteoporosis of multiple sites M81.0    Gastroesophageal reflux disease without esophagitis K21.9    Sepsis ([facility]) A41.9    Bilateral pulmonary embolism ([facility]) I26.99    Acute deep vein thrombosis (DVT) of distal vein of both lower extremities ([facility]) I82.4Z3    History of pulmonary embolism Z86.711    SOB (shortness of breath) R06.02    Wound of left shoulder S41.002A    Hx pulmonary embolism Z86.711    Multifocal pneumonia J18.9    COPD exacerbation ([facility]) J44.1    Bacterial pneumonia J15.9    Cerebrovascular accident (CVA) ([facility] I63.9    MARIS (acute kidney injury) ([facility] N17.9    Suspected COVID-19 virus infection Z20.828    Dizziness R42    Elevated procalcitonin R79.89    Long term systemic steroid user Z79.52    History of DVT (deep vein thrombosis) Z86.718    Anticoagulated Z79.01    Small vessel disease, cerebrovascular I67.9    Ex-smoker Z87.891    History of depression Z86.59    Overweight (BMI 25.0-29. 9) E66.3       Isolation/Infection:   Isolation          No Isolation        Patient Infection Status     Infection Onset Added Last Indicated Last Indicated By Review Planned Expiration Resolved Resolved By    None active    Resolved    COVID-19 Rule Out 08/28/20 08/28/20 08/28/20 COVID-19 (Ordered)   08/29/20 Rule-Out Test Resulted    VRE 01/13/20 01/16/20 01/13/20 URINE CULTURE   08/30/20 Sandee aVldivia RN          Nurse Assessment:  Last Vital Signs: /79   Pulse 103   Temp 97.5 °F (36.4 °C) (Oral)   Resp 18   Ht 5' (1.524 m)   Wt 151 lb (68.5 kg)   SpO2 95%   BMI 29.49 kg/m²     Last documented pain score (0-10 scale): Pain Level: 0  Last Weight:   Wt Readings from Last 1 Encounters:   09/02/20 151 lb (68.5 kg)     Mental Status:  oriented and alert    IV Access:  - None    Nursing Mobility/ADLs:  Walking   Independent  Transfer  Independent  Bathing  Assisted  Dressing  Independent  [address]  Independent  Med Delivery   whole    Wound Care Documentation and Therapy:        Elimination:  Continence:   · Bowel: No  · Bladder: No  Urinary Catheter: None   Colostomy/Ileostomy/Ileal Conduit: No       Date of Last BM: 09/05/2020    Intake/Output Summary (Last 24 hours) at 9/3/2020 1510  Last data filed at 9/3/2020 1258  Gross per 24 hour   Intake 540 ml   Output --   Net 540 ml     I/O last 3 completed shifts: In: 5 [P.O.:540]  Out: -     Safety Concerns:      At Risk for Falls    Impairments/Disabilities:      None    Nutrition Therapy:  Current Nutrition Therapy:   - Oral Diet:  Cardiac    Routes of Feeding: Oral  Liquids: No Restrictions  Daily Fluid Restriction: no  Last Modified Barium Swallow with Video (Video Swallowing Test): not done    Treatments at the Time of Hospital Discharge:   Respiratory Treatments: symbicort, albuterol, spiriva  Oxygen Therapy:  is on oxygen at 2 L/min per nasal cannula. Ventilator:    - No ventilator support    Rehab Therapies: SN,PT,OT  Weight Bearing Status/Restrictions: No weight bearing restirctions  Other Medical Equipment (for information only, NOT a DME order):  walker  Other Treatments:     Patient's personal belongings (please select all that are sent with patient):  Glasses    RN SIGNATURE:  Electronically signed by Na Young RN on 9/5/20 at 4:04 PM EDT    CASE MANAGEMENT/SOCIAL WORK SECTION    Inpatient Status Date: ***    Readmission Risk Assessment Score:  Readmission Risk              Risk of Unplanned Readmission:        21           Discharging to Facility/ [address] [place]       Phone -[phone]         [facility]  [phone], fax [phone]. Please notify [facility], [address] [place] [address], [phone]    Dialysis Facility (if applicable)   · Name:    / signature: Electronically signed by ESTRELLA Rizzo on 9/3/20 at 3:09 PM EDT    PHYSICIAN SECTION    Prognosis: Good    Condition at Discharge: Stable    Rehab Potential (if transferring to Rehab): Good    Recommended Labs or Other Treatments After Discharge:     Physician Certification: I certify the above information and transfer of Jillian Sesay  is necessary for the continuing treatment of the diagnosis listed and that she requires Home Care for less 30 days.      Update Admission H&P: No change in H&P    PHYSICIAN SIGNATURE:  Electronically signed by Macario Cespedes MD on 9/5/20 at 3:49 PM EDT

## 2020-09-01 NOTE — PROGRESS NOTES
[facility] unable to staff @ this time. [facility] has accepted this referral for home care. Discharge planner notified.

## 2020-09-01 NOTE — PROGRESS NOTES
Reviewed CT-abdomen/pelvis:  Moderate amount of stool within the large bowel, possible ileus. Change to clear diet. Encourage ambulation. Ordered mag citrate and Dulcolax suppository earlier. Consider GI evaluation in the morning, depending on status.     SIGNED: Poly Armijo MD . 8/31/2020

## 2020-09-01 NOTE — PROGRESS NOTES
Physical Therapy    Facility/Department: [address]  Initial Assessment    NAME: Marylee Abide  : 1950  MRN: [id number]    Date of Service: 2020    Discharge Recommendations:Susan COBY Ottorashawn Saeed scored a 17/ on the AM-PAC short mobility form. Current research shows that an AM-PAC score of 17 or less is typically not associated with a discharge to the patient's home setting. Based on the patient's AM-PAC score and their current functional mobility deficits, it is recommended that the patient have 3-5 sessions per week of Physical Therapy at d/c to increase the patient's independence. Please see assessment section for further patient specific details. If patient discharges prior to next session this note will serve as a discharge summary. Please see below for the latest assessment towards goals. PT Equipment Recommendations  Equipment Needed: No  Other: Pt has rollator at home    Assessment   Body structures, Functions, Activity limitations: Decreased functional mobility ; Decreased ADL status; Decreased strength;Decreased endurance;Decreased balance;Decreased posture  Assessment: Pt presents as below her baseline function. Pt would benefit from skilled PT services to promote safe return to OF. Pt demonstrates decreased strength and endurance required for proloned mobility and stair navigation. At this time, would recommend continued PT in an inpatient setting following discharge to ensure safe transition to home environment. Prognosis: Good  Decision Making: Low Complexity  PT Education: Goals;Plan of Care;PT Role  Patient Education: D/C recommendations--pt verbalizing understanding but currently refusing  REQUIRES PT FOLLOW UP: Yes  Activity Tolerance  Activity Tolerance: Patient Tolerated treatment well;Patient limited by endurance       Patient Diagnosis(es): The primary encounter diagnosis was Pneumonia due to organism.  Diagnoses of Suspected COVID-19 virus infection, Acute respiratory failure with hypoxia ([address] MARIS (acute kidney injury) ([address] Cerebrovascular accident (CVA), unspecified mechanism ([address] and Dizziness were also pertinent to this visit. 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 has a past surgical history that includes shoulder surgery; back surgery; brain surgery; Colon surgery; Colonoscopy (12/5/11); Colonoscopy (4-2-2012); Cholecystectomy; Abdomen surgery; and Biopsy shoulder (Left, 2/27/2019). Restrictions  Restrictions/Precautions  Restrictions/Precautions: Fall Risk, General Precautions(High, 3L O2)  Required Braces or Orthoses?: No  Position Activity Restriction  Other position/activity restrictions: Joi Shetty is a 77 y.o. female who presents because of difficulty breathing. She has a history of chronic respiratory failure and severe COPD. She wears 2 L of oxygen all the time. She says that her air conditioning went out from her home about a month ago. She started using a portable air conditioner but most of her home is still hot. She has needed to sleep in her living room in a chair because her bedroom is too hot. She is able to lie flat. She has had increased cough and congestion for about 3 days. She feels generally weak all over and unsteady on her feet for multiple days. She was too weak this morning to take her home inhalers or her normal medicines. She has been otherwise compliant with her medicines and takes prednisone daily and Eliquis twice daily. She does have a history of blood clots.   She denies any leg swelling. Cough is mostly nonproductive. She does not believe she has had a fever and has been checking her temperature. She has been sweating a lot because of the temperature at her home. EMS found her to be hypoxic on her home oxygen 86%. The administer higher flow oxygen and brought her to the hospital.  She does have a headache and has a sensation of the room spinning since sometime yesterday. She does have left-sided chest pain, worse with coughing for several days. She has been unsteady on her feet, walking into walls for several days. Vision/Hearing  Vision: Impaired  Vision Exceptions: Wears glasses at all times  Hearing: Within functional limits     Subjective  General  Chart Reviewed: Yes  Response To Previous Treatment: Not applicable  Family / Caregiver Present: No  Diagnosis: Bacterial Pneumonia, concern for ileus  Follows Commands: Within Functional Limits  General Comment  Comments: Pt supine in bed upon arrival, tearful expressing feelings of distress due to feeling dirty with limited opportunities to complete bathing. Offered words of comfort and offered to complete bathing and ADL's during evaluation.   Subjective  Subjective: Pt agreeable to PT/OT eval.  Pain Screening  Patient Currently in Pain: Denies  Vital Signs  Patient Currently in Pain: Denies       Orientation  Orientation  Overall Orientation Status: Within Functional Limits  Social/Functional History  Social/Functional History  Lives With: Alone  Type of Home: Apartment  Home Layout: One level  Home Access: Stairs to enter with rails(4-5)  Entrance Stairs - Number of Steps: 1 step in  Entrance Stairs - Rails: None  Bathroom Shower/Tub: Tub/Shower unit  Bathroom Toilet: Standard  Bathroom Equipment: Grab bars in shower, Tub transfer bench  Bathroom Accessibility: Not accessible  Home Equipment: 4 wheeled walker, Cane, Alert Button, Oxygen(2L oxygen)  Receives Help From: Family, Neighbor, Home health(home aid x2/wk)  ADL Assistance: Independent  Homemaking Assistance: Needs assistance  Meal Prep: Other (comment)(MOW)  Laundry: (home health)  Homemaking Responsibilities: No(MOW, home care x2/wk for laundry and cleaning)  Ambulation Assistance: Independent(with 4ww)  Transfer Assistance: Independent  Active : No  Patient's  Info: family, friends, neighbors assist with transport  Mode of Transportation: Family, Friends  Leisure & Hobbies: sew, jaleel  IADL Comments: assistance from family and friends  Additional Comments: fall before coming into hospital - got dizzy and fell in bathroom; life alert  Cognition        Objective     Observation/Palpation  Posture: Fair    AROM RLE (degrees)  RLE AROM: WFL  AROM LLE (degrees)  LLE AROM : WFL  Strength RLE  Strength RLE: WFL  Comment: Adequate for ambulation  Strength LLE  Strength LLE: WFL  Comment: Adequate for ambulation  Tone RLE  RLE Tone: Normotonic  Tone LLE  LLE Tone: Normotonic  Motor Control  Gross Motor?: WFL  Sensation  Overall Sensation Status: WFL  Bed mobility  Supine to Sit: Stand by assistance  Scooting: Stand by assistance  Transfers  Sit to Stand: Contact guard assistance;Stand by assistance(CGA from EOB, SBA from [facility] and recliner)  Stand to sit: Contact guard assistance  Ambulation  Ambulation?: Yes  Ambulation 1  Surface: level tile  Device: Rolling Walker  Assistance: Contact guard assistance  Quality of Gait: Pt ambulates with decreased step length, slow camryn, forward flexed posture, no LOB.   Distance: ~15 ft  Comments: Pt reporting dizziness/lightheadedness following ambulation, BP checked adn reading 100/64,  bpm, SpO2 89-94% on 2L O2  Stairs/Curb  Stairs?: No     Balance  Posture: Fair  Sitting - Static: Good  Sitting - Dynamic: Good;-  Standing - Static: Fair;+  Standing - Dynamic: [address]  Times per week: 3-5x  Times per day: Daily  Current Treatment Recommendations: Strengthening, ROM, Balance Training, Functional Mobility Training, Transfer Training, Endurance Training, Gait Training, Home Exercise Program, Safety Education & Training, Patient/Caregiver Education & Training  Safety Devices  Type of devices: All fall risk precautions in place, Call light within reach, Chair alarm in place, Gait belt, Patient at risk for falls, Left in chair, Nurse notified  Restraints  Initially in place: No    G-Code       OutComes Score                                                  AM-PAC Score  AM-PAC Inpatient Mobility Raw Score : 17 (09/01/20 1003)  AM-PAC Inpatient T-Scale Score : 42.13 (09/01/20 1003)  Mobility Inpatient CMS 0-100% Score: 50.57 (09/01/20 1003)  Mobility Inpatient CMS G-Code Modifier : CK (09/01/20 1003)          Goals  Short term goals  Time Frame for Short term goals:  To be met prior to discharge  Short term goal 1: Pt will complete bed mobility with mod I  Short term goal 2: Pt will complete sit to/from stand with mod I  Short term goal 3: Pt will ambulate 100 ft with LRAD and CGA  Short term goal 4: Pt will navigate up/down 5 steps with HR and supervision       Therapy Time   Individual Concurrent Group Co-treatment   Time In 0838         Time Out 0936         Minutes 58         Timed Code Treatment Minutes: [address]  [id number], PT   [employer] SquRenee garcia, DPT, [id number]

## 2020-09-01 NOTE — PROGRESS NOTES
[facility] PROGRESS NOTE    9/1/2020 9:15 AM        Name: Siva Keys . Admitted: 8/28/2020  Primary Care Provider: Modesto Walsh MD (Tel: [phone])    Brief Course:  Patient is a 70-year-old -American female with history of COPD, Crohn's disease, CKD-3, depression, rheumatoid arthritis, history of DVT/PE, osteoporosis who presented with increased shortness of breath, dry cough and s/p fall on the morning of admission.  She was noted to be hypoxic with oxygen saturation in mid 80s requiring increased O2 supplementation. Chest x-ray was noted for bilateral peripheral airspace disease suggestive of atypical/viral pneumonia. She was found to have sepsis due to bilateral pleural pneumonia, possible aspiration pneumonia, acute on chronic kidney injury, acute on chronic respiratory failure as well as mechanical fall at home.     CC: Worsening shortness of breath, dry cough, s/p fall at home  Subjective: No acute events overnight. Patient continues to report respiratory distress. Constipated for the last 4-5 days. Overnight, patient complained of left lower quadrant pain for which he underwent CT abdomen/pelvis. Imaging was suggestive of constipation. Patient received mag citrate with 3-4 bowel movements by today morning.   Patient reports improvement in pain abdomen.     Reviewed interval ancillary notes    Current Medications  sennosides-docusate sodium (SENOKOT-S) 8.6-50 MG tablet 2 tablet, Daily  piperacillin-tazobactam (ZOSYN) 3.375 g in dextrose 5 % 50 mL IVPB extended infusion (mini-bag), Q8H  bisacodyl (DULCOLAX) suppository 10 mg, Daily PRN  ipratropium-albuterol (DUONEB) nebulizer solution 1 ampule, Q4H WA  guaiFENesin-codeine (GUAIFENESIN AC) 100-10 MG/5ML liquid 10 mL, Q4H PRN  budesonide-formoterol (SYMBICORT) 160-4.5 MCG/ACT inhaler 2 puff, BID  tiotropium (SPIRIVA RESPIMAT) 2.5 MCG/ACT inhaler 2 puff, Daily  predniSONE (DELTASONE) tablet 20 mg, Daily  linezolid (ZYVOX) IVPB 600 mg, Q12H  0.9 % sodium chloride bolus, Once  sodium chloride flush 0.9 % injection 10 mL, 2 times per day  sodium chloride flush 0.9 % injection 10 mL, PRN  acetaminophen (TYLENOL) tablet 650 mg, Q6H PRN    Or  acetaminophen (TYLENOL) suppository 650 mg, Q6H PRN  polyethylene glycol (GLYCOLAX) packet 17 g, Daily PRN  promethazine (PHENERGAN) tablet 12.5 mg, Q6H PRN    Or  ondansetron (ZOFRAN) injection 4 mg, Q6H PRN  amitriptyline (ELAVIL) tablet 75 mg, Nightly  apixaban (ELIQUIS) tablet 5 mg, BID  atorvastatin (LIPITOR) tablet 40 mg, Nightly  labetalol (NORMODYNE;TRANDATE) injection 10 mg, Q4H PRN  albuterol sulfate  (90 Base) MCG/ACT inhaler 2 puff, Q2H PRN        Objective:  /88   Pulse 120   Temp 97.8 °F (36.6 °C) (Oral)   Resp 22   Ht 5' (1.524 m)   Wt 152 lb 14.4 oz (69.4 kg)   SpO2 97%   BMI 29.86 kg/m²     Intake/Output Summary (Last 24 hours) at 9/1/2020 0915  Last data filed at 8/31/2020 2154  Gross per 24 hour   Intake 300 ml   Output 325 ml   Net -25 ml      Wt Readings from Last 3 Encounters:   08/31/20 152 lb 14.4 oz (69.4 kg)   08/13/20 147 lb (66.7 kg)   03/25/20 133 lb (60.3 kg)       General appearance: Elderly -American female, in mild respiratory distress, on O2 via NC  Cardiovascular: Regular rhythm, normal S1, S2. No murmur. No edema in lower extremities  Respiratory: on 3L O2 via NC, using accessory muscles. Good inspiratory effort. Clear to auscultation bilaterally, no wheeze or crackles. GI: Abdomen firm, distended, no tenderness, not distended, sluggish bowel sounds  Musculoskeletal: No cyanosis in digits, neck supple  Neurology: CN 2-12 grossly intact. No speech or motor deficits  Psych: Normal affect. Alert and oriented in time, place and person  Skin: Warm, dry, normal turgor  Extremity exam shows brisk capillary refill.   Peripheral pulses are palpable in lower extremities        Labs and Tests:  CBC:   Recent Labs     08/30/20  0422 08/31/20  1145 09/01/20  0514   WBC 32.8* 25.8* 27.9*   HGB 9.8* 9.1* 9.8*    363 382     BMP:    Recent Labs     08/30/20  0422 08/31/20  1145 09/01/20  0515    135* 134*   K 4.1 3.9 4.6    103 103   CO2 19* 21 16*   BUN 26* 21* 20   CREATININE 1.8* 1.7* 1.6*   GLUCOSE 109* 136* 87     Hepatic: No results for input(s): AST, ALT, ALB, BILITOT, ALKPHOS in the last 72 hours. CT ABDOMEN PELVIS WO CONTRAST Additional Contrast? None   Final Result   Moderate amount of stool and fluid within the large bowel, which may reflect   ileus and/or diarrheal disease. Mild fluid-filled distention of small bowel, which also likely represents   ileus. Obstruction does not appear to be present. Small left pleural effusion with adjacent airspace disease, likely passive   atelectasis. Infiltrate in the posterior aspect of the lingula is noted,   incompletely included on the field of view, most compatible with pneumonia. MRI BRAIN WO CONTRAST   Final Result   Mild chronic small vessel ischemic changes. No acute stroke, midline shift   or mass effect. VL DUP CAROTID BILATERAL   Final Result      CT CHEST WO CONTRAST   Final Result   Significant worsening in size of the left upper lobe pneumonia in 2 days. Moderate left pleural effusion now present with mild atelectasis. Severe   emphysematous changes. No CHF. XR CHEST (2 VW)   Final Result   Progressive consolidation within the left upper lobe consistent with   pneumonia. Increased dependent left lower lobe opacification and effusion. Improved aeration at the right lung base. CT CHEST WO CONTRAST   Final Result   1. Left upper lobe pneumonia. Recommend chest radiograph in 8 weeks to   confirm resolution. 2. Trace left pleural effusion. CT HEAD WO CONTRAST   Final Result   1.  New age-indeterminate infarct within the right internal capsule. Recommend further evaluation with MRI of the head. XR CHEST PORTABLE   Final Result   Bilateral peripheral airspace disease suggestive of atypical or viral   pneumonia. Problem List  Active Problems:    Chronic emphysema syndrome (HCC)    Necrotizing pneumonia ([address]    Acute respiratory failure with hypoxia (HCC)    Multifocal pneumonia    Bacterial pneumonia    Cerebrovascular accident (CVA) ([address]    MARIS (acute kidney injury) ([address]    Suspected COVID-19 virus infection    Dizziness    Elevated procalcitonin    Long term systemic steroid user    History of DVT (deep vein thrombosis)    Anticoagulated    Small vessel disease, cerebrovascular    Ex-smoker    History of depression    Overweight (BMI 25.0-29. 9)  Resolved Problems:    * No resolved hospital problems. *       Assessment & Plan:     Acute on chronic respiratory failure with hypoxia: COVID-19 test is negative  Pulmonology was consulted. On treatment for bacterial/aspiration pneumonia with IV zosyn and iv Linezolid. On optimal management of COPD -duo nebs, Spiriva, Symbicort, prednisone. Infectious disease service consulted as per pulmonology recommendation. Dr. Raina Barclay recommends continuing IV linezolid, IV Zosyn possible BAL for diagnosis. Pulmonology following on bronchoscopy with BAL tomorrow morning. N.p.o. past midnight. Constipation:   Patient has history of Crohn's with partial bowel resection. Gives history of chronic intermittent constipation. Follows up with Dr. Miranda Hi at [facility] for Crohn's. Patient reported LLQ pain overnight. CT imaging was suggestive of constipation. Mg citrate ordered. Patient had 4-5 bowel movements in the last 12 hours. Reports improvement in pain abdomen. Has Senokot scheduled and MiraLAX as needed for constipation.     Sepsis due to Bacterial pneumonia:    WBC count trending down. CT chest with ADAM infiltrate. Elevated procalcitonin.  On IV antibiotic therapy as mentioned above.     Chronic steroid use:  On prednisone 20 mg po daily.  Stable BP and hemodynamics.     Cerebrovascular accident (CVA): Patient had a fall at home.  CT head was concerning for possible internal capsule stroke.  Neurology consulted. On anticoagulation, statin. MRI brain showed changes consistent with old infarcts. No new CVA are mass-effect. Carotid artery Dopplers report as below:   -The right internal carotid artery appears to have a minimal <50% diameter     reducing stenosis based on velocity criteria.     -The left internal carotid artery appears to have a minimal <50% diameter     reducing stenosis based on velocity criteria. Acute kidney injury: Improving renal function with IV hydration.     IV Access: Peripheral IV  Carreon: None  Diet: DIET GENERAL;  Code:Full Code  DVT PPX on Eliquis  Disposition - pending acute issues.     Radha Skinner MD   9/1/2020 9:15 AM

## 2020-09-02 ENCOUNTER — ANESTHESIA (OUTPATIENT)
Dept: ENDOSCOPY | Age: 70
DRG: 871 | End: 2020-09-02
Payer: MEDICARE

## 2020-09-02 VITALS
RESPIRATION RATE: 2 BRPM | OXYGEN SATURATION: 100 % | DIASTOLIC BLOOD PRESSURE: 66 MMHG | SYSTOLIC BLOOD PRESSURE: 128 MMHG

## 2020-09-02 LAB
ANION GAP SERPL CALCULATED.3IONS-SCNC: 11 MMOL/L (ref 3–16)
ANISOCYTOSIS: ABNORMAL
APPEARANCE BAL (LAVAGE): ABNORMAL
BANDED NEUTROPHILS RELATIVE PERCENT: 4 % (ref 0–7)
BASOPHILS ABSOLUTE: 0 K/UL (ref 0–0.2)
BASOPHILS RELATIVE PERCENT: 0 %
BUN BLDV-MCNC: 17 MG/DL (ref 7–20)
CALCIUM SERPL-MCNC: 8.7 MG/DL (ref 8.3–10.6)
CHLORIDE BLD-SCNC: 103 MMOL/L (ref 99–110)
CLOT EVALUATION BAL: ABNORMAL
CO2: 22 MMOL/L (ref 21–32)
COLOR LAVAGE: ABNORMAL
CREAT SERPL-MCNC: 1.6 MG/DL (ref 0.6–1.2)
EOSINOPHILS ABSOLUTE: 0.4 K/UL (ref 0–0.6)
EOSINOPHILS RELATIVE PERCENT: 2 %
GFR AFRICAN AMERICAN: 39
GFR NON-AFRICAN AMERICAN: 32
GLUCOSE BLD-MCNC: 100 MG/DL (ref 70–99)
HCT VFR BLD CALC: 28.4 % (ref 36–48)
HEMATOLOGY PATH CONSULT: NO
HEMOGLOBIN: 9 G/DL (ref 12–16)
HYPOCHROMIA: ABNORMAL
LYMPHOCYTES ABSOLUTE: 6.8 K/UL (ref 1–5.1)
LYMPHOCYTES RELATIVE PERCENT: 34 %
LYMPHOCYTES, BAL: 32 % (ref 5–10)
MACROPHAGES, BAL: 23 % (ref 90–95)
MAGNESIUM: 2.9 MG/DL (ref 1.8–2.4)
MCH RBC QN AUTO: 20.4 PG (ref 26–34)
MCHC RBC AUTO-ENTMCNC: 31.9 G/DL (ref 31–36)
MCV RBC AUTO: 64.1 FL (ref 80–100)
MONOCYTES ABSOLUTE: 1.8 K/UL (ref 0–1.3)
MONOCYTES RELATIVE PERCENT: 9 %
NEUTROPHILS ABSOLUTE: 10.9 K/UL (ref 1.7–7.7)
NEUTROPHILS RELATIVE PERCENT: 51 %
NUMBER OF CELLS COUNTED BAL (LAVAGE): 100
OVALOCYTES: ABNORMAL
PDW BLD-RTO: 15.1 % (ref 12.4–15.4)
PLATELET # BLD: 420 K/UL (ref 135–450)
PLATELET SLIDE REVIEW: ADEQUATE
PMV BLD AUTO: 7.1 FL (ref 5–10.5)
POIKILOCYTES: ABNORMAL
POLYCHROMASIA: ABNORMAL
POTASSIUM REFLEX MAGNESIUM: 3.5 MMOL/L (ref 3.5–5.1)
RBC # BLD: 4.43 M/UL (ref 4–5.2)
RBC, BAL: 3700 /CUMM
SEGMENTED NEUTROPHILS, BAL: 45 % (ref 5–10)
SLIDE REVIEW: ABNORMAL
SODIUM BLD-SCNC: 136 MMOL/L (ref 136–145)
TARGET CELLS: ABNORMAL
TEAR DROP CELLS: ABNORMAL
WBC # BLD: 19.9 K/UL (ref 4–11)
WBC/EPI CELLS BAL: 691 /CUMM

## 2020-09-02 PROCEDURE — 87070 CULTURE OTHR SPECIMN AEROBIC: CPT

## 2020-09-02 PROCEDURE — 2500000003 HC RX 250 WO HCPCS: Performed by: ANESTHESIOLOGY

## 2020-09-02 PROCEDURE — 3609010800 HC BRONCHOSCOPY ALVEOLAR LAVAGE: Performed by: INTERNAL MEDICINE

## 2020-09-02 PROCEDURE — 87556 M.TUBERCULO DNA AMP PROBE: CPT

## 2020-09-02 PROCEDURE — 7100000001 HC PACU RECOVERY - ADDTL 15 MIN: Performed by: INTERNAL MEDICINE

## 2020-09-02 PROCEDURE — 6370000000 HC RX 637 (ALT 250 FOR IP): Performed by: INTERNAL MEDICINE

## 2020-09-02 PROCEDURE — 2580000003 HC RX 258: Performed by: NURSE ANESTHETIST, CERTIFIED REGISTERED

## 2020-09-02 PROCEDURE — 36415 COLL VENOUS BLD VENIPUNCTURE: CPT

## 2020-09-02 PROCEDURE — 87798 DETECT AGENT NOS DNA AMP: CPT

## 2020-09-02 PROCEDURE — 87385 HISTOPLASMA CAPSUL AG IA: CPT

## 2020-09-02 PROCEDURE — 87206 SMEAR FLUORESCENT/ACID STAI: CPT

## 2020-09-02 PROCEDURE — 87449 NOS EACH ORGANISM AG IA: CPT

## 2020-09-02 PROCEDURE — 87541 LEGION PNEUMO DNA AMP PROB: CPT

## 2020-09-02 PROCEDURE — 87632 RESP VIRUS 6-11 TARGETS: CPT

## 2020-09-02 PROCEDURE — 94640 AIRWAY INHALATION TREATMENT: CPT

## 2020-09-02 PROCEDURE — 99233 SBSQ HOSP IP/OBS HIGH 50: CPT | Performed by: INTERNAL MEDICINE

## 2020-09-02 PROCEDURE — 0BC88ZZ EXTIRPATION OF MATTER FROM LEFT UPPER LOBE BRONCHUS, VIA NATURAL OR ARTIFICIAL OPENING ENDOSCOPIC: ICD-10-PCS | Performed by: INTERNAL MEDICINE

## 2020-09-02 PROCEDURE — 2580000003 HC RX 258: Performed by: INTERNAL MEDICINE

## 2020-09-02 PROCEDURE — 6360000002 HC RX W HCPCS: Performed by: ANESTHESIOLOGY

## 2020-09-02 PROCEDURE — 94761 N-INVAS EAR/PLS OXIMETRY MLT: CPT

## 2020-09-02 PROCEDURE — 87116 MYCOBACTERIA CULTURE: CPT

## 2020-09-02 PROCEDURE — 87281 PNEUMOCYSTIS CARINII AG IF: CPT

## 2020-09-02 PROCEDURE — 80048 BASIC METABOLIC PNL TOTAL CA: CPT

## 2020-09-02 PROCEDURE — 2709999900 HC NON-CHARGEABLE SUPPLY: Performed by: INTERNAL MEDICINE

## 2020-09-02 PROCEDURE — 6360000002 HC RX W HCPCS: Performed by: INTERNAL MEDICINE

## 2020-09-02 PROCEDURE — 89051 BODY FLUID CELL COUNT: CPT

## 2020-09-02 PROCEDURE — 88112 CYTOPATH CELL ENHANCE TECH: CPT

## 2020-09-02 PROCEDURE — 85025 COMPLETE CBC W/AUTO DIFF WBC: CPT

## 2020-09-02 PROCEDURE — 87015 SPECIMEN INFECT AGNT CONCNTJ: CPT

## 2020-09-02 PROCEDURE — 83735 ASSAY OF MAGNESIUM: CPT

## 2020-09-02 PROCEDURE — 31624 DX BRONCHOSCOPE/LAVAGE: CPT | Performed by: INTERNAL MEDICINE

## 2020-09-02 PROCEDURE — 87305 ASPERGILLUS AG IA: CPT

## 2020-09-02 PROCEDURE — 6370000000 HC RX 637 (ALT 250 FOR IP): Performed by: ANESTHESIOLOGY

## 2020-09-02 PROCEDURE — 87581 M.PNEUMON DNA AMP PROBE: CPT

## 2020-09-02 PROCEDURE — 87205 SMEAR GRAM STAIN: CPT

## 2020-09-02 PROCEDURE — 2060000000 HC ICU INTERMEDIATE R&B

## 2020-09-02 PROCEDURE — 7100000000 HC PACU RECOVERY - FIRST 15 MIN: Performed by: INTERNAL MEDICINE

## 2020-09-02 PROCEDURE — 2500000003 HC RX 250 WO HCPCS: Performed by: NURSE ANESTHETIST, CERTIFIED REGISTERED

## 2020-09-02 PROCEDURE — 3700000000 HC ANESTHESIA ATTENDED CARE: Performed by: INTERNAL MEDICINE

## 2020-09-02 PROCEDURE — 87102 FUNGUS ISOLATION CULTURE: CPT

## 2020-09-02 PROCEDURE — 6360000002 HC RX W HCPCS: Performed by: NURSE ANESTHETIST, CERTIFIED REGISTERED

## 2020-09-02 PROCEDURE — 2700000000 HC OXYGEN THERAPY PER DAY

## 2020-09-02 PROCEDURE — 87486 CHLMYD PNEUM DNA AMP PROBE: CPT

## 2020-09-02 PROCEDURE — 88305 TISSUE EXAM BY PATHOLOGIST: CPT

## 2020-09-02 RX ORDER — SODIUM CHLORIDE 9 MG/ML
INJECTION, SOLUTION INTRAVENOUS CONTINUOUS PRN
Status: DISCONTINUED | OUTPATIENT
Start: 2020-09-02 | End: 2020-09-02 | Stop reason: SDUPTHER

## 2020-09-02 RX ORDER — LIDOCAINE HYDROCHLORIDE 40 MG/ML
4 INJECTION, SOLUTION RETROBULBAR; TOPICAL ONCE
Status: COMPLETED | OUTPATIENT
Start: 2020-09-02 | End: 2020-09-02

## 2020-09-02 RX ORDER — PROPOFOL 10 MG/ML
INJECTION, EMULSION INTRAVENOUS PRN
Status: DISCONTINUED | OUTPATIENT
Start: 2020-09-02 | End: 2020-09-02 | Stop reason: SDUPTHER

## 2020-09-02 RX ORDER — LIDOCAINE HYDROCHLORIDE 20 MG/ML
INJECTION, SOLUTION EPIDURAL; INFILTRATION; INTRACAUDAL; PERINEURAL PRN
Status: DISCONTINUED | OUTPATIENT
Start: 2020-09-02 | End: 2020-09-02 | Stop reason: SDUPTHER

## 2020-09-02 RX ORDER — IPRATROPIUM BROMIDE AND ALBUTEROL SULFATE 2.5; .5 MG/3ML; MG/3ML
1 SOLUTION RESPIRATORY (INHALATION)
Status: DISCONTINUED | OUTPATIENT
Start: 2020-09-02 | End: 2020-09-02

## 2020-09-02 RX ORDER — LIDOCAINE HYDROCHLORIDE 40 MG/ML
SOLUTION TOPICAL PRN
Status: DISCONTINUED | OUTPATIENT
Start: 2020-09-02 | End: 2020-09-02 | Stop reason: ALTCHOICE

## 2020-09-02 RX ADMIN — AMITRIPTYLINE HYDROCHLORIDE 75 MG: 25 TABLET, FILM COATED ORAL at 20:22

## 2020-09-02 RX ADMIN — PREDNISONE 20 MG: 20 TABLET ORAL at 10:41

## 2020-09-02 RX ADMIN — IPRATROPIUM BROMIDE AND ALBUTEROL SULFATE 1 AMPULE: .5; 3 SOLUTION RESPIRATORY (INHALATION) at 08:10

## 2020-09-02 RX ADMIN — Medication 2 PUFF: at 19:58

## 2020-09-02 RX ADMIN — LINEZOLID 600 MG: 600 INJECTION, SOLUTION INTRAVENOUS at 02:08

## 2020-09-02 RX ADMIN — PROPOFOL 20 MG: 10 INJECTION, EMULSION INTRAVENOUS at 08:29

## 2020-09-02 RX ADMIN — SODIUM CHLORIDE: 9 INJECTION, SOLUTION INTRAVENOUS at 08:18

## 2020-09-02 RX ADMIN — Medication 2 PUFF: at 11:11

## 2020-09-02 RX ADMIN — HYDROCORTISONE SODIUM SUCCINATE 100 MG: 250 INJECTION, POWDER, FOR SOLUTION INTRAMUSCULAR; INTRAVENOUS at 08:16

## 2020-09-02 RX ADMIN — APIXABAN 5 MG: 5 TABLET, FILM COATED ORAL at 10:41

## 2020-09-02 RX ADMIN — PROPOFOL 20 MG: 10 INJECTION, EMULSION INTRAVENOUS at 08:31

## 2020-09-02 RX ADMIN — STANDARDIZED SENNA CONCENTRATE AND DOCUSATE SODIUM 2 TABLET: 8.6; 5 TABLET ORAL at 10:41

## 2020-09-02 RX ADMIN — APIXABAN 5 MG: 5 TABLET, FILM COATED ORAL at 20:22

## 2020-09-02 RX ADMIN — DESMOPRESSIN ACETATE 40 MG: 0.2 TABLET ORAL at 20:22

## 2020-09-02 RX ADMIN — PROPOFOL 50 MG: 10 INJECTION, EMULSION INTRAVENOUS at 08:27

## 2020-09-02 RX ADMIN — GUAIFENESIN AND CODEINE PHOSPHATE 10 ML: 100; 10 SOLUTION ORAL at 13:34

## 2020-09-02 RX ADMIN — IPRATROPIUM BROMIDE AND ALBUTEROL SULFATE 1 AMPULE: .5; 3 SOLUTION RESPIRATORY (INHALATION) at 15:44

## 2020-09-02 RX ADMIN — IPRATROPIUM BROMIDE AND ALBUTEROL SULFATE 1 AMPULE: .5; 3 SOLUTION RESPIRATORY (INHALATION) at 11:11

## 2020-09-02 RX ADMIN — LINEZOLID 600 MG: 600 INJECTION, SOLUTION INTRAVENOUS at 13:21

## 2020-09-02 RX ADMIN — LIDOCAINE HYDROCHLORIDE 100 MG: 20 INJECTION, SOLUTION EPIDURAL; INFILTRATION; INTRACAUDAL; PERINEURAL at 08:27

## 2020-09-02 RX ADMIN — HYDROCORTISONE SODIUM SUCCINATE 100 MG: 250 INJECTION, POWDER, FOR SOLUTION INTRAMUSCULAR; INTRAVENOUS at 15:34

## 2020-09-02 RX ADMIN — LIDOCAINE HYDROCHLORIDE 4 ML: 40 INJECTION, SOLUTION RETROBULBAR; TOPICAL at 08:10

## 2020-09-02 RX ADMIN — TIOTROPIUM BROMIDE INHALATION SPRAY 2 PUFF: 3.12 SPRAY, METERED RESPIRATORY (INHALATION) at 11:12

## 2020-09-02 RX ADMIN — Medication 10 ML: at 10:42

## 2020-09-02 RX ADMIN — PIPERACILLIN AND TAZOBACTAM 3.38 G: 3; .375 INJECTION, POWDER, LYOPHILIZED, FOR SOLUTION INTRAVENOUS at 20:24

## 2020-09-02 RX ADMIN — PIPERACILLIN AND TAZOBACTAM 3.38 G: 3; .375 INJECTION, POWDER, LYOPHILIZED, FOR SOLUTION INTRAVENOUS at 15:28

## 2020-09-02 RX ADMIN — PIPERACILLIN AND TAZOBACTAM 3.38 G: 3; .375 INJECTION, POWDER, LYOPHILIZED, FOR SOLUTION INTRAVENOUS at 05:28

## 2020-09-02 RX ADMIN — IPRATROPIUM BROMIDE AND ALBUTEROL SULFATE 1 AMPULE: .5; 3 SOLUTION RESPIRATORY (INHALATION) at 19:58

## 2020-09-02 ASSESSMENT — PULMONARY FUNCTION TESTS
PIF_VALUE: 20
PIF_VALUE: 22
PIF_VALUE: 1
PIF_VALUE: 2
PIF_VALUE: 18
PIF_VALUE: 13
PIF_VALUE: 18
PIF_VALUE: 1
PIF_VALUE: 3
PIF_VALUE: 23
PIF_VALUE: 1
PIF_VALUE: 25

## 2020-09-02 ASSESSMENT — ENCOUNTER SYMPTOMS
BLOOD IN STOOL: 0
ABDOMINAL PAIN: 0
TROUBLE SWALLOWING: 0
COUGH: 1
COLOR CHANGE: 0
FACIAL SWELLING: 0
APNEA: 0
RHINORRHEA: 0
EYE DISCHARGE: 0
NAUSEA: 0
EYE REDNESS: 0
SHORTNESS OF BREATH: 1
PHOTOPHOBIA: 0
CHEST TIGHTNESS: 0
DIARRHEA: 0
CHOKING: 0
SHORTNESS OF BREATH: 1
STRIDOR: 0

## 2020-09-02 ASSESSMENT — COPD QUESTIONNAIRES: CAT_SEVERITY: SEVERE

## 2020-09-02 ASSESSMENT — PAIN SCALES - GENERAL
PAINLEVEL_OUTOF10: 0
PAINLEVEL_OUTOF10: 7
PAINLEVEL_OUTOF10: 0
PAINLEVEL_OUTOF10: 0

## 2020-09-02 NOTE — PROGRESS NOTES
Daily  piperacillin-tazobactam (ZOSYN) 3.375 g in dextrose 5 % 50 mL IVPB extended infusion (mini-bag), 3.375 g, Intravenous, Q8H  bisacodyl (DULCOLAX) suppository 10 mg, 10 mg, Rectal, Daily PRN  ipratropium-albuterol (DUONEB) nebulizer solution 1 ampule, 1 ampule, Inhalation, Q4H WA  guaiFENesin-codeine (GUAIFENESIN AC) 100-10 MG/5ML liquid 10 mL, 10 mL, Oral, Q4H PRN  budesonide-formoterol (SYMBICORT) 160-4.5 MCG/ACT inhaler 2 puff, 2 puff, Inhalation, BID  tiotropium (SPIRIVA RESPIMAT) 2.5 MCG/ACT inhaler 2 puff, 2 puff, Inhalation, Daily  predniSONE (DELTASONE) tablet 20 mg, 20 mg, Oral, Daily  linezolid (ZYVOX) IVPB 600 mg, 600 mg, Intravenous, Q12H  0.9 % sodium chloride bolus, 30 mL/kg, Intravenous, Once  sodium chloride flush 0.9 % injection 10 mL, 10 mL, Intravenous, 2 times per day  sodium chloride flush 0.9 % injection 10 mL, 10 mL, Intravenous, PRN  acetaminophen (TYLENOL) tablet 650 mg, 650 mg, Oral, Q6H PRN **OR** acetaminophen (TYLENOL) suppository 650 mg, 650 mg, Rectal, Q6H PRN  polyethylene glycol (GLYCOLAX) packet 17 g, 17 g, Oral, Daily PRN  promethazine (PHENERGAN) tablet 12.5 mg, 12.5 mg, Oral, Q6H PRN **OR** ondansetron (ZOFRAN) injection 4 mg, 4 mg, Intravenous, Q6H PRN  amitriptyline (ELAVIL) tablet 75 mg, 75 mg, Oral, Nightly  apixaban (ELIQUIS) tablet 5 mg, 5 mg, Oral, BID  atorvastatin (LIPITOR) tablet 40 mg, 40 mg, Oral, Nightly  labetalol (NORMODYNE;TRANDATE) injection 10 mg, 10 mg, Intravenous, Q4H PRN  albuterol sulfate  (90 Base) MCG/ACT inhaler 2 puff, 2 puff, Inhalation, Q2H PRN    No Known Allergies    Social History:    TOBACCO:   reports that she quit smoking about 5 years ago. Her smoking use included cigarettes. She has a 7.50 pack-year smoking history. She has never used smokeless tobacco.  ETOH:   reports no history of alcohol use.   Patient currently lives independently  Environmental/chemical exposure: None known    Family History:       Problem Relation Age of Onset    Heart Disease Mother     High Blood Pressure Mother     High Cholesterol Mother     Early Death Mother 36        MI    Heart Disease Father     High Blood Pressure Father     High Cholesterol Father     Stroke Father 79    High Blood Pressure Sister     High Blood Pressure Brother      REVIEW OF SYSTEMS:    CONSTITUTIONAL:  negative for fevers, chills, diaphoresis, activity change, appetite change, fatigue, night sweats and unexpected weight change. EYES:  negative for blurred vision, eye discharge, visual disturbance and icterus  HEENT:  negative for hearing loss, tinnitus, ear drainage, sinus pressure, nasal congestion, epistaxis and snoring  RESPIRATORY:  See HPI  CARDIOVASCULAR:  negative for chest pain, palpitations, exertional chest pressure/discomfort, edema, syncope  GASTROINTESTINAL:  negative for nausea, vomiting, diarrhea, constipation, blood in stool and abdominal pain  GENITOURINARY:  negative for frequency, dysuria, urinary incontinence, decreased urine volume, and hematuria  HEMATOLOGIC/LYMPHATIC:  negative for easy bruising, bleeding and lymphadenopathy  ALLERGIC/IMMUNOLOGIC:  negative for recurrent infections, angioedema, anaphylaxis and drug reactions  ENDOCRINE:  negative for weight changes and diabetic symptoms including polyuria, polydipsia and polyphagia  MUSCULOSKELETAL:  negative for  pain, joint swelling, decreased range of motion and muscle weakness  NEUROLOGICAL:  negative for headaches, slurred speech, unilateral weakness  PSYCHIATRIC/BEHAVIORAL: negative for hallucinations, behavioral problems, confusion and agitation.      Objective:   PHYSICAL EXAM:      VITALS:  /62   Pulse 108   Temp 98.6 °F (37 °C) (Temporal)   Resp 25   Ht 5' (1.524 m)   Wt 152 lb 14.4 oz (69.4 kg)   SpO2 100%   BMI 29.86 kg/m²      24HR INTAKE/OUTPUT:      Intake/Output Summary (Last 24 hours) at 9/2/2020 0816  Last data filed at 9/1/2020 1518  Gross per 24 hour   Intake 720 ml Output 325 ml   Net 395 ml     CONSTITUTIONAL:  awake, alert, cooperative, no apparent distress, and appears stated age  NECK:  Supple, symmetrical, trachea midline, no adenopathy, thyroid symmetric, not enlarged and no tenderness, skin normal  LUNGS:  no increased work of breathing and diminished to auscultation. No accessory muscle use  CARDIOVASCULAR: S1 and S2, no edema and no JVD  ABDOMEN:  normal bowel sounds, non-distended and no masses palpated, and no tenderness to palpation. No hepatospleenomegaly  LYMPHADENOPATHY:  no axillary or supraclavicular adenopathy. No cervical adnenopathy  PSYCHIATRIC: Oriented to person place and time. No obvious depression or anxiety. MUSCULOSKELETAL: No obvious misalignment or effusion of the joints. No clubbing, cyanosis of the digits. SKIN:  normal skin color, texture, turgor and no redness, warmth, or swelling. No palpable nodules    DATA:    Old records have been reviewed    CBC:  Recent Labs     08/31/20  1145 09/01/20  0514 09/02/20  0445   WBC 25.8* 27.9* 19.9*   RBC 4.62 4.87 4.43   HGB 9.1* 9.8* 9.0*   HCT 29.3* 31.5* 28.4*    382 420   MCV 63.5* 64.7* 64.1*   MCH 19.7* 20.0* 20.4*   MCHC 31.1 31.0 31.9   RDW 14.6 15.3 15.1   NRBC  --  1*  1*  --    BANDSPCT  --  3 4      BMP:  Recent Labs     08/31/20  1145 09/01/20  0515 09/02/20  0445   * 134* 136   K 3.9 4.6 3.5    103 103   CO2 21 16* 22   BUN 21* 20 17   CREATININE 1.7* 1.6* 1.6*   CALCIUM 8.7 8.9 8.7   GLUCOSE 136* 87 100*      ABG:  No results for input(s): PHART, QIT9OBL, PO2ART, FLA0JBY, Q4QBLTMW, BEART in the last 72 hours. Lab Results   Component Value Date    BNP <15 08/06/2011     Lab Results   Component Value Date    CKTOTAL 29 08/07/2011    CKMB 0.88 08/07/2011    TROPONINI <0.01 08/29/2020       Cultures:     Abx:    Radiology Review:  All pertinent images / reports were reviewed as a part of this visit. Assessment:     1.  Acute on chronic hypoxemic respiratory failure  2. Left upper lobe pneumonia  3. COPD severe  4. Centrilobular emphysema  5. GERD    Plan:     1. I have reviewed laboratories, medical records and images for this visit  2. Afebrile  3. WBC is 19K, lower than previous  4. Chest is congested on exam  5. Family at bedside. 6. Explained the bronchoscopy and BAL including risks such as respiratory failure, bleeding and PTX. All questions were addressed.   7. Proceed with Bronch/BAL

## 2020-09-02 NOTE — PROGRESS NOTES
tablet 20 mg, Daily  linezolid (ZYVOX) IVPB 600 mg, Q12H  0.9 % sodium chloride bolus, Once  sodium chloride flush 0.9 % injection 10 mL, 2 times per day  sodium chloride flush 0.9 % injection 10 mL, PRN  acetaminophen (TYLENOL) tablet 650 mg, Q6H PRN    Or  acetaminophen (TYLENOL) suppository 650 mg, Q6H PRN  polyethylene glycol (GLYCOLAX) packet 17 g, Daily PRN  promethazine (PHENERGAN) tablet 12.5 mg, Q6H PRN    Or  ondansetron (ZOFRAN) injection 4 mg, Q6H PRN  amitriptyline (ELAVIL) tablet 75 mg, Nightly  apixaban (ELIQUIS) tablet 5 mg, BID  atorvastatin (LIPITOR) tablet 40 mg, Nightly  labetalol (NORMODYNE;TRANDATE) injection 10 mg, Q4H PRN  albuterol sulfate  (90 Base) MCG/ACT inhaler 2 puff, Q2H PRN        Objective:  /72   Pulse 110   Temp 97.4 °F (36.3 °C) (Temporal)   Resp 27   Ht 5' (1.524 m)   Wt 152 lb 14.4 oz (69.4 kg)   SpO2 100%   BMI 29.86 kg/m²     Intake/Output Summary (Last 24 hours) at 9/2/2020 0905  Last data filed at 9/1/2020 1518  Gross per 24 hour   Intake 720 ml   Output 325 ml   Net 395 ml      Wt Readings from Last 3 Encounters:   08/31/20 152 lb 14.4 oz (69.4 kg)   08/13/20 147 lb (66.7 kg)   03/25/20 133 lb (60.3 kg)       General appearance: Elderly -American female, comfortable in chair  Cardiovascular: Regular rhythm, normal S1, S2. No murmur. No edema in lower extremities  Respiratory: on 3L O2 via NC, using accessory muscles. Clear to auscultation bilaterally, no wheeze or crackles. GI: Abdomen firm, distended, no tenderness, not distended, sluggish bowel sounds  Musculoskeletal: No cyanosis in digits, neck supple  Neurology: CN 2-12 grossly intact. No speech or motor deficits  Psych: Normal affect.  Alert and oriented in time, place and person  Skin: Warm, dry, normal turgor  Extremity exam shows brisk capillary refill.  Peripheral pulses are palpable in lower extremities        Labs and Tests:  CBC:   Recent Labs     08/31/20  1145 09/01/20  0514 09/02/20  0445   WBC 25.8* 27.9* 19.9*   HGB 9.1* 9.8* 9.0*    382 420     BMP:    Recent Labs     08/31/20  1145 09/01/20  0515 09/02/20  0445   * 134* 136   K 3.9 4.6 3.5    103 103   CO2 21 16* 22   BUN 21* 20 17   CREATININE 1.7* 1.6* 1.6*   GLUCOSE 136* 87 100*     Hepatic: No results for input(s): AST, ALT, ALB, BILITOT, ALKPHOS in the last 72 hours. CT ABDOMEN PELVIS WO CONTRAST Additional Contrast? None   Final Result   Moderate amount of stool and fluid within the large bowel, which may reflect   ileus and/or diarrheal disease. Mild fluid-filled distention of small bowel, which also likely represents   ileus. Obstruction does not appear to be present. Small left pleural effusion with adjacent airspace disease, likely passive   atelectasis. Infiltrate in the posterior aspect of the lingula is noted,   incompletely included on the field of view, most compatible with pneumonia. MRI BRAIN WO CONTRAST   Final Result   Mild chronic small vessel ischemic changes. No acute stroke, midline shift   or mass effect. VL DUP CAROTID BILATERAL   Final Result      CT CHEST WO CONTRAST   Final Result   Significant worsening in size of the left upper lobe pneumonia in 2 days. Moderate left pleural effusion now present with mild atelectasis. Severe   emphysematous changes. No CHF. XR CHEST (2 VW)   Final Result   Progressive consolidation within the left upper lobe consistent with   pneumonia. Increased dependent left lower lobe opacification and effusion. Improved aeration at the right lung base. CT CHEST WO CONTRAST   Final Result   1. Left upper lobe pneumonia. Recommend chest radiograph in 8 weeks to   confirm resolution. 2. Trace left pleural effusion. CT HEAD WO CONTRAST   Final Result   1. New age-indeterminate infarct within the right internal capsule. Recommend further evaluation with MRI of the head.          XR CHEST PORTABLE Final Result   Bilateral peripheral airspace disease suggestive of atypical or viral   pneumonia. Problem List  Active Problems:    Centrilobular emphysema (HCC)    Necrotizing pneumonia ([address]    Acute on chronic respiratory failure with hypoxia ([address]    Pneumonia due to organism    Bacterial pneumonia    Cerebrovascular accident (CVA) ([facility]    MARIS (acute kidney injury) ([facility] [address] [facility]    Suspected COVID-19 virus infection    Dizziness    Elevated procalcitonin    Long term systemic steroid user    History of DVT (deep vein thrombosis)    Anticoagulated    Small vessel disease, cerebrovascular    Ex-smoker    History of depression    Overweight (BMI 25.0-29. 9)  Resolved Problems:    * No resolved hospital problems. *       Assessment & Plan:     Acute on chronic respiratory failure with hypoxia:   COVID-19 negative  On treatment for bacterial/aspiration pneumonia with IV zosyn and po Linezolid.  On optimal management of COPD -duo nebs, Spiriva, Symbicort, prednisone. Patient underwent bronchoscopy with BAL today morning. As per the procedure note, mucus and mucus plugs were suctioned clear. BAL performed and specimen obtained and sent for testing. specimen returned. Dr. Mikey Palacio recommends continuing po linezolid, IV Zosyn .     Constipation:   Patient has history of Crohn's with partial bowel resection. Gives history of chronic intermittent constipation. Follows up with Dr. Alfred Sparks at [facility] for Crohn's. Noted to have severe constipation 2 days ago. Improving pain abdomen with bowel movements. Currently on Senokot scheduled and MiraLAX as needed. Encouraged adequate fluid intake.      Sepsis due to Bacterial pneumonia:    No fevers noted. WBC count trending down.  CT chest with ADAM infiltrate. Elevated procalcitonin. On IV antibiotic therapy as mentioned above.     Chronic steroid use:  On prednisone 20 mg po daily.  Stable BP and hemodynamics.     Cerebrovascular accident (CVA): Patient had a fall at home.  CT head was concerning for possible internal capsule stroke. Neurology consulted. MRI brain showed changes consistent with old infarcts. No new CVA are mass-effect. Carotid artery Dopplers did not show significant blockade.  On anticoagulation, statin.        Acute kidney injury: Improving renal function with IV hydration.     IV Access: Peripheral IV  Carreon: None  Diet: DIET CLEAR LIQUID; post bronchoscopy  Code:Full Code  DVT PPX on Eliquis  Disposition - pending acute issues      Rogerio Barber MD   9/2/2020 9:05 AM

## 2020-09-02 NOTE — PROGRESS NOTES
Adm to pacu from endo per cart awakening. Coughing. Respirations symmetrical. BBS diminished with expiratory wheezes.

## 2020-09-02 NOTE — PLAN OF CARE
Problem: Falls - Risk of:  Goal: Will remain free from falls  Description: Will remain free from falls  Outcome: Ongoing  Note: Pt high fall risk, bed alarm on. Instructed pt to call nurse prior to ambulation for assistance.       Problem: Pain:  Goal: Pain level will decrease  Description: Pain level will decrease  Outcome: Ongoing  Note: Pt has denied pain t/o shift     Problem: HEMODYNAMIC STATUS  Goal: Patient has stable vital signs and fluid balance  Outcome: Ongoing  Note: Pt vss t/o shift

## 2020-09-02 NOTE — PROGRESS NOTES
Infectious Diseases   Progress Note      Admission Date: 8/28/2020  Hospital Day: Hospital Day: 6   Attending: Macario Cespedes MD  Date of service: 9/2/2020     Chief complaint/ Reason for consult:     · Sepsis with worsening fever, persistent bandemia, acute kidney injury on chronic kidney disease  · Left-sided multifocal pneumonia with necrotizing infiltrate in the left upper lobe  · Elevated procalcitonin of 2.17 on 8/28/2020  · Chronic respiratory failure with hypoxia  · Bilateral chronic emphysema  · Crohn's disease    Microbiology:        I have reviewed allavailable micro lab data and cultures    · Blood culture (2/2) - collected on 8/28/2020: Negative    · Urine culture  - collected on 8/28/2020: Negative      Antibiotics and immunizations:       Current antibiotics: All antibiotics and their doses were reviewed by me    Recent Abx Admin                   linezolid (ZYVOX) IVPB 600 mg (mg) 600 mg New Bag 09/02/20 1321     600 mg New Bag  0208    piperacillin-tazobactam (ZOSYN) 3.375 g in dextrose 5 % 50 mL IVPB extended infusion (mini-bag) (g) 3.375 g New Bag 09/02/20 0528     3.375 g New Bag 09/01/20 2144     3.375 g New Bag  1509                  Immunization History: All immunization history was reviewed by me today. Immunization History   Administered Date(s) Administered    DT (pediatric) 09/25/2010    Influenza Virus Vaccine 10/01/2012, 10/20/2015    Influenza Whole 10/01/2011    Influenza, High Dose (Fluzone 65 yrs and older) 10/06/2014, 10/19/2016, 11/16/2017, 11/05/2018    Influenza, Triv, inactivated, subunit, adjuvanted, IM (Fluad 65 yrs and older) 11/13/2019    Pneumococcal Conjugate 13-valent ([id number]) 06/11/2015    Pneumococcal Conjugate 7-valent (Prevnar7) 02/07/2005, 10/01/2011    Pneumococcal Polysaccharide ([id number]) 06/15/2016    Zoster Live (Zostavax) 07/01/2013       Known drug allergies:      All allergies were reviewed and updated    No Known Allergies    Social history:     Social History:  All social andepidemiologic history was reviewed and updated by me today as needed. · Tobacco use:   reports that she quit smoking about 5 years ago. Her smoking use included cigarettes. She has a 7.50 pack-year smoking history. She has never used smokeless tobacco.  · Alcohol use:   reports no history of alcohol use. · Currently lives in: [facility] [address]  ·  reports no history of drug use. Assessment:     The patient is a 77 y.o. old female who  has a past medical history of Bullous emphysema ([address] CKD (chronic kidney disease) stage 3, GFR 30-59 ml/min ([address] Clostridium difficile carrier (01/21/2019), COPD (chronic obstructive pulmonary disease) ([address] Crohn's disease ([address] Depression (1/30/2011), DVT (deep venous thrombosis) ([facility] Hiatal hernia, blood clots, Kidney disease, Kidney insufficiency, Osteoporosis, Peripheral neuropathy, Pneumonia, Postmenopausal, Pulmonary embolism ([address] Sepsis ([address] and Syringomyelia ([address] with following problems:    · Sepsis with worsening fever, persistent bandemia, acute kidney injury on chronic kidney disease -slowly improving, white cell count is 19,900 today  · Left-sided multifocal pneumonia with necrotizing infiltrate in the left upper lobe -status post diagnostic bronchoscopy on 9/2/2020  · Elevated procalcitonin of 2.17 on 8/28/2020  · Chronic respiratory failure with hypoxia -remains on 3 L oxygen via nasal cannula  · Bilateral chronic emphysema  · Crohn's disease  · Long-term steroid use  · History of DVT  · Chronic small vessel CNS disease on MRI of the brain  · History of depression  · Overweight due to excess calorie intake : Body mass index is 29.86 kg/m². · Ex-smoker      Discussion:      The patient is on empiric linezolid and IV Zosyn. She is tolerating the antibiotics okay. White cell count is slowly improving and is 19,900 today. The patient had a bronchoscopy done today.     Serum creatinine is 1.6.    The patient was found to have mucous plugs, which were removed bronchoscopically earlier today by Dr. Guevara Caba:       Diagnostic Workup:    · Follow-up on BAL cultures and cytology  · Continue to follow  fever curve, WBC count and blood cultures  · Follow up on liver and renal function, blood counts    Antimicrobials:    · Will continue IV linezolid 60 mg every 12 hour. · Continue with Zosyn 3.375 g every 8 hour  · Continue to monitor her vitals closely  · We will follow-up on the BAL culture results and cytology and will make further recommendations accordingly  · Cough and deep breathing exercises  · Continue oxygen support to maintain oxygen saturation above 92%  · Continue watch for new fever or diarrhea  · Aspiration precaution  · Fall precautions  · DVT prophylaxis  · Discussed all above with patient and RN      Drug Monitoring:    · Continue monitoring for antibiotic toxicity as follows: *CBC, CMP  · Continue to watch for following: new or worsening fever, new hypotension, hives, lip swelling and redness or purulence at vascular access sites. I/v access Management:    · Continue to monitor i.v access sites for erythema, induration, discharge or tenderness. · As always, continue efforts to minimize tubes/lines/drains as clinically appropriate to reduce chances of line associated infections. Patient education and counseling:        · The patient was educated in detail about the side-effects of various antibiotics and things to watch for like new rashes, lip swelling, severe reaction, worsening diarrhea, break through fever etc.  · Discussed patient's condition and what to expect. All of the patient's questions were addressed in a satisfactory manner and patient verbalized understanding all instructions. Level of complexity of visit: High     Risk of Complications/Morbidity: High     · Illness(es)/ Infection present that pose threat to bodily function.    · There is potential for severe exacerbation of infection/side effects of treatment. · Therapy requires intensive monitoring for antimicrobial agent toxicity    TIME SPENT TODAY:     - Spent over  36  minutes on visit (including interval history, physical exam, review of data including labs, cultures, imaging, development and implementation of treatment plan and coordination of complex care). Thank you for involving me in the care of your patient. I will continue to follow. If you have anyadditional questions, please do not hesitate to contact me. Subjective: Interval history: Interval history was obtained from chart review and RN. She is afebrile. She is tolerating antibiotics okay. No diarrhea. She had a bronchoscopy done today. REVIEW OF SYSTEMS:      Review of Systems   Constitutional: Negative for chills, diaphoresis and unexpected weight change. HENT: Negative for congestion, ear discharge, ear pain, facial swelling, hearing loss, rhinorrhea and trouble swallowing. Eyes: Negative for photophobia, discharge, redness and visual disturbance. Respiratory: Positive for cough and shortness of breath. Negative for apnea, choking, chest tightness and stridor. Cardiovascular: Negative for chest pain and palpitations. Gastrointestinal: Negative for abdominal pain, blood in stool, diarrhea and nausea. Endocrine: Negative for polydipsia, polyphagia and polyuria. Genitourinary: Negative for difficulty urinating, dysuria, frequency, hematuria, menstrual problem and vaginal discharge. Musculoskeletal: Negative for arthralgias, joint swelling, myalgias and neck stiffness. Skin: Negative for color change and rash. Allergic/Immunologic: Negative for immunocompromised state. Neurological: Negative for dizziness, seizures, speech difficulty, light-headedness and headaches. Hematological: Negative for adenopathy. Psychiatric/Behavioral: Negative for agitation, hallucinations and suicidal ideas. Past Medical History: All past medical history reviewed today. Past Medical History:   Diagnosis Date    Bullous emphysema (HCC)     CKD (chronic kidney disease) stage 3, GFR 30-59 ml/min (HCC)     Clostridium difficile carrier 01/21/2019    COPD (chronic obstructive pulmonary disease) ([address]     PFT done 7 years ago   Luis Fernando Swann Crohn's disease ([address]     Crohn's disease    Depression 1/30/2011    pt states resolved as of 3-26-12    DVT (deep venous thrombosis) ([address]     2012; first ocurrence     Hiatal hernia     Hx of blood clots     Kidney disease     Kidney insufficiency     Osteoporosis     Peripheral neuropathy     neuropathy in legs    Pneumonia     Postmenopausal     LMP in  40's    Pulmonary embolism ([address]     2012; first ocurrence    Sepsis ([address]     Syringomyelia ([address]        Past Surgical History: All past surgical history was reviewed today. Past Surgical History:   Procedure Laterality Date    ABDOMEN SURGERY      BACK SURGERY      shunt to drain CSF    BIOPSY SHOULDER Left 2/27/2019    EXCISION OF LEFT SHOULDER MASS performed by Lalita Don MD at [address] [facility]      crainotomy- remove fluid ? V-P SHUNT    BRONCHOSCOPY N/A 9/2/2020    BRONCHOSCOPY ALVEOLAR LAVAGE performed by Re Resendiz MD at [facility]      resection X2    COLONOSCOPY  12/5/11    BIOPSY AND POLYPECTOMY    COLONOSCOPY  4-2-2012    and ablation ERBE/APC    SHOULDER SURGERY      L        Family History: All family history was reviewed today.         Problem Relation Age of Onset    Heart Disease Mother     High Blood Pressure Mother     High Cholesterol Mother     Early Death Mother 36        MI    Heart Disease Father     High Blood Pressure Father     High Cholesterol Father     Stroke Father 79    High Blood Pressure Sister     High Blood Pressure Brother        Objective:       PHYSICAL EXAM:      Vitals:   Vitals: 09/02/20 0930 09/02/20 0957 09/02/20 1112 09/02/20 1300   BP: 133/79   135/78   Pulse: 99   88   Resp: 22  22 20   Temp: 97.4 °F (36.3 °C)   97 °F (36.1 °C)   TempSrc: Oral   Oral   SpO2: 97%  97% 96%   Weight:  151 lb (68.5 kg)     Height:           Physical Exam  Vitals signs and nursing note reviewed. Constitutional:       General: She is not in acute distress. Appearance: She is well-developed. She is not diaphoretic. HENT:      Head: Normocephalic. Right Ear: External ear normal.      Left Ear: External ear normal.      Nose: Nose normal.   Eyes:      General: No scleral icterus. Right eye: No discharge. Left eye: No discharge. Conjunctiva/sclera: Conjunctivae normal.      Pupils: Pupils are equal, round, and reactive to light. Neck:      Musculoskeletal: Normal range of motion and neck supple. Cardiovascular:      Rate and Rhythm: Normal rate and regular rhythm. Heart sounds: No murmur. No friction rub. Pulmonary:      Effort: Pulmonary effort is normal.      Breath sounds: No stridor. Rales (Left upper and lower lobe areas) present. No wheezing. Chest:      Chest wall: No tenderness. Abdominal:      Palpations: Abdomen is soft. There is no mass. Tenderness: There is no abdominal tenderness. There is no guarding or rebound. Musculoskeletal:         General: No tenderness. Lymphadenopathy:      Cervical: No cervical adenopathy. Skin:     General: Skin is warm and dry. Findings: No erythema or rash. Neurological:      Mental Status: She is alert and oriented to person, place, and time. Motor: No abnormal muscle tone. Psychiatric:         Judgment: Judgment normal.           Lines: All vascular access sites are healthy with no local erythema, discharge or tenderness. Intake and output:    I/O last 3 completed shifts:   In: 5 [P.O.:720]  Out: 36 [Urine:325]    Lab Data:   All available labs and old records have been reviewed by me.    CBC:  Recent Labs     08/31/20  1145 09/01/20  0514 09/02/20  0445   WBC 25.8* 27.9* 19.9*   RBC 4.62 4.87 4.43   HGB 9.1* 9.8* 9.0*   HCT 29.3* 31.5* 28.4*    382 420   MCV 63.5* 64.7* 64.1*   MCH 19.7* 20.0* 20.4*   MCHC 31.1 31.0 31.9   RDW 14.6 15.3 15.1   NRBC  --  1*  1*  --    BANDSPCT  --  3 4        BMP:  Recent Labs     08/31/20  1145 09/01/20  0515 09/02/20  0445   * 134* 136   K 3.9 4.6 3.5    103 103   CO2 21 16* 22   BUN 21* 20 17   CREATININE 1.7* 1.6* 1.6*   CALCIUM 8.7 8.9 8.7   GLUCOSE 136* 87 100*        Hepatic Function Panel:   Lab Results   Component Value Date    ALKPHOS 102 08/29/2020    ALT 8 08/29/2020    AST 9 08/29/2020    PROT 6.7 08/29/2020    PROT 7.1 11/13/2012    BILITOT 0.3 08/29/2020    BILIDIR <0.2 01/05/2020    IBILI see below 01/05/2020    LABALBU 2.5 08/29/2020       CPK:   Lab Results   Component Value Date    CKTOTAL 29 08/07/2011     ESR:   Lab Results   Component Value Date    SEDRATE 24 03/18/2019     CRP:   Lab Results   Component Value Date    CRP 2.1 03/18/2019           Imaging: All pertinent images and reports for the current visit were reviewed by me during this visit. CT ABDOMEN PELVIS WO CONTRAST Additional Contrast? None   Final Result   Moderate amount of stool and fluid within the large bowel, which may reflect   ileus and/or diarrheal disease. Mild fluid-filled distention of small bowel, which also likely represents   ileus. Obstruction does not appear to be present. Small left pleural effusion with adjacent airspace disease, likely passive   atelectasis. Infiltrate in the posterior aspect of the lingula is noted,   incompletely included on the field of view, most compatible with pneumonia. MRI BRAIN WO CONTRAST   Final Result   Mild chronic small vessel ischemic changes. No acute stroke, midline shift   or mass effect.          VL DUP CAROTID BILATERAL   Final Result      CT CHEST WO CONTRAST   Final Result Significant worsening in size of the left upper lobe pneumonia in 2 days. Moderate left pleural effusion now present with mild atelectasis. Severe   emphysematous changes. No CHF. XR CHEST (2 VW)   Final Result   Progressive consolidation within the left upper lobe consistent with   pneumonia. Increased dependent left lower lobe opacification and effusion. Improved aeration at the right lung base. CT CHEST WO CONTRAST   Final Result   1. Left upper lobe pneumonia. Recommend chest radiograph in 8 weeks to   confirm resolution. 2. Trace left pleural effusion. CT HEAD WO CONTRAST   Final Result   1. New age-indeterminate infarct within the right internal capsule. Recommend further evaluation with MRI of the head. XR CHEST PORTABLE   Final Result   Bilateral peripheral airspace disease suggestive of atypical or viral   pneumonia. Medications: All current and past medications were reviewed.      hydrocortisone sodium succinate PF  100 mg Intravenous Q8H    sennosides-docusate sodium  2 tablet Oral Daily    piperacillin-tazobactam  3.375 g Intravenous Q8H    ipratropium-albuterol  1 ampule Inhalation Q4H WA    budesonide-formoterol  2 puff Inhalation BID    tiotropium  2 puff Inhalation Daily    predniSONE  20 mg Oral Daily    linezolid  600 mg Intravenous Q12H    sodium chloride  30 mL/kg Intravenous Once    sodium chloride flush  10 mL Intravenous 2 times per day    amitriptyline  75 mg Oral Nightly    apixaban  5 mg Oral BID    atorvastatin  40 mg Oral Nightly           bisacodyl, guaiFENesin-codeine, sodium chloride flush, acetaminophen **OR** acetaminophen, polyethylene glycol, promethazine **OR** ondansetron, labetalol, albuterol sulfate HFA      Problem list:       Patient Active Problem List   Diagnosis Code    Crohn disease ([facility] K50.90    Depression F32.9    Osteoarthritis M19.90    Lupus anticoagulant disorder ([facility] S57.71    Dysphagia R13.10    Syringomyelia ([facility]) G95.0    Gastritis and gastroduodenitis K29.70, K29.90    Skin ulcer of shoulder ([facility] L98.499    Shortness of breath R06.02    Anemia D64.9    Centrilobular emphysema ([facility]) J43.2    Pulmonary fibrosis ([facility]) J84.10    Chronic hypoxemic respiratory failure ([facility]) J96.11    Hiatal hernia K44.9    COPD, severe ([facility]) J44.9    Alpha thalassemia minor D56.3    Necrotizing pneumonia ([facility]) J85.0    Acute on chronic respiratory failure with hypoxia ([facility]) J96.21    Bronchiectasis with acute exacerbation ([facility]) J47.1    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    Osteoporosis of multiple sites M81.0    Gastroesophageal reflux disease without esophagitis K21.9    Sepsis ([facility]) A41.9    Bilateral pulmonary embolism ([facility]) I26.99    Acute deep vein thrombosis (DVT) of distal vein of both lower extremities ([facility]) I82.4Z3    History of pulmonary embolism Z86.711    SOB (shortness of breath) R06.02    Wound of left shoulder S41.002A    Hx pulmonary embolism Z86.711    Pneumonia due to organism J18.9    COPD exacerbation ([facility]) J44.1    Bacterial pneumonia J15.9    Cerebrovascular accident (CVA) ([facility] I63.9    MARIS (acute kidney injury) ([facility] [address] N17.9    Suspected COVID-19 virus infection Z20.828    Dizziness R42    Elevated procalcitonin R79.89    Long term systemic steroid user Z79.52    History of DVT (deep vein thrombosis) Z86.718    Anticoagulated Z79.01    Small vessel disease, cerebrovascular I67.9    Ex-smoker Z87.891    History of depression Z86.59    Overweight (BMI 25.0-29. 9) E66.3       Please note that this chart was generated using Dragon dictation software. Although every effort was made to ensure the accuracy of this automated transcription, some errors in transcription may have occurred inadvertently. If you may need any clarification, please do not hesitate to contact me through [employer] or at the phone number provided below with my electronic signature.   Any

## 2020-09-02 NOTE — PROGRESS NOTES
Awake alert & oriented. Respirations symmetrical. Occasional cough. Denies pain. Transferred to floor.

## 2020-09-02 NOTE — PROCEDURES
Bronchoscopy Procedure Note    Date of Operation: 9/2/20  Pre-op Diagnosis: ADAM pneumonia  Post-op Diagnosis: Same  Surgeon: Vaishnavi Goodwin  Anesthesia: Monitored Local Anesthesia with Sedation  Estimate Blood Loss: Minimal   Complications: None    Indications and History:  The patient is 77 y.o. female with worsneing ADAM pneumonia. The risks, benefits, complications, treatment options and expected outcomes were discussed with the patient. The possibilities of reaction to medication, pulmonary aspiration, perforation of a viscus, bleeding, failure to diagnose a condition and creating a complication requiring transfusion or operation were discussed with the patient who freely signed the consent. Description of Procedure: The patient was taken to endoscopy suite, identified as Leatha Bolaños and the procedure verified as flexible fiberoptic bronchoscopy. A time out was held and the above information confirmed. After the induction of topical nasopharyngeal anesthesia, the patient was placed in appropriate position and the bronchoscope was passed through the OP. The vocal cords were visualized and total of 3 ml of 2% Lidocaine was topically placed onto the cords. The cords were normal. The scope was then passed into the trachea. Lidocaine 2% 3 ml was used topically on the deana. Careful inspection of the tracheal lumen was accomplished. The scope was sequentially passed into the left main and then left upper and lower bronchi and segmental bronchi. The scope was then withdrawn and advanced into the right main bronchus and then into the RUL, RML, and RLL bronchi and segmental bronchi.      Endobronchial findings:   Trachea: Normal mucosa   Deana: Normal mucosa   Right main bronchus: Normal mucosa   Right upper lobe bronchus: Normal mucosa   Right middle lobe bronchus: Normal mucosa   Right lower lobe bronchus: Normal mucosa   Left main bronchus: Normal mucosa   Left upper lobe bronchus: Normal mucosa Left lower lobe bronchus: Normal mucosa     Mucus and mucus plugs were suctioned clear    ADAM was entered and a wedge position obtained. 60 cc NS lavage instilled and 12 cc specimen returned. The patient was taken to the endoscopy recovery area in satisfactory condition. Recommendation:   1. F/U on culture results   2. F/U on cytology results     Attestation: I performed the procedure.   Rossana Macedo

## 2020-09-02 NOTE — ANESTHESIA PRE PROCEDURE
Department of Anesthesiology  Preprocedure Note       Name:  Park Lima   Age:  77 y.o.  :  1950                                          MRN:  [id number]         Date:  2020      Surgeon: Iesha Whittington):  Bradley Maya MD    Procedure: BRONCHOSCOPY DIAGNOSTIC OR CELL [address] ONLY (N/A Abdomen)    Medications prior to admission:   Prior to Admission medications    Medication Sig Start Date End Date Taking? Authorizing Provider   amitriptyline (ELAVIL) 150 MG tablet TAKE 0.5 TABLETS BY MOUTH NIGHTLY 20   yRan Hummel MD   predniSONE (DELTASONE) 10 MG tablet TAKE 1 TABLET BY MOUTH EVERY DAY FOR 10 DAYS 20   Historical Provider, MD   albuterol sulfate  (90 Base) MCG/ACT inhaler Inhale 2 puffs into the lungs every 6 hours as needed for Wheezing 20  Bradley Maya MD   ELIQUIS 5 MG TABS tablet TAKE 1 TABLET BY MOUTH TWICE A DAY 20   Bradley Maya MD   Lift Chair MISC by Other route Dx:J44.1, N18.4 3/25/20   Ryan Hummel MD   potassium chloride (KLOR-CON) 10 MEQ extended release tablet Take 1 tablet by mouth 3 times daily 20   Robert Cruz MD   ondansetron (ZOFRAN ODT) 4 MG disintegrating tablet Take 1 tablet by mouth every 8 hours as needed for Nausea 20   JAIME Brar   budesonide-formoterol [facility]) 160-4.5 MCG/ACT AERO Inhale 2 puffs into the lungs 2 times daily 19   Ryan Hummel MD   tiotropium (SPIRIVA RESPIMAT) 2.5 MCG/ACT AERS inhaler Inhale 2 puffs into the lungs daily 19   Ryan Hummel MD   albuterol (PROVENTIL) (2.5 MG/3ML) 0.083% nebulizer solution Take 3 mLs by nebulization every 6 hours as needed for Wheezing Dx: COPD      ICD-10: J44.9 19   Bradley Maya MD   Misc.  Devices (BATH/SHOWER SEAT) MISC Use as directed 19   [place] Lama, MD   alendronate (FOSAMAX) 70 MG tablet Take 1 tablet by mouth every 7 days 19   Magda Chao MD   albuterol sulfate HFA 108 (90 Base) MCG/ACT inhaler Inhale 2 puffs into the lungs every 6 hours as needed for Wheezing 3/15/18   Ryan Hummel MD   omeprazole (PRILOSEC) 20 MG delayed release capsule Take 20 mg by mouth Daily Takes as needed    Historical Provider, MD   Lift Chair [address] by Does not apply route Please dispense a Lift Chair 6/14/17   Ryan Hummel MD   guaiFENesin [facility]) 600 MG extended release tablet Take 1 tablet by mouth 2 times daily 12/8/16   Salvatore Dakins, MD   [facility]. Devices (ROLLER [place]) MISC 1 each by Does not apply route daily Please dispense a walker with a seat 6/9/16   Ryan Hummel MD   Adalimumab (HUMIRA) 20 MG/0.4ML KIT Inject 1 vial into the skin every 14 days     Historical Provider, MD       Current medications:    No current facility-administered medications for this visit. No current outpatient medications on file.      Facility-Administered Medications Ordered in Other Visits   Medication Dose Route Frequency Provider Last Rate Last Dose    sennosides-docusate sodium (SENOKOT-S) 8.6-50 MG tablet 2 tablet  2 tablet Oral Daily Messi Vears MD   2 tablet at 09/01/20 0944    piperacillin-tazobactam (ZOSYN) 3.375 g in dextrose 5 % 50 mL IVPB extended infusion (mini-bag)  3.375 g Intravenous Q8H Naveen Trent MD 12.5 mL/hr at 09/02/20 0528 3.375 g at 09/02/20 0528    bisacodyl (DULCOLAX) suppository 10 mg  10 mg Rectal Daily PRN Avril Lowery MD        ipratropium-albuterol (DUONEB) nebulizer solution 1 ampule  1 ampule Inhalation Q4H WA Esteban Clancy MD   1 ampule at 09/01/20 2110    guaiFENesin-codeine (GUAIFENESIN AC) 100-10 MG/5ML liquid 10 mL  10 mL Oral Q4H PRN Esteban Clancy MD   10 mL at 08/31/20 1046    budesonide-formoterol (SYMBICORT) 160-4.5 MCG/ACT inhaler 2 puff  2 puff Inhalation BID Bradley Maya MD   2 puff at 09/01/20 2110    tiotropium (SPIRIVA RESPIMAT) 2.5 MCG/ACT inhaler 2 puff  2 puff Inhalation Daily Yeison Jones MD Eryn   2 puff at 09/01/20 1304    predniSONE (DELTASONE) tablet 20 mg  20 mg Oral Daily Esteban Clancy MD   20 mg at 09/01/20 0944    linezolid (ZYVOX) IVPB 600 mg  600 mg Intravenous Q12H Clarence Snell MD   Stopped at 09/02/20 0318    0.9 % sodium chloride bolus  30 mL/kg Intravenous Once Clarence Snell MD        sodium chloride flush 0.9 % injection 10 mL  10 mL Intravenous 2 times per day Clarence Snell MD   10 mL at 09/01/20 2036    sodium chloride flush 0.9 % injection 10 mL  10 mL Intravenous PRN Clarence Snell MD        acetaminophen (TYLENOL) tablet 650 mg  650 mg Oral Q6H PRN Clarence Snell MD   650 mg at 08/30/20 1958    Or    acetaminophen (TYLENOL) suppository 650 mg  650 mg Rectal Q6H PRN Clarence Snell MD        polyethylene glycol (GLYCOLAX) packet 17 g  17 g Oral Daily PRN Clarence Snell MD   17 g at 09/01/20 1534    promethazine (PHENERGAN) tablet 12.5 mg  12.5 mg Oral Q6H PRN Clarence Snell MD        Or    ondansetron (ZOFRAN) injection 4 mg  4 mg Intravenous Q6H PRN Clarence Snell MD        amitriptyline (ELAVIL) tablet 75 mg  75 mg Oral Nightly Clarence Snell MD   75 mg at 09/01/20 2034    apixaban (ELIQUIS) tablet 5 mg  5 mg Oral BID Clarence Snell MD   5 mg at 09/01/20 2034    atorvastatin (LIPITOR) tablet 40 mg  40 mg Oral Nightly Clarence Snell MD   40 mg at 09/01/20 2034    labetalol (NORMODYNE;TRANDATE) injection 10 mg  10 mg Intravenous Q4H PRN Clarence Snell MD        albuterol sulfate  (90 Base) MCG/ACT inhaler 2 puff  2 puff Inhalation Q2H PRN Clarence Snell MD           Allergies:  No Known Allergies    Problem List:    Patient Active Problem List   Diagnosis Code    Crohn disease ([facility] K50.90    Depression F32.9    Osteoarthritis M19.90    Lupus anticoagulant disorder ([facility] D68.62    Dysphagia R13.10    Syringomyelia ([facility] G95.0    Gastritis and gastroduodenitis K29.70, K29.90    Skin ulcer of shoulder ([facility] L98.499    Shortness of breath R06.02    Anemia D64.9    Chronic emphysema syndrome (HCC) J43.9    Pulmonary fibrosis (HCC) J84.10    Chronic hypoxemic respiratory failure ([facility]) J96.11    Hiatal hernia K44.9    COPD, severe ([facility]) J44.9    Alpha thalassemia minor D56.3    Necrotizing pneumonia ([facility]) J85.0    Acute on chronic respiratory failure with hypoxia ([facility]) J96.21    Bronchiectasis with acute exacerbation ([facility]) J47.1    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    Osteoporosis of multiple sites M81.0    Gastroesophageal reflux disease without esophagitis K21.9    Sepsis ([facility]) A41.9    Bilateral pulmonary embolism ([facility]) I26.99    Acute deep vein thrombosis (DVT) of distal vein of both lower extremities ([facility]) I82.4Z3    History of pulmonary embolism Z86.711    SOB (shortness of breath) R06.02    Wound of left shoulder S41.002A    Hx pulmonary embolism Z86.711    Pneumonia due to organism J18.9    COPD exacerbation ([facility]) J44.1    Bacterial pneumonia J15.9    Cerebrovascular accident (CVA) ([facility] I63.9    MARIS (acute kidney injury) ([facility] N17.9    Suspected COVID-19 virus infection Z20.828    Dizziness R42    Elevated procalcitonin R79.89    Long term systemic steroid user Z79.52    History of DVT (deep vein thrombosis) Z86.718    Anticoagulated Z79.01    Small vessel disease, cerebrovascular I67.9    Ex-smoker Z87.891    History of depression Z86.59    Overweight (BMI 25.0-29. 9) E66.3       Past Medical History:        Diagnosis Date    Bullous emphysema ([facility])     CKD (chronic kidney disease) stage 3, GFR 30-59 ml/min ([facility])     Clostridium difficile carrier 01/21/2019    COPD (chronic obstructive pulmonary disease) ([facility]     PFT done 7 years ago   [facility] Crohn's disease ([facility]     Crohn's disease    Depression 1/30/2011    pt states resolved as of 3-26-12    DVT (deep venous thrombosis) ([facility]     2012; first ocurrence     Hiatal hernia     Hx of blood clots     Kidney disease     Kidney insufficiency     Osteoporosis     Peripheral neuropathy     neuropathy in legs    Pneumonia     Postmenopausal     LMP in  40's    Pulmonary embolism ([facility]     2012; first ocurrence    Sepsis ([facility]     Syringomyelia ([facility]        Past Surgical History:        Procedure Laterality Date    ABDOMEN SURGERY      BACK SURGERY      shunt to drain CSF    BIOPSY SHOULDER Left 2019    EXCISION OF LEFT SHOULDER MASS performed by Andrés Hernandez MD at [facility]      crainotomy- remove fluid ? V-P SHUNT    CHOLECYSTECTOMY      COLON SURGERY      resection X2    COLONOSCOPY  11    BIOPSY AND POLYPECTOMY    COLONOSCOPY  2012    and ablation ERBE/APC    SHOULDER SURGERY      L        Social History:    Social History     Tobacco Use    Smoking status: Former Smoker     Packs/day: 0.25     Years: 30.00     Pack years: 7.50     Types: Cigarettes     Last attempt to quit: 2015     Years since quittin.5    Smokeless tobacco: Never Used    Tobacco comment: started smoking at age 26 / smoked up to 9 cigarettes a day    Substance Use Topics    Alcohol use: No                                Counseling given: Not Answered  Comment: started smoking at age 26 / smoked up to 9 cigarettes a day       Vital Signs (Current): There were no vitals filed for this visit.                                            BP Readings from Last 3 Encounters:   20 111/66   20 126/82   20 138/88       NPO Status:                                                                                 BMI:   Wt Readings from Last 3 Encounters:   20 152 lb 14.4 oz (69.4 kg)   20 147 lb (66.7 kg)   20 133 lb (60.3 kg)     There is no height or weight on file to calculate BMI.    CBC:   Lab Results   Component Value Date    WBC 19.9 2020    RBC 4.43 2020    HGB 9.0 2020    HCT 28.4 2020    MCV 64.1 2020    RDW 15.1

## 2020-09-02 NOTE — PLAN OF CARE
Problem: Falls - Risk of:  Goal: Will remain free from falls  Description: Will remain free from falls  Outcome: Ongoing     Problem: Pain:  Goal: Pain level will decrease  Description: Pain level will decrease  Outcome: Ongoing   Pt denies any pain at this time   Problem: ACTIVITY INTOLERANCE/IMPAIRED MOBILITY  Goal: Mobility/activity is maintained at optimum level for patient  Outcome: Ongoing

## 2020-09-02 NOTE — PROGRESS NOTES
Transferred from cart to bed. Tolerated well. Family at bedside. VSS. Report to Mirta Queen RN to complete handoff.

## 2020-09-02 NOTE — ANESTHESIA POSTPROCEDURE EVALUATION
Department of Anesthesiology  Postprocedure Note    Patient: Joi Shetty  MRN: [id number]  YOB: 1950  Date of evaluation: 9/2/2020  Time:  1:52 PM     Procedure Summary     Date:  09/02/20 Room / Location:  [address] [place] [address]    Anesthesia Start:  0825 Anesthesia Stop:  4051    Procedure:  BRONCHOSCOPY ALVEOLAR LAVAGE (N/A Abdomen) Diagnosis:  (Pneumonia)    Surgeon:  Martell Priest MD Responsible Provider:  Jerzy Whitley MD    Anesthesia Type:  general ASA Status:  3          Anesthesia Type: general    Sumanth Phase I: Sumanth Score: 9    Sumanth Phase II:      Last vitals: Reviewed and per EMR flowsheets.        Anesthesia Post Evaluation    Patient location during evaluation: PACU  Complications: no  Cardiovascular status: hemodynamically stable  Respiratory status: acceptable

## 2020-09-03 LAB
ANION GAP SERPL CALCULATED.3IONS-SCNC: 11 MMOL/L (ref 3–16)
BASOPHILS ABSOLUTE: 0 K/UL (ref 0–0.2)
BASOPHILS RELATIVE PERCENT: 0 %
BUN BLDV-MCNC: 21 MG/DL (ref 7–20)
CALCIUM SERPL-MCNC: 8.2 MG/DL (ref 8.3–10.6)
CHLORIDE BLD-SCNC: 104 MMOL/L (ref 99–110)
CO2: 24 MMOL/L (ref 21–32)
CREAT SERPL-MCNC: 1.7 MG/DL (ref 0.6–1.2)
EOSINOPHILS ABSOLUTE: 0 K/UL (ref 0–0.6)
EOSINOPHILS RELATIVE PERCENT: 0 %
GFR AFRICAN AMERICAN: 36
GFR NON-AFRICAN AMERICAN: 30
GLUCOSE BLD-MCNC: 134 MG/DL (ref 70–99)
HCT VFR BLD CALC: 24.9 % (ref 36–48)
HEMATOLOGY PATH CONSULT: NO
HEMOGLOBIN: 7.8 G/DL (ref 12–16)
HYPOCHROMIA: ABNORMAL
LYMPHOCYTES ABSOLUTE: 1.6 K/UL (ref 1–5.1)
LYMPHOCYTES RELATIVE PERCENT: 8 %
MCH RBC QN AUTO: 19.7 PG (ref 26–34)
MCHC RBC AUTO-ENTMCNC: 31.3 G/DL (ref 31–36)
MCV RBC AUTO: 63.1 FL (ref 80–100)
MONOCYTES ABSOLUTE: 0.4 K/UL (ref 0–1.3)
MONOCYTES RELATIVE PERCENT: 2 %
NEUTROPHILS ABSOLUTE: 18.4 K/UL (ref 1.7–7.7)
NEUTROPHILS RELATIVE PERCENT: 90 %
OVALOCYTES: ABNORMAL
PDW BLD-RTO: 14.9 % (ref 12.4–15.4)
PLATELET # BLD: 446 K/UL (ref 135–450)
PLATELET SLIDE REVIEW: ADEQUATE
PMV BLD AUTO: 7.1 FL (ref 5–10.5)
POIKILOCYTES: ABNORMAL
POLYCHROMASIA: ABNORMAL
POTASSIUM REFLEX MAGNESIUM: 3.6 MMOL/L (ref 3.5–5.1)
RBC # BLD: 3.95 M/UL (ref 4–5.2)
SLIDE REVIEW: ABNORMAL
SODIUM BLD-SCNC: 139 MMOL/L (ref 136–145)
WBC # BLD: 20.4 K/UL (ref 4–11)

## 2020-09-03 PROCEDURE — 6370000000 HC RX 637 (ALT 250 FOR IP): Performed by: INTERNAL MEDICINE

## 2020-09-03 PROCEDURE — 2700000000 HC OXYGEN THERAPY PER DAY

## 2020-09-03 PROCEDURE — 6360000002 HC RX W HCPCS: Performed by: ANESTHESIOLOGY

## 2020-09-03 PROCEDURE — 6360000002 HC RX W HCPCS: Performed by: INTERNAL MEDICINE

## 2020-09-03 PROCEDURE — 2580000003 HC RX 258: Performed by: INTERNAL MEDICINE

## 2020-09-03 PROCEDURE — 2060000000 HC ICU INTERMEDIATE R&B

## 2020-09-03 PROCEDURE — 99233 SBSQ HOSP IP/OBS HIGH 50: CPT | Performed by: INTERNAL MEDICINE

## 2020-09-03 PROCEDURE — 80048 BASIC METABOLIC PNL TOTAL CA: CPT

## 2020-09-03 PROCEDURE — 97530 THERAPEUTIC ACTIVITIES: CPT

## 2020-09-03 PROCEDURE — 36415 COLL VENOUS BLD VENIPUNCTURE: CPT

## 2020-09-03 PROCEDURE — 94761 N-INVAS EAR/PLS OXIMETRY MLT: CPT

## 2020-09-03 PROCEDURE — 97116 GAIT TRAINING THERAPY: CPT

## 2020-09-03 PROCEDURE — 87641 MR-STAPH DNA AMP PROBE: CPT

## 2020-09-03 PROCEDURE — 85025 COMPLETE CBC W/AUTO DIFF WBC: CPT

## 2020-09-03 PROCEDURE — 94640 AIRWAY INHALATION TREATMENT: CPT

## 2020-09-03 RX ORDER — LINEZOLID 600 MG/1
600 TABLET, FILM COATED ORAL EVERY 12 HOURS SCHEDULED
Status: DISCONTINUED | OUTPATIENT
Start: 2020-09-03 | End: 2020-09-06 | Stop reason: HOSPADM

## 2020-09-03 RX ORDER — METRONIDAZOLE 250 MG/1
500 TABLET ORAL EVERY 8 HOURS SCHEDULED
Status: DISCONTINUED | OUTPATIENT
Start: 2020-09-03 | End: 2020-09-06 | Stop reason: HOSPADM

## 2020-09-03 RX ADMIN — AMITRIPTYLINE HYDROCHLORIDE 75 MG: 25 TABLET, FILM COATED ORAL at 21:44

## 2020-09-03 RX ADMIN — IPRATROPIUM BROMIDE AND ALBUTEROL SULFATE 1 AMPULE: .5; 3 SOLUTION RESPIRATORY (INHALATION) at 07:56

## 2020-09-03 RX ADMIN — HYDROCORTISONE SODIUM SUCCINATE 100 MG: 250 INJECTION, POWDER, FOR SOLUTION INTRAMUSCULAR; INTRAVENOUS at 08:28

## 2020-09-03 RX ADMIN — HYDROCORTISONE SODIUM SUCCINATE 100 MG: 250 INJECTION, POWDER, FOR SOLUTION INTRAMUSCULAR; INTRAVENOUS at 00:36

## 2020-09-03 RX ADMIN — IPRATROPIUM BROMIDE AND ALBUTEROL SULFATE 1 AMPULE: .5; 3 SOLUTION RESPIRATORY (INHALATION) at 15:23

## 2020-09-03 RX ADMIN — STANDARDIZED SENNA CONCENTRATE AND DOCUSATE SODIUM 2 TABLET: 8.6; 5 TABLET ORAL at 08:27

## 2020-09-03 RX ADMIN — HYDROCORTISONE SODIUM SUCCINATE 100 MG: 250 INJECTION, POWDER, FOR SOLUTION INTRAMUSCULAR; INTRAVENOUS at 23:38

## 2020-09-03 RX ADMIN — PIPERACILLIN AND TAZOBACTAM 3.38 G: 3; .375 INJECTION, POWDER, LYOPHILIZED, FOR SOLUTION INTRAVENOUS at 04:48

## 2020-09-03 RX ADMIN — APIXABAN 5 MG: 5 TABLET, FILM COATED ORAL at 21:43

## 2020-09-03 RX ADMIN — Medication 10 ML: at 23:50

## 2020-09-03 RX ADMIN — APIXABAN 5 MG: 5 TABLET, FILM COATED ORAL at 08:27

## 2020-09-03 RX ADMIN — IPRATROPIUM BROMIDE AND ALBUTEROL SULFATE 1 AMPULE: .5; 3 SOLUTION RESPIRATORY (INHALATION) at 20:48

## 2020-09-03 RX ADMIN — PIPERACILLIN AND TAZOBACTAM 3.38 G: 3; .375 INJECTION, POWDER, LYOPHILIZED, FOR SOLUTION INTRAVENOUS at 13:00

## 2020-09-03 RX ADMIN — METRONIDAZOLE 500 MG: 250 TABLET ORAL at 21:43

## 2020-09-03 RX ADMIN — TIOTROPIUM BROMIDE INHALATION SPRAY 2 PUFF: 3.12 SPRAY, METERED RESPIRATORY (INHALATION) at 07:56

## 2020-09-03 RX ADMIN — Medication 2 PUFF: at 07:56

## 2020-09-03 RX ADMIN — LEVOFLOXACIN 750 MG: 500 TABLET, FILM COATED ORAL at 15:54

## 2020-09-03 RX ADMIN — LINEZOLID 600 MG: 600 INJECTION, SOLUTION INTRAVENOUS at 13:04

## 2020-09-03 RX ADMIN — DESMOPRESSIN ACETATE 40 MG: 0.2 TABLET ORAL at 21:43

## 2020-09-03 RX ADMIN — Medication 2 PUFF: at 20:48

## 2020-09-03 RX ADMIN — GUAIFENESIN AND CODEINE PHOSPHATE 10 ML: 100; 10 SOLUTION ORAL at 23:38

## 2020-09-03 RX ADMIN — IPRATROPIUM BROMIDE AND ALBUTEROL SULFATE 1 AMPULE: .5; 3 SOLUTION RESPIRATORY (INHALATION) at 12:31

## 2020-09-03 RX ADMIN — PREDNISONE 20 MG: 20 TABLET ORAL at 08:28

## 2020-09-03 RX ADMIN — METRONIDAZOLE 500 MG: 250 TABLET ORAL at 15:54

## 2020-09-03 RX ADMIN — LINEZOLID 600 MG: 600 INJECTION, SOLUTION INTRAVENOUS at 00:35

## 2020-09-03 RX ADMIN — HYDROCORTISONE SODIUM SUCCINATE 100 MG: 250 INJECTION, POWDER, FOR SOLUTION INTRAMUSCULAR; INTRAVENOUS at 15:55

## 2020-09-03 RX ADMIN — LINEZOLID 600 MG: 600 TABLET, FILM COATED ORAL at 21:44

## 2020-09-03 ASSESSMENT — ENCOUNTER SYMPTOMS
DIARRHEA: 0
ABDOMINAL PAIN: 0
NAUSEA: 0
EYE DISCHARGE: 0
COLOR CHANGE: 0
CHEST TIGHTNESS: 0
TROUBLE SWALLOWING: 0
RHINORRHEA: 0
APNEA: 0
CHOKING: 0
STRIDOR: 0
FACIAL SWELLING: 0
SHORTNESS OF BREATH: 0
PHOTOPHOBIA: 0
BLOOD IN STOOL: 0
EYE REDNESS: 0
COUGH: 1

## 2020-09-03 ASSESSMENT — PAIN SCALES - GENERAL
PAINLEVEL_OUTOF10: 0
PAINLEVEL_OUTOF10: 7

## 2020-09-03 ASSESSMENT — PAIN DESCRIPTION - LOCATION: LOCATION: HEAD

## 2020-09-03 NOTE — PROGRESS NOTES
P Pulmonary and Critical Care  Progress note      Reason for Consult: Pneumonia, COPD, emphysema  Requesting Physician: Dr Lobo Thompson:   [facility]:                The patient is a 77 y.o. female with significant past medical history of:      Diagnosis Date    Bullous emphysema ([address]     CKD (chronic kidney disease) stage 3, GFR 30-59 ml/min ([address]     Clostridium difficile carrier 01/21/2019    COPD (chronic obstructive pulmonary disease) ([address]     PFT done 7 years ago   Kirk Crohn's disease ([address]     Crohn's disease    Depression 1/30/2011    pt states resolved as of 3-26-12    DVT (deep venous thrombosis) ([address]     2012; first ocurrence     Hiatal hernia     Hx of blood clots     Kidney disease     Kidney insufficiency     Osteoporosis     Peripheral neuropathy     neuropathy in legs    Pneumonia     Postmenopausal     LMP in  42's    Pulmonary embolism ([address]     2012; first ocurrence    Sepsis ([address]     Syringomyelia ([address]      Interval history: The patient tolerated bronchoscopy well yesterday. She is feeling better but weak. O2 at 2 LPM.      Past Surgical History:        Procedure Laterality Date    ABDOMEN SURGERY      BACK SURGERY      shunt to drain CSF    BIOPSY SHOULDER Left 2/27/2019    EXCISION OF LEFT SHOULDER MASS performed by Paola Serra MD at [address] [place] [address]      crainotomy- remove fluid ? V-P SHUNT    BRONCHOSCOPY N/A 9/2/2020    BRONCHOSCOPY ALVEOLAR LAVAGE performed by [facility]a [facility]      resection X2    COLONOSCOPY  12/5/11    BIOPSY AND POLYPECTOMY    COLONOSCOPY  4-2-2012    and ablation ERBE/APC    SHOULDER SURGERY      L      Current Medications:    Current Facility-Administered Medications: hydrocortisone sodium succinate PF (SOLU-CORTEF) injection 100 mg, 100 mg, Intravenous, Q8H  sennosides-docusate sodium (SENOKOT-S) 8.6-50 MG tablet 2 tablet, 2 tablet, Oral, Daily  piperacillin-tazobactam (ZOSYN) 3.375 g in dextrose 5 % 50 mL IVPB extended infusion (mini-bag), 3.375 g, Intravenous, Q8H  bisacodyl (DULCOLAX) suppository 10 mg, 10 mg, Rectal, Daily PRN  ipratropium-albuterol (DUONEB) nebulizer solution 1 ampule, 1 ampule, Inhalation, Q4H WA  guaiFENesin-codeine (GUAIFENESIN AC) 100-10 MG/5ML liquid 10 mL, 10 mL, Oral, Q4H PRN  budesonide-formoterol (SYMBICORT) 160-4.5 MCG/ACT inhaler 2 puff, 2 puff, Inhalation, BID  tiotropium (SPIRIVA RESPIMAT) 2.5 MCG/ACT inhaler 2 puff, 2 puff, Inhalation, Daily  predniSONE (DELTASONE) tablet 20 mg, 20 mg, Oral, Daily  linezolid (ZYVOX) IVPB 600 mg, 600 mg, Intravenous, Q12H  0.9 % sodium chloride bolus, 30 mL/kg, Intravenous, Once  sodium chloride flush 0.9 % injection 10 mL, 10 mL, Intravenous, 2 times per day  sodium chloride flush 0.9 % injection 10 mL, 10 mL, Intravenous, PRN  acetaminophen (TYLENOL) tablet 650 mg, 650 mg, Oral, Q6H PRN **OR** acetaminophen (TYLENOL) suppository 650 mg, 650 mg, Rectal, Q6H PRN  polyethylene glycol (GLYCOLAX) packet 17 g, 17 g, Oral, Daily PRN  promethazine (PHENERGAN) tablet 12.5 mg, 12.5 mg, Oral, Q6H PRN **OR** ondansetron (ZOFRAN) injection 4 mg, 4 mg, Intravenous, Q6H PRN  amitriptyline (ELAVIL) tablet 75 mg, 75 mg, Oral, Nightly  apixaban (ELIQUIS) tablet 5 mg, 5 mg, Oral, BID  atorvastatin (LIPITOR) tablet 40 mg, 40 mg, Oral, Nightly  labetalol (NORMODYNE;TRANDATE) injection 10 mg, 10 mg, Intravenous, Q4H PRN  albuterol sulfate  (90 Base) MCG/ACT inhaler 2 puff, 2 puff, Inhalation, Q2H PRN    No Known Allergies    Social History:    TOBACCO:   reports that she quit smoking about 5 years ago. Her smoking use included cigarettes. She has a 7.50 pack-year smoking history. She has never used smokeless tobacco.  ETOH:   reports no history of alcohol use.   Patient currently lives independently  Environmental/chemical exposure: None known    Family History:       Problem Relation Age of Onset    Heart Disease Mother     High Blood Pressure Mother     High Cholesterol Mother     Early Death Mother 36        MI    Heart Disease Father     High Blood Pressure Father     High Cholesterol Father     Stroke Father 79    High Blood Pressure Sister     High Blood Pressure Brother      REVIEW OF SYSTEMS:    CONSTITUTIONAL:  negative for fevers, chills, diaphoresis, activity change, appetite change, fatigue, night sweats and unexpected weight change. EYES:  negative for blurred vision, eye discharge, visual disturbance and icterus  HEENT:  negative for hearing loss, tinnitus, ear drainage, sinus pressure, nasal congestion, epistaxis and snoring  RESPIRATORY:  See HPI  CARDIOVASCULAR:  negative for chest pain, palpitations, exertional chest pressure/discomfort, edema, syncope  GASTROINTESTINAL:  negative for nausea, vomiting, diarrhea, constipation, blood in stool and abdominal pain  GENITOURINARY:  negative for frequency, dysuria, urinary incontinence, decreased urine volume, and hematuria  HEMATOLOGIC/LYMPHATIC:  negative for easy bruising, bleeding and lymphadenopathy  ALLERGIC/IMMUNOLOGIC:  negative for recurrent infections, angioedema, anaphylaxis and drug reactions  ENDOCRINE:  negative for weight changes and diabetic symptoms including polyuria, polydipsia and polyphagia  MUSCULOSKELETAL:  negative for  pain, joint swelling, decreased range of motion and muscle weakness  NEUROLOGICAL:  negative for headaches, slurred speech, unilateral weakness  PSYCHIATRIC/BEHAVIORAL: negative for hallucinations, behavioral problems, confusion and agitation.      Objective:   PHYSICAL EXAM:      VITALS:  BP (!) 150/90   Pulse 100   Temp 97 °F (36.1 °C) (Axillary)   Resp 16   Ht 5' (1.524 m)   Wt 151 lb (68.5 kg)   SpO2 98%   BMI 29.49 kg/m²      24HR INTAKE/OUTPUT:      Intake/Output Summary (Last 24 hours) at 9/3/2020 0960  Last data filed at 9/3/2020 0824  Gross per 24 hour   Intake 300 ml Output --   Net 300 ml     CONSTITUTIONAL:  awake, alert, cooperative, no apparent distress, and appears stated age  NECK:  Supple, symmetrical, trachea midline, no adenopathy, thyroid symmetric, not enlarged and no tenderness, skin normal  LUNGS:  no increased work of breathing and diminished to auscultation. No accessory muscle use  CARDIOVASCULAR: S1 and S2, no edema and no JVD  ABDOMEN:  normal bowel sounds, non-distended and no masses palpated, and no tenderness to palpation. No hepatospleenomegaly  LYMPHADENOPATHY:  no axillary or supraclavicular adenopathy. No cervical adnenopathy  PSYCHIATRIC: Oriented to person place and time. No obvious depression or anxiety. MUSCULOSKELETAL: No obvious misalignment or effusion of the joints. No clubbing, cyanosis of the digits. SKIN:  normal skin color, texture, turgor and no redness, warmth, or swelling. No palpable nodules    DATA:    Old records have been reviewed    CBC:  Recent Labs     09/01/20 0514 09/02/20 0445 09/03/20 0442   WBC 27.9* 19.9* 20.4*   RBC 4.87 4.43 3.95*   HGB 9.8* 9.0* 7.8*   HCT 31.5* 28.4* 24.9*    420 446   MCV 64.7* 64.1* 63.1*   MCH 20.0* 20.4* 19.7*   MCHC 31.0 31.9 31.3   RDW 15.3 15.1 14.9   NRBC 1*  1*  --   --    BANDSPCT 3 4  --       BMP:  Recent Labs     09/01/20 0515 09/02/20 0445 09/03/20 0442   * 136 139   K 4.6 3.5 3.6    103 104   CO2 16* 22 24   BUN 20 17 21*   CREATININE 1.6* 1.6* 1.7*   CALCIUM 8.9 8.7 8.2*   GLUCOSE 87 100* 134*      ABG:  No results for input(s): PHART, ZWM1WYJ, PO2ART, PQI1IET, R6RINUFO, BEART in the last 72 hours. Lab Results   Component Value Date    BNP <15 08/06/2011     Lab Results   Component Value Date    CKTOTAL 29 08/07/2011    CKMB 0.88 08/07/2011    TROPONINI <0.01 08/29/2020       Cultures:     Abx:    Radiology Review:  All pertinent images / reports were reviewed as a part of this visit. Assessment:     1.  Acute on chronic hypoxemic respiratory

## 2020-09-03 NOTE — PLAN OF CARE
Problem: Falls - Risk of:  Goal: Will remain free from falls  Description: Will remain free from falls  9/2/2020 2151 by Theodora Forte RN  Outcome: Ongoing  9/2/2020 1620 by Brett Clifton RN  Outcome: Ongoing  Note: Pt high fall risk, bed alarm on. Instructed pt to call nurse prior to ambulation for assistance.    Goal: Absence of physical injury  Description: Absence of physical injury  Outcome: Ongoing     Problem: Pain:  Goal: Pain level will decrease  Description: Pain level will decrease  9/2/2020 2151 by Theodora Forte RN  Outcome: Ongoing  9/2/2020 1620 by Brett Clifton RN  Outcome: Ongoing  Note: Pt has denied pain t/o shift  Goal: Control of acute pain  Description: Control of acute pain  Outcome: Ongoing  Goal: Control of chronic pain  Description: Control of chronic pain  Outcome: Ongoing     Problem: HEMODYNAMIC STATUS  Goal: Patient has stable vital signs and fluid balance  9/2/2020 2151 by Theodora Forte RN  Outcome: Ongoing  9/2/2020 1620 by Brett Clifton RN  Outcome: Ongoing  Note: Pt vss t/o shift     Problem: ACTIVITY INTOLERANCE/IMPAIRED MOBILITY  Goal: Mobility/activity is maintained at optimum level for patient  Outcome: Ongoing     Problem: COMMUNICATION IMPAIRMENT  Goal: Ability to express needs and understand communication  Outcome: Ongoing

## 2020-09-03 NOTE — CARE COORDINATION
Patient is active with [facility] for services including Meals on wheels 7/d week through [employer]. Home health care through [address] [place] [address], 8 hours/wk,  and has a Life alert system. Please notify Jake Calix, COA-casemanager, [phone] when patient is discharged to resume  non-medical services. Referred to [employer] for probable home IVABX. Per Massiel [place], Patient is covered at 100%  Please note IVABX on the [address] form with a stop date. [employer]  [phone], fax [phone].

## 2020-09-03 NOTE — PROGRESS NOTES
Comprehensive Nutrition Assessment    Type and Reason for Visit:  Initial(RD identified LOS)    Nutrition Recommendations/Plan:   1. Continue current diet  2. Encourage po    Nutrition Assessment:  Pt is nutritionally stable at this time. Pt is eating well. Pt consuming 50-75% of meals depending on what is being served. Pt does not like the food. Pt denies any n/v.    Malnutrition Assessment:  Malnutrition Status:  No malnutrition    Context:  Acute Illness     Findings of the 6 clinical characteristics of malnutrition:  Energy Intake:  No significant decrease in energy intake  Weight Loss:  No significant weight loss     Body Fat Loss:  No significant body fat loss     Muscle Mass Loss:  No significant muscle mass loss    Fluid Accumulation:  No significant fluid accumulation     Strength:  Not Performed    Estimated Daily Nutrient Needs:  Energy (kcal):  0308-0754 (20-25 cals/kg CBW 69kg); Weight Used for Energy Requirements:  Current     Protein (g):  59-68 gms (1.3-1.5 gms/kg IBW 45kg); Weight Used for Protein Requirements:  Ideal        Fluid (ml/day):  1 ml/kcal; Weight Used for Fluid Requirements:  Current      Nutrition Related Findings:  No edema. I/O's: +2.9L      Wounds:  None       Current Nutrition Therapies:    DIET LOW SODIUM 2 GM; Anthropometric Measures:  · Height: 5' (152.4 cm)  · Current Body Weight: 151 lb (68.5 kg)   · Ideal Body Weight: 100 lbs; % Ideal Body Weight     · BMI: 29.5  · Adjusted Body Weight:  ; No Adjustment   · BMI Categories: Overweight (BMI 25.0-29. 9)       Nutrition Diagnosis:   No nutrition diagnosis at this time     Nutrition Interventions:   Food and/or Nutrient Delivery:  Continue Current Diet  Nutrition Education/Counseling:  Education not indicated   Coordination of Nutrition Care:  No recommendation at this time    Goals:  PO 75% or more at meals       Nutrition Monitoring and Evaluation:   Food/Nutrient Intake Outcomes:  Food and Nutrient Intake  Physical

## 2020-09-03 NOTE — CARE COORDINATION
Referral received to check IV Benefits.  Patients benefit information is as follows:       Patient is covered at 100%    Electronically signed by Danyell Savage on 9/3/2020 at 11:34 AM   Cell Ph# [phone], Office # [phone]

## 2020-09-03 NOTE — PROGRESS NOTES
Shift assessment completed, pt is alert and oriented, VSS, fall precautions in place, call light within reach. See flowsheets and MAR/ will monitor pt. The care plan and education has been reviewed and mutually agreed upon with the patient.

## 2020-09-03 NOTE — PROGRESS NOTES
Occupational Therapy  Facility/Department: [address]  Daily Treatment Note  NAME: Zayda Velazco  : 1950  MRN: [id number]    Date of Service: 9/3/2020    Discharge Recommendations:  Zayda Velazco scored a 18/24 on the AM-PAC ADL Inpatient form. Current research shows that an AM-PAC score of 17 or less is typically not associated with a discharge to the patient's home setting. Based on the patient's AM-PAC score and their current ADL deficits, it is recommended that the patient have 3-5 sessions per week of Occupational Therapy at d/c to increase the patient's independence. Please see assessment section for further patient specific details. If patient discharges prior to next session this note will serve as a discharge summary. Please see below for the latest assessment towards goals. Should pt refuse therapy recommendations and d/c home:  [address] [place] [address]: LEVEL 3 SAFETY     - Initial home health evaluation to occur within 24-48 hours, in patient home   - Therapy evaluations in home within 24-48 hours of discharge; including DME and home safety   - Frontload therapy 5 days, then 3x a week   - Therapy to evaluate if patient has [address] needs for personal care   -  evaluation within 24-48 hours, includes evaluation of resources and insurance to determine [place], [place], LTC, and [employer] options     OT Equipment Recommendations  Equipment Needed: Yes  Mobility Devices: ADL Assistive Devices  ADL Assistive Devices: Hand-held Shower    Assessment   Performance deficits / Impairments: Decreased functional mobility ; Decreased ADL status; Decreased endurance;Decreased strength  Assessment: Pt is not at baseline level of occupational function based on the above deficits associated with bacterial PNA. Pt would benefit from continued skilled acute OT services to address these deficits.   Treatment Diagnosis: decreased functional mobility, ADL status, endurance and strength associated w/ bacterial PNA  Prognosis: Good  OT Education: OT Role;Plan of Care;Energy Conservation  Patient Education: use of PLB- pt demonstrated and verbalized understanding  Barriers to Learning: none  REQUIRES OT FOLLOW UP: Yes  Activity Tolerance  Activity Tolerance: Patient Tolerated treatment well  Activity Tolerance: limited by SOB, need for frequent rest breaks. SpO2 89-95% on 2L O2  Safety Devices  Safety Devices in place: Yes  Type of devices: All fall risk precautions in place; Chair alarm in place;Call light within reach; Left in chair;Nurse notified;Gait belt         Patient Diagnosis(es): The primary encounter diagnosis was Pneumonia due to organism. Diagnoses of Suspected COVID-19 virus infection, Acute respiratory failure with hypoxia ([address] MARIS (acute kidney injury) ([address] Cerebrovascular accident (CVA), unspecified mechanism ([address] and Dizziness were also pertinent to this visit. 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 has a past surgical history that includes shoulder surgery; back surgery; brain surgery; Colon surgery; Colonoscopy (12/5/11); Colonoscopy (4-2-2012); Cholecystectomy; Abdomen surgery; Biopsy shoulder (Left, 2/27/2019); and bronchoscopy (N/A, 9/2/2020).     Restrictions  Restrictions/Precautions  Restrictions/Precautions: Fall Risk, General Precautions(HIGH FALL RISK, 2L O2)  Required Braces or Orthoses?: No  Position Activity Restriction  Other position/activity restrictions: Andrea Werner is a 77 y.o. female who presents because of difficulty breathing. She has a history of chronic respiratory failure and severe COPD. She wears 2 L of oxygen all the time. She says that her air conditioning went out from her home about a month ago. She started using a portable air conditioner but most of her home is still hot. She has needed to sleep in her living room in a chair because her bedroom is too hot. She is able to lie flat. She has had increased cough and congestion for about 3 days. She feels generally weak all over and unsteady on her feet for multiple days. She was too weak this morning to take her home inhalers or her normal medicines. She has been otherwise compliant with her medicines and takes prednisone daily and Eliquis twice daily. She does have a history of blood clots. She denies any leg swelling. Cough is mostly nonproductive. She does not believe she has had a fever and has been checking her temperature. She has been sweating a lot because of the temperature at her home. EMS found her to be hypoxic on her home oxygen 86%. The administer higher flow oxygen and brought her to the hospital.  She does have a headache and has a sensation of the room spinning since sometime yesterday. She does have left-sided chest pain, worse with coughing for several days. She has been unsteady on her feet, walking into walls for several days.     Subjective   General  Chart Reviewed: Yes  Family / Caregiver Present: No  Diagnosis: bacterial PNA  Subjective  Subjective: Pt in recliner chair upon arrival, pleasant and agreeable to OT treatment session  Vital Signs  Patient Currently in Pain: Denies     Orientation  Orientation  Overall Orientation Status: Within Normal Limits    Objective    ADL  Additional Comments: Pt denied the need to complete ADLs this date stating that she completed them prior to the start of session  Balance  Sitting Balance: Independent  Standing Balance: Contact guard assistance(CGA-SBA w/ RW)  Standing Balance  Time: 12 minutes  Activity: functional transfers and functional mobility in room  Comment: no LOB noted  Functional Mobility  Functional - Mobility Device: Rolling Walker  Activity: Other(150' in hospital hallway)  Assist Level: Contact guard assistance  Functional Mobility Comments: Pt ambualted 150' with use of RW requiring one standing rest break d/t fatigue; decreased step length and speed needed, SOB noted (SP02% 86) after completing and pt educated on use of PLB and pt recovered after 2 minutes to above 90%  Bed mobility  Comment: unable to assess d/t pt being in recliner chair at start and end of session  Transfers  Sit to stand: Stand by assistance  Stand to sit: Stand by assistance  Cognition  Overall Cognitive Status: WNL                 Plan   Plan  Times per week: 3-5  Times per day: Daily  Current Treatment Recommendations: Strengthening, Functional Mobility Training, Endurance Training, Safety Education & Training, Self-Care / ADL    G-Code     OutComes Score                                                  AM-PAC Score        -[facility] Inpatient Daily Activity Raw Score: 18 (09/03/20 1515)  AM-PAC Inpatient ADL T-Scale Score : 38.66 (09/03/20 1515)  ADL Inpatient CMS 0-100% Score: 46.65 (09/03/20 1515)  ADL Inpatient CMS G-Code Modifier : CK (09/03/20 1515)    Goals  Short term goals  Time Frame for Short term goals: Discharge  Short term goal 1: Supervision for funtional transfers to ADL surfaces w/RW  Short term goal 2: Supervision for functional mobility for ADL ax w/RW  Short term goal 3: Supervision for LB dressing and bathing  Short term goal 4: Supervision for toileting  Short term goal 5: Pt to tolerate standing for 5-10 min for ADL ax/functional mobility       Therapy Time   Individual Concurrent Group Co-treatment   Time In 1345         Time Out 1408         Minutes 23         Timed Code Treatment Minutes: [address], [address] [place] [id number]

## 2020-09-03 NOTE — PROGRESS NOTES
Infectious Diseases   Progress Note      Admission Date: 8/28/2020  Hospital Day: Hospital Day: 7   Attending: Messi Veras MD  Date of service: 9/3/2020     Chief complaint/ Reason for consult:     · Sepsis with worsening fever, persistent bandemia, acute kidney injury on chronic kidney disease  · Left-sided multifocal pneumonia with necrotizing infiltrate in the left upper lobe  · Elevated procalcitonin of 2.17 on 8/28/2020  · Chronic respiratory failure with hypoxia  · Bilateral chronic emphysema  · Crohn's disease    Microbiology:        I have reviewed allavailable micro lab data and cultures    · Blood culture (2/2) - collected on 8/28/2020: Negative    · Urine culture  - collected on 8/28/2020: Negative      Antibiotics and immunizations:       Current antibiotics: All antibiotics and their doses were reviewed by me    Recent Abx Admin                   linezolid (ZYVOX) IVPB 600 mg (mg) 600 mg New Bag 09/03/20 1304     600 mg New Bag  0035    piperacillin-tazobactam (ZOSYN) 3.375 g in dextrose 5 % 50 mL IVPB extended infusion (mini-bag) ()  Restarted 09/03/20 1302     3.375 g New Bag  1300     3.375 g New Bag  0448     3.375 g New Bag 09/02/20 2024     3.375 g New Bag  1528                  Immunization History: All immunization history was reviewed by me today. Immunization History   Administered Date(s) Administered    DT (pediatric) 09/25/2010    Influenza Virus Vaccine 10/01/2012, 10/20/2015    Influenza Whole 10/01/2011    Influenza, High Dose (Fluzone 65 yrs and older) 10/06/2014, 10/19/2016, 11/16/2017, 11/05/2018    Influenza, Triv, inactivated, subunit, adjuvanted, IM (Fluad 65 yrs and older) 11/13/2019    Pneumococcal Conjugate 13-valent ([id number]) 06/11/2015    Pneumococcal Conjugate 7-valent (Prevnar7) 02/07/2005, 10/01/2011    Pneumococcal Polysaccharide ([id number]) 06/15/2016    Zoster Live (Zostavax) 07/01/2013       Known drug allergies:      All allergies were reviewed and updated    No Known Allergies    Social history:     Social History:  All social andepidemiologic history was reviewed and updated by me today as needed. · Tobacco use:   reports that she quit smoking about 5 years ago. Her smoking use included cigarettes. She has a 7.50 pack-year smoking history. She has never used smokeless tobacco.  · Alcohol use:   reports no history of alcohol use. · Currently lives in: [facility] [address]  ·  reports no history of drug use. Assessment:     The patient is a 77 y.o. old female who  has a past medical history of Bullous emphysema ([facility] [address] CKD (chronic kidney disease) stage 3, GFR 30-59 ml/min ([facility] [address] Clostridium difficile carrier (01/21/2019), COPD (chronic obstructive pulmonary disease) ([facility] [address] Crohn's disease ([address] Depression (1/30/2011), DVT (deep venous thrombosis) ([facility] [address] Hiatal hernia, blood clots, Kidney disease, Kidney insufficiency, Osteoporosis, Peripheral neuropathy, Pneumonia, Postmenopausal, Pulmonary embolism ([address] Sepsis ([address] and Syringomyelia ([facility] [address] with following problems:    · Sepsis with worsening fever, persistent bandemia, acute kidney injury on chronic kidney disease -slowly improving, white cell count is 19,900 today  · Left-sided multifocal pneumonia with necrotizing infiltrate in the left upper lobe -status post diagnostic bronchoscopy on 9/2/2020  · Elevated procalcitonin of 2.17 on 8/28/2020  · Chronic respiratory failure with hypoxia -remains on 3 L oxygen via nasal cannula  · Bilateral chronic emphysema  · Crohn's disease  · Long-term steroid use  · History of DVT  · Chronic small vessel CNS disease on MRI of the brain  · History of depression  · Overweight due to excess calorie intake : Body mass index is 29.86 kg/m². · Ex-smoker      Discussion:      The patient is on IV linezolid and Zosyn. She is afebrile. Serum creatinine is 1.7. White cell count is 20,400. She had a bronchoscopy done yesterday.   Bronchial washing cultures and cytology are in process. BAL AFB stains were negative. She is currently on 2 L oxygen via nasal cannula. Plan:     Diagnostic Workup:      · Continue to follow  fever curve, WBC count and blood cultures  · Follow up on blood counts, liver and renal function  · Looks like nasal MRSA screening culture was canceled. Will order nasal MRSA probe    Antimicrobials:    · Will switch IV linezolid to p.o. linezolid 600 mg every 12 hour  · We will switch IV Zosyn to p.o. Levaquin 750 mg every 48 hours  · We will order p.o. Flagyl 500 mg every 8 hours  · Continue to monitor her vitals closely  · Will follow-up on BAL culture results and clinical progress and will make further recommendations accordingly  · Aspiration precautions  · Continue oxygen support to maintain oxygen saturation above 92%  · Cough and deep breathing exercises  · DVT prophylaxis  · Discussed all above with patient and RN      Drug Monitoring:    · Continue monitoring for antibiotic toxicity as follows: CBC, CMP, QTc interval  · Continue to watch for following: new or worsening fever, new hypotension, hives, lip swelling and redness or purulence at vascular access sites. I/v access Management:    · Continue to monitor i.v access sites for erythema, induration, discharge or tenderness. · As always, continue efforts to minimize tubes/lines/drains as clinically appropriate to reduce chances of line associated infections. Patient education and counseling:       · The patient was educated in detail about the side-effects of various antibiotics and things to watch for like new rashes, lip swelling, severe reaction, worsening diarrhea, break through fever etc.  · Discussed patient's condition and what to expect. All of the patient's questions were addressed in a satisfactory manner and patient verbalized understanding all instructions.       Level of complexity of visit: High     TIME SPENT TODAY:     - Spent over  37  minutes on visit (including interval history, physical exam, review of data including labs, cultures, imaging, development and implementation of treatment plan and coordination of complex care). Thank you for involving me in the care of your patient. I will continue to follow. If you have anyadditional questions, please do not hesitate to contact me. Subjective: Interval history: Interval history was obtained from chart review and RN. The patient is afebrile. She is on 2 L oxygen via nasal cannula. No diarrhea. REVIEW OF SYSTEMS:      Review of Systems   Constitutional: Negative for chills, diaphoresis and unexpected weight change. HENT: Negative for congestion, ear discharge, ear pain, facial swelling, hearing loss, rhinorrhea and trouble swallowing. Eyes: Negative for photophobia, discharge, redness and visual disturbance. Respiratory: Positive for cough. Negative for apnea, choking, chest tightness, shortness of breath and stridor. Cardiovascular: Negative for chest pain and palpitations. Gastrointestinal: Negative for abdominal pain, blood in stool, diarrhea and nausea. Endocrine: Negative for polydipsia, polyphagia and polyuria. Genitourinary: Negative for difficulty urinating, dysuria, frequency, hematuria, menstrual problem and vaginal discharge. Musculoskeletal: Negative for arthralgias, joint swelling, myalgias and neck stiffness. Skin: Negative for color change and rash. Allergic/Immunologic: Negative for immunocompromised state. Neurological: Negative for dizziness, seizures, speech difficulty, light-headedness and headaches. Hematological: Negative for adenopathy. Psychiatric/Behavioral: Negative for agitation, hallucinations and suicidal ideas. Past Medical History: All past medical history reviewed today.     Past Medical History:   Diagnosis Date    Bullous emphysema (HCC)     CKD (chronic kidney disease) stage 3, GFR 30-59 ml/min ([facility])     Clostridium difficile carrier 01/21/2019    COPD (chronic obstructive pulmonary disease) ([facility] [address]     PFT done 7 years ago   Marcie Yoder Crohn's disease ([facility] [address]     Crohn's disease    Depression 1/30/2011    pt states resolved as of 3-26-12    DVT (deep venous thrombosis) ([facility] [address]     2012; first ocurrence     Hiatal hernia     Hx of blood clots     Kidney disease     Kidney insufficiency     Osteoporosis     Peripheral neuropathy     neuropathy in legs    Pneumonia     Postmenopausal     LMP in  40's    Pulmonary embolism ([facility] [address]     2012; first ocurrence    Sepsis ([facility] [address]     Syringomyelia ([facility] [address]        Past Surgical History: All past surgical history was reviewed today. Past Surgical History:   Procedure Laterality Date    ABDOMEN SURGERY      BACK SURGERY      shunt to drain CSF    BIOPSY SHOULDER Left 2/27/2019    EXCISION OF LEFT SHOULDER MASS performed by Andrés Hernandez MD at [address]      crainotomy- remove fluid ? V-P SHUNT    BRONCHOSCOPY N/A 9/2/2020    BRONCHOSCOPY ALVEOLAR LAVAGE performed by Vikki Peres MD at [place] [address]      resection X2    COLONOSCOPY  12/5/11    BIOPSY AND POLYPECTOMY    COLONOSCOPY  4-2-2012    and ablation ERBE/APC    SHOULDER SURGERY      L        Family History: All family history was reviewed today.         Problem Relation Age of Onset    Heart Disease Mother     High Blood Pressure Mother     High Cholesterol Mother     Early Death Mother 36        MI    Heart Disease Father     High Blood Pressure Father     High Cholesterol Father     Stroke Father 79    High Blood Pressure Sister     High Blood Pressure Brother        Objective:       PHYSICAL EXAM:      Vitals:   Vitals:    09/03/20 0802 09/03/20 0815 09/03/20 1230 09/03/20 1231   BP:  (!) 150/90 128/79    Pulse:  100 103    Resp: 18 16 18 18   Temp:  97 °F (36.1 °C) 97.5 °F (36.4 °C)    TempSrc:  Axillary Oral    SpO2: 98% 98% 95% 95%   Weight:       Height:           Physical Exam  Vitals signs and nursing note reviewed. Constitutional:       General: She is not in acute distress. Appearance: She is well-developed. She is not diaphoretic. HENT:      Head: Normocephalic. Right Ear: External ear normal.      Left Ear: External ear normal.      Nose: Nose normal.   Eyes:      General: No scleral icterus. Right eye: No discharge. Left eye: No discharge. Conjunctiva/sclera: Conjunctivae normal.      Pupils: Pupils are equal, round, and reactive to light. Neck:      Musculoskeletal: Normal range of motion and neck supple. Cardiovascular:      Rate and Rhythm: Normal rate and regular rhythm. Heart sounds: No murmur. No friction rub. Pulmonary:      Effort: Pulmonary effort is normal.      Breath sounds: No stridor. Rales (Left upper lobe and left lower lobe area) present. No wheezing. Chest:      Chest wall: No tenderness. Abdominal:      Palpations: Abdomen is soft. There is no mass. Tenderness: There is no abdominal tenderness. There is no guarding or rebound. Musculoskeletal:         General: No tenderness. Lymphadenopathy:      Cervical: No cervical adenopathy. Skin:     General: Skin is warm and dry. Findings: No erythema or rash. Neurological:      Mental Status: She is alert and oriented to person, place, and time. Motor: No abnormal muscle tone. Psychiatric:         Judgment: Judgment normal.           Lines: All vascular access sites are healthy with no local erythema, discharge or tenderness. Intake and output:    I/O last 3 completed shifts: In: 5 [P.O.:540]  Out: -     Lab Data:   All available labs and old records have been reviewed by me.     CBC:  Recent Labs     09/01/20  0514 09/02/20  0445 09/03/20  0442   WBC 27.9* 19.9* 20.4*   RBC 4.87 4.43 3.95*   HGB 9.8* 9.0* 7.8*   HCT 31.5* 28.4* 24.9*    420 446   MCV 64.7* 64.1* 63.1*   MCH 20.0* 20.4* 19.7*   MCHC 31.0 31.9 31.3   RDW 15.3 15.1 14.9   NRBC 1*  1*  --   --    BANDSPCT 3 4  --         BMP:  Recent Labs     09/01/20  0515 09/02/20  0445 09/03/20  0442   * 136 139   K 4.6 3.5 3.6    103 104   CO2 16* 22 24   BUN 20 17 21*   CREATININE 1.6* 1.6* 1.7*   CALCIUM 8.9 8.7 8.2*   GLUCOSE 87 100* 134*        Hepatic Function Panel:   Lab Results   Component Value Date    ALKPHOS 102 08/29/2020    ALT 8 08/29/2020    AST 9 08/29/2020    PROT 6.7 08/29/2020    PROT 7.1 11/13/2012    BILITOT 0.3 08/29/2020    BILIDIR <0.2 01/05/2020    IBILI see below 01/05/2020    LABALBU 2.5 08/29/2020       CPK:   Lab Results   Component Value Date    CKTOTAL 29 08/07/2011     ESR:   Lab Results   Component Value Date    SEDRATE 24 03/18/2019     CRP:   Lab Results   Component Value Date    CRP 2.1 03/18/2019           Imaging: All pertinent images and reports for the current visit were reviewed by me during this visit. CT ABDOMEN PELVIS WO CONTRAST Additional Contrast? None   Final Result   Moderate amount of stool and fluid within the large bowel, which may reflect   ileus and/or diarrheal disease. Mild fluid-filled distention of small bowel, which also likely represents   ileus. Obstruction does not appear to be present. Small left pleural effusion with adjacent airspace disease, likely passive   atelectasis. Infiltrate in the posterior aspect of the lingula is noted,   incompletely included on the field of view, most compatible with pneumonia. MRI BRAIN WO CONTRAST   Final Result   Mild chronic small vessel ischemic changes. No acute stroke, midline shift   or mass effect. VL DUP CAROTID BILATERAL   Final Result      CT CHEST WO CONTRAST   Final Result   Significant worsening in size of the left upper lobe pneumonia in 2 days. Moderate left pleural effusion now present with mild atelectasis. Severe   emphysematous changes. No CHF.          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    Osteoporosis of multiple sites M81.0    Gastroesophageal reflux disease without esophagitis K21.9    Sepsis (HCC) A41.9    Bilateral pulmonary embolism (HCC) I26.99    Acute deep vein thrombosis (DVT) of distal vein of both lower extremities (HCC) I82.4Z3    History of pulmonary embolism Z86.711    SOB (shortness of breath) R06.02    Wound of left shoulder S41.002A    Hx pulmonary embolism Z86.711    Multifocal pneumonia J18.9    COPD exacerbation (HCC) J44.1    Bacterial pneumonia J15.9    Cerebrovascular accident (CVA) ([facility] I63.9    MARIS (acute kidney injury) ([facility] [address] N17.9    Suspected COVID-19 virus infection Z20.828    Dizziness R42    Elevated procalcitonin R79.89    Long term systemic steroid user Z79.52    History of DVT (deep vein thrombosis) Z86.718    Anticoagulated Z79.01    Small vessel disease, cerebrovascular I67.9    Ex-smoker Z87.891    History of depression Z86.59    Overweight (BMI 25.0-29. 9) E66.3       Please note that this chart was generated using Dragon dictation software. Although every effort was made to ensure the accuracy of this automated transcription, some errors in transcription may have occurred inadvertently. If you may need any clarification, please do not hesitate to contact me through EP[employer] or at the phone number provided below with my electronic signature. Any pictures or media included in this note were obtained after taking informed verbal consent from the patient and with their approval to include those in the patient's medical record.     Ally Mendoza MD, MPH  9/3/2020 , 3:12 PM   [facility] Infectious Disease   Office: [phone]  Fax: [phone]  Tuesday AM clinic:   [address] [place] [address] 120  Thursday AM [id number] [facility]

## 2020-09-03 NOTE — PLAN OF CARE
Nutrition Problem #1: No nutrition diagnosis at this time  Intervention: Food and/or Nutrient Delivery: Continue Current Diet  Nutritional Goals: PO 75% or more at meals

## 2020-09-03 NOTE — PROGRESS NOTES
Shift assessment completed. Pt alert and oriented X 4. Morning meds given. VSS. Denies pain. Fall precautions and call light in place. No complications or concerns to note.

## 2020-09-03 NOTE — PROGRESS NOTES
[facility]    9/3/2020 9:26 AM        Name: Marco Shetty . Admitted: 8/28/2020  Primary Care Provider: Hi Joshua MD (Tel: [phone])    Brief Course:    Patient is a 60-year-old -American female with history of COPD, Crohn's disease, CKD-3, depression, rheumatoid arthritis, history of DVT/PE, osteoporosis who presented with increased shortness of breath, dry cough and s/p fall on the morning of admission.  She was noted to be hypoxic with oxygen saturation in mid 80s requiring increased O2 supplementation. Chest x-ray was noted for bilateral peripheral airspace disease suggestive of atypical/viral pneumonia. She was found to have sepsis due to bilateral pleural pneumonia, possible aspiration pneumonia, acute on chronic kidney injury, acute on chronic respiratory failure as well as mechanical fall at home. Patient underwent bronchoscopy with BAL on 9/2/20.      CC: Worsening shortness of breath, dry cough, s/p fall at home  Subjective: No acute events overnight. Patient reports improvement in her respiratory status after bronchoscopy yesterday. Constipation is currently resolved. Able to have better p.o. intake.     Current Medications  hydrocortisone sodium succinate PF (SOLU-CORTEF) injection 100 mg, Q8H  sennosides-docusate sodium (SENOKOT-S) 8.6-50 MG tablet 2 tablet, Daily  piperacillin-tazobactam (ZOSYN) 3.375 g in dextrose 5 % 50 mL IVPB extended infusion (mini-bag), Q8H  bisacodyl (DULCOLAX) suppository 10 mg, Daily PRN  ipratropium-albuterol (DUONEB) nebulizer solution 1 ampule, Q4H WA  guaiFENesin-codeine (GUAIFENESIN AC) 100-10 MG/5ML liquid 10 mL, Q4H PRN  budesonide-formoterol (SYMBICORT) 160-4.5 MCG/ACT inhaler 2 puff, BID  tiotropium (SPIRIVA RESPIMAT) 2.5 MCG/ACT inhaler 2 puff, Daily  predniSONE (DELTASONE) tablet 20 mg, Daily  linezolid (ZYVOX) IVPB 600 mg, Q12H  0.9 % sodium chloride bolus, Once  sodium chloride flush 0.9 % injection 10 mL, 2 times per day  sodium chloride flush 0.9 % injection 10 mL, PRN  acetaminophen (TYLENOL) tablet 650 mg, Q6H PRN    Or  acetaminophen (TYLENOL) suppository 650 mg, Q6H PRN  polyethylene glycol (GLYCOLAX) packet 17 g, Daily PRN  promethazine (PHENERGAN) tablet 12.5 mg, Q6H PRN    Or  ondansetron (ZOFRAN) injection 4 mg, Q6H PRN  amitriptyline (ELAVIL) tablet 75 mg, Nightly  apixaban (ELIQUIS) tablet 5 mg, BID  atorvastatin (LIPITOR) tablet 40 mg, Nightly  labetalol (NORMODYNE;TRANDATE) injection 10 mg, Q4H PRN  albuterol sulfate  (90 Base) MCG/ACT inhaler 2 puff, Q2H PRN        Objective:  BP (!) 150/90   Pulse 100   Temp 97 °F (36.1 °C) (Axillary)   Resp 16   Ht 5' (1.524 m)   Wt 151 lb (68.5 kg)   SpO2 98%   BMI 29.49 kg/m²     Intake/Output Summary (Last 24 hours) at 9/3/2020 0926  Last data filed at 9/3/2020 9865  Gross per 24 hour   Intake 300 ml   Output --   Net 300 ml      Wt Readings from Last 3 Encounters:   09/02/20 151 lb (68.5 kg)   08/13/20 147 lb (66.7 kg)   03/25/20 133 lb (60.3 kg)       General appearance: Elderly -American female, comfortable in chair  Cardiovascular: Regular rhythm, normal S1, S2. No murmur. No edema in lower extremities  Respiratory: on 3L O2 via NC, using accessory muscles. Clear to auscultation bilaterally, no wheeze or crackles. GI: Abdomen firm, distended, no tenderness, not distended, sluggish bowel sounds  Musculoskeletal: No cyanosis in digits, neck supple  Neurology: CN 2-12 grossly intact. No speech or motor deficits  Psych: Normal affect.  Alert and oriented in time, place and person  Skin: Warm, dry, normal turgor  Extremity exam shows brisk capillary refill.  Peripheral pulses are palpable in lower extremities       Labs and Tests:  CBC:   Recent Labs     09/01/20  0514 09/02/20  0445 09/03/20  0442   WBC 27.9* 19.9* 20.4*   HGB 9.8* 9.0* 7.8*    420 446 BMP:    Recent Labs     09/01/20  0515 09/02/20  0445 09/03/20  0442   * 136 139   K 4.6 3.5 3.6    103 104   CO2 16* 22 24   BUN 20 17 21*   CREATININE 1.6* 1.6* 1.7*   GLUCOSE 87 100* 134*     Hepatic: No results for input(s): AST, ALT, ALB, BILITOT, ALKPHOS in the last 72 hours. CT ABDOMEN PELVIS WO CONTRAST Additional Contrast? None   Final Result   Moderate amount of stool and fluid within the large bowel, which may reflect   ileus and/or diarrheal disease. Mild fluid-filled distention of small bowel, which also likely represents   ileus. Obstruction does not appear to be present. Small left pleural effusion with adjacent airspace disease, likely passive   atelectasis. Infiltrate in the posterior aspect of the lingula is noted,   incompletely included on the field of view, most compatible with pneumonia. MRI BRAIN WO CONTRAST   Final Result   Mild chronic small vessel ischemic changes. No acute stroke, midline shift   or mass effect. VL DUP CAROTID BILATERAL   Final Result      CT CHEST WO CONTRAST   Final Result   Significant worsening in size of the left upper lobe pneumonia in 2 days. Moderate left pleural effusion now present with mild atelectasis. Severe   emphysematous changes. No CHF. XR CHEST (2 VW)   Final Result   Progressive consolidation within the left upper lobe consistent with   pneumonia. Increased dependent left lower lobe opacification and effusion. Improved aeration at the right lung base. CT CHEST WO CONTRAST   Final Result   1. Left upper lobe pneumonia. Recommend chest radiograph in 8 weeks to   confirm resolution. 2. Trace left pleural effusion. CT HEAD WO CONTRAST   Final Result   1. New age-indeterminate infarct within the right internal capsule. Recommend further evaluation with MRI of the head.          XR CHEST PORTABLE   Final Result   Bilateral peripheral airspace disease suggestive of atypical or viral   pneumonia. Problem List  Active Problems:    Chronic emphysema syndrome (HCC)    Necrotizing pneumonia ([facility]    Acute respiratory failure with hypoxia (HCC)    Multifocal pneumonia    Bacterial pneumonia    Cerebrovascular accident (CVA) ([facility]    MARIS (acute kidney injury) ([facility]    Suspected COVID-19 virus infection    Dizziness    Elevated procalcitonin    Long term systemic steroid user    History of DVT (deep vein thrombosis)    Anticoagulated    Small vessel disease, cerebrovascular    Ex-smoker    History of depression    Overweight (BMI 25.0-29. 9)  Resolved Problems:    * No resolved hospital problems. *       Assessment & Plan:     Acute on chronic respiratory failure with hypoxia:   Respiratory status has improved since admission. Patient underwent bronchoscopy with mucous plug removal and BAL on 9/2. Patient is on IV antibiotics for bacterial/aspiration pneumonia. As per ID recommendations, patient is currently on p.o. linezolid, p.o. Levaquin and p.o. Flagyl. Final recommendations on antibiotics based on BAL culture sensitivities.     Constipation:   Currently resolved. Patient has history of Crohn's with partial bowel resection.  Gives history of chronic intermittent constipation.  Follows up with Dr. Alfred Sparks at [facility] for Crohn's.       Sepsis due to Bacterial pneumonia:    Currently resolved. No fevers noted. WBC count trending down.  CT chest with ADAM infiltrate. Elevated procalcitonin. On IV antibiotic therapy as mentioned above.     Chronic steroid use:  On prednisone 20 mg po daily.  Stable BP and hemodynamics.     Cerebrovascular accident (CVA): CT head was concerning for possible internal capsule stroke. Neurology consulted. MRI brain showed changes consistent with old infarcts.  No new CVA are mass-effect noted on MRI. Carotid artery Dopplers did not show significant blockade. On anticoagulation, statin.        Acute kidney injury: Patient received IV fluids.   Renal function stable with serum creatinine at 1.7.     IV Access: Peripheral IV  Carreon: None  Diet: DIET CLEAR LIQUID; post bronchoscopy  Code:Full Code  DVT PPX on Eliquis  Disposition - pending acute issues, awaiting BAL c/s for antibiotic recs from [address] Valery Rosado MD   9/3/2020 9:26 AM

## 2020-09-03 NOTE — PROGRESS NOTES
Physical Therapy  Facility/Department: [address]  Daily Treatment Note  NAME: Nba Everett  : 1950  MRN: [id number]    Date of Service: 9/3/2020    Discharge Recommendations:Susan TENORIO Sara Cui scored a 17/24 on the AM-PAC short mobility form. Current research shows that an AM-PAC score of 17 or less is typically not associated with a discharge to the patient's home setting. Based on the patient's AM-PAC score and their current functional mobility deficits, it is recommended that the patient have 3-5 sessions per week of Physical Therapy at d/c to increase the patient's independence. Please see assessment section for further patient specific details. If patient discharges prior to next session this note will serve as a discharge summary. Please see below for the latest assessment towards goals. If patient discharges home:  [address] [place] [address]: LEVEL 3 SAFETY     - Initial home health evaluation to occur within 24-48 hours, in patient home   - Therapy evaluations in home within 24-48 hours of discharge; including DME and home safety   - Frontload therapy 5 days, then 3x a week   - Therapy to evaluate if patient has [address] needs for personal care   -  evaluation within 24-48 hours, includes evaluation of resources and insurance to determine [place], [place], LTC, and Medicaid options       PT Equipment Recommendations  Equipment Needed: No  Other: Pt has rollator at home    Assessment   Body structures, Functions, Activity limitations: Decreased functional mobility ; Decreased ADL status; Decreased strength;Decreased endurance;Decreased balance;Decreased posture  Assessment: Pt presents as below her baseline function. Pt would benefit from skilled PT services to promote safe return to OF. Pt demonstrates decreased strength and endurance required for proloned mobility and stair navigation. Pt requesting to go home with [facility] at this time and not rehab.   Prognosis: Good  Decision Making: Low Complexity  PT Education: Goals;Plan of Care;PT Role;Gait Training;General Safety  Patient Education: D/C recommendations--pt verbalizing understanding but currently refusing and wanting to go home with [facility]. Explained the benefits of going to a facility for rehab pt continued to refuse. REQUIRES PT FOLLOW UP: Yes  Activity Tolerance  Activity Tolerance: Patient Tolerated treatment well;Patient limited by endurance; Patient limited by fatigue     Patient Diagnosis(es): The primary encounter diagnosis was Pneumonia due to organism. Diagnoses of Suspected COVID-19 virus infection, Acute respiratory failure with hypoxia ([address] MARIS (acute kidney injury) ([address] Cerebrovascular accident (CVA), unspecified mechanism ([address] and Dizziness were also pertinent to this visit. 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 has a past surgical history that includes shoulder surgery; back surgery; brain surgery; Colon surgery; Colonoscopy (12/5/11); Colonoscopy (4-2-2012); Cholecystectomy; Abdomen surgery; Biopsy shoulder (Left, 2/27/2019); and bronchoscopy (N/A, 9/2/2020). Restrictions  Restrictions/Precautions  Restrictions/Precautions: Fall Risk, General Precautions(High, 3L O2)  Required Braces or Orthoses?: No  Position Activity Restriction  Other position/activity restrictions: Travis Dennis is a 77 y.o. female who presents because of difficulty breathing. She has a history of chronic respiratory failure and severe COPD. She wears 2 L of oxygen all the time.   She says that her air conditioning went out from her home about a month ago. She started using a portable air conditioner but most of her home is still hot. She has needed to sleep in her living room in a chair because her bedroom is too hot. She is able to lie flat. She has had increased cough and congestion for about 3 days. She feels generally weak all over and unsteady on her feet for multiple days. She was too weak this morning to take her home inhalers or her normal medicines. She has been otherwise compliant with her medicines and takes prednisone daily and Eliquis twice daily. She does have a history of blood clots. She denies any leg swelling. Cough is mostly nonproductive. She does not believe she has had a fever and has been checking her temperature. She has been sweating a lot because of the temperature at her home. EMS found her to be hypoxic on her home oxygen 86%. The administer higher flow oxygen and brought her to the hospital.  She does have a headache and has a sensation of the room spinning since sometime yesterday. She does have left-sided chest pain, worse with coughing for several days. She has been unsteady on her feet, walking into walls for several days. Subjective   General  Chart Reviewed: Yes  Response To Previous Treatment: Patient with no complaints from previous session. Family / Caregiver Present: No  Subjective  Subjective: Pt agreeable to PT. General Comment  Comments: Pt sitting up in chair upon arrival, eager to walk and work on getting her strength back. Pain Screening  Patient Currently in Pain: Denies  Vital Signs  Patient Currently in Pain: Denies       Orientation  Orientation  Overall Orientation Status: Within Functional Limits  Cognition      Objective   Bed mobility  Comment: unable to assess pt up in chair upon arrival and returned to chair at end of session to eat lunch.   Transfers  Sit to Stand: Stand by assistance  Stand to sit: Stand by assistance  Ambulation  Ambulation?: Yes  Ambulation 1  Surface: level tile  Device: Rolling Walker  Other Apparatus: O2(2L)  Assistance: Contact guard assistance  Quality of Gait: Pt ambulates with decreased step length, slow camryn, forward flexed posture, no LOB. Distance: 48' + 40'  Comments: Pt ambulated down peters and returned to EOB for a seated rest break, slight SOB. Sp02 at 93% with 2L o2. After resting pt ambulated again and returned to chair to eat lunch. Stairs/Curb  Stairs?: No     Balance  Posture: Fair  Sitting - Static: Good  Sitting - Dynamic: Good  Standing - Static: Fair;+(with RW)  Standing - Dynamic: Fair(with RW)  Exercises  Comments: held exercises d/t increased fatigue and slight SOB with increased ambulation this date. G-Code     OutComes Score                                                     AM-PAC Score  AM-PAC Inpatient Mobility Raw Score : 17 (09/03/20 1204)  AM-PAC Inpatient T-Scale Score : 42.13 (09/03/20 1204)  Mobility Inpatient CMS 0-100% Score: 50.57 (09/03/20 1204)  Mobility Inpatient CMS G-Code Modifier : CK (09/03/20 1204)          Goals  Short term goals  Time Frame for Short term goals: To be met prior to discharge  Short term goal 1: Pt will complete bed mobility with mod I  Short term goal 2: Pt will complete sit to/from stand with mod I  Short term goal 3: Pt will ambulate 100 ft with LRAD and CGA  Short term goal 4: Pt will navigate up/down 5 steps with HR and supervision  NO GOALS MET THIS DATE    Plan    Plan  Times per week: 3-5x  Times per day: Daily  Current Treatment Recommendations: Strengthening, ROM, Balance Training, Functional Mobility Training, Transfer Training, Endurance Training, Gait Training, Home Exercise Program, Safety Education & Training, Patient/Caregiver Education & Training  Safety Devices  Type of devices:  All fall risk precautions in place, Call light within reach, Chair alarm in place, Gait belt, Patient at risk for falls, Left in chair, Nurse notified  Restraints  Initially in place: No     Therapy Time   Individual Concurrent Group Co-treatment   Time In 1135         Time Out [facility]         Minutes [address] [place] [address] [id number]      I agree with the note above. Goals addressed by PT this session.    [address] [facility], [place] [address]

## 2020-09-04 LAB
ADENOVIRUS PCR: NOT DETECTED
ANION GAP SERPL CALCULATED.3IONS-SCNC: 11 MMOL/L (ref 3–16)
ASPERGILLUS ANTIBODY CF: NORMAL
ASPERGILLUS GALACTO AG: NEGATIVE
ASPERGILLUS GALACTO INDEX: 0.06
BANDED NEUTROPHILS RELATIVE PERCENT: 8 % (ref 0–7)
BASOPHILS ABSOLUTE: 0 K/UL (ref 0–0.2)
BASOPHILS RELATIVE PERCENT: 0 %
BLASTOMYCES AB BY EIA, SERUM: 0.2 IV
BUN BLDV-MCNC: 29 MG/DL (ref 7–20)
CALCIUM SERPL-MCNC: 8.3 MG/DL (ref 8.3–10.6)
CHLORIDE BLD-SCNC: 102 MMOL/L (ref 99–110)
CO2: 26 MMOL/L (ref 21–32)
COCCIDIOIDES ANTIBODY CF: NORMAL
CREAT SERPL-MCNC: 1.7 MG/DL (ref 0.6–1.2)
CULTURE, RESPIRATORY: NORMAL
CULTURE, RESPIRATORY: NORMAL
EOSINOPHILS ABSOLUTE: 0 K/UL (ref 0–0.6)
EOSINOPHILS RELATIVE PERCENT: 0 %
GFR AFRICAN AMERICAN: 36
GFR NON-AFRICAN AMERICAN: 30
GLUCOSE BLD-MCNC: 137 MG/DL (ref 70–99)
GRAM STAIN RESULT: NORMAL
GRAM STAIN RESULT: NORMAL
HCT VFR BLD CALC: 24.6 % (ref 36–48)
HEMOGLOBIN: 7.8 G/DL (ref 12–16)
HISTOPLASMA ANTIBODY MYCELIAL CF: NORMAL
HISTOPLASMA ANTIBODY YEAST CF: NORMAL
HISTOPLASMA ANTIGEN URINE INTERP: NOT DETECTED
HISTOPLASMA ANTIGEN URINE: NOT DETECTED NG/ML
HISTOPLASMA ANTIGEN, SERUM: NOT DETECTED
HISTOPLASMA INTERPETATION: NOT DETECTED
HUMAN METAPNEUMOVIRUS PCR: NOT DETECTED
HYPOCHROMIA: ABNORMAL
INFLUENZA A: NOT DETECTED
INFLUENZA B: NOT DETECTED
LYMPHOCYTES ABSOLUTE: 1.1 K/UL (ref 1–5.1)
LYMPHOCYTES RELATIVE PERCENT: 6 %
MAGNESIUM: 2.5 MG/DL (ref 1.8–2.4)
MCH RBC QN AUTO: 19.7 PG (ref 26–34)
MCHC RBC AUTO-ENTMCNC: 31.6 G/DL (ref 31–36)
MCV RBC AUTO: 62.2 FL (ref 80–100)
METAMYELOCYTES RELATIVE PERCENT: 1 %
MICROCYTES: ABNORMAL
MONOCYTES ABSOLUTE: 0.2 K/UL (ref 0–1.3)
MONOCYTES RELATIVE PERCENT: 1 %
MRSA SCREEN RT-PCR: NORMAL
NEUTROPHILS ABSOLUTE: 16.8 K/UL (ref 1.7–7.7)
NEUTROPHILS RELATIVE PERCENT: 84 %
OVALOCYTES: ABNORMAL
PARAINFLUENZA 1 PCR: NOT DETECTED
PARAINFLUENZA 2 PCR: NOT DETECTED
PARAINFLUENZA 3 PCR: NOT DETECTED
PARAINFLUENZA 4 PCR: NOT DETECTED
PDW BLD-RTO: 14.9 % (ref 12.4–15.4)
PLATELET # BLD: 478 K/UL (ref 135–450)
PLATELET SLIDE REVIEW: ADEQUATE
PMV BLD AUTO: 6.9 FL (ref 5–10.5)
PNEUMOCYSTIS JIROVECI DFA: NEGATIVE
PNEUMOCYSTIS SOURCE: NORMAL
POLYCHROMASIA: ABNORMAL
POTASSIUM REFLEX MAGNESIUM: 3.2 MMOL/L (ref 3.5–5.1)
RBC # BLD: 3.96 M/UL (ref 4–5.2)
RHINO/ENTEROVIRUS PCR: NOT DETECTED
RSV BY PCR: NOT DETECTED
RSV SOURCE: NORMAL
SCHISTOCYTES: ABNORMAL
SLIDE REVIEW: ABNORMAL
SODIUM BLD-SCNC: 139 MMOL/L (ref 136–145)
WBC # BLD: 18.1 K/UL (ref 4–11)

## 2020-09-04 PROCEDURE — 99232 SBSQ HOSP IP/OBS MODERATE 35: CPT | Performed by: INTERNAL MEDICINE

## 2020-09-04 PROCEDURE — 6370000000 HC RX 637 (ALT 250 FOR IP): Performed by: INTERNAL MEDICINE

## 2020-09-04 PROCEDURE — 6360000002 HC RX W HCPCS: Performed by: ANESTHESIOLOGY

## 2020-09-04 PROCEDURE — 2700000000 HC OXYGEN THERAPY PER DAY

## 2020-09-04 PROCEDURE — 36415 COLL VENOUS BLD VENIPUNCTURE: CPT

## 2020-09-04 PROCEDURE — 99233 SBSQ HOSP IP/OBS HIGH 50: CPT | Performed by: INTERNAL MEDICINE

## 2020-09-04 PROCEDURE — 2060000000 HC ICU INTERMEDIATE R&B

## 2020-09-04 PROCEDURE — 94761 N-INVAS EAR/PLS OXIMETRY MLT: CPT

## 2020-09-04 PROCEDURE — 2580000003 HC RX 258: Performed by: INTERNAL MEDICINE

## 2020-09-04 PROCEDURE — 83735 ASSAY OF MAGNESIUM: CPT

## 2020-09-04 PROCEDURE — 80048 BASIC METABOLIC PNL TOTAL CA: CPT

## 2020-09-04 PROCEDURE — 85025 COMPLETE CBC W/AUTO DIFF WBC: CPT

## 2020-09-04 PROCEDURE — 94640 AIRWAY INHALATION TREATMENT: CPT

## 2020-09-04 RX ORDER — LEVOFLOXACIN 750 MG/1
750 TABLET ORAL EVERY OTHER DAY
Qty: 15 TABLET | Refills: 0 | Status: ON HOLD | OUTPATIENT
Start: 2020-09-05 | End: 2020-09-17 | Stop reason: SDUPTHER

## 2020-09-04 RX ORDER — METRONIDAZOLE 500 MG/1
500 TABLET ORAL EVERY 8 HOURS SCHEDULED
Qty: 90 TABLET | Refills: 0 | Status: ON HOLD | OUTPATIENT
Start: 2020-09-04 | End: 2020-09-17 | Stop reason: SDUPTHER

## 2020-09-04 RX ORDER — LINEZOLID 600 MG/1
600 TABLET, FILM COATED ORAL EVERY 12 HOURS SCHEDULED
Qty: 20 TABLET | Refills: 0 | Status: ON HOLD | OUTPATIENT
Start: 2020-09-04 | End: 2020-09-17 | Stop reason: HOSPADM

## 2020-09-04 RX ADMIN — METRONIDAZOLE 500 MG: 250 TABLET ORAL at 21:09

## 2020-09-04 RX ADMIN — Medication 10 ML: at 09:31

## 2020-09-04 RX ADMIN — APIXABAN 5 MG: 5 TABLET, FILM COATED ORAL at 09:30

## 2020-09-04 RX ADMIN — METRONIDAZOLE 500 MG: 250 TABLET ORAL at 06:17

## 2020-09-04 RX ADMIN — IPRATROPIUM BROMIDE AND ALBUTEROL SULFATE 1 AMPULE: .5; 3 SOLUTION RESPIRATORY (INHALATION) at 11:26

## 2020-09-04 RX ADMIN — ACETAMINOPHEN 650 MG: 325 TABLET ORAL at 21:09

## 2020-09-04 RX ADMIN — IPRATROPIUM BROMIDE AND ALBUTEROL SULFATE 1 AMPULE: .5; 3 SOLUTION RESPIRATORY (INHALATION) at 20:27

## 2020-09-04 RX ADMIN — STANDARDIZED SENNA CONCENTRATE AND DOCUSATE SODIUM 2 TABLET: 8.6; 5 TABLET ORAL at 09:29

## 2020-09-04 RX ADMIN — IPRATROPIUM BROMIDE AND ALBUTEROL SULFATE 1 AMPULE: .5; 3 SOLUTION RESPIRATORY (INHALATION) at 09:37

## 2020-09-04 RX ADMIN — LINEZOLID 600 MG: 600 TABLET, FILM COATED ORAL at 09:29

## 2020-09-04 RX ADMIN — GUAIFENESIN AND CODEINE PHOSPHATE 10 ML: 100; 10 SOLUTION ORAL at 13:57

## 2020-09-04 RX ADMIN — IPRATROPIUM BROMIDE AND ALBUTEROL SULFATE 1 AMPULE: .5; 3 SOLUTION RESPIRATORY (INHALATION) at 16:32

## 2020-09-04 RX ADMIN — DESMOPRESSIN ACETATE 40 MG: 0.2 TABLET ORAL at 21:10

## 2020-09-04 RX ADMIN — AMITRIPTYLINE HYDROCHLORIDE 75 MG: 25 TABLET, FILM COATED ORAL at 21:09

## 2020-09-04 RX ADMIN — METRONIDAZOLE 500 MG: 250 TABLET ORAL at 13:58

## 2020-09-04 RX ADMIN — HYDROCORTISONE SODIUM SUCCINATE 100 MG: 250 INJECTION, POWDER, FOR SOLUTION INTRAMUSCULAR; INTRAVENOUS at 09:28

## 2020-09-04 RX ADMIN — APIXABAN 5 MG: 5 TABLET, FILM COATED ORAL at 21:10

## 2020-09-04 RX ADMIN — PREDNISONE 20 MG: 20 TABLET ORAL at 09:30

## 2020-09-04 RX ADMIN — TIOTROPIUM BROMIDE INHALATION SPRAY 2 PUFF: 3.12 SPRAY, METERED RESPIRATORY (INHALATION) at 09:37

## 2020-09-04 RX ADMIN — Medication 2 PUFF: at 09:37

## 2020-09-04 RX ADMIN — LINEZOLID 600 MG: 600 TABLET, FILM COATED ORAL at 21:10

## 2020-09-04 RX ADMIN — Medication 2 PUFF: at 20:27

## 2020-09-04 ASSESSMENT — PAIN DESCRIPTION - LOCATION: LOCATION: HEAD

## 2020-09-04 ASSESSMENT — ENCOUNTER SYMPTOMS
CHEST TIGHTNESS: 0
RHINORRHEA: 0
FACIAL SWELLING: 0
BLOOD IN STOOL: 0
APNEA: 0
PHOTOPHOBIA: 0
COUGH: 1
COLOR CHANGE: 0
CHOKING: 0
NAUSEA: 0
ABDOMINAL PAIN: 0
SHORTNESS OF BREATH: 0
STRIDOR: 0
EYE DISCHARGE: 0
TROUBLE SWALLOWING: 0
EYE REDNESS: 0
DIARRHEA: 0

## 2020-09-04 ASSESSMENT — PAIN SCALES - GENERAL
PAINLEVEL_OUTOF10: 7
PAINLEVEL_OUTOF10: 0
PAINLEVEL_OUTOF10: 7
PAINLEVEL_OUTOF10: 0

## 2020-09-04 ASSESSMENT — PAIN DESCRIPTION - FREQUENCY: FREQUENCY: CONTINUOUS

## 2020-09-04 ASSESSMENT — PAIN DESCRIPTION - PAIN TYPE: TYPE: ACUTE PAIN

## 2020-09-04 ASSESSMENT — PAIN - FUNCTIONAL ASSESSMENT: PAIN_FUNCTIONAL_ASSESSMENT: ACTIVITIES ARE NOT PREVENTED

## 2020-09-04 ASSESSMENT — PAIN DESCRIPTION - DESCRIPTORS: DESCRIPTORS: STABBING

## 2020-09-04 NOTE — PROGRESS NOTES
Oral, 3 times per day  hydrocortisone sodium succinate PF (SOLU-CORTEF) injection 100 mg, 100 mg, Intravenous, Q8H  sennosides-docusate sodium (SENOKOT-S) 8.6-50 MG tablet 2 tablet, 2 tablet, Oral, Daily  bisacodyl (DULCOLAX) suppository 10 mg, 10 mg, Rectal, Daily PRN  ipratropium-albuterol (DUONEB) nebulizer solution 1 ampule, 1 ampule, Inhalation, Q4H WA  guaiFENesin-codeine (GUAIFENESIN AC) 100-10 MG/5ML liquid 10 mL, 10 mL, Oral, Q4H PRN  budesonide-formoterol (SYMBICORT) 160-4.5 MCG/ACT inhaler 2 puff, 2 puff, Inhalation, BID  tiotropium (SPIRIVA RESPIMAT) 2.5 MCG/ACT inhaler 2 puff, 2 puff, Inhalation, Daily  predniSONE (DELTASONE) tablet 20 mg, 20 mg, Oral, Daily  0.9 % sodium chloride bolus, 30 mL/kg, Intravenous, Once  sodium chloride flush 0.9 % injection 10 mL, 10 mL, Intravenous, 2 times per day  sodium chloride flush 0.9 % injection 10 mL, 10 mL, Intravenous, PRN  acetaminophen (TYLENOL) tablet 650 mg, 650 mg, Oral, Q6H PRN **OR** acetaminophen (TYLENOL) suppository 650 mg, 650 mg, Rectal, Q6H PRN  polyethylene glycol (GLYCOLAX) packet 17 g, 17 g, Oral, Daily PRN  promethazine (PHENERGAN) tablet 12.5 mg, 12.5 mg, Oral, Q6H PRN **OR** ondansetron (ZOFRAN) injection 4 mg, 4 mg, Intravenous, Q6H PRN  amitriptyline (ELAVIL) tablet 75 mg, 75 mg, Oral, Nightly  apixaban (ELIQUIS) tablet 5 mg, 5 mg, Oral, BID  atorvastatin (LIPITOR) tablet 40 mg, 40 mg, Oral, Nightly  labetalol (NORMODYNE;TRANDATE) injection 10 mg, 10 mg, Intravenous, Q4H PRN  albuterol sulfate  (90 Base) MCG/ACT inhaler 2 puff, 2 puff, Inhalation, Q2H PRN    No Known Allergies    Social History:    TOBACCO:   reports that she quit smoking about 5 years ago. Her smoking use included cigarettes. She has a 7.50 pack-year smoking history. She has never used smokeless tobacco.  ETOH:   reports no history of alcohol use.   Patient currently lives independently  Environmental/chemical exposure: None known    Family History:       Problem Relation Age of Onset    Heart Disease Mother     High Blood Pressure Mother     High Cholesterol Mother     Early Death Mother 36        MI    Heart Disease Father     High Blood Pressure Father     High Cholesterol Father     Stroke Father 79    High Blood Pressure Sister     High Blood Pressure Brother      REVIEW OF SYSTEMS:    CONSTITUTIONAL:  negative for fevers, chills, diaphoresis, activity change, appetite change, fatigue, night sweats and unexpected weight change. EYES:  negative for blurred vision, eye discharge, visual disturbance and icterus  HEENT:  negative for hearing loss, tinnitus, ear drainage, sinus pressure, nasal congestion, epistaxis and snoring  RESPIRATORY:  See HPI  CARDIOVASCULAR:  negative for chest pain, palpitations, exertional chest pressure/discomfort, edema, syncope  GASTROINTESTINAL:  negative for nausea, vomiting, diarrhea, constipation, blood in stool and abdominal pain  GENITOURINARY:  negative for frequency, dysuria, urinary incontinence, decreased urine volume, and hematuria  HEMATOLOGIC/LYMPHATIC:  negative for easy bruising, bleeding and lymphadenopathy  ALLERGIC/IMMUNOLOGIC:  negative for recurrent infections, angioedema, anaphylaxis and drug reactions  ENDOCRINE:  negative for weight changes and diabetic symptoms including polyuria, polydipsia and polyphagia  MUSCULOSKELETAL:  negative for  pain, joint swelling, decreased range of motion and muscle weakness  NEUROLOGICAL:  negative for headaches, slurred speech, unilateral weakness  PSYCHIATRIC/BEHAVIORAL: negative for hallucinations, behavioral problems, confusion and agitation.      Objective:   PHYSICAL EXAM:      VITALS:  /71   Pulse 107   Temp 98.6 °F (37 °C) (Oral)   Resp 22   Ht 5' (1.524 m)   Wt 151 lb 4.8 oz (68.6 kg)   SpO2 95%   BMI 29.55 kg/m²      24HR INTAKE/OUTPUT:      Intake/Output Summary (Last 24 hours) at 9/4/2020 1124  Last data filed at 9/4/2020 0919  Gross per 24 hour   Intake 490 ml   Output 600 ml   Net -110 ml     CONSTITUTIONAL:  awake, alert, cooperative, no apparent distress, and appears stated age  NECK:  Supple, symmetrical, trachea midline, no adenopathy, thyroid symmetric, not enlarged and no tenderness, skin normal  LUNGS:  no increased work of breathing and diminished to auscultation. No accessory muscle use  CARDIOVASCULAR: S1 and S2, no edema and no JVD  ABDOMEN:  normal bowel sounds, non-distended and no masses palpated, and no tenderness to palpation. No hepatospleenomegaly  LYMPHADENOPATHY:  no axillary or supraclavicular adenopathy. No cervical adnenopathy  PSYCHIATRIC: Oriented to person place and time. No obvious depression or anxiety. MUSCULOSKELETAL: No obvious misalignment or effusion of the joints. No clubbing, cyanosis of the digits. SKIN:  normal skin color, texture, turgor and no redness, warmth, or swelling. No palpable nodules    DATA:    Old records have been reviewed    CBC:  Recent Labs     09/02/20 0445 09/03/20 0442 09/04/20  0504   WBC 19.9* 20.4* 18.1*   RBC 4.43 3.95* 3.96*   HGB 9.0* 7.8* 7.8*   HCT 28.4* 24.9* 24.6*    446 478*   MCV 64.1* 63.1* 62.2*   MCH 20.4* 19.7* 19.7*   MCHC 31.9 31.3 31.6   RDW 15.1 14.9 14.9   BANDSPCT 4  --  8*      BMP:  Recent Labs     09/02/20 0445 09/03/20 0442 09/04/20  0504    139 139   K 3.5 3.6 3.2*    104 102   CO2 22 24 26   BUN 17 21* 29*   CREATININE 1.6* 1.7* 1.7*   CALCIUM 8.7 8.2* 8.3   GLUCOSE 100* 134* 137*      ABG:  No results for input(s): PHART, EBT7BPV, PO2ART, YLY6RLW, O8OIJAEW, BEART in the last 72 hours. Lab Results   Component Value Date    BNP <15 08/06/2011     Lab Results   Component Value Date    CKTOTAL 29 08/07/2011    CKMB 0.88 08/07/2011    TROPONINI <0.01 08/29/2020       Cultures:     Abx:    Radiology Review:  All pertinent images / reports were reviewed as a part of this visit. Assessment:     1. Acute on chronic hypoxemic respiratory failure  2.  Left upper lobe pneumonia  3. COPD severe  4. Centrilobular emphysema  5. GERD    Plan:     1. I have reviewed laboratories, medical records and images for this visit  2. Afebrile  3. WBC decreasing  4. All Cxs are NGSF  5. Tolerating 2 LPM O2 supplement  6. Has O2 at home  7. OK with me if she is discharged home when ok with the rest of the treatment team  8.  I will have the office arrange OPT CXR and f/u for about 10-14 days

## 2020-09-04 NOTE — PROGRESS NOTES
CLINICAL PHARMACY NOTE: MEDS TO [address] [place] [address] Drive Select Patient?: No  Total # of Prescriptions Filled: 3   The following medications were delivered to the patient:  · Metronidazole 500mg  · Levofloxacin 750mg  · Zyvox 600mg  Total # of Interventions Completed: 0  Time Spent (min): 30    Additional Documentation:    Delivered to Patient    Cheikh Dyer CPhT

## 2020-09-04 NOTE — PROGRESS NOTES
Assessment complete, see doc flowsheet. Patient resting in bed, call light in reach, bed in lowest position, brake set. Patient denies any needs at this time. Patient encouraged to call if needs arise.   Tree Arizmendi RN

## 2020-09-04 NOTE — PLAN OF CARE
Problem: Falls - Risk of:  Goal: Will remain free from falls  Description: Will remain free from falls  Outcome: Ongoing  Goal: Absence of physical injury  Description: Absence of physical injury  Outcome: Ongoing     Pt high risk for falls. Refuses alarms but calls appropriately upon transfers. Problem: HEMODYNAMIC STATUS  Goal: Patient has stable vital signs and fluid balance  Outcome: Ongoing    VSS. Educated on CKD diagnosis. Problem: ACTIVITY INTOLERANCE/IMPAIRED MOBILITY  Goal: Mobility/activity is maintained at optimum level for patient  Outcome: Ongoing    Pt denies SOB upon transfers.

## 2020-09-04 NOTE — PROGRESS NOTES
[facility]    9/4/2020 9:18 AM        Name: Jillian Sesay . Admitted: 8/28/2020  Primary Care Provider: Kapil Haddad MD (Tel: [phone])    Brief Course:    Patient is a 80-year-old -American female with history of COPD, Crohn's disease, CKD-3, depression, rheumatoid arthritis, history of DVT/PE, osteoporosis who presented with increased shortness of breath, dry cough and s/p fall on the morning of admission.  She was noted to be hypoxic with oxygen saturation in mid 80s requiring increased O2 supplementation. Chest x-ray was noted for bilateral peripheral airspace disease suggestive of atypical/viral pneumonia. She was found to have sepsis due to bilateral pleural pneumonia, possible aspiration pneumonia, acute on chronic kidney injury, acute on chronic respiratory failure as well as mechanical fall at home. Patient underwent bronchoscopy with BAL on 9/2/20.      CC: Worsening shortness of breath, dry cough, s/p fall at home  Subjective: No acute events overnight. Patient reports significant improvement in respiratory status. Constipation is resolved.   Getting physical therapy and doing well.     Current Medications  linezolid (ZYVOX) tablet 600 mg, 2 times per day  levoFLOXacin (LEVAQUIN) tablet 750 mg, Every Other Day  metroNIDAZOLE (FLAGYL) tablet 500 mg, 3 times per day  hydrocortisone sodium succinate PF (SOLU-CORTEF) injection 100 mg, Q8H  sennosides-docusate sodium (SENOKOT-S) 8.6-50 MG tablet 2 tablet, Daily  bisacodyl (DULCOLAX) suppository 10 mg, Daily PRN  ipratropium-albuterol (DUONEB) nebulizer solution 1 ampule, Q4H WA  guaiFENesin-codeine (GUAIFENESIN AC) 100-10 MG/5ML liquid 10 mL, Q4H PRN  budesonide-formoterol (SYMBICORT) 160-4.5 MCG/ACT inhaler 2 puff, BID  tiotropium (SPIRIVA RESPIMAT) 2.5 MCG/ACT inhaler 2 puff, Daily  predniSONE (DELTASONE) tablet 20 mg, Daily  0.9 % sodium chloride bolus, Once  sodium chloride flush 0.9 % injection 10 mL, 2 times per day  sodium chloride flush 0.9 % injection 10 mL, PRN  acetaminophen (TYLENOL) tablet 650 mg, Q6H PRN    Or  acetaminophen (TYLENOL) suppository 650 mg, Q6H PRN  polyethylene glycol (GLYCOLAX) packet 17 g, Daily PRN  promethazine (PHENERGAN) tablet 12.5 mg, Q6H PRN    Or  ondansetron (ZOFRAN) injection 4 mg, Q6H PRN  amitriptyline (ELAVIL) tablet 75 mg, Nightly  apixaban (ELIQUIS) tablet 5 mg, BID  atorvastatin (LIPITOR) tablet 40 mg, Nightly  labetalol (NORMODYNE;TRANDATE) injection 10 mg, Q4H PRN  albuterol sulfate  (90 Base) MCG/ACT inhaler 2 puff, Q2H PRN        Objective:  /71   Pulse 107   Temp 98.6 °F (37 °C) (Oral)   Resp 22   Ht 5' (1.524 m)   Wt 151 lb 4.8 oz (68.6 kg)   SpO2 96%   BMI 29.55 kg/m²     Intake/Output Summary (Last 24 hours) at 9/4/2020 0918  Last data filed at 9/3/2020 1848  Gross per 24 hour   Intake 240 ml   Output 600 ml   Net -360 ml      Wt Readings from Last 3 Encounters:   09/04/20 151 lb 4.8 oz (68.6 kg)   08/13/20 147 lb (66.7 kg)   03/25/20 133 lb (60.3 kg)       General appearance: Elderly -American female, comfortable  Cardiovascular: Regular rhythm, normal S1, S2. No murmur. No edema in lower extremities  Respiratory: on 3L O2 via NC, using accessory muscles. Clear to auscultation bilaterally, no wheeze or crackles. GI: Abdomen firm, distended, no tenderness, not distended, sluggish bowel sounds  Musculoskeletal: No cyanosis in digits, neck supple  Neurology: CN 2-12 grossly intact. No speech or motor deficits  Psych: Normal affect.  Alert and oriented in time, place and person  Skin: Warm, dry, normal turgor  Extremity exam shows brisk capillary refill.  Peripheral pulses are palpable in lower extremities      Labs and Tests:  CBC:   Recent Labs     09/02/20  0445 09/03/20  0442 09/04/20  0504   WBC 19.9* 20.4* 18.1*   HGB 9.0* 7.8* 7.8*    446 478*     BMP:    Recent Labs     09/02/20  0445 09/03/20  0442 09/04/20  0504    139 139   K 3.5 3.6 3.2*    104 102   CO2 22 24 26   BUN 17 21* 29*   CREATININE 1.6* 1.7* 1.7*   GLUCOSE 100* 134* 137*     Hepatic: No results for input(s): AST, ALT, ALB, BILITOT, ALKPHOS in the last 72 hours. CT ABDOMEN PELVIS WO CONTRAST Additional Contrast? None   Final Result   Moderate amount of stool and fluid within the large bowel, which may reflect   ileus and/or diarrheal disease. Mild fluid-filled distention of small bowel, which also likely represents   ileus. Obstruction does not appear to be present. Small left pleural effusion with adjacent airspace disease, likely passive   atelectasis. Infiltrate in the posterior aspect of the lingula is noted,   incompletely included on the field of view, most compatible with pneumonia. MRI BRAIN WO CONTRAST   Final Result   Mild chronic small vessel ischemic changes. No acute stroke, midline shift   or mass effect. VL DUP CAROTID BILATERAL   Final Result      CT CHEST WO CONTRAST   Final Result   Significant worsening in size of the left upper lobe pneumonia in 2 days. Moderate left pleural effusion now present with mild atelectasis. Severe   emphysematous changes. No CHF. XR CHEST (2 VW)   Final Result   Progressive consolidation within the left upper lobe consistent with   pneumonia. Increased dependent left lower lobe opacification and effusion. Improved aeration at the right lung base. CT CHEST WO CONTRAST   Final Result   1. Left upper lobe pneumonia. Recommend chest radiograph in 8 weeks to   confirm resolution. 2. Trace left pleural effusion. CT HEAD WO CONTRAST   Final Result   1. New age-indeterminate infarct within the right internal capsule. Recommend further evaluation with MRI of the head.          XR CHEST PORTABLE   Final Result   Bilateral peripheral airspace disease suggestive of

## 2020-09-04 NOTE — PROGRESS NOTES
Infectious Diseases   Progress Note      Admission Date: 8/28/2020  Hospital Day: Hospital Day: 8   Attending: Melissa Brumfield MD  Date of service: 9/4/2020     Chief complaint/ Reason for consult:     · Sepsis with worsening fever, persistent bandemia, acute kidney injury on chronic kidney disease  · Left-sided multifocal pneumonia with necrotizing infiltrate in the left upper lobe  · Elevated procalcitonin of 2.17 on 8/28/2020  · Chronic respiratory failure with hypoxia  · Bilateral chronic emphysema  · Crohn's disease    Microbiology:        I have reviewed allavailable micro lab data and cultures    · Blood culture (2/2) - collected on 8/28/2020: Negative    · Urine culture  - collected on 8/28/2020: Negative      Antibiotics and immunizations:       Current antibiotics: All antibiotics and their doses were reviewed by me    Recent Abx Admin                   metroNIDAZOLE (FLAGYL) tablet 500 mg (mg) 500 mg Given 09/04/20 1358     500 mg Given  0617     500 mg Given 09/03/20 2143     500 mg Given  1554    linezolid (ZYVOX) tablet 600 mg (mg) 600 mg Given 09/04/20 0929     600 mg Given 09/03/20 2144    levoFLOXacin (LEVAQUIN) tablet 750 mg (mg) 750 mg Given 09/03/20 1554                  Immunization History: All immunization history was reviewed by me today. Immunization History   Administered Date(s) Administered    DT (pediatric) 09/25/2010    Influenza Virus Vaccine 10/01/2012, 10/20/2015    Influenza Whole 10/01/2011    Influenza, High Dose (Fluzone 65 yrs and older) 10/06/2014, 10/19/2016, 11/16/2017, 11/05/2018    Influenza, Triv, inactivated, subunit, adjuvanted, IM (Fluad 65 yrs and older) 11/13/2019    Pneumococcal Conjugate 13-valent ([id number]) 06/11/2015    Pneumococcal Conjugate 7-valent (Prevnar7) 02/07/2005, 10/01/2011    Pneumococcal Polysaccharide ([id number]) 06/15/2016    Zoster Live (Zostavax) 07/01/2013       Known drug allergies:      All allergies were reviewed and updated    No Known Allergies    Social history:     Social History:  All social andepidemiologic history was reviewed and updated by me today as needed. · Tobacco use:   reports that she quit smoking about 5 years ago. Her smoking use included cigarettes. She has a 7.50 pack-year smoking history. She has never used smokeless tobacco.  · Alcohol use:   reports no history of alcohol use. · Currently lives in: [facility]. [address]  ·  reports no history of drug use. Assessment:     The patient is a 77 y.o. old female who  has a past medical history of Bullous emphysema ([address] CKD (chronic kidney disease) stage 3, GFR 30-59 ml/min ([facility] Clostridium difficile carrier (01/21/2019), COPD (chronic obstructive pulmonary disease) ([facility] Crohn's disease ([address] Depression (1/30/2011), DVT (deep venous thrombosis) ([facility] Hiatal hernia, blood clots, Kidney disease, Kidney insufficiency, Osteoporosis, Peripheral neuropathy, Pneumonia, Postmenopausal, Pulmonary embolism ([address] Sepsis ([address] and Syringomyelia ([address] with following problems:    · Sepsis with worsening fever, persistent bandemia, acute kidney injury on chronic kidney disease -improving slowly  · Left-sided multifocal pneumonia with necrotizing infiltrate in the left upper lobe -status post diagnostic bronchoscopy on 9/2/2020-BAL cultures in process  · Elevated procalcitonin of 2.17 on 8/28/2020  · Chronic respiratory failure with hypoxia-on 2 L seen via nasal cannula  · Bilateral chronic emphysema  · Crohn's disease  · Long-term steroid use-recommend tapering and stopping steroids  · History of DVT  · Chronic small vessel CNS disease on MRI of the brain  · History of depression  · Overweight due to excess calorie intake : Body mass index is 29.86 kg/m². · Ex-smoker      Discussion:      The patient is now on oral linezolid, Levaquin and Flagyl. She is tolerating the antibiotics okay. White cell count is 18,100. BAL cultures are in process.   So far, low or high blood sugars, muscle pains and ankle tendon pain while on Ciprofloxacin or Levofloxacin. Patient was advised to keep a sugar candy at all times as all fluoroquinolones have the potential of causing hypoglycemia. If these symptoms develops, patient was instructed to stop the antibiotic and call my office at [phone]. Use sunscreen when going in bright sun while on Ciprofloxacin or Levofloxacin as these antibiotics can cause photosensitivity. Take 1 hour before or 2 hour after dairy, calcium, iron, magnesium, aluminum or zinc.    Smoking cessation/ Tobacco use disorder counseling:    I have counseled the patient extensively about risks of smoking and have encouraged the patient to maintain a healthy smoke-free lifestyle. Information was given about various smoking cessation programs and their websites like www.smokefree.gov, [place]. quitlogix. org as well as help lines like 1-738NoknokerQUIT-NOW and 1-800NoknokerLUNG-[place]. TIME SPENT TODAY:     - Spent over  36  minutes on visit (including interval history, physical exam, review of data including labs, cultures, imaging, development and implementation of treatment plan and coordination of complex care). Thank you for involving me in the care of your patient. I will continue to follow. If you have anyadditional questions, please do not hesitate to contact me. Subjective: Interval history: Interval history was obtained from chart review and RN. She is afebrile. She is on 2 L oxygen via nasal cannula. Tolerating oral antibiotics okay     REVIEW OF SYSTEMS:      Review of Systems   Constitutional: Negative for chills, diaphoresis and unexpected weight change. HENT: Negative for congestion, ear discharge, ear pain, facial swelling, hearing loss, rhinorrhea and trouble swallowing. Eyes: Negative for photophobia, discharge, redness and visual disturbance. Respiratory: Positive for cough.  Negative for apnea, choking, chest tightness, shortness of breath and stridor. Cardiovascular: Negative for chest pain and palpitations. Gastrointestinal: Negative for abdominal pain, blood in stool, diarrhea and nausea. Endocrine: Negative for polydipsia, polyphagia and polyuria. Genitourinary: Negative for difficulty urinating, dysuria, frequency, hematuria, menstrual problem and vaginal discharge. Musculoskeletal: Negative for arthralgias, joint swelling, myalgias and neck stiffness. Skin: Negative for color change and rash. Allergic/Immunologic: Negative for immunocompromised state. Neurological: Negative for dizziness, seizures, speech difficulty, light-headedness and headaches. Hematological: Negative for adenopathy. Psychiatric/Behavioral: Negative for agitation, hallucinations and suicidal ideas. Past Medical History: All past medical history reviewed today. Past Medical History:   Diagnosis Date    Bullous emphysema (HCC)     CKD (chronic kidney disease) stage 3, GFR 30-59 ml/min ([facility])     Clostridium difficile carrier 01/21/2019    COPD (chronic obstructive pulmonary disease) ([address]     PFT done 7 years ago   [facility] Crohn's disease ([address]     Crohn's disease    Depression 1/30/2011    pt states resolved as of 3-26-12    DVT (deep venous thrombosis) ([address]     2012; first ocurrence     Hiatal hernia     Hx of blood clots     Kidney disease     Kidney insufficiency     Osteoporosis     Peripheral neuropathy     neuropathy in legs    Pneumonia     Postmenopausal     LMP in  40's    Pulmonary embolism ([address]     2012; first ocurrence    Sepsis ([address]     Syringomyelia ([address]        Past Surgical History: All past surgical history was reviewed today.     Past Surgical History:   Procedure Laterality Date    ABDOMEN SURGERY      BACK SURGERY      shunt to drain CSF    BIOPSY SHOULDER Left 2/27/2019    EXCISION OF LEFT SHOULDER MASS performed by Antonio Trent MD at [address] [place] [address]      [facility]      Tenderness: There is no abdominal tenderness. There is no guarding or rebound. Musculoskeletal:         General: No tenderness. Lymphadenopathy:      Cervical: No cervical adenopathy. Skin:     General: Skin is warm and dry. Findings: No erythema or rash. Neurological:      Mental Status: She is alert and oriented to person, place, and time. Motor: No abnormal muscle tone. Psychiatric:         Judgment: Judgment normal.           Lines: All vascular access sites are healthy with no local erythema, discharge or tenderness. Intake and output:    I/O last 3 completed shifts: In: 5 [P.O.:540]  Out: 600 [Urine:600]    Lab Data:   All available labs and old records have been reviewed by me. CBC:  Recent Labs     09/02/20 0445 09/03/20 0442 09/04/20  0504   WBC 19.9* 20.4* 18.1*   RBC 4.43 3.95* 3.96*   HGB 9.0* 7.8* 7.8*   HCT 28.4* 24.9* 24.6*    446 478*   MCV 64.1* 63.1* 62.2*   MCH 20.4* 19.7* 19.7*   MCHC 31.9 31.3 31.6   RDW 15.1 14.9 14.9   BANDSPCT 4  --  8*        BMP:  Recent Labs     09/02/20 0445 09/03/20 0442 09/04/20  0504    139 139   K 3.5 3.6 3.2*    104 102   CO2 22 24 26   BUN 17 21* 29*   CREATININE 1.6* 1.7* 1.7*   CALCIUM 8.7 8.2* 8.3   GLUCOSE 100* 134* 137*        Hepatic Function Panel:   Lab Results   Component Value Date    ALKPHOS 102 08/29/2020    ALT 8 08/29/2020    AST 9 08/29/2020    PROT 6.7 08/29/2020    PROT 7.1 11/13/2012    BILITOT 0.3 08/29/2020    BILIDIR <0.2 01/05/2020    IBILI see below 01/05/2020    LABALBU 2.5 08/29/2020       CPK:   Lab Results   Component Value Date    CKTOTAL 29 08/07/2011     ESR:   Lab Results   Component Value Date    SEDRATE 24 03/18/2019     CRP:   Lab Results   Component Value Date    CRP 2.1 03/18/2019           Imaging: All pertinent images and reports for the current visit were reviewed by me during this visit.     CT ABDOMEN PELVIS WO CONTRAST Additional Contrast? None   Final Result Moderate amount of stool and fluid within the large bowel, which may reflect   ileus and/or diarrheal disease. Mild fluid-filled distention of small bowel, which also likely represents   ileus. Obstruction does not appear to be present. Small left pleural effusion with adjacent airspace disease, likely passive   atelectasis. Infiltrate in the posterior aspect of the lingula is noted,   incompletely included on the field of view, most compatible with pneumonia. MRI BRAIN WO CONTRAST   Final Result   Mild chronic small vessel ischemic changes. No acute stroke, midline shift   or mass effect. VL DUP CAROTID BILATERAL   Final Result      CT CHEST WO CONTRAST   Final Result   Significant worsening in size of the left upper lobe pneumonia in 2 days. Moderate left pleural effusion now present with mild atelectasis. Severe   emphysematous changes. No CHF. XR CHEST (2 VW)   Final Result   Progressive consolidation within the left upper lobe consistent with   pneumonia. Increased dependent left lower lobe opacification and effusion. Improved aeration at the right lung base. CT CHEST WO CONTRAST   Final Result   1. Left upper lobe pneumonia. Recommend chest radiograph in 8 weeks to   confirm resolution. 2. Trace left pleural effusion. CT HEAD WO CONTRAST   Final Result   1. New age-indeterminate infarct within the right internal capsule. Recommend further evaluation with MRI of the head. XR CHEST PORTABLE   Final Result   Bilateral peripheral airspace disease suggestive of atypical or viral   pneumonia. Medications: All current and past medications were reviewed.      linezolid  600 mg Oral 2 times per day    levoFLOXacin  750 mg Oral Every Other Day    metroNIDAZOLE  500 mg Oral 3 times per day    hydrocortisone sodium succinate PF  100 mg Intravenous Q8H    sennosides-docusate sodium  2 tablet Oral Daily    ipratropium-albuterol  1 ampule Inhalation Q4H WA    budesonide-formoterol  2 puff Inhalation BID    tiotropium  2 puff Inhalation Daily    predniSONE  20 mg Oral Daily    sodium chloride  30 mL/kg Intravenous Once    sodium chloride flush  10 mL Intravenous 2 times per day    amitriptyline  75 mg Oral Nightly    apixaban  5 mg Oral BID    atorvastatin  40 mg Oral Nightly           bisacodyl, guaiFENesin-codeine, sodium chloride flush, acetaminophen **OR** acetaminophen, polyethylene glycol, promethazine **OR** ondansetron, labetalol, albuterol sulfate HFA      Problem list:       Patient Active Problem List   Diagnosis Code    Cro[facility] disease ([facility] K50.90    Depression F32.9    Osteoarthritis M19.90    Lupus anticoagulant disorder ([facility] D68.62    Dysphagia R13.10    Syringomyelia ([facility] G95.0    Gastritis and gastroduodenitis K29.70, K29.90    Skin ulcer of shoulder ([facility] L98.499    Shortness of breath R06.02    Anemia D64.9    Chronic emphysema syndrome (HCC) J43.9    Pulmonary fibrosis ([facility]) J84.10    Chronic hypoxemic respiratory failure ([facility]) J96.11    Hiatal hernia K44.9    COPD, severe ([facility]) J44.9    Alpha thalassemia minor D56.3    Necrotizing pneumonia ([facility]) J85.0    Acute respiratory failure with hypoxia ([facility]) J96.01    Bronchiectasis with acute exacerbation ([facility]) J47.1    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    Osteoporosis of multiple sites M81.0    Gastroesophageal reflux disease without esophagitis K21.9    Sepsis ([facility]) A41.9    Bilateral pulmonary embolism ([facility]) I26.99    Acute deep vein thrombosis (DVT) of distal vein of both lower extremities ([facility]) I82.4Z3    History of pulmonary embolism Z86.711    SOB (shortness of breath) R06.02    Wound of left shoulder S41.002A    Hx pulmonary embolism Z86.711    Multifocal pneumonia J18.9    COPD exacerbation ([facility]) J44.1    Bacterial pneumonia J15.9    Cerebrovascular accident (CVA) ([facility] I63.9    MARIS (acute kidney injury) ([facility] N17.9    Suspected COVID-19 virus infection Z20.828    Dizziness R42    Elevated procalcitonin R79.89    Long term systemic steroid user Z79.52    History of DVT (deep vein thrombosis) Z86.718    Anticoagulated Z79.01    Small vessel disease, cerebrovascular I67.9    Ex-smoker Z87.891    History of depression Z86.59    Overweight (BMI 25.0-29. 9) E66.3    Encounter for medication counseling Z71.89    Tobacco abuse counseling Z71.6       Please note that this chart was generated using Dragon dictation software. Although every effort was made to ensure the accuracy of this automated transcription, some errors in transcription may have occurred inadvertently. If you may need any clarification, please do not hesitate to contact me through EPIC or at the phone number provided below with my electronic signature. Any pictures or media included in this note were obtained after taking informed verbal consent from the patient and with their approval to include those in the patient's medical record.     Juno Pa MD, MPH  9/4/2020 , 2:02 PM   [facility] Infectious Disease   Office: [phone]  Fax: [phone]  Tuesday AM clinic:   [address] [place] [address] 120  Thursday AM [id number] [facility]

## 2020-09-05 PROBLEM — J15.9 BACTERIAL PNEUMONIA: Status: RESOLVED | Noted: 2020-08-28 | Resolved: 2020-09-05

## 2020-09-05 PROBLEM — J18.9 MULTIFOCAL PNEUMONIA: Status: RESOLVED | Noted: 2019-04-02 | Resolved: 2020-09-05

## 2020-09-05 LAB
ANION GAP SERPL CALCULATED.3IONS-SCNC: 13 MMOL/L (ref 3–16)
BANDED NEUTROPHILS RELATIVE PERCENT: 1 % (ref 0–7)
BASOPHILS ABSOLUTE: 0 K/UL (ref 0–0.2)
BASOPHILS RELATIVE PERCENT: 0 %
BUN BLDV-MCNC: 33 MG/DL (ref 7–20)
CALCIUM SERPL-MCNC: 8.4 MG/DL (ref 8.3–10.6)
CHLORIDE BLD-SCNC: 104 MMOL/L (ref 99–110)
CO2: 23 MMOL/L (ref 21–32)
CREAT SERPL-MCNC: 1.8 MG/DL (ref 0.6–1.2)
EOSINOPHILS ABSOLUTE: 0 K/UL (ref 0–0.6)
EOSINOPHILS RELATIVE PERCENT: 0 %
GFR AFRICAN AMERICAN: 34
GFR NON-AFRICAN AMERICAN: 28
GLUCOSE BLD-MCNC: 97 MG/DL (ref 70–99)
HCT VFR BLD CALC: 25.2 % (ref 36–48)
HEMOGLOBIN: 8.1 G/DL (ref 12–16)
HYPOCHROMIA: ABNORMAL
LYMPHOCYTES ABSOLUTE: 1.6 K/UL (ref 1–5.1)
LYMPHOCYTES RELATIVE PERCENT: 10 %
MAGNESIUM: 2.5 MG/DL (ref 1.8–2.4)
MCH RBC QN AUTO: 20.2 PG (ref 26–34)
MCHC RBC AUTO-ENTMCNC: 32.1 G/DL (ref 31–36)
MCV RBC AUTO: 62.8 FL (ref 80–100)
MICROCYTES: ABNORMAL
MONOCYTES ABSOLUTE: 0 K/UL (ref 0–1.3)
MONOCYTES RELATIVE PERCENT: 0 %
MTB COMPLEX INTERP: NOT DETECTED
MTB RIFAMPIN BY PCR: NORMAL
MTBRIF SOURCE: NORMAL
MYCOBACTERIUM TUBERCULOSIS PCR: NOT DETECTED
NEUTROPHILS ABSOLUTE: 14.8 K/UL (ref 1.7–7.7)
NEUTROPHILS RELATIVE PERCENT: 89 %
OVALOCYTES: ABNORMAL
PDW BLD-RTO: 15.1 % (ref 12.4–15.4)
PLATELET # BLD: 473 K/UL (ref 135–450)
PLATELET SLIDE REVIEW: ADEQUATE
PMV BLD AUTO: 6.8 FL (ref 5–10.5)
POLYCHROMASIA: ABNORMAL
POTASSIUM REFLEX MAGNESIUM: 3 MMOL/L (ref 3.5–5.1)
POTASSIUM SERPL-SCNC: 4.1 MMOL/L (ref 3.5–5.1)
RBC # BLD: 4.02 M/UL (ref 4–5.2)
SCHISTOCYTES: ABNORMAL
SLIDE REVIEW: ABNORMAL
SODIUM BLD-SCNC: 140 MMOL/L (ref 136–145)
WBC # BLD: 16.4 K/UL (ref 4–11)

## 2020-09-05 PROCEDURE — 6370000000 HC RX 637 (ALT 250 FOR IP): Performed by: INTERNAL MEDICINE

## 2020-09-05 PROCEDURE — 2580000003 HC RX 258: Performed by: INTERNAL MEDICINE

## 2020-09-05 PROCEDURE — 83735 ASSAY OF MAGNESIUM: CPT

## 2020-09-05 PROCEDURE — 94640 AIRWAY INHALATION TREATMENT: CPT

## 2020-09-05 PROCEDURE — 6360000002 HC RX W HCPCS: Performed by: INTERNAL MEDICINE

## 2020-09-05 PROCEDURE — 36415 COLL VENOUS BLD VENIPUNCTURE: CPT

## 2020-09-05 PROCEDURE — 84132 ASSAY OF SERUM POTASSIUM: CPT

## 2020-09-05 PROCEDURE — 85025 COMPLETE CBC W/AUTO DIFF WBC: CPT

## 2020-09-05 PROCEDURE — 2700000000 HC OXYGEN THERAPY PER DAY

## 2020-09-05 PROCEDURE — 2060000000 HC ICU INTERMEDIATE R&B

## 2020-09-05 PROCEDURE — 80048 BASIC METABOLIC PNL TOTAL CA: CPT

## 2020-09-05 PROCEDURE — 94761 N-INVAS EAR/PLS OXIMETRY MLT: CPT

## 2020-09-05 RX ORDER — POTASSIUM CHLORIDE 7.45 MG/ML
10 INJECTION INTRAVENOUS PRN
Status: DISCONTINUED | OUTPATIENT
Start: 2020-09-05 | End: 2020-09-06 | Stop reason: HOSPADM

## 2020-09-05 RX ORDER — ATORVASTATIN CALCIUM 40 MG/1
40 TABLET, FILM COATED ORAL NIGHTLY
Qty: 30 TABLET | Refills: 3 | Status: SHIPPED | OUTPATIENT
Start: 2020-09-05 | End: 2020-09-14 | Stop reason: ALTCHOICE

## 2020-09-05 RX ORDER — POTASSIUM CHLORIDE 7.45 MG/ML
10 INJECTION INTRAVENOUS
Status: COMPLETED | OUTPATIENT
Start: 2020-09-05 | End: 2020-09-05

## 2020-09-05 RX ORDER — POTASSIUM CHLORIDE 20 MEQ/1
40 TABLET, EXTENDED RELEASE ORAL PRN
Status: DISCONTINUED | OUTPATIENT
Start: 2020-09-05 | End: 2020-09-06 | Stop reason: HOSPADM

## 2020-09-05 RX ORDER — POTASSIUM CHLORIDE 20 MEQ/1
40 TABLET, EXTENDED RELEASE ORAL ONCE
Status: COMPLETED | OUTPATIENT
Start: 2020-09-05 | End: 2020-09-05

## 2020-09-05 RX ORDER — CODEINE PHOSPHATE AND GUAIFENESIN 10; 100 MG/5ML; MG/5ML
10 SOLUTION ORAL EVERY 4 HOURS PRN
Qty: 420 ML | Refills: 0 | Status: CANCELLED | OUTPATIENT
Start: 2020-09-05 | End: 2020-09-08

## 2020-09-05 RX ADMIN — APIXABAN 5 MG: 5 TABLET, FILM COATED ORAL at 10:27

## 2020-09-05 RX ADMIN — STANDARDIZED SENNA CONCENTRATE AND DOCUSATE SODIUM 2 TABLET: 8.6; 5 TABLET ORAL at 10:27

## 2020-09-05 RX ADMIN — LEVOFLOXACIN 750 MG: 500 TABLET, FILM COATED ORAL at 10:30

## 2020-09-05 RX ADMIN — POTASSIUM CHLORIDE 40 MEQ: 1500 TABLET, EXTENDED RELEASE ORAL at 10:27

## 2020-09-05 RX ADMIN — AMITRIPTYLINE HYDROCHLORIDE 75 MG: 25 TABLET, FILM COATED ORAL at 21:12

## 2020-09-05 RX ADMIN — DESMOPRESSIN ACETATE 40 MG: 0.2 TABLET ORAL at 21:12

## 2020-09-05 RX ADMIN — TIOTROPIUM BROMIDE INHALATION SPRAY 2 PUFF: 3.12 SPRAY, METERED RESPIRATORY (INHALATION) at 12:04

## 2020-09-05 RX ADMIN — Medication 2 PUFF: at 12:04

## 2020-09-05 RX ADMIN — METRONIDAZOLE 500 MG: 250 TABLET ORAL at 21:11

## 2020-09-05 RX ADMIN — POTASSIUM CHLORIDE 10 MEQ: 7.46 INJECTION, SOLUTION INTRAVENOUS at 13:45

## 2020-09-05 RX ADMIN — Medication 10 ML: at 09:50

## 2020-09-05 RX ADMIN — METRONIDAZOLE 500 MG: 250 TABLET ORAL at 18:12

## 2020-09-05 RX ADMIN — POTASSIUM CHLORIDE 10 MEQ: 7.46 INJECTION, SOLUTION INTRAVENOUS at 10:26

## 2020-09-05 RX ADMIN — POTASSIUM CHLORIDE 10 MEQ: 7.46 INJECTION, SOLUTION INTRAVENOUS at 12:30

## 2020-09-05 RX ADMIN — LINEZOLID 600 MG: 600 TABLET, FILM COATED ORAL at 10:30

## 2020-09-05 RX ADMIN — IPRATROPIUM BROMIDE AND ALBUTEROL SULFATE 1 AMPULE: .5; 3 SOLUTION RESPIRATORY (INHALATION) at 15:01

## 2020-09-05 RX ADMIN — Medication 10 ML: at 21:12

## 2020-09-05 RX ADMIN — POTASSIUM CHLORIDE 10 MEQ: 7.46 INJECTION, SOLUTION INTRAVENOUS at 15:15

## 2020-09-05 RX ADMIN — Medication 2 PUFF: at 20:42

## 2020-09-05 RX ADMIN — APIXABAN 5 MG: 5 TABLET, FILM COATED ORAL at 21:12

## 2020-09-05 RX ADMIN — PREDNISONE 20 MG: 20 TABLET ORAL at 10:27

## 2020-09-05 RX ADMIN — IPRATROPIUM BROMIDE AND ALBUTEROL SULFATE 1 AMPULE: .5; 3 SOLUTION RESPIRATORY (INHALATION) at 20:41

## 2020-09-05 RX ADMIN — METRONIDAZOLE 500 MG: 250 TABLET ORAL at 05:35

## 2020-09-05 RX ADMIN — LINEZOLID 600 MG: 600 TABLET, FILM COATED ORAL at 21:12

## 2020-09-05 RX ADMIN — IPRATROPIUM BROMIDE AND ALBUTEROL SULFATE 1 AMPULE: .5; 3 SOLUTION RESPIRATORY (INHALATION) at 12:04

## 2020-09-05 ASSESSMENT — PAIN SCALES - GENERAL
PAINLEVEL_OUTOF10: 0

## 2020-09-05 NOTE — PROGRESS NOTES
Last bag of potassium still infusing-- then will need times K+ 1 hr post. Pt discussed this with writer-- pt doesn't have a ride this late and would rather leave in the AM.

## 2020-09-05 NOTE — PLAN OF CARE
Problem: Falls - Risk of:  Goal: Will remain free from falls  Description: Will remain free from falls  9/4/2020 2113 by Senait Price RN  Outcome: Ongoing  Note: All fall precautions in place, bed in lowest position, frequent rounding to assist with needs. Pt has signed a waiver for the bed alarm. Pt absent of fall this shift. Problem: Falls - Risk of:  Goal: Absence of physical injury  Description: Absence of physical injury  9/4/2020 2113 by Senait Price RN  Outcome: Ongoing     Problem: Pain:  Goal: Pain level will decrease  Description: Pain level will decrease  9/4/2020 2113 by Senait Price RN  Outcome: Ongoing  Note: Pt has tylenol for pain control. Problem: Pain:  Goal: Control of acute pain  Description: Control of acute pain  9/4/2020 2113 by Senait Price RN  Outcome: Ongoing  Note: Pt given tylenol for headache at 7/10. Will continue to monitor.       Problem: Pain:  Goal: Control of chronic pain  Description: Control of chronic pain  9/4/2020 2113 by Senait Price RN  Outcome: Ongoing

## 2020-09-05 NOTE — PROGRESS NOTES
[facility]    9/5/2020 10:54 AM        Name: Efrain Aceves . Admitted: 8/28/2020  Primary Care Provider: Shelly Fletcher MD (Tel: [phone])    Brief Course:    Patient is a 61-year-old -American female with history of COPD, Crohn's disease, CKD-3, depression, rheumatoid arthritis, history of DVT/PE, osteoporosis who presented with increased shortness of breath, dry cough and s/p fall on the morning of admission.  She was noted to be hypoxic with oxygen saturation in mid 80s requiring increased O2 supplementation. Chest x-ray was noted for bilateral peripheral airspace disease suggestive of atypical/viral pneumonia. Sepsis due to bilateral pleural pneumonia, possible aspiration pneumonia, acute on chronic kidney injury, acute on chronic respiratory failure as well as mechanical fall at home. Patient underwent bronchoscopy with BAL on 9/2/20.      CC: Worsening shortness of breath, dry cough, s/p fall at home  Subjective: No acute events overnight. Pt reports pretty good and requesting to be discharged home. Getting IV runs of KCl for hypokalemia.     Current Medications  potassium chloride 10 mEq/100 mL IVPB (Peripheral Line), Q1H  potassium chloride (KLOR-CON M) extended release tablet 40 mEq, PRN    Or  potassium bicarb-citric acid (EFFER-K) effervescent tablet 40 mEq, PRN    Or  potassium chloride 10 mEq/100 mL IVPB (Peripheral Line), PRN  linezolid (ZYVOX) tablet 600 mg, 2 times per day  levoFLOXacin (LEVAQUIN) tablet 750 mg, Every Other Day  metroNIDAZOLE (FLAGYL) tablet 500 mg, 3 times per day  sennosides-docusate sodium (SENOKOT-S) 8.6-50 MG tablet 2 tablet, Daily  bisacodyl (DULCOLAX) suppository 10 mg, Daily PRN  ipratropium-albuterol (DUONEB) nebulizer solution 1 ampule, Q4H WA  guaiFENesin-codeine (GUAIFENESIN AC) 100-10 MG/5ML liquid 10 mL, Q4H PRN  budesonide-formoterol (SYMBICORT) 160-4.5 MCG/ACT inhaler 2 puff, BID  tiotropium (SPIRIVA RESPIMAT) 2.5 MCG/ACT inhaler 2 puff, Daily  predniSONE (DELTASONE) tablet 20 mg, Daily  0.9 % sodium chloride bolus, Once  sodium chloride flush 0.9 % injection 10 mL, 2 times per day  sodium chloride flush 0.9 % injection 10 mL, PRN  acetaminophen (TYLENOL) tablet 650 mg, Q6H PRN    Or  acetaminophen (TYLENOL) suppository 650 mg, Q6H PRN  polyethylene glycol (GLYCOLAX) packet 17 g, Daily PRN  promethazine (PHENERGAN) tablet 12.5 mg, Q6H PRN    Or  ondansetron (ZOFRAN) injection 4 mg, Q6H PRN  amitriptyline (ELAVIL) tablet 75 mg, Nightly  apixaban (ELIQUIS) tablet 5 mg, BID  atorvastatin (LIPITOR) tablet 40 mg, Nightly  labetalol (NORMODYNE;TRANDATE) injection 10 mg, Q4H PRN  albuterol sulfate  (90 Base) MCG/ACT inhaler 2 puff, Q2H PRN    Objective:  /78   Pulse 107   Temp 97.8 °F (36.6 °C) (Oral)   Resp 19   Ht 5' (1.524 m)   Wt 151 lb 4.8 oz (68.6 kg)   SpO2 95%   BMI 29.55 kg/m²     Intake/Output Summary (Last 24 hours) at 9/5/2020 1054  Last data filed at 9/4/2020 1606  Gross per 24 hour   Intake 360 ml   Output --   Net 360 ml      Wt Readings from Last 3 Encounters:   09/04/20 151 lb 4.8 oz (68.6 kg)   08/13/20 147 lb (66.7 kg)   03/25/20 133 lb (60.3 kg)       General appearance: Elderly -American female, comfortable  Cardiovascular: Regular rhythm, normal S1, S2. No murmur. No edema in lower extremities  Respiratory: on 3L O2 via NC, using accessory muscles. Clear to auscultation bilaterally, no wheeze or crackles. GI: Abdomen firm, distended, no tenderness, not distended, sluggish bowel sounds  Musculoskeletal: No cyanosis in digits, neck supple  Neurology: CN 2-12 grossly intact. No speech or motor deficits  Psych: Normal affect.  Alert and oriented in time, place and person  Skin: Warm, dry, normal turgor  Extremity exam shows brisk capillary refill.  Peripheral pulses are palpable in lower extremities      Labs and Tests:  CBC:   Recent Labs     09/03/20  0442 09/04/20  0504 09/05/20  0458   WBC 20.4* 18.1* 16.4*   HGB 7.8* 7.8* 8.1*    478* 473*     BMP:    Recent Labs     09/03/20  0442 09/04/20  0504 09/05/20  0458    139 140   K 3.6 3.2* 3.0*    102 104   CO2 24 26 23   BUN 21* 29* 33*   CREATININE 1.7* 1.7* 1.8*   GLUCOSE 134* 137* 97     Hepatic: No results for input(s): AST, ALT, ALB, BILITOT, ALKPHOS in the last 72 hours. CT ABDOMEN PELVIS WO CONTRAST Additional Contrast? None   Final Result   Moderate amount of stool and fluid within the large bowel, which may reflect   ileus and/or diarrheal disease. Mild fluid-filled distention of small bowel, which also likely represents   ileus. Obstruction does not appear to be present. Small left pleural effusion with adjacent airspace disease, likely passive   atelectasis. Infiltrate in the posterior aspect of the lingula is noted,   incompletely included on the field of view, most compatible with pneumonia. MRI BRAIN WO CONTRAST   Final Result   Mild chronic small vessel ischemic changes. No acute stroke, midline shift   or mass effect. VL DUP CAROTID BILATERAL   Final Result      CT CHEST WO CONTRAST   Final Result   Significant worsening in size of the left upper lobe pneumonia in 2 days. Moderate left pleural effusion now present with mild atelectasis. Severe   emphysematous changes. No CHF. XR CHEST (2 VW)   Final Result   Progressive consolidation within the left upper lobe consistent with   pneumonia. Increased dependent left lower lobe opacification and effusion. Improved aeration at the right lung base. CT CHEST WO CONTRAST   Final Result   1. Left upper lobe pneumonia. Recommend chest radiograph in 8 weeks to   confirm resolution. 2. Trace left pleural effusion. CT HEAD WO CONTRAST   Final Result   1. New age-indeterminate infarct within the right internal capsule. Recommend further evaluation with MRI of the head. XR CHEST PORTABLE   Final Result   Bilateral peripheral airspace disease suggestive of atypical or viral   pneumonia. Problem List  Active Problems:    Chronic emphysema syndrome (HCC)    Necrotizing pneumonia ([facility]    Acute respiratory failure with hypoxia (HCC)    Multifocal pneumonia    Bacterial pneumonia    Cerebrovascular accident (CVA) ([facility] [facility]    MARIS (acute kidney injury) ([facility] [facility]    Suspected COVID-19 virus infection    Dizziness    Elevated procalcitonin    Long term systemic steroid user    History of DVT (deep vein thrombosis)    Anticoagulated    Small vessel disease, cerebrovascular    Ex-smoker    History of depression    Overweight (BMI 25.0-29. 9)    Encounter for medication counseling    Tobacco abuse counseling  Resolved Problems:    * No resolved hospital problems. *       Assessment & Plan:     Acute on chronic respiratory failure with hypoxia: Currently resolved  Patient underwent bronchoscopy with mucous plug removal and BAL on 9/2. As per ID, plan is to discharge on p.o. linezolid, p.o. Levaquin and p.o. Flagyl.       Constipation:   Currently resolved.  Patient has history of Crohn's with partial bowel resection. F/u with Dr. Layla Wray at [facility] for Crohn's.       Sepsis due to Bacterial pneumonia: Resolved. No fevers noted. WBC count trending down. Will be discharged on oral antibiotics as mentioned above.      Chronic steroid use:  On prednisone 20 mg po daily.  Stable BP and hemodynamics.     Cerebrovascular accident (CVA): CT head and MRI brain showed changes consistent with old infarcts. Carotid artery Dopplers show no major block. On anticoagulation and statin.        Acute kidney injury: Patient received IV fluids.  Renal function stable. serum creatinine at 1.7. Hypokalemia:   Serum K of 3.0 today am. Replacing as per K replacement protocol.      IV Access: Peripheral IV  Carreon: None  Diet: DIET LOW SODIUM 2

## 2020-09-05 NOTE — PROGRESS NOTES
Assisted to reposition in bed, fresh ice water provided, Flagyl administered. Denies other needs @present.

## 2020-09-05 NOTE — CARE COORDINATION
Per nurse, Matthew Edmondson, pt discharging today with [facility]. Patient discharging home with [address]  PHONE; [phone]  FAX: [phone]    Order/ AVS faxed and agency notifed. No other SW needs at this time.     All discharge needs met per case management    [employer] MSW, [address]

## 2020-09-06 VITALS
HEIGHT: 60 IN | DIASTOLIC BLOOD PRESSURE: 71 MMHG | HEART RATE: 114 BPM | BODY MASS INDEX: 29.7 KG/M2 | TEMPERATURE: 98.3 F | OXYGEN SATURATION: 93 % | WEIGHT: 151.3 LBS | RESPIRATION RATE: 20 BRPM | SYSTOLIC BLOOD PRESSURE: 107 MMHG

## 2020-09-06 LAB
ANION GAP SERPL CALCULATED.3IONS-SCNC: 11 MMOL/L (ref 3–16)
ANISOCYTOSIS: ABNORMAL
ASPERGILLUS ANTIBODY ID: NORMAL
BANDED NEUTROPHILS RELATIVE PERCENT: 1 % (ref 0–7)
BASOPHILS ABSOLUTE: 0 K/UL (ref 0–0.2)
BASOPHILS RELATIVE PERCENT: 0 %
BLASTOMYCES ANTIBODY ID: NORMAL
BUN BLDV-MCNC: 26 MG/DL (ref 7–20)
CALCIUM SERPL-MCNC: 8.5 MG/DL (ref 8.3–10.6)
CHLORIDE BLD-SCNC: 108 MMOL/L (ref 99–110)
CMV DNA QNT PCR: <2.6 LOG CPY/ML
CMV DNA QUANTATATIVE INTERPRETATION: <2.4 LOG IU/ML
CMV DNA QUANTATATIVE INTERPRETATION: NOT DETECTED
CMV DNA QUANTITATIVE: <227 IU/ML
CMV SOURCE: NORMAL
CMVQ COPY/ML: <390 CPY/ML
CO2: 22 MMOL/L (ref 21–32)
COCCIDIOIDES ANTIBODY ID: NORMAL
CREAT SERPL-MCNC: 1.6 MG/DL (ref 0.6–1.2)
EOSINOPHILS ABSOLUTE: 0.2 K/UL (ref 0–0.6)
EOSINOPHILS RELATIVE PERCENT: 1 %
GFR AFRICAN AMERICAN: 39
GFR NON-AFRICAN AMERICAN: 32
GLUCOSE BLD-MCNC: 91 MG/DL (ref 70–99)
HCT VFR BLD CALC: 28.5 % (ref 36–48)
HEMOGLOBIN: 9 G/DL (ref 12–16)
HISTOPLASMA ABS, ID: NORMAL
HYPOCHROMIA: ABNORMAL
LEGIONELLA PNEUMOPHILIA BY PCR: NOT DETECTED
LEGIONELLA SOURCE: NORMAL
LEGIONELLA SPECIES PCR: NOT DETECTED
LYMPHOCYTES ABSOLUTE: 6 K/UL (ref 1–5.1)
LYMPHOCYTES RELATIVE PERCENT: 28 %
MCH RBC QN AUTO: 19.9 PG (ref 26–34)
MCHC RBC AUTO-ENTMCNC: 31.4 G/DL (ref 31–36)
MCV RBC AUTO: 63.5 FL (ref 80–100)
MONOCYTES ABSOLUTE: 1.3 K/UL (ref 0–1.3)
MONOCYTES RELATIVE PERCENT: 6 %
NEUTROPHILS ABSOLUTE: 13.8 K/UL (ref 1.7–7.7)
NEUTROPHILS RELATIVE PERCENT: 64 %
OVALOCYTES: ABNORMAL
PDW BLD-RTO: 15.7 % (ref 12.4–15.4)
PLATELET # BLD: 493 K/UL (ref 135–450)
PLATELET SLIDE REVIEW: ADEQUATE
PMV BLD AUTO: 6.8 FL (ref 5–10.5)
POIKILOCYTES: ABNORMAL
POLYCHROMASIA: ABNORMAL
POTASSIUM REFLEX MAGNESIUM: 4 MMOL/L (ref 3.5–5.1)
RBC # BLD: 4.49 M/UL (ref 4–5.2)
SLIDE REVIEW: ABNORMAL
SODIUM BLD-SCNC: 141 MMOL/L (ref 136–145)
WBC # BLD: 21.3 K/UL (ref 4–11)

## 2020-09-06 PROCEDURE — 36415 COLL VENOUS BLD VENIPUNCTURE: CPT

## 2020-09-06 PROCEDURE — 80048 BASIC METABOLIC PNL TOTAL CA: CPT

## 2020-09-06 PROCEDURE — 6370000000 HC RX 637 (ALT 250 FOR IP): Performed by: INTERNAL MEDICINE

## 2020-09-06 PROCEDURE — 2700000000 HC OXYGEN THERAPY PER DAY

## 2020-09-06 PROCEDURE — 94640 AIRWAY INHALATION TREATMENT: CPT

## 2020-09-06 PROCEDURE — 85025 COMPLETE CBC W/AUTO DIFF WBC: CPT

## 2020-09-06 PROCEDURE — 2580000003 HC RX 258: Performed by: INTERNAL MEDICINE

## 2020-09-06 PROCEDURE — 94761 N-INVAS EAR/PLS OXIMETRY MLT: CPT

## 2020-09-06 RX ADMIN — METRONIDAZOLE 500 MG: 250 TABLET ORAL at 05:56

## 2020-09-06 RX ADMIN — IPRATROPIUM BROMIDE AND ALBUTEROL SULFATE 1 AMPULE: .5; 3 SOLUTION RESPIRATORY (INHALATION) at 08:29

## 2020-09-06 RX ADMIN — TIOTROPIUM BROMIDE INHALATION SPRAY 2 PUFF: 3.12 SPRAY, METERED RESPIRATORY (INHALATION) at 08:29

## 2020-09-06 RX ADMIN — LINEZOLID 600 MG: 600 TABLET, FILM COATED ORAL at 09:43

## 2020-09-06 RX ADMIN — Medication 2 PUFF: at 08:29

## 2020-09-06 RX ADMIN — APIXABAN 5 MG: 5 TABLET, FILM COATED ORAL at 09:42

## 2020-09-06 RX ADMIN — IPRATROPIUM BROMIDE AND ALBUTEROL SULFATE 1 AMPULE: .5; 3 SOLUTION RESPIRATORY (INHALATION) at 11:51

## 2020-09-06 RX ADMIN — Medication 10 ML: at 09:44

## 2020-09-06 RX ADMIN — STANDARDIZED SENNA CONCENTRATE AND DOCUSATE SODIUM 2 TABLET: 8.6; 5 TABLET ORAL at 09:43

## 2020-09-06 RX ADMIN — PREDNISONE 20 MG: 20 TABLET ORAL at 09:41

## 2020-09-06 ASSESSMENT — PAIN SCALES - GENERAL
PAINLEVEL_OUTOF10: 0

## 2020-09-06 NOTE — DISCHARGE SUMMARY
[facility]    Patient Demographics    Patient. Tristen Guerrier  Date of Birth. 1950  MRN. [id number]     Primary care provider. Asad Mars MD  (Tel: [phone])    Admit date: 8/28/2020    Discharge date (blank if same as Note Date): Note Date: 9/6/2020     Reason for Hospitalization. Chief Complaint   Patient presents with    Shortness of Breath     Pt to Er via FP squad form home with c/o SOP since tuesday, hx of COPD, no energy. sating 86% on 2L, on non rebreather at ER arrival. sattes took week to take home inhalers. Significant Findings. Active Problems:    Chronic emphysema syndrome ([address]    Cerebrovascular accident (CVA) ([address]    History of DVT (deep vein thrombosis)    Ex-smoker    History of depression    Overweight (BMI 25.0-29. 9)  Resolved Problems:    Necrotizing pneumonia ([address]    Acute respiratory failure with hypoxia (HCC)    Multifocal pneumonia    Bacterial pneumonia    MARIS (acute kidney injury) ([address]    Suspected COVID-19 virus infection    Dizziness    Elevated procalcitonin    Long term systemic steroid user    Anticoagulated    Small vessel disease, cerebrovascular    Encounter for medication counseling    Tobacco abuse counseling       Problems and results from this hospitalization that need follow up. 1. None    Significant test results and incidental findings. CT ABDOMEN PELVIS WO CONTRAST Additional Contrast? None   Final Result   Moderate amount of stool and fluid within the large bowel, which may reflect   ileus and/or diarrheal disease. Mild fluid-filled distention of small bowel, which also likely represents   ileus. Obstruction does not appear to be present. Small left pleural effusion with adjacent airspace disease, likely passive   atelectasis. Infiltrate in the posterior aspect of the lingula is noted,   incompletely included on the field of view, most compatible with pneumonia. MRI BRAIN WO CONTRAST   Final Result   Mild chronic small vessel ischemic changes. No acute stroke, midline shift   or mass effect. VL DUP CAROTID BILATERAL   Final Result      CT CHEST WO CONTRAST   Final Result   Significant worsening in size of the left upper lobe pneumonia in 2 days. Moderate left pleural effusion now present with mild atelectasis. Severe   emphysematous changes. No CHF. XR CHEST (2 VW)   Final Result   Progressive consolidation within the left upper lobe consistent with   pneumonia. Increased dependent left lower lobe opacification and effusion. Improved aeration at the right lung base. CT CHEST WO CONTRAST   Final Result   1. Left upper lobe pneumonia. Recommend chest radiograph in 8 weeks to   confirm resolution. 2. Trace left pleural effusion. CT HEAD WO CONTRAST   Final Result   1. New age-indeterminate infarct within the right internal capsule. Recommend further evaluation with MRI of the head. XR CHEST PORTABLE   Final Result   Bilateral peripheral airspace disease suggestive of atypical or viral   pneumonia. Invasive procedures and treatments. 1. None    Problem-based Hospital Course. Patient is a 41-year-old -American female with history of COPD, Crohn's disease, CKD-3, depression, rheumatoid arthritis, history of DVT/PE, osteoporosis who presented with increased shortness of breath, dry cough and s/p fall on the morning of admission.  She was noted to be hypoxic with oxygen saturation in mid 80s requiring increased O2 supplementation. Chest x-ray was noted for bilateral peripheral airspace disease suggestive of atypical/viral pneumonia. Acute on chronic respiratory failure with hypoxia: Currently resolved  Patient underwent bronchoscopy with mucous plug removal and BAL on 9/2. As per ID, plan is to discharge on p.o. linezolid, p.o. Levaquin and p.o.  Ivet Rivas has history of Crohn's with partial bowel resection. F/u with Dr. Burke Angel at [facility] for Crohn's.     Sepsis due to Bacterial pneumonia: Resolved.   No fevers noted. WBC count trending down. Will be discharged on oral antibiotics as mentioned above.    Chronic steroid use:  On prednisone 20 mg po daily.  Stable BP and hemodynamics.   Cerebrovascular accident (CVA): CT head and MRI brain showed changes consistent with old infarcts. Carotid artery Dopplers show no major block. On anticoagulation and statin.      Acute kidney injury: Patient received IV fluids.  Renal function stable. serum creatinine at 1.7.   Hypokalemia: Potassium replaced as per K replacement protocol. Reusmed on oral KCl repalcement on discharge.      Consults. IP CONSULT TO HOSPITALIST  IP CONSULT TO NEUROLOGY  IP CONSULT TO PULMONOLOGY  IP CONSULT TO INFECTIOUS DISEASES  IP CONSULT TO INFECTIOUS DISEASES  IP CONSULT TO HOME CARE NEEDS    Physical examination on discharge day. /71   Pulse 114   Temp 98.3 °F (36.8 °C) (Axillary)   Resp 22   Ht 5' (1.524 m)   Wt 151 lb 4.8 oz (68.6 kg)   SpO2 93%   BMI 29.55 kg/m²   General appearance: Elderly -American female, comfortable  Cardiovascular: Regular rhythm, normal S1, S2. No murmur. No edema in lower extremities  Respiratory: on 3L O2 via NC, Clear to auscultation bilaterally, no wheeze or crackles. GI: Abdomen firm, distended, no tenderness, not distended, sluggish bowel sounds  Musculoskeletal: No cyanosis in digits, neck supple  Neurology: CN 2-12 grossly intact. No speech or motor deficits  Psych: Normal affect. Alert and oriented in time, place and person  Skin: Warm, dry, normal turgor  Extremity exam shows brisk capillary refill.  Peripheral pulses are palpable in lower extremities     Condition at time of discharge stable    Medication instructions provided to patient at discharge.      Medication List      START taking these medications    atorvastatin 40 MG headache, fast heartbeat     * albuterol sulfate  (90 Base) MCG/ACT inhaler  Inhale 2 puffs into the lungs every 6 hours as needed for Wheezing  Notes to patient:  Albuterol is used to treat respiratory problems:  Common side effects include:  Nervousness, headache, palpitations, nausea, muscle cramps, insomnia     alendronate 70 MG tablet  Commonly known as:  FOSAMAX  Take 1 tablet by mouth every 7 days  Notes to patient:  Use: to treat or prevent osteoporosis  Side effects: constipation, dizziness, muscle/joint pain, indigestion /bloating     amitriptyline 150 MG tablet  Commonly known as:  ELAVIL  TAKE 0.5 TABLETS BY MOUTH NIGHTLY  Notes to patient:  Use: treats depression   Side effects: drowsiness, dizziness, dry mouth, an constipation      budesonide-formoterol 160-4.5 MCG/ACT Aero  Commonly known as:  Symbicort  Inhale 2 puffs into the lungs 2 times daily  Notes to patient:   For management of COPD     Eliquis 5 MG Tabs tablet  Generic drug:  apixaban  TAKE 1 TABLET BY MOUTH TWICE A DAY  Notes to patient:  Use: Blood thinner to lower risk of stroke  Side effects: easy bruising, unusual bleeding (nose, mouth, vagina, or rectum), bleeding from wounds or needle injections, any bleeding that will not stop, headache     Humira 20 MG/0.4ML Pskt  Generic drug:  Adalimumab     omeprazole 20 MG delayed release capsule  Commonly known as:  PRILOSEC  Notes to patient:  Use: reduces stomach acid and protect the digestive tract  Side effects: headache or diarrhea     ondansetron 4 MG disintegrating tablet  Commonly known as:  Zofran ODT  Take 1 tablet by mouth every 8 hours as needed for Nausea  Notes to patient:  Ondansetron/ Zofran  Use: nausea and vomiting  Side effects: headache, weakness or dizziness     potassium chloride 10 MEQ extended release tablet  Commonly known as:  KLOR-CON  Notes to patient:  Potassium chloride/ Klor-Con  Use: restore potassium in your body  Side effects: stomach upset     * Roller Tami Triana  1 each by Does not apply route daily Please dispense a walker with a seat     * Bath/Shower Seat Misc  Use as directed     tiotropium 2.5 MCG/ACT Aers inhaler  Commonly known as:  Spiriva Respimat  Inhale 2 puffs into the lungs daily  Notes to patient: For management of COPD. * This list has 5 medication(s) that are the same as other medications prescribed for you. Read the directions carefully, and ask your doctor or other care provider to review them with you. STOP taking these medications    guaiFENesin 600 MG extended release tablet  Commonly known as:  MUCINEX     predniSONE 10 MG tablet  Commonly known as:  Vernard Blazing           Where to Get Your Medications      These medications were sent to [facility], [address] [place] [address]    Phone:  [phone]   · atorvastatin 40 MG tablet  · levoFLOXacin 750 MG tablet  · linezolid 600 MG tablet  · metroNIDAZOLE 500 MG tablet         Discharge recommendations given to patient. Follow Up. pcp in 1 week   Disposition. Home with home care  Activity. activity as tolerated  Diet: DIET LOW SODIUM 2 GM; Spent 45 minutes in discharge process.     Signed:  Chandu Tavares MD     9/6/2020 9:34 AM

## 2020-09-06 NOTE — PROGRESS NOTES
Discharge paperwork completed-- per pt daughter is coming from [facility] to pick her up this afternoon. IV removed, bleeding stopped, pt tolerated well.

## 2020-09-06 NOTE — PROGRESS NOTES
AM assessment completed-- AM meds given, finishing up discharge paperwork now. Pt requesting to get cleaned up prior to discharge. Dr. Thom Matias aware pt didn't discharge last night, home care orders completed.

## 2020-09-06 NOTE — CARE COORDINATION
CM called Noel Galo with [facility] [phone] this am and informed her pt did not d/c yesterday and discharging today. She states she has paperwork she needs.     Patient discharged 9/6/2020 to home   All discharge needs met per case management     Denis Eugene RN, BSN  [phone]

## 2020-09-06 NOTE — PLAN OF CARE
Problem: Falls - Risk of:  Goal: Will remain free from falls  Description: Will remain free from falls  Outcome: Ongoing  Note: Remains free from falls this shift. Problem: Pain:  Goal: Pain level will decrease  Description: Pain level will decrease  Outcome: Ongoing  Note: No report of pain this shift. Problem: HEMODYNAMIC STATUS  Goal: Patient has stable vital signs and fluid balance  Outcome: Ongoing  Note: VSS this shift, remains SR/ST on telemetry.

## 2020-09-06 NOTE — PROGRESS NOTES
Meds administered, requested and was given 2 cups of jello, will call when finished eating them to return to bed, denies other needs @present.

## 2020-09-06 NOTE — PROGRESS NOTES
Patient called and asked to see me, states she got up and had a BM, feels dirty, did not get to bathe yesterday and would like to wash up and change her gown. VSS, advised I will have the PCA come in to assist her when she is finished with her VS rounds, no other needs voiced.

## 2020-09-06 NOTE — PROGRESS NOTES
Potassium infused, disconnected from IV, site flushed, locked. Order placed for repeat potassium, wants to stay in chair for now, will call when ready to return to bed.

## 2020-09-06 NOTE — PROGRESS NOTES
Called to say she is ready to get in bed. To room, telemetry box fell out of gown pocket and batteries are on floor. Batteries replaced, couldn't find back to battery compartment. Patient advised to look in BR, as it fell out of her pocket while in BR earlier today and she doesn't think it has had the back on it since. Back to telemetry unit found in BR floor, wiped down with Versa wipe and replaced on telemetry unit. Returned to bed with necessities within reach, thermostat adjusted per patient request for room being too warm. Potassium level now 4.1 after replacement. Patient advised she often has trouble with low potassium as she only has 20% of colon due to Crohn's, reports 3 BMs today and that is not unusual for her. Denies other needs @present.

## 2020-09-07 LAB
MYCOPLASMA PNEUMO SOURCE: NORMAL
MYCOPLASMA PNEUMONIAE PCR: NOT DETECTED

## 2020-09-08 ENCOUNTER — TELEPHONE (OUTPATIENT)
Dept: INTERNAL MEDICINE CLINIC | Age: 70
End: 2020-09-08

## 2020-09-08 ENCOUNTER — CARE COORDINATION (OUTPATIENT)
Dept: CASE MANAGEMENT | Age: 70
End: 2020-09-08

## 2020-09-08 ENCOUNTER — TELEPHONE (OUTPATIENT)
Dept: PULMONOLOGY | Age: 70
End: 2020-09-08

## 2020-09-08 LAB
CHLAMYDIA PNEUMONIAE BY PCR: NOT DETECTED
CHLAMYDIA PNEUMONIAE SOURCE: NORMAL
P JIROVECII BY PCR: NOT DETECTED
P JIROVECII SOURCE: NORMAL

## 2020-09-08 NOTE — CARE COORDINATION
Patient contacted regarding recent discharge and COVID-19 risk. Discussed COVID-19 related testing which was available at this time. Test results were negative. Patient informed of results, if available? Yes     Care Transition Nurse contacted the patient by telephone to perform post discharge assessment. Patient has following risk factors of: COPD, pneumonia and immunocompromised. CTN reviewed discharge instructions, medical action plan and red flags related to discharge diagnosis. Reviewed and educated them on any new and changed medications related to discharge diagnosis. Education provided regarding infection prevention, and signs and symptoms of COVID-19 and when to seek medical attention with patient who verbalized understanding. Discussed exposure protocols and quarantine from [address] you at higher risk for severe illness 2019 and given an opportunity for questions and concerns. The patient agrees to contact the COVID-19 hotline [phone] or PCP office for questions related to their healthcare. CTN provided contact information for future reference. From CDC: Are you at higher risk for severe illness?  Wash your hands often.  Avoid close contact (6 feet, which is about two arm lengths) with people who are sick.  Put distance between yourself and other people if COVID-19 is spreading in your community.  Clean and disinfect frequently touched surfaces.  Avoid all cruise travel and non-essential air travel.  Call your healthcare professional if you have concerns about COVID-19 and your underlying condition or if you are sick. For more information on steps you can take to protect yourself, see CDC's How to Protect Yourself    Pt states doing well, no issues or concerns except just worn out from hospital stay. Denies SOB, CP, fever. Has all her new meds.  Pt states she has not heard from home care as yet, this nurse informed pt that a call will be made by this nurse to ensure that they have received a referral. Agreed to more CTC f/u calls. PC made to [facility] , they have the referral and will be reaching out to the pt today. Plan for follow-up call in 7-14 days based on severity of symptoms and risk factors.

## 2020-09-08 NOTE — TELEPHONE ENCOUNTER
taken medication to treat a fever within the past   3 days? No  Have you had a cough   shortness of breath or flu-like symptoms within the past 3 days? No  Do you currently have flu-like symptoms including fever or chills   cough   shortness of breath   or difficulty breathing   or new loss of taste or smell? No  (Service Expert  click yes below to proceed with [employer]   Scheduling)?  Yes

## 2020-09-10 ENCOUNTER — CARE COORDINATION (OUTPATIENT)
Dept: CASE MANAGEMENT | Age: 70
End: 2020-09-10

## 2020-09-10 LAB
VARICELLA-ZOSTER, PCR: NOT DETECTED
VZ SOURCE: NORMAL

## 2020-09-12 LAB
COCCIDIOIDES AG EIA: NORMAL
COCCIDIOIDES AG-SOURCE: NORMAL

## 2020-09-14 ENCOUNTER — TELEPHONE (OUTPATIENT)
Dept: OTHER | Facility: CLINIC | Age: 70
End: 2020-09-14

## 2020-09-14 ENCOUNTER — APPOINTMENT (OUTPATIENT)
Dept: CT IMAGING | Age: 70
DRG: 871 | End: 2020-09-14
Payer: MEDICARE

## 2020-09-14 ENCOUNTER — APPOINTMENT (OUTPATIENT)
Dept: GENERAL RADIOLOGY | Age: 70
DRG: 871 | End: 2020-09-14
Payer: MEDICARE

## 2020-09-14 ENCOUNTER — HOSPITAL ENCOUNTER (INPATIENT)
Age: 70
LOS: 6 days | Discharge: HOME HEALTH | DRG: 871 | End: 2020-09-20
Attending: EMERGENCY MEDICINE | Admitting: HOSPITALIST
Payer: MEDICARE

## 2020-09-14 ENCOUNTER — TELEPHONE (OUTPATIENT)
Dept: INTERNAL MEDICINE CLINIC | Age: 70
End: 2020-09-14

## 2020-09-14 PROBLEM — J18.9 HCAP (HEALTHCARE-ASSOCIATED PNEUMONIA): Status: ACTIVE | Noted: 2020-09-14

## 2020-09-14 LAB
A/G RATIO: 1.1 (ref 1.1–2.2)
ALBUMIN SERPL-MCNC: 3.4 G/DL (ref 3.4–5)
ALP BLD-CCNC: 72 U/L (ref 40–129)
ALT SERPL-CCNC: 8 U/L (ref 10–40)
ANION GAP SERPL CALCULATED.3IONS-SCNC: 12 MMOL/L (ref 3–16)
AST SERPL-CCNC: 12 U/L (ref 15–37)
BASOPHILS ABSOLUTE: 0 K/UL (ref 0–0.2)
BASOPHILS RELATIVE PERCENT: 0.4 %
BILIRUB SERPL-MCNC: <0.2 MG/DL (ref 0–1)
BUN BLDV-MCNC: 23 MG/DL (ref 7–20)
CALCIUM SERPL-MCNC: 9.1 MG/DL (ref 8.3–10.6)
CHLORIDE BLD-SCNC: 109 MMOL/L (ref 99–110)
CO2: 19 MMOL/L (ref 21–32)
CREAT SERPL-MCNC: 1.8 MG/DL (ref 0.6–1.2)
EOSINOPHILS ABSOLUTE: 0.1 K/UL (ref 0–0.6)
EOSINOPHILS RELATIVE PERCENT: 0.5 %
GFR AFRICAN AMERICAN: 34
GFR NON-AFRICAN AMERICAN: 28
GLOBULIN: 3 G/DL
GLUCOSE BLD-MCNC: 123 MG/DL (ref 70–99)
HCT VFR BLD CALC: 22.5 % (ref 36–48)
HEMATOLOGY PATH CONSULT: NO
HEMOGLOBIN: 7.4 G/DL (ref 12–16)
INR BLD: 1.3 (ref 0.86–1.14)
LACTIC ACID: 2.8 MMOL/L (ref 0.4–2)
LACTIC ACID: 4 MMOL/L (ref 0.4–2)
LYMPHOCYTES ABSOLUTE: 1.7 K/UL (ref 1–5.1)
LYMPHOCYTES RELATIVE PERCENT: 16.6 %
MCH RBC QN AUTO: 20.5 PG (ref 26–34)
MCHC RBC AUTO-ENTMCNC: 32.7 G/DL (ref 31–36)
MCV RBC AUTO: 62.7 FL (ref 80–100)
MONOCYTES ABSOLUTE: 0.1 K/UL (ref 0–1.3)
MONOCYTES RELATIVE PERCENT: 0.9 %
NEUTROPHILS ABSOLUTE: 8.3 K/UL (ref 1.7–7.7)
NEUTROPHILS RELATIVE PERCENT: 81.6 %
PDW BLD-RTO: 15.3 % (ref 12.4–15.4)
PLATELET # BLD: 336 K/UL (ref 135–450)
PMV BLD AUTO: 6.4 FL (ref 5–10.5)
POTASSIUM REFLEX MAGNESIUM: 4.7 MMOL/L (ref 3.5–5.1)
PRO-BNP: 334 PG/ML (ref 0–124)
PROTHROMBIN TIME: 15.1 SEC (ref 10–13.2)
RBC # BLD: 3.59 M/UL (ref 4–5.2)
SODIUM BLD-SCNC: 140 MMOL/L (ref 136–145)
TOTAL PROTEIN: 6.4 G/DL (ref 6.4–8.2)
TROPONIN: <0.01 NG/ML
WBC # BLD: 10.2 K/UL (ref 4–11)

## 2020-09-14 PROCEDURE — 71250 CT THORAX DX C-: CPT

## 2020-09-14 PROCEDURE — 94640 AIRWAY INHALATION TREATMENT: CPT

## 2020-09-14 PROCEDURE — 81001 URINALYSIS AUTO W/SCOPE: CPT

## 2020-09-14 PROCEDURE — 87040 BLOOD CULTURE FOR BACTERIA: CPT

## 2020-09-14 PROCEDURE — 96365 THER/PROPH/DIAG IV INF INIT: CPT

## 2020-09-14 PROCEDURE — 1200000000 HC SEMI PRIVATE

## 2020-09-14 PROCEDURE — 96375 TX/PRO/DX INJ NEW DRUG ADDON: CPT

## 2020-09-14 PROCEDURE — 80053 COMPREHEN METABOLIC PANEL: CPT

## 2020-09-14 PROCEDURE — 83880 ASSAY OF NATRIURETIC PEPTIDE: CPT

## 2020-09-14 PROCEDURE — 71045 X-RAY EXAM CHEST 1 VIEW: CPT

## 2020-09-14 PROCEDURE — 99291 CRITICAL CARE FIRST HOUR: CPT

## 2020-09-14 PROCEDURE — 83605 ASSAY OF LACTIC ACID: CPT

## 2020-09-14 PROCEDURE — 6370000000 HC RX 637 (ALT 250 FOR IP): Performed by: HOSPITALIST

## 2020-09-14 PROCEDURE — 84484 ASSAY OF TROPONIN QUANT: CPT

## 2020-09-14 PROCEDURE — 96367 TX/PROPH/DG ADDL SEQ IV INF: CPT

## 2020-09-14 PROCEDURE — 96366 THER/PROPH/DIAG IV INF ADDON: CPT

## 2020-09-14 PROCEDURE — 6360000002 HC RX W HCPCS: Performed by: EMERGENCY MEDICINE

## 2020-09-14 PROCEDURE — 6370000000 HC RX 637 (ALT 250 FOR IP): Performed by: EMERGENCY MEDICINE

## 2020-09-14 PROCEDURE — U0003 INFECTIOUS AGENT DETECTION BY NUCLEIC ACID (DNA OR RNA); SEVERE ACUTE RESPIRATORY SYNDROME CORONAVIRUS 2 (SARS-COV-2) (CORONAVIRUS DISEASE [COVID-19]), AMPLIFIED PROBE TECHNIQUE, MAKING USE OF HIGH THROUGHPUT TECHNOLOGIES AS DESCRIBED BY CMS-2020-01-R: HCPCS

## 2020-09-14 PROCEDURE — 87449 NOS EACH ORGANISM AG IA: CPT

## 2020-09-14 PROCEDURE — 2580000003 HC RX 258: Performed by: EMERGENCY MEDICINE

## 2020-09-14 PROCEDURE — 93005 ELECTROCARDIOGRAM TRACING: CPT | Performed by: EMERGENCY MEDICINE

## 2020-09-14 PROCEDURE — 36415 COLL VENOUS BLD VENIPUNCTURE: CPT

## 2020-09-14 PROCEDURE — 85610 PROTHROMBIN TIME: CPT

## 2020-09-14 PROCEDURE — 85025 COMPLETE CBC W/AUTO DIFF WBC: CPT

## 2020-09-14 RX ORDER — PREDNISONE 10 MG/1
10 TABLET ORAL DAILY
COMMUNITY
End: 2020-10-21 | Stop reason: SDUPTHER

## 2020-09-14 RX ORDER — METHYLPREDNISOLONE SODIUM SUCCINATE 125 MG/2ML
125 INJECTION, POWDER, LYOPHILIZED, FOR SOLUTION INTRAMUSCULAR; INTRAVENOUS ONCE
Status: COMPLETED | OUTPATIENT
Start: 2020-09-14 | End: 2020-09-14

## 2020-09-14 RX ORDER — IPRATROPIUM BROMIDE AND ALBUTEROL SULFATE 2.5; .5 MG/3ML; MG/3ML
1 SOLUTION RESPIRATORY (INHALATION) ONCE
Status: COMPLETED | OUTPATIENT
Start: 2020-09-14 | End: 2020-09-14

## 2020-09-14 RX ORDER — POTASSIUM CHLORIDE 7.45 MG/ML
10 INJECTION INTRAVENOUS PRN
Status: DISCONTINUED | OUTPATIENT
Start: 2020-09-14 | End: 2020-09-14

## 2020-09-14 RX ORDER — POTASSIUM CHLORIDE 20 MEQ/1
40 TABLET, EXTENDED RELEASE ORAL PRN
Status: DISCONTINUED | OUTPATIENT
Start: 2020-09-14 | End: 2020-09-14

## 2020-09-14 RX ORDER — OXYCODONE HYDROCHLORIDE 5 MG/1
5 TABLET ORAL EVERY 6 HOURS PRN
Status: DISCONTINUED | OUTPATIENT
Start: 2020-09-14 | End: 2020-09-20 | Stop reason: HOSPADM

## 2020-09-14 RX ORDER — PANTOPRAZOLE SODIUM 40 MG/1
40 TABLET, DELAYED RELEASE ORAL
Status: DISCONTINUED | OUTPATIENT
Start: 2020-09-15 | End: 2020-09-15

## 2020-09-14 RX ORDER — ACETAMINOPHEN 325 MG/1
650 TABLET ORAL EVERY 6 HOURS PRN
Status: DISCONTINUED | OUTPATIENT
Start: 2020-09-14 | End: 2020-09-20 | Stop reason: HOSPADM

## 2020-09-14 RX ORDER — SODIUM CHLORIDE 0.9 % (FLUSH) 0.9 %
10 SYRINGE (ML) INJECTION PRN
Status: DISCONTINUED | OUTPATIENT
Start: 2020-09-14 | End: 2020-09-20 | Stop reason: HOSPADM

## 2020-09-14 RX ORDER — 0.9 % SODIUM CHLORIDE 0.9 %
30 INTRAVENOUS SOLUTION INTRAVENOUS ONCE
Status: COMPLETED | OUTPATIENT
Start: 2020-09-14 | End: 2020-09-14

## 2020-09-14 RX ORDER — MAGNESIUM SULFATE 1 G/100ML
1 INJECTION INTRAVENOUS PRN
Status: DISCONTINUED | OUTPATIENT
Start: 2020-09-14 | End: 2020-09-14

## 2020-09-14 RX ORDER — ACETAMINOPHEN 650 MG/1
650 SUPPOSITORY RECTAL EVERY 6 HOURS PRN
Status: DISCONTINUED | OUTPATIENT
Start: 2020-09-14 | End: 2020-09-20 | Stop reason: HOSPADM

## 2020-09-14 RX ORDER — CYCLOBENZAPRINE HCL 5 MG
5 TABLET ORAL 4 TIMES DAILY
COMMUNITY
End: 2021-09-01 | Stop reason: CLARIF

## 2020-09-14 RX ORDER — BUDESONIDE AND FORMOTEROL FUMARATE DIHYDRATE 160; 4.5 UG/1; UG/1
2 AEROSOL RESPIRATORY (INHALATION) 2 TIMES DAILY
Status: DISCONTINUED | OUTPATIENT
Start: 2020-09-14 | End: 2020-09-19

## 2020-09-14 RX ORDER — ONDANSETRON 4 MG/1
4 TABLET, ORALLY DISINTEGRATING ORAL EVERY 8 HOURS PRN
Status: DISCONTINUED | OUTPATIENT
Start: 2020-09-14 | End: 2020-09-20 | Stop reason: HOSPADM

## 2020-09-14 RX ORDER — CYCLOBENZAPRINE HCL 10 MG
5 TABLET ORAL 3 TIMES DAILY PRN
Status: DISCONTINUED | OUTPATIENT
Start: 2020-09-14 | End: 2020-09-14 | Stop reason: ALTCHOICE

## 2020-09-14 RX ORDER — METRONIDAZOLE 250 MG/1
500 TABLET ORAL EVERY 8 HOURS SCHEDULED
Status: DISCONTINUED | OUTPATIENT
Start: 2020-09-14 | End: 2020-09-20 | Stop reason: HOSPADM

## 2020-09-14 RX ORDER — ONDANSETRON 2 MG/ML
4 INJECTION INTRAMUSCULAR; INTRAVENOUS EVERY 6 HOURS PRN
Status: DISCONTINUED | OUTPATIENT
Start: 2020-09-14 | End: 2020-09-20 | Stop reason: HOSPADM

## 2020-09-14 RX ORDER — METHYLPREDNISOLONE SODIUM SUCCINATE 40 MG/ML
40 INJECTION, POWDER, LYOPHILIZED, FOR SOLUTION INTRAMUSCULAR; INTRAVENOUS EVERY 8 HOURS
Status: DISCONTINUED | OUTPATIENT
Start: 2020-09-15 | End: 2020-09-15 | Stop reason: DRUGHIGH

## 2020-09-14 RX ADMIN — METHYLPREDNISOLONE SODIUM SUCCINATE 125 MG: 125 INJECTION, POWDER, FOR SOLUTION INTRAMUSCULAR; INTRAVENOUS at 14:33

## 2020-09-14 RX ADMIN — IPRATROPIUM BROMIDE AND ALBUTEROL SULFATE 1 AMPULE: .5; 3 SOLUTION RESPIRATORY (INHALATION) at 13:52

## 2020-09-14 RX ADMIN — VANCOMYCIN HYDROCHLORIDE 1000 MG: 1 INJECTION, POWDER, LYOPHILIZED, FOR SOLUTION INTRAVENOUS at 16:52

## 2020-09-14 RX ADMIN — CEFEPIME HYDROCHLORIDE 1 G: 1 INJECTION, POWDER, FOR SOLUTION INTRAMUSCULAR; INTRAVENOUS at 16:16

## 2020-09-14 RX ADMIN — SODIUM CHLORIDE 2040 ML: 9 INJECTION, SOLUTION INTRAVENOUS at 16:15

## 2020-09-14 RX ADMIN — OXYCODONE 5 MG: 5 TABLET ORAL at 23:13

## 2020-09-14 ASSESSMENT — PAIN DESCRIPTION - LOCATION
LOCATION: RIB CAGE

## 2020-09-14 ASSESSMENT — PAIN DESCRIPTION - ORIENTATION
ORIENTATION: LEFT

## 2020-09-14 ASSESSMENT — PAIN DESCRIPTION - PAIN TYPE
TYPE: ACUTE PAIN
TYPE: ACUTE PAIN

## 2020-09-14 ASSESSMENT — PAIN SCALES - GENERAL
PAINLEVEL_OUTOF10: 5
PAINLEVEL_OUTOF10: 5
PAINLEVEL_OUTOF10: 6
PAINLEVEL_OUTOF10: 8
PAINLEVEL_OUTOF10: 10

## 2020-09-14 ASSESSMENT — PAIN - FUNCTIONAL ASSESSMENT: PAIN_FUNCTIONAL_ASSESSMENT: ACTIVITIES ARE NOT PREVENTED

## 2020-09-14 NOTE — ED PROVIDER NOTES
[facility] Emergency Department    CHIEF COMPLAINT  Chief Complaint   Patient presents with    Shortness of [address] brought pt. \"Pt says,\" I was released from here a week ago with pnemonia. I have not slept since then I am short of breath. I have left sided rib pain. \"      HISTORY OF PRESENT ILLNESS  Toya Lowe is a 77 y.o. female  who presents to the ED complaining of profound dyspnea with exertion and at rest, poor sleeping but fatigue, and coughing. She also complains of some left sided chest pains. Admitted 8/28 to 9/6 for acute on chronic hypoxic respiratory failure and was discharged on PO abx for pneumonia after bronchoscopic evaluation and BAL. She was diagnosed with bacterial pneumonia sepsis as well. Remains on 10mg prednisone daily for emphysema. Has stage 3 CKD. 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 Coughing is dry. She is not wheezing much but her cough is painful. No new abdominal pains. No vomiting. She has chronic diarrhea from Crohn's history but this is actually better than her baseline. She has not had any fevers. No leg swelling. Previously was on 2L NC QHS but since her admission she was on this amount around the clock. No other complaints, modifying factors or associated symptoms. I have reviewed the following from the nursing documentation.     Past Medical History:   Diagnosis Date    Bullous emphysema (HCC)     CKD (chronic kidney disease) stage 3, GFR 30-59 ml/min ([facility])     Clostridium difficile carrier 01/21/2019    COPD (chronic obstructive pulmonary disease) ([facility] [address]     PFT done 7 years ago   [facility] Crohn's disease ([facility] [address] [facility]     Crohn's disease    Depression 1/30/2011    pt states resolved as of 3-26-12    DVT (deep venous thrombosis) ([facility] [address]     2012; first ocurrence     Hiatal hernia     Hx of blood clots     Kidney disease     Kidney insufficiency     Osteoporosis     Peripheral neuropathy     neuropathy in legs    Pneumonia     Postmenopausal     LMP in  40's    Pulmonary embolism ([facility] [address]     2012; first ocurrence    Sepsis ([address]     Syringomyelia ([address]      Past Surgical History:   Procedure Laterality Date    ABDOMEN SURGERY      BACK SURGERY      shunt to drain CSF    BIOPSY SHOULDER Left 2019    EXCISION OF LEFT SHOULDER MASS performed by Nessa Servin MD at [address]      crainotomy- remove fluid ? V-P SHUNT    BRONCHOSCOPY N/A 2020    BRONCHOSCOPY ALVEOLAR LAVAGE performed by Yoni Navarro MD at [place] [facility]      resection X2    COLONOSCOPY  11    BIOPSY AND POLYPECTOMY    COLONOSCOPY  2012    and ablation ERBE/APC    SHOULDER SURGERY      L      Family History   Problem Relation Age of Onset    Heart Disease Mother     High Blood Pressure Mother     High Cholesterol Mother     Early Death Mother 36        MI    Heart Disease Father     High Blood Pressure Father     High Cholesterol Father     Stroke Father 79    High Blood Pressure Sister     High Blood Pressure Brother      Social History     Socioeconomic History    Marital status: Single     Spouse name: Not on file    Number of children: 2    Years of education: 15    Highest education level: Not on file   Occupational History    Occupation: unemployed     Comment: disability syrinogmyelia   Social Needs    Financial resource strain: Not on file    Food insecurity     Worry: Not on file     Inability: Not on file   [employer] needs     Medical: Not on file     Non-medical: Not on file   Tobacco Use    Smoking status: Former Smoker     Packs/day: 0.25     Years: 30.00     Pack years: 7.50     Types: Cigarettes     Last attempt to quit: 2015     Years since quittin.5    Smokeless tobacco: Never Used    Tobacco comment: started smoking at age 26 / smoked up to 5 cigarettes a day    Substance and Sexual Activity    Alcohol use: No    Drug use: No    Sexual activity: Not Currently   Lifestyle    Physical activity     Days per week: Not on file     Minutes per session: Not on file    Stress: Not on file   Relationships    Social connections     Talks on phone: Not on file     Gets together: Not on file     Attends Quaker service: Not on file     Active member of club or organization: Not on file     Attends meetings of clubs or organizations: Not on file     Relationship status: Not on file    Intimate partner violence     Fear of current or ex partner: Not on file     Emotionally abused: Not on file     Physically abused: Not on file     Forced sexual activity: Not on file   Other Topics Concern    Not on file   Social History Narrative    Not on file     Current Facility-Administered Medications   Medication Dose Route Frequency Provider Last Rate Last Dose    vancomycin 1000 mg IVPB in 250 mL D5W addavial  15 mg/kg Intravenous Once Hayder Dubois  mL/hr at 09/14/20 1652 1,000 mg at 09/14/20 1652     Current Outpatient Medications   Medication Sig Dispense Refill    atorvastatin (LIPITOR) 40 MG tablet Take 1 tablet by mouth nightly 30 tablet 3    linezolid (ZYVOX) 600 MG tablet Take 1 tablet by mouth every 12 hours for 10 days 20 tablet 0    levoFLOXacin (LEVAQUIN) 750 MG tablet Take 1 tablet by mouth every other day for 15 doses 15 tablet 0    metroNIDAZOLE (FLAGYL) 500 MG tablet Take 1 tablet by mouth every 8 hours 90 tablet 0    amitriptyline (ELAVIL) 150 MG tablet TAKE 0.5 TABLETS BY MOUTH NIGHTLY 45 tablet 3    albuterol sulfate  (90 Base) MCG/ACT inhaler Inhale 2 puffs into the lungs every 6 hours as needed for Wheezing 1 Inhaler 11    ELIQUIS 5 MG TABS tablet TAKE 1 TABLET BY MOUTH TWICE A DAY 60 tablet 6    potassium chloride (KLOR-CON) 10 MEQ extended release tablet Take 1 tablet by mouth 3 times daily  3    ondansetron (ZOFRAN ODT) 4 MG disintegrating tablet Take 1 tablet by mouth every 8 hours as needed for Nausea 20 tablet 0    budesonide-formoterol (SYMBICORT) 160-4.5 MCG/ACT AERO Inhale 2 puffs into the lungs 2 times daily 3 Inhaler 3    tiotropium (SPIRIVA RESPIMAT) 2.5 MCG/ACT AERS inhaler Inhale 2 puffs into the lungs daily 3 Inhaler 3    albuterol (PROVENTIL) (2.5 MG/3ML) 0.083% nebulizer solution Take 3 mLs by nebulization every 6 hours as needed for Wheezing Dx: COPD      ICD-10: J44.9 360 mL 6    alendronate (FOSAMAX) 70 MG tablet Take 1 tablet by mouth every 7 days 12 tablet 3    omeprazole (PRILOSEC) 20 MG delayed release capsule Take 20 mg by mouth Daily Takes as needed      Adalimumab (HUMIRA) 20 MG/0.4ML KIT Inject 1 vial into the skin every 14 days        No Known Allergies    REVIEW OF SYSTEMS  10 systems reviewed, pertinent positives per HPI otherwise noted to be negative. PHYSICAL EXAM  /64   Pulse 106   Temp 97.9 °F (36.6 °C) (Oral)   Resp 26   Ht 5' (1.524 m)   Wt 150 lb (68 kg)   SpO2 100%   BMI 29.29 kg/m²    GENERAL APPEARANCE: Awake and alert. Cooperative. Mild distress. HENT: Normocephalic. Atraumatic. Mucous membranes are dry. NECK: Supple. EYES: PERRL. EOM's grossly intact. HEART/CHEST: Tachycardia, reg rhythm. +L sided chest wall tenderness, no crepitus. No murmur. LUNGS: Respirations mildly labored, rhonchi mildly throughout, mild wheezing throughout. ABDOMEN: No tenderness. Soft. Non-distended. No masses. No organomegaly. No guarding or rebound. Normal bowel sounds throughout. MUSCULOSKELETAL: No extremity edema. Compartments soft. No deformity. No tenderness in the extremities. All extremities neurovascularly intact. SKIN: Warm and dry. No acute rashes. NEUROLOGICAL: Alert and oriented. CN's 2-12 intact. No gross facial drooping. Strength 5/5, sensation intact. 2 plus DTR's in knees bilaterally. Gait not assessed.   PSYCHIATRIC: Normal mood and affect. LABS  I have reviewed all labs for this visit.    Results for orders placed or performed during the hospital encounter of 09/14/20   CBC auto differential   Result Value Ref Range    WBC 10.2 4.0 - 11.0 K/uL    RBC 3.59 (L) 4.00 - 5.20 M/uL    Hemoglobin 7.4 (L) 12.0 - 16.0 g/dL    Hematocrit 22.5 (L) 36.0 - 48.0 %    MCV 62.7 (L) 80.0 - 100.0 fL    MCH 20.5 (L) 26.0 - 34.0 pg    MCHC 32.7 31.0 - 36.0 g/dL    RDW 15.3 12.4 - 15.4 %    Platelets 917 622 - 797 K/uL    MPV 6.4 5.0 - 10.5 fL    Path Consult No     Neutrophils % 81.6 %    Lymphocytes % 16.6 %    Monocytes % 0.9 %    Eosinophils % 0.5 %    Basophils % 0.4 %    Neutrophils Absolute 8.3 (H) 1.7 - 7.7 K/uL    Lymphocytes Absolute 1.7 1.0 - 5.1 K/uL    Monocytes Absolute 0.1 0.0 - 1.3 K/uL    Eosinophils Absolute 0.1 0.0 - 0.6 K/uL    Basophils Absolute 0.0 0.0 - 0.2 K/uL   Comprehensive Metabolic Panel w/ Reflex to MG   Result Value Ref Range    Sodium 140 136 - 145 mmol/L    Potassium reflex Magnesium 4.7 3.5 - 5.1 mmol/L    Chloride 109 99 - 110 mmol/L    CO2 19 (L) 21 - 32 mmol/L    Anion Gap 12 3 - 16    Glucose 123 (H) 70 - 99 mg/dL    BUN 23 (H) 7 - 20 mg/dL    CREATININE 1.8 (H) 0.6 - 1.2 mg/dL    GFR Non-African American 28 (A) >60    GFR  34 (A) >60    Calcium 9.1 8.3 - 10.6 mg/dL    Total Protein 6.4 6.4 - 8.2 g/dL    Alb 3.4 3.4 - 5.0 g/dL    Albumin/Globulin Ratio 1.1 1.1 - 2.2    Total Bilirubin <0.2 0.0 - 1.0 mg/dL    Alkaline Phosphatase 72 40 - 129 U/L    ALT 8 (L) 10 - 40 U/L    AST 12 (L) 15 - 37 U/L    Globulin 3.0 g/dL   Protime-INR   Result Value Ref Range    Protime 15.1 (H) 10.0 - 13.2 sec    INR 1.30 (H) 0.86 - 1.14   Troponin   Result Value Ref Range    Troponin <0.01 <0.01 ng/mL   Brain Natriuretic Peptide   Result Value Ref Range    Pro- (H) 0 - 124 pg/mL   Lactic Acid, Plasma   Result Value Ref Range    Lactic Acid 2.8 (H) 0.4 - 2.0 mmol/L   Lactic Acid, Plasma   Result Value Ref Range    Lactic Acid 4.0 (HH) 0.4 - 2.0 mmol/L   EKG 12 Lead   Result Value Ref Range    Ventricular Rate 105 BPM    Atrial Rate 105 BPM    P-R Interval 130 ms    QRS Duration 62 ms    Q-T Interval 340 ms    QTc Calculation (Bazett) 449 ms    P Axis 9 degrees    R Axis 9 degrees    T Axis 25 degrees    Diagnosis Sinus tachycardiaOtherwise normal ECG      The 12 lead EKG was interpreted by me as follows:  Rate: tachycardia with a rate of 105  Rhythm: sinus  Axis: normal  Intervals: normal IN, narrow QRS, normal QTc  ST segments: no ST elevations or depressions  T waves: no abnormal inversions  Non-specific T wave changes: not present  Prior EKG comparison: EKG dated 8/28/20 is not significantly different    RADIOLOGY    Ct Abdomen Pelvis Wo Contrast Additional Contrast? None    Result Date: 8/31/2020  EXAMINATION: CT OF THE ABDOMEN AND PELVIS WITHOUT CONTRAST 8/31/2020 6:35 pm TECHNIQUE: CT of the abdomen and pelvis was performed without the administration of intravenous contrast. Multiplanar reformatted images are provided for review. Dose modulation, iterative reconstruction, and/or weight based adjustment of the mA/kV was utilized to reduce the radiation dose to as low as reasonably achievable. COMPARISON: 01/05/2020 HISTORY: ORDERING SYSTEM PROVIDED HISTORY: Abd distention, pain TECHNOLOGIST PROVIDED HISTORY: Reason for exam:->Abd distention, pain Additional Contrast?->None Reason for Exam: Abd distention, pain Acuity: Acute Type of Exam: Initial FINDINGS: Lower Chest: There is a small left pleural effusion with adjacent airspace disease. Ground-glass infiltrate in the lingula is noted posteriorly, incompletely included on the field of view. Emphysema noted. Dependent atelectasis found bilaterally. Organs: Evaluation of the solid abdominal viscera is limited without intravenous contrast.  Having said that, no acute hepatic abnormality is identified. Gallbladder is surgically absent.   Noncontrast imaging of the spleen, adrenals, and pancreas is unremarkable. No acute or suspicious renal abnormalities are found. GI/Bowel: There is a moderate amount of stool within the large bowel. Portions of the large bowel are fluid-filled, which may reflect diarrheal disease. The appendix is not well visualized but no asymmetric pericecal inflammation is seen There is a moderate-sized hiatal hernia. Distal esophagus and stomach are otherwise unremarkable in appearance. Duodenal diverticulum is noted, unchanged. The duodenal sweep is otherwise unremarkable. There is mild fluid-filled distention of small bowel, likely reflecting ileus. Obstruction does not appear to be present. Pelvis: Urinary bladder unremarkable. Uterus is surgically absent. No free pelvic fluid. Peritoneum/Retroperitoneum: Abdominal aorta is normal in caliber. No retroperitoneal lymphadenopathy. Bones/Soft Tissues: No osteolytic or osteoblastic bone lesions are seen. Subacute right 9th rib fracture is noted anterior laterally. No acute bony abnormalities are detected. Moderate amount of stool and fluid within the large bowel, which may reflect ileus and/or diarrheal disease. Mild fluid-filled distention of small bowel, which also likely represents ileus. Obstruction does not appear to be present. Small left pleural effusion with adjacent airspace disease, likely passive atelectasis. Infiltrate in the posterior aspect of the lingula is noted, incompletely included on the field of view, most compatible with pneumonia. Xr Chest (2 Vw)    Result Date: 8/30/2020  EXAMINATION: TWO XRAY VIEWS OF THE CHEST 8/30/2020 1:30 pm COMPARISON: 08/28/2020. HISTORY: ORDERING SYSTEM PROVIDED HISTORY: dyspnea TECHNOLOGIST PROVIDED HISTORY: Reason for exam:->dyspnea Reason for Exam: dyspnea Acuity: Unknown Type of Exam: Unknown FINDINGS: The cardiac silhouette is enlarged and stable.   Prominent calcified mediastinal and right hilar nodes consistent with old granulomatous disease. There is more confluent left upper lobe consolidation consistent with pneumonia. There is also increased dependent left lower lobe opacification with probable left pleural effusion. Improved aeration at the right base with mild dependent atelectasis. Progressive consolidation within the left upper lobe consistent with pneumonia. Increased dependent left lower lobe opacification and effusion. Improved aeration at the right lung base. Ct Head Wo Contrast    Result Date: 8/28/2020  EXAMINATION: CT OF THE HEAD WITHOUT CONTRAST  8/28/2020 1:57 pm TECHNIQUE: CT of the head was performed without the administration of intravenous contrast. Dose modulation, iterative reconstruction, and/or weight based adjustment of the mA/kV was utilized to reduce the radiation dose to as low as reasonably achievable. COMPARISON: 11/13/2012 HISTORY: ORDERING SYSTEM PROVIDED HISTORY: headache, dizzy TECHNOLOGIST PROVIDED HISTORY: Reason for exam:->headache, dizzy Has a \"code stroke\" or \"stroke alert\" been called? ->No Reason for Exam: headache, dizzy; stroke like symptoms Acuity: Unknown Type of Exam: Unknown FINDINGS: BRAIN/VENTRICLES: There is no acute intracerebral hemorrhage or extra-axial fluid collection. There is mild cerebral atrophy with mild periventricular, subcortical and deep white matter small vessel ischemic disease. There is a new age-indeterminate infarct involving the anterior limb of the right internal capsule. ORBITS: The orbits are unremarkable. SINUSES: The visualized paranasal sinuses and mastoid air cells are clear. SOFT TISSUES/SKULL:  Status post suboccipital craniectomy. 1. New age-indeterminate infarct within the right internal capsule. Recommend further evaluation with MRI of the head.      Ct Chest Wo Contrast    Result Date: 9/14/2020  EXAMINATION: CT OF THE CHEST WITHOUT CONTRAST 9/14/2020 3:56 pm TECHNIQUE: CT of the chest was performed without the administration of intravenous contrast. pleural effusion also present. Severe emphysematous changes. No abnormal pulmonary vascular congestion. No pulmonary mass or suspicious pulmonary nodule identified. Airways remain patent. Upper Abdomen: No acute abnormality. Left renal cyst again noted. Soft Tissues/Bones: No acute abnormality. Significant worsening in size of the left upper lobe pneumonia in 2 days. Moderate left pleural effusion now present with mild atelectasis. Severe emphysematous changes. No CHF. Ct Chest Wo Contrast    Result Date: 8/28/2020  EXAMINATION: CT OF THE CHEST WITHOUT CONTRAST 8/28/2020 1:57 pm TECHNIQUE: CT of the chest was performed without the administration of intravenous contrast. Multiplanar reformatted images are provided for review. Dose modulation, iterative reconstruction, and/or weight based adjustment of the mA/kV was utilized to reduce the radiation dose to as low as reasonably achievable. COMPARISON: 08/28/2020 and 01/05/2020 HISTORY: ORDERING SYSTEM PROVIDED HISTORY: pneumonia TECHNOLOGIST PROVIDED HISTORY: Reason for exam:->pneumonia Reason for Exam: pneumonia Acuity: Unknown Type of Exam: Unknown FINDINGS: Mediastinum: The unenhanced heart is unremarkable. Calcified granulomatous disease is noted. .  There are no enlarged thoracic lymph nodes. Lungs/pleura: The tracheobronchial tree is patent. There is no pneumothorax. There is a trace left pleural effusion with biapical, bibasilar scarring and atelectasis. Moderate to severe emphysema involves the bilateral lungs. However, there is patchy consolidation within the left upper lobe due to pneumonia. Upper Abdomen: The liver is diffusely low in attenuation consistent with hepatic steatosis. Status post cholecystectomy. There are bilateral simple renal cysts. Soft Tissues/Bones: Degenerative changes involve the thoracic spine. The bones are demineralized.   No change in the exaggerated kyphosis of the upper thoracic spine nor in the old compression fractures involving the superior endplates of the upper thoracic vertebral bodies. 1. Left upper lobe pneumonia. Recommend chest radiograph in 8 weeks to confirm resolution. 2. Trace left pleural effusion. Xr Chest Portable    Result Date: 9/14/2020  EXAMINATION: ONE XRAY VIEW OF THE CHEST 9/14/2020 2:24 pm COMPARISON: Prior study(s) most recent chest CT 08/30/2020. HISTORY: ORDERING SYSTEM PROVIDED HISTORY: sob TECHNOLOGIST PROVIDED HISTORY: Reason for exam:->sob Reason for Exam: Shortness of Breath [facility] brought pt. \"Pt says,\" I was released from here a week ago with pnemonia. I have not slept since then I am short of breath. I have left sided rib pain. \") Acuity: Unknown Type of Exam: Ongoing FINDINGS: The heart is within normal limits size. There is moderate airspace disease in the lateral left lung extending to the pleural surface. This has progressed further since prior studies. There is still residual seen lateral to the hilum and greatest along the pleural margin. The right lung remains clear. There is emphysema bilaterally greater in the upper lungs. No new airspace disease. No pleural effusion or pneumothorax evident. Slight further graph shin in the left lung airspace disease with moderate residual remaining. Additional follow-up is recommended to be certain that this resolves further. Xr Chest Portable    Result Date: 8/28/2020  EXAMINATION: ONE XRAY VIEW OF THE CHEST 8/28/2020 10:27 am COMPARISON: Chest radiograph January 30, 2020 and priors. HISTORY: ORDERING SYSTEM PROVIDED HISTORY: CP TECHNOLOGIST PROVIDED HISTORY: Reason for exam:->CP Reason for Exam: Shortness of Breath (Pt to Er via FP squad form home with c/o SOP since tuesday, hx of COPD, no energy. sating 86% on 2L, on non rebreather at ER arrival. sattes took week to take home inhalers.) Acuity: Acute Type of Exam: Initial FINDINGS: Study is limited due to rotation.   There are new bilateral airspace opacities which are peripheral in distribution. No pneumothorax or pleural effusion identified. Evaluation cardiac and mediastinal contours is limited. No acute osseous abnormality. Bilateral peripheral airspace disease suggestive of atypical or viral pneumonia. Vl Dup Carotid Bilateral    Result Date: 8/31/2020  Carotid Duplex Study  Demographics   Patient Name      Noé Genao   Date of Study     08/31/2020         Gender              Female   Patient Number    [id number]         Date of Birth       1950   Visit Number      [id number]          Age                 77 year(s)   Accession Number  [id number]         Room Number         0513   Corporate ID      [id number]           GEOVANI Lloyd   Ordering          Abena Powell, Interpreting        I Vascular  Physician         MD                 Physician           Jay Kamara MD,                                                           [facility]  Procedure Type of Study:   Cerebral:Carotid, VL CAROTID DUPLEX BILATERAL. Vascular Sonographer Report  Additional Indications:Cerebral vascular accident Impressions Right Impression The right internal carotid artery appears to have a minimal <50% diameter reducing stenosis based on velocity criteria. The right vertebral artery demonstrates normal antegrade flow. The right subclavian artery is visualized and demonstrates multiphasic flow. Left Impression The left internal carotid artery appears to have a minimal <50% diameter reducing stenosis based on velocity criteria. The left vertebral artery demonstrates normal antegrade flow. The left subclavian artery is visualized and demonstrates multiphasic flow. Conclusions   Summary   -The right internal carotid artery appears to have a minimal <50% diameter  reducing stenosis based on velocity criteria.   -The left internal carotid artery appears to have a minimal <50% diameter reducing stenosis based on velocity criteria. Signature   ------------------------------------------------------------------  Electronically signed by Desmond Cantor MD, [facility] (Interpreting  physician) on 08/31/2020 at 11:03 AM  ------------------------------------------------------------------  Patient Status:Routine. [address] [place] [address] - Vascular Lab. Technical Quality:Limited visualization due to patient immobility. Plaque   - A plaque was found in the Right Prox ICA. The plaque characteristics are: medium echogenicity, minimal severity and heterogeneous texture. - A plaque was found in the Left Prox ICA. The plaque characteristics are: medium echogenicity, minimal severity and heterogeneous texture. Velocities are measured in cm/s ; Diameters are measured in mm Carotid Right Measurements +---------------+----+----+-----+----+ ! Location       ! PSV ! EDV ! Angle! RI  ! +---------------+----+----+-----+----+ ! Prox CCA       !95. 1!24. 9!60   !0.74! +---------------+----+----+-----+----+ ! Mid CCA        !80.8!22. 4!60   !0.72! +---------------+----+----+-----+----+ ! Dist CCA       !69  !23  !60   !0.67! +---------------+----+----+-----+----+ ! Prox ICA       ! 59  !23  !60   !0.61! +---------------+----+----+-----+----+ ! Mid ICA        !44. 7!15. 5!60   !0.65! +---------------+----+----+-----+----+ ! Dist ICA       !87. 6!30. 4!60   !0.65! +---------------+----+----+-----+----+ ! Prox ECA       !104 !    !61   !    ! +---------------+----+----+-----+----+ ! Vertebral      !87  !    !60   !    ! +---------------+----+----+-----+----+ ! Prox Subclavian!92.6!    !60   !    ! +---------------+----+----+-----+----+   - There is antegrade vertebral flow noted on the right side. - Additional Measurements:ICAPSV/CCAPSV 1.08. ICAEDV/CCAEDV 1.22. Carotid Left Measurements +---------------+----+----+-----+----+ ! Location       ! PSV ! EDV ! Angle! RI  ! +---------------+----+----+-----+----+ ! Prox CCA       !123 !20. 3! 61 ! 0.83! +---------------+----+----+-----+----+ ! Mid CCA        !82. 7!67. 8!60   !0.71! +---------------+----+----+-----+----+ ! Dist CCA       !70.1!22. 4!60   !0.68! +---------------+----+----+-----+----+ ! Prox ICA       !60. 9!28  !60   !0.54! +---------------+----+----+-----+----+ ! Mid ICA        !85.5!32. 9!60   !0.62! +---------------+----+----+-----+----+ ! Dist ICA       !108 !40. 6! 60   !0.62! +---------------+----+----+-----+----+ ! Prox ECA       !113 !    !61   !    ! +---------------+----+----+-----+----+ ! Vertebral      !47.2!    !60   !    ! +---------------+----+----+-----+----+ ! Prox Subclavian! 110 !    !60   !    ! +---------------+----+----+-----+----+   - There is antegrade vertebral flow noted on the left side. - Additional Measurements:ICAPSV/CCAPSV 1.31. ICAEDV/CCAEDV 2. Mri Brain Wo Contrast    Result Date: 8/31/2020  EXAMINATION: MRI OF THE BRAIN WITHOUT CONTRAST  8/31/2020 11:16 am TECHNIQUE: Multiplanar multisequence MRI of the brain was performed without the administration of intravenous contrast. COMPARISON: CT head 08/28/2020, MRI cervical spine 05/19/2014 HISTORY: ORDERING SYSTEM PROVIDED HISTORY: CVA TECHNOLOGIST PROVIDED HISTORY: Reason for exam:->CVA Reason for Exam: CVA   History of syrinx and prior mri's here and  shunt put in many years ago. OK to  Proceed with scan per [facility]. Acuity: Acute Type of Exam: Unknown FINDINGS: INTRACRANIAL STRUCTURES/VENTRICLES: There is no acute infarct. No mass effect or midline shift. No evidence of an acute intracranial hemorrhage. The ventricles and sulci are normal in size and configuration for patient's age. Mild periventricular subcortical T2 prolongation suggestive chronic small ischemic disease. Patchy T2 per did not prolongation within the day midbrain suggestive chronic microvascular disease. .  Band of abnormal signal identified within the central aspect of the upper cervical cord appears similar to prior MRI cervical spine the sellar/suprasellar regions appear unremarkable. The normal signal voids within the major intracranial vessels appear maintained. ORBITS: The visualized portion of the orbits demonstrate no acute abnormality. SINUSES: Minimal ethmoid sinus mucosal thickening. BONES/SOFT TISSUES: The bone marrow signal intensity appears normal. The soft tissues demonstrate no acute abnormality. Mild chronic small vessel ischemic changes. No acute stroke, midline shift or mass effect. ED COURSE/MDM  Patient seen and evaluated. Old records reviewed. Labs and imaging reviewed and results discussed with patient. After initial evaluation, differential diagnostic considerations included: acute coronary syndrome, pulmonary embolism, COPD/asthma, pneumonia, sepsis, pericardial tamponade, pneumothorax, CHF, thoracic aortic dissection, anxiety    The patient's ED workup was notable for worsening shortness of breath and some chest discomfort as well. The patient does have persistence of pneumonia on imaging. Hard to tell if this is persistent and improving from previous admission or worsening although clinically would appear that she got better and now is worsening again. Therefore she was covered empirically for now with broad-spectrum antibiotics and sepsis protocol fluids considering her lactic acidosis of greater than 4. She was also given steroids and nebulizers. COVID will be re-swabbed although was negative on the previous admission. She remains little tachycardic but given her anticoagulation I have low concern for PE at this time.   She is also required a little more oxygen than her previous baseline which I feel warrants readmission to the hospital.    SEP-1 CORE MEASURE DATA    Classification: severe sepsis    Amount of fluids ordered: at least 30mL/kg based on entered actual weight at time of triage    Time at which sepsis was identified: 1530    Broad-spectrum antibiotics chosen: vanc/cefepime based on suspected source of: Pulmonary - Nosocomial    Repeat lactate level: pending    On reassessment after fluid resuscitation:   I have reassessed tissue perfusion and hemodynamic status after fluid bolus    During the patient's ED course, the patient was given:  Medications   vancomycin 1000 mg IVPB in 250 mL D5W addavial (1,000 mg Intravenous New Bag 9/14/20 1652)   ipratropium-albuterol (DUONEB) nebulizer solution 1 ampule (1 ampule Inhalation Given 9/14/20 1352)   methylPREDNISolone sodium (SOLU-MEDROL) injection 125 mg (125 mg Intravenous Given 9/14/20 1433)   cefepime (MAXIPIME) 1 g IVPB minibag (0 g Intravenous Stopped 9/14/20 1650)   0.9 % sodium chloride IV bolus 2,040 mL (2,040 mLs Intravenous New Bag 9/14/20 1615)        CLINICAL IMPRESSION  1. HCAP (healthcare-associated pneumonia)    2. Dyspnea and respiratory abnormalities    3. Lactic acidosis        Blood pressure 109/64, pulse 106, temperature 97.9 °F (36.6 °C), temperature source Oral, resp. rate 26, height 5' (1.524 m), weight 150 lb (68 kg), SpO2 100 %, not currently breastfeeding. DISPOSITION  Zari Taylor was admitted in fair condition. The plan is to admit to the hospital at this time under the hospitalist service. Hospitalist accepted the patient and will take over the patient's care. The total critical care time spent while evaluating and treating this patient was at least 38 minutes. This excludes time spent doing separately billable procedures. This includes time at the bedside, data interpretation, medication management, obtaining critical history from collateral sources if the patient is unable to provide it directly, and physician consultation. Specifics of interventions taken and potentially life-threatening diagnostic considerations are listed above in the medical decision making. DISCLAIMER: This chart was created using Dragon dictation software.   Efforts were made by me to ensure accuracy, however some errors may

## 2020-09-14 NOTE — CARE COORDINATION
Discharge Planning Assessment    SW discharge planner met with patient to discuss reason for admission, current living situation, and potential needs at the time of discharge. Pt in ED d/t healthcare associated pneumonia     Demographics/Insurance verified:  Yes    Current type of dwellinnd floor apt    Living arrangements:  Alone    Level of function/Support:  Pt reported since discharge on , she has been using her walker at all times at home. Stated her neighbor was checking on her daily, helping with her meds, breakfast and house duties. PCP:  Shaneka Thomas    Last Visit to PCP:  3 weeks ago    DME:  amado Best    Active with any community resources/agencies/skilled home care:  Yes, pt is active w/[facility]. Stated RN came out 2x but PT/OT did not start. Was supposed to start this week. Active w/[facility] for home delivered meals, lifeline and aide 2x/week. Medication compliance issues:  No    Financial issues that could impact healthcare:  No    Tentative discharge plan:  Pt still refusing SNF at this time. Active w/[facility], but therapy did not get started last week like they were supposed to. If pt discharges home, PT/OT will need to see as soon as possible. Active w/[facility]. SW to follow for other needs. Discussed with patient and/or family that on the day of discharge home tentative time of discharge will be between 10 AM and noon.     Transportation at the time of discharge:  Dtr can assist.    Electronically signed by NAEEM Noel, ESTRELLA on 2020 at 7:55 PM

## 2020-09-14 NOTE — TELEPHONE ENCOUNTER
Karena Larson from [employer] called and said that Pt been out hospital for 1 week she hasn't been sleeping been very dizzy and have abdominal pain her Respiration   is 24 her O2 is 97 and B/P is 96/70 she would like to know if she should seen her back to hospital or what should she do The Nurse Number is [phone] and [phone] has to be used in number to get through

## 2020-09-15 ENCOUNTER — APPOINTMENT (OUTPATIENT)
Dept: CT IMAGING | Age: 70
DRG: 871 | End: 2020-09-15
Payer: MEDICARE

## 2020-09-15 PROBLEM — R06.00 DYSPNEA AND RESPIRATORY ABNORMALITIES: Status: ACTIVE | Noted: 2019-01-20

## 2020-09-15 PROBLEM — R06.89 DYSPNEA AND RESPIRATORY ABNORMALITIES: Status: ACTIVE | Noted: 2019-01-20

## 2020-09-15 LAB
A/G RATIO: 1 (ref 1.1–2.2)
ABO/RH: NORMAL
ALBUMIN SERPL-MCNC: 2.9 G/DL (ref 3.4–5)
ALP BLD-CCNC: 65 U/L (ref 40–129)
ALT SERPL-CCNC: 8 U/L (ref 10–40)
ANION GAP SERPL CALCULATED.3IONS-SCNC: 14 MMOL/L (ref 3–16)
ANTIBODY SCREEN: NORMAL
AST SERPL-CCNC: 12 U/L (ref 15–37)
BASE EXCESS ARTERIAL: -8.4 MMOL/L (ref -3–3)
BASOPHILS ABSOLUTE: 0 K/UL (ref 0–0.2)
BASOPHILS RELATIVE PERCENT: 0.1 %
BILIRUB SERPL-MCNC: <0.2 MG/DL (ref 0–1)
BILIRUBIN URINE: NEGATIVE
BLOOD, URINE: NEGATIVE
BUN BLDV-MCNC: 23 MG/DL (ref 7–20)
CALCIUM SERPL-MCNC: 8 MG/DL (ref 8.3–10.6)
CARBOXYHEMOGLOBIN ARTERIAL: 1.1 % (ref 0–1.5)
CHLORIDE BLD-SCNC: 112 MMOL/L (ref 99–110)
CLARITY: CLEAR
CO2: 12 MMOL/L (ref 21–32)
COLOR: YELLOW
COMMENT UA: ABNORMAL
CREAT SERPL-MCNC: 1.6 MG/DL (ref 0.6–1.2)
EOSINOPHILS ABSOLUTE: 0 K/UL (ref 0–0.6)
EOSINOPHILS RELATIVE PERCENT: 0 %
EPITHELIAL CELLS, UA: 2 /HPF (ref 0–5)
GFR AFRICAN AMERICAN: 39
GFR NON-AFRICAN AMERICAN: 32
GLOBULIN: 2.9 G/DL
GLUCOSE BLD-MCNC: 158 MG/DL (ref 70–99)
GLUCOSE URINE: NEGATIVE MG/DL
HCO3 ARTERIAL: 16.5 MMOL/L (ref 21–29)
HCT VFR BLD CALC: 20.8 % (ref 36–48)
HEMATOLOGY PATH CONSULT: NO
HEMOGLOBIN, ART, EXTENDED: 5.8 G/DL (ref 12–16)
HEMOGLOBIN: 6.6 G/DL (ref 12–16)
HYALINE CASTS: 6 /LPF (ref 0–8)
KETONES, URINE: NEGATIVE MG/DL
LACTIC ACID, SEPSIS: 2.8 MMOL/L (ref 0.4–1.9)
LACTIC ACID: 4.8 MMOL/L (ref 0.4–2)
LEUKOCYTE ESTERASE, URINE: NEGATIVE
LYMPHOCYTES ABSOLUTE: 3.4 K/UL (ref 1–5.1)
LYMPHOCYTES RELATIVE PERCENT: 25 %
MCH RBC QN AUTO: 20 PG (ref 26–34)
MCHC RBC AUTO-ENTMCNC: 31.5 G/DL (ref 31–36)
MCV RBC AUTO: 63.5 FL (ref 80–100)
METHEMOGLOBIN ARTERIAL: 0.6 %
MICROSCOPIC EXAMINATION: YES
MONOCYTES ABSOLUTE: 0.1 K/UL (ref 0–1.3)
MONOCYTES RELATIVE PERCENT: 0.9 %
NEUTROPHILS ABSOLUTE: 10 K/UL (ref 1.7–7.7)
NEUTROPHILS RELATIVE PERCENT: 74 %
NITRITE, URINE: NEGATIVE
O2 CONTENT ARTERIAL: 9 ML/DL
O2 SAT, ARTERIAL: 100 %
O2 THERAPY: ABNORMAL
PCO2 ARTERIAL: 29.9 MMHG (ref 35–45)
PDW BLD-RTO: 15.6 % (ref 12.4–15.4)
PH ARTERIAL: 7.35 (ref 7.35–7.45)
PH UA: 5 (ref 5–8)
PLATELET # BLD: 329 K/UL (ref 135–450)
PLATELET SLIDE REVIEW: ADEQUATE
PMV BLD AUTO: 6.8 FL (ref 5–10.5)
PO2 ARTERIAL: 178 MMHG (ref 75–108)
POTASSIUM REFLEX MAGNESIUM: 4.4 MMOL/L (ref 3.5–5.1)
PROCALCITONIN: 0.25 NG/ML (ref 0–0.15)
PROTEIN UA: ABNORMAL MG/DL
RBC # BLD: 3.27 M/UL (ref 4–5.2)
RBC UA: 2 /HPF (ref 0–4)
REASON FOR REJECTION: NORMAL
REJECTED TEST: NORMAL
SLIDE REVIEW: ABNORMAL
SODIUM BLD-SCNC: 138 MMOL/L (ref 136–145)
SPECIFIC GRAVITY UA: 1.01 (ref 1–1.03)
TCO2 ARTERIAL: 38.9 MMOL/L
TOTAL PROTEIN: 5.8 G/DL (ref 6.4–8.2)
URINE REFLEX TO CULTURE: ABNORMAL
URINE TYPE: ABNORMAL
UROBILINOGEN, URINE: 0.2 E.U./DL
WBC # BLD: 13.6 K/UL (ref 4–11)
WBC UA: 2 /HPF (ref 0–5)
YEAST: PRESENT /HPF

## 2020-09-15 PROCEDURE — 6360000002 HC RX W HCPCS: Performed by: HOSPITALIST

## 2020-09-15 PROCEDURE — 74176 CT ABD & PELVIS W/O CONTRAST: CPT

## 2020-09-15 PROCEDURE — 6360000002 HC RX W HCPCS: Performed by: INTERNAL MEDICINE

## 2020-09-15 PROCEDURE — 84145 PROCALCITONIN (PCT): CPT

## 2020-09-15 PROCEDURE — 36600 WITHDRAWAL OF ARTERIAL BLOOD: CPT

## 2020-09-15 PROCEDURE — 2580000003 HC RX 258: Performed by: PEDIATRICS

## 2020-09-15 PROCEDURE — 99223 1ST HOSP IP/OBS HIGH 75: CPT | Performed by: INTERNAL MEDICINE

## 2020-09-15 PROCEDURE — 86850 RBC ANTIBODY SCREEN: CPT

## 2020-09-15 PROCEDURE — 86901 BLOOD TYPING SEROLOGIC RH(D): CPT

## 2020-09-15 PROCEDURE — C9113 INJ PANTOPRAZOLE SODIUM, VIA: HCPCS | Performed by: INTERNAL MEDICINE

## 2020-09-15 PROCEDURE — 94761 N-INVAS EAR/PLS OXIMETRY MLT: CPT

## 2020-09-15 PROCEDURE — 6370000000 HC RX 637 (ALT 250 FOR IP): Performed by: HOSPITALIST

## 2020-09-15 PROCEDURE — 2580000003 HC RX 258: Performed by: INTERNAL MEDICINE

## 2020-09-15 PROCEDURE — 86923 COMPATIBILITY TEST ELECTRIC: CPT

## 2020-09-15 PROCEDURE — 1200000000 HC SEMI PRIVATE

## 2020-09-15 PROCEDURE — 80053 COMPREHEN METABOLIC PANEL: CPT

## 2020-09-15 PROCEDURE — 86900 BLOOD TYPING SEROLOGIC ABO: CPT

## 2020-09-15 PROCEDURE — 82803 BLOOD GASES ANY COMBINATION: CPT

## 2020-09-15 PROCEDURE — 83605 ASSAY OF LACTIC ACID: CPT

## 2020-09-15 PROCEDURE — 2580000003 HC RX 258

## 2020-09-15 PROCEDURE — 2580000003 HC RX 258: Performed by: HOSPITALIST

## 2020-09-15 PROCEDURE — 85025 COMPLETE CBC W/AUTO DIFF WBC: CPT

## 2020-09-15 PROCEDURE — 2700000000 HC OXYGEN THERAPY PER DAY

## 2020-09-15 PROCEDURE — 36415 COLL VENOUS BLD VENIPUNCTURE: CPT

## 2020-09-15 PROCEDURE — 94640 AIRWAY INHALATION TREATMENT: CPT

## 2020-09-15 PROCEDURE — P9016 RBC LEUKOCYTES REDUCED: HCPCS

## 2020-09-15 PROCEDURE — 36430 TRANSFUSION BLD/BLD COMPNT: CPT

## 2020-09-15 PROCEDURE — 6370000000 HC RX 637 (ALT 250 FOR IP): Performed by: INTERNAL MEDICINE

## 2020-09-15 RX ORDER — 0.9 % SODIUM CHLORIDE 0.9 %
20 INTRAVENOUS SOLUTION INTRAVENOUS ONCE
Status: COMPLETED | OUTPATIENT
Start: 2020-09-15 | End: 2020-09-15

## 2020-09-15 RX ORDER — PANTOPRAZOLE SODIUM 40 MG/10ML
40 INJECTION, POWDER, LYOPHILIZED, FOR SOLUTION INTRAVENOUS 2 TIMES DAILY
Status: DISCONTINUED | OUTPATIENT
Start: 2020-09-15 | End: 2020-09-16 | Stop reason: ALTCHOICE

## 2020-09-15 RX ORDER — LEVOFLOXACIN 750 MG/1
750 TABLET ORAL EVERY OTHER DAY
Status: DISCONTINUED | OUTPATIENT
Start: 2020-09-15 | End: 2020-09-20 | Stop reason: HOSPADM

## 2020-09-15 RX ORDER — SODIUM CHLORIDE, SODIUM LACTATE, POTASSIUM CHLORIDE, CALCIUM CHLORIDE 600; 310; 30; 20 MG/100ML; MG/100ML; MG/100ML; MG/100ML
INJECTION, SOLUTION INTRAVENOUS CONTINUOUS
Status: DISCONTINUED | OUTPATIENT
Start: 2020-09-15 | End: 2020-09-16

## 2020-09-15 RX ORDER — METHYLPREDNISOLONE SODIUM SUCCINATE 40 MG/ML
20 INJECTION, POWDER, LYOPHILIZED, FOR SOLUTION INTRAMUSCULAR; INTRAVENOUS DAILY
Status: DISCONTINUED | OUTPATIENT
Start: 2020-09-15 | End: 2020-09-18

## 2020-09-15 RX ORDER — SODIUM CHLORIDE 9 MG/ML
INJECTION, SOLUTION INTRAVENOUS
Status: COMPLETED
Start: 2020-09-15 | End: 2020-09-15

## 2020-09-15 RX ADMIN — SODIUM CHLORIDE 20 ML: 9 INJECTION, SOLUTION INTRAVENOUS at 13:44

## 2020-09-15 RX ADMIN — TIOTROPIUM BROMIDE INHALATION SPRAY 2 PUFF: 3.12 SPRAY, METERED RESPIRATORY (INHALATION) at 08:00

## 2020-09-15 RX ADMIN — PANTOPRAZOLE SODIUM 40 MG: 40 TABLET, DELAYED RELEASE ORAL at 06:19

## 2020-09-15 RX ADMIN — VANCOMYCIN HYDROCHLORIDE 1000 MG: 1 INJECTION, POWDER, LYOPHILIZED, FOR SOLUTION INTRAVENOUS at 20:41

## 2020-09-15 RX ADMIN — METRONIDAZOLE 500 MG: 250 TABLET, FILM COATED ORAL at 00:10

## 2020-09-15 RX ADMIN — SODIUM CHLORIDE 250 ML: 9 INJECTION, SOLUTION INTRAVENOUS at 22:36

## 2020-09-15 RX ADMIN — METRONIDAZOLE 500 MG: 250 TABLET, FILM COATED ORAL at 14:36

## 2020-09-15 RX ADMIN — PANTOPRAZOLE SODIUM 40 MG: 40 INJECTION, POWDER, FOR SOLUTION INTRAVENOUS at 20:41

## 2020-09-15 RX ADMIN — METRONIDAZOLE 500 MG: 250 TABLET, FILM COATED ORAL at 22:36

## 2020-09-15 RX ADMIN — METHYLPREDNISOLONE SODIUM SUCCINATE 40 MG: 40 INJECTION, POWDER, FOR SOLUTION INTRAMUSCULAR; INTRAVENOUS at 00:10

## 2020-09-15 RX ADMIN — OXYCODONE 5 MG: 5 TABLET ORAL at 16:00

## 2020-09-15 RX ADMIN — PANTOPRAZOLE SODIUM 40 MG: 40 INJECTION, POWDER, FOR SOLUTION INTRAVENOUS at 10:19

## 2020-09-15 RX ADMIN — Medication 2 PUFF: at 08:00

## 2020-09-15 RX ADMIN — CEFEPIME HYDROCHLORIDE 1 G: 1 INJECTION, POWDER, FOR SOLUTION INTRAMUSCULAR; INTRAVENOUS at 03:43

## 2020-09-15 RX ADMIN — METHYLPREDNISOLONE SODIUM SUCCINATE 20 MG: 40 INJECTION, POWDER, FOR SOLUTION INTRAMUSCULAR; INTRAVENOUS at 17:07

## 2020-09-15 RX ADMIN — METRONIDAZOLE 500 MG: 250 TABLET, FILM COATED ORAL at 06:19

## 2020-09-15 RX ADMIN — LEVOFLOXACIN 750 MG: 750 TABLET, FILM COATED ORAL at 14:37

## 2020-09-15 RX ADMIN — Medication 2 PUFF: at 20:54

## 2020-09-15 RX ADMIN — Medication 10 ML: at 20:41

## 2020-09-15 RX ADMIN — APIXABAN 5 MG: 5 TABLET, FILM COATED ORAL at 00:10

## 2020-09-15 RX ADMIN — SODIUM CHLORIDE, POTASSIUM CHLORIDE, SODIUM LACTATE AND CALCIUM CHLORIDE: 600; 310; 30; 20 INJECTION, SOLUTION INTRAVENOUS at 06:00

## 2020-09-15 ASSESSMENT — ENCOUNTER SYMPTOMS
COUGH: 1
ABDOMINAL PAIN: 0
FACIAL SWELLING: 0
SHORTNESS OF BREATH: 1
APNEA: 0
PHOTOPHOBIA: 0
RHINORRHEA: 0
CHEST TIGHTNESS: 0
NAUSEA: 0
EYE DISCHARGE: 0
CHOKING: 0
COLOR CHANGE: 0
STRIDOR: 0
DIARRHEA: 0
TROUBLE SWALLOWING: 0
BLOOD IN STOOL: 0
EYE REDNESS: 0

## 2020-09-15 ASSESSMENT — PAIN DESCRIPTION - PAIN TYPE: TYPE: ACUTE PAIN

## 2020-09-15 ASSESSMENT — PAIN DESCRIPTION - ORIENTATION: ORIENTATION: LEFT

## 2020-09-15 ASSESSMENT — PAIN DESCRIPTION - LOCATION: LOCATION: RIB CAGE

## 2020-09-15 ASSESSMENT — PAIN SCALES - GENERAL
PAINLEVEL_OUTOF10: 8
PAINLEVEL_OUTOF10: 0

## 2020-09-15 NOTE — H&P
Hospital Medicine History & Physical      PCP: Binh Robledo MD    Date of Admission: 9/14/2020    Date of Service: Pt seen/examined on 9/14/2020 12:56 PM and   Admitted to Inpatient with expected LOS greater than two midnights due to medical therapy.        Chief Complaint: Fatigue and dyspnea    History Of Present Illness:    77 y.o. female with PMHx significant for COPD on 2 L oxygen, recent treatment for pneumonia and respiratory failure discharged home on oral antibiotics also recent acute kidney injury on baseline of chronic kidney disease stage III admitted to the hospital complaining of progressive dyspnea on exertion and rest lack of sleep fatigue and cough    Patient states that since she has been discharged and been on steroids she has not been able to sleep for the last week  Has been getting increasingly short of breath to the point that she is short of breath at rest  Complains of some back pain denies any abdominal pain  Has had very small amount of bowel movement however has not noticed any blood or black stools  Also on she has not had to go up on oxygen despite increased shortness of breath  Denies any swelling in the lower extremities  No lightheadedness  No loss of consciousness  No fevers  No swelling in the lower extremities    Past Medical History:          Diagnosis Date    Bullous emphysema ([facility]     CKD (chronic kidney disease) stage 3, GFR 30-59 ml/min [facility]     Clostridium difficile carrier 01/21/2019    COPD (chronic obstructive pulmonary disease) ([facility]     PFT done 7 years ago   [facility] Crohn's disease ([facility]     Crohn's disease    Depression 1/30/2011    pt states resolved as of 3-26-12    DVT (deep venous thrombosis) ([facility]     2012; first ocurrence     Hiatal hernia     Hx of blood clots     Kidney disease     Kidney insufficiency     Osteoporosis     Peripheral neuropathy     neuropathy in legs    Pneumonia     Postmenopausal     LMP in  40's    Pulmonary embolism ([facility]     2012; first ocurrence    Sepsis ([facility]     Syringomyelia ([facility]        Past Surgical History:          Procedure Laterality Date    ABDOMEN SURGERY      BACK SURGERY      shunt to drain CSF    BIOPSY SHOULDER Left 2/27/2019    EXCISION OF LEFT SHOULDER MASS performed by Ani Farr MD at [facility]      crainotomy- remove fluid ? V-P SHUNT    BRONCHOSCOPY N/A 9/2/2020    BRONCHOSCOPY ALVEOLAR LAVAGE performed by Vaishnavi Goodwin MD at [facility]      resection X2    COLONOSCOPY  12/5/11    BIOPSY AND POLYPECTOMY    COLONOSCOPY  4-2-2012    and ablation ERBE/APC    SHOULDER SURGERY      L        Medications Prior to Admission:      Prior to Admission medications    Medication Sig Start Date End Date Taking?  Authorizing Provider   levoFLOXacin (LEVAQUIN) 750 MG tablet Take 1 tablet by mouth every other day for 15 doses 9/5/20 10/4/20 Yes Amira Powell MD   metroNIDAZOLE (FLAGYL) 500 MG tablet Take 1 tablet by mouth every 8 hours 9/4/20 10/4/20 Yes Amira Powell MD   amitriptyline (ELAVIL) 150 MG tablet TAKE 0.5 TABLETS BY MOUTH NIGHTLY 8/19/20  Yes Lauren Little MD   albuterol sulfate  (90 Base) MCG/ACT inhaler Inhale 2 puffs into the lungs every 6 hours as needed for Wheezing 7/28/20 7/28/21 Yes Vaishnavi Goodwin MD   ELIQUIS 5 MG TABS tablet TAKE 1 TABLET BY MOUTH TWICE A DAY 6/23/20  Yes Vaishnavi Goodwin MD   potassium chloride (KLOR-CON) 10 MEQ extended release tablet Take 10 mEq by mouth daily  1/27/20  Yes Alyson Hunt MD   budesonide-formoterol [facility]) 160-4.5 MCG/ACT AERO Inhale 2 puffs into the lungs 2 times daily 11/13/19  Yes Lauren Little MD   tiotropium (SPIRIVA RESPIMAT) 2.5 MCG/ACT AERS inhaler Inhale 2 puffs into the lungs daily 11/13/19  Yes Lauren Little MD   albuterol (PROVENTIL) (2.5 MG/3ML) 0.083% nebulizer solution Take 3 mLs by nebulization every 6 hours as needed for Wheezing Dx: COPD      ICD-10: J44.9 8/13/19  Yes Lindy Travis MD   alendronate (FOSAMAX) 70 MG tablet Take 1 tablet by mouth every 7 days 4/6/19  Yes Kristopher Solis MD   omeprazole (PRILOSEC) 20 MG delayed release capsule Take 20 mg by mouth Daily Takes as needed   Yes Historical Provider, MD   Adalimumab (HUMIRA) 20 MG/0.4ML KIT Inject 1 vial into the skin every 14 days    Yes Historical Provider, MD   predniSONE (DELTASONE) 10 MG tablet Take 10 mg by mouth daily Take every morning    Historical Provider, MD   cyclobenzaprine (FLEXERIL) 5 MG tablet Take 5 mg by mouth 4 times daily Every 6 hours for muscle spasms    Historical Provider, MD       Allergies:  Patient has no known allergies. Social History:      The patient currently lives at home     TOBACCO:   reports that she quit smoking about 5 years ago. Her smoking use included cigarettes. She has a 7.50 pack-year smoking history. She has never used smokeless tobacco.  ETOH:   reports no history of alcohol use. Family History:      Reviewed in detail and negative for DM, CAD, Cancer, CVA. Positive as follows:        Problem Relation Age of Onset    Heart Disease Mother     High Blood Pressure Mother     High Cholesterol Mother     Early Death Mother 36        MI    Heart Disease Father     High Blood Pressure Father     High Cholesterol Father     Stroke Father 79    High Blood Pressure Sister     High Blood Pressure Brother        REVIEW OF SYSTEMS:   Pertinent positives as noted in the HPI. All other systems reviewed and negative. PHYSICAL EXAM:    /88   Pulse 105   Temp 98.4 °F (36.9 °C) (Oral)   Resp 20   Ht 5' (1.524 m)   Wt 145 lb 14.4 oz (66.2 kg)   SpO2 100%   BMI 28.49 kg/m²     General appearance:  No apparent distress, appears stated age and cooperative. HEENT:  Normal cephalic, atraumatic without obvious deformity. Pupils equal, round, and reactive to light.   Pallor positive extra ocular muscles intact. Conjunctivae/corneas clear. Neck: Supple, with full range of motion. No jugular venous distention. Trachea midline. Respiratory: Generalized decreased air entry with no RALES/WHEEZES/RHONCHI. Cardiovascular:  Regular rate and rhythm with normal S1/S2 without MURMUR, rubs or gallops. Abdomen: Soft, non-tender, non-distended with normal bowel sounds. Musculoskeletal:  No clubbing, cyanosis or EDEMA bilaterally. Full range of motion without deformity. Skin: Skin color, texture, turgor normal.  No rashes or lesions. Neurologic:  Neurovascularly intact without any focal sensory/motor deficits.  Cranial nerves: II-XII intact, grossly non-focal.  Psychiatric:  Alert and oriented, thought content appropriate, normal insight  Capillary Refill: Brisk,< 3 seconds   Peripheral Pulses: +2 palpable, equal bilaterally       Labs:     Recent Labs     09/14/20  1437 09/15/20  0907   WBC 10.2 13.6*   HGB 7.4* 6.6*   HCT 22.5* 20.8*    329     Recent Labs     09/14/20  1437 09/15/20  0449    138   K 4.7 4.4    112*   CO2 19* 12*   BUN 23* 23*   CREATININE 1.8* 1.6*   CALCIUM 9.1 8.0*     Recent Labs     09/14/20  1437 09/15/20  0449   AST 12* 12*   ALT 8* 8*   BILITOT <0.2 <0.2   ALKPHOS 72 65     Recent Labs     09/14/20  1437   INR 1.30*     Recent Labs     09/14/20  1437   TROPONINI <0.01       Urinalysis:      Lab Results   Component Value Date    NITRU Negative 09/14/2020    WBCUA 2 09/14/2020    BACTERIA RARE 08/28/2020    RBCUA 2 09/14/2020    BLOODU Negative 09/14/2020    SPECGRAV 1.014 09/14/2020    GLUCOSEU Negative 09/14/2020       Radiology:     CXR: I have reviewed the CXR with the following interpretation: Per inflation  Bilateral infiltrates left greater than right however unchanged from recent chest x-ray  EKG:  I have reviewed the EKG with the following interpretation: Sinus tachycardia    CT ABDOMEN PELVIS WO CONTRAST Additional Contrast? None   Final Result   Overall, no significant change from prior. Scattered fluid-filled loops of   bowel are seen. No retroperitoneal hematoma         CT CHEST WO CONTRAST   Final Result   Decreasing consolidation in the left upper lobe with resolution of left   pleural effusion         XR CHEST PORTABLE   Final Result   Slight further graph shin in the left lung airspace disease with moderate   residual remaining. Additional follow-up is recommended to be certain that   this resolves further.          VL Extremity Venous Bilateral    (Results Pending)       ASSESSMENT/PLAN:    Active Hospital Problems    Diagnosis Date Noted    HCAP (healthcare-associated pneumonia) [J18.9] 09/14/2020    MARIS (acute kidney injury) ([facility] [facility] [N17.9]     Sepsis ([facility] [A41.9] 12/18/2017    Acute on chronic respiratory failure with hypoxia (HCC) [J96.21]     COPD, severe ([facility] [J44.9] 02/05/2016       Acute Medical Issues Being Addressed:    56-year-old lady admitted to the hospital complaining of dyspnea    Dyspnea and fatigue suspect secondary to anemia    Acute anemia suspected acute blood loss GI  We will discontinue Eliquis  She has had previous PEs and DVTs hence on it  Hemoglobin down to 6  Check iron studies and B12  Type and screen  Transfuse 2 units of blood  Check hemoglobin every 6 hours  Shall start pantoprazole 40 IV twice daily  Gastroenterology consulted  CT of the abdomen and pelvis did not show any evidence of any retroperitoneal bleed      Pneumonia bilateral  I suspect that it is improving  Given her low blood pressures we will start broad-spectrum antibiotics on vancomycin cefepime and metronidazole    Mild hypotension  On chronic steroids  Changed to IV methylprednisone  Dose decreased per pulmonary critical care    Chronic respiratory failure secondary to COPD  No evidence of exacerbation  Continue inhalers  On baseline 2 L oxygen    Previous PEs and DVTs  Hold Eliquis given bleeding  We will get Dopplers of both lower extremities    Lactic acidosis secondary to hypovolemia  Transfuse blood  Check lactic acid every 6 4 hours for 2 more times    Chronic kidney disease stage III  Creatinine seems to be at baseline    All of the above discussed at length with the patient  Discussed with pulmonary critical care were reviewed the patient  At this point do recommend continuing with stepdown level of care since she is hemodynamically stable      DVT Prophylaxis:scd   Diet: Diet NPO Effective Now  Code Status: Full Code    PT/OT Eval Status: will have eval if appropriate given patient's condition    Dispo - once acute medical process is resolved       Kwesi Rodriguez MD    Thank you Sruthi Gannon MD for the opportunity to be involved in this patient's care. If you have any questions or concerns please feel free to contact me.

## 2020-09-15 NOTE — PROGRESS NOTES
Message sent to Dr. Pj Polk: Patient just transferred to floor for sepsis/pneumonia. Patient has no idea orders. Please advise.

## 2020-09-15 NOTE — PROGRESS NOTES
Patient states that she does not want to take Prednisone anymore because she is having some abdominal distension. Patient states that she has a hard time having a bowel movement when she take prednisone Q8hrs.

## 2020-09-15 NOTE — CONSULTS
Gastroenterology Consult Note      Patient: Toya Lowe  : 1950  Acct#:      Date:  9/15/2020    Subjective:       History of Present Illness  Patient is a 77 y.o.  female admitted with Sepsis ([address] [A41.9]  Sepsis ([address] [A41.9] who is seen in consult for anemia. H/o MMP as below. H/o crohns disease followed by Dr Nikki Spears. She is on Humira. She was admitted recently for bacterial pneumonia. She presented to the ED for DURAND. She was found to have a microcytic anemia with hgb of 6.6. She denies any melena, hematochezia. No abdominal pain, N/V. Past Medical History:   Diagnosis Date    Bullous emphysema (HCC)     CKD (chronic kidney disease) stage 3, GFR 30-59 ml/min ([facility])     Clostridium difficile carrier 2019    COPD (chronic obstructive pulmonary disease) ([address]     PFT done 7 years ago   [facility] Crohn's disease ([address]     Crohn's disease    Depression 2011    pt states resolved as of 3-26-12    DVT (deep venous thrombosis) ([address]     ; first ocurrence     Hiatal hernia     Hx of blood clots     Kidney disease     Kidney insufficiency     Osteoporosis     Peripheral neuropathy     neuropathy in legs    Pneumonia     Postmenopausal     LMP in  40's    Pulmonary embolism ([address]     ; first ocurrence    Sepsis ([address]     Syringomyelia ([address]       Past Surgical History:   Procedure Laterality Date    ABDOMEN SURGERY      BACK SURGERY      shunt to drain CSF    BIOPSY SHOULDER Left 2019    EXCISION OF LEFT SHOULDER MASS performed by Danyell Henderson MD at [address]      crainotomy- remove fluid ? V-P SHUNT    BRONCHOSCOPY N/A 2020    BRONCHOSCOPY ALVEOLAR LAVAGE performed by Alvis Severin, MD at [facility]      resection X2    COLONOSCOPY  11    BIOPSY AND POLYPECTOMY    COLONOSCOPY  2012    and ablation ERBE/APC    SHOULDER SURGERY      L       Past Endoscopic History    Admission Meds  No current facility-administered medications on file prior to encounter.       Current Outpatient Medications on File Prior to Encounter   Medication Sig Dispense Refill    levoFLOXacin (LEVAQUIN) 750 MG tablet Take 1 tablet by mouth every other day for 15 doses 15 tablet 0    metroNIDAZOLE (FLAGYL) 500 MG tablet Take 1 tablet by mouth every 8 hours 90 tablet 0    amitriptyline (ELAVIL) 150 MG tablet TAKE 0.5 TABLETS BY MOUTH NIGHTLY 45 tablet 3    albuterol sulfate  (90 Base) MCG/ACT inhaler Inhale 2 puffs into the lungs every 6 hours as needed for Wheezing 1 Inhaler 11    ELIQUIS 5 MG TABS tablet TAKE 1 TABLET BY MOUTH TWICE A DAY 60 tablet 6    potassium chloride (KLOR-CON) 10 MEQ extended release tablet Take 10 mEq by mouth daily   3    budesonide-formoterol (SYMBICORT) 160-4.5 MCG/ACT AERO Inhale 2 puffs into the lungs 2 times daily 3 Inhaler 3    tiotropium (SPIRIVA RESPIMAT) 2.5 MCG/ACT AERS inhaler Inhale 2 puffs into the lungs daily 3 Inhaler 3    albuterol (PROVENTIL) (2.5 MG/3ML) 0.083% nebulizer solution Take 3 mLs by nebulization every 6 hours as needed for Wheezing Dx: COPD      ICD-10: J44.9 360 mL 6    alendronate (FOSAMAX) 70 MG tablet Take 1 tablet by mouth every 7 days 12 tablet 3    omeprazole (PRILOSEC) 20 MG delayed release capsule Take 20 mg by mouth Daily Takes as needed      Adalimumab (HUMIRA) 20 MG/0.4ML KIT Inject 1 vial into the skin every 14 days       predniSONE (DELTASONE) 10 MG tablet Take 10 mg by mouth daily Take every morning      cyclobenzaprine (FLEXERIL) 5 MG tablet Take 5 mg by mouth 4 times daily Every 6 hours for muscle spasms              Allergies  No Known Allergies   Social   Social History     Tobacco Use    Smoking status: Former Smoker     Packs/day: 0.25     Years: 30.00     Pack years: 7.50     Types: Cigarettes     Last attempt to quit: 2015     Years since quittin.5    Smokeless tobacco: Never Used    Tobacco comment: started smoking at age 26 / smoked up to 9 cigarettes a day    Substance Use Topics    Alcohol use: No        Family History   Problem Relation Age of Onset    Heart Disease Mother     High Blood Pressure Mother     High Cholesterol Mother     Early Death Mother 36        MI    Heart Disease Father     High Blood Pressure Father     High Cholesterol Father     Stroke Father 79    High Blood Pressure Sister     High Blood Pressure Brother          Review of Systems  Constitutional: negative for fevers, chills, sweats    Ears, nose, mouth, throat, and face: negative for nasal congestion and sore throat   Respiratory: negative for cough + shortness of breath   Cardiovascular: negative for chest pain + dyspnea   Gastrointestinal: see hpi   Genitourinary:negative for dysuria and frequency   Integument/breast: negative for pruritus and rash   Hematologic/lymphatic: negative for bleeding and easy bruising   Musculoskeletal:negative for arthralgias and myalgias   Neurological: negative for dizziness and weakness           Physical Exam  Blood pressure 125/88, pulse 105, temperature 98.4 °F (36.9 °C), temperature source Oral, resp. rate 20, height 5' (1.524 m), weight 145 lb 14.4 oz (66.2 kg), SpO2 100 %, not currently breastfeeding. General appearance: alert, cooperative, no distress, appears stated age  Eyes: Anicteric  Head: Normocephalic, without obvious abnormality  Lungs: clear to auscultation bilaterally, Normal Effort  Heart: tachycardia, regular rhythm, normal S1 and S2, no murmurs or rubs  Abdomen: soft, non-tender. Bowel sounds normal. No masses,  no organomegaly.    Extremities: atraumatic, no cyanosis or edema  Skin: warm and dry, no jaundice  Neuro: Grossly intact, A&OX3  Musculoskeletal: 5/5  strength BUE      Data Review:    Recent Labs     09/14/20  1437 09/15/20  0907   WBC 10.2 13.6*   HGB 7.4* 6.6*   HCT 22.5* 20.8*   MCV 62.7* 63.5*    329     Recent Labs 09/14/20  1437 09/15/20  0449    138   K 4.7 4.4    112*   CO2 19* 12*   BUN 23* 23*   CREATININE 1.8* 1.6*     Recent Labs     09/14/20  1437 09/15/20  0449   AST 12* 12*   ALT 8* 8*   BILITOT <0.2 <0.2   ALKPHOS 72 65     No results for input(s): LIPASE, AMYLASE in the last 72 hours. Recent Labs     09/14/20  1437   PROTIME 15.1*   INR 1.30*     No results for input(s): PTT in the last 72 hours. No results for input(s): OCCULTBLD in the last 72 hours. Imaging Studies:                 CT-scan of abdomen and pelvis wo contrast 9/15/20  Impression    Overall, no significant change from prior.  Scattered fluid-filled loops of    bowel are seen.  No retroperitoneal hematoma                       Assessment:     Principal Problem:    Sepsis ([address]  Active Problems:    COPD, severe ([address]    Acute on chronic respiratory failure with hypoxia (HCC)    MARIS (acute kidney injury) ([address]    HCAP (healthcare-associated pneumonia)  Resolved Problems:    * No resolved hospital problems. *    Microcytic anemia - Hgb 6.6 with MCV 63. She is getting 2 U PRBC today. No gross GI bleeding. H/o crohn's disease - followed by Dr Alfred Sparks. On Humira. No GI symptoms currently. Recommendations:   - PRBC transfusions  - check iron and replace if low  - follow up with Dr Alfred Sparks for outpatient EGD and colonoscopy for anemia    Discussed with Dr. Rosaura Pantoja, [address]      I have personally performed a face to face diagnostic evaluation on this patient. I have interviewed and examined the patient and I agree with the findings and recommended plan of care. In summary, my findings and plan are the following:  Admitted with pneumonia. Chronic microcytic anemia and h/o Crohn's disease. Pt with hgb 6.6. No gross bleeding no ab pain n/v/change in bowel habits. Ok for PRBC, repeat iron indices. Pt sees Dr. Emma bowens for her ibd and is on humira. She can f/u with him at [place].

## 2020-09-15 NOTE — PROCEDURES
Per our protocol, the reading physician has reviewed the patient's chart and determined that the patient has no contraindications to anticoagulation. The venous study should wait until the patient's COVID results are complete and be re-evaluated at that time.

## 2020-09-15 NOTE — PROGRESS NOTES
Hospitalist Progress Note      PCP: Sunil Olivo MD    Date of Admission: 9/14/2020    Chief Complaint: Dyspnea    Hospital Course:   Feels very weak and tired  Some shortness of breath  On baseline 2 L oxygen  No evidence of blood in stool  Complains of some back pain but denies any abdominal pain  Hemoglobin down to 6.6        Medications:  Reviewed      Exam:    /65   Pulse 102   Temp 97.9 °F (36.6 °C) (Oral)   Resp 20   Ht 5' (1.524 m)   Wt 145 lb 14.4 oz (66.2 kg)   SpO2 100%   BMI 28.49 kg/m²     General appearance:  No apparent distress, appears stated age and cooperative. HEENT:  Normal cephalic, atraumatic without obvious deformity. Pupils equal, round, and reactive to light. Pallor positive extra ocular muscles intact. Conjunctivae/corneas clear. Neck: Supple, with full range of motion. No jugular venous distention. Trachea midline. Respiratory: Generalized decreased air entry with no RALES/WHEEZES/RHONCHI. Cardiovascular:  Regular rate and rhythm with normal S1/S2 without MURMUR, rubs or gallops. Abdomen: Soft, non-tender, non-distended with normal bowel sounds. Musculoskeletal:  No clubbing, cyanosis or EDEMA bilaterally. Full range of motion without deformity. Skin: Skin color, texture, turgor normal.  No rashes or lesions. Neurologic:  Neurovascularly intact without any focal sensory/motor deficits.  Cranial nerves: II-XII intact, grossly non-focal.  Psychiatric:  Alert and oriented, thought content appropriate, normal insight  Capillary Refill: Brisk,< 3 seconds   Peripheral Pulses: +2 palpable, equal bilaterally          Labs:   Recent Labs     09/14/20  1437 09/15/20  0907   WBC 10.2 13.6*   HGB 7.4* 6.6*   HCT 22.5* 20.8*    329     Recent Labs     09/14/20  1437 09/15/20  0449    138   K 4.7 4.4    112*   CO2 19* 12*   BUN 23* 23*   CREATININE 1.8* 1.6*   CALCIUM 9.1 8.0*     Recent Labs     09/14/20  1437 09/15/20  0449   AST 12* 12*   ALT 8* 8*   BILITOT <0.2 <0.2   ALKPHOS 72 65     Recent Labs     09/14/20  1437   INR 1.30*     Recent Labs     09/14/20  1437   TROPONINI <0.01       Urinalysis:      Lab Results   Component Value Date    NITRU Negative 09/14/2020    WBCUA 2 09/14/2020    BACTERIA RARE 08/28/2020    RBCUA 2 09/14/2020    BLOODU Negative 09/14/2020    SPECGRAV 1.014 09/14/2020    GLUCOSEU Negative 09/14/2020       Radiology:  CT ABDOMEN PELVIS WO CONTRAST Additional Contrast? None   Final Result   Overall, no significant change from prior. Scattered fluid-filled loops of   bowel are seen. No retroperitoneal hematoma         CT CHEST WO CONTRAST   Final Result   Decreasing consolidation in the left upper lobe with resolution of left   pleural effusion         XR CHEST PORTABLE   Final Result   Slight further graph shin in the left lung airspace disease with moderate   residual remaining. Additional follow-up is recommended to be certain that   this resolves further.          VL Extremity Venous Bilateral    (Results Pending)       Assessment/Plan:    Active Hospital Problems    Diagnosis Date Noted    Lactic acidosis [E87.2]     Long term (current) use of systemic steroids [Z79.52]     HCAP (healthcare-associated pneumonia) [J18.9] 09/14/2020    Overweight [E66.3]     Small vessel disease, cerebrovascular [I67.9]     MARIS ([facility]) [facility] [N17.9]     Dyspnea and respiratory abnormalities [R06.00, R06.89] 01/20/2019    Sepsis ([facility] [A41.9] 12/18/2017    Acute on chronic respiratory failure with hypoxia (HCC) [J96.21]     COPD, severe ([facility] [J44.9] 02/05/2016       Acute Medical Issues Being Addressed:  36-year-old lady admitted to the hospital complaining of dyspnea     Dyspnea and fatigue suspect secondary to anemia     Acute anemia suspected acute blood loss GI  We will discontinue Eliquis  She has had previous PEs and DVTs hence on it  Hemoglobin down to 6  Check iron studies and B12  Type and screen  Transfuse 2 units of blood  Check hemoglobin every 6 hours  Shall start pantoprazole 40 IV twice daily  Gastroenterology consulted  CT of the abdomen and pelvis did not show any evidence of any retroperitoneal bleed        Pneumonia bilateral  I suspect that it is improving  Given her low blood pressures we will start broad-spectrum antibiotics on vancomycin cefepime and metronidazole     Mild hypotension  On chronic steroids  Changed to IV methylprednisone  Dose decreased per pulmonary critical care     Chronic respiratory failure secondary to COPD  No evidence of exacerbation  Continue inhalers  On baseline 2 L oxygen     Previous PEs and DVTs  Hold Eliquis given bleeding  We will get Dopplers of both lower extremities     Lactic acidosis secondary to hypovolemia  Transfuse blood  Check lactic acid every 6 4 hours for 2 more times     Chronic kidney disease stage III  Creatinine seems to be at baseline     All of the above discussed at length with the patient  Discussed with pulmonary critical care were reviewed the patient  At this point do recommend continuing with stepdown level of care since she is hemodynamically stable        DVT Prophylaxis:scd   Diet: Diet NPO Effective Now  Code Status: Full Code  Dispo - once acute medical processes have resolved    Shonda Lizarraga MD

## 2020-09-15 NOTE — ED NOTES
Tried to call report to Jesenia, 5T. She will call back.          Brooklynn Sanchez RN  09/14/20 3801

## 2020-09-15 NOTE — ED NOTES
Gave report to Jesenia, 5T. Pt. Alert and oreinted. Pt. On 2L nasal cannula. Pt. On tele. Nothing infusing. April, Tech to transport.        Dav Richards RN  09/14/20 2109

## 2020-09-15 NOTE — H&P
[facility] HOSPITALISTS HISTORY AND PHYSICAL    9/16/2020 2:32 AM    Patient Information:  Dory Weston is a 77 y.o. female [id number]  PCP:  Lauren Little MD (Tel: [phone] )    Chief complaint:    Chief Complaint   Patient presents with    Shortness of [address] brought pt. \"Pt says,\" I was released from here a week ago with pnemonia. I have not slept since then I am short of breath. I have left sided rib pain. \"        History of Present Illness:  Leatha Bolaños is a 77 y.o. female who presented with Leatha Bolaños is a 77 y.o. female  who presents to the ED complaining of profound dyspnea with exertion and at rest, poor sleeping but fatigue, and coughing. She also complains of some left sided chest pains. Admitted 8/28 to 9/6 for acute on chronic hypoxic respiratory failure and was discharged on PO abx for pneumonia after bronchoscopic evaluation and BAL. She was diagnosed with bacterial pneumonia sepsis as well. Remains on 10mg prednisone daily for emphysema. Labs were obtained and noted for profound lactic acidosis at 4.0, anemia with H&H 7.4/22.5, and INR of 1.3. CT of the chest revealed decreasing consolidation left upper lobe and resolution of left-sided pleural effusion. UA revealed no evidence of infection. Blood cultures were obtained and patient was initiated on broad-spectrum antibiotics including vancomycin and cefepime. Hospitalist team consulted to admit this patient for sepsis and acute worsening of anemia secondary to CKD. History obtained from patient and [facility] chart    Old medical records show patient was recently admitted  for profound dyspnea with exertion and at rest, coughing. The patient was admitted from August 28 to September 6 for sepsis and acute on chronic respiratory failure with hypoxia.   She was found to have left-sided multifocal pneumonia with necrotizing infiltrate in the left upper lobe and had a bronchoscopy on 9/2/2020. He showed clinical improvement and was discharged on empiric linezolid for 10 days and empiric Levaquin and Flagyl for 4 weeks.         REVIEW OF SYSTEMS:   Constitutional: Positive for fever,chills or night sweats  ENT: Negative for rhinorrhea, epistaxis, hoarseness, sore throat. Respiratory: Positive for shortness of breath,wheezing; cough decreased  Cardiovascular: Negative for chest pain, palpitations   Gastrointestinal: Negative for nausea, vomiting, diarrhea; positive abdominal pain diffusely  Genitourinary: Negative for polyuria, dysuria   Hematologic/Lymphatic: Negative for bleeding tendency, easy bruising  Musculoskeletal: Positive for myalgias and arthralgias  Neurologic: Negative for confusion,dysarthria. Skin: Negative for itching,rash  Psychiatric: Negative for depression,anxiety, agitation. Endocrine: Negative for polydipsia,polyuria,heat /cold intolerance. Past Medical History:   has a past medical history of Bullous emphysema ([facility] CKD (chronic kidney disease) stage 3, GFR 30-59 ml/min ([facility]), Clostridium difficile carrier, COPD (chronic obstructive pulmonary disease) ([address] Crohn's disease ([address] Depression, DVT (deep venous thrombosis) ([address] Hiatal hernia, Hx of blood clots, Kidney disease, Kidney insufficiency, Osteoporosis, Peripheral neuropathy, Pneumonia, Postmenopausal, Pulmonary embolism ([address] Sepsis ([address] and Syringomyelia ([facility] Past Surgical History:   has a past surgical history that includes shoulder surgery; back surgery; brain surgery; Colon surgery; Colonoscopy (12/5/11); Colonoscopy (4-2-2012); Cholecystectomy; Abdomen surgery; Biopsy shoulder (Left, 2/27/2019); and bronchoscopy (N/A, 9/2/2020). Medications:  No current facility-administered medications on file prior to encounter.       Current Outpatient Medications on File Prior to Encounter   Medication Sig Dispense Refill    levoFLOXacin (LEVAQUIN) 750 MG tablet Take 1 tablet by mouth every other day for 15 doses 15 tablet 0    metroNIDAZOLE (FLAGYL) 500 MG tablet Take 1 tablet by mouth every 8 hours 90 tablet 0    amitriptyline (ELAVIL) 150 MG tablet TAKE 0.5 TABLETS BY MOUTH NIGHTLY 45 tablet 3    albuterol sulfate  (90 Base) MCG/ACT inhaler Inhale 2 puffs into the lungs every 6 hours as needed for Wheezing 1 Inhaler 11    ELIQUIS 5 MG TABS tablet TAKE 1 TABLET BY MOUTH TWICE A DAY 60 tablet 6    potassium chloride (KLOR-CON) 10 MEQ extended release tablet Take 10 mEq by mouth daily   3    budesonide-formoterol (SYMBICORT) 160-4.5 MCG/ACT AERO Inhale 2 puffs into the lungs 2 times daily 3 Inhaler 3    tiotropium (SPIRIVA RESPIMAT) 2.5 MCG/ACT AERS inhaler Inhale 2 puffs into the lungs daily 3 Inhaler 3    albuterol (PROVENTIL) (2.5 MG/3ML) 0.083% nebulizer solution Take 3 mLs by nebulization every 6 hours as needed for Wheezing Dx: COPD      ICD-10: J44.9 360 mL 6    alendronate (FOSAMAX) 70 MG tablet Take 1 tablet by mouth every 7 days 12 tablet 3    omeprazole (PRILOSEC) 20 MG delayed release capsule Take 20 mg by mouth Daily Takes as needed      Adalimumab (HUMIRA) 20 MG/0.4ML KIT Inject 1 vial into the skin every 14 days       predniSONE (DELTASONE) 10 MG tablet Take 10 mg by mouth daily Take every morning      cyclobenzaprine (FLEXERIL) 5 MG tablet Take 5 mg by mouth 4 times daily Every 6 hours for muscle spasms         Allergies:  No Known Allergies     Social History:  Patient Lives  reports that she quit smoking about 5 years ago. Her smoking use included cigarettes. She has a 7.50 pack-year smoking history. She has never used smokeless tobacco. She reports that she does not drink alcohol or use drugs.      Family History:  family history includes Early Death (age of onset: 36) in her mother; Heart Disease in her father and mother; High Blood Pressure in her brother, father, mother, and sister; High Cholesterol in her father and mother; Stroke (age of onset: 79) in her father. Physical Exam:  /65   Pulse 106   Temp 98.4 °F (36.9 °C) (Oral)   Resp 20   Ht 5' (1.524 m)   Wt 145 lb 14.4 oz (66.2 kg)   SpO2 100%   BMI 28.49 kg/m²     General appearance:  Appears acutely ill. Well nourished  Eyes: Sclera clear, pupils equal  ENT: Dry mucus membranes, no thrush. Trachea midline. Cardiovascular: Tachycardic, normal S1, S2. No murmur, gallop, rub. No edema in lower extremities  Respiratory: Clear to auscultation bilaterally, no wheeze, good inspiratory effort  Gastrointestinal: Abdomen soft with diffuse tenderness; no rebound or guarding normal bowel sounds  Musculoskeletal: No cyanosis in digits, neck supple  Neurology: Cranial nerves grossly intact. Alert and oriented in time, place and person. No speech or motor deficits  Psychiatry: Appropriate affect. Not agitated  Skin: Warm, dry, normal turgor, no rash  Brisk capillary refill, peripheral pulses palpable   Labs:  CBC:   Lab Results   Component Value Date    WBC 13.6 09/15/2020    RBC 3.27 09/15/2020    HGB 7.7 09/16/2020    HCT 24.2 09/16/2020    MCV 63.5 09/15/2020    MCH 20.0 09/15/2020    MCHC 31.5 09/15/2020    RDW 15.6 09/15/2020     09/15/2020    MPV 6.8 09/15/2020     BMP:    Lab Results   Component Value Date     09/15/2020    K 4.4 09/15/2020     09/15/2020    CO2 12 09/15/2020    BUN 23 09/15/2020    CREATININE 1.6 09/15/2020    CALCIUM 8.0 09/15/2020    GFRAA 39 09/15/2020    GFRAA >60 04/26/2013    LABGLOM 32 09/15/2020    GLUCOSE 158 09/15/2020     CT ABDOMEN PELVIS WO CONTRAST Additional Contrast? None   Final Result   Overall, no significant change from prior. Scattered fluid-filled loops of   bowel are seen.   No retroperitoneal hematoma         CT CHEST WO CONTRAST   Final Result   Decreasing consolidation in the left upper lobe with resolution of left   pleural effusion         XR CHEST PORTABLE   Final hospitalization spanning more than two midnights for investigation and treatment of the above medically necessary diagnoses. Please note that some part of this chart was generated using Dragon dictation software. Although every effort was made to ensure the accuracy of this automated transcription, some errors in transcription may have occurred inadvertently. If you may need any clarification, please do not hesitate to contact me through [place].        Mily Taylor MD    9/16/2020 2:32 AM

## 2020-09-15 NOTE — ED NOTES
Pt. Did take BM in bedside commode but States she cannot provide urine sample as of yet.             Nishi Jones RN  09/14/20 2053

## 2020-09-15 NOTE — PROGRESS NOTES
Clinical Pharmacy Note: Pharmacy to Dose Vancomycin    Efrain Aceves is a 77 y.o. female started on Vancomycin; consult received from Dr. Karina Collier to manage therapy. Also receiving the following antibiotics: cefepime. Goal Trough Level: 15-20 mcg/mL    Assessment/Plan:  Initiate vancomycin 1000 mg IV every 24 hours. A vancomycin trough has been ordered for [email or web address]. Changes in regimen will be determined based on culture results, renal function, and clinical response. Pharmacy will continue to monitor and adjust regimen as necessary. Allergies:  Patient has no known allergies. Recent Labs     09/14/20  1437   CREATININE 1.8*       Recent Labs     09/14/20  1437   WBC 10.2       Ht Readings from Last 1 Encounters:   09/14/20 5' (1.524 m)        Wt Readings from Last 1 Encounters:   09/14/20 150 lb (68 kg)         Estimated Creatinine Clearance: 26 mL/min (A) (based on SCr of 1.8 mg/dL (H)).       Thank you for the consult,    Megan Costello PharmD

## 2020-09-15 NOTE — CONSULTS
Infectious Diseases   Consult Note        Admission Date: 9/14/2020  Hospital Day: Hospital Day: 2   Attending: Francis Allen MD  Date of service: 9/15/20     Reason for admission: Sepsis [facility]) [A41.9]  Sepsis [facility]) [A41.9]    Chief complaint/ Reason for consult: Acute on chronic respiratory failure with hypoxia, lactic acidosis    Microbiology:        I have reviewed allavailable micro lab data and cultures    Blood culture (2/2) - collected on 9/14/2020: In process    Antibiotics and immunizations:       Current antibiotics: All antibiotics and their doses were reviewed by me    Recent Abx Admin                   metroNIDAZOLE (FLAGYL) tablet 500 mg (mg) 500 mg Given 09/15/20 0619     500 mg Given  0010    cefepime (MAXIPIME) 1 g IVPB minibag (g) 1 g New Bag 09/15/20 0343    vancomycin 1000 mg IVPB in 250 mL D5W addavial (mg) 1,000 mg New Bag 09/14/20 1652    cefepime (MAXIPIME) 1 g IVPB minibag (g) 1 g New Bag 09/14/20 1616                  Immunization History: All immunization history was reviewed by me today. Immunization History   Administered Date(s) Administered    DT (pediatric) 09/25/2010    Influenza Virus Vaccine 10/01/2012, 10/20/2015    Influenza Whole 10/01/2011    Influenza, High Dose (Fluzone 65 yrs and older) 10/06/2014, 10/19/2016, 11/16/2017, 11/05/2018    Influenza, Triv, inactivated, subunit, adjuvanted, IM (Fluad 65 yrs and older) 11/13/2019    Pneumococcal Conjugate 13-valent (Pfivoft68) 06/11/2015    Pneumococcal Conjugate 7-valent (Prevnar7) 02/07/2005, 10/01/2011    Pneumococcal Polysaccharide ([id number]) 06/15/2016    Zoster Live (Zostavax) 07/01/2013       Known drug allergies: All allergies were reviewed and updated    No Known Allergies    Social history:     Social History:  All social andepidemiologic history was reviewed and updated by me today as needed. · Tobacco use:   reports that she quit smoking about 5 years ago. Her smoking use included cigarettes. She has a 7.50 pack-year smoking history. She has never used smokeless tobacco.  · Alcohol use:   reports no history of alcohol use. · Currently lives in: [facility] [address]  ·  reports no history of drug use. Assessment:     The patient is a 77 y.o. old female who  has a past medical history of Bullous emphysema ([address] CKD (chronic kidney disease) stage 3, GFR 30-59 ml/min ([facility] [address] Clostridium difficile carrier (01/21/2019), COPD (chronic obstructive pulmonary disease) ([facility] [address] Crohn's disease ([address] Depression (1/30/2011), DVT (deep venous thrombosis) ([facility] [address] Hiatal hernia, blood clots, Kidney disease, Kidney insufficiency, Osteoporosis, Peripheral neuropathy, Pneumonia, Postmenopausal, Pulmonary embolism ([facility] [address] Sepsis ([facility] [address] and Syringomyelia ([facility] [address] with following problems:    · Acute on chronic respiratory failure with hypoxia,  · Severe lactic acidosis  · Recent hospitalization for left-sided multifocal pneumonia with necrotizing infiltrate in the left upper lobe  · Status post diagnostic bronchoscopy on 9/2/2020  · Bilateral chronic emphysema  · Longstanding steroid use  · Acute kidney injury on chronic kidney disease  · Microcytic hypochromic anemia  · Crohn's disease  · History of DVT  · Chronic small vessel CNS disease on recent MRI of the brain  · Ex-smoker  · History of depression  · Overweight due to excess calorie intake : Body mass index is 28.49 kg/m². ·       Discussion:      In the ER, patient was afebrile. White cell count was 10,200. Serum lactate was however quite elevated at 4.0 yesterday and was 4.8 earlier today    This is good improvement from the previous white cell count of 21,300 on 9/5/2020. He was started empirically on IV vancomycin, cefepime and Flagyl. Blood cultures and urine Legionella and pneumococcal antigens have been sent. She has been also given IV Solu-Medrol 125 mg yesterday and 40 mg today. The elevation in white cell count today secondary to that.     I reviewed the microbiology results of her BAL done on 9/2/2020. BAL AFB stain and culture, fungal stain and culture, viral panel, Aspergillus galactomannan, histoplasma antigen, fungal serologies by immunodiffusion and complement fixation, direct MTB PCR have all been negative    Urinalysis from this admission was negative for UTI, did show some yeast in the urine analysis. She had a CT scan of chest abdomen and pelvis done yesterday without contrast.  Images reviewed. Left upper lobe consolidation is improving and left pleural effusion has resolved. No acute intra-abdominal abnormality was noted. No retroperitoneal hematoma. Her platelet count was 966,[phone]. Hemoglobin was 6.6    EKG from admission reviewed. QTC was 449 ms, which has been pretty good stable    Plan:     Diagnostic Workup:    · Follow-up on COVID 19 PCR test  · Follow-up on urine Legionella and pneumococcal antigens**  · Continue to follow fever curve, WBC count and blood cultures  · Follow up on liverand renal functions closely    Antimicrobials:    · Agree with packed ed cell transfusion for severe anemia   · Will continue empiric IV vancomycin for now  Check Vancomycin trough before the 5th dose. Target vancomycin trough of around 15. Keep vancomycin trough below 20 at all times. Avoid increasing the dose of vancomycin above a total of 4 grams in a 24-hour period in patients younger than 45 years and above 3 grams in a 24-hour period in a patient of age 39 years or older. Continue to monitor serum creatinine and Vanco levels closely, while the patient is on I/v Vancomycin. · If no evidence of MRSA in the cultures, will plan to stop IV vancomycin coming days  · We will stop IV cefepime today  · Will continue oral Flagyl 500 mg every 8 hours  · We will order p.o.  Levaquin at a renally adjusted dose of 750 mg every 48 hours  · COVID 19 rule out precautions  · Fall and aspiration precautions  · DVT prophylaxis  · Discussed the above plan with patient and RN       Drug Monitoring:    · Continue serial monitoring for antibiotic toxicity as follows: Vanco trough, CMP, QTc interval  · Continue to watch for following: new or worsening fever, hypotension, hives, lip swelling and redness or purulence at vascular access sites. I/v access Management:    · Continue to monitor i.v access sites for erythema, induration, discharge or tenderness. · As always, continue efforts to minimizetubes/lines/drains as clinically appropriate to reduce chances of line associated infections. Current isolation precautions:    Currently active isolation(s): Droplet Plus       Level of complexity of consult: High     Risk of Complications/Morbidity: High     · Illness(es)/ Infection present that pose threat to life/bodily function. · There is potential for severe exacerbation of infection/side effects of treatment. · Therapy requires intensive monitoring for antimicrobial agent toxicity. Thank you for involving me in the care of your patient. I will continue to follow. If you have any additional questions, please do not hesitate to contact me. Subjective:     Presenting complaint in ER:     Chief Complaint   Patient presents with    Shortness of [address] brought pt. \"Pt says,\" I was released from here a week ago with pnemonia. I have not slept since then I am short of breath. I have left sided rib pain. \"        HPI: Len Tavares is a 77 y.o. female patient, who was seen at the request of Dr. Francis Allen MD.    History was obtained from chart review and the patient/ RN. The patient was admitted on 9/14/2020. I have been consulted to see the patient for above mentioned reason(s). The patient has multiple medical comorbidities, and presented to the ER for profound dyspnea with exertion and at rest, coughing. The patient was admitted from August 28 to September 6 for sepsis and acute on chronic respiratory failure with hypoxia. She was found to have left-sided multifocal pneumonia with necrotizing infiltrate in the left upper lobe and had a bronchoscopy on 9/2/2020. He showed clinical improvement and was discharged on empiric linezolid for 10 days and empiric Levaquin and Flagyl for 4 weeks. The patient continues to be short of breath and was also having left-sided pleuritic chest pain and hence called EMS and was brought to the ER and was admitted. I have been asked for my opinion for management for this patient. Past Medical History: All past medical history reviewed today. Past Medical History:   Diagnosis Date    Bullous emphysema (HCC)     CKD (chronic kidney disease) stage 3, GFR 30-59 ml/min (HCC)     Clostridium difficile carrier 01/21/2019    COPD (chronic obstructive pulmonary disease) ([address]     PFT done 7 years ago   Devang Stinson Crohn's disease ([address]     Crohn's disease    Depression 1/30/2011    pt states resolved as of 3-26-12    DVT (deep venous thrombosis) ([address]     2012; first ocurrence     Hiatal hernia     Hx of blood clots     Kidney disease     Kidney insufficiency     Osteoporosis     Peripheral neuropathy     neuropathy in legs    Pneumonia     Postmenopausal     LMP in  40's    Pulmonary embolism ([address]     2012; first ocurrence    Sepsis ([address]     Syringomyelia ([address]          Past Surgical History: All pastsurgical history was reviewed today. Past Surgical History:   Procedure Laterality Date    ABDOMEN SURGERY      BACK SURGERY      shunt to drain CSF    BIOPSY SHOULDER Left 2/27/2019    EXCISION OF LEFT SHOULDER MASS performed by Kris Wong MD at [facility]      [facility] ? V-P SHUNT    BRONCHOSCOPY N/A 9/2/2020    BRONCHOSCOPY ALVEOLAR LAVAGE performed by Lindy Travis MD at [facility]      resection X2    COLONOSCOPY  12/5/11    BIOPSY AND POLYPECTOMY    COLONOSCOPY muscle spasms     sodium chloride  20 mL Intravenous Once    pantoprazole  40 mg Intravenous BID    methylPREDNISolone  20 mg Intravenous Daily    levoFLOXacin  750 mg Oral Every Other Day    budesonide-formoterol  2 puff Inhalation BID    metroNIDAZOLE  500 mg Oral 3 times per day    tiotropium  2 puff Inhalation Daily    vancomycin  1,000 mg Intravenous Q24H          REVIEW OF SYSTEMS:       Review of Systems   Constitutional: Positive for fatigue. Negative for chills, diaphoresis and unexpected weight change. HENT: Negative for congestion, ear discharge, ear pain, facial swelling, hearing loss, rhinorrhea and trouble swallowing. Eyes: Negative for photophobia, discharge, redness and visual disturbance. Respiratory: Positive for cough and shortness of breath. Negative for apnea, choking, chest tightness and stridor. Pleuritic chest pain   Cardiovascular: Negative for chest pain and palpitations. Gastrointestinal: Negative for abdominal pain, blood in stool, diarrhea and nausea. Endocrine: Negative for polydipsia, polyphagia and polyuria. Genitourinary: Negative for difficulty urinating, dysuria, frequency, hematuria, menstrual problem and vaginal discharge. Musculoskeletal: Negative for arthralgias, joint swelling, myalgias and neck stiffness. Skin: Negative for color change and rash. Allergic/Immunologic: Negative for immunocompromised state. Neurological: Negative for dizziness, seizures, speech difficulty, light-headedness and headaches. Hematological: Negative for adenopathy. Psychiatric/Behavioral: Negative for agitation, hallucinations and suicidal ideas.          Objective:       PHYSICAL EXAM:      Vitals:   Vitals:    09/15/20 1015 09/15/20 1130 09/15/20 1349 09/15/20 1418   BP: 123/78 125/88 107/61 122/65   Pulse: 114 105 101 102   Resp:  20 20 20   Temp:  98.4 °F (36.9 °C) 98 °F (36.7 °C) 97.9 °F (36.6 °C)   TempSrc:  Oral Oral Oral   SpO2:  100% 98% 100% Weight:       Height:           Physical Exam      PHYSICAL EXAM:     In-person bedside physical examination deferred. Pursuant to the emergency declaration under the [address] [place] [address] and the [employer] Act, this clinical encounter was conducted to provide necessary medical care. (Also consistent with new provisions and guidance offered by [facility] on March 18, 2020 in setting of COVID 19 outbreak and in order to preserve personal protective equipment in accordance with the flexibilities announced by CMS on March 30, 2020)   References: https://[facility]. [facility]/Portals/0/Resources/COVID-19/3_18%20Telemed%20Guidance%20Updated%20March%2018. pdf?[email or web address]                      https://[facility]. [facility]/Portals/0/Resources/COVID-19/3_18%20Telemed%20Guidance%20Updated%20March%2018. pdf?[email or web address]                      [email or web address] pdf                            General: Awake and oriented to time place and person according to primary service, vitals reviewed  HEENT: Deferred  Cardiovascular: Telemetry data reviewed, rest deferred   Pulmonary: deferred  Abdomen/GI: deferred  Neuro: deferred  Skin: deferred  Musculoskeletal:  deferred  Genitourinary: Deferred  Psych: deferred  Lymphatic/Immunologic: deferred    Intake and output:     I/O last 3 completed shifts:  In: -   Out: 700 [Urine:700]    Lab Data:   All available labs were reviewed by me today.      CBC:   Recent Labs     09/14/20  1437 09/15/20  0907   WBC 10.2 13.6*   RBC 3.59* 3.27*   HGB 7.4* 6.6*   HCT 22.5* 20.8*    329   MCV 62.7* 63.5*   MCH 20.5* 20.0*   MCHC 32.7 31.5   RDW 15.3 15.6*        BMP:  Recent Labs     09/14/20  1437 09/15/20  0449    138   K 4.7 4.4    112*   CO2 19* 12*   BUN 23* 23*   CREATININE 1.8* 1.6*   CALCIUM 9.1 8.0*   GLUCOSE 123* 158*        Hepatic FunctionPanel:   Lab Results   Component Value Date    ALKPHOS 65 09/15/2020    ALT 8 09/15/2020    AST 12 09/15/2020    PROT 5.8 09/15/2020    PROT 7.1 11/13/2012    BILITOT <0.2 09/15/2020    BILIDIR <0.2 01/05/2020    IBILI see below 01/05/2020    LABALBU 2.9 09/15/2020       CPK:   Lab Results   Component Value Date    CKTOTAL 29 08/07/2011     ESR:   Lab Results   Component Value Date    SEDRATE 24 03/18/2019     CRP:   Lab Results   Component Value Date    CRP 2.1 03/18/2019         Imaging: All pertinent images and reports for the current visit were reviewed by meduring this visit. CT ABDOMEN PELVIS WO CONTRAST Additional Contrast? None   Final Result   Overall, no significant change from prior. Scattered fluid-filled loops of   bowel are seen. No retroperitoneal hematoma         CT CHEST WO CONTRAST   Final Result   Decreasing consolidation in the left upper lobe with resolution of left   pleural effusion         XR CHEST PORTABLE   Final Result   Slight further graph shin in the left lung airspace disease with moderate   residual remaining. Additional follow-up is recommended to be certain that   this resolves further. VL Extremity Venous Bilateral    (Results Pending)       Outside records:    Labs, Microbiology, Radiology and pertinent results from Care everywhere, if available, were reviewed as a part ofthe consultation.       Problem list:       Patient Active Problem List   Diagnosis Code    Crohn disease ([address] K50.90    Depression F32.9    Osteoarthritis M19.90    Lupus anticoagulant disorder ([address] D68.62    Dysphagia R13.10    Syringomyelia ([address] G95.0    Gastritis and gastroduodenitis K29.70, K29.90    Skin ulcer of shoulder ([address] L98.499    Shortness of breath R06.02    Anemia D64.9    Chronic emphysema syndrome ([address] J43.9    Pulmonary fibrosis ([address] J84.10    Chronic hypoxemic respiratory failure (HCC) J96.11    Hiatal hernia K44.9    COPD, severe (HCC) J44.9    Alpha thalassemia minor D56.3    Acute on chronic respiratory failure with hypoxia (HCC) J96.21    Bronchiectasis with acute exacerbation (HCC) J47.1    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    Osteoporosis of multiple sites M81.0    Gastroesophageal reflux disease without esophagitis K21.9    Sepsis (HCC) A41.9    Bilateral pulmonary embolism (HCC) I26.99    Acute deep vein thrombosis (DVT) of distal vein of both lower extremities ([facility]) I82.4Z3    History of pulmonary embolism Z86.711    Dyspnea and respiratory abnormalities R06.00, R06.89    Wound of left shoulder S41.002A    Hx pulmonary embolism Z86.711    COPD exacerbation (HCC) J44.1    Cerebrovascular accident (CVA) ([facility]) I63.9    MARIS (acute kidney injury) ([facility] N17.9    History of DVT (deep vein thrombosis) Z86.718    Small vessel disease, cerebrovascular I67.9    Ex-smoker Z87.891    History of depression Z86.59    Overweight E66.3    HCAP (healthcare-associated pneumonia) J18.9    Lactic acidosis E87.2    Long term (current) use of systemic steroids Z79.52         Please note that this chart was generated using Dragon dictation software. Although every effort was made to ensure the accuracy of this automated transcription, some errors in transcription may have occurred inadvertently. If you may need any clarification, please do not hesitate to contact me through [employer] or at the phone number provided below with my electronic signature. Any pictures or media included in this note were obtained after taking informed verbal consent from the patient and with their approval to include those in the patient's medical record.       Sabi Medellin MD, MPH  9/15/20, 2:07 PM EDT   [facility] Infectious Disease   [address] [place] [address], [address] [place] [address]  Office: [phone]  Fax: [phone]  Clinic days:  Tuesday & Thursday

## 2020-09-15 NOTE — PROGRESS NOTES
.4 Eyes Skin Assessment     The patient is being assess for  Admission    I agree that 2 RN's have performed a thorough Head to Toe Skin Assessment on the patient. ALL assessment sites listed below have been assessed. Areas assessed by both nurses: Head to Toe  [x]   Head, Face, and Ears   [x]   Shoulders, Back, and Chest  [x]   Arms, Elbows, and Hands   [x]   Coccyx, Sacrum, and IschIum  [x]   Legs, Feet, and Heels        Does the Patient have Skin Breakdown?   No         Anthony Prevention initiated:  NA   Wound Care Orders initiated:  NA      WO nurse consulted for Pressure Injury (Stage 3,4, Unstageable, DTI, NWPT, and Complex wounds), New and Established Ostomies:  NA      Nurse 1 eSignature: Electronically signed by Saundra Mancia RN on 9/14/20 at 11:59 PM EDT    **SHARE this note so that the co-signing nurse is able to place an eSignature**    Nurse 2 eSignature: Electronically signed by Anika Combs RN on 9/14/20 at 11:59 PM EDT

## 2020-09-15 NOTE — PROGRESS NOTES
Clinical Pharmacy Note     Eliquis 5mg BID placed on hold by Dr. Elsa Bishop. Order discontinued per protocol. Please assess and reorder when appropriate. Thank you for allowing us to participate in the care of this patient.      Giancarlo Trinidad PharmD  9/15/2020 10:03 AM

## 2020-09-15 NOTE — CONSULTS
OF LEFT SHOULDER MASS performed by Elizabeth Mondragon MD at [facility]      crainotomy- remove fluid ? V-P SHUNT    BRONCHOSCOPY N/A 9/2/2020    BRONCHOSCOPY ALVEOLAR LAVAGE performed by Tree Price MD at [facility]      resection X2    COLONOSCOPY  12/5/11    BIOPSY AND POLYPECTOMY    COLONOSCOPY  4-2-2012    and ablation ERBE/APC    SHOULDER SURGERY      L      Current Medications:    Current Facility-Administered Medications: lactated ringers infusion, , Intravenous, Continuous  0.9 % sodium chloride bolus, 20 mL, Intravenous, Once  pantoprazole (PROTONIX) injection 40 mg, 40 mg, Intravenous, BID  methylPREDNISolone sodium (SOLU-MEDROL) injection 20 mg, 20 mg, Intravenous, Daily  budesonide-formoterol (SYMBICORT) 160-4.5 MCG/ACT inhaler 2 puff, 2 puff, Inhalation, BID  metroNIDAZOLE (FLAGYL) tablet 500 mg, 500 mg, Oral, 3 times per day  tiotropium (SPIRIVA RESPIMAT) 2.5 MCG/ACT inhaler 2 puff, 2 puff, Inhalation, Daily  oxyCODONE (ROXICODONE) immediate release tablet 5 mg, 5 mg, Oral, Q6H PRN  sodium chloride flush 0.9 % injection 10 mL, 10 mL, Intravenous, PRN  acetaminophen (TYLENOL) tablet 650 mg, 650 mg, Oral, Q6H PRN **OR** acetaminophen (TYLENOL) suppository 650 mg, 650 mg, Rectal, Q6H PRN  ondansetron (ZOFRAN-ODT) disintegrating tablet 4 mg, 4 mg, Oral, Q8H PRN **OR** ondansetron (ZOFRAN) injection 4 mg, 4 mg, Intravenous, Q6H PRN  cefepime (MAXIPIME) 1 g IVPB minibag, 1 g, Intravenous, Q12H  vancomycin 1000 mg IVPB in 250 mL D5W addavial, 1,000 mg, Intravenous, Q24H    No Known Allergies    Social History:    TOBACCO:   reports that she quit smoking about 5 years ago. Her smoking use included cigarettes. She has a 7.50 pack-year smoking history. She has never used smokeless tobacco.  ETOH:   reports no history of alcohol use.   Patient currently lives independently    Family History:       Problem Relation Age of Onset    Heart Disease Mother     High Blood Pressure Mother     High Cholesterol Mother     Early Death Mother 36        MI    Heart Disease Father     High Blood Pressure Father     High Cholesterol Father     Stroke Father 79    High Blood Pressure Sister     High Blood Pressure Brother        REVIEW OF SYSTEMS:    Constitutional: negative for fatigue, fevers, malaise and weight loss  Ears, nose, mouth, throat: negative for ear drainage, epistaxis, hoarseness, nasal congestion, sore throat and voice change  Respiratory: negative except for cough and shortness of breath  Cardiovascular: negative for chest pain, chest pressure/discomfort, irregular heart beat, lower extremity edema and palpitations  Gastrointestinal: Abdominal pain, distention, constipation +, no nausea or vomiting  Hematologic/lymphatic: negative for bleeding, easy bruising, lymphadenopathy and petechiae  Musculoskeletal:negative for arthralgias, bone pain, muscle weakness, neck pain and stiff joints  Neurological: negative for dizziness, gait problems, headaches, seizures, speech problems, tremors and weakness  Behavioral/Psych: negative for anxiety, behavior problems, depression, fatigue and sleep disturbance  Endocrine: negative for diabetic symptoms including none, neuropathy, polyphagia, polyuria, polydipsia, vomiting and diarrhea and temperature intolerance  Allergic/Immunologic: negative for anaphylaxis, angioedema, hay fever and urticaria      Objective:     Patient Vitals for the past 8 hrs:   BP Temp Temp src Pulse Resp SpO2 Weight   09/15/20 1130 125/88 98.4 °F (36.9 °C) Oral 105 20 100 % --   09/15/20 1015 123/78 -- -- 114 -- -- --   09/15/20 0945 115/73 -- -- 110 20 100 % --   09/15/20 0821 -- -- -- -- 20 97 % --   09/15/20 0615 107/65 98.1 °F (36.7 °C) Oral 112 20 97 % 145 lb 14.4 oz (66.2 kg)   09/15/20 0600 -- -- -- 111 -- -- --     I/O last 3 completed shifts:  In: -   Out: 700 [Urine:700]  I/O this shift:  In: -   Out: 200 [Urine:200]    Physical Exam:  General Appearance: alert and oriented to person, place and time, well developed and well- nourished, in no acute distress  Skin: warm and dry, no rash or erythema  Head: normocephalic and atraumatic  Eyes: pupils equal, round, and reactive to light, extraocular eye movements intact, conjunctivae normal  ENT: external ear and ear canal normal bilaterally, nose without deformity, nasal mucosa and turbinates normal  Neck: supple and non-tender without mass, no cervical lymphadenopathy  Pulmonary/Chest: wheezing present-scattered, bilateral  Cardiovascular: normal rate, regular rhythm,  no murmurs, rubs, distal pulses intact, no carotid bruits  Abdomen: soft, non-tender, non-distended, normal bowel sounds, no masses or organomegaly  Lymph Nodes: Cervical, supraclavicular normal  Extremities: no cyanosis, clubbing or edema  Musculoskeletal: normal range of motion, no joint swelling, deformity or tenderness  Neurologic: alert, no focal neurologic deficits    Data Review:  CBC:   Lab Results   Component Value Date    WBC 13.6 09/15/2020    RBC 3.27 09/15/2020     BMP:   Lab Results   Component Value Date    GLUCOSE 158 09/15/2020    CO2 12 09/15/2020    BUN 23 09/15/2020    CREATININE 1.6 09/15/2020    CALCIUM 8.0 09/15/2020     ABG:   Lab Results   Component Value Date    VSQ3XIY 16.5 09/15/2020    BEART -8.4 09/15/2020    Z4ZQMLMS 100.0 09/15/2020    PHART 7.349 09/15/2020    KTK3CLZ 29.9 09/15/2020    PO2ART 178.0 09/15/2020    JSH5VRV 38.9 09/15/2020       Radiology: All pertinent images / reports were reviewed as a part of this visit. Narrative    EXAMINATION:    CT OF THE CHEST WITHOUT CONTRAST 9/14/2020 3:56 pm         TECHNIQUE:    CT of the chest was performed without the administration of intravenous    contrast. Multiplanar reformatted images are provided for review.  Dose    modulation, iterative reconstruction, and/or weight based adjustment of the    mA/kV was utilized to reduce the radiation dose to as low as reasonably    achievable.         COMPARISON:    None.         HISTORY:    ORDERING SYSTEM PROVIDED HISTORY: sob    TECHNOLOGIST PROVIDED HISTORY:    Reason for exam:->sob    Reason for Exam: sob    Acuity: Acute    Type of Exam: Initial         FINDINGS:    Mediastinum: Stable appearance of the mediastinum.  Evidence of pericardial    effusion calcified nodes are noted in the mediastinum.         Lungs/pleura: Decrease in consolidation in left upper wall as compared to    prior study is resolution left pleural effusion.         Upper Abdomen: Stable appearance of visualized portion of liver and spleen    compared to the prior study         Soft Tissues/Bones: No change in alignment of the spine              Impression    Decreasing consolidation in the left upper lobe with resolution of left    pleural effusion          Problem List:   Severe COPD with acute exacerbation  Abdominal pain and distention/history of Crohn's disease  Acute blood loss anemia  Lactic acidosis  Assessment/Plan:     Patient does appear to have mild exacerbation of COPD, though most of her presenting complaints are related to abdominal pain and distention. Patient also noted to have significant anemia, microcytic-hemoglobin of 6.6. Plans for 2 units of blood transfusion. ? History of thalassemia since MCV is very low, check iron studies. Patient does not have any history of recent GI bleed. States that she usually has loose stools related to Crohn's disease, which lately has been more trending towards constipation. CT abdomen showed no evidence of obstruction. Reviewed patient CT imaging performed yesterday which shows improved left lingular infiltrate. No obvious mucus plugging noted. Will not benefit from repeat bronchoscopy. Continue with bronchodilators, patient reluctant to increase the dose of Solu-Medrol, since she believes that it does cause some GI adverse effects.   Will give her a moderate dose of Solu-Medrol 20 mg IV daily. Lactic acidosis 4.8>2.8. Monitor. Hopefully should improve with IV fluid resuscitation and blood transfusion. Pulmonary will closely follow.     Radhika Richards MD

## 2020-09-15 NOTE — PROGRESS NOTES
09/15/20 0108   Incentive Spirometry Tx   Treatment Tolerance Well   Incentive Spirometry Goal (mL) 415 mL   Incentive Spirometry Achieved (mL) 1000 mL

## 2020-09-16 LAB
A/G RATIO: 1.2 (ref 1.1–2.2)
ALBUMIN SERPL-MCNC: 2.9 G/DL (ref 3.4–5)
ALP BLD-CCNC: 59 U/L (ref 40–129)
ALT SERPL-CCNC: 8 U/L (ref 10–40)
ANION GAP SERPL CALCULATED.3IONS-SCNC: 8 MMOL/L (ref 3–16)
AST SERPL-CCNC: 11 U/L (ref 15–37)
BASOPHILS ABSOLUTE: 0 K/UL (ref 0–0.2)
BASOPHILS RELATIVE PERCENT: 0.3 %
BILIRUB SERPL-MCNC: <0.2 MG/DL (ref 0–1)
BUN BLDV-MCNC: 24 MG/DL (ref 7–20)
C DIFF TOXIN/ANTIGEN: NORMAL
CALCIUM SERPL-MCNC: 8 MG/DL (ref 8.3–10.6)
CHLORIDE BLD-SCNC: 113 MMOL/L (ref 99–110)
CO2: 18 MMOL/L (ref 21–32)
CREAT SERPL-MCNC: 1.7 MG/DL (ref 0.6–1.2)
EKG ATRIAL RATE: 105 BPM
EKG DIAGNOSIS: NORMAL
EKG P AXIS: 9 DEGREES
EKG P-R INTERVAL: 130 MS
EKG Q-T INTERVAL: 340 MS
EKG QRS DURATION: 62 MS
EKG QTC CALCULATION (BAZETT): 449 MS
EKG R AXIS: 9 DEGREES
EKG T AXIS: 25 DEGREES
EKG VENTRICULAR RATE: 105 BPM
EOSINOPHILS ABSOLUTE: 0.1 K/UL (ref 0–0.6)
EOSINOPHILS RELATIVE PERCENT: 1.1 %
FERRITIN: 1213 NG/ML (ref 15–150)
GFR AFRICAN AMERICAN: 36
GFR NON-AFRICAN AMERICAN: 30
GLOBULIN: 2.5 G/DL
GLUCOSE BLD-MCNC: 87 MG/DL (ref 70–99)
HCT VFR BLD CALC: 23.8 % (ref 36–48)
HCT VFR BLD CALC: 24 % (ref 36–48)
HCT VFR BLD CALC: 24.2 % (ref 36–48)
HEMOGLOBIN: 7.7 G/DL (ref 12–16)
HEMOGLOBIN: 7.7 G/DL (ref 12–16)
HEMOGLOBIN: 8 G/DL (ref 12–16)
IRON SATURATION: ABNORMAL % (ref 15–50)
IRON: 219 UG/DL (ref 37–145)
L. PNEUMOPHILA SEROGP 1 UR AG: NORMAL
LACTIC ACID: 1.5 MMOL/L (ref 0.4–2)
LACTIC ACID: 1.9 MMOL/L (ref 0.4–2)
LACTIC ACID: 2.5 MMOL/L (ref 0.4–2)
LACTIC ACID: 5.4 MMOL/L (ref 0.4–2)
LYMPHOCYTES ABSOLUTE: 4.1 K/UL (ref 1–5.1)
LYMPHOCYTES RELATIVE PERCENT: 38.4 %
MAGNESIUM: 1.9 MG/DL (ref 1.8–2.4)
MCH RBC QN AUTO: 22.1 PG (ref 26–34)
MCHC RBC AUTO-ENTMCNC: 32.1 G/DL (ref 31–36)
MCV RBC AUTO: 68.8 FL (ref 80–100)
MONOCYTES ABSOLUTE: 0.3 K/UL (ref 0–1.3)
MONOCYTES RELATIVE PERCENT: 2.5 %
NEUTROPHILS ABSOLUTE: 6.2 K/UL (ref 1.7–7.7)
NEUTROPHILS RELATIVE PERCENT: 57.7 %
PDW BLD-RTO: 21 % (ref 12.4–15.4)
PLATELET # BLD: 253 K/UL (ref 135–450)
PMV BLD AUTO: 6.2 FL (ref 5–10.5)
POTASSIUM SERPL-SCNC: 3.9 MMOL/L (ref 3.5–5.1)
RBC # BLD: 3.49 M/UL (ref 4–5.2)
SODIUM BLD-SCNC: 139 MMOL/L (ref 136–145)
STREP PNEUMONIAE ANTIGEN, URINE: NORMAL
TOTAL IRON BINDING CAPACITY: ABNORMAL UG/DL (ref 260–445)
TOTAL PROTEIN: 5.4 G/DL (ref 6.4–8.2)
VITAMIN B-12: 272 PG/ML (ref 211–911)
WBC # BLD: 10.7 K/UL (ref 4–11)

## 2020-09-16 PROCEDURE — 85014 HEMATOCRIT: CPT

## 2020-09-16 PROCEDURE — 87324 CLOSTRIDIUM AG IA: CPT

## 2020-09-16 PROCEDURE — 97530 THERAPEUTIC ACTIVITIES: CPT

## 2020-09-16 PROCEDURE — 82607 VITAMIN B-12: CPT

## 2020-09-16 PROCEDURE — 2580000003 HC RX 258: Performed by: PEDIATRICS

## 2020-09-16 PROCEDURE — 6370000000 HC RX 637 (ALT 250 FOR IP): Performed by: HOSPITALIST

## 2020-09-16 PROCEDURE — 99232 SBSQ HOSP IP/OBS MODERATE 35: CPT | Performed by: INTERNAL MEDICINE

## 2020-09-16 PROCEDURE — 93010 ELECTROCARDIOGRAM REPORT: CPT | Performed by: INTERNAL MEDICINE

## 2020-09-16 PROCEDURE — 83605 ASSAY OF LACTIC ACID: CPT

## 2020-09-16 PROCEDURE — 2580000003 HC RX 258: Performed by: INTERNAL MEDICINE

## 2020-09-16 PROCEDURE — 99233 SBSQ HOSP IP/OBS HIGH 50: CPT | Performed by: INTERNAL MEDICINE

## 2020-09-16 PROCEDURE — 85018 HEMOGLOBIN: CPT

## 2020-09-16 PROCEDURE — 1200000000 HC SEMI PRIVATE

## 2020-09-16 PROCEDURE — 85025 COMPLETE CBC W/AUTO DIFF WBC: CPT

## 2020-09-16 PROCEDURE — 97166 OT EVAL MOD COMPLEX 45 MIN: CPT

## 2020-09-16 PROCEDURE — 94640 AIRWAY INHALATION TREATMENT: CPT

## 2020-09-16 PROCEDURE — 80053 COMPREHEN METABOLIC PANEL: CPT

## 2020-09-16 PROCEDURE — 83735 ASSAY OF MAGNESIUM: CPT

## 2020-09-16 PROCEDURE — 6360000002 HC RX W HCPCS: Performed by: INTERNAL MEDICINE

## 2020-09-16 PROCEDURE — 83550 IRON BINDING TEST: CPT

## 2020-09-16 PROCEDURE — 94761 N-INVAS EAR/PLS OXIMETRY MLT: CPT

## 2020-09-16 PROCEDURE — 2700000000 HC OXYGEN THERAPY PER DAY

## 2020-09-16 PROCEDURE — 82728 ASSAY OF FERRITIN: CPT

## 2020-09-16 PROCEDURE — 83540 ASSAY OF IRON: CPT

## 2020-09-16 PROCEDURE — 87449 NOS EACH ORGANISM AG IA: CPT

## 2020-09-16 PROCEDURE — C9113 INJ PANTOPRAZOLE SODIUM, VIA: HCPCS | Performed by: INTERNAL MEDICINE

## 2020-09-16 PROCEDURE — 97162 PT EVAL MOD COMPLEX 30 MIN: CPT

## 2020-09-16 PROCEDURE — 36415 COLL VENOUS BLD VENIPUNCTURE: CPT

## 2020-09-16 RX ORDER — LANOLIN ALCOHOL/MO/W.PET/CERES
6 CREAM (GRAM) TOPICAL ONCE
Status: COMPLETED | OUTPATIENT
Start: 2020-09-16 | End: 2020-09-17

## 2020-09-16 RX ORDER — 0.9 % SODIUM CHLORIDE 0.9 %
1000 INTRAVENOUS SOLUTION INTRAVENOUS ONCE
Status: COMPLETED | OUTPATIENT
Start: 2020-09-16 | End: 2020-09-16

## 2020-09-16 RX ORDER — PANTOPRAZOLE SODIUM 40 MG/1
40 TABLET, DELAYED RELEASE ORAL
Status: DISCONTINUED | OUTPATIENT
Start: 2020-09-17 | End: 2020-09-20 | Stop reason: HOSPADM

## 2020-09-16 RX ADMIN — OXYCODONE 5 MG: 5 TABLET ORAL at 20:19

## 2020-09-16 RX ADMIN — OXYCODONE 5 MG: 5 TABLET ORAL at 04:56

## 2020-09-16 RX ADMIN — PANTOPRAZOLE SODIUM 40 MG: 40 INJECTION, POWDER, FOR SOLUTION INTRAVENOUS at 08:47

## 2020-09-16 RX ADMIN — TIOTROPIUM BROMIDE INHALATION SPRAY 2 PUFF: 3.12 SPRAY, METERED RESPIRATORY (INHALATION) at 11:50

## 2020-09-16 RX ADMIN — Medication 2 PUFF: at 19:40

## 2020-09-16 RX ADMIN — METRONIDAZOLE 500 MG: 250 TABLET, FILM COATED ORAL at 20:19

## 2020-09-16 RX ADMIN — METRONIDAZOLE 500 MG: 250 TABLET, FILM COATED ORAL at 15:19

## 2020-09-16 RX ADMIN — METRONIDAZOLE 500 MG: 250 TABLET, FILM COATED ORAL at 04:56

## 2020-09-16 RX ADMIN — Medication 2 PUFF: at 11:50

## 2020-09-16 RX ADMIN — METHYLPREDNISOLONE SODIUM SUCCINATE 20 MG: 40 INJECTION, POWDER, FOR SOLUTION INTRAMUSCULAR; INTRAVENOUS at 08:47

## 2020-09-16 RX ADMIN — SODIUM CHLORIDE, POTASSIUM CHLORIDE, SODIUM LACTATE AND CALCIUM CHLORIDE: 600; 310; 30; 20 INJECTION, SOLUTION INTRAVENOUS at 01:18

## 2020-09-16 RX ADMIN — SODIUM CHLORIDE 1000 ML: 9 INJECTION, SOLUTION INTRAVENOUS at 12:40

## 2020-09-16 ASSESSMENT — ENCOUNTER SYMPTOMS
EYE DISCHARGE: 0
EYE REDNESS: 0
DIARRHEA: 1
RHINORRHEA: 0
BLOOD IN STOOL: 0
ABDOMINAL PAIN: 0
TROUBLE SWALLOWING: 0
FACIAL SWELLING: 0
CHEST TIGHTNESS: 0
NAUSEA: 0
STRIDOR: 0
APNEA: 0
CHOKING: 0
PHOTOPHOBIA: 0
COUGH: 1
COLOR CHANGE: 0
SHORTNESS OF BREATH: 1

## 2020-09-16 ASSESSMENT — PAIN SCALES - GENERAL
PAINLEVEL_OUTOF10: 7
PAINLEVEL_OUTOF10: 8
PAINLEVEL_OUTOF10: 6
PAINLEVEL_OUTOF10: 8
PAINLEVEL_OUTOF10: 6

## 2020-09-16 ASSESSMENT — PAIN DESCRIPTION - LOCATION
LOCATION: BACK
LOCATION: BACK

## 2020-09-16 ASSESSMENT — PAIN DESCRIPTION - ORIENTATION: ORIENTATION: LEFT

## 2020-09-16 ASSESSMENT — PAIN DESCRIPTION - PAIN TYPE: TYPE: ACUTE PAIN

## 2020-09-16 NOTE — PROGRESS NOTES
Occupational Therapy   Occupational Therapy Initial Assessment  Date: 2020   Patient Name: Siva Keys  MRN: [id number]     : 1950    Date of Service: 2020    Discharge Recommendations:    Siva Keys scored a  on the AM-PAC ADL Inpatient form. Current research shows that an AM-PAC score of 17 or less is typically not associated with a discharge to the patient's home setting. Based on the patient's AM-PAC score and their current ADL deficits, it is recommended that the patient have 5-7 sessions per week of Occupational Therapy at d/c to increase the patient's independence. At this time, this patient demonstrates the endurance, and/or tolerance for 3 hours of therapy each day, with a treatment frequency of 5-7x/wk. Please see assessment section for further patient specific details. If patient discharges prior to next session this note will serve as a discharge summary. Please see below for the latest assessment towards goals. Should pt refuse therapy recommendations and d/c home:  [address] [place] [address]: LEVEL 3 SAFETY     - Initial home health evaluation to occur within 24-48 hours, in patient home   - Therapy evaluations in home within 24-48 hours of discharge; including DME and home safety   - Frontload therapy 5 days, then 3x a week   - Therapy to evaluate if patient has [address] needs for personal care   -  evaluation within 24-48 hours, includes evaluation of resources and insurance to determine [place], [place], LTC, and Medicaid options     OT Equipment Recommendations  Equipment Needed: No  Other: continue to assess pending pt progress    Assessment   Performance deficits / Impairments: Decreased functional mobility ; Decreased ADL status; Decreased ROM; Decreased strength;Decreased safe awareness;Decreased endurance;Decreased balance;Decreased high-level IADLs  Assessment: Pt is currently functioning below occupational baseline and demo the deficits listed above. Pt would benefit from continued skilled OT services to address these deficits and increase independence, safety, and ease with all occupational pursuits. Treatment Diagnosis: Decreased ADL/IADL status, functional mobility, and functional transfers d/t Acute on chronic respiratory failure with hypoxia (HCC)  Prognosis: Good  Decision Making: Medium Complexity  OT Education: Energy Conservation;OT Role;Plan of Care;Transfer Training  Patient Education: d/c planning, benefits of continued therapy- pt verbalized understanding but adamant on returning home despite education. Barriers to Learning: none  REQUIRES OT FOLLOW UP: Yes  Activity Tolerance  Activity Tolerance: Patient Tolerated treatment well;Patient limited by fatigue  Activity Tolerance: Increased fatigue and SOB noted; Pt's SP02% dropped into mid 80's after ambulation but recovered quickly to above 90% with seated rest break. Safety Devices  Safety Devices in place: Yes  Type of devices: Call light within reach; Chair alarm in place; All fall risk precautions in place; Patient at risk for falls;Nurse notified; Left in chair           Patient Diagnosis(es): The primary encounter diagnosis was HCAP (healthcare-associated pneumonia). Diagnoses of Dyspnea and respiratory abnormalities and Lactic acidosis were also pertinent to this visit. 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 has a past surgical history that includes shoulder surgery; back surgery; brain surgery; Colon surgery; Colonoscopy (12/5/11);  Colonoscopy (4-2-2012); Cholecystectomy; Abdomen surgery; Biopsy shoulder (Left, 2/27/2019); and bronchoscopy (N/A, 9/2/2020). Treatment Diagnosis: Decreased ADL/IADL status, functional mobility, and functional transfers d/t Acute on chronic respiratory failure with hypoxia (HCC)      Restrictions  Restrictions/Precautions  Restrictions/Precautions: Fall Risk, Isolation(high fall risk; droplet plus)  Required Braces or Orthoses?: No  Position Activity Restriction  Other position/activity restrictions: Flaco Goldberg is a 77 y.o. female  who presents to the ED complaining of profound dyspnea with exertion and at rest, poor sleeping but fatigue, and coughing. She also complains of some left sided chest pains. Admitted 8/28 to 9/6 for acute on chronic hypoxic respiratory failure and was discharged on PO abx for pneumonia after bronchoscopic evaluation and BAL. She was diagnosed with bacterial pneumonia sepsis as well. Remains on 10mg prednisone daily for emphysema. Labs were obtained and noted for profound lactic acidosis at 4.0, anemia with H&H 7.4/22.5, and INR of 1.3. CT of the chest revealed decreasing consolidation left upper lobe and resolution of left-sided pleural effusion. UA revealed no evidence of infection. Blood cultures were obtained and patient was initiated on broad-spectrum antibiotics including vancomycin and cefepime. Hospitalist team consulted to admit this patient for sepsis and acute worsening of anemia secondary to CKD.     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 Sepsis ([facility] and Syringomyelia ([facility]   Family / Caregiver Present: No  Referring Practitioner: Aparna Reddy MD  Diagnosis: Acute on chronic respiratory failure with hypoxia ([facility]  Subjective  Subjective: Pt in recliner chair upon arrival, agreeable to OT/PT evaluation  Patient Currently in Pain: Yes  Pain Assessment  Pain Assessment: 0-10  Pain Level: 8  Pain Location: Back  Pain Orientation: Left  Pre Treatment Pain Screening  Intervention List: Patient able to continue with treatment  Vital Signs  Temp: 97.9 °F (36.6 °C)  Temp Source: Oral  Pulse: 107  Heart Rate Source: Monitor  Resp: 18  BP: 118/78(manual)  BP Location: Left upper arm  Patient Position: Up in chair  Level of Consciousness: Alert  MEWS Score: 2  Patient Currently in Pain: Yes  Oxygen Therapy  SpO2: 100 %  Pulse Oximeter Device Mode: Continuous  Pulse Oximeter Device Location: Right;Finger    Social/Functional History  Social/Functional History  Lives With: Alone(pt has 24/7 assist if needed from neighbors)  Type of Home: Apartment  Home Layout: One level  Home Access: Stairs to enter with rails  Entrance Stairs - Number of Steps: 5 DINESH  Entrance Stairs - Rails: Both  Bathroom Shower/Tub: Tub/Shower unit, Shower chair with back  Bathroom Toilet: Standard  Bathroom Equipment: Grab bars in shower, Tub transfer bench  Home Equipment: Grab bars, Cane, 4 wheeled walker, Oxygen, Alert Button(sleeping in recliner; has limited space to use 4WW in her apt; home O2 usually just at night)  Receives Help From: [employer], Personal care attendant  ADL Assistance: Needs assistance(neighbor assisiting recently after last hospital admission; prior to her recent illness, pt was independent)  Homemaking Assistance: Needs assistance  Homemaking Responsibilities: Yes  Ambulation Assistance: Independent(holding walls and furniture)  Transfer Assistance: Independent  Leisure & Hobbies: jaleel  Additional Comments: Pt was term goal 4: Pt will complete UB/LB dressing with supervision  Short term goal 5: Pt will complete functional mobility to<>bathroom with use of RW and supervision assist  Long term goals  Time Frame for Long term goals : STG=LTG  Patient Goals   Patient goals : to return home       Therapy Time   Individual Concurrent Group Co-treatment   Time In 0912         Time Out 0959         Minutes 47         Timed Code Treatment Minutes: [id number] [facility], [address] [place] [address] [id number]

## 2020-09-16 NOTE — PROGRESS NOTES
social andepidemiologic history was reviewed and updated by me today as needed. · Tobacco use:   reports that she quit smoking about 5 years ago. Her smoking use included cigarettes. She has a 7.50 pack-year smoking history. She has never used smokeless tobacco.  · Alcohol use:   reports no history of alcohol use. · Currently lives in: [address] [place] [address]  ·  reports no history of drug use. Assessment:     The patient is a 77 y.o. 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 with following problems:    · Acute on chronic respiratory failure with hypoxia, -currently on 3 L seen via nasal cannula  · Severe lactic acidosis-serum lactate is again elevated at 5.4  · Recent hospitalization for left-sided multifocal pneumonia with necrotizing infiltrate in the left upper lobe-improving slowly  · Status post diagnostic bronchoscopy on 9/2/2020-BAL micro work-up negative so far  · Bilateral chronic emphysema  · Longstanding steroid use  · Acute kidney injury on chronic kidney disease-serum creatinine is 1.7  · Microcytic hypochromic anemia  · Crohn's disease  · History of DVT  · Chronic small vessel CNS disease on recent MRI of the brain  · Ex-smoker  · History of depression  · Overweight due to excess calorie intake : Body mass index is 28.49 kg/m². Discussion:      Her blood cultures from 9/14/2020 remain negative. Urine Legionella and pneumococcal antigens are negative. Serum creatinine is 1.7. Liver functions are okay.     Hemoglobin is 7.7. COVID 19 test is pending. Serum lactate reported at 5.4 at 11:55 AM today. Will repeat level this afternoon. If the patient has true lactic acidosis, I am not convinced that this is related to an infectious process at this time. Her fever curve and white cell count are improving with antibiotics. Had two loose  bowel movements yesterday and one loose one today    Plan:     Diagnostic Workup:    · Follow-up on repeat lactate level this evening  · Continue to follow  fever curve, WBC count and blood cultures  · Follow up on liver and renal function  · Follow-up on COVID 19 test.  · Will check stool for C. difficile    Antimicrobials:    · All cultures remain negative for MRSA, ok  to stop vancomycin  · Continue oral Levaquin 750 mg every other day  · Continue oral Flagyl 500 mg every other day for pneumonia coverage  · Patient noted to be on Solu-Medrol 20 mg IV daily  · Aspiration precautions  · COVID 19 rule out precautions until test comes back  · If the repeat lactate level this evening also comes back elevated, consider nephrology consultation to work-up for the cause. · Continue oxygen support to maintain oxygen saturation above 92%  · DVT prophylaxis  · Fall precautions    Drug Monitoring:    · Continue monitoring for antibiotic toxicity as follows:  CBC, CMP  · Continue to watch for following: new or worsening fever, new hypotension, hives, lip swelling and redness or purulence at vascular access sites. Current isolation precautions:    Currently active isolation(s): Droplet Plus     I/v access Management:    · Continue to monitor i.v access sites for erythema, induration,discharge or tenderness. · As always, continue efforts to minimize tubes/ lines/ drains as clinically appropriate to reduce chances of line associated infections.          Level of complexity of consult: High     Risk of Complications/Morbidity: High     · Illness(es)/ Infection present that pose threat to life/bodily function. · There is potential for severe exacerbation of infection/side effects of treatment. · Therapy requires intensive monitoring for antimicrobial agent toxicity. Thank you for involving me in the care of your patient. I will continue to follow. If you have any additional questions, please do not hesitate to contact me. Subjective: Interval history: Interval history was obtained from chart review and RN. The patient is afebrile. She is tolerating antibiotics okay. She is on 3 L oxygen via nasal cannula. She is having some loose bowel movements     REVIEW OF SYSTEMS:      Review of Systems   Constitutional: Positive for fatigue. Negative for chills, diaphoresis and unexpected weight change. HENT: Negative for congestion, ear discharge, ear pain, facial swelling, hearing loss, rhinorrhea and trouble swallowing. Eyes: Negative for photophobia, discharge, redness and visual disturbance. Respiratory: Positive for cough and shortness of breath. Negative for apnea, choking, chest tightness and stridor. Cardiovascular: Negative for chest pain and palpitations. Gastrointestinal: Positive for diarrhea. Negative for abdominal pain, blood in stool and nausea. Endocrine: Negative for polydipsia, polyphagia and polyuria. Genitourinary: Negative for difficulty urinating, dysuria, frequency, hematuria, menstrual problem and vaginal discharge. Musculoskeletal: Negative for arthralgias, joint swelling, myalgias and neck stiffness. Skin: Negative for color change and rash. Allergic/Immunologic: Negative for immunocompromised state. Neurological: Negative for dizziness, seizures, speech difficulty, light-headedness and headaches. Hematological: Negative for adenopathy. Psychiatric/Behavioral: Negative for agitation, hallucinations and suicidal ideas. Past Medical History: All past medical history reviewed today.     Past Medical History:   Diagnosis Date    Bullous emphysema ([facility]     CKD (chronic kidney disease) stage 3, GFR 30-59 ml/min ([facility])     Clostridium difficile carrier 01/21/2019    COPD (chronic obstructive pulmonary disease) ([facility])     PFT done 7 years ago   [facility] Crohn's disease ([facility]     Crohn's disease    Depression 1/30/2011    pt states resolved as of 3-26-12    DVT (deep venous thrombosis) ([facility]     2012; first ocurrence     Hiatal hernia     Hx of blood clots     Kidney disease     Kidney insufficiency     Osteoporosis     Peripheral neuropathy     neuropathy in legs    Pneumonia     Postmenopausal     LMP in  40's    Pulmonary embolism ([address]     2012; first ocurrence    Sepsis ([address]     Syringomyelia ([address]        Past Surgical History: All past surgical history was reviewed today. Past Surgical History:   Procedure Laterality Date    ABDOMEN SURGERY      BACK SURGERY      shunt to drain CSF    BIOPSY SHOULDER Left 2/27/2019    EXCISION OF LEFT SHOULDER MASS performed by Zia Man MD at [address]      crainotomy- remove fluid ? V-P SHUNT    BRONCHOSCOPY N/A 9/2/2020    BRONCHOSCOPY ALVEOLAR LAVAGE performed by Jenae Castillo MD at [facility]      resection X2    COLONOSCOPY  12/5/11    BIOPSY AND POLYPECTOMY    COLONOSCOPY  4-2-2012    and ablation ERBE/APC    SHOULDER SURGERY      L        Family History: All family history was reviewed today.         Problem Relation Age of Onset    Heart Disease Mother     High Blood Pressure Mother     High Cholesterol Mother     Early Death Mother 36        MI    Heart Disease Father     High Blood Pressure Father     High Cholesterol Father     Stroke Father 79    High Blood Pressure Sister     High Blood Pressure Brother        Objective:       PHYSICAL EXAM:      Vitals:   Vitals:    09/16/20 0115 09/16/20 0446 09/16/20 0815 09/16/20 1150   BP: 107/65 108/64 112/82    Pulse: 106 108 76    Resp: 20 16 16 18   Temp: 98.4 °F (36.9 °C) 98.5 °F (36.9 °C) 97.6 °F (36.4 °C)    TempSrc: Oral Oral Axillary    SpO2: 100% 98% 98% 99%   Weight:       Height:           Physical Exam      PHYSICAL EXAM:     In-person bedside physical examination deferred. Pursuant to the emergency declaration under the [address] [place] [address] and the [employer] Act, this clinical encounter was conducted to provide necessary medical care. (Also consistent with new provisions and guidance offered by [facility] on March 18, 2020 in setting of COVID 19 outbreak and in order to preserve personal protective equipment in accordance with the flexibilities announced by CMS on March 30, 2020)   References: https://[email or web address] [email or web address]/0/Resources/COVID-19/3_18%20Telemed%20Guidance%20Updated%20March%2018. pdf?[email or web address]                      [email or web address]://[email or web address] [facility]/Portals/0/Resources/COVID-19/3_18%20Telemed%20Guidance%20Updated%20March%2018. pdf?mqo=[email or web address]                      [email or web address] pdf                            General: Awake and oriented to time place and person according to primary service, vitals reviewed  HEENT: Deferred  Cardiovascular: Telemetry data reviewed, rest deferred   Pulmonary: deferred  Abdomen/GI: deferred  Neuro: deferred  Skin: deferred  Musculoskeletal:  deferred  Genitourinary: Deferred  Psych: deferred  Lymphatic/Immunologic: deferred    Intake and output:    I/O last 3 completed shifts: In: 2116 [I.V.:1791; Blood:325]  Out: 1400 [Urine:1400]    Lab Data:   All available labs and old records have been reviewed by me.     CBC:  Recent Labs     09/14/20  1437 09/15/20  0907 09/16/20  0142 09/16/20  0738   WBC 10.2 13.6*  --  10.7   RBC 3.59* 3.27*  --  3.49*   HGB 7.4* 6.6* 7.7* 7.7*   HCT 22.5* 20.8* 24.2* 24.0*    329  --  253   MCV Inhalation Daily    vancomycin  1,000 mg Intravenous Q24H           oxyCODONE, sodium chloride flush, acetaminophen **OR** acetaminophen, ondansetron **OR** ondansetron      Problem list:       Patient Active Problem List   Diagnosis Code    Crohn disease ([facility] K50.90    Depression F32.9    Osteoarthritis M19.90    Lupus anticoagulant disorder ([facility] D68.62    Dysphagia R13.10    Syringomyelia ([facility] G95.0    Gastritis and gastroduodenitis K29.70, K29.90    Skin ulcer of shoulder ([facility] L98.499    Shortness of breath R06.02    Anemia D64.9    Chronic emphysema syndrome ([facility] J43.9    Pulmonary fibrosis ([facility] J84.10    Chronic hypoxemic respiratory failure ([facility]) J96.11    Hiatal hernia K44.9    COPD, severe ([facility]) J44.9    Alpha thalassemia minor D56.3    Acute on chronic respiratory failure with hypoxia ([facility]) J96.21    Bronchiectasis with acute exacerbation ([facility]) J47.1    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    Osteoporosis of multiple sites M81.0    Gastroesophageal reflux disease without esophagitis K21.9    Sepsis ([facility]) A41.9    Bilateral pulmonary embolism ([facility]) I26.99    Acute deep vein thrombosis (DVT) of distal vein of both lower extremities ([facility]) I82.4Z3    History of pulmonary embolism Z86.711    Dyspnea and respiratory abnormalities R06.00, R06.89    Wound of left shoulder S41.002A    Hx pulmonary embolism Z86.711    COPD exacerbation ([facility]) J44.1    Cerebrovascular accident (CVA) ([facility]) I63.9    MARIS (acute kidney injury) ([facility] N17.9    History of DVT (deep vein thrombosis) Z86.718    Small vessel disease, cerebrovascular I67.9    Ex-smoker Z87.891    History of depression Z86.59    Overweight E66.3    HCAP (healthcare-associated pneumonia) J18.9    Lactic acidosis E87.2    Long term (current) use of systemic steroids Z79.52       Please note that this chart was generated using Dragon dictation software.  Although every effort was made to ensure the accuracy of this automated transcription, some errors in transcription may have occurred inadvertently. If you may need any clarification, please do not hesitate to contact me through EPIC or at the phone number provided below with my electronic signature. Any pictures or media included in this note were obtained after taking informed verbal consent from the patient and with their approval to include those in the patient's medical record.     Mary Broussard MD, MPH  9/16/2020 , 12:40 PM   [facility] Infectious Disease   [address] [place] [address], [place] [address] [address]  Office: [phone]  Fax: [phone]  Clinic days:  Tuesday & Thursday

## 2020-09-16 NOTE — PROGRESS NOTES
Page sent to MD regarding the need for an order to transfuse due to 2nd order being released already.

## 2020-09-16 NOTE — PROGRESS NOTES
[facility] Pulmonary/CCM Progress note      Admit Date: 9/14/2020    Chief Complaint: Abdominal distention, bloating and shortness of breath    Subjective: Interval History: Patient looks somewhat better, on 3 L O2. Feels weak and tired. Abdomen is still quite bloated. No significant phlegm with the cough.     Scheduled Meds:   pantoprazole  40 mg Intravenous BID    methylPREDNISolone  20 mg Intravenous Daily    levoFLOXacin  750 mg Oral Every Other Day    budesonide-formoterol  2 puff Inhalation BID    metroNIDAZOLE  500 mg Oral 3 times per day    tiotropium  2 puff Inhalation Daily    vancomycin  1,000 mg Intravenous Q24H     Continuous Infusions:   lactated ringers 75 mL/hr at 09/16/20 0118     PRN Meds:oxyCODONE, sodium chloride flush, acetaminophen **OR** acetaminophen, ondansetron **OR** ondansetron    Review of Systems  Constitutional: negative for fatigue, fevers, malaise and weight loss  Ears, nose, mouth, throat: negative for ear drainage, epistaxis, hoarseness, nasal congestion, sore throat and voice change  Respiratory: negative except for cough and shortness of breath  Cardiovascular: negative for chest pain, chest pressure/discomfort, irregular heart beat, lower extremity edema and palpitations  Gastrointestinal: negative for abdominal pain, constipation, diarrhea, jaundice, melena, odynophagia, reflux symptoms and vomiting  Hematologic/lymphatic: negative for bleeding, easy bruising, lymphadenopathy and petechiae  Musculoskeletal:negative for arthralgias, bone pain, muscle weakness, neck pain and stiff joints  Neurological: negative for dizziness, gait problems, headaches, seizures, speech problems, tremors and weakness  Behavioral/Psych: negative for anxiety, behavior problems, depression, fatigue and sleep disturbance  Endocrine: negative for diabetic symptoms including none, neuropathy, polyphagia, polyuria, polydipsia, vomiting and diarrhea and temperature intolerance  Allergic/Immunologic: exam:->sob    Reason for Exam: sob    Acuity: Acute    Type of Exam: Initial         FINDINGS:    Mediastinum: Stable appearance of the mediastinum.  Evidence of pericardial    effusion calcified nodes are noted in the mediastinum.         Lungs/pleura: Decrease in consolidation in left upper wall as compared to    prior study is resolution left pleural effusion.         Upper Abdomen: Stable appearance of visualized portion of liver and spleen    compared to the prior study         Soft Tissues/Bones: No change in alignment of the spine              Impression    Decreasing consolidation in the left upper lobe with resolution of left    pleural effusion        Problem List:     Severe COPD with acute exacerbation  Abdominal pain and distention/history of Crohn's disease  Acute blood loss anemia  Lactic acidosis    Assessment/Plan:     Severe COPD with mild exacerbation. Continue bronchodilators and Solu-Medrol 20 mg IV daily. CT chest shows resolving pneumonia, no indication for bronchoscopy. Lactic acidosis is resolved. Microcytic anemia with history of Crohn's disease. Received 1 unit of PRBC, hemoglobin 7.7. Patient might benefit from SNF/rehab-refusing, wants to go home. Patient should continue follow-up with Dr. Sabas Sanders as previously planned. Pulmonary will sign off.     Violette Gilbert MD

## 2020-09-16 NOTE — PROGRESS NOTES
Physical Therapy    Facility/Department: [address] ONCOLOGY  Initial Assessment    NAME: Cheo Kimball  : 1950  MRN: [id number]    Date of Service: 2020    Discharge Recommendations:Susan Antunez scored a 17/24 on the AM-PAC short mobility form. Current research shows that an AM-PAC score of 17 or less is typically not associated with a discharge to the patient's home setting. Based on the patient's AM-PAC score and their current functional mobility deficits, it is recommended that the patient have 5-7 sessions per week of Physical Therapy at d/c to increase the patient's independence. At this time, this patient demonstrates the endurance, and/or tolerance for 3 hours of therapy each day, with a treatment frequency of 5-7x/wk. Please see assessment section for further patient specific details. If patient discharges prior to next session this note will serve as a discharge summary. Please see below for the latest assessment towards goals. PT Equipment Recommendations  Equipment Needed: No    Assessment   Body structures, Functions, Activity limitations: Decreased functional mobility ; Decreased ROM; Decreased strength;Decreased safe awareness;Decreased endurance;Decreased balance  Assessment: Pt is limited by the above deficits and would benefit from skilled PT services to maximize pt functional mobility and safety prior to discharge. Pt is a high fall risk and presents with decreased strength and endurance. Pt lives alone but has good support from neighbors in her apt building. Recommend rehab prior to return home. Treatment Diagnosis: decreased strength, balane, mobility  Prognosis: Good  Decision Making: Medium Complexity  PT Education: Goals;PT Role;Plan of Care  Patient Education: Lengthy discussion regarding dischage recommendations and therapy concerns regarding pt discharging home from the hospital. Pt is adamant about returning home to live alone.   Barriers to Learning: 7. 4/22.5, and INR of 1.3. CT of the chest revealed decreasing consolidation left upper lobe and resolution of left-sided pleural effusion. UA revealed no evidence of infection. Blood cultures were obtained and patient was initiated on broad-spectrum antibiotics including vancomycin and cefepime. Hospitalist team consulted to admit this patient for sepsis and acute worsening of anemia secondary to CKD.   Vision/Hearing  Vision: Impaired  Vision Exceptions: Wears glasses at all times  Hearing: Within functional limits     Subjective  General  Chart Reviewed: Yes  Patient assessed for rehabilitation services?: Yes  Family / Caregiver Present: No  Diagnosis: Acute on Chronic Respiratory Failure  Follows Commands: Within Functional Limits  General Comment  Comments: Pt in chair upon arrival; agreeable to PT/OT; on 3L  Subjective  Subjective: Pt reports some back pain from being in bed so long, 7/10; did have pain meds this morning  Pain Screening  Patient Currently in Pain: Yes          Orientation  Orientation  Overall Orientation Status: Within Functional Limits  Social/Functional History  Social/Functional History  Lives With: Alone(pt has 24/7 assist if needed from neighbors)  Type of Home: Apartment  Home Access: Stairs to enter with rails  Entrance Stairs - Number of Steps: 5 DINESH  Bathroom Equipment: Grab bars in shower, Shower chair  Home Equipment: Grab bars, Cane, 4 wheeled walker, Oxygen(sleeping in recliner; has limited space to use 4WW in her apt; home O2 usually just at night)  Receives Help From: Neighbor, Personal care attendant(assisting with ADLs and cooking)  ADL Assistance: Needs assistance(neighbor assisiting recently after last hospital admission; prior to her recent illness, pt was independent)  Homemaking Assistance: Needs assistance  Homemaking Responsibilities: Yes  Ambulation Assistance: Independent(holding walls and furniture)  Transfer Assistance: [address] [place] [address]:

## 2020-09-16 NOTE — PROGRESS NOTES
Lactic 5.4, MD notified. 1L bolus started.  New lactic to be drawn after completion, along with H&H.

## 2020-09-16 NOTE — PROGRESS NOTES
Hospitalist Progress Note      PCP: Kyle Rich MD    Date of Admission: 9/14/2020    Chief Complaint: Dyspnea    Hospital Course:   Feels much better  Breathing back to baseline  On 2 L oxygen  No cough  No abdominal pain  No bleeding  Lactate elevated to 5.4 although the patient clinically looks very well        Medications:  Reviewed      Exam:    /82 Comment: manual  Pulse 76   Temp 97.6 °F (36.4 °C) (Axillary)   Resp 18   Ht 5' (1.524 m)   Wt 145 lb 14.4 oz (66.2 kg)   SpO2 99%   BMI 28.49 kg/m²     General appearance:  No apparent distress, appears stated age and cooperative. HEENT:  Normal cephalic, atraumatic without obvious deformity. Pupils equal, round, and reactive to light. Pallor positive extra ocular muscles intact. Conjunctivae/corneas clear. Neck: Supple, with full range of motion. No jugular venous distention. Trachea midline. Respiratory:  Creased air entry at lung bases  Cardiovascular:  Regular rate and rhythm with normal S1/S2 without MURMUR, rubs or gallops. Abdomen: Soft, non-tender, non-distended with normal bowel sounds. Musculoskeletal:  No clubbing, cyanosis or EDEMA bilaterally. Full range of motion without deformity. Skin: Skin color, texture, turgor normal.  No rashes or lesions. Neurologic:  Neurovascularly intact without any focal sensory/motor deficits.  Cranial nerves: II-XII intact, grossly non-focal.           Labs:   Recent Labs     09/14/20  1437 09/15/20  0907 09/16/20  0142 09/16/20  0738   WBC 10.2 13.6*  --  10.7   HGB 7.4* 6.6* 7.7* 7.7*   HCT 22.5* 20.8* 24.2* 24.0*    329  --  253     Recent Labs     09/14/20  1437 09/15/20  0449 09/16/20  0738    138 139   K 4.7 4.4 3.9    112* 113*   CO2 19* 12* 18*   BUN 23* 23* 24*   CREATININE 1.8* 1.6* 1.7*   CALCIUM 9.1 8.0* 8.0*     Recent Labs     09/14/20  1437 09/15/20  0449 09/16/20  0738   AST 12* 12* 11*   ALT 8* 8* 8*   BILITOT <0.2 <0.2 <0.2   ALKPHOS 72 65 59 Recent Labs     09/14/20  1437   INR 1.30*     Recent Labs     09/14/20  1437   TROPONINI <0.01       Urinalysis:      Lab Results   Component Value Date    NITRU Negative 09/14/2020    WBCUA 2 09/14/2020    BACTERIA RARE 08/28/2020    RBCUA 2 09/14/2020    BLOODU Negative 09/14/2020    SPECGRAV 1.014 09/14/2020    GLUCOSEU Negative 09/14/2020       Radiology:  CT ABDOMEN PELVIS WO CONTRAST Additional Contrast? None   Final Result   Overall, no significant change from prior. Scattered fluid-filled loops of   bowel are seen. No retroperitoneal hematoma         CT CHEST WO CONTRAST   Final Result   Decreasing consolidation in the left upper lobe with resolution of left   pleural effusion         XR CHEST PORTABLE   Final Result   Slight further graph shin in the left lung airspace disease with moderate   residual remaining. Additional follow-up is recommended to be certain that   this resolves further.          VL Extremity Venous Bilateral    (Results Pending)       Assessment/Plan:    Active Hospital Problems    Diagnosis Date Noted    Lactic acidosis [E87.2]     HCAP (healthcare-associated pneumonia) [J18.9] 09/14/2020    MARIS (acute kidney injury) ([facility] [address] [N17.9]     Acute on chronic respiratory failure with hypoxia (HCC) [J96.21]     COPD, severe ([facility] [address] [J44.9] 02/05/2016       Acute Medical Issues Being Addressed:  81-year-old lady admitted to the hospital complaining of dyspnea     Dyspnea and fatigue suspect secondary to anemia     Acute anemia suspected acute blood loss   Eliquis has been held  Previous PE and DVTs  She only received 1 unit of blood transfusion with a very appropriate rise in hemoglobin from 6.6-7.7  Seen by GI  No need for any intervention as there is no evidence of GI bleeding  CT abdomen did not show any retroperitoneal bleed  Appropriate response of blood transfusion  We will continue to monitor hemoglobin for the next 24 hours  If hemoglobin remains stable then will possibly

## 2020-09-16 NOTE — PROGRESS NOTES
First unit of blood finished. No reaction noted. Vital signs stable. Will start second unit when antibiotics are finished infusing.

## 2020-09-16 NOTE — PLAN OF CARE
Problem: Falls - Risk of:  Goal: Will remain free from falls  Description: Will remain free from falls  9/16/2020 1403 by Cyndie Velazquez RN  Outcome: Ongoing  9/16/2020 1402 by Cyndie Velazquez RN  Outcome: Ongoing  Goal: Absence of physical injury  Description: Absence of physical injury  9/16/2020 1403 by Cyndie Velazquez RN  Outcome: Ongoing  9/16/2020 1402 by Cyndie Velazquez RN  Outcome: Ongoing     Problem: Pain:  Goal: Pain level will decrease  Description: Pain level will decrease  9/16/2020 1403 by Cyndie Velazquez RN  Outcome: Ongoing  9/16/2020 1402 by Cyndie Velazquez RN  Outcome: Ongoing  Goal: Control of acute pain  Description: Control of acute pain  9/16/2020 1403 by Cyndie Velazquez RN  Outcome: Ongoing  9/16/2020 1402 by Cyndie Velazquez RN  Outcome: Ongoing  Goal: Control of chronic pain  Description: Control of chronic pain  9/16/2020 1403 by Cyndie Velazquez RN  Outcome: Ongoing  9/16/2020 1402 by Cyndie Velazquez RN  Outcome: Ongoing     Problem: Infection:  Goal: Will remain free from infection  Description: Will remain free from infection  Outcome: Ongoing     Problem: Daily Care:  Goal: Daily care needs are met  Description: Daily care needs are met  Outcome: Ongoing     Problem: Skin Integrity:  Goal: Skin integrity will stabilize  Description: Skin integrity will stabilize  Outcome: Ongoing     Problem: Discharge Planning:  Goal: Patients continuum of care needs are met  Description: Patients continuum of care needs are met  Outcome: Ongoing

## 2020-09-17 LAB
A/G RATIO: 1.1 (ref 1.1–2.2)
ALBUMIN SERPL-MCNC: 2.7 G/DL (ref 3.4–5)
ALP BLD-CCNC: 56 U/L (ref 40–129)
ALT SERPL-CCNC: 7 U/L (ref 10–40)
ANION GAP SERPL CALCULATED.3IONS-SCNC: 11 MMOL/L (ref 3–16)
AST SERPL-CCNC: 9 U/L (ref 15–37)
BASOPHILS ABSOLUTE: 0.2 K/UL (ref 0–0.2)
BASOPHILS RELATIVE PERCENT: 1.8 %
BILIRUB SERPL-MCNC: <0.2 MG/DL (ref 0–1)
BUN BLDV-MCNC: 22 MG/DL (ref 7–20)
CALCIUM SERPL-MCNC: 8.4 MG/DL (ref 8.3–10.6)
CHLORIDE BLD-SCNC: 115 MMOL/L (ref 99–110)
CO2: 16 MMOL/L (ref 21–32)
CREAT SERPL-MCNC: 1.4 MG/DL (ref 0.6–1.2)
EOSINOPHILS ABSOLUTE: 0.3 K/UL (ref 0–0.6)
EOSINOPHILS RELATIVE PERCENT: 3.1 %
GFR AFRICAN AMERICAN: 45
GFR NON-AFRICAN AMERICAN: 37
GLOBULIN: 2.5 G/DL
GLUCOSE BLD-MCNC: 85 MG/DL (ref 70–99)
HCT VFR BLD CALC: 21.9 % (ref 36–48)
HCT VFR BLD CALC: 22.3 % (ref 36–48)
HCT VFR BLD CALC: 23.9 % (ref 36–48)
HEMOGLOBIN: 7.1 G/DL (ref 12–16)
HEMOGLOBIN: 7.2 G/DL (ref 12–16)
HEMOGLOBIN: 7.8 G/DL (ref 12–16)
LYMPHOCYTES ABSOLUTE: 3.8 K/UL (ref 1–5.1)
LYMPHOCYTES RELATIVE PERCENT: 34.1 %
MAGNESIUM: 2 MG/DL (ref 1.8–2.4)
MCH RBC QN AUTO: 21.9 PG (ref 26–34)
MCHC RBC AUTO-ENTMCNC: 32.2 G/DL (ref 31–36)
MCV RBC AUTO: 68.2 FL (ref 80–100)
MONOCYTES ABSOLUTE: 0.5 K/UL (ref 0–1.3)
MONOCYTES RELATIVE PERCENT: 4.2 %
NEUTROPHILS ABSOLUTE: 6.3 K/UL (ref 1.7–7.7)
NEUTROPHILS RELATIVE PERCENT: 56.8 %
PDW BLD-RTO: 20.3 % (ref 12.4–15.4)
PLATELET # BLD: 137 K/UL (ref 135–450)
PMV BLD AUTO: 8.2 FL (ref 5–10.5)
POTASSIUM SERPL-SCNC: 4 MMOL/L (ref 3.5–5.1)
RBC # BLD: 3.21 M/UL (ref 4–5.2)
SARS-COV-2, NAA: ABNORMAL
SARS-COV-2, NAAT: NOT DETECTED
SODIUM BLD-SCNC: 142 MMOL/L (ref 136–145)
TOTAL PROTEIN: 5.2 G/DL (ref 6.4–8.2)
WBC # BLD: 11.2 K/UL (ref 4–11)

## 2020-09-17 PROCEDURE — 85025 COMPLETE CBC W/AUTO DIFF WBC: CPT

## 2020-09-17 PROCEDURE — 36415 COLL VENOUS BLD VENIPUNCTURE: CPT

## 2020-09-17 PROCEDURE — 85014 HEMATOCRIT: CPT

## 2020-09-17 PROCEDURE — 6370000000 HC RX 637 (ALT 250 FOR IP): Performed by: HOSPITALIST

## 2020-09-17 PROCEDURE — 6360000002 HC RX W HCPCS: Performed by: INTERNAL MEDICINE

## 2020-09-17 PROCEDURE — 99232 SBSQ HOSP IP/OBS MODERATE 35: CPT | Performed by: INTERNAL MEDICINE

## 2020-09-17 PROCEDURE — U0002 COVID-19 LAB TEST NON-CDC: HCPCS

## 2020-09-17 PROCEDURE — 85018 HEMOGLOBIN: CPT

## 2020-09-17 PROCEDURE — 6370000000 HC RX 637 (ALT 250 FOR IP): Performed by: INTERNAL MEDICINE

## 2020-09-17 PROCEDURE — 2700000000 HC OXYGEN THERAPY PER DAY

## 2020-09-17 PROCEDURE — 1200000000 HC SEMI PRIVATE

## 2020-09-17 PROCEDURE — 80053 COMPREHEN METABOLIC PANEL: CPT

## 2020-09-17 PROCEDURE — 94640 AIRWAY INHALATION TREATMENT: CPT

## 2020-09-17 PROCEDURE — 94761 N-INVAS EAR/PLS OXIMETRY MLT: CPT

## 2020-09-17 PROCEDURE — 83735 ASSAY OF MAGNESIUM: CPT

## 2020-09-17 RX ORDER — METRONIDAZOLE 500 MG/1
500 TABLET ORAL EVERY 8 HOURS SCHEDULED
Qty: 63 TABLET | Refills: 0 | Status: SHIPPED | OUTPATIENT
Start: 2020-09-17 | End: 2020-10-08

## 2020-09-17 RX ORDER — HEPARIN SODIUM 5000 [USP'U]/ML
5000 INJECTION, SOLUTION INTRAVENOUS; SUBCUTANEOUS EVERY 8 HOURS SCHEDULED
Status: DISCONTINUED | OUTPATIENT
Start: 2020-09-17 | End: 2020-09-18

## 2020-09-17 RX ORDER — LEVOFLOXACIN 750 MG/1
750 TABLET ORAL EVERY OTHER DAY
Qty: 11 TABLET | Refills: 0 | Status: SHIPPED | OUTPATIENT
Start: 2020-09-17 | End: 2020-10-08

## 2020-09-17 RX ORDER — LANOLIN ALCOHOL/MO/W.PET/CERES
3 CREAM (GRAM) TOPICAL NIGHTLY PRN
Status: DISCONTINUED | OUTPATIENT
Start: 2020-09-17 | End: 2020-09-20 | Stop reason: HOSPADM

## 2020-09-17 RX ADMIN — LEVOFLOXACIN 750 MG: 750 TABLET, FILM COATED ORAL at 08:51

## 2020-09-17 RX ADMIN — Medication 6 MG: at 00:07

## 2020-09-17 RX ADMIN — METRONIDAZOLE 500 MG: 250 TABLET, FILM COATED ORAL at 05:25

## 2020-09-17 RX ADMIN — METHYLPREDNISOLONE SODIUM SUCCINATE 20 MG: 40 INJECTION, POWDER, FOR SOLUTION INTRAMUSCULAR; INTRAVENOUS at 08:52

## 2020-09-17 RX ADMIN — PANTOPRAZOLE SODIUM 40 MG: 40 TABLET, DELAYED RELEASE ORAL at 05:24

## 2020-09-17 RX ADMIN — OXYCODONE 5 MG: 5 TABLET ORAL at 21:31

## 2020-09-17 RX ADMIN — OXYCODONE 5 MG: 5 TABLET ORAL at 12:49

## 2020-09-17 RX ADMIN — TIOTROPIUM BROMIDE INHALATION SPRAY 2 PUFF: 3.12 SPRAY, METERED RESPIRATORY (INHALATION) at 08:14

## 2020-09-17 RX ADMIN — HEPARIN SODIUM 5000 UNITS: 5000 INJECTION INTRAVENOUS; SUBCUTANEOUS at 14:26

## 2020-09-17 RX ADMIN — Medication 2 PUFF: at 20:21

## 2020-09-17 RX ADMIN — Medication 2 PUFF: at 08:14

## 2020-09-17 RX ADMIN — METRONIDAZOLE 500 MG: 250 TABLET, FILM COATED ORAL at 14:34

## 2020-09-17 RX ADMIN — HEPARIN SODIUM 5000 UNITS: 5000 INJECTION INTRAVENOUS; SUBCUTANEOUS at 21:30

## 2020-09-17 RX ADMIN — METRONIDAZOLE 500 MG: 250 TABLET, FILM COATED ORAL at 21:30

## 2020-09-17 ASSESSMENT — PAIN SCALES - GENERAL
PAINLEVEL_OUTOF10: 7
PAINLEVEL_OUTOF10: 7
PAINLEVEL_OUTOF10: 0
PAINLEVEL_OUTOF10: 7

## 2020-09-17 ASSESSMENT — ENCOUNTER SYMPTOMS
FACIAL SWELLING: 0
TROUBLE SWALLOWING: 0
DIARRHEA: 0
COUGH: 1
EYE REDNESS: 0
APNEA: 0
STRIDOR: 0
BLOOD IN STOOL: 0
CHEST TIGHTNESS: 0
RHINORRHEA: 0
NAUSEA: 0
EYE DISCHARGE: 0
SHORTNESS OF BREATH: 0
ABDOMINAL PAIN: 0
CHOKING: 0
PHOTOPHOBIA: 0
COLOR CHANGE: 0

## 2020-09-17 ASSESSMENT — PAIN DESCRIPTION - PAIN TYPE
TYPE: ACUTE PAIN
TYPE: ACUTE PAIN

## 2020-09-17 NOTE — PROGRESS NOTES
· Tobacco use:   reports that she quit smoking about 5 years ago. Her smoking use included cigarettes. She has a 7.50 pack-year smoking history. She has never used smokeless tobacco.  · Alcohol use:   reports no history of alcohol use. · Currently lives in: [facility]. [address]  ·  reports no history of drug use. Assessment:     The patient is a 77 y.o. 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), COPD (chronic obstructive pulmonary disease) ([facility] [address] Crohn's disease ([facility] [address] Depression (1/30/2011), DVT (deep venous thrombosis) ([facility] [address] Hiatal hernia, blood clots, Kidney disease, Kidney insufficiency, Osteoporosis, Peripheral neuropathy, Pneumonia, Postmenopausal, Pulmonary embolism ([facility] [address] Sepsis ([facility] [address] and Syringomyelia ([facility] [address] with following problems:    · Acute on chronic respiratory failure with hypoxia-currently on 2 L oxygen  · Severe lactic acidosis-serum lactate was 2.4 yesterday, primary managing  · Recent hospitalization for left-sided multifocal pneumonia with necrotizing infiltrate in the left upper lobe-improving slowly  · Status post diagnostic bronchoscopy on 9/2/2020-BAL micro work-up negative so far  · Bilateral chronic emphysema  · Longstanding steroid use  · Acute kidney injury on chronic kidney disease-improving, serum creatinine is 1.4 today  · Microcytic hypochromic anemia  · Crohn's disease  · History of DVT  · Chronic small vessel CNS disease on recent MRI of the brain  · Kw-nsupxc-xezmsxrfrs done  · History of depression  · Overweight due to excess calorie intake : Body mass index is 28.49 kg/m².-Counseling done      Discussion:      She is on oral Levaquin and Flagyl. She is tolerating the antibiotics okay. Serum creatinine is 1.4. Liver functions are okay. She had a rapid COVID 19 test done earlier today which was negative.     White cell count is loss counseling was done. It is important to set a realistic weight loss goal. First goal should be to avoid gaining more weight and staying at current weight (or within 5 percent). People at high risk of developing diabetes who are able to lose 5 percent of their body weight and maintain this weight will reduce their risk of developing diabetes by about 50 percent and reduce their blood pressure. Losing more than 15 percent of  body weight and staying at this weight is an extremely good result, even if you never reach your \"dream\" or \"ideal\" weight. Lifestyle changes including changing eating habits, substituting excess carbohydrates with proteins, stress reduction, using self-help programs like Weight Watchers®, Overeaters Anonymous®, and Take Off Pounds Sensibly (TOPS)© , following DASH diet and increasing exercise or walking briskly daily for half hour to and hour 5-7 days a week was suggested among other measures. Information was given about various weight loss education programs and their websites like www.cdc.gov/healthyweight, www.choosemyplate.gov and www.health.gov/dietaryguidelines/    TIME SPENT TODAY:     - Spent over  25 minutes on visit (including interval history, physical exam, review of data including labs, cultures, imaging, development and implementation of treatment plan and coordination of complex care). Thanks for allowing me to participate in your patient's care. I will sign off today, but will be available to answer any further questions or concerns that may arise during patient's stay in the hospital.          Subjective: Interval history: Interval history was obtained from chart review and RN. She is afebrile. She is on 2 L oxygen via nasal cannula. Clinically improving     REVIEW OF SYSTEMS:      Review of Systems   Constitutional: Negative for chills, diaphoresis and unexpected weight change.    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    Osteoporosis of multiple sites M81.0    Gastroesophageal reflux disease without esophagitis K21.9    Sepsis (HCC) A41.9    Bilateral pulmonary embolism (HCC) I26.99    Acute deep vein thrombosis (DVT) of distal vein of both lower extremities ([facility]) I82.4Z3    History of pulmonary embolism Z86.711    Dyspnea and respiratory abnormalities R06.00, R06.89    Wound of left shoulder S41.002A    Hx pulmonary embolism Z86.711    COPD exacerbation (HCC) J44.1    Cerebrovascular accident (CVA) ([facility]) I63.9    MARIS (acute kidney injury) ([facility] [address] N17.9    History of DVT (deep vein thrombosis) Z86.718    Small vessel disease, cerebrovascular I67.9    Ex-smoker Z87.891    History of depression Z86.59    Overweight E66.3    HCAP (healthcare-associated pneumonia) J18.9    Lactic acidosis E87.2    Long term (current) use of systemic steroids Z79.52       Please note that this chart was generated using Dragon dictation software. Although every effort was made to ensure the accuracy of this automated transcription, some errors in transcription may have occurred inadvertently. If you may need any clarification, please do not hesitate to contact me through EPIC or at the phone number provided below with my electronic signature. Any pictures or media included in this note were obtained after taking informed verbal consent from the patient and with their approval to include those in the patient's medical record.     Salvador Donnelly MD, MPH  9/17/2020 , 4:44 PM   [facility]   [address] [place] [address], [address], [address] [place] [address]  Office: [phone]  Fax: [phone]  Clinic days:  Tuesday & Thursday

## 2020-09-17 NOTE — PROGRESS NOTES
Hospitalist Progress Note      PCP: Emilie Booth MD    Date of Admission: 9/14/2020    Chief Complaint: Dyspnea    Hospital Course:   Feels much better  Breathing back to baseline  On 2 L oxygen  Lactate seems to have responded to fluids  Hemoglobin 7.8  Had a bowel movement  No melena  No blood in stool        Medications:  Reviewed      Exam:    BP 97/66   Pulse 101   Temp 97.9 °F (36.6 °C) (Oral)   Resp 16   Ht 5' (1.524 m)   Wt 152 lb 14.4 oz (69.4 kg)   SpO2 96%   BMI 29.86 kg/m²     General appearance:  No apparent distress, appears stated age and cooperative. HEENT:  Normal cephalic, atraumatic without obvious deformity. Pupils equal, round, and reactive to light. Pallor positive extra ocular muscles intact. Conjunctivae/corneas clear. Neck: Supple, with full range of motion. No jugular venous distention. Trachea midline. Respiratory:  Creased air entry at lung bases  Cardiovascular:  Regular rate and rhythm with normal S1/S2 without MURMUR, rubs or gallops. Abdomen: Soft, non-tender, non-distended with normal bowel sounds. Musculoskeletal:  No clubbing, cyanosis or EDEMA bilaterally. Full range of motion without deformity. Skin: Skin color, texture, turgor normal.  No rashes or lesions. Neurologic:  Neurovascularly intact without any focal sensory/motor deficits. Cranial nerves: II-XII intact, grossly non-focal.  Physical exam remains unchanged from 9/16           Labs:   Recent Labs     09/15/20  0907  09/16/20  0738  09/17/20  0151 09/17/20  0501 09/17/20  0847   WBC 13.6*  --  10.7  --   --  11.2*  --    HGB 6.6*   < > 7.7*   < > 7.2* 7.1* 7.8*   HCT 20.8*   < > 24.0*   < > 22.3* 21.9* 23.9*     --  253  --   --  137  --     < > = values in this interval not displayed.      Recent Labs     09/15/20  0449 09/16/20  0738 09/17/20  0501    139 142   K 4.4 3.9 4.0   * 113* 115*   CO2 12* 18* 16*   BUN 23* 24* 22*   CREATININE 1.6* 1.7* 1.4*   CALCIUM 8.0* 8.0* 8.4     Recent Labs     09/15/20  0449 09/16/20  0738 09/17/20  0501   AST 12* 11* 9*   ALT 8* 8* 7*   BILITOT <0.2 <0.2 <0.2   ALKPHOS 65 59 56     Recent Labs     09/14/20  1437   INR 1.30*     Recent Labs     09/14/20  1437   TROPONINI <0.01       Urinalysis:      Lab Results   Component Value Date    NITRU Negative 09/14/2020    WBCUA 2 09/14/2020    BACTERIA RARE 08/28/2020    RBCUA 2 09/14/2020    BLOODU Negative 09/14/2020    SPECGRAV 1.014 09/14/2020    GLUCOSEU Negative 09/14/2020       Radiology:  CT ABDOMEN PELVIS WO CONTRAST Additional Contrast? None   Final Result   Overall, no significant change from prior. Scattered fluid-filled loops of   bowel are seen. No retroperitoneal hematoma         CT CHEST WO CONTRAST   Final Result   Decreasing consolidation in the left upper lobe with resolution of left   pleural effusion         XR CHEST PORTABLE   Final Result   Slight further graph shin in the left lung airspace disease with moderate   residual remaining. Additional follow-up is recommended to be certain that   this resolves further.          VL Extremity Venous Bilateral    (Results Pending)       Assessment/Plan:    Active Hospital Problems    Diagnosis Date Noted    Lactic acidosis [E87.2]     HCAP (healthcare-associated pneumonia) [J18.9] 09/14/2020    MARIS (acute kidney injury) ([facility] [address] [N17.9]     Acute on chronic respiratory failure with hypoxia (HCC) [J96.21]     COPD, severe ([facility] [address] [J44.9] 02/05/2016       Acute Medical Issues Being Addressed:  56-year-old lady admitted to the hospital complaining of dyspnea     Dyspnea and fatigue suspect secondary to anemia     Acute anemia suspected acute blood loss   Eliquis has been held  Previous PE and DVTs  She only received 1 unit of blood transfusion with a very appropriate rise in hemoglobin from 6.6-7.7  Seen by GI  No need for any intervention as there is no evidence of GI bleeding  CT abdomen did not show any retroperitoneal bleed  Appropriate response of blood transfusion  Hemoglobin has remained stable  At this point I am discontinuing her every 8 hours hemoglobin check  We will see how her hemoglobin is tomorrow if remains stable we will start her on Eliquis and then monitor hemoglobin for 24 hours       Pneumonia bilateral  I suspect that it is improving  She is on broad-spectrum vancomycin cefepime and metronidazole  Infectious disease were consulted  MRSA was negative on the third of this month  Discontinued vancomycin  Currently on levofloxacin and metronidazole  Today is day 4 of antibiotics  We will complete a 7-day course if okay with infectious disease    Mild hypotension  On chronic steroids  Changed to IV methylprednisone  Dose decreased per pulmonary critical care  If doing well will change her to oral prednisone tomorrow     Chronic respiratory failure secondary to COPD  No evidence of exacerbation  Continue inhalers  On baseline 2 L oxygen     Previous PEs and DVTs  Hold Eliquis given bleeding  We will get Dopplers of both lower extremities  If hemoglobin remained stable start Eliquis tomorrow     Lactic acidosis secondary to hypovolemia  Transfuse blood  Acted normalized however seems to have gone up to 5.4  I do not think that this is now due to hypoperfusion as she is looking very well  She does have a small bowel and I wonder if that is causing this lactate increase  Did seem to improve responded to IV fluids yesterday  We will repeat lactate levels today     Chronic kidney disease stage III  Creatinine seems to be at baseline     All of the above discussed at length with the patient        DVT Prophylaxis:  We will start subcu heparin for the next 24 hours  Diet: Diet NPO Effective Now  Code Status: Full Code  Dispo - once acute medical processes have resolved    Chente Arzate MD

## 2020-09-18 LAB
A/G RATIO: 1.3 (ref 1.1–2.2)
ALBUMIN SERPL-MCNC: 3 G/DL (ref 3.4–5)
ALP BLD-CCNC: 61 U/L (ref 40–129)
ALT SERPL-CCNC: 6 U/L (ref 10–40)
ANION GAP SERPL CALCULATED.3IONS-SCNC: 7 MMOL/L (ref 3–16)
AST SERPL-CCNC: 8 U/L (ref 15–37)
BILIRUB SERPL-MCNC: <0.2 MG/DL (ref 0–1)
BLOOD CULTURE, ROUTINE: NORMAL
BUN BLDV-MCNC: 18 MG/DL (ref 7–20)
CALCIUM SERPL-MCNC: 8.9 MG/DL (ref 8.3–10.6)
CHLORIDE BLD-SCNC: 112 MMOL/L (ref 99–110)
CO2: 21 MMOL/L (ref 21–32)
CREAT SERPL-MCNC: 1.5 MG/DL (ref 0.6–1.2)
CULTURE, BLOOD 2: NORMAL
GFR AFRICAN AMERICAN: 42
GFR NON-AFRICAN AMERICAN: 34
GLOBULIN: 2.4 G/DL
GLUCOSE BLD-MCNC: 92 MG/DL (ref 70–99)
MAGNESIUM: 1.9 MG/DL (ref 1.8–2.4)
MISCELLANEOUS LAB TEST ORDER: NORMAL
POTASSIUM SERPL-SCNC: 3.7 MMOL/L (ref 3.5–5.1)
SODIUM BLD-SCNC: 140 MMOL/L (ref 136–145)
TOTAL PROTEIN: 5.4 G/DL (ref 6.4–8.2)

## 2020-09-18 PROCEDURE — 36415 COLL VENOUS BLD VENIPUNCTURE: CPT

## 2020-09-18 PROCEDURE — 80053 COMPREHEN METABOLIC PANEL: CPT

## 2020-09-18 PROCEDURE — 2580000003 HC RX 258: Performed by: HOSPITALIST

## 2020-09-18 PROCEDURE — 6370000000 HC RX 637 (ALT 250 FOR IP): Performed by: HOSPITALIST

## 2020-09-18 PROCEDURE — 6370000000 HC RX 637 (ALT 250 FOR IP): Performed by: INTERNAL MEDICINE

## 2020-09-18 PROCEDURE — 6370000000 HC RX 637 (ALT 250 FOR IP): Performed by: NURSE PRACTITIONER

## 2020-09-18 PROCEDURE — 94760 N-INVAS EAR/PLS OXIMETRY 1: CPT

## 2020-09-18 PROCEDURE — 6360000002 HC RX W HCPCS: Performed by: INTERNAL MEDICINE

## 2020-09-18 PROCEDURE — 83735 ASSAY OF MAGNESIUM: CPT

## 2020-09-18 PROCEDURE — 93970 EXTREMITY STUDY: CPT

## 2020-09-18 PROCEDURE — 94640 AIRWAY INHALATION TREATMENT: CPT

## 2020-09-18 PROCEDURE — 2700000000 HC OXYGEN THERAPY PER DAY

## 2020-09-18 PROCEDURE — 85025 COMPLETE CBC W/AUTO DIFF WBC: CPT

## 2020-09-18 PROCEDURE — 1200000000 HC SEMI PRIVATE

## 2020-09-18 RX ORDER — PREDNISONE 20 MG/1
20 TABLET ORAL DAILY
Status: DISCONTINUED | OUTPATIENT
Start: 2020-09-19 | End: 2020-09-20 | Stop reason: HOSPADM

## 2020-09-18 RX ADMIN — Medication 2 PUFF: at 20:44

## 2020-09-18 RX ADMIN — METRONIDAZOLE 500 MG: 250 TABLET, FILM COATED ORAL at 14:25

## 2020-09-18 RX ADMIN — METRONIDAZOLE 500 MG: 250 TABLET, FILM COATED ORAL at 05:09

## 2020-09-18 RX ADMIN — PANTOPRAZOLE SODIUM 40 MG: 40 TABLET, DELAYED RELEASE ORAL at 05:09

## 2020-09-18 RX ADMIN — Medication 3 MG: at 20:58

## 2020-09-18 RX ADMIN — Medication 10 ML: at 08:38

## 2020-09-18 RX ADMIN — APIXABAN 5 MG: 5 TABLET, FILM COATED ORAL at 20:19

## 2020-09-18 RX ADMIN — APIXABAN 5 MG: 5 TABLET, FILM COATED ORAL at 14:25

## 2020-09-18 RX ADMIN — METRONIDAZOLE 500 MG: 250 TABLET, FILM COATED ORAL at 20:19

## 2020-09-18 RX ADMIN — Medication 2 PUFF: at 09:27

## 2020-09-18 RX ADMIN — HEPARIN SODIUM 5000 UNITS: 5000 INJECTION INTRAVENOUS; SUBCUTANEOUS at 05:08

## 2020-09-18 RX ADMIN — TIOTROPIUM BROMIDE INHALATION SPRAY 2 PUFF: 3.12 SPRAY, METERED RESPIRATORY (INHALATION) at 09:27

## 2020-09-18 RX ADMIN — METHYLPREDNISOLONE SODIUM SUCCINATE 20 MG: 40 INJECTION, POWDER, FOR SOLUTION INTRAMUSCULAR; INTRAVENOUS at 08:37

## 2020-09-18 ASSESSMENT — PAIN DESCRIPTION - ORIENTATION: ORIENTATION: LEFT

## 2020-09-18 ASSESSMENT — PAIN DESCRIPTION - LOCATION: LOCATION: BACK

## 2020-09-18 ASSESSMENT — PAIN DESCRIPTION - PAIN TYPE: TYPE: ACUTE PAIN

## 2020-09-18 ASSESSMENT — PAIN SCALES - GENERAL
PAINLEVEL_OUTOF10: 0
PAINLEVEL_OUTOF10: 0

## 2020-09-18 NOTE — PROGRESS NOTES
Hospitalist Progress Note      PCP: Marcie Spencer MD    Date of Admission: 9/14/2020    Chief Complaint: Dyspnea    Hospital Course:   Doing well  Had 2 bowel movements semisolid/loose however this has been her norm for the past many years given underlying Crohn's  No abdominal pain  Back on 2 L oxygen  No acute events on telemetry  No chest pain        Medications:  Reviewed      Exam:    /66   Pulse 103   Temp 98.2 °F (36.8 °C) (Oral)   Resp 16   Ht 5' (1.524 m)   Wt 152 lb 3 oz (69 kg)   SpO2 96%   BMI 29.72 kg/m²     General appearance:  No apparent distress, appears stated age and cooperative. HEENT:  Normal cephalic, atraumatic without obvious deformity. Pupils equal, round, and reactive to light. Pallor positive extra ocular muscles intact. Conjunctivae/corneas clear. Neck: Supple, with full range of motion. No jugular venous distention. Trachea midline. Respiratory:  Creased air entry at lung bases  Cardiovascular:  Regular rate and rhythm with normal S1/S2 without MURMUR, rubs or gallops. Abdomen: Soft, non-tender, non-distended with normal bowel sounds. Musculoskeletal:  No clubbing, cyanosis or EDEMA bilaterally. Full range of motion without deformity. Skin: Skin color, texture, turgor normal.  No rashes or lesions. Neurologic:  Neurovascularly intact without any focal sensory/motor deficits. Cranial nerves: II-XII intact, grossly non-focal.  Physical exam remains unchanged from 9/17           Labs:   Recent Labs     09/16/20  0738  09/17/20  0501 09/17/20  0847 09/18/20  0450   WBC 10.7  --  11.2*  --  13.0*   HGB 7.7*   < > 7.1* 7.8* 7.6*   HCT 24.0*   < > 21.9* 23.9* 23.4*     --  137  --  226    < > = values in this interval not displayed.      Recent Labs     09/16/20  0738 09/17/20  0501 09/18/20  0450    142 140   K 3.9 4.0 3.7   * 115* 112*   CO2 18* 16* 21   BUN 24* 22* 18   CREATININE 1.7* 1.4* 1.5*   CALCIUM 8.0* 8.4 8.9     Recent Labs response of blood transfusion  Globin continues to remain stable  She was on DVT prophylaxis for the past 24 hours and hemoglobin still continues to remain stable  At this point will change over to oral Eliquis and if hemoglobin remained stable for the next 24 hours she can be discharged home  I discussed this with the patient who voiced understanding and is in agreement with this plan      Pneumonia bilateral  I suspect that it is improving  She is on broad-spectrum vancomycin cefepime and metronidazole  Infectious disease were consulted  MRSA was negative on the third of this month  Discontinued vancomycin  Currently on levofloxacin and metronidazole  Today's day 5 of antibiotics she has been changed over to oral  We will complete a 7-day course    Mild hypotension  On chronic steroids  Changed to IV methylprednisone  Dose decreased per pulmonary critical care  She has been doing well  She is on baseline prednisone 10 mg we will change over to 20 mg oral prednisone today and after 2 days she can go back on her home dose of 10 mg     Chronic respiratory failure secondary to COPD  No evidence of exacerbation  Continue inhalers  On baseline 2 L oxygen     Previous PEs and DVTs  Usually Eliquis was held and restarting it today     Lactic acidosis secondary to hypovolemia  Transfuse blood  Acted normalized however seems to have gone up to 5.4  I do not think that this is now due to hypoperfusion as she is looking very well  She does have a small bowel and I wonder if that is causing this lactate increase  Did seem to improve responded to IV fluids yesterday  Lactate levels  were down to 2.5     Chronic kidney disease stage III  Creatinine seems to be at baseline     All of the above discussed at length with the patient        DVT Prophylaxis:  We will start subcu heparin for the next 24 hours  Diet: Diet NPO Effective Now  Code Status: Full Code  Dispo - once acute medical processes have resolved    Donaldo Moya MD

## 2020-09-18 NOTE — PROGRESS NOTES
Physical Therapy  Patient off floor for vascular study. Will follow.   Sonali Daly, [address] [facility], DPT, ATC-R [id number]

## 2020-09-18 NOTE — DISCHARGE INSTR - COC
Continuity of Care Form    Patient Name: Efrain Aceves   :  1950  MRN:  [id number]    Admit date:  2020  Discharge date:  ***    Code Status Order: Full Code   Advance Directives:   Advance Care Flowsheet Documentation     Date/Time Healthcare Directive Type of Healthcare Directive Copy in [address] Agent's Name Healthcare Agent's Phone Number    20 1027  Yes, patient has an advance directive for healthcare treatment  Health care treatment directive; Living will  No, copy requested from family --  --  --          Admitting Physician:  Karina Collier MD  PCP: Shelly Fletcher MD    Discharging Nurse: [facility] Unit/Room#: 3VL-3446/8888-15  Discharging Unit Phone Number: ***    Emergency Contact:   Extended Emergency Contact Information  Primary Emergency Contact: Jessie Costello [facility] [address]  Phone: [phone]  Relation: Brother/Sister  Secondary Emergency Contact: STEPHANIEvone Crigler [place] [address] [address]  Phone: [phone]  Relation: Domestic Partner    Past Surgical History:  Past Surgical History:   Procedure Laterality Date    ABDOMEN SURGERY      BACK SURGERY      shunt to drain CSF    BIOPSY SHOULDER Left 2019    EXCISION OF LEFT SHOULDER MASS performed by Ronald De Jesus MD at [address] [place] [address]      crainotomy- remove fluid ? V-P SHUNT    BRONCHOSCOPY N/A 2020    BRONCHOSCOPY ALVEOLAR LAVAGE performed by Glen Ignacio MD at [place] [address]      resection X2    COLONOSCOPY  11    BIOPSY AND POLYPECTOMY    COLONOSCOPY  2012    and ablation ERBE/APC    SHOULDER SURGERY      L        Immunization History:   Immunization History   Administered Date(s) Administered    DT (pediatric) 2010    Influenza Virus Vaccine 10/01/2012, 10/20/2015    Influenza Whole 10/01/2011    Influenza, High Dose (Fluzone 65 yrs and older) 10/06/2014, 10/19/2016, 11/16/2017, 11/05/2018    Influenza, Triv, inactivated, subunit, adjuvanted, IM (Fluad 65 yrs and older) 11/13/2019    Pneumococcal Conjugate 13-valent (Akihlhz84) 06/11/2015    Pneumococcal Conjugate 7-valent (Prevnar7) 02/07/2005, 10/01/2011    Pneumococcal Polysaccharide (Vghcpwoai07) 06/15/2016    Zoster Live (Zostavax) 07/01/2013       Active Problems:  Patient Active Problem List   Diagnosis Code    Crohn disease ([facility] K50.90    Depression F32.9    Osteoarthritis M19.90    Lupus anticoagulant disorder ([facility] D68.62    Dysphagia R13.10    Syringomyelia ([facility] G95.0    Gastritis and gastroduodenitis K29.70, K29.90    Skin ulcer of shoulder ([facility] L98.499    Shortness of breath R06.02    Anemia D64.9    Chronic emphysema syndrome ([facility]) J43.9    Pulmonary fibrosis ([facility]) J84.10    Chronic hypoxemic respiratory failure ([facility]) J96.11    Hiatal hernia K44.9    COPD, severe ([facility]) J44.9    Alpha thalassemia minor D56.3    Acute on chronic respiratory failure with hypoxia ([facility]) J96.21    Bronchiectasis with acute exacerbation ([facility]) J47.1    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    Osteoporosis of multiple sites M81.0    Gastroesophageal reflux disease without esophagitis K21.9    Sepsis ([facility]) A41.9    Bilateral pulmonary embolism ([facility]) I26.99    Acute deep vein thrombosis (DVT) of distal vein of both lower extremities ([facility]) I82.4Z3    History of pulmonary embolism Z86.711    Dyspnea and respiratory abnormalities R06.00, R06.89    Wound of left shoulder S41.002A    Hx pulmonary embolism Z86.711    COPD exacerbation ([facility]) J44.1    Cerebrovascular accident (CVA) ([facility]) I63.9    MARIS (acute kidney injury) ([facility] N17.9    History of DVT (deep vein thrombosis) Z86.718    Small vessel disease, cerebrovascular I67.9    Ex-smoker Z87.891    History of depression Z86.59    Overweight E66.3    HCAP (healthcare-associated pneumonia) J18.9    Lactic acidosis E87.2    Long term (current) use of systemic steroids Z79.52       Isolation/Infection:   Isolation          No Isolation        Patient Infection Status     Infection Onset Added Last Indicated Last Indicated By Review Planned Expiration Resolved Resolved By    None active    Resolved    COVID-19 Rule Out 20 COVID-19 (Ordered)   20 Rule-Out Test Resulted    COVID-19 20 COVID-19   20 Jemal Frias RN    C-diff Rule Out 20 Clostridium difficile toxin/antigen (Ordered)   20 Rule-Out Test Resulted    COVID-19 Rule Out 20 COVID-19 (Ordered)   20 Rule-Out Test Resulted    COVID-19 Rule Out 20 COVID-19 (Ordered)   20 Rule-Out Test Resulted    VRE 20 URINE CULTURE   20 Jemal Frias RN          Nurse Assessment:  Last Vital Signs: /66   Pulse 103   Temp 98.2 °F (36.8 °C) (Oral)   Resp 16   Ht 5' (1.524 m)   Wt 152 lb 3 oz (69 kg)   SpO2 96%   BMI 29.72 kg/m²     Last documented pain score (0-10 scale): Pain Level: 0  Last Weight:   Wt Readings from Last 1 Encounters:   20 152 lb 3 oz (69 kg)     Mental Status:  {IP PT MENTAL STATUS:55971}    IV Access:  { EMMANUELLE IV ACCESS:908462969}    Nursing Mobility/ADLs:  Walking   {CHP DME DHC}  Transfer  {CHP DME NMZP:781657080}  Bathing  {CHP DME TPHY:267646289}  Dressing  {CHP DME RMOR:748242177}  Toileting  {CHP DME RJOO:671922972}  Feeding  {CHP DME QLXN:747058776}  Med Admin  {P DME REHI:601837191}  Med Delivery   { EMMANUELLE MED Delivery:026578813}    Wound Care Documentation and Therapy:        Elimination:  Continence:   · Bowel: {YES / KT:30461}  · Bladder: {YES / WN:13227}  Urinary Catheter: {Urinary Catheter:683572799}   Colostomy/Ileostomy/Ileal Conduit: {YES / CR:07821}       Date of Last BM: ***    Intake/Output Summary (Last 24 hours) at 2020 1119  Last data filed at 2020 0845  Gross per 24 hour   Intake 780 ml   Output --   Net 780 ml     I/O last 3 completed shifts:   In: 5 [P.O.:540]  Out: 500 [Urine:500]    Safety Concerns:     [address] [place] Safety Concerns:300157141}    Impairments/Disabilities:      [address] [place] Impairments/Disabilities:917873057}    Nutrition Therapy:  Current Nutrition Therapy:   [address] [place] Diet List:669597100}    Routes of Feeding: {CHP DME Other Feedings:527774384}  Liquids: {Slp liquid thickness:33263}  Daily Fluid Restriction: {CHP DME Yes amt example:928065915}  Last Modified Barium Swallow with Video (Video Swallowing Test): {Done Not Done VNQ:136671801}    Treatments at the Time of Hospital Discharge:   Respiratory Treatments: ***  Oxygen Therapy:  {Therapy; copd oxygen:46733}  Ventilator:    { CC Vent UKIY:334211968}    Rehab Therapies: SN,PT,OT  Weight Bearing Status/Restrictions: {[facility] Weight Bearin}  Other Medical Equipment (for information only, NOT a DME order):  {EQUIPMENT:756238542}  Other Treatments: ***    Patient's personal belongings (please select all that are sent with patient):  {[facility] DME Belongings:986949633}    RN SIGNATURE:  {Esignature:330081167}    CASE MANAGEMENT/SOCIAL WORK SECTION    Inpatient Status Date: 2020    Readmission Risk Assessment Score:  Readmission Risk              Risk of Unplanned Readmission:        18           Discharging to Facility/ [address] [facility]  [phone]    Dialysis Facility (if applicable)   · Name:  · Address:  · Dialysis Schedule:  · Phone:  · Fax:    / signature:     PHYSICIAN SECTION    Prognosis: good    Condition at Discharge: stable    Rehab Potential (if transferring to Rehab): good    Recommended Labs or Other Treatments After Discharge: ***    Physician Certification: I certify the above information and transfer of Adarsh Gloria  is necessary for the continuing treatment of the diagnosis listed and that she requires home care for  30 days.      Update Admission H&P: UPDATED    PHYSICIAN SIGNATURE:  Zhou Lindsay MD ON 09/20/20

## 2020-09-18 NOTE — PLAN OF CARE
Problem: Falls - Risk of:  Goal: Will remain free from falls  Description: Will remain free from falls  9/18/2020 0132 by Christian Lopez RN  Note: Bed alarm on. Non-slip socks on. Bed in lowest position. Call light within reach.   9/18/2020 0132 by Christian Lopez RN  Outcome: Ongoing  Goal: Absence of physical injury  Description: Absence of physical injury  Outcome: Ongoing     Problem: Pain:  Goal: Pain level will decrease  Description: Pain level will decrease  Outcome: Ongoing  Goal: Control of acute pain  Description: Control of acute pain  Outcome: Ongoing  Goal: Control of chronic pain  Description: Control of chronic pain  Outcome: Ongoing     Problem: Infection:  Goal: Will remain free from infection  Description: Will remain free from infection  Outcome: Ongoing     Problem: Daily Care:  Goal: Daily care needs are met  Description: Daily care needs are met  Outcome: Ongoing     Problem: Skin Integrity:  Goal: Skin integrity will stabilize  Description: Skin integrity will stabilize  Outcome: Ongoing     Problem: Discharge Planning:  Goal: Patients continuum of care needs are met  Description: Patients continuum of care needs are met  Outcome: Ongoing

## 2020-09-18 NOTE — CARE COORDINATION
Cm called Reilly Garcia with [facility] [phone] and informed pt may discharge tomorrow. Pt is currently active with them and will need hc orders for RN/PT/OT.     Deepa Trujillo RN, BSN  [phone]

## 2020-09-19 LAB
A/G RATIO: 1.2 (ref 1.1–2.2)
ABO/RH: NORMAL
ALBUMIN SERPL-MCNC: 2.9 G/DL (ref 3.4–5)
ALP BLD-CCNC: 62 U/L (ref 40–129)
ALT SERPL-CCNC: 6 U/L (ref 10–40)
ANION GAP SERPL CALCULATED.3IONS-SCNC: 9 MMOL/L (ref 3–16)
ANISOCYTOSIS: ABNORMAL
ANTIBODY SCREEN: NORMAL
AST SERPL-CCNC: 9 U/L (ref 15–37)
BANDED NEUTROPHILS RELATIVE PERCENT: 1 % (ref 0–7)
BASOPHILS ABSOLUTE: 0 K/UL (ref 0–0.2)
BASOPHILS ABSOLUTE: 0 K/UL (ref 0–0.2)
BASOPHILS RELATIVE PERCENT: 0 %
BASOPHILS RELATIVE PERCENT: 0.3 %
BILIRUB SERPL-MCNC: <0.2 MG/DL (ref 0–1)
BLOOD BANK DISPENSE STATUS: NORMAL
BLOOD BANK PRODUCT CODE: NORMAL
BPU ID: NORMAL
BUN BLDV-MCNC: 17 MG/DL (ref 7–20)
CALCIUM SERPL-MCNC: 8.4 MG/DL (ref 8.3–10.6)
CHLORIDE BLD-SCNC: 113 MMOL/L (ref 99–110)
CO2: 21 MMOL/L (ref 21–32)
CREAT SERPL-MCNC: 1.6 MG/DL (ref 0.6–1.2)
DESCRIPTION BLOOD BANK: NORMAL
EOSINOPHILS ABSOLUTE: 0.2 K/UL (ref 0–0.6)
EOSINOPHILS ABSOLUTE: 0.4 K/UL (ref 0–0.6)
EOSINOPHILS RELATIVE PERCENT: 1 %
EOSINOPHILS RELATIVE PERCENT: 3.1 %
GFR AFRICAN AMERICAN: 39
GFR NON-AFRICAN AMERICAN: 32
GLOBULIN: 2.4 G/DL
GLUCOSE BLD-MCNC: 91 MG/DL (ref 70–99)
HCT VFR BLD CALC: 22.2 % (ref 36–48)
HCT VFR BLD CALC: 23.4 % (ref 36–48)
HCT VFR BLD CALC: 27.3 % (ref 36–48)
HEMATOLOGY PATH CONSULT: NO
HEMATOLOGY PATH CONSULT: NO
HEMOGLOBIN: 7.2 G/DL (ref 12–16)
HEMOGLOBIN: 7.6 G/DL (ref 12–16)
HEMOGLOBIN: 9 G/DL (ref 12–16)
HYPOCHROMIA: ABNORMAL
LYMPHOCYTES ABSOLUTE: 3.8 K/UL (ref 1–5.1)
LYMPHOCYTES ABSOLUTE: 3.8 K/UL (ref 1–5.1)
LYMPHOCYTES RELATIVE PERCENT: 22 %
LYMPHOCYTES RELATIVE PERCENT: 29.2 %
MACROCYTES: ABNORMAL
MCH RBC QN AUTO: 21.4 PG (ref 26–34)
MCH RBC QN AUTO: 21.8 PG (ref 26–34)
MCHC RBC AUTO-ENTMCNC: 32.2 G/DL (ref 31–36)
MCHC RBC AUTO-ENTMCNC: 32.5 G/DL (ref 31–36)
MCV RBC AUTO: 66.3 FL (ref 80–100)
MCV RBC AUTO: 67.2 FL (ref 80–100)
MICROCYTES: ABNORMAL
MONOCYTES ABSOLUTE: 0.2 K/UL (ref 0–1.3)
MONOCYTES ABSOLUTE: 0.8 K/UL (ref 0–1.3)
MONOCYTES RELATIVE PERCENT: 1 %
MONOCYTES RELATIVE PERCENT: 6.4 %
NEUTROPHILS ABSOLUTE: 13 K/UL (ref 1.7–7.7)
NEUTROPHILS ABSOLUTE: 7.9 K/UL (ref 1.7–7.7)
NEUTROPHILS RELATIVE PERCENT: 61 %
NEUTROPHILS RELATIVE PERCENT: 75 %
OVALOCYTES: ABNORMAL
PDW BLD-RTO: 20.4 % (ref 12.4–15.4)
PDW BLD-RTO: 21.4 % (ref 12.4–15.4)
PLATELET # BLD: 215 K/UL (ref 135–450)
PLATELET # BLD: 226 K/UL (ref 135–450)
PLATELET SLIDE REVIEW: ADEQUATE
PMV BLD AUTO: 6.4 FL (ref 5–10.5)
PMV BLD AUTO: 6.7 FL (ref 5–10.5)
POLYCHROMASIA: ABNORMAL
POTASSIUM SERPL-SCNC: 3.3 MMOL/L (ref 3.5–5.1)
RBC # BLD: 3.35 M/UL (ref 4–5.2)
RBC # BLD: 3.49 M/UL (ref 4–5.2)
SLIDE REVIEW: ABNORMAL
SODIUM BLD-SCNC: 143 MMOL/L (ref 136–145)
TOTAL PROTEIN: 5.3 G/DL (ref 6.4–8.2)
WBC # BLD: 13 K/UL (ref 4–11)
WBC # BLD: 17.1 K/UL (ref 4–11)

## 2020-09-19 PROCEDURE — 6370000000 HC RX 637 (ALT 250 FOR IP): Performed by: INTERNAL MEDICINE

## 2020-09-19 PROCEDURE — 80053 COMPREHEN METABOLIC PANEL: CPT

## 2020-09-19 PROCEDURE — 6370000000 HC RX 637 (ALT 250 FOR IP): Performed by: HOSPITALIST

## 2020-09-19 PROCEDURE — 85025 COMPLETE CBC W/AUTO DIFF WBC: CPT

## 2020-09-19 PROCEDURE — 2700000000 HC OXYGEN THERAPY PER DAY

## 2020-09-19 PROCEDURE — 6370000000 HC RX 637 (ALT 250 FOR IP): Performed by: FAMILY MEDICINE

## 2020-09-19 PROCEDURE — 85014 HEMATOCRIT: CPT

## 2020-09-19 PROCEDURE — 85018 HEMOGLOBIN: CPT

## 2020-09-19 PROCEDURE — 2580000003 HC RX 258: Performed by: FAMILY MEDICINE

## 2020-09-19 PROCEDURE — 86900 BLOOD TYPING SEROLOGIC ABO: CPT

## 2020-09-19 PROCEDURE — 86923 COMPATIBILITY TEST ELECTRIC: CPT

## 2020-09-19 PROCEDURE — 86850 RBC ANTIBODY SCREEN: CPT

## 2020-09-19 PROCEDURE — P9016 RBC LEUKOCYTES REDUCED: HCPCS

## 2020-09-19 PROCEDURE — 6370000000 HC RX 637 (ALT 250 FOR IP): Performed by: NURSE PRACTITIONER

## 2020-09-19 PROCEDURE — 94640 AIRWAY INHALATION TREATMENT: CPT

## 2020-09-19 PROCEDURE — 86901 BLOOD TYPING SEROLOGIC RH(D): CPT

## 2020-09-19 PROCEDURE — 1200000000 HC SEMI PRIVATE

## 2020-09-19 PROCEDURE — 36430 TRANSFUSION BLD/BLD COMPNT: CPT

## 2020-09-19 PROCEDURE — 2580000003 HC RX 258: Performed by: HOSPITALIST

## 2020-09-19 PROCEDURE — 36415 COLL VENOUS BLD VENIPUNCTURE: CPT

## 2020-09-19 RX ORDER — POTASSIUM CHLORIDE 20 MEQ/1
40 TABLET, EXTENDED RELEASE ORAL PRN
Status: DISCONTINUED | OUTPATIENT
Start: 2020-09-19 | End: 2020-09-20 | Stop reason: HOSPADM

## 2020-09-19 RX ORDER — BUDESONIDE AND FORMOTEROL FUMARATE DIHYDRATE 160; 4.5 UG/1; UG/1
2 AEROSOL RESPIRATORY (INHALATION) 2 TIMES DAILY
Status: DISCONTINUED | OUTPATIENT
Start: 2020-09-19 | End: 2020-09-20 | Stop reason: HOSPADM

## 2020-09-19 RX ORDER — 0.9 % SODIUM CHLORIDE 0.9 %
20 INTRAVENOUS SOLUTION INTRAVENOUS ONCE
Status: COMPLETED | OUTPATIENT
Start: 2020-09-19 | End: 2020-09-19

## 2020-09-19 RX ORDER — POTASSIUM CHLORIDE 7.45 MG/ML
10 INJECTION INTRAVENOUS PRN
Status: DISCONTINUED | OUTPATIENT
Start: 2020-09-19 | End: 2020-09-20 | Stop reason: HOSPADM

## 2020-09-19 RX ORDER — LORAZEPAM 0.5 MG/1
0.5 TABLET ORAL EVERY 4 HOURS PRN
Status: DISCONTINUED | OUTPATIENT
Start: 2020-09-19 | End: 2020-09-20 | Stop reason: HOSPADM

## 2020-09-19 RX ADMIN — Medication 3 MG: at 21:42

## 2020-09-19 RX ADMIN — Medication 2 PUFF: at 19:49

## 2020-09-19 RX ADMIN — APIXABAN 5 MG: 5 TABLET, FILM COATED ORAL at 20:31

## 2020-09-19 RX ADMIN — APIXABAN 5 MG: 5 TABLET, FILM COATED ORAL at 09:31

## 2020-09-19 RX ADMIN — METRONIDAZOLE 500 MG: 250 TABLET, FILM COATED ORAL at 20:31

## 2020-09-19 RX ADMIN — PREDNISONE 20 MG: 20 TABLET ORAL at 09:31

## 2020-09-19 RX ADMIN — TIOTROPIUM BROMIDE INHALATION SPRAY 2 PUFF: 3.12 SPRAY, METERED RESPIRATORY (INHALATION) at 09:00

## 2020-09-19 RX ADMIN — LORAZEPAM 0.5 MG: 0.5 TABLET ORAL at 17:37

## 2020-09-19 RX ADMIN — METRONIDAZOLE 500 MG: 250 TABLET, FILM COATED ORAL at 05:46

## 2020-09-19 RX ADMIN — LEVOFLOXACIN 750 MG: 750 TABLET, FILM COATED ORAL at 09:31

## 2020-09-19 RX ADMIN — Medication 10 ML: at 09:32

## 2020-09-19 RX ADMIN — OXYCODONE 5 MG: 5 TABLET ORAL at 15:19

## 2020-09-19 RX ADMIN — METRONIDAZOLE 500 MG: 250 TABLET, FILM COATED ORAL at 15:32

## 2020-09-19 RX ADMIN — Medication 2 PUFF: at 09:00

## 2020-09-19 RX ADMIN — SODIUM CHLORIDE 20 ML: 9 INJECTION, SOLUTION INTRAVENOUS at 17:37

## 2020-09-19 RX ADMIN — POTASSIUM CHLORIDE 40 MEQ: 1500 TABLET, EXTENDED RELEASE ORAL at 15:32

## 2020-09-19 RX ADMIN — PANTOPRAZOLE SODIUM 40 MG: 40 TABLET, DELAYED RELEASE ORAL at 05:46

## 2020-09-19 ASSESSMENT — PAIN SCALES - GENERAL
PAINLEVEL_OUTOF10: 0
PAINLEVEL_OUTOF10: 10
PAINLEVEL_OUTOF10: 5
PAINLEVEL_OUTOF10: 0
PAINLEVEL_OUTOF10: 5

## 2020-09-19 ASSESSMENT — PAIN DESCRIPTION - ORIENTATION: ORIENTATION: LEFT

## 2020-09-19 ASSESSMENT — PAIN DESCRIPTION - LOCATION: LOCATION: BACK

## 2020-09-19 ASSESSMENT — PAIN DESCRIPTION - PAIN TYPE: TYPE: ACUTE PAIN

## 2020-09-19 NOTE — PROGRESS NOTES
Pt inquiring about discharge plans. Messaged Dr. Marcelina Guerin for further information and orders for K+ replacement. BP pressure low, pt trends low, will continue to monitor.

## 2020-09-19 NOTE — PLAN OF CARE
Problem: Falls - Risk of:  Goal: Will remain free from falls  Description: Will remain free from falls  Outcome: Ongoing  Note: Fall precautions in place: bed/chair alarm on, non-slip socks on, overhead table and call light within reach. Patient appropriately calls out when she needs to use the restroom or needs to go back to bed.    Goal: Absence of physical injury  Description: Absence of physical injury  Outcome: Ongoing

## 2020-09-20 VITALS
BODY MASS INDEX: 28.74 KG/M2 | SYSTOLIC BLOOD PRESSURE: 117 MMHG | HEIGHT: 60 IN | DIASTOLIC BLOOD PRESSURE: 71 MMHG | OXYGEN SATURATION: 100 % | RESPIRATION RATE: 18 BRPM | HEART RATE: 87 BPM | TEMPERATURE: 98.2 F | WEIGHT: 146.4 LBS

## 2020-09-20 LAB
A/G RATIO: 1.4 (ref 1.1–2.2)
ALBUMIN SERPL-MCNC: 3.1 G/DL (ref 3.4–5)
ALP BLD-CCNC: 65 U/L (ref 40–129)
ALT SERPL-CCNC: 6 U/L (ref 10–40)
ANION GAP SERPL CALCULATED.3IONS-SCNC: 9 MMOL/L (ref 3–16)
ANISOCYTOSIS: ABNORMAL
AST SERPL-CCNC: 11 U/L (ref 15–37)
BANDED NEUTROPHILS RELATIVE PERCENT: 5 % (ref 0–7)
BASOPHILS ABSOLUTE: 0 K/UL (ref 0–0.2)
BASOPHILS RELATIVE PERCENT: 0 %
BILIRUB SERPL-MCNC: 0.3 MG/DL (ref 0–1)
BUN BLDV-MCNC: 13 MG/DL (ref 7–20)
CALCIUM SERPL-MCNC: 8.4 MG/DL (ref 8.3–10.6)
CHLORIDE BLD-SCNC: 111 MMOL/L (ref 99–110)
CO2: 21 MMOL/L (ref 21–32)
CREAT SERPL-MCNC: 1.5 MG/DL (ref 0.6–1.2)
EOSINOPHILS ABSOLUTE: 0.5 K/UL (ref 0–0.6)
EOSINOPHILS RELATIVE PERCENT: 2 %
GFR AFRICAN AMERICAN: 42
GFR NON-AFRICAN AMERICAN: 34
GLOBULIN: 2.2 G/DL
GLUCOSE BLD-MCNC: 95 MG/DL (ref 70–99)
HCT VFR BLD CALC: 27.8 % (ref 36–48)
HEMOGLOBIN: 9.2 G/DL (ref 12–16)
HYPOCHROMIA: ABNORMAL
LYMPHOCYTES ABSOLUTE: 6.5 K/UL (ref 1–5.1)
LYMPHOCYTES RELATIVE PERCENT: 29 %
MACROCYTES: ABNORMAL
MCH RBC QN AUTO: 23.9 PG (ref 26–34)
MCHC RBC AUTO-ENTMCNC: 33.2 G/DL (ref 31–36)
MCV RBC AUTO: 71.9 FL (ref 80–100)
METAMYELOCYTES RELATIVE PERCENT: 3 %
MICROCYTES: ABNORMAL
MONOCYTES ABSOLUTE: 1.4 K/UL (ref 0–1.3)
MONOCYTES RELATIVE PERCENT: 6 %
MYELOCYTE PERCENT: 2 %
NEUTROPHILS ABSOLUTE: 14.2 K/UL (ref 1.7–7.7)
NEUTROPHILS RELATIVE PERCENT: 53 %
PDW BLD-RTO: 27.4 % (ref 12.4–15.4)
PLATELET # BLD: 207 K/UL (ref 135–450)
PLATELET SLIDE REVIEW: ADEQUATE
PMV BLD AUTO: 7.5 FL (ref 5–10.5)
POLYCHROMASIA: ABNORMAL
POTASSIUM SERPL-SCNC: 3.2 MMOL/L (ref 3.5–5.1)
RBC # BLD: 3.87 M/UL (ref 4–5.2)
SLIDE REVIEW: ABNORMAL
SODIUM BLD-SCNC: 141 MMOL/L (ref 136–145)
TARGET CELLS: ABNORMAL
TOTAL PROTEIN: 5.3 G/DL (ref 6.4–8.2)
WBC # BLD: 22.5 K/UL (ref 4–11)

## 2020-09-20 PROCEDURE — 6370000000 HC RX 637 (ALT 250 FOR IP): Performed by: HOSPITALIST

## 2020-09-20 PROCEDURE — 6370000000 HC RX 637 (ALT 250 FOR IP): Performed by: INTERNAL MEDICINE

## 2020-09-20 PROCEDURE — 94640 AIRWAY INHALATION TREATMENT: CPT

## 2020-09-20 PROCEDURE — 2700000000 HC OXYGEN THERAPY PER DAY

## 2020-09-20 PROCEDURE — 6370000000 HC RX 637 (ALT 250 FOR IP): Performed by: FAMILY MEDICINE

## 2020-09-20 PROCEDURE — 80053 COMPREHEN METABOLIC PANEL: CPT

## 2020-09-20 PROCEDURE — 85025 COMPLETE CBC W/AUTO DIFF WBC: CPT

## 2020-09-20 PROCEDURE — 94761 N-INVAS EAR/PLS OXIMETRY MLT: CPT

## 2020-09-20 RX ADMIN — PREDNISONE 20 MG: 20 TABLET ORAL at 09:32

## 2020-09-20 RX ADMIN — METRONIDAZOLE 500 MG: 250 TABLET, FILM COATED ORAL at 05:47

## 2020-09-20 RX ADMIN — Medication 2 PUFF: at 17:40

## 2020-09-20 RX ADMIN — POTASSIUM CHLORIDE 40 MEQ: 1500 TABLET, EXTENDED RELEASE ORAL at 12:48

## 2020-09-20 RX ADMIN — METRONIDAZOLE 500 MG: 250 TABLET, FILM COATED ORAL at 14:49

## 2020-09-20 RX ADMIN — Medication 2 PUFF: at 08:57

## 2020-09-20 RX ADMIN — TIOTROPIUM BROMIDE INHALATION SPRAY 2 PUFF: 3.12 SPRAY, METERED RESPIRATORY (INHALATION) at 08:57

## 2020-09-20 RX ADMIN — APIXABAN 5 MG: 5 TABLET, FILM COATED ORAL at 09:32

## 2020-09-20 RX ADMIN — PANTOPRAZOLE SODIUM 40 MG: 40 TABLET, DELAYED RELEASE ORAL at 05:47

## 2020-09-20 NOTE — PROGRESS NOTES
[facility] PROGRESS NOTE    9/19/2020 9:01 PM        Name: Parviz Peters . Admitted: 9/14/2020  Primary Care Provider: Navdeep Mulligan MD (Tel: [phone])      Subjective: Amy Cintron     Hb dropped again to 7  bp low  Worsening leucocytosis  Pt is insisting to be discharged  Agreed to stay for another day to get blood transfusion     Reviewed interval ancillary notes    Current Medications  potassium chloride (KLOR-CON M) extended release tablet 40 mEq, PRN    Or  potassium bicarb-citric acid (EFFER-K) effervescent tablet 40 mEq, PRN    Or  potassium chloride 10 mEq/100 mL IVPB (Peripheral Line), PRN  LORazepam (ATIVAN) tablet 0.5 mg, Q4H PRN  budesonide-formoterol (SYMBICORT) 160-4.5 MCG/ACT inhaler 2 puff, BID  apixaban (ELIQUIS) tablet 5 mg, BID  predniSONE (DELTASONE) tablet 20 mg, Daily  melatonin tablet 3 mg, Nightly PRN  pantoprazole (PROTONIX) tablet 40 mg, QAM AC  levoFLOXacin (LEVAQUIN) tablet 750 mg, Every Other Day  metroNIDAZOLE (FLAGYL) tablet 500 mg, 3 times per day  tiotropium (SPIRIVA RESPIMAT) 2.5 MCG/ACT inhaler 2 puff, Daily  oxyCODONE (ROXICODONE) immediate release tablet 5 mg, Q6H PRN  sodium chloride flush 0.9 % injection 10 mL, PRN  acetaminophen (TYLENOL) tablet 650 mg, Q6H PRN    Or  acetaminophen (TYLENOL) suppository 650 mg, Q6H PRN  ondansetron (ZOFRAN-ODT) disintegrating tablet 4 mg, Q8H PRN    Or  ondansetron (ZOFRAN) injection 4 mg, Q6H PRN        Objective:  /64   Pulse 98   Temp 97.8 °F (36.6 °C) (Oral)   Resp 18   Ht 5' (1.524 m)   Wt 143 lb (64.9 kg)   SpO2 98%   BMI 27.93 kg/m²     Intake/Output Summary (Last 24 hours) at 9/19/2020 2101  Last data filed at 9/19/2020 2027  Gross per 24 hour   Intake 743.33 ml   Output --   Net 743.33 ml      Wt Readings from Last 3 Encounters:   09/19/20 143 lb (64.9 kg)   09/04/20 151 lb 4.8 oz (68.6 kg)   08/13/20 147 lb (66.7 kg) General appearance:  Appears comfortable  Eyes: Sclera clear. Pupils equal.  ENT: Moist oral mucosa. Trachea midline, no adenopathy. Cardiovascular: Regular rhythm, normal S1, S2. No murmur. No edema in lower extremities  Respiratory: Not using accessory muscles. Good inspiratory effort. Clear to auscultation bilaterally, no wheeze or crackles. GI: Abdomen soft, no tenderness, not distended, normal bowel sounds  Musculoskeletal: No cyanosis in digits, neck supple  Neurology: CN 2-12 grossly intact. No speech or motor deficits  Psych: Normal affect. Alert and oriented in time, place and person  Skin: Warm, dry, normal turgor    Labs and Tests:  CBC:   Recent Labs     09/17/20  0501 09/17/20  0847 09/18/20  0450 09/19/20  0429   WBC 11.2*  --  13.0* 17.1*   HGB 7.1* 7.8* 7.6* 7.2*     --  226 215     BMP:    Recent Labs     09/17/20  0501 09/18/20  0450 09/19/20  0429    140 143   K 4.0 3.7 3.3*   * 112* 113*   CO2 16* 21 21   BUN 22* 18 17   CREATININE 1.4* 1.5* 1.6*   GLUCOSE 85 92 91     Hepatic:   Recent Labs     09/17/20  0501 09/18/20  0450 09/19/20  0429   AST 9* 8* 9*   ALT 7* 6* 6*   BILITOT <0.2 <0.2 <0.2   ALKPHOS 56 61 58       Discussed care with family  D    Problem List  Active Problems:    COPD, severe (HCC)    Acute on chronic respiratory failure with hypoxia (HCC)    MARIS (acute kidney injury) ([facility]    HCAP (healthcare-associated pneumonia)    Lactic acidosis  Resolved Problems:    * No resolved hospital problems.  *       Assessment & Plan:   50-year-old lady admitted to the hospital complaining of dyspnea     Dyspnea and fatigue suspect secondary to anemia     Acute anemia suspected acute blood loss   Previous PE and DVTs  She only received 1 unit of blood transfusion   Hb is dropping again   Will transfuse another unit today  Seen by GI  No need for any intervention as there is no evidence of GI bleeding  CT abdomen did not show any retroperitoneal bleed  resume oral Eliquis      Pneumonia bilateral  I suspect that it is improving  She is on broad-spectrum vancomycin cefepime and metronidazole  Infectious disease were consulted  Currently on levofloxacin and metronidazole  Abx  We will complete a 7-day course    Dispo dc tomorrow.   The pt birthday is next week she really wants to be discharged tomorrow      Diet: DIET CARDIAC; Carb Control: 5 carb choices (75 gms)/meal; No Added Salt (3-4 GM)  Code:Full Code  DVT PPX      Wenceslao Sue MD   9/19/2020 9:01 PM

## 2020-09-20 NOTE — DISCHARGE SUMMARY
Hospital Medicine Discharge Summary    Patient: Alyssia Fulton     Gender: female  : 1950   Age: 77 y.o. MRN: [id number]    Admitting Physician: Kian Barahona MD  Discharge Physician: Natalie Eckert DO       Admit Date: 2020   Discharge Date:   2020    Disposition:  Home with [facility]    Discharge Diagnoses: Active Hospital Problems    Diagnosis Date Noted    Lactic acidosis [E87.2]     HCAP (healthcare-associated pneumonia) [J18.9] 2020    MARIS (acute kidney injury) ([facility] [N17.9]     Acute on chronic respiratory failure with hypoxia (HCC) [J96.21]     COPD, severe ([facility] [J44.9] 2016       Follow-up appointments:  one week    Outpatient to do list: f/u with PCP in 1-2 wks                                       F/u with ID PRN                                       F/u with pulm as scheduled                                       F/u with GI as scheduled    Condition at Discharge:  Stable    Hospital Course:   Chloe Wyman a 77 y. o. female  who presents to the ED complaining of profound dyspnea with exertion and at rest, poor sleeping but fatigue, and coughing.  She also complains of some left sided chest pains.  Admitted  to  for acute on chronic hypoxic respiratory failure and was discharged on PO abx for pneumonia after bronchoscopic evaluation and BAL.  She was diagnosed with bacterial pneumonia sepsis as well.  Remains on 10mg prednisone daily for emphysema. Labs were obtained and noted for profound lactic acidosis at 4.0, anemia with H&H 7.4/22.5, and INR of 1.3. CT of the chest revealed decreasing consolidation left upper lobe and resolution of left-sided pleural effusion. UA revealed no evidence of infection. Blood cultures were obtained and patient was initiated on broad-spectrum antibiotics including vancomycin and cefepime.   Hospitalist team consulted to admit this patient for sepsis and acute worsening of anemia secondary to CKD.    Admit, ID/Pulm/GI, IV AtB, lactic acidosis resolved, PRBC x 2; no endoscopy required; Hgb stabilized; steroids increased in house; WBC increased and bands increased minimally; Atbs continued x 3 weeks per ID; Pt clinically improved; wishes to be dc'd; dc/ home with [facility];  Pt agreeable to plan    Discharge Medications:   Current Discharge Medication List        Current Discharge Medication List      CONTINUE these medications which have CHANGED    Details   levoFLOXacin (LEVAQUIN) 750 MG tablet Take 1 tablet by mouth every other day for 11 doses  Qty: 11 tablet, Refills: 0      metroNIDAZOLE (FLAGYL) 500 MG tablet Take 1 tablet by mouth every 8 hours for 21 days  Qty: 63 tablet, Refills: 0           Current Discharge Medication List      CONTINUE these medications which have NOT CHANGED    Details   albuterol sulfate  (90 Base) MCG/ACT inhaler Inhale 2 puffs into the lungs every 6 hours as needed for Wheezing  Qty: 1 Inhaler, Refills: 11      ELIQUIS 5 MG TABS tablet TAKE 1 TABLET BY MOUTH TWICE A DAY  Qty: 60 tablet, Refills: 6      budesonide-formoterol (SYMBICORT) 160-4.5 MCG/ACT AERO Inhale 2 puffs into the lungs 2 times daily  Qty: 3 Inhaler, Refills: 3    Associated Diagnoses: COPD, severe (HCC)      tiotropium (SPIRIVA RESPIMAT) 2.5 MCG/ACT AERS inhaler Inhale 2 puffs into the lungs daily  Qty: 3 Inhaler, Refills: 3    Associated Diagnoses: COPD, severe (HCC)      albuterol (PROVENTIL) (2.5 MG/3ML) 0.083% nebulizer solution Take 3 mLs by nebulization every 6 hours as needed for Wheezing Dx: COPD      ICD-10: J44.9  Qty: 360 mL, Refills: 6      alendronate (FOSAMAX) 70 MG tablet Take 1 tablet by mouth every 7 days  Qty: 12 tablet, Refills: 3    Associated Diagnoses: Osteoporosis      omeprazole (PRILOSEC) 20 MG delayed release capsule Take 20 mg by mouth Daily Takes as needed      predniSONE (DELTASONE) 10 MG tablet Take 10 mg by mouth daily Take every morning      cyclobenzaprine (FLEXERIL) 5 MG tablet Take 5 mg by mouth 4 times daily Every 6 hours for muscle spasms           Current Discharge Medication List      STOP taking these medications       linezolid (ZYVOX) 600 MG tablet Comments:   Reason for Stopping:         amitriptyline (ELAVIL) 150 MG tablet Comments:   Reason for Stopping:         potassium chloride (KLOR-CON) 10 MEQ extended release tablet Comments:   Reason for Stopping:         Misc. Devices (BATH/SHOWER SEAT) MISC Comments:   Reason for Stopping:         Misc. Devices (ROLLER [place]) MISC Comments:   Reason for Stopping:         Adalimumab (HUMIRA) 20 MG/0.4ML KIT Comments:   Reason for Stopping:                                Note that greater than 30 minutes was spent in preparing discharge papers, discussing discharge with patient, medication review, etc.       Signed:    Zelda Napoles DO   9/20/2020      Thank you Modesto Walsh MD for the opportunity to be involved in this patient's care.  If you have any questions or concerns please feel free to contact me at [facility]

## 2020-09-20 NOTE — PROGRESS NOTES
Shift assessment complete. VSS. Patient resting in bed respirations are easy and unlabored. Blood discontinued. Call light in reach, bed locked and in lowest position. Fall precautions in place. Will continue to monitor.

## 2020-09-21 ENCOUNTER — CARE COORDINATION (OUTPATIENT)
Dept: CASE MANAGEMENT | Age: 70
End: 2020-09-21

## 2020-09-21 NOTE — CARE COORDINATION
was performed with patient, who verbalizes understanding of administration of home medications. Advised obtaining a 90-day supply of all daily and as-needed medications. Covid Risk Education    Patient has following risk factors of: COPD and asthma. Education provided regarding infection prevention, and signs and symptoms of COVID-19 and when to seek medical attention with patient who verbalized understanding. Discussed exposure protocols and quarantine From [facility]: Are you at higher risk for severe illness?   and given an opportunity for questions and concerns. The patient agrees to contact the COVID-19 hotline [phone] or PCP office for questions related to COVID-19. For more information on steps you can take to protect yourself, see CDC's How to Protect Yourself     Patient/family/caregiver given information for [employer] and agrees to enroll no  Patient's preferred e-mail: declines  Patient's preferred phone number: declines    Discussed follow-up appointments. If no appointment was previously scheduled, appointment scheduling offered: Yes. Is follow up appointment scheduled within 7 days of discharge? Yes  Non-[facility] follow up appointment(s): no    Plan for follow-up call in 3-5 days based on severity of symptoms and risk factors. Plan for next call: hospital f/u, [facility] OT/ PT     Pt states doing much better, no issues or concerns. Denies SOB, CP, fever. [facility]. nurse was out this morning, anxious to start therapy. Agreed to more CTC f/u calls. CTN provided contact information for future needs. .     Follow Up  Future Appointments   Date Time Provider Kia Guadarrama   9/24/2020  4:15 PM [address] [place] [address], [facility] - CNP PUL[facility]   11/9/2020 10:15 AM Melodie Fulton MD P[facility]   12/17/2020 11:00 AM Fadi Herron MD  [facility]  YUDELKA Cruz RN

## 2020-09-24 ENCOUNTER — CARE COORDINATION (OUTPATIENT)
Dept: CASE MANAGEMENT | Age: 70
End: 2020-09-24

## 2020-09-24 ENCOUNTER — OFFICE VISIT (OUTPATIENT)
Dept: PULMONOLOGY | Age: 70
End: 2020-09-24
Payer: MEDICARE

## 2020-09-24 VITALS
OXYGEN SATURATION: 96 % | HEIGHT: 60 IN | WEIGHT: 146 LBS | HEART RATE: 108 BPM | SYSTOLIC BLOOD PRESSURE: 116 MMHG | BODY MASS INDEX: 28.66 KG/M2 | DIASTOLIC BLOOD PRESSURE: 72 MMHG

## 2020-09-24 PROCEDURE — G8417 CALC BMI ABV UP PARAM F/U: HCPCS | Performed by: NURSE PRACTITIONER

## 2020-09-24 PROCEDURE — 1036F TOBACCO NON-USER: CPT | Performed by: NURSE PRACTITIONER

## 2020-09-24 PROCEDURE — 4040F PNEUMOC VAC/ADMIN/RCVD: CPT | Performed by: NURSE PRACTITIONER

## 2020-09-24 PROCEDURE — G8926 SPIRO NO PERF OR DOC: HCPCS | Performed by: NURSE PRACTITIONER

## 2020-09-24 PROCEDURE — 1123F ACP DISCUSS/DSCN MKR DOCD: CPT | Performed by: NURSE PRACTITIONER

## 2020-09-24 PROCEDURE — 3023F SPIROM DOC REV: CPT | Performed by: NURSE PRACTITIONER

## 2020-09-24 PROCEDURE — G8427 DOCREV CUR MEDS BY ELIG CLIN: HCPCS | Performed by: NURSE PRACTITIONER

## 2020-09-24 PROCEDURE — 3017F COLORECTAL CA SCREEN DOC REV: CPT | Performed by: NURSE PRACTITIONER

## 2020-09-24 PROCEDURE — 1111F DSCHRG MED/CURRENT MED MERGE: CPT | Performed by: NURSE PRACTITIONER

## 2020-09-24 PROCEDURE — G8399 PT W/DXA RESULTS DOCUMENT: HCPCS | Performed by: NURSE PRACTITIONER

## 2020-09-24 PROCEDURE — 1090F PRES/ABSN URINE INCON ASSESS: CPT | Performed by: NURSE PRACTITIONER

## 2020-09-24 PROCEDURE — 99214 OFFICE O/P EST MOD 30 MIN: CPT | Performed by: NURSE PRACTITIONER

## 2020-09-24 ASSESSMENT — ENCOUNTER SYMPTOMS
COLOR CHANGE: 0
COUGH: 0
SHORTNESS OF BREATH: 1
CONSTIPATION: 0
ABDOMINAL PAIN: 0

## 2020-09-24 NOTE — PROGRESS NOTES
[facility] Follow Up Note    Subjective:   CHIEF COMPLAINT / HPI: Recent hospitalization PNA / AECOPD 9/14-9/20, 8/28-9/6   The patient is 72 y.o. female who presents today for a routine follow up visit related to the above mentioned issues. There is a PMH significant for severe COPD, CKD, past CDiff, Crohn's disease, HH, osteoporosis and past PE. She initially presented to the hospital in late August with increased SOB and significant ADAM infiltrate. She was treated with Zyvox, Cefepime and Flagyl. She underwent bronchoscopy on 9/2 by Dr. Polo Agarwal which revealed normal mucosa and some mucous plugs. Cultures were negative. COVID testing was negative. She improved and was discharged on 9/8. Unfortunately symptoms persisted and she again returned to the ED on 9/14. She was found to have a significantly distended abdomen and was quite anemic which required transfusion. She was treated with Levaquin but follow up CT showed improved infiltrate. Presently she reports significant improvement in SOB. She denies cough. There is no fever or chilling. She doesn't feel like breathing is back to baseline and she continues to need oxygen continuously which is new for her, but overall she feels better.         Past Medical History:   Diagnosis Date    Bullous emphysema (HCC)     CKD (chronic kidney disease) stage 3, GFR 30-59 ml/min ([facility])     Clostridium difficile carrier 01/21/2019    COPD (chronic obstructive pulmonary disease) [address]     PFT done 7 years ago   [facility] Crohn's disease ([facility] [address]     Crohn's disease    Depression 1/30/2011    pt states resolved as of 3-26-12    DVT (deep venous thrombosis) ([address]     2012; first ocurrence     Hiatal hernia     Hx of blood clots     Kidney disease     Kidney insufficiency     Osteoporosis     Peripheral neuropathy     neuropathy in legs    Pneumonia     Postmenopausal     LMP in  40's    Pulmonary embolism ([address]     2012; first ocurrence    Sepsis ([address]  Syringomyelia ([facility] [address]      Social History:    Social History     Tobacco Use   Smoking Status Former Smoker    Packs/day: 0.25    Years: 30.00    Pack years: 7.50    Types: Cigarettes    Last attempt to quit: 2015    Years since quittin.5   Smokeless Tobacco Never Used   Tobacco Comment    started smoking at age 26 / smoked up to 9 cigarettes a day      Current Medications:     Current Outpatient Medications on File Prior to Visit   Medication Sig Dispense Refill    levoFLOXacin (LEVAQUIN) 750 MG tablet Take 1 tablet by mouth every other day for 11 doses 11 tablet 0    metroNIDAZOLE (FLAGYL) 500 MG tablet Take 1 tablet by mouth every 8 hours for 21 days 63 tablet 0    predniSONE (DELTASONE) 10 MG tablet Take 10 mg by mouth daily Take every morning      cyclobenzaprine (FLEXERIL) 5 MG tablet Take 5 mg by mouth 4 times daily Every 6 hours for muscle spasms      albuterol sulfate  (90 Base) MCG/ACT inhaler Inhale 2 puffs into the lungs every 6 hours as needed for Wheezing 1 Inhaler 11    ELIQUIS 5 MG TABS tablet TAKE 1 TABLET BY MOUTH TWICE A DAY 60 tablet 6    budesonide-formoterol (SYMBICORT) 160-4.5 MCG/ACT AERO Inhale 2 puffs into the lungs 2 times daily 3 Inhaler 3    tiotropium (SPIRIVA RESPIMAT) 2.5 MCG/ACT AERS inhaler Inhale 2 puffs into the lungs daily 3 Inhaler 3    albuterol (PROVENTIL) (2.5 MG/3ML) 0.083% nebulizer solution Take 3 mLs by nebulization every 6 hours as needed for Wheezing Dx: COPD      ICD-10: J44.9 360 mL 6    alendronate (FOSAMAX) 70 MG tablet Take 1 tablet by mouth every 7 days 12 tablet 3    omeprazole (PRILOSEC) 20 MG delayed release capsule Take 20 mg by mouth Daily Takes as needed       No current facility-administered medications on file prior to visit. Review of Systems   Constitutional: Negative for chills and fever. HENT: Negative for congestion and postnasal drip. Respiratory: Positive for shortness of breath. Negative for cough. Cardiovascular: Negative for chest pain and leg swelling. Gastrointestinal: Negative for abdominal pain and constipation. Musculoskeletal: Negative for arthralgias and joint swelling. Skin: Negative for color change and pallor. Allergic/Immunologic: Negative for environmental allergies and food allergies. Psychiatric/Behavioral: Negative for agitation and confusion. Objective:       VITALS:  /72 (Site: Left Upper Arm, Position: Sitting, Cuff Size: Medium Adult)   Pulse 108   Ht 5' (1.524 m)   Wt 146 lb (66.2 kg)   SpO2 96% Comment: 2L  Breastfeeding No   BMI 28.51 kg/m²      Physical Exam  Vitals signs reviewed. Constitutional:       General: She is not in acute distress. Appearance: She is well-developed. HENT:      Head: Normocephalic. Mouth/Throat:      Pharynx: No oropharyngeal exudate. Eyes:      General:         Right eye: No discharge. Left eye: No discharge. Conjunctiva/sclera: Conjunctivae normal.      Pupils: Pupils are equal, round, and reactive to light. Neck:      Musculoskeletal: Normal range of motion and neck supple. Trachea: No tracheal deviation. Cardiovascular:      Rate and Rhythm: Normal rate and regular rhythm. Heart sounds: No friction rub. Pulmonary:      Effort: Pulmonary effort is normal. No tachypnea, accessory muscle usage or respiratory distress. Breath sounds: No stridor. No wheezing, rhonchi or rales. Chest:      Chest wall: No tenderness or crepitus. Abdominal:      General: Bowel sounds are normal. There is no distension. Palpations: Abdomen is soft. Tenderness: There is no abdominal tenderness. Musculoskeletal: Normal range of motion. Lymphadenopathy:      Cervical: No cervical adenopathy. Skin:     General: Skin is warm and dry. Findings: No erythema. Neurological:      Mental Status: She is alert and oriented to person, place, and time.    Psychiatric:         Thought Content: Thought content normal.       DATA:      Radiology Review:  Pertinent images / reports were reviewed as a part of this visit. CT chest done 9/14/20:  Decreasing consolidation in the left upper lobe with resolution of left pleural effusion     CT chest done 8/30/20 reveals the following:  Significant worsening in size of the left upper lobe pneumonia in 2 days. Moderate left pleural effusion now present with mild atelectasis. Severe emphysematous changes. No CHF. Last PFTs done 2019:  Spirometry reveals decreased FVC at 1.49 liters, which  is 57% predicted. FEV1 is decreased at 0.94 liters, which is 48%  predicted. FEV1/FVC ratio is reduced at 63%. Postbronchodilator study  is not performed. Lung volumes reveal normal total lung capacity. Residual volume is increased at 159% predicted. Diffusion capacity is  decreased at 27% predicted. In comparison to a study from 02/2017, FVC  has decreased by 21%. FEV1 is increased by 19% and residual volume is  increased by 17%. Diffusion capacity is decreased by 37%. IMPRESSION:  Severe obstructive lung disease with air trapping. There  has been significant worsening since the preceding study. Assessment / Plan:   1. Chronic hypoxemic respiratory failure (HCC)  - Now requires oxygen continuously     2. Pneumonia of left upper lobe due to infectious organism [facility])  - This had improved on most recent CT  - No guiding culture data and COVID (-)  - She has completed abx  - Plan f/u CXR in 8 weeks     3. COPD, severe ([address]  - Breathing is quite back to normal yet  - She has struggled off of Prednisone but has now had a pneumonia  - Will continue the 10 mg until she is back to baseline then wean off  - Continue Symbicort, Spiriva and ANTONIA via nebulizer     Return in about 4 weeks (around 10/22/2020) for short term follow up of symptoms. RTC sooner if symptoms worsen.     Dora Briseno MSN APRN-ACNP CCRN

## 2020-09-24 NOTE — CARE COORDINATION
Patient resolved from the Care Transitions episode on 9/24/20, left contact info on vm. No further outreach scheduled with this CTN. Episode of Care resolved.

## 2020-10-05 LAB
FUNGUS (MYCOLOGY) CULTURE: NORMAL
FUNGUS (MYCOLOGY) CULTURE: NORMAL
FUNGUS STAIN: NORMAL
FUNGUS STAIN: NORMAL

## 2020-10-14 ENCOUNTER — TELEPHONE (OUTPATIENT)
Dept: PULMONOLOGY | Age: 70
End: 2020-10-14

## 2020-10-14 NOTE — TELEPHONE ENCOUNTER
Pt called in stating that she finished her \"abx\" yesterday and she has not been able to eat or drink, feels very weak. Pt requesting a call back.     Pt # [phone]

## 2020-10-14 NOTE — TELEPHONE ENCOUNTER
Spoke with pt and she would like to come in and be seen here in office tomorrow.  Order placed for CXR and she will come into hospital at 3:00 to get this done and then come down to office and see Dr Alva Mojica

## 2020-10-15 ENCOUNTER — HOSPITAL ENCOUNTER (OUTPATIENT)
Dept: GENERAL RADIOLOGY | Age: 70
Discharge: HOME | End: 2020-10-15
Payer: MEDICARE

## 2020-10-15 ENCOUNTER — OFFICE VISIT (OUTPATIENT)
Dept: PULMONOLOGY | Age: 70
End: 2020-10-15
Payer: MEDICARE

## 2020-10-15 ENCOUNTER — HOSPITAL ENCOUNTER (OUTPATIENT)
Age: 70
Discharge: HOME | End: 2020-10-15
Payer: MEDICARE

## 2020-10-15 VITALS
DIASTOLIC BLOOD PRESSURE: 77 MMHG | SYSTOLIC BLOOD PRESSURE: 114 MMHG | TEMPERATURE: 97.8 F | BODY MASS INDEX: 26.9 KG/M2 | WEIGHT: 137 LBS | RESPIRATION RATE: 18 BRPM | HEIGHT: 60 IN | OXYGEN SATURATION: 98 % | HEART RATE: 108 BPM

## 2020-10-15 PROCEDURE — 71046 X-RAY EXAM CHEST 2 VIEWS: CPT

## 2020-10-15 PROCEDURE — G8484 FLU IMMUNIZE NO ADMIN: HCPCS | Performed by: INTERNAL MEDICINE

## 2020-10-15 PROCEDURE — 4040F PNEUMOC VAC/ADMIN/RCVD: CPT | Performed by: INTERNAL MEDICINE

## 2020-10-15 PROCEDURE — G8427 DOCREV CUR MEDS BY ELIG CLIN: HCPCS | Performed by: INTERNAL MEDICINE

## 2020-10-15 PROCEDURE — 99214 OFFICE O/P EST MOD 30 MIN: CPT | Performed by: INTERNAL MEDICINE

## 2020-10-15 PROCEDURE — 1090F PRES/ABSN URINE INCON ASSESS: CPT | Performed by: INTERNAL MEDICINE

## 2020-10-15 PROCEDURE — G8926 SPIRO NO PERF OR DOC: HCPCS | Performed by: INTERNAL MEDICINE

## 2020-10-15 PROCEDURE — 1123F ACP DISCUSS/DSCN MKR DOCD: CPT | Performed by: INTERNAL MEDICINE

## 2020-10-15 PROCEDURE — G8399 PT W/DXA RESULTS DOCUMENT: HCPCS | Performed by: INTERNAL MEDICINE

## 2020-10-15 PROCEDURE — G8417 CALC BMI ABV UP PARAM F/U: HCPCS | Performed by: INTERNAL MEDICINE

## 2020-10-15 PROCEDURE — 3017F COLORECTAL CA SCREEN DOC REV: CPT | Performed by: INTERNAL MEDICINE

## 2020-10-15 PROCEDURE — 3023F SPIROM DOC REV: CPT | Performed by: INTERNAL MEDICINE

## 2020-10-15 PROCEDURE — 1036F TOBACCO NON-USER: CPT | Performed by: INTERNAL MEDICINE

## 2020-10-15 PROCEDURE — 1111F DSCHRG MED/CURRENT MED MERGE: CPT | Performed by: INTERNAL MEDICINE

## 2020-10-15 RX ORDER — AMINO ACIDS/PROTEIN HYDROLYS 18 G-72/30
1 LIQUID (ML) ORAL 2 TIMES DAILY
Qty: 30 BOTTLE | Refills: 2 | Status: SHIPPED | OUTPATIENT
Start: 2020-10-15 | End: 2021-09-01 | Stop reason: CLARIF

## 2020-10-15 NOTE — PROGRESS NOTES
PULMONARY OFFICE FOLLOW UP NOTE    REASON FOR VISIT:   Chief Complaint   Patient presents with    Shortness of Breath     Acute visit - pt c/o sob since Aug 31       DATE OF VISIT: 10/15/2020     HISTORY OF PRESENT ILLNESS: 72y.o. year old female comes into the pulmonary office for an acute visit. Patient has a known history of severe COPD with chronic hypoxemic respiratory failure. Patient was recently hospitalized with suspicion for left sided pneumonia. Underwent bronchoscopic evaluation which did not reveal any new infections. Patient continues to take her Symbicort, Spiriva and albuterol regularly. Reports that she just finished with her antibiotics course. Antibiotics led to diarrhea which has made her feel very exhausted and tired. Her breathing is overall stable. She denies any wheezing, chest tightness, chest heaviness, orthopnea or paroxysmal nocturnal dyspnea. Denies any discolored expectoration, hemoptysis, fevers or night sweats at this time. REVIEW OF SYSTEMS: 14 point ROS is negative beside mentioned in [address]      PAST MEDICAL HISTORY:   Past Medical History:   Diagnosis Date    Bullous emphysema (HCC)     CKD (chronic kidney disease) stage 3, GFR 30-59 ml/min     Clostridium difficile carrier 01/21/2019    COPD (chronic obstructive pulmonary disease) ([address]     PFT done 7 years ago   [place] Crohn's disease ([address]     Crohn's disease    Depression 1/30/2011    pt states resolved as of 3-26-12    DVT (deep venous thrombosis) ([address]     2012; first ocurrence     Hiatal hernia     Hx of blood clots     Kidney disease     Kidney insufficiency     Osteoporosis     Peripheral neuropathy     neuropathy in legs    Pneumonia     Postmenopausal     LMP in  40's    Pulmonary embolism ([address]     2012; first ocurrence    Sepsis ([address]     Syringomyelia ([address]        PAST SURGICAL HISTORY:   Past Surgical History:   Procedure Laterality Date    ABDOMEN SURGERY      BACK SURGERY      shunt to drain CSF    BIOPSY SHOULDER Left 2019    EXCISION OF LEFT SHOULDER MASS performed by Noa Meredith MD at [facility]      crainotomy- remove fluid ? V-P SHUNT    BRONCHOSCOPY N/A 2020    BRONCHOSCOPY ALVEOLAR LAVAGE performed by Cooper Miller MD at [facility]      resection X2    COLONOSCOPY  11    BIOPSY AND POLYPECTOMY    COLONOSCOPY  2012    and ablation ERBE/APC    SHOULDER SURGERY      L        SOCIAL HISTORY:   Social History     Tobacco Use    Smoking status: Former Smoker     Packs/day: 0.25     Years: 30.00     Pack years: 7.50     Types: Cigarettes     Last attempt to quit: 2015     Years since quittin.6    Smokeless tobacco: Never Used    Tobacco comment: started smoking at age 26 / smoked up to 9 cigarettes a day    Substance Use Topics    Alcohol use: No    Drug use: No       FAMILY HISTORY:   Family History   Problem Relation Age of Onset    Heart Disease Mother     High Blood Pressure Mother     High Cholesterol Mother     Early Death Mother 36        MI    Heart Disease Father     High Blood Pressure Father     High Cholesterol Father     Stroke Father 79    High Blood Pressure Sister     High Blood Pressure Brother        MEDICATIONS:     Current Outpatient Medications on File Prior to Visit   Medication Sig Dispense Refill    predniSONE (DELTASONE) 10 MG tablet Take 10 mg by mouth daily Take every morning      cyclobenzaprine (FLEXERIL) 5 MG tablet Take 5 mg by mouth 4 times daily Every 6 hours for muscle spasms      albuterol sulfate  (90 Base) MCG/ACT inhaler Inhale 2 puffs into the lungs every 6 hours as needed for Wheezing 1 Inhaler 11    ELIQUIS 5 MG TABS tablet TAKE 1 TABLET BY MOUTH TWICE A DAY 60 tablet 6    budesonide-formoterol (SYMBICORT) 160-4.5 MCG/ACT AERO Inhale 2 puffs into the lungs 2 times daily 3 Inhaler 3    tiotropium (SPIRIVA RESPIMAT) 2.5 MCG/ACT AERS inhaler Inhale 2 puffs into the lungs daily 3 Inhaler 3    albuterol (PROVENTIL) (2.5 MG/3ML) 0.083% nebulizer solution Take 3 mLs by nebulization every 6 hours as needed for Wheezing Dx: COPD      ICD-10: J44.9 360 mL 6    alendronate (FOSAMAX) 70 MG tablet Take 1 tablet by mouth every 7 days 12 tablet 3    omeprazole (PRILOSEC) 20 MG delayed release capsule Take 20 mg by mouth Daily Takes as needed       No current facility-administered medications on file prior to visit. ALLERGIES:   Allergies as of 10/15/2020    (No Known Allergies)      OBJECTIVE:   height is 5' (1.524 m) and weight is 137 lb (62.1 kg). Her oral temperature is 97.8 °F (36.6 °C). Her blood pressure is 114/77 and her pulse is 108. Her respiration is 18 and oxygen saturation is 98%. PHYSICAL EXAM:    CONSTITUTIONAL: She is a 72y.o.-year-old who appears her stated age. She is alert and oriented x 3 and in no acute distress. HEENT: PERRL. No scleral icterus. No thrush, atraumatic, normocephalic. NECK: Supple, without cervical or supraclavicular lymphadenopathy:  CARDIOVASCULAR: S1 S2 RRR. Without murmer  RESPIRATORY & CHEST: Lungs are clear to auscultation and percussion. No wheezing, no crackles. Good air movement  GASTROINTESTINAL & ABDOMEN: Soft, nontender, positive bowel sounds in all quadrants, non-distended, without hepatosplenomegaly. GENITOURINARY: Deferred. MUSCULOSKELETAL: No tenderness to palpation of the axial skeleton. There is no clubbing. No cyanosis. No edema of the lower extremities. SKIN OF BODY: No rash or jaundice. PSYCHIATRIC EVALUATION: Normal affect. Patient answers questions appropriately. HEMATOLOGIC/LYMPHATIC/ IMMUNOLOGIC: No palpable lymphadenopathy. NEUROLOGIC: Alert and oriented x 3. Groslly non-focal. Motor strength is 5+/5 in all muscle groups. The patient has a normal sensorium globally.       Labs:    Lab Summary Latest Ref Rng & Units 9/20/2020 9/19/2020 9/19/2020 WBC 4.0 - 11.0 K/uL 22. 5(H) - 17. 1(H)   Hgb 12.0 - 16.0 g/dL 9.2(L) 9.0(L) 7. 2(L)   Hct 36.0 - 48.0 % 27. 8(L) 27. 3(L) 22. 2(L)   Platelets 259 - 536 K/uL 207 - 215   Sodium 136 - 145 mmol/L 141 - 143   Potassium 3.5 - 5.1 mmol/L 3.2(L) - 3. 3(L)   BUN 7 - 20 mg/dL 13 - 17   Creatinine 0.6 - 1.2 mg/dL 1.5(H) - 1. 6(H)   Glucose 70 - 99 mg/dL 95 - 91   Calcium 8.3 - 10.6 mg/dL 8.4 - 8.4   Magnesium 1.80 - 2.40 mg/dL - - -   Alk Phos 40 - 129 U/L 65 - 62   Albumin 3.4 - 5.0 g/dL 3. 1(L) - 2. 9(L)   Bilirubin 0.0 - 1.0 mg/dL 0.3 - <0.2   AST 15 - 37 U/L 11(L) - 9(L)   ALT 10 - 40 U/L 6(L) - 6(L)   Some recent data might be hidden       All labs were personally reviewed by me any my interpretation is: CBC is leukocytosis and anemia. Chem 8 is chronic kidney disease. IMAGING:    CXR done on 10/15/2020 was personally reviewed by me and my interpretation is : Left upper lobe chronic scarring is seen. Mild scoliosis is also seen. Cardiac silhouette is within normal limits. No new focal consolidation, pleural effusions or pneumothorax seen. Impression: Overall stable chest x-ray. Pulmonary Functions Testing Results:    IMPRESSION AND RECOMMENDATIONS:     1. COPD, severe ([address]  -Patient has known severe COPD. Chest x-ray today shows stable findings without any new consolidation. On auscultation I could not hear any wheezing or crackles. Patient had SPO2 of 98% on room air today. Her severe COPD is stable. -Continue with Symbicort, Spiriva and albuterol  -To provide some relief to the patient, I will increase the prednisone to 15 mg p.o. daily for 5 days and then will start tapering it down.   Patient will call us back in 5 days time to let us know how she is feeling.  -Unclear if she really had any pneumonia in the recent past.  She has a chronic left upper lobe lung scarring but no clear consolidation.  -Chronic antibiotic therapy has led to gastrointestinal side effects which is probably leading to electrolyte losses as well as generalized fatigue. Advised patient to take nutrition supplements at least twice a day as well as drink Gatorade to replenish electrolytes. 2. Chronic hypoxemic respiratory failure (HCC)  -Currently patient's oxygenation has started to improve. She will use oxygen supplementation with ambulation and at nighttime. Keep the original follow-up on 10/29/2020. Long Pierce MD  Pulmonary Critical Care and Sleep Medicine  10/15/2020, 3:51 PM    This note was completed using dragon medical speech recognition software. Grammatical errors, random word insertions, pronoun errors and incomplete sentences are occasional consequences of this technology due to software limitations. If there are questions or concerns about the content of this note of information contained within the body of this dictation they should be addressed with the provider for clarification.

## 2020-10-20 LAB
AFB CULTURE (MYCOBACTERIA): NORMAL
AFB CULTURE (MYCOBACTERIA): NORMAL
AFB SMEAR: NORMAL
AFB SMEAR: NORMAL

## 2020-10-21 ENCOUNTER — TELEPHONE (OUTPATIENT)
Dept: PULMONOLOGY | Age: 70
End: 2020-10-21

## 2020-10-21 RX ORDER — PREDNISONE 10 MG/1
TABLET ORAL
Qty: 12 TABLET | Refills: 0 | Status: ON HOLD | OUTPATIENT
Start: 2020-10-21 | End: 2021-04-15 | Stop reason: HOSPADM

## 2020-10-21 NOTE — TELEPHONE ENCOUNTER
Called pt back to see how she is feeling and she stated she is still feeling sluggish and not much energy Trying to drink some Ensure. O2 levels are staying up 94-96% pt states she took 15 mg a day of Prednisone for 5 days as instructed by Dr Abelardo Hylton.

## 2020-10-21 NOTE — TELEPHONE ENCOUNTER
Pt called in stating that she was supposed to call back 5 days after her office visit to see if she should wean off the prednisone. Pt requesting a call back.    Pt # [phone]

## 2020-10-21 NOTE — TELEPHONE ENCOUNTER
Please ask her to continue taking Prednisone at 15mg po daily till she follows with Dr. Nancy Juarez. Lack of energy may be more a sign of generalized weakness than a lung related issue. Thanks.

## 2020-10-22 ENCOUNTER — TELEPHONE (OUTPATIENT)
Dept: INTERNAL MEDICINE CLINIC | Age: 70
End: 2020-10-22

## 2020-10-22 RX ORDER — FEEDER CONTAINER WITH PUMP SET
1 EACH MISCELLANEOUS 2 TIMES DAILY
Qty: 60 CAN | Refills: 1 | Status: SHIPPED | OUTPATIENT
Start: 2020-10-22 | End: 2021-08-05 | Stop reason: ALTCHOICE

## 2020-10-22 NOTE — TELEPHONE ENCOUNTER
----- Message from Tuyet Perales sent at 10/21/2020  4:47 PM EDT -----  Subject: Message to Provider    QUESTIONS  Information for Provider? Patient states that Dr. Messi Diaz put in an order   for a Shingles shot 1 year ago and she was never able to find someone that   had one to give her. Patient wants to know what she should do to get one.   ---------------------------------------------------------------------------  --------------  CALL BACK INFO  What is the best way for the office to contact you? OK to leave message on   voicemail  Preferred Call Back Phone Number? [phone]  ---------------------------------------------------------------------------  --------------  SCRIPT ANSWERS  Relationship to Patient?  Self

## 2020-10-29 ENCOUNTER — OFFICE VISIT (OUTPATIENT)
Dept: PULMONOLOGY | Age: 70
End: 2020-10-29
Payer: MEDICARE

## 2020-10-29 VITALS
DIASTOLIC BLOOD PRESSURE: 90 MMHG | SYSTOLIC BLOOD PRESSURE: 138 MMHG | HEIGHT: 60 IN | WEIGHT: 137 LBS | OXYGEN SATURATION: 96 % | HEART RATE: 60 BPM | BODY MASS INDEX: 26.9 KG/M2

## 2020-10-29 PROCEDURE — 1036F TOBACCO NON-USER: CPT | Performed by: NURSE PRACTITIONER

## 2020-10-29 PROCEDURE — G0008 ADMIN INFLUENZA VIRUS VAC: HCPCS | Performed by: NURSE PRACTITIONER

## 2020-10-29 PROCEDURE — 1123F ACP DISCUSS/DSCN MKR DOCD: CPT | Performed by: NURSE PRACTITIONER

## 2020-10-29 PROCEDURE — G8399 PT W/DXA RESULTS DOCUMENT: HCPCS | Performed by: NURSE PRACTITIONER

## 2020-10-29 PROCEDURE — 1090F PRES/ABSN URINE INCON ASSESS: CPT | Performed by: NURSE PRACTITIONER

## 2020-10-29 PROCEDURE — G8484 FLU IMMUNIZE NO ADMIN: HCPCS | Performed by: NURSE PRACTITIONER

## 2020-10-29 PROCEDURE — 3017F COLORECTAL CA SCREEN DOC REV: CPT | Performed by: NURSE PRACTITIONER

## 2020-10-29 PROCEDURE — 90694 VACC AIIV4 NO PRSRV 0.5ML IM: CPT | Performed by: NURSE PRACTITIONER

## 2020-10-29 PROCEDURE — 3023F SPIROM DOC REV: CPT | Performed by: NURSE PRACTITIONER

## 2020-10-29 PROCEDURE — G8926 SPIRO NO PERF OR DOC: HCPCS | Performed by: NURSE PRACTITIONER

## 2020-10-29 PROCEDURE — G8427 DOCREV CUR MEDS BY ELIG CLIN: HCPCS | Performed by: NURSE PRACTITIONER

## 2020-10-29 PROCEDURE — G8417 CALC BMI ABV UP PARAM F/U: HCPCS | Performed by: NURSE PRACTITIONER

## 2020-10-29 PROCEDURE — 99214 OFFICE O/P EST MOD 30 MIN: CPT | Performed by: NURSE PRACTITIONER

## 2020-10-29 PROCEDURE — 4040F PNEUMOC VAC/ADMIN/RCVD: CPT | Performed by: NURSE PRACTITIONER

## 2020-10-29 ASSESSMENT — ENCOUNTER SYMPTOMS
CONSTIPATION: 0
ABDOMINAL PAIN: 0
COUGH: 0
SHORTNESS OF BREATH: 1
COLOR CHANGE: 0

## 2020-10-29 NOTE — PROGRESS NOTES
[facility] Follow Up Note    Subjective:   CHIEF COMPLAINT / HPI: Recent hospitalization PNA September The patient is 72 y.o. female who presents today for a routine follow up visit related to the above mentioned issues. There is a PMH significant for severe COPD, CKD, past CDiff, Crohn's disease, HH, osteoporosis and past PE. She initially presented to the hospital in late August with increased SOB and significant ADAM infiltrate. She was treated with Zyvox, Cefepime and Flagyl. She underwent bronchoscopy on 9/2 by Dr. Kelly Garza which revealed normal mucosa and some mucous plugs. Cultures were negative. COVID testing was negative. She improved and was discharged on 9/8. Unfortunately symptoms persisted and she again returned to the ED on 9/14. She was found to have a significantly distended abdomen and was quite anemic which required transfusion. She was treated with Levaquin but follow up CT showed improved infiltrate. I saw her late September and she was improved but not yet back to baseline. She returned two weeks later and saw Dr. Pilar Donaldson. He increased her Prednisone to 15 mg daily initially x 5 days, then continued this dosage until she was seen in follow up. Presently she reports feeling very tired. She hasn't been sleeping well. She feels her SOB is some better. She denies cough. There is no fever or chilling. She is back to wearing O2 with activity and sleep.        Past Medical History:   Diagnosis Date    Bullous emphysema (HCC)     CKD (chronic kidney disease) stage 3, GFR 30-59 ml/min     Clostridium difficile carrier 01/21/2019    COPD (chronic obstructive pulmonary disease) ([facility] [address]     PFT done 7 years ago   Hiramydluiz Ada Crohn's disease ([facility] [address]     Crohn's disease    Depression 1/30/2011    pt states resolved as of 3-26-12    DVT (deep venous thrombosis) ([facility] [address]     2012; first ocurrence     Hiatal hernia     Hx of blood clots     Kidney disease     Kidney insufficiency     Osteoporosis  Peripheral neuropathy     neuropathy in legs    Pneumonia     Postmenopausal     LMP in  40's    Pulmonary embolism ([facility] [address]     2012; first ocurrence    Sepsis ([facility] [address]     Syringomyelia ([facility]      Social History:    Social History     Tobacco Use   Smoking Status Former Smoker    Packs/day: 0.25    Years: 30.00    Pack years: 7.50    Types: Cigarettes    Last attempt to quit: 2015    Years since quittin.6   Smokeless Tobacco Never Used   Tobacco Comment    started smoking at age 26 / smoked up to 9 cigarettes a day      Current Medications:     Current Outpatient Medications on File Prior to Visit   Medication Sig Dispense Refill    Nutritional Supplements (ENSURE HIGH PROTEIN) LIQD Take 1 Can by mouth 2 times daily 60 Can 1    predniSONE (DELTASONE) 10 MG tablet Take 1-1/2 tablets (15mg) every morning until seen 12 tablet 0    potassium chloride (KLOR-CON M) 20 MEQ extended release tablet Take 1 tablet by mouth daily 90 tablet 1    Amino Acids-Protein Hydrolys (PROTEINEX P18) LIQD Take 1 Bottle by mouth 2 times daily 30 Bottle 2    cyclobenzaprine (FLEXERIL) 5 MG tablet Take 5 mg by mouth 4 times daily Every 6 hours for muscle spasms      albuterol sulfate  (90 Base) MCG/ACT inhaler Inhale 2 puffs into the lungs every 6 hours as needed for Wheezing 1 Inhaler 11    ELIQUIS 5 MG TABS tablet TAKE 1 TABLET BY MOUTH TWICE A DAY 60 tablet 6    budesonide-formoterol (SYMBICORT) 160-4.5 MCG/ACT AERO Inhale 2 puffs into the lungs 2 times daily 3 Inhaler 3    tiotropium (SPIRIVA RESPIMAT) 2.5 MCG/ACT AERS inhaler Inhale 2 puffs into the lungs daily 3 Inhaler 3    albuterol (PROVENTIL) (2.5 MG/3ML) 0.083% nebulizer solution Take 3 mLs by nebulization every 6 hours as needed for Wheezing Dx: COPD      ICD-10: J44.9 360 mL 6    alendronate (FOSAMAX) 70 MG tablet Take 1 tablet by mouth every 7 days 12 tablet 3    omeprazole (PRILOSEC) 20 MG delayed release capsule Take 20 mg by mouth Daily Takes as needed       No current facility-administered medications on file prior to visit. Review of Systems   Constitutional: Negative for chills and fever. HENT: Negative for congestion and postnasal drip. Respiratory: Positive for shortness of breath. Negative for cough. Cardiovascular: Negative for chest pain and leg swelling. Gastrointestinal: Negative for abdominal pain and constipation. Musculoskeletal: Negative for arthralgias and joint swelling. Skin: Negative for color change and pallor. Allergic/Immunologic: Negative for environmental allergies and food allergies. Psychiatric/Behavioral: Negative for agitation and confusion. Objective:       VITALS:  BP (!) 138/90   Pulse 60   Ht 5' (1.524 m)   Wt 137 lb (62.1 kg)   SpO2 96%   Breastfeeding No   BMI 26.76 kg/m²      Physical Exam  Vitals signs reviewed. Constitutional:       General: She is not in acute distress. Appearance: She is well-developed. HENT:      Head: Normocephalic. Mouth/Throat:      Pharynx: No oropharyngeal exudate. Eyes:      General:         Right eye: No discharge. Left eye: No discharge. Conjunctiva/sclera: Conjunctivae normal.      Pupils: Pupils are equal, round, and reactive to light. Neck:      Musculoskeletal: Normal range of motion and neck supple. Trachea: No tracheal deviation. Cardiovascular:      Rate and Rhythm: Normal rate and regular rhythm. Heart sounds: No friction rub. Pulmonary:      Effort: Pulmonary effort is normal. No tachypnea, accessory muscle usage or respiratory distress. Breath sounds: No stridor. No decreased breath sounds, wheezing, rhonchi or rales. Chest:      Chest wall: No tenderness or crepitus. Abdominal:      General: Bowel sounds are normal. There is no distension. Palpations: Abdomen is soft. Tenderness: There is no abdominal tenderness. Musculoskeletal: Normal range of motion.    Lymphadenopathy: Cervical: No cervical adenopathy. Skin:     General: Skin is warm and dry. Findings: No erythema. Neurological:      Mental Status: She is alert and oriented to person, place, and time. Psychiatric:         Thought Content: Thought content normal.       DATA:      Radiology Review:  Pertinent images / reports were reviewed as a part of this visit. CXR done 10/15/20:  Underlying changes of COPD. Cardiac silhouette is upper limits of normal in size. Subtle somewhat ground-glass bandlike opacity within the mid left lung extending peripherally to the pleura. This is stable to slightly less prominent than previous exam.  No significant change otherwise. No acute bony abnormality. CT chest done 9/14/20:  Decreasing consolidation in the left upper lobe with resolution of left pleural effusion     CT chest done 8/30/20 reveals the following:  Significant worsening in size of the left upper lobe pneumonia in 2 days. Moderate left pleural effusion now present with mild atelectasis. Severe emphysematous changes. No CHF. Last PFTs done 2019:  Spirometry reveals decreased FVC at 1.49 liters, which  is 57% predicted. FEV1 is decreased at 0.94 liters, which is 48%  predicted. FEV1/FVC ratio is reduced at 63%. Postbronchodilator study  is not performed. Lung volumes reveal normal total lung capacity. Residual volume is increased at 159% predicted. Diffusion capacity is  decreased at 27% predicted. In comparison to a study from 02/2017, FVC  has decreased by 21%. FEV1 is increased by 19% and residual volume is  increased by 17%. Diffusion capacity is decreased by 37%. IMPRESSION:  Severe obstructive lung disease with air trapping. There  has been significant worsening since the preceding study. Assessment / Plan:   1.  Chronic hypoxemic respiratory failure (HCC)  - Oxygenation good in the office today on RA  - Continue O2 with activity and sleep  - Would benefit from face mask O2 delivery when she is ambulating as she has trouble breathing through her nose     2. COPD, severe ([address]  - SOB is somewhat better but she is having trouble sleeping, this may relate to Prednisone  - Decrease to 10 mg daily  - She is using Advair / Spiriva, continue  - She is only using ANTONIA QD, increase to QID and monitor  - CXR at last visit was stable to slightly improved   - Flu shot today    Return in about 4 weeks (around 11/26/2020) for short term follow up of symptoms, follow up with Dr. Ajay Cornell. RTC sooner if symptoms worsen.     Montana Huerta MSN APRN-ACNP CCRN

## 2020-10-30 ENCOUNTER — TELEPHONE (OUTPATIENT)
Dept: PULMONOLOGY | Age: 70
End: 2020-10-30

## 2020-11-17 ENCOUNTER — TELEPHONE (OUTPATIENT)
Dept: PULMONOLOGY | Age: 70
End: 2020-11-17

## 2020-11-17 NOTE — TELEPHONE ENCOUNTER
Pt called and said she needs another prescription for :    predniSONE (DELTASONE) 10 MG tablet, same [facility] pharmacy

## 2020-11-18 RX ORDER — PREDNISONE 10 MG/1
10 TABLET ORAL DAILY
Qty: 30 TABLET | Refills: 0 | Status: SHIPPED | OUTPATIENT
Start: 2020-11-18 | End: 2021-08-18

## 2020-12-08 ENCOUNTER — OFFICE VISIT (OUTPATIENT)
Dept: PULMONOLOGY | Age: 70
End: 2020-12-08
Payer: MEDICARE

## 2020-12-08 VITALS — OXYGEN SATURATION: 97 % | HEART RATE: 91 BPM

## 2020-12-08 PROCEDURE — 3017F COLORECTAL CA SCREEN DOC REV: CPT | Performed by: INTERNAL MEDICINE

## 2020-12-08 PROCEDURE — 1123F ACP DISCUSS/DSCN MKR DOCD: CPT | Performed by: INTERNAL MEDICINE

## 2020-12-08 PROCEDURE — 99214 OFFICE O/P EST MOD 30 MIN: CPT | Performed by: INTERNAL MEDICINE

## 2020-12-08 PROCEDURE — 3023F SPIROM DOC REV: CPT | Performed by: INTERNAL MEDICINE

## 2020-12-08 PROCEDURE — G8399 PT W/DXA RESULTS DOCUMENT: HCPCS | Performed by: INTERNAL MEDICINE

## 2020-12-08 PROCEDURE — 1090F PRES/ABSN URINE INCON ASSESS: CPT | Performed by: INTERNAL MEDICINE

## 2020-12-08 PROCEDURE — G8926 SPIRO NO PERF OR DOC: HCPCS | Performed by: INTERNAL MEDICINE

## 2020-12-08 PROCEDURE — 1036F TOBACCO NON-USER: CPT | Performed by: INTERNAL MEDICINE

## 2020-12-08 PROCEDURE — G8484 FLU IMMUNIZE NO ADMIN: HCPCS | Performed by: INTERNAL MEDICINE

## 2020-12-08 PROCEDURE — 4040F PNEUMOC VAC/ADMIN/RCVD: CPT | Performed by: INTERNAL MEDICINE

## 2020-12-08 PROCEDURE — G8417 CALC BMI ABV UP PARAM F/U: HCPCS | Performed by: INTERNAL MEDICINE

## 2020-12-08 PROCEDURE — G8427 DOCREV CUR MEDS BY ELIG CLIN: HCPCS | Performed by: INTERNAL MEDICINE

## 2020-12-08 RX ORDER — DOXYCYCLINE HYCLATE 100 MG/1
100 CAPSULE ORAL DAILY
Qty: 10 CAPSULE | Refills: 3 | Status: SHIPPED | OUTPATIENT
Start: 2020-12-08 | End: 2020-12-17

## 2020-12-08 RX ORDER — PREDNISONE 10 MG/1
TABLET ORAL
Qty: 30 TABLET | Refills: 0 | Status: SHIPPED | OUTPATIENT
Start: 2020-12-08 | End: 2020-12-18

## 2020-12-08 NOTE — PROGRESS NOTES
[facility]  Follow Up Note  Oseas Spann MD       Ollie Heaton   YOB: 1950    Date of Visit:  12/8/2020    Assessment/Plan:  1. SOB (shortness of breath)  Worse  Cough, sputum and more SOB    Doxy and Pred taper     2. COPD, severe ([address]  PFT 7/19:  INTERPRETATION:  Spirometry reveals decreased FVC at 1.49 liters, which  is 57% predicted. FEV1 is decreased at 0.94 liters, which is 48%  predicted. FEV1/FVC ratio is reduced at 63%. Postbronchodilator study  is not performed. Lung volumes reveal normal total lung capacity. Residual volume is increased at 159% predicted. Diffusion capacity is  decreased at 27% predicted. In comparison to a study from 02/2017, FVC  has decreased by 21%. FEV1 is increased by 19% and residual volume is  increased by 17%. Diffusion capacity is decreased by 37%.     IMPRESSION:  Severe obstructive lung disease with air trapping. There  has been significant worsening since the preceding study. Symbicort  Spiriva  Albuterol HHN    Continues on Prednisone 10 mg per day as a baseline. 3. Centrilobular emphysema ([address]  CT Chest 1/5/20:  Impression   Minimal dependent opacity at the left lung base favored to represent   atelectasis.  Pneumonia considered less likely. .       COPD with advanced emphysematous changes. 4. Chronic hypoxemic respiratory failure (HCC)  Oxygen saturation on room air with exertion in the office today is 84%. The patient would benefit from supplemental oxygen with exertion and sleep  The patient is independently mobile within the home and would benefit from a portable oxygen concentrator      3 months      No chief complaint on file. HPI  The patient presents with a chief complaint of moderate shortness of breath related to severe COPD of many years duration. He has mild associated cough. Exertion is a modifying factor. She has had a couple of hospitalizations.  She was bronch'd in August. CT in September was improved. She is again having chest congestion and more SOB than usual.      Review of Systems  As reviewed in HPI    History  I have reviewed past medical, surgical, social and family history. This is documented elsewhere in the medical record. Physical Exam:  Well developed, well nourished  Alert and oriented  Sclera is clear  No cervical adenopathy  No JVD. Chest examination is clear. Cardiac examination reveals regular rate and rhythm without murmur, gallop or rub. The abdomen is soft, nontender and nondistended. There is no clubbing, cyanosis or edema of the extremities. There is no obvious skin rash. No focal neuro deficicts  Normal mood and affect    No Known Allergies  Prior to Visit Medications    Medication Sig Taking?  Authorizing Provider   predniSONE (DELTASONE) 10 MG tablet Take 1 tablet by mouth daily  Josefa Kayser, MD   Nutritional Supplements (ENSURE HIGH PROTEIN) LIQD Take 1 Can by mouth 2 times daily  Josefa Kayser, MD   predniSONE (DELTASONE) 10 MG tablet Take 1-1/2 tablets (15mg) every morning until seen  Josefa Kayser, MD   potassium chloride (KLOR-CON M) 20 MEQ extended release tablet Take 1 tablet by mouth daily  Rosie Hidalgo MD   Amino Acids-Protein Hydrolys (PROTEINEX P18) LIQD Take 1 Bottle by mouth 2 times daily  Josefa Kayser, MD   cyclobenzaprine (FLEXERIL) 5 MG tablet Take 5 mg by mouth 4 times daily Every 6 hours for muscle spasms  Historical Provider, MD   albuterol sulfate  (90 Base) MCG/ACT inhaler Inhale 2 puffs into the lungs every 6 hours as needed for Wheezing  Dangelo Weaver MD   ELIQUIS 5 MG TABS tablet TAKE 1 TABLET BY MOUTH TWICE A DAY  Dangelo Weaver MD   budesonide-formoterol [facility]) 160-4.5 MCG/ACT AERO Inhale 2 puffs into the lungs 2 times daily  Elenita Law MD   tiotropium (SPIRIVA RESPIMAT) 2.5 MCG/ACT AERS inhaler Inhale 2 puffs into the lungs daily  Elenita Law MD   albuterol (PROVENTIL) (2.5 MG/3ML) 0.083% nebulizer solution Take 3 mLs by nebulization every 6 hours as needed for Wheezing Dx: COPD      ICD-10: J44.9  Silvia Rivera MD   alendronate (FOSAMAX) 70 MG tablet Take 1 tablet by mouth every 7 days  Alex Florentino MD   omeprazole (PRILOSEC) 20 MG delayed release capsule Take 20 mg by mouth Daily Takes as needed  Historical Provider, MD       There were no vitals filed for this visit. There is no height or weight on file to calculate BMI.      Wt Readings from Last 3 Encounters:   10/29/20 137 lb (62.1 kg)   10/15/20 137 lb (62.1 kg)   20 146 lb (66.2 kg)     BP Readings from Last 3 Encounters:   10/29/20 (!) 138/90   10/15/20 114/77   20 116/72        Social History     Tobacco Use   Smoking Status Former Smoker    Packs/day: 0.25    Years: 30.00    Pack years: 7.50    Types: Cigarettes    Last attempt to quit: 2015    Years since quittin.7   Smokeless Tobacco Never Used   Tobacco Comment    started smoking at age 26 / smoked up to 9 cigarettes a day

## 2020-12-17 ENCOUNTER — OFFICE VISIT (OUTPATIENT)
Dept: INTERNAL MEDICINE CLINIC | Age: 70
End: 2020-12-17
Payer: MEDICARE

## 2020-12-17 VITALS
DIASTOLIC BLOOD PRESSURE: 84 MMHG | WEIGHT: 141 LBS | OXYGEN SATURATION: 96 % | HEART RATE: 99 BPM | BODY MASS INDEX: 28.43 KG/M2 | SYSTOLIC BLOOD PRESSURE: 136 MMHG | HEIGHT: 59 IN | TEMPERATURE: 97.4 F

## 2020-12-17 PROCEDURE — 3017F COLORECTAL CA SCREEN DOC REV: CPT | Performed by: INTERNAL MEDICINE

## 2020-12-17 PROCEDURE — G8484 FLU IMMUNIZE NO ADMIN: HCPCS | Performed by: INTERNAL MEDICINE

## 2020-12-17 PROCEDURE — G0439 PPPS, SUBSEQ VISIT: HCPCS | Performed by: INTERNAL MEDICINE

## 2020-12-17 PROCEDURE — 1123F ACP DISCUSS/DSCN MKR DOCD: CPT | Performed by: INTERNAL MEDICINE

## 2020-12-17 PROCEDURE — 4040F PNEUMOC VAC/ADMIN/RCVD: CPT | Performed by: INTERNAL MEDICINE

## 2020-12-17 RX ORDER — MONTELUKAST SODIUM 4 MG/1
1 TABLET, CHEWABLE ORAL 2 TIMES DAILY
COMMUNITY
End: 2021-12-22 | Stop reason: SDUPTHER

## 2020-12-17 RX ORDER — AMITRIPTYLINE HYDROCHLORIDE 150 MG/1
75 TABLET, FILM COATED ORAL NIGHTLY
Qty: 45 TABLET | Refills: 3 | Status: SHIPPED | OUTPATIENT
Start: 2020-12-17 | End: 2021-11-23 | Stop reason: SDUPTHER

## 2020-12-17 ASSESSMENT — PATIENT HEALTH QUESTIONNAIRE - PHQ9
SUM OF ALL RESPONSES TO PHQ9 QUESTIONS 1 & 2: 0
SUM OF ALL RESPONSES TO PHQ QUESTIONS 1-9: 0
SUM OF ALL RESPONSES TO PHQ QUESTIONS 1-9: 0
2. FEELING DOWN, DEPRESSED OR HOPELESS: 0
SUM OF ALL RESPONSES TO PHQ QUESTIONS 1-9: 0
1. LITTLE INTEREST OR PLEASURE IN DOING THINGS: 0

## 2020-12-17 ASSESSMENT — LIFESTYLE VARIABLES: HOW OFTEN DO YOU HAVE A DRINK CONTAINING ALCOHOL: 0

## 2020-12-17 NOTE — PATIENT INSTRUCTIONS
Personalized Preventive Plan for Jillian Mason - 12/17/2020  Medicare offers a range of preventive health benefits. Some of the tests and screenings are paid in full while other may be subject to a deductible, co-insurance, and/or copay. Some of these benefits include a comprehensive review of your medical history including lifestyle, illnesses that may run in your family, and various assessments and screenings as appropriate. After reviewing your medical record and screening and assessments performed today your provider may have ordered immunizations, labs, imaging, and/or referrals for you. A list of these orders (if applicable) as well as your Preventive Care list are included within your After Visit Summary for your review. Other Preventive Recommendations:    · A preventive eye exam performed by an eye specialist is recommended every 1-2 years to screen for glaucoma; cataracts, macular degeneration, and other eye disorders. · A preventive dental visit is recommended every 6 months. · Try to get at least 150 minutes of exercise per week or 10,000 steps per day on a pedometer . · Order or download the FREE \"Exercise & Physical Activity: Your Everyday Guide\" from [employer]. Call 5-648.725.1236 or search [employer] online. · You need 9083-4101 mg of calcium and 6093-1519 IU of vitamin D per day. It is possible to meet your calcium requirement with diet alone, but a vitamin D supplement is usually necessary to meet this goal.  · When exposed to the sun, use a sunscreen that protects against both UVA and UVB radiation with an SPF of 30 or greater. Reapply every 2 to 3 hours or after sweating, drying off with a towel, or swimming. · Always wear a seat belt when traveling in a car. Always wear a helmet when riding a bicycle or motorcycle. Heart-Healthy Diet   Sodium, Fat, and Cholesterol Controlled Diet       What Is a Heart Healthy Diet?    A heart-healthy diet is one that limits sodium , certain types of fat , and cholesterol . This type of diet is recommended for:   People with any form of cardiovascular disease (eg, coronary heart disease , peripheral vascular disease , previous heart attack , previous stroke )   People with risk factors for cardiovascular disease, such as high blood pressure , high cholesterol , or diabetes   Anyone who wants to lower their risk of developing cardiovascular disease   Sodium    Sodium is a mineral found in many foods. In general, most people consume much more sodium than they need. Diets high in sodium can increase blood pressure and lead to edema (water retention). On a heart-healthy diet, you should consume no more than 2,300 mg (milligrams) of sodium per dayabout the amount in one teaspoon of table salt. The foods highest in sodium include table salt (about 50% sodium), processed foods, convenience foods, and preserved foods. Cholesterol    Cholesterol is a fat-like, waxy substance in your blood. Our bodies make some cholesterol. It is also found in animal products, with the highest amounts in fatty meat, egg yolks, whole milk, cheese, shellfish, and organ meats. On a heart-healthy diet, you should limit your cholesterol intake to less than 200 mg per day. It is normal and important to have some cholesterol in your bloodstream. But too much cholesterol can cause plaque to build up within your arteries, which can eventually lead to a heart attack or stroke. The two types of cholesterol that are most commonly referred to are:   Low-density lipoprotein (LDL) cholesterol  Also known as bad cholesterol, this is the cholesterol that tends to build up along your arteries. Bad cholesterol levels are increased by eating fats that are saturated or hydrogenated. Optimal level of this cholesterol is less than 100. Over 130 starts to get risky for heart disease.    High-density lipoprotein (HDL) cholesterol  Also known as good cholesterol, this type of cholesterol actually carries cholesterol away from your arteries and may, therefore, help lower your risk of having a heart attack. You want this level to be high (ideally greater than 60). It is a risk to have a level less than 40. You can raise this good cholesterol by eating olive oil, canola oil, avocados, or nuts. Exercise raises this level, too. Fat    Fat is calorie dense and packs a lot of calories into a small amount of food. Even though fats should be limited due to their high calorie content, not all fats are bad. In fact, some fats are quite healthful. Fat can be broken down into four main types. The good-for-you fats are:   Monounsaturated fat  found in oils such as olive and canola, avocados, and nuts and natural nut butters; can decrease cholesterol levels, while keeping levels of HDL cholesterol high   Polyunsaturated fat  found in oils such as safflower, sunflower, soybean, corn, and sesame; can decrease total cholesterol and LDL cholesterol   Omega-3 fatty acids  particularly those found in fatty fish (such as salmon, trout, tuna, mackerel, herring, and sardines); can decrease risk of arrhythmias, decrease triglyceride levels, and slightly lower blood pressure   The fats that you want to limit are:   Saturated fat  found in animal products, many fast foods, and a few vegetables; increases total blood cholesterol, including LDL levels   Animal fats that are saturated include: butter, lard, whole-milk dairy products, meat fat, and poultry skin   Vegetable fats that are saturated include: hydrogenated shortening, palm oil, coconut oil, cocoa butter   Hydrogenated or trans fat  found in margarine and vegetable shortening, most shelf stable snack foods, and fried foods; increases LDL and decreases HDL     It is generally recommended that you limit your total fat for the day to less than 30% of your total calories.  If you follow an 1800-calorie heart healthy diet, for example, this would mean 60 grams of fat or less per day. Saturated fat and trans fat in your diet raises your blood cholesterol the most, much more than dietary cholesterol does. For this reason, on a heart-healthy diet, less than 7% of your calories should come from saturated fat and ideally 0% from trans fat. On an 1800-calorie diet, this translates into less than 14 grams of saturated fat per day, leaving 46 grams of fat to come from mono- and polyunsaturated fats.    Food Choices on a Heart Healthy Diet   Food Category   Foods Recommended   Foods to Avoid   Grains   Breads and rolls without salted tops Most dry and cooked cereals Unsalted crackers and breadsticks Low-sodium or homemade breadcrumbs or stuffing All rice and pastas   Breads, rolls, and crackers with salted tops High-fat baked goods (eg, muffins, donuts, pastries) Quick breads, self-rising flour, and biscuit mixes Regular bread crumbs Instant hot cereals Commercially prepared rice, pasta, or stuffing mixes   Vegetables   Most fresh, frozen, and low-sodium canned vegetables Low-sodium and salt-free vegetable juices Canned vegetables if unsalted or rinsed   Regular canned vegetables and juices, including sauerkraut and pickled vegetables Frozen vegetables with sauces Commercially prepared potato and vegetable mixes   Fruits   Most fresh, frozen, and canned fruits All fruit juices   Fruits processed with salt or sodium   Milk   Nonfat or low-fat (1%) milk Nonfat or low-fat yogurt Cottage cheese, low-fat ricotta, cheeses labeled as low-fat and low-sodium   Whole milk Reduced-fat (2%) milk Malted and chocolate milk Full fat yogurt Most cheeses (unless low-fat and low salt) Buttermilk (no more than 1 cup per week)   Meats and Beans   Lean cuts of fresh or frozen beef, veal, lamb, or pork (look for the word loin) Fresh or frozen poultry without the skin Fresh or frozen fish and some shellfish Egg whites and egg substitutes (Limit whole eggs to three per week) Tofu Nuts or seeds (unsalted, dry-roasted), low-sodium peanut butter Dried peas, beans, and lentils   Any smoked, cured, salted, or canned meat, fish, or poultry (including junior, chipped beef, cold cuts, hot dogs, sausages, sardines, and anchovies) Poultry skins Breaded and/or fried fish or meats Canned peas, beans, and lentils Salted nuts   Fats and Oils   Olive oil and canola oil Low-sodium, low-fat salad dressings and mayonnaise   Butter, margarine, coconut and palm oils, junior fat   Snacks, Sweets, and Condiments   Low-sodium or unsalted versions of broths, soups, soy sauce, and condiments Pepper, herbs, and spices; vinegar, lemon, or lime juice Low-fat frozen desserts (yogurt, sherbet, fruit bars) Sugar, cocoa powder, honey, syrup, jam, and preserves Low-fat, trans-fat free cookies, cakes, and pies Brandon and animal crackers, fig bars, fang snaps   High-fat desserts Broth, soups, gravies, and sauces, made from instant mixes or other high-sodium ingredients Salted snack foods Canned olives Meat tenderizers, seasoning salt, and most flavored vinegars   Beverages   Low-sodium carbonated beverages Tea and coffee in moderation Soy milk   Commercially softened water   Suggestions   Make whole grains, fruits, and vegetables the base of your diet. Choose heart-healthy fats such as canola, olive, and flaxseed oil, and foods high in heart-healthy fats, such as nuts, seeds, soybeans, tofu, and fish. Eat fish at least twice per week; the fish highest in omega-3 fatty acids and lowest in mercury include salmon, herring, mackerel, sardines, and canned chunk light tuna. If you eat fish less than twice per week or have high triglycerides, talk to your doctor about taking fish oil supplements. Read food labels.    For products low in fat and cholesterol, look for fat free, low-fat, cholesterol free, saturated fat free, and trans fat freeAlso scan the Nutrition Facts Label, which lists saturated fat, trans fat, and cholesterol amounts. For products low in sodium, look for sodium free, very low sodium, low sodium, no added salt, and unsalted   Skip the salt when cooking or at the table; if food needs more flavor, get creative and try out different herbs and spices. Garlic and onion also add substantial flavor to foods. Trim any visible fat off meat and poultry before cooking, and drain the fat off after aquino. Use cooking methods that require little or no added fat, such as grilling, boiling, baking, poaching, broiling, roasting, steaming, stir-frying, and sauting. Avoid fast food and convenience food. They tend to be high in saturated and trans fat and have a lot of added salt. Talk to a registered dietitian for individualized diet advice. Last Reviewed: March 2011 Dedrick Sheppard MS, MPH, RD   Updated: 3/29/2011   ·     Heart-Healthy Diet   Sodium, Fat, and Cholesterol Controlled Diet       What Is a Heart Healthy Diet? A heart-healthy diet is one that limits sodium , certain types of fat , and cholesterol . This type of diet is recommended for:   People with any form of cardiovascular disease (eg, coronary heart disease , peripheral vascular disease , previous heart attack , previous stroke )   People with risk factors for cardiovascular disease, such as high blood pressure , high cholesterol , or diabetes   Anyone who wants to lower their risk of developing cardiovascular disease   Sodium    Sodium is a mineral found in many foods. In general, most people consume much more sodium than they need. Diets high in sodium can increase blood pressure and lead to edema (water retention). On a heart-healthy diet, you should consume no more than 2,300 mg (milligrams) of sodium per dayabout the amount in one teaspoon of table salt. The foods highest in sodium include table salt (about 50% sodium), processed foods, convenience foods, and preserved foods.    Cholesterol    Cholesterol is a fat-like, waxy Salted snack foods Canned olives Meat tenderizers, seasoning salt, and most flavored vinegars   Beverages   Low-sodium carbonated beverages Tea and coffee in moderation Soy milk   Commercially softened water   Suggestions   Make whole grains, fruits, and vegetables the base of your diet. Choose heart-healthy fats such as canola, olive, and flaxseed oil, and foods high in heart-healthy fats, such as nuts, seeds, soybeans, tofu, and fish. Eat fish at least twice per week; the fish highest in omega-3 fatty acids and lowest in mercury include salmon, herring, mackerel, sardines, and canned chunk light tuna. If you eat fish less than twice per week or have high triglycerides, talk to your doctor about taking fish oil supplements. Read food labels. For products low in fat and cholesterol, look for fat free, low-fat, cholesterol free, saturated fat free, and trans fat freeAlso scan the Nutrition Facts Label, which lists saturated fat, trans fat, and cholesterol amounts. For products low in sodium, look for sodium free, very low sodium, low sodium, no added salt, and unsalted   Skip the salt when cooking or at the table; if food needs more flavor, get creative and try out different herbs and spices. Garlic and onion also add substantial flavor to foods. Trim any visible fat off meat and poultry before cooking, and drain the fat off after aquino. Use cooking methods that require little or no added fat, such as grilling, boiling, baking, poaching, broiling, roasting, steaming, stir-frying, and sauting. Avoid fast food and convenience food. They tend to be high in saturated and trans fat and have a lot of added salt. Talk to a registered dietitian for individualized diet advice.       Last Reviewed: March 2011 Viki Cruz MS, MPH, RD   Updated: 3/29/2011   ·     Preventing Osteoporosis: After Your Visit  Your Care Instructions  Osteoporosis means the bones are weak and thin enough that they can break easily. The older you are, the more likely you are to get osteoporosis. But with plenty of calcium, vitamin D, and exercise, you can help prevent osteoporosis. The preteen and teen years are a key time for bone building. With the help of calcium, vitamin D, and exercise in those early years and beyond, the bones reach their peak density and strength by age 27. After age 27, your bones naturally start to thin and weaken. The stronger your bones are at around age 27, the lower your risk for osteoporosis. But no matter what your age and risk are, your bones still need calcium, vitamin D, and exercise to stay strong. Also avoid smoking, and limit alcohol. Smoking and heavy alcohol use can make your bones thinner. Talk to your doctor about any special risks you might have, such as having a close relative with osteoporosis or taking a medicine that can weaken bones. Your doctor can tell you the best ways to protect your bones from thinning. Follow-up care is a key part of your treatment and safety. Be sure to make and go to all appointments, and call your doctor if you are having problems. It's also a good idea to know your test results and keep a list of the medicines you take. How can you care for yourself at home? Get enough calcium and vitamin D. The [employer] recommends adults younger than age 46 need 1,000 mg of calcium and 600 IU of vitamin D each day. Women ages 46 to 79 need 1,200 mg of calcium and 600 IU of vitamin D each day. Men ages 46 to 79 need 1,000 mg of calcium and 600 IU of vitamin D each day. Adults 71 and older need 1,200 mg of calcium and 800 IU of vitamin D each day. Eat foods rich in calcium, like yogurt, cheese, milk, and dark green vegetables. Eat foods rich in vitamin D, like eggs, fatty fish, cereal, and fortified milk. Get some sunshine. Your body uses sunshine to make its own vitamin D. The safest time to be out in the sun is before 10 a.m. or after 3 p.m.  Avoid getting sunburned. Sunburn can increase your risk of skin cancer. Talk to your doctor about taking a calcium plus vitamin D supplement. Ask about what type of calcium is right for you, and how much to take at a time. Adults ages 23 to 48 should not get more than 2,500 mg of calcium and 4,000 IU of vitamin D each day, whether it is from supplements and/or food. Adults ages 46 and older should not get more than 2,000 mg of calcium and 4,000 IU of vitamin D each day from supplements and/or food. Get regular bone-building exercise. Weight-bearing and resistance exercises keep bones healthy by working the muscles and bones against gravity. Start out at an exercise level that feels right for you. Add a little at a time until you can do the following:  Do 30 minutes of weight-bearing exercise on most days of the week. Walking, jogging, stair climbing, and dancing are good choices. Do resistance exercises with weights or elastic bands 2 to 3 days a week. Limit alcohol. Drink no more than 1 alcohol drink a day if you are a woman. Drink no more than 2 alcohol drinks a day if you are a man. Do not smoke. Smoking can make bones thin faster. If you need help quitting, talk to your doctor about stop-smoking programs and medicines. These can increase your chances of quitting for good. When should you call for help? Watch closely for changes in your health, and be sure to contact your doctor if:  You need help with a healthy eating plan. You need help with an exercise plan    © 8898-8792 [employer]. Care instructions adapted under license by [facility]. This care instruction is for use with your licensed healthcare professional. If you have questions about a medical condition or this instruction, always ask your healthcare professional. [place] [address] any warranty or liability for your use of this information. Content Version: 9.4.97348;  Last Revised: June 20, 2011              ·     DASH Diet: After Your Visit  Your Care Instructions  The DASH diet is an eating plan that can help lower your blood pressure. DASH stands for Dietary Approaches to Stop Hypertension. Hypertension is high blood pressure. The DASH diet focuses on eating foods that are high in calcium, potassium, and magnesium. These nutrients can lower blood pressure. The foods that are highest in these nutrients are fruits, vegetables, low-fat dairy products, nuts, seeds, and legumes. But taking calcium, potassium, and magnesium supplements instead of eating foods that are high in those nutrients does not have the same effect. The DASH diet also includes whole grains, fish, and poultry. The DASH diet is one of several lifestyle changes your doctor may recommend to lower your high blood pressure. Your doctor may also want you to decrease the amount of sodium in your diet. Lowering sodium while following the DASH diet can lower blood pressure even further than just the DASH diet alone. Follow-up care is a key part of your treatment and safety. Be sure to make and go to all appointments, and call your doctor if you are having problems. Its also a good idea to know your test results and keep a list of the medicines you take. How can you care for yourself at home? Following the DASH diet  Eat 4 to 5 servings of fruit each day. A serving is 1 medium-sized piece of fruit, ½ cup chopped or canned fruit, 1/4 cup dried fruit, or 4 ounces (½ cup) of fruit juice. Choose fruit more often than fruit juice. Eat 4 to 5 servings of vegetables each day. A serving is 1 cup of lettuce or raw leafy vegetables, ½ cup of chopped or cooked vegetables, or 4 ounces (½ cup) of vegetable juice. Choose vegetables more often than vegetable juice. Get 2 to 3 servings of low-fat and fat-free dairy each day. A serving is 8 ounces of milk, 1 cup of yogurt, or 1 ½ ounces of cheese. Eat 7 to 8 servings of grains each day.  A serving is 1 slice of bread, 1 ounce of dry cereal, or ½ cup of cooked rice, pasta, or cooked cereal. Try to choose whole-grain products as much as possible. Limit lean meat, poultry, and fish to 6 ounces each day. Six ounces is about the size of two decks of cards. Eat 4 to 5 servings of nuts, seeds, and legumes (cooked dried beans, lentils, and split peas) each week. A serving is 1/3 cup of nuts, 2 tablespoons of seeds, or ½ cup cooked dried beans or peas. Limit sweets and added sugars to 5 servings or less a week. A serving is 1 tablespoon jelly or jam, ½ cup sorbet, or 1 cup of lemonade. Tips for success  Start small. Do not try to make dramatic changes to your diet all at once. You might feel that you are missing out on your favorite foods and then be more likely to not follow the plan. Make small changes, and stick with them. Once those changes become habit, add a few more changes. Try some of the following:  Make it a goal to eat a fruit or vegetable at every meal and at snacks. This will make it easy to get the recommended amount of fruits and vegetables each day. Try yogurt topped with fruit and nuts for a snack or healthy dessert. Add lettuce, tomato, cucumber, and onion to sandwiches. Combine a ready-made pizza crust with low-fat mozzarella cheese and lots of vegetable toppings. Try using tomatoes, squash, spinach, broccoli, carrots, cauliflower, and onions. Have a variety of cut-up vegetables with a low-fat dip as an appetizer instead of chips and dip. Sprinkle sunflower seeds or chopped almonds over salads. Or try adding chopped walnuts or almonds to cooked vegetables. Try some vegetarian meals using beans and peas. Add garbanzo or kidney beans to salads. Make burritos and tacos with mashed shaffer beans or black beans    © 2928-0130 [employer]. Care instructions adapted under license by [facility].  This care instruction is for use with your licensed healthcare professional. If

## 2020-12-17 NOTE — PROGRESS NOTES
Medicare Annual Wellness Visit  Name: Norah Quijano Date: 2020   MRN: [id number] Sex: Female   Age: 72 y.o. Ethnicity: Non-/Non    : 1950 Race: Star Manjarrez is here for Medicare AWV    Screenings for behavioral, psychosocial and functional/safety risks, and cognitive dysfunction are all negative except as indicated below. These results, as well as other patient data from the [address] form, are documented in Flowsheets linked to this Encounter. No Known Allergies    Prior to Visit Medications    Medication Sig Taking? Authorizing Provider   Tdap (ADACEL) 5-2-15.5 LF-MCG/0.5 injection Inject 0.5 mLs into the muscle once for 1 dose Yes Katelynn Heart MD   zoster recombinant adjuvanted vaccine [facility]) 50 MCG/0.5ML SUSR injection Inject 0.5 mLs into the muscle once for 1 dose Yes Katelynn Heart MD   predniSONE (DELTASONE) 10 MG tablet Take 40 mg by mouth for 3 days 30 mg for 3 days 20 mg for 3 days 10 mg for 3 days.  Yes Marco Saunders MD   predniSONE (DELTASONE) 10 MG tablet Take 1 tablet by mouth daily Yes Elissa Mcmahon MD   Nutritional Supplements (ENSURE HIGH PROTEIN) LIQD Take 1 Can by mouth 2 times daily Yes Elissa Mcmahon MD   predniSONE (DELTASONE) 10 MG tablet Take 1-1/2 tablets (15mg) every morning until seen Yes Elissa Mcmahon MD   potassium chloride (KLOR-CON M) 20 MEQ extended release tablet Take 1 tablet by mouth daily Yes Urvashi Morrissey MD   Amino Acids-Protein Hydrolys (PROTEINEX P18) LIQD Take 1 Bottle by mouth 2 times daily Yes Elissa Mcmahon MD   cyclobenzaprine (FLEXERIL) 5 MG tablet Take 5 mg by mouth 4 times daily Every 6 hours for muscle spasms Yes Historical Provider, MD   albuterol sulfate  (90 Base) MCG/ACT inhaler Inhale 2 puffs into the lungs every 6 hours as needed for Wheezing Yes Marco Saunders MD   ELIQUIS 5 MG TABS tablet TAKE 1 TABLET BY MOUTH TWICE A DAY Yes Marco Saunders MD budesonide-formoterol (SYMBICORT) 160-4.5 MCG/ACT AERO Inhale 2 puffs into the lungs 2 times daily Yes Jyothi Powell MD   tiotropium (SPIRIVA RESPIMAT) 2.5 MCG/ACT AERS inhaler Inhale 2 puffs into the lungs daily Yes Jyothi Powell MD   albuterol (PROVENTIL) (2.5 MG/3ML) 0.083% nebulizer solution Take 3 mLs by nebulization every 6 hours as needed for Wheezing Dx: COPD      ICD-10: J44.9 Yes Dee Wolf MD   alendronate (FOSAMAX) 70 MG tablet Take 1 tablet by mouth every 7 days Yes Ricki Sanders MD   omeprazole (PRILOSEC) 20 MG delayed release capsule Take 20 mg by mouth Daily Takes as needed Yes Historical Provider, MD   colestipol (COLESTID) 1 g tablet TAKE 1 TABLET BY MOUTH TWICE A DAY  Historical Provider, MD       Past Medical History:   Diagnosis Date    [address]ous emphysema ([facility]     CKD (chronic kidney disease) stage 3, GFR 30-59 ml/min     Clostridium difficile carrier 01/21/2019    COPD (chronic obstructive pulmonary disease) ([facility]     PFT done 7 years ago   [facility] Crohn's disease ([facility] [address] [facility]     Crohn's disease    Depression 1/30/2011    pt states resolved as of 3-26-12    DVT (deep venous thrombosis) ([address]     2012; first ocurrence     Hiatal hernia     Hx of blood clots     Kidney disease     Kidney insufficiency     Osteoporosis     Peripheral neuropathy     neuropathy in legs    Pneumonia     Postmenopausal     LMP in  42's    Pulmonary embolism ([address]     2012; first ocurrence    Sepsis ([address]     Syringomyelia ([address]        Past Surgical History:   Procedure Laterality Date    ABDOMEN SURGERY      BACK SURGERY      shunt to drain CSF    BIOPSY SHOULDER Left 2/27/2019    EXCISION OF LEFT SHOULDER MASS performed by Ben Bacon MD at [address] [place] [address]      crainotomy- remove fluid ? V-P SHUNT    BRONCHOSCOPY N/A 9/2/2020    BRONCHOSCOPY ALVEOLAR LAVAGE performed by Dee Wolf MD at [place] [address] resection X2    COLONOSCOPY  12/5/11    BIOPSY AND POLYPECTOMY    COLONOSCOPY  4-2-2012    and ablation ERBE/APC    SHOULDER SURGERY      L        Family History   Problem Relation Age of Onset    Heart Disease Mother     High Blood Pressure Mother     High Cholesterol Mother     Early Death Mother 36        MI    Heart Disease Father     High Blood Pressure Father     High Cholesterol Father     Stroke Father 79    High Blood Pressure Sister     High Blood Pressure Brother        CareTeam (Including outside providers/suppliers regularly involved in providing care):   Patient Care Team:  Jaida Sharp MD as PCP - General (Pediatrics)  Jaida Sharp MD as PCP - [facility]  Corie Leventhal, MD as Consulting Physician (Gastroenterology)  Serg Moss MD as Physician (Pulmonology)  Soto Gambino MD as Surgeon (General Surgery)    Wt Readings from Last 3 Encounters:   12/17/20 141 lb (64 kg)   10/29/20 137 lb (62.1 kg)   10/15/20 137 lb (62.1 kg)     Vitals:    12/17/20 1114   BP: 136/84   Site: Left Upper Arm   Position: Sitting   Cuff Size: Large Adult   Pulse: 99   Temp: 97.4 °F (36.3 °C)   TempSrc: Infrared   SpO2: 96%   Weight: 141 lb (64 kg)   Height: 4' 11\" (1.499 m)     Body mass index is 28.48 kg/m². Based upon direct observation of the patient, evaluation of cognition reveals recent and remote memory intact. Patient's complete Health Risk Assessment and screening values have been reviewed and are found in Flowsheets. The following problems were reviewed today and where indicated follow up appointments were made and/or referrals ordered. Positive Risk Factor Screenings with Interventions:          General Health and ACP:  General  In general, how would you say your health is?: Fair  In the past 7 days, have you experienced any of the following?  New or Increased Pain, New or Increased Fatigue, Loneliness, Social Isolation, Stress or Anger?: (!) New or Increased Pain  Do you get the social and emotional support that you need?: Yes  Do you have a Living Will?: Yes  Advance Directives     Power of  Living Will ACP-Advance Directive ACP-Power of     Not on File Filed on 01/25/19 Filed Not on File      General Health Risk Interventions:  · Pain issues: patient has some back pain. she is being treated with antibiotics and prednisone    Health Habits/Nutrition:  Health Habits/Nutrition  Do you exercise for at least 20 minutes 2-3 times per week?: (!) No  Have you lost any weight without trying in the past 3 months?: No  Do you eat fewer than 2 meals per day?: (!) Yes  Have you seen a dentist within the past year?: (!) No  Body mass index: (!) 28.48  Health Habits/Nutrition Interventions:  · Inadequate physical activity:  patient agrees to exercise for at least 150 minutes/week  · Nutritional issues:  educational materials for healthy, well-balanced diet provided     Safety:  Safety  Do you have working smoke detectors?: Yes  Have all throw rugs been removed or fastened?: (!) No  Do you have non-slip mats or surfaces in all bathtubs/showers?: (!) No  Do all of your stairways have a railing or banister?: Yes  Are your doorways, halls and stairs free of clutter?: Yes  Do you always fasten your seatbelt when you are in a car?: Yes  Safety Interventions:  · Home safety tips provided    ADL:  ADLs  In the past 7 days, did you need help from others to perform any of the following everyday activities? Eating, dressing, grooming, bathing, toileting, or walking/balance?: (!) Eating, Dressing, Bathing, Toileting  In the past 7 days, did you need help from others to take care of any of the following?  Laundry, housekeeping, banking/finances, shopping, telephone use, food preparation, transportation, or taking medications?: (!) Laundry, Banking/Finances, Shopping, Telephone Use, Food Preparation, Taking Medications  ADL Interventions:  · Patient has a home health aide and meals on wheels    Personalized Preventive Plan   Current Health Maintenance Status  Immunization History   Administered Date(s) Administered    DT (pediatric) 09/25/2010    Influenza Virus Vaccine 10/01/2012, 10/20/2015    Influenza Whole 10/01/2011    Influenza, High Dose (Fluzone 65 yrs and older) 10/06/2014, 10/19/2016, 11/16/2017, 11/05/2018    Influenza, Quadv, adjuvanted, 65 yrs +, IM, PF (Fluad) 10/29/2020    Influenza, Triv, inactivated, subunit, adjuvanted, IM (Fluad 65 yrs and older) 11/13/2019    Pneumococcal Conjugate 13-valent ([id number]) 06/11/2015    Pneumococcal Conjugate 7-valent (Prevnar7) 02/07/2005, 10/01/2011    Pneumococcal Polysaccharide ([id number]) 06/15/2016    Zoster Live (Zostavax) 07/01/2013        Health Maintenance   Topic Date Due    Shingles Vaccine (2 of 3) 08/26/2013    Annual Wellness Visit (AWV)  05/29/2019    Lipid screen  01/21/2020    DTaP/Tdap/Td vaccine (2 - Tdap) 09/25/2020    Pneumococcal 65+ years Vaccine (2 of 2 - PPSV23) 06/15/2021    Breast cancer screen  10/14/2021    Colon cancer screen colonoscopy  04/19/2027    DEXA (modify frequency per FRAX score)  Completed    Flu vaccine  Completed    Hepatitis C screen  Completed    Hepatitis A vaccine  Aged Out    Hepatitis B vaccine  Aged Out    Hib vaccine  Aged Out    Meningococcal (ACWY) vaccine  Aged Out     Recommendations for [place] Due: see orders and patient instructions/AVS.  . Recommended screening schedule for the next 5-10 years is provided to the patient in written form: see Patient Instructions/AVS.    Eulalio Trevino was seen today for medicare awv. Diagnoses and all orders for this visit:    Need for prophylactic vaccination against diphtheria-tetanus-pertussis (DTP)  -     Tdap (ADACEL) 5-2-15.5 LF-MCG/0.5 injection;  Inject 0.5 mLs into the muscle once for 1 dose    Need for prophylactic vaccination and inoculation against varicella  -     zoster recombinant adjuvanted vaccine [facility]) 50 MCG/0.5ML SUSR injection;  Inject 0.5 mLs into the muscle once for 1 dose    Routine general medical examination at a health care facility

## 2020-12-18 RX ORDER — TIOTROPIUM BROMIDE INHALATION SPRAY 3.12 UG/1
SPRAY, METERED RESPIRATORY (INHALATION)
Qty: 3 INHALER | Refills: 2 | Status: SHIPPED | OUTPATIENT
Start: 2020-12-18 | End: 2021-06-22 | Stop reason: ALTCHOICE

## 2020-12-23 ENCOUNTER — TELEPHONE (OUTPATIENT)
Dept: PULMONOLOGY | Age: 70
End: 2020-12-23

## 2020-12-23 RX ORDER — LEVOFLOXACIN 750 MG/1
750 TABLET ORAL DAILY
Qty: 7 TABLET | Refills: 0 | Status: SHIPPED | OUTPATIENT
Start: 2020-12-23 | End: 2020-12-30

## 2020-12-23 NOTE — TELEPHONE ENCOUNTER
Pt called in stating she finished the prednisone and abx from 12/8 but stated she is still struggling with the cough. Pt requesting a call back.     Pt 3 [phone]

## 2020-12-23 NOTE — TELEPHONE ENCOUNTER
Peri(home care nurse) called back and pt was winded coming down 8 steps to let her in and then when they got back up the steps pt's O2 sat was 90% RA but after 2 minutes of resting went backup to 95%. [place] can hear crackles R base diminished on  L base upper lobes clear some pain on occasion R side with breathing Took the Doxycycline and Pred Taper from 12-8-20 by Dr Ajay Cornell and feels it really didn't help.  ? Stronger antibiotic

## 2020-12-23 NOTE — TELEPHONE ENCOUNTER
Called pt and she is still coughing. And short winded. Can't shake this cold No fever Has a nurse coming between 12-1 today Asked her to have nurse call us back and let us know what she hears in pt's lungs.  Pt stated she would have nurse call us

## 2020-12-24 LAB
A/G RATIO: 1.7 (ref 1.1–2.2)
ALBUMIN SERPL-MCNC: 4 G/DL (ref 3.4–5)
ALP BLD-CCNC: 94 U/L (ref 40–129)
ALT SERPL-CCNC: 12 U/L (ref 10–40)
ANION GAP SERPL CALCULATED.3IONS-SCNC: 17 MMOL/L (ref 3–16)
AST SERPL-CCNC: 16 U/L (ref 15–37)
BILIRUB SERPL-MCNC: 0.5 MG/DL (ref 0–1)
BUN BLDV-MCNC: 26 MG/DL (ref 7–20)
CALCIUM SERPL-MCNC: 8.6 MG/DL (ref 8.3–10.6)
CHLORIDE BLD-SCNC: 101 MMOL/L (ref 99–110)
CHOLESTEROL, TOTAL: 194 MG/DL (ref 0–199)
CO2: 19 MMOL/L (ref 21–32)
CREAT SERPL-MCNC: 1.9 MG/DL (ref 0.6–1.2)
GFR AFRICAN AMERICAN: 32
GFR NON-AFRICAN AMERICAN: 26
GLOBULIN: 2.4 G/DL
GLUCOSE BLD-MCNC: 93 MG/DL (ref 70–99)
HDLC SERPL-MCNC: 125 MG/DL (ref 40–60)
LDL CHOLESTEROL CALCULATED: 32 MG/DL
POTASSIUM SERPL-SCNC: 4.1 MMOL/L (ref 3.5–5.1)
SODIUM BLD-SCNC: 137 MMOL/L (ref 136–145)
TOTAL PROTEIN: 6.4 G/DL (ref 6.4–8.2)
TRIGL SERPL-MCNC: 185 MG/DL (ref 0–150)
VLDLC SERPL CALC-MCNC: 37 MG/DL

## 2021-01-01 DIAGNOSIS — N18.32 STAGE 3B CHRONIC KIDNEY DISEASE (HCC): ICD-10-CM

## 2021-01-01 DIAGNOSIS — I50.31 ACUTE DIASTOLIC CHF (CONGESTIVE HEART FAILURE) (HCC): ICD-10-CM

## 2021-01-01 DIAGNOSIS — N17.9 AKI (ACUTE KIDNEY INJURY) (HCC): ICD-10-CM

## 2021-01-01 LAB
ANION GAP SERPL CALCULATED.3IONS-SCNC: 24 MMOL/L (ref 3–16)
BUN BLDV-MCNC: 15 MG/DL (ref 7–20)
CALCIUM SERPL-MCNC: 8.8 MG/DL (ref 8.3–10.6)
CHLORIDE BLD-SCNC: 102 MMOL/L (ref 99–110)
CO2: 14 MMOL/L (ref 21–32)
CREAT SERPL-MCNC: 2.3 MG/DL (ref 0.6–1.2)
GFR AFRICAN AMERICAN: 25
GFR NON-AFRICAN AMERICAN: 21
GLUCOSE BLD-MCNC: 101 MG/DL (ref 70–99)
POTASSIUM SERPL-SCNC: 3.6 MMOL/L (ref 3.5–5.1)
SODIUM BLD-SCNC: 140 MMOL/L (ref 136–145)

## 2021-01-06 ENCOUNTER — TELEPHONE (OUTPATIENT)
Dept: INTERNAL MEDICINE CLINIC | Age: 71
End: 2021-01-06

## 2021-01-06 NOTE — TELEPHONE ENCOUNTER
Ganesh w/[facility] calling to report 2 concerns:    1. Patient c/o BLE increased pain and weakness. She rates the pain 6/10. Pt has a h/o spinal surgery w/shunt placement  2. Patient had left shoulder surgery in February of 2019. The surgical site never healed completely. It will scab over for several weeks and then the scab falls off and the wound opens up and drains. The area is open now and has clear drainage. The patient said the area is still sore and sensitive. Ganesh said there is no odor, it is not red or warm, there is not much depth to the wound, it is about 2.5 cm x 3 cm. Ganesh is not sure why patient has not mentioned this to her PCP b/c it was open when she was seen on 12/17/2020. Ganesh wants to know if PCP would like her to culture the wound.

## 2021-01-07 ENCOUNTER — HOSPITAL ENCOUNTER (OUTPATIENT)
Age: 71
Setting detail: SPECIMEN
Discharge: HOME | End: 2021-01-07
Payer: MEDICARE

## 2021-01-07 PROCEDURE — 87205 SMEAR GRAM STAIN: CPT

## 2021-01-07 PROCEDURE — 87070 CULTURE OTHR SPECIMN AEROBIC: CPT

## 2021-01-07 NOTE — TELEPHONE ENCOUNTER
Spoke with Madelyn Mcmahon. She will place the orders for PT and wound culture. She will send us the orders to sign. Verbal given to proceed.

## 2021-01-10 LAB
GRAM STAIN RESULT: NORMAL
WOUND/ABSCESS: NORMAL

## 2021-01-11 DIAGNOSIS — J44.9 COPD, SEVERE (HCC): ICD-10-CM

## 2021-01-11 RX ORDER — BUDESONIDE AND FORMOTEROL FUMARATE DIHYDRATE 160; 4.5 UG/1; UG/1
AEROSOL RESPIRATORY (INHALATION)
Qty: 30.6 INHALER | Refills: 3 | Status: SHIPPED | OUTPATIENT
Start: 2021-01-11 | End: 2021-06-01 | Stop reason: ALTCHOICE

## 2021-01-12 ENCOUNTER — TELEPHONE (OUTPATIENT)
Dept: PULMONOLOGY | Age: 71
End: 2021-01-12

## 2021-01-13 ENCOUNTER — TELEPHONE (OUTPATIENT)
Dept: PULMONOLOGY | Age: 71
End: 2021-01-13

## 2021-01-13 RX ORDER — ALBUTEROL SULFATE 2.5 MG/3ML
2.5 SOLUTION RESPIRATORY (INHALATION) EVERY 6 HOURS PRN
Qty: 300 ML | Refills: 6 | Status: SHIPPED | OUTPATIENT
Start: 2021-01-13 | End: 2022-03-16 | Stop reason: SDUPTHER

## 2021-01-13 NOTE — TELEPHONE ENCOUNTER
Pt called in stating that she is trying to get a POC from [employer] and that they faxed us a form this week for Pt's liter flow. Pt stated she spoke with [employer] and they still have not received the form. Pt requesting a call back from Andrzej.      Pt # [phone]

## 2021-01-13 NOTE — TELEPHONE ENCOUNTER
[employer] faxed order again on 1/13/21 and it was put it folder. They need this completed as soon as possible.

## 2021-01-15 ENCOUNTER — TELEPHONE (OUTPATIENT)
Dept: INTERNAL MEDICINE CLINIC | Age: 71
End: 2021-01-15

## 2021-01-15 DIAGNOSIS — J18.9 HCAP (HEALTHCARE-ASSOCIATED PNEUMONIA): Primary | ICD-10-CM

## 2021-01-15 DIAGNOSIS — B37.2 CANDIDIASIS, INTERTRIGO: ICD-10-CM

## 2021-01-15 RX ORDER — METRONIDAZOLE 500 MG/1
500 TABLET ORAL 3 TIMES DAILY
Qty: 63 TABLET | Refills: 0 | OUTPATIENT
Start: 2021-01-15 | End: 2021-01-25

## 2021-01-15 RX ORDER — APIXABAN 5 MG/1
TABLET, FILM COATED ORAL
Qty: 60 TABLET | Refills: 6 | Status: SHIPPED | OUTPATIENT
Start: 2021-01-15 | End: 2021-08-09

## 2021-01-15 RX ORDER — AMLODIPINE BESYLATE 5 MG/1
5 TABLET ORAL DAILY
Qty: 30 TABLET | Refills: 5 | Status: SHIPPED | OUTPATIENT
Start: 2021-01-15 | End: 2021-05-03

## 2021-01-18 RX ORDER — FLUCONAZOLE 150 MG/1
150 TABLET ORAL WEEKLY
Qty: 6 TABLET | Refills: 0 | Status: SHIPPED | OUTPATIENT
Start: 2021-01-18 | End: 2021-02-23

## 2021-01-20 ENCOUNTER — TELEPHONE (OUTPATIENT)
Dept: INTERNAL MEDICINE CLINIC | Age: 71
End: 2021-01-20

## 2021-01-20 NOTE — TELEPHONE ENCOUNTER
Landry Carter w/[facility] calling to report patients b/p readings since starting amLODIPine 5 MG on 01/16/2021.  01/19/2021 176/102 morning/pre med  01/19/2021 140/106 evening/post med  01/20/2021 153/104 morning/pre med  01/20/2021 152/90 3 hrs post morning meds taken w/vishnu cuff by Landry Carter.

## 2021-01-21 RX ORDER — METRONIDAZOLE 500 MG/1
500 TABLET ORAL 3 TIMES DAILY
Qty: 63 TABLET | Refills: 0 | OUTPATIENT
Start: 2021-01-21 | End: 2021-02-11

## 2021-01-21 NOTE — TELEPHONE ENCOUNTER
We will likely need to increase to 10 mg. Since amlodipine takes a couple of weeks to demonstrate its full effect, I recommend that we continue to monitor it.  We can increase in 2-3 weeks if the blood pressure is not at goal.

## 2021-01-21 NOTE — TELEPHONE ENCOUNTER
Deborah Singh, Can you please address. No number given to call Ganesh back with Dr Alejandro Escalona.

## 2021-01-21 NOTE — TELEPHONE ENCOUNTER
Spoke with patient and she stated that she did not ask for the refill and that the pharmacy had sent the request.

## 2021-02-15 ENCOUNTER — TELEPHONE (OUTPATIENT)
Dept: INTERNAL MEDICINE CLINIC | Age: 71
End: 2021-02-15

## 2021-02-15 NOTE — TELEPHONE ENCOUNTER
Patient had in-home screening for peripheral arterial diease. Patient has contractures in fingers and toes due to arthritis. No significant findings, no blueness, etc.  Nurse Practitioner wanted it documented that there is speculative findings of PAD from in-home [employer] test.  She has chronic pain in legs due to arthritis. Per NP, as a sidebar - speculative findings due to contractures in hands and feet due to RA.

## 2021-02-24 PROBLEM — I10 ESSENTIAL HYPERTENSION: Status: ACTIVE | Noted: 2021-02-24

## 2021-02-24 PROBLEM — N18.32 STAGE 3B CHRONIC KIDNEY DISEASE (HCC): Status: ACTIVE | Noted: 2021-02-24

## 2021-03-05 ENCOUNTER — IMMUNIZATION (OUTPATIENT)
Dept: PRIMARY CARE CLINIC | Age: 71
End: 2021-03-05
Payer: MEDICARE

## 2021-03-05 PROCEDURE — 91300 COVID-19, PFIZER VACCINE 30MCG/0.3ML DOSE: CPT

## 2021-03-05 PROCEDURE — 0001A COVID-19, PFIZER VACCINE 30MCG/0.3ML DOSE: CPT

## 2021-03-10 ENCOUNTER — OFFICE VISIT (OUTPATIENT)
Dept: PULMONOLOGY | Age: 71
End: 2021-03-10
Payer: MEDICARE

## 2021-03-10 VITALS — OXYGEN SATURATION: 94 % | HEART RATE: 84 BPM

## 2021-03-10 DIAGNOSIS — J96.11 CHRONIC HYPOXEMIC RESPIRATORY FAILURE (HCC): ICD-10-CM

## 2021-03-10 DIAGNOSIS — J43.2 CENTRILOBULAR EMPHYSEMA (HCC): ICD-10-CM

## 2021-03-10 DIAGNOSIS — R06.02 SHORTNESS OF BREATH: Primary | ICD-10-CM

## 2021-03-10 DIAGNOSIS — J44.9 COPD, SEVERE (HCC): ICD-10-CM

## 2021-03-10 PROCEDURE — G8417 CALC BMI ABV UP PARAM F/U: HCPCS | Performed by: INTERNAL MEDICINE

## 2021-03-10 PROCEDURE — 4040F PNEUMOC VAC/ADMIN/RCVD: CPT | Performed by: INTERNAL MEDICINE

## 2021-03-10 PROCEDURE — G8399 PT W/DXA RESULTS DOCUMENT: HCPCS | Performed by: INTERNAL MEDICINE

## 2021-03-10 PROCEDURE — 1036F TOBACCO NON-USER: CPT | Performed by: INTERNAL MEDICINE

## 2021-03-10 PROCEDURE — 3023F SPIROM DOC REV: CPT | Performed by: INTERNAL MEDICINE

## 2021-03-10 PROCEDURE — G8427 DOCREV CUR MEDS BY ELIG CLIN: HCPCS | Performed by: INTERNAL MEDICINE

## 2021-03-10 PROCEDURE — 3017F COLORECTAL CA SCREEN DOC REV: CPT | Performed by: INTERNAL MEDICINE

## 2021-03-10 PROCEDURE — G8484 FLU IMMUNIZE NO ADMIN: HCPCS | Performed by: INTERNAL MEDICINE

## 2021-03-10 PROCEDURE — 1123F ACP DISCUSS/DSCN MKR DOCD: CPT | Performed by: INTERNAL MEDICINE

## 2021-03-10 PROCEDURE — 1090F PRES/ABSN URINE INCON ASSESS: CPT | Performed by: INTERNAL MEDICINE

## 2021-03-10 PROCEDURE — 99214 OFFICE O/P EST MOD 30 MIN: CPT | Performed by: INTERNAL MEDICINE

## 2021-03-10 PROCEDURE — G8926 SPIRO NO PERF OR DOC: HCPCS | Performed by: INTERNAL MEDICINE

## 2021-03-10 RX ORDER — PREDNISONE 1 MG/1
5 TABLET ORAL DAILY
Qty: 30 TABLET | Refills: 3 | Status: ON HOLD | OUTPATIENT
Start: 2021-03-10 | End: 2021-04-15 | Stop reason: HOSPADM

## 2021-03-10 RX ORDER — ALBUTEROL SULFATE 90 UG/1
2 AEROSOL, METERED RESPIRATORY (INHALATION) EVERY 6 HOURS PRN
Qty: 1 INHALER | Refills: 11 | Status: ON HOLD | OUTPATIENT
Start: 2021-03-10 | End: 2021-04-15 | Stop reason: HOSPADM

## 2021-03-10 NOTE — PROGRESS NOTES
[facility]  Follow Up Note  Sofia Flores MD       Shahab Burris   YOB: 1950    Date of Visit:  3/10/2021    Assessment/Plan:  1. SOB (shortness of breath)  Stable but not as good as before her PNA in 9/20   Have her repeat her CXR    2. COPD, severe ([address]  PFT 7/19:  INTERPRETATION:  Spirometry reveals decreased FVC at 1.49 liters, which  is 57% predicted. FEV1 is decreased at 0.94 liters, which is 48%  predicted. FEV1/FVC ratio is reduced at 63%. Postbronchodilator study  is not performed. Lung volumes reveal normal total lung capacity. Residual volume is increased at 159% predicted. Diffusion capacity is  decreased at 27% predicted. In comparison to a study from 02/2017, FVC  has decreased by 21%. FEV1 is increased by 19% and residual volume is  increased by 17%. Diffusion capacity is decreased by 37%.     IMPRESSION:  Severe obstructive lung disease with air trapping. There  has been significant worsening since the preceding study. Symbicort  Spiriva  Albuterol HHN    Continues on Prednisone 10 mg per day as a baseline. Decrease to 5 mg per day    3. Centrilobular emphysema ([address]  CT Chest 9/20:  FINDINGS:   Mediastinum: Stable appearance of the mediastinum.  Evidence of pericardial   effusion calcified nodes are noted in the mediastinum.       Lungs/pleura: Decrease in consolidation in left upper wall as compared to   prior study is resolution left pleural effusion.       Upper Abdomen: Stable appearance of visualized portion of liver and spleen   compared to the prior study       Soft Tissues/Bones: No change in alignment of the spine           Impression   Decreasing consolidation in the left upper lobe with resolution of left   pleural effusion       4. Chronic hypoxemic respiratory failure (HCC)  Oxygen saturation on room air with exertion in the office today is 84%.   The patient would benefit from supplemental oxygen with exertion and sleep  The patient is independently mobile within the home and would benefit from a portable oxygen concentrator      6 months      Chief Complaint   Patient presents with    Shortness of Breath     3 month f.u. Had had her 1st COVID Vaccine. Has had to turn her O2 up to 3 liters at times to keep sat's above 90%        HPI  The patient presents with a chief complaint of moderate shortness of breath related to severe COPD of many years duration. He has mild associated cough. Exertion is a modifying factor. She has had PNA 9/20 and struggled to bounce back from that. She is still SOB more than usual. She is trying to do some exercise. Review of Systems  No Chest pain, Nausea or vomiting reported      History  I have reviewed past medical, surgical, social and family history. This is documented elsewhere in the medical record. Physical Exam:  Well developed, well nourished  Alert and oriented  Sclera is clear  No cervical adenopathy  No JVD. Chest examination is clear. Cardiac examination reveals regular rate and rhythm without murmur, gallop or rub. The abdomen is soft, nontender and nondistended. There is no clubbing, cyanosis or edema of the extremities. There is no obvious skin rash. No focal neuro deficicts  Normal mood and affect    No Known Allergies  Prior to Visit Medications    Medication Sig Taking?  Authorizing Provider   ELIQUIS 5 MG TABS tablet TAKE 1 TABLET BY MOUTH TWICE A DAY  Yakov Spencer MD   amLODIPine (NORVASC) 5 MG tablet Take 1 tablet by mouth daily  Mercedes Cloón MD   albuterol (PROVENTIL) (2.5 MG/3ML) 0.083% nebulizer solution TAKE 3 MLS BY NEBULIZATION EVERY 6 HOURS AS NEEDED FOR WHEEZING DX: COPD ICD-10: J44.9  Yakov Spencer MD   SYMBICORT 160-4.5 MCG/ACT AERO TAKE 2 PUFFS BY MOUTH TWICE A DAY  Mercedes Colón MD   SPIRIVA RESPIMAT 2.5 MCG/ACT AERS inhaler INHALE 2 PUFFS BY MOUTH INTO THE LUNGS DAILY  Mercedes Colón MD   colestipol (COLESTID) 1 g tablet TAKE 1 TABLET BY MOUTH TWICE A DAY  Historical Provider, MD   amitriptyline (ELAVIL) 150 MG tablet Take 0.5 tablets by mouth nightly  Joel Tate MD   Nutritional Supplements (ENSURE HIGH PROTEIN) LIQD Take 1 Can by mouth 2 times daily  Patient not taking: Reported on 2021  Amalia Maldonado MD   predniSONE (DELTASONE) 10 MG tablet Take 1-1/2 tablets (15mg) every morning until seen  Amalia Maldonado MD   potassium chloride (KLOR-CON M) 20 MEQ extended release tablet Take 1 tablet by mouth daily  Riccardo Oro MD   Amino Acids-Protein Hydrolys (PROTEINEX P18) LIQD Take 1 Bottle by mouth 2 times daily  Patient not taking: Reported on 2021  Amalia Maldonado MD   cyclobenzaprine (FLEXERIL) 5 MG tablet Take 5 mg by mouth 4 times daily Every 6 hours for muscle spasms  Historical Provider, MD   albuterol sulfate  (90 Base) MCG/ACT inhaler Inhale 2 puffs into the lungs every 6 hours as needed for Wheezing  Burke Walton MD   alendronate (FOSAMAX) 70 MG tablet Take 1 tablet by mouth every 7 days  Carolyne Deras MD   omeprazole (PRILOSEC) 20 MG delayed release capsule Take 20 mg by mouth Daily Takes as needed  Historical Provider, MD       Vitals:    03/10/21 1306   Pulse: 84   SpO2: 94%     There is no height or weight on file to calculate BMI.      Wt Readings from Last 3 Encounters:   21 149 lb (67.6 kg)   20 141 lb (64 kg)   10/29/20 137 lb (62.1 kg)     BP Readings from Last 3 Encounters:   21 (!) 164/75   20 136/84   10/29/20 (!) 138/90        Social History     Tobacco Use   Smoking Status Former Smoker    Packs/day: 0.25    Years: 30.00    Pack years: 7.50    Types: Cigarettes    Quit date: 2015    Years since quittin.0   Smokeless Tobacco Never Used   Tobacco Comment    started smoking at age 26 / smoked up to 9 cigarettes a day

## 2021-03-15 ENCOUNTER — HOSPITAL ENCOUNTER (OUTPATIENT)
Age: 71
Discharge: HOME | End: 2021-03-15
Payer: MEDICARE

## 2021-03-15 ENCOUNTER — HOSPITAL ENCOUNTER (OUTPATIENT)
Dept: GENERAL RADIOLOGY | Age: 71
Discharge: HOME | End: 2021-03-15
Payer: MEDICARE

## 2021-03-15 DIAGNOSIS — R05.9 COUGH: ICD-10-CM

## 2021-03-15 DIAGNOSIS — N18.32 STAGE 3B CHRONIC KIDNEY DISEASE (HCC): ICD-10-CM

## 2021-03-15 DIAGNOSIS — N17.9 AKI (ACUTE KIDNEY INJURY) (HCC): ICD-10-CM

## 2021-03-15 LAB
ANION GAP SERPL CALCULATED.3IONS-SCNC: 14 MMOL/L (ref 3–16)
BUN BLDV-MCNC: 13 MG/DL (ref 7–20)
CALCIUM SERPL-MCNC: 9.1 MG/DL (ref 8.3–10.6)
CHLORIDE BLD-SCNC: 106 MMOL/L (ref 99–110)
CO2: 22 MMOL/L (ref 21–32)
CREAT SERPL-MCNC: 1.8 MG/DL (ref 0.6–1.2)
GFR AFRICAN AMERICAN: 34
GFR NON-AFRICAN AMERICAN: 28
GLUCOSE BLD-MCNC: 119 MG/DL (ref 70–99)
POTASSIUM SERPL-SCNC: 3.4 MMOL/L (ref 3.5–5.1)
SODIUM BLD-SCNC: 142 MMOL/L (ref 136–145)

## 2021-03-15 PROCEDURE — 80048 BASIC METABOLIC PNL TOTAL CA: CPT

## 2021-03-15 PROCEDURE — 71046 X-RAY EXAM CHEST 2 VIEWS: CPT

## 2021-03-15 PROCEDURE — 36415 COLL VENOUS BLD VENIPUNCTURE: CPT

## 2021-03-22 ENCOUNTER — TELEPHONE (OUTPATIENT)
Dept: PULMONOLOGY | Age: 71
End: 2021-03-22

## 2021-03-26 ENCOUNTER — IMMUNIZATION (OUTPATIENT)
Dept: PRIMARY CARE CLINIC | Age: 71
End: 2021-03-26
Payer: MEDICARE

## 2021-03-26 PROCEDURE — 0002A COVID-19, PFIZER VACCINE 30MCG/0.3ML DOSE: CPT | Performed by: FAMILY MEDICINE

## 2021-03-26 PROCEDURE — 91300 COVID-19, PFIZER VACCINE 30MCG/0.3ML DOSE: CPT | Performed by: FAMILY MEDICINE

## 2021-04-05 ENCOUNTER — TELEPHONE (OUTPATIENT)
Dept: PULMONOLOGY | Age: 71
End: 2021-04-05

## 2021-04-05 RX ORDER — PREDNISONE 1 MG/1
5 TABLET ORAL 2 TIMES DAILY
Qty: 60 TABLET | Refills: 0 | Status: SHIPPED | OUTPATIENT
Start: 2021-04-05 | End: 2021-04-29

## 2021-04-08 ENCOUNTER — APPOINTMENT (OUTPATIENT)
Dept: CT IMAGING | Age: 71
DRG: 140 | End: 2021-04-08
Payer: MEDICARE

## 2021-04-08 ENCOUNTER — APPOINTMENT (OUTPATIENT)
Dept: GENERAL RADIOLOGY | Age: 71
DRG: 140 | End: 2021-04-08
Payer: MEDICARE

## 2021-04-08 ENCOUNTER — HOSPITAL ENCOUNTER (INPATIENT)
Age: 71
LOS: 7 days | Discharge: HOME | DRG: 140 | End: 2021-04-15
Attending: EMERGENCY MEDICINE | Admitting: INTERNAL MEDICINE
Payer: MEDICARE

## 2021-04-08 DIAGNOSIS — E87.6 HYPOKALEMIA: ICD-10-CM

## 2021-04-08 DIAGNOSIS — R77.8 ELEVATED TROPONIN: ICD-10-CM

## 2021-04-08 DIAGNOSIS — R07.9 CHEST PAIN, UNSPECIFIED TYPE: Primary | ICD-10-CM

## 2021-04-08 DIAGNOSIS — N18.9 CHRONIC KIDNEY DISEASE, UNSPECIFIED CKD STAGE: ICD-10-CM

## 2021-04-08 PROBLEM — N18.30 ACUTE RENAL FAILURE SUPERIMPOSED ON STAGE 3 CHRONIC KIDNEY DISEASE (HCC): Status: ACTIVE | Noted: 2021-04-08

## 2021-04-08 PROBLEM — N17.9 ACUTE RENAL FAILURE SUPERIMPOSED ON STAGE 3 CHRONIC KIDNEY DISEASE (HCC): Status: ACTIVE | Noted: 2021-04-08

## 2021-04-08 PROBLEM — J44.1 ACUTE EXACERBATION OF CHRONIC OBSTRUCTIVE PULMONARY DISEASE (COPD) (HCC): Status: ACTIVE | Noted: 2021-04-08

## 2021-04-08 LAB
A/G RATIO: 1.4 (ref 1.1–2.2)
ALBUMIN SERPL-MCNC: 3.8 G/DL (ref 3.4–5)
ALP BLD-CCNC: 88 U/L (ref 40–129)
ALT SERPL-CCNC: 9 U/L (ref 10–40)
ANION GAP SERPL CALCULATED.3IONS-SCNC: 14 MMOL/L (ref 3–16)
APTT: 24.9 SEC (ref 24.2–36.2)
APTT: 29.4 SEC (ref 24.2–36.2)
AST SERPL-CCNC: 13 U/L (ref 15–37)
ATYPICAL LYMPHOCYTE RELATIVE PERCENT: 4 % (ref 0–6)
BASOPHILS ABSOLUTE: 0 K/UL (ref 0–0.2)
BASOPHILS RELATIVE PERCENT: 0 %
BILIRUB SERPL-MCNC: <0.2 MG/DL (ref 0–1)
BUN BLDV-MCNC: 17 MG/DL (ref 7–20)
CALCIUM SERPL-MCNC: 8.4 MG/DL (ref 8.3–10.6)
CHLORIDE BLD-SCNC: 107 MMOL/L (ref 99–110)
CO2: 17 MMOL/L (ref 21–32)
CREAT SERPL-MCNC: 2 MG/DL (ref 0.6–1.2)
EKG ATRIAL RATE: 98 BPM
EKG DIAGNOSIS: NORMAL
EKG P AXIS: 15 DEGREES
EKG P-R INTERVAL: 100 MS
EKG Q-T INTERVAL: 358 MS
EKG QRS DURATION: 74 MS
EKG QTC CALCULATION (BAZETT): 457 MS
EKG R AXIS: 5 DEGREES
EKG T AXIS: 81 DEGREES
EKG VENTRICULAR RATE: 98 BPM
EOSINOPHILS ABSOLUTE: 0 K/UL (ref 0–0.6)
EOSINOPHILS RELATIVE PERCENT: 0 %
GFR AFRICAN AMERICAN: 30
GFR NON-AFRICAN AMERICAN: 25
GLOBULIN: 2.8 G/DL
GLUCOSE BLD-MCNC: 126 MG/DL (ref 70–99)
HCT VFR BLD CALC: 30 % (ref 36–48)
HEMOGLOBIN: 9.3 G/DL (ref 12–16)
LACTIC ACID, SEPSIS: 2.2 MMOL/L (ref 0.4–1.9)
LACTIC ACID, SEPSIS: 2.8 MMOL/L (ref 0.4–1.9)
LYMPHOCYTES ABSOLUTE: 1 K/UL (ref 1–5.1)
LYMPHOCYTES RELATIVE PERCENT: 4 %
MAGNESIUM: 2 MG/DL (ref 1.8–2.4)
MCH RBC QN AUTO: 20.6 PG (ref 26–34)
MCHC RBC AUTO-ENTMCNC: 31 G/DL (ref 31–36)
MCV RBC AUTO: 66.4 FL (ref 80–100)
METAMYELOCYTES RELATIVE PERCENT: 1 %
MONOCYTES ABSOLUTE: 0.1 K/UL (ref 0–1.3)
MONOCYTES RELATIVE PERCENT: 1 %
NEUTROPHILS ABSOLUTE: 11.8 K/UL (ref 1.7–7.7)
NEUTROPHILS RELATIVE PERCENT: 90 %
PDW BLD-RTO: 16.6 % (ref 12.4–15.4)
PLATELET # BLD: 393 K/UL (ref 135–450)
PLATELET SLIDE REVIEW: ADEQUATE
PMV BLD AUTO: 6.9 FL (ref 5–10.5)
POTASSIUM REFLEX MAGNESIUM: 3.1 MMOL/L (ref 3.5–5.1)
PRO-BNP: 259 PG/ML (ref 0–124)
PROCALCITONIN: 0.15 NG/ML (ref 0–0.15)
RBC # BLD: 4.52 M/UL (ref 4–5.2)
RBC # BLD: NORMAL 10*6/UL
SLIDE REVIEW: ABNORMAL
SODIUM BLD-SCNC: 138 MMOL/L (ref 136–145)
TOTAL PROTEIN: 6.6 G/DL (ref 6.4–8.2)
TROPONIN: 0.03 NG/ML
WBC # BLD: 13 K/UL (ref 4–11)

## 2021-04-08 PROCEDURE — 80053 COMPREHEN METABOLIC PANEL: CPT

## 2021-04-08 PROCEDURE — 99283 EMERGENCY DEPT VISIT LOW MDM: CPT

## 2021-04-08 PROCEDURE — 71046 X-RAY EXAM CHEST 2 VIEWS: CPT

## 2021-04-08 PROCEDURE — 6360000002 HC RX W HCPCS: Performed by: PHYSICIAN ASSISTANT

## 2021-04-08 PROCEDURE — 84484 ASSAY OF TROPONIN QUANT: CPT

## 2021-04-08 PROCEDURE — 6370000000 HC RX 637 (ALT 250 FOR IP): Performed by: PHYSICIAN ASSISTANT

## 2021-04-08 PROCEDURE — U0005 INFEC AGEN DETEC AMPLI PROBE: HCPCS

## 2021-04-08 PROCEDURE — 85730 THROMBOPLASTIN TIME PARTIAL: CPT

## 2021-04-08 PROCEDURE — 93005 ELECTROCARDIOGRAM TRACING: CPT | Performed by: EMERGENCY MEDICINE

## 2021-04-08 PROCEDURE — 87040 BLOOD CULTURE FOR BACTERIA: CPT

## 2021-04-08 PROCEDURE — 93010 ELECTROCARDIOGRAM REPORT: CPT | Performed by: INTERNAL MEDICINE

## 2021-04-08 PROCEDURE — 71045 X-RAY EXAM CHEST 1 VIEW: CPT

## 2021-04-08 PROCEDURE — 85025 COMPLETE CBC W/AUTO DIFF WBC: CPT

## 2021-04-08 PROCEDURE — 6360000002 HC RX W HCPCS: Performed by: INTERNAL MEDICINE

## 2021-04-08 PROCEDURE — 83880 ASSAY OF NATRIURETIC PEPTIDE: CPT

## 2021-04-08 PROCEDURE — 96374 THER/PROPH/DIAG INJ IV PUSH: CPT

## 2021-04-08 PROCEDURE — 2580000003 HC RX 258: Performed by: INTERNAL MEDICINE

## 2021-04-08 PROCEDURE — 83605 ASSAY OF LACTIC ACID: CPT

## 2021-04-08 PROCEDURE — U0003 INFECTIOUS AGENT DETECTION BY NUCLEIC ACID (DNA OR RNA); SEVERE ACUTE RESPIRATORY SYNDROME CORONAVIRUS 2 (SARS-COV-2) (CORONAVIRUS DISEASE [COVID-19]), AMPLIFIED PROBE TECHNIQUE, MAKING USE OF HIGH THROUGHPUT TECHNOLOGIES AS DESCRIBED BY CMS-2020-01-R: HCPCS

## 2021-04-08 PROCEDURE — 6360000002 HC RX W HCPCS: Performed by: EMERGENCY MEDICINE

## 2021-04-08 PROCEDURE — 71250 CT THORAX DX C-: CPT

## 2021-04-08 PROCEDURE — 36415 COLL VENOUS BLD VENIPUNCTURE: CPT

## 2021-04-08 PROCEDURE — 84145 PROCALCITONIN (PCT): CPT

## 2021-04-08 PROCEDURE — 1200000000 HC SEMI PRIVATE

## 2021-04-08 PROCEDURE — 6370000000 HC RX 637 (ALT 250 FOR IP): Performed by: INTERNAL MEDICINE

## 2021-04-08 PROCEDURE — 83735 ASSAY OF MAGNESIUM: CPT

## 2021-04-08 RX ORDER — PREDNISONE 20 MG/1
40 TABLET ORAL DAILY
Status: COMPLETED | OUTPATIENT
Start: 2021-04-11 | End: 2021-04-15

## 2021-04-08 RX ORDER — HEPARIN SODIUM 1000 [USP'U]/ML
40 INJECTION, SOLUTION INTRAVENOUS; SUBCUTANEOUS PRN
Status: DISCONTINUED | OUTPATIENT
Start: 2021-04-08 | End: 2021-04-08

## 2021-04-08 RX ORDER — SODIUM CHLORIDE 9 MG/ML
25 INJECTION, SOLUTION INTRAVENOUS PRN
Status: DISCONTINUED | OUTPATIENT
Start: 2021-04-08 | End: 2021-04-15 | Stop reason: HOSPADM

## 2021-04-08 RX ORDER — ASPIRIN 81 MG/1
324 TABLET, CHEWABLE ORAL ONCE
Status: COMPLETED | OUTPATIENT
Start: 2021-04-08 | End: 2021-04-08

## 2021-04-08 RX ORDER — POTASSIUM CHLORIDE 20 MEQ/1
20 TABLET, EXTENDED RELEASE ORAL DAILY
Status: DISCONTINUED | OUTPATIENT
Start: 2021-04-09 | End: 2021-04-09

## 2021-04-08 RX ORDER — HEPARIN SODIUM 1000 [USP'U]/ML
80 INJECTION, SOLUTION INTRAVENOUS; SUBCUTANEOUS PRN
Status: DISCONTINUED | OUTPATIENT
Start: 2021-04-08 | End: 2021-04-08

## 2021-04-08 RX ORDER — DOXYCYCLINE HYCLATE 100 MG
100 TABLET ORAL EVERY 12 HOURS
Status: DISCONTINUED | OUTPATIENT
Start: 2021-04-08 | End: 2021-04-09

## 2021-04-08 RX ORDER — HEPARIN SODIUM 1000 [USP'U]/ML
80 INJECTION, SOLUTION INTRAVENOUS; SUBCUTANEOUS ONCE
Status: DISCONTINUED | OUTPATIENT
Start: 2021-04-08 | End: 2021-04-08

## 2021-04-08 RX ORDER — AMLODIPINE BESYLATE 5 MG/1
5 TABLET ORAL DAILY
Status: DISCONTINUED | OUTPATIENT
Start: 2021-04-09 | End: 2021-04-15 | Stop reason: HOSPADM

## 2021-04-08 RX ORDER — POTASSIUM CHLORIDE 20 MEQ/1
40 TABLET, EXTENDED RELEASE ORAL ONCE
Status: COMPLETED | OUTPATIENT
Start: 2021-04-08 | End: 2021-04-08

## 2021-04-08 RX ORDER — ACETAMINOPHEN 650 MG/1
650 SUPPOSITORY RECTAL EVERY 6 HOURS PRN
Status: DISCONTINUED | OUTPATIENT
Start: 2021-04-08 | End: 2021-04-11

## 2021-04-08 RX ORDER — PANTOPRAZOLE SODIUM 40 MG/1
40 TABLET, DELAYED RELEASE ORAL
Status: DISCONTINUED | OUTPATIENT
Start: 2021-04-09 | End: 2021-04-15 | Stop reason: HOSPADM

## 2021-04-08 RX ORDER — SODIUM CHLORIDE 0.9 % (FLUSH) 0.9 %
5-40 SYRINGE (ML) INJECTION PRN
Status: DISCONTINUED | OUTPATIENT
Start: 2021-04-08 | End: 2021-04-15 | Stop reason: HOSPADM

## 2021-04-08 RX ORDER — MONTELUKAST SODIUM 4 MG/1
1 TABLET, CHEWABLE ORAL 2 TIMES DAILY
Status: DISCONTINUED | OUTPATIENT
Start: 2021-04-08 | End: 2021-04-08 | Stop reason: CLARIF

## 2021-04-08 RX ORDER — ALBUTEROL SULFATE 90 UG/1
2 AEROSOL, METERED RESPIRATORY (INHALATION)
Status: DISCONTINUED | OUTPATIENT
Start: 2021-04-08 | End: 2021-04-15 | Stop reason: HOSPADM

## 2021-04-08 RX ORDER — ONDANSETRON 2 MG/ML
4 INJECTION INTRAMUSCULAR; INTRAVENOUS EVERY 6 HOURS PRN
Status: DISCONTINUED | OUTPATIENT
Start: 2021-04-08 | End: 2021-04-15 | Stop reason: HOSPADM

## 2021-04-08 RX ORDER — ACETAMINOPHEN 325 MG/1
650 TABLET ORAL EVERY 6 HOURS PRN
Status: DISCONTINUED | OUTPATIENT
Start: 2021-04-08 | End: 2021-04-15 | Stop reason: HOSPADM

## 2021-04-08 RX ORDER — METHYLPREDNISOLONE SODIUM SUCCINATE 40 MG/ML
40 INJECTION, POWDER, LYOPHILIZED, FOR SOLUTION INTRAMUSCULAR; INTRAVENOUS EVERY 6 HOURS
Status: COMPLETED | OUTPATIENT
Start: 2021-04-08 | End: 2021-04-10

## 2021-04-08 RX ORDER — POLYETHYLENE GLYCOL 3350 17 G/17G
17 POWDER, FOR SOLUTION ORAL DAILY PRN
Status: DISCONTINUED | OUTPATIENT
Start: 2021-04-08 | End: 2021-04-15 | Stop reason: HOSPADM

## 2021-04-08 RX ORDER — PROMETHAZINE HYDROCHLORIDE 25 MG/1
12.5 TABLET ORAL EVERY 6 HOURS PRN
Status: DISCONTINUED | OUTPATIENT
Start: 2021-04-08 | End: 2021-04-15 | Stop reason: HOSPADM

## 2021-04-08 RX ORDER — ALBUTEROL SULFATE 90 UG/1
2 AEROSOL, METERED RESPIRATORY (INHALATION) 4 TIMES DAILY
Status: DISCONTINUED | OUTPATIENT
Start: 2021-04-08 | End: 2021-04-09

## 2021-04-08 RX ORDER — MORPHINE SULFATE 4 MG/ML
4 INJECTION, SOLUTION INTRAMUSCULAR; INTRAVENOUS ONCE
Status: COMPLETED | OUTPATIENT
Start: 2021-04-08 | End: 2021-04-08

## 2021-04-08 RX ORDER — ALBUTEROL SULFATE 2.5 MG/3ML
2.5 SOLUTION RESPIRATORY (INHALATION)
Status: DISCONTINUED | OUTPATIENT
Start: 2021-04-08 | End: 2021-04-08 | Stop reason: RX

## 2021-04-08 RX ORDER — SODIUM CHLORIDE 0.9 % (FLUSH) 0.9 %
5-40 SYRINGE (ML) INJECTION EVERY 12 HOURS SCHEDULED
Status: DISCONTINUED | OUTPATIENT
Start: 2021-04-08 | End: 2021-04-11

## 2021-04-08 RX ORDER — HEPARIN SODIUM 10000 [USP'U]/100ML
5-30 INJECTION, SOLUTION INTRAVENOUS CONTINUOUS
Status: DISCONTINUED | OUTPATIENT
Start: 2021-04-08 | End: 2021-04-08

## 2021-04-08 RX ORDER — IPRATROPIUM BROMIDE AND ALBUTEROL SULFATE 2.5; .5 MG/3ML; MG/3ML
1 SOLUTION RESPIRATORY (INHALATION)
Status: DISCONTINUED | OUTPATIENT
Start: 2021-04-09 | End: 2021-04-08 | Stop reason: RX

## 2021-04-08 RX ORDER — CHOLESTYRAMINE 4 G/9G
4 POWDER, FOR SUSPENSION ORAL DAILY
Status: DISCONTINUED | OUTPATIENT
Start: 2021-04-09 | End: 2021-04-15 | Stop reason: HOSPADM

## 2021-04-08 RX ORDER — SODIUM CHLORIDE 0.9 % (FLUSH) 0.9 %
5-40 SYRINGE (ML) INJECTION EVERY 12 HOURS SCHEDULED
Status: DISCONTINUED | OUTPATIENT
Start: 2021-04-08 | End: 2021-04-15 | Stop reason: HOSPADM

## 2021-04-08 RX ADMIN — APIXABAN 5 MG: 5 TABLET, FILM COATED ORAL at 21:44

## 2021-04-08 RX ADMIN — ASPIRIN 324 MG: 81 TABLET, CHEWABLE ORAL at 20:32

## 2021-04-08 RX ADMIN — MORPHINE SULFATE 4 MG: 4 INJECTION, SOLUTION INTRAMUSCULAR; INTRAVENOUS at 15:39

## 2021-04-08 RX ADMIN — MORPHINE SULFATE 4 MG: 4 INJECTION, SOLUTION INTRAMUSCULAR; INTRAVENOUS at 20:32

## 2021-04-08 RX ADMIN — METHYLPREDNISOLONE SODIUM SUCCINATE 40 MG: 40 INJECTION, POWDER, FOR SOLUTION INTRAMUSCULAR; INTRAVENOUS at 21:44

## 2021-04-08 RX ADMIN — AMITRIPTYLINE HYDROCHLORIDE 75 MG: 25 TABLET, FILM COATED ORAL at 21:44

## 2021-04-08 RX ADMIN — DOXYCYCLINE HYCLATE 100 MG: 100 TABLET, COATED ORAL at 21:44

## 2021-04-08 RX ADMIN — POTASSIUM CHLORIDE 40 MEQ: 1500 TABLET, EXTENDED RELEASE ORAL at 20:32

## 2021-04-08 RX ADMIN — Medication 10 ML: at 21:44

## 2021-04-08 ASSESSMENT — PAIN SCALES - GENERAL
PAINLEVEL_OUTOF10: 8
PAINLEVEL_OUTOF10: 0
PAINLEVEL_OUTOF10: 9

## 2021-04-08 NOTE — H&P
Hospital Medicine History & Physical      PCP: Mary Hanna MD    Date of Admission: 4/8/2021    Date of Service: Pt seen/examined on 4/8/2021  and Admitted to Inpatient with expected LOS greater than two midnights due to medical therapy. Chief Complaint:    Chief Complaint   Patient presents with    Shortness of Breath     since Easter. no cough. increase pain with breating        History Of Present Illness: The patient is a 72 y.o. 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 She reports cough with no significant production. No fevers or chills. In the ER she had CT chest done which did not reveal any acute pathology  Laboratory work-up noted for increased in creatinine from a baseline of 1.5-2.0. EKG sinus rhythm with no ischemic changes. Troponin 0 0.03.     Past Medical History:        Diagnosis Date    Bullous emphysema (HCC)     CKD (chronic kidney disease) stage 3, GFR 30-59 ml/min     Clostridium difficile carrier 01/21/2019    COPD (chronic obstructive pulmonary disease) ([address]     PFT done 7 years ago   [employer] Crohn's disease ([address]     Crohn's disease    Depression 1/30/2011    pt states resolved as of 3-26-12    DVT (deep venous thrombosis) ([address]     2012; first ocurrence     Hiatal hernia     Hx of blood clots     Kidney disease     Kidney insufficiency     Osteoporosis     Peripheral neuropathy     neuropathy in legs    Pneumonia     Postmenopausal     LMP in  40's    Pulmonary embolism ([address]     2012; first ocurrence    Sepsis ([address]     Syringomyelia ([address]        Past Surgical History:        Procedure Laterality Date    ABDOMEN SURGERY      BACK SURGERY shunt to drain CSF    BIOPSY SHOULDER Left 2/27/2019    EXCISION OF LEFT SHOULDER MASS performed by Annmarie Wong MD at [address]      crainotomy- remove fluid ? V-P SHUNT    BRONCHOSCOPY N/A 9/2/2020    BRONCHOSCOPY ALVEOLAR LAVAGE performed by Sebastian Huerta MD at [facility]      resection X2    COLONOSCOPY  12/5/11    BIOPSY AND POLYPECTOMY    COLONOSCOPY  4-2-2012    and ablation ERBE/APC    SHOULDER SURGERY      L        Medications Prior to Admission:    Prior to Admission medications    Medication Sig Start Date End Date Taking?  Authorizing Provider   albuterol sulfate  (90 Base) MCG/ACT inhaler Inhale 2 puffs into the lungs every 6 hours as needed for Wheezing 3/10/21 3/10/22 Yes Sebastian Huerta MD   amLODIPine (NORVASC) 5 MG tablet Take 1 tablet by mouth daily 1/15/21  Yes Sierra Coto MD   albuterol (PROVENTIL) (2.5 MG/3ML) 0.083% nebulizer solution TAKE 3 MLS BY NEBULIZATION EVERY 6 HOURS AS NEEDED FOR WHEEZING DX: COPD ICD-10: J44.9 1/13/21  Yes Sebastian Huerta MD   SPIRIVA RESPIMAT 2.5 MCG/ACT AERS inhaler INHALE 2 PUFFS BY MOUTH INTO THE LUNGS DAILY 12/18/20  Yes Sierra Coto MD   colestipol (COLESTID) 1 g tablet TAKE 1 TABLET BY MOUTH TWICE A DAY 10/7/20  Yes Historical Provider, MD   amitriptyline (ELAVIL) 150 MG tablet Take 0.5 tablets by mouth nightly 12/17/20  Yes Sierra Coto MD   predniSONE (DELTASONE) 10 MG tablet Take 1-1/2 tablets (15mg) every morning until seen 10/21/20  Yes Saintclair Spoon, MD   potassium chloride (KLOR-CON M) 20 MEQ extended release tablet Take 1 tablet by mouth daily 10/18/20  Yes Kaylie Adam MD   cyclobenzaprine (FLEXERIL) 5 MG tablet Take 5 mg by mouth 4 times daily Every 6 hours for muscle spasms   Yes Historical Provider, MD   albuterol sulfate  (90 Base) MCG/ACT inhaler Inhale 2 puffs into the lungs every 6 hours as needed for Wheezing negative. PHYSICAL EXAM:    BP (!) 141/86   Pulse 90   Temp 98.4 °F (36.9 °C)   Resp 16   Ht 4' 11\" (1.499 m)   Wt 152 lb (68.9 kg)   SpO2 91%   BMI 30.70 kg/m²     General appearance: No apparent distress appears stated age and cooperative. HEENT Normal cephalic, atraumatic without obvious deformity. Pupils equal, round, and reactive to light. Extra ocular muscles intact. Conjunctivae/corneas clear. Neck: Supple, No jugular venous distention/bruits. Trachea midline without thyromegaly or adenopathy with full range of motion. Lungs: Diminished bibasilar air entry. Chest tenderness to anterior chest as well as posterolateral lower chest reproducible with palpation  Heart: Regular rate and rhythm with Normal S1/S2 without murmurs, rubs or gallops  Abdomen: Soft, non-tender or non-distended without rigidity or guarding and positive bowel sounds all four quadrants. Extremities: No clubbing, cyanosis, or edema bilaterally. Skin: Skin color, texture, turgor normal.  No rashes or lesions. Neurologic: Alert and oriented X 3, neurovascularly intact with sensory/motor intact upper extremities/lower extremities, bilaterally. Cranial nerves: II-XII intact, grossly non-focal.  Mental status: Alert, oriented, thought content appropriate. CBC   Recent Labs     04/08/21  1335   WBC 13.0*   HGB 9.3*   HCT 30.0*         RENAL  Recent Labs     04/08/21  1335      K 3.1*      CO2 17*   BUN 17   CREATININE 2.0*     LFT'S  Recent Labs     04/08/21  1335   AST 13*   ALT 9*   BILITOT <0.2   ALKPHOS 88     COAG  No results for input(s): INR in the last 72 hours.   CARDIAC ENZYMES  Recent Labs     04/08/21  1335   TROPONINI 0.03*         Active Hospital Problems    Diagnosis Date Noted    Acute exacerbation of chronic obstructive pulmonary disease (COPD) [facility] [J44.1] 04/08/2021    Acute renal failure superimposed on stage 3 chronic kidney disease ([facility] [N17.9, N18.30] 04/08/2021    Essential hypertension [I10] 02/24/2021    Long term (current) use of systemic steroids [Z79.52]     SOB (shortness of breath) [R06.02] 01/20/2019    History of pulmonary embolism [Z86.711]     Gastroesophageal reflux disease without esophagitis [K21.9]     Chronic hypoxemic respiratory failure ([facility] [J96.11] 11/12/2015         ASSESSMENT/PLAN:   72 y.o. 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 She is admitted for probable COPD exacerbation and chest wall pain. I doubt this to be cardiac in low suspicion for PE given that she has been on anticoagulation. Plan:  Plan:  -Continue on breathing therapy with scheduled and PRN bronchodilators and  cardiopulmonary protocol  -Systemic IV steroids  -Antibiotic coverage  - Oxygen supplementation with target saturations greater than 90% and wean as tolerated  - Pulmonology consult  -We will get VQ scan to rule out PE given that creatinine is up and not able to do CT pulmonary angiogram  -Oral narcotic analgesia  -Continue other chronic home medications      DVT Prophylaxis: On Eliquis  Diet: General diet  Code Status: Full code       Stu Bo MD    Thank you Fredi Arellano MD for the opportunity to be involved in this patient's care. If you have any questions or concerns please feel free to contact me at [phone].

## 2021-04-08 NOTE — ED PROVIDER NOTES
[facility]        Pt Name: Kelly Whitaker  MRN: [id number]  [place] 1950  Date of evaluation: 4/8/2021  Provider: Anita Tobin PA-C  PCP: Terrye Litten, MD     I have seen and evaluated this patient with my supervising physician Lamonte Barrett MD.    The total critical care time spent while evaluating and treating this patient was at least 33 minutes. This excludes time spent doing separately billable procedures. This includes time at the bedside, data interpretation, medication management, obtaining critical history from collateral sources if the patient is unable to provide it directly, and physician consultation. Specifics of interventions taken and potentially life-threatening diagnostic considerations are listed above in the medical decision making. CHIEF COMPLAINT       Chief Complaint   Patient presents with    Shortness of Breath     since Easter. no cough. increase pain with breating       HISTORY OF PRESENT ILLNESS   (Location, Timing/Onset, Context/Setting, Quality, Duration, Modifying Factors, Severity, Associated Signs and Symptoms)  Note limiting factors. Kelly Whitaker is a 72 y.o. female that presents to the emergency department with a chief complaint of worsening shortness of breath over the past 5 to 6 days with some pleuritic chest pain. She states she has an occasional nonproductive cough. She has history of COPD not on oxygen at home. Denies any history of CHF. Denies any recent sick contacts. Tomorrow will nikki 2 weeks from her second Covid vaccination. She denies any pain just sitting there. Denies abdominal pain, dysuria, hematuria, diarrhea, bloody stool, leg swelling. States doing things makes her shortness of breath worse. Denies any other symptoms.   She does have history of DVT and PE and is anticoagulated on Eliquis and states she has been compliant with her medication. Nursing Notes were all reviewed and agreed with or any disagreements were addressed in the HPI. REVIEW OF SYSTEMS    (2-9 systems for level 4, 10 or more for level 5)     Review of Systems    Positives and Pertinent negatives as per HPI. Except as noted above in the ROS, all other systems were reviewed and negative. PAST MEDICAL HISTORY     Past Medical History:   Diagnosis Date    Bullous emphysema (HCC)     CKD (chronic kidney disease) stage 3, GFR 30-59 ml/min     Clostridium difficile carrier 01/21/2019    COPD (chronic obstructive pulmonary disease) ([address]     PFT done 7 years ago   [facility] Crohn's disease ([address]     Crohn's disease    Depression 1/30/2011    pt states resolved as of 3-26-12    DVT (deep venous thrombosis) ([address]     2012; first ocurrence     Hiatal hernia     Hx of blood clots     Kidney disease     Kidney insufficiency     Osteoporosis     Peripheral neuropathy     neuropathy in legs    Pneumonia     Postmenopausal     LMP in  40's    Pulmonary embolism ([address]     2012; first ocurrence    Sepsis ([address]     Syringomyelia ([address]          SURGICAL HISTORY     Past Surgical History:   Procedure Laterality Date    ABDOMEN SURGERY      BACK SURGERY      shunt to drain CSF    BIOPSY SHOULDER Left 2/27/2019    EXCISION OF LEFT SHOULDER MASS performed by Monisha Leal MD at [address]      crainotomy- remove fluid ? V-P SHUNT    BRONCHOSCOPY N/A 9/2/2020    BRONCHOSCOPY ALVEOLAR LAVAGE performed by Dion Burgos MD at [place] [address]      resection X2    COLONOSCOPY  12/5/11    BIOPSY AND POLYPECTOMY    COLONOSCOPY  4-2-2012    and ablation ERBE/APC    SHOULDER SURGERY      L          CURRENTMEDICATIONS       Previous Medications    ALBUTEROL (PROVENTIL) (2.5 MG/3ML) 0.083% NEBULIZER SOLUTION    TAKE 3 MLS BY NEBULIZATION EVERY 6 HOURS AS NEEDED FOR WHEEZING DX: COPD ICD-10: J44.9    ALBUTEROL Psychiatric:         Behavior: Behavior normal.         DIAGNOSTIC RESULTS   LABS:    Labs Reviewed   CBC WITH AUTO DIFFERENTIAL - Abnormal; Notable for the following components:       Result Value    WBC 13.0 (*)     Hemoglobin 9.3 (*)     Hematocrit 30.0 (*)     MCV 66.4 (*)     MCH 20.6 (*)     RDW 16.6 (*)     Neutrophils Absolute 11.8 (*)     Metamyelocytes Relative 1 (*)     All other components within normal limits    Narrative:     Performed at:  [facility] [email or web address]   Phone [phone]   COMPREHENSIVE METABOLIC PANEL W/ REFLEX TO MG FOR LOW K - Abnormal; Notable for the following components:    Potassium reflex Magnesium 3.1 (*)     CO2 17 (*)     Glucose 126 (*)     CREATININE 2.0 (*)     GFR Non- 25 (*)     GFR  30 (*)     ALT 9 (*)     AST 13 (*)     All other components within normal limits    Narrative:     Performed at:  [facility] [email or web address]   Phone [phone]   TROPONIN - Abnormal; Notable for the following components:    Troponin 0.03 (*)     All other components within normal limits    Narrative:     Performed at:  [facility]  [address]   Phone [address]  - Abnormal; Notable for the following components:    Pro- (*)     All other components within normal limits    Narrative:     Performed at:  [facility] [email or web address]   Phone [phone]   LACTATE, SEPSIS - Abnormal; Notable for the following components:    Lactic Acid, Sepsis 2.2 (*)     All other components within normal limits    Narrative:     Performed at:  [facility]  [address]   Phone [phone]   CULTURE, BLOOD 1   CULTURE, BLOOD 2   MAGNESIUM    Narrative: Performed at:  [facility]  [address],  [place], [address]   Phone [phone]   APTT    Narrative:     Performed at:  [facility]  [address] [place], [address]   Phone [phone]   LACTATE, SEPSIS   COVID-19   APTT   APTT   PROCALCITONIN       All other labs were within normal range or not returned as of this dictation. EKG: All EKG's are interpreted by the Emergency Department Physician in the absence of a cardiologist.  Please see their note for interpretation of EKG. RADIOLOGY:   Non-plain film images such as CT, Ultrasound and MRI are read by the radiologist. Plain radiographic images are visualized and preliminarily interpreted by the ED Provider with the below findings:        Interpretation per the Radiologist below, if available at the time of this note:    XR CHEST (2 VW)   Final Result   No convincing evidence for acute cardiopulmonary pathology. CT CHEST WO CONTRAST   Preliminary Result   No acute abnormality identified in the chest.      The previously noted left upper lobe consolidation on the CT from 09/14/2020   has resolved with mild atelectasis and/or scarring remaining. Emphysematous changes are seen in the lungs. XR CHEST PORTABLE   Final Result   No acute process.          NM LUNG VENT/PERFUSION (VQ)    (Results Pending)           PROCEDURES   Unless otherwise noted below, none     Procedures    CRITICAL CARE TIME   N/A    CONSULTS: Dr. Praveena Eng and DIFFERENTIAL DIAGNOSIS/MDM:   Vitals:    Vitals:    04/08/21 1630 04/08/21 1700 04/08/21 1715 04/08/21 1730   BP: 128/72 125/78 129/76 (!) 141/86   Pulse: 90 90 91 90   Resp: 17 15 17 16   Temp: 98.4 °F (36.9 °C)      SpO2: 93% 92% 92% 91%   Weight:       Height:           Patient was given the following medications:  Medications   sodium chloride specified.     DISCHARGE MEDICATIONS:  New Prescriptions    No medications on file       DISCONTINUED MEDICATIONS:  Discontinued Medications    No medications on file              (Please note that portions of this note were completed with a voice recognition program.  Efforts were made to edit the dictations but occasionally words are mis-transcribed.)    Pramod Robles PA-C (electronically signed)            Pramod Robles PA-C  04/08/21 1924

## 2021-04-08 NOTE — ED PROVIDER NOTES
I independently performed a history and physical on Doe Harvey. All diagnostic, treatment, and disposition decisions were made by myself in conjunction with the advanced practice provider. Briefly, this is a 72 y.o. female here for Chest pain and dyspnea, pleurisy. Juarez Couch Hx of DVT/PE on Eliquis  S/p 2nd COVID vaccine recently. On exam, she is tachypneic, tachycardic, and hypoxic. Has some pain with inspiration. Her lungs are clear. EKG  Rate 98, NSR. No change from prior. Screenings                   MDM  Chest Pain/Dyspnea  Increased Trop from baseline  Acute renal insufficiency. Need to bring in for ACS r/o  Will obtain VQ scan to r/o PE. Patient Referrals:  No follow-up provider specified. Discharge Medications:  New Prescriptions    No medications on file       FINAL IMPRESSION  1. Chest pain, unspecified type    2. Chronic kidney disease, unspecified CKD stage    3. Elevated troponin    4. Hypokalemia        Blood pressure (!) 141/86, pulse 90, temperature 98.4 °F (36.9 °C), resp. rate 16, height 4' 11\" (1.499 m), weight 152 lb (68.9 kg), SpO2 91 %, not currently breastfeeding. For further details of Ronnie Chris emergency department encounter, please see documentation by advanced practice provider, Martha Singletary.        Marquise Alonzo MD  04/08/21 [address], [place]  04/08/21 7619

## 2021-04-09 ENCOUNTER — APPOINTMENT (OUTPATIENT)
Dept: NUCLEAR MEDICINE | Age: 71
DRG: 140 | End: 2021-04-09
Payer: MEDICARE

## 2021-04-09 LAB
ACANTHOCYTES: ABNORMAL
ANION GAP SERPL CALCULATED.3IONS-SCNC: 10 MMOL/L (ref 3–16)
ANISOCYTOSIS: ABNORMAL
BANDED NEUTROPHILS RELATIVE PERCENT: 3 % (ref 0–7)
BASOPHILS ABSOLUTE: 0 K/UL (ref 0–0.2)
BASOPHILS RELATIVE PERCENT: 0 %
BUN BLDV-MCNC: 32 MG/DL (ref 7–20)
CALCIUM SERPL-MCNC: 8.1 MG/DL (ref 8.3–10.6)
CHLORIDE BLD-SCNC: 111 MMOL/L (ref 99–110)
CO2: 17 MMOL/L (ref 21–32)
CREAT SERPL-MCNC: 2.1 MG/DL (ref 0.6–1.2)
EOSINOPHILS ABSOLUTE: 0 K/UL (ref 0–0.6)
EOSINOPHILS RELATIVE PERCENT: 0 %
GFR AFRICAN AMERICAN: 28
GFR NON-AFRICAN AMERICAN: 23
GLUCOSE BLD-MCNC: 134 MG/DL (ref 70–99)
HCT VFR BLD CALC: 29.7 % (ref 36–48)
HEMOGLOBIN: 9 G/DL (ref 12–16)
HYPOCHROMIA: ABNORMAL
LYMPHOCYTES ABSOLUTE: 0.8 K/UL (ref 1–5.1)
LYMPHOCYTES RELATIVE PERCENT: 5 %
MCH RBC QN AUTO: 20.3 PG (ref 26–34)
MCHC RBC AUTO-ENTMCNC: 30.2 G/DL (ref 31–36)
MCV RBC AUTO: 67.2 FL (ref 80–100)
MICROCYTES: ABNORMAL
MONOCYTES ABSOLUTE: 0.2 K/UL (ref 0–1.3)
MONOCYTES RELATIVE PERCENT: 1 %
NEUTROPHILS ABSOLUTE: 14.6 K/UL (ref 1.7–7.7)
NEUTROPHILS RELATIVE PERCENT: 91 %
PDW BLD-RTO: 16.7 % (ref 12.4–15.4)
PLATELET # BLD: 400 K/UL (ref 135–450)
PMV BLD AUTO: 7.7 FL (ref 5–10.5)
POTASSIUM REFLEX MAGNESIUM: 5.3 MMOL/L (ref 3.5–5.1)
RBC # BLD: 4.41 M/UL (ref 4–5.2)
SARS-COV-2: NOT DETECTED
SODIUM BLD-SCNC: 138 MMOL/L (ref 136–145)
TARGET CELLS: ABNORMAL
TROPONIN: <0.01 NG/ML
WBC # BLD: 15.5 K/UL (ref 4–11)

## 2021-04-09 PROCEDURE — 6370000000 HC RX 637 (ALT 250 FOR IP): Performed by: INTERNAL MEDICINE

## 2021-04-09 PROCEDURE — 6360000002 HC RX W HCPCS: Performed by: INTERNAL MEDICINE

## 2021-04-09 PROCEDURE — 99223 1ST HOSP IP/OBS HIGH 75: CPT | Performed by: INTERNAL MEDICINE

## 2021-04-09 PROCEDURE — 84484 ASSAY OF TROPONIN QUANT: CPT

## 2021-04-09 PROCEDURE — 94761 N-INVAS EAR/PLS OXIMETRY MLT: CPT

## 2021-04-09 PROCEDURE — 2580000003 HC RX 258: Performed by: INTERNAL MEDICINE

## 2021-04-09 PROCEDURE — 85025 COMPLETE CBC W/AUTO DIFF WBC: CPT

## 2021-04-09 PROCEDURE — 2580000003 HC RX 258: Performed by: PHYSICIAN ASSISTANT

## 2021-04-09 PROCEDURE — 36415 COLL VENOUS BLD VENIPUNCTURE: CPT

## 2021-04-09 PROCEDURE — A9540 TC99M MAA: HCPCS | Performed by: EMERGENCY MEDICINE

## 2021-04-09 PROCEDURE — 2580000003 HC RX 258: Performed by: EMERGENCY MEDICINE

## 2021-04-09 PROCEDURE — 6370000000 HC RX 637 (ALT 250 FOR IP): Performed by: HOSPITALIST

## 2021-04-09 PROCEDURE — A9558 XE133 XENON 10MCI: HCPCS | Performed by: EMERGENCY MEDICINE

## 2021-04-09 PROCEDURE — 80048 BASIC METABOLIC PNL TOTAL CA: CPT

## 2021-04-09 PROCEDURE — 94640 AIRWAY INHALATION TREATMENT: CPT

## 2021-04-09 PROCEDURE — 3430000000 HC RX DIAGNOSTIC RADIOPHARMACEUTICAL: Performed by: EMERGENCY MEDICINE

## 2021-04-09 PROCEDURE — 2700000000 HC OXYGEN THERAPY PER DAY

## 2021-04-09 PROCEDURE — 78582 LUNG VENTILAT&PERFUS IMAGING: CPT

## 2021-04-09 PROCEDURE — 1200000000 HC SEMI PRIVATE

## 2021-04-09 RX ORDER — SODIUM CHLORIDE 0.9 % (FLUSH) 0.9 %
10 SYRINGE (ML) INJECTION PRN
Status: DISCONTINUED | OUTPATIENT
Start: 2021-04-09 | End: 2021-04-15 | Stop reason: HOSPADM

## 2021-04-09 RX ORDER — TRAMADOL HYDROCHLORIDE 50 MG/1
50 TABLET ORAL EVERY 12 HOURS PRN
Status: DISCONTINUED | OUTPATIENT
Start: 2021-04-09 | End: 2021-04-15 | Stop reason: HOSPADM

## 2021-04-09 RX ORDER — IPRATROPIUM BROMIDE AND ALBUTEROL SULFATE 2.5; .5 MG/3ML; MG/3ML
1 SOLUTION RESPIRATORY (INHALATION) 4 TIMES DAILY
Status: DISCONTINUED | OUTPATIENT
Start: 2021-04-09 | End: 2021-04-15 | Stop reason: HOSPADM

## 2021-04-09 RX ORDER — XENON XE-133 10 MCI/1
6.4 GAS RESPIRATORY (INHALATION)
Status: COMPLETED | OUTPATIENT
Start: 2021-04-09 | End: 2021-04-09

## 2021-04-09 RX ADMIN — METHYLPREDNISOLONE SODIUM SUCCINATE 40 MG: 40 INJECTION, POWDER, FOR SOLUTION INTRAMUSCULAR; INTRAVENOUS at 20:03

## 2021-04-09 RX ADMIN — IPRATROPIUM BROMIDE AND ALBUTEROL SULFATE 1 AMPULE: .5; 3 SOLUTION RESPIRATORY (INHALATION) at 16:51

## 2021-04-09 RX ADMIN — Medication 10 ML: at 20:03

## 2021-04-09 RX ADMIN — ALBUTEROL SULFATE 2 PUFF: 90 AEROSOL, METERED RESPIRATORY (INHALATION) at 08:09

## 2021-04-09 RX ADMIN — METHYLPREDNISOLONE SODIUM SUCCINATE 40 MG: 40 INJECTION, POWDER, FOR SOLUTION INTRAMUSCULAR; INTRAVENOUS at 08:29

## 2021-04-09 RX ADMIN — APIXABAN 5 MG: 5 TABLET, FILM COATED ORAL at 20:03

## 2021-04-09 RX ADMIN — IPRATROPIUM BROMIDE AND ALBUTEROL SULFATE 1 AMPULE: .5; 3 SOLUTION RESPIRATORY (INHALATION) at 22:07

## 2021-04-09 RX ADMIN — Medication 10 ML: at 21:00

## 2021-04-09 RX ADMIN — METHYLPREDNISOLONE SODIUM SUCCINATE 40 MG: 40 INJECTION, POWDER, FOR SOLUTION INTRAMUSCULAR; INTRAVENOUS at 15:31

## 2021-04-09 RX ADMIN — AMITRIPTYLINE HYDROCHLORIDE 75 MG: 25 TABLET, FILM COATED ORAL at 20:03

## 2021-04-09 RX ADMIN — Medication 10 ML: at 08:30

## 2021-04-09 RX ADMIN — METHYLPREDNISOLONE SODIUM SUCCINATE 40 MG: 40 INJECTION, POWDER, FOR SOLUTION INTRAMUSCULAR; INTRAVENOUS at 03:26

## 2021-04-09 RX ADMIN — APIXABAN 5 MG: 5 TABLET, FILM COATED ORAL at 08:29

## 2021-04-09 RX ADMIN — Medication 5.3 MILLICURIE: at 09:46

## 2021-04-09 RX ADMIN — Medication 5 ML: at 08:33

## 2021-04-09 RX ADMIN — TRAMADOL HYDROCHLORIDE 50 MG: 50 TABLET, FILM COATED ORAL at 13:57

## 2021-04-09 RX ADMIN — PANTOPRAZOLE SODIUM 40 MG: 40 TABLET, DELAYED RELEASE ORAL at 06:34

## 2021-04-09 RX ADMIN — CHOLESTYRAMINE 4 G: 4 POWDER, FOR SUSPENSION ORAL at 11:54

## 2021-04-09 RX ADMIN — IPRATROPIUM BROMIDE 2 PUFF: 17 AEROSOL, METERED RESPIRATORY (INHALATION) at 08:09

## 2021-04-09 RX ADMIN — IPRATROPIUM BROMIDE AND ALBUTEROL SULFATE 1 AMPULE: .5; 3 SOLUTION RESPIRATORY (INHALATION) at 13:08

## 2021-04-09 RX ADMIN — XENON XE-133 6.4 MILLICURIE: 10 GAS RESPIRATORY (INHALATION) at 09:45

## 2021-04-09 RX ADMIN — AMLODIPINE BESYLATE 5 MG: 5 TABLET ORAL at 08:29

## 2021-04-09 RX ADMIN — Medication 10 ML: at 09:46

## 2021-04-09 RX ADMIN — ACETAMINOPHEN 650 MG: 325 TABLET ORAL at 13:58

## 2021-04-09 ASSESSMENT — PAIN SCALES - GENERAL
PAINLEVEL_OUTOF10: 0
PAINLEVEL_OUTOF10: 0

## 2021-04-09 NOTE — CONSULTS
P Pulmonary and Critical Care   Consult Note      Reason for Consult: COPD exacerbation, chronic hypoxemic respiratory failure  Requesting Physician: Dr. Carolina Lord:   [facility]:                The patient is a 72 y.o. 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 01/21/2019    COPD (chronic obstructive pulmonary disease) ([address]     PFT done 7 years ago   [facility] Crohn's disease ([address]     Crohn's disease    Depression 1/30/2011    pt states resolved as of 3-26-12    DVT (deep venous thrombosis) ([address]     2012; first ocurrence     Hiatal hernia     Hx of blood clots     Kidney disease     Kidney insufficiency     Osteoporosis     Peripheral neuropathy     neuropathy in legs    Pneumonia     Postmenopausal     LMP in  40's    Pulmonary embolism ([address]     2012; first ocurrence    Sepsis ([address]     Syringomyelia ([address]      The patient presented to the emergency department with a chief complaint of increasingly severe shortness of breath and pleuritic sounding chest pain over the preceding 5 to 7 days. I saw her recently in the office and tried to taper her prednisone to 5 mg/day from 10 mg/day. After tapering prednisone, the patient felt her breathing got worse. She went back to 10 mg of prednisone per day but that did not seem to help. Ultimately she began to feel tight and have pleuritic sounding chest pain so she presented to the emergency department. This morning she is feeling a little bit better. She does have chronic hypoxemic respiratory failure and normally wears 2 L/min nasal cannula oxygen supplement at home. She turned this up to 3 L/min over the last few days but that also did not seem to help her shortness of breath.       Past Surgical History:        Procedure Laterality Date    ABDOMEN SURGERY      BACK SURGERY      shunt to tablet 40 mg, 40 mg, Oral, Daily  cholestyramine (QUESTRAN) packet 4 g, 4 g, Oral, Daily  albuterol sulfate  (90 Base) MCG/ACT inhaler 2 puff, 2 puff, Inhalation, Q2H PRN    No Known Allergies    Social History:    TOBACCO:   reports that she quit smoking about 6 years ago. Her smoking use included cigarettes. She has a 7.50 pack-year smoking history. She has never used smokeless tobacco.  ETOH:   reports no history of alcohol use. Patient currently lives independently  Environmental/chemical exposure: None known    Family History:       Problem Relation Age of Onset    Heart Disease Mother     High Blood Pressure Mother     High Cholesterol Mother     Early Death Mother 36        MI    Heart Disease Father     High Blood Pressure Father     High Cholesterol Father     Stroke Father 79    High Blood Pressure Sister     High Blood Pressure Brother      REVIEW OF SYSTEMS:    CONSTITUTIONAL:  negative for fevers, chills, diaphoresis, activity change, appetite change, fatigue, night sweats and unexpected weight change.    EYES:  negative for blurred vision, eye discharge, visual disturbance and icterus  HEENT:  negative for hearing loss, tinnitus, ear drainage, sinus pressure, nasal congestion, epistaxis and snoring  RESPIRATORY:  See HPI  CARDIOVASCULAR:  negative for chest pain, palpitations, exertional chest pressure/discomfort, edema, syncope  GASTROINTESTINAL:  negative for nausea, vomiting, diarrhea, constipation, blood in stool and abdominal pain  GENITOURINARY:  negative for frequency, dysuria, urinary incontinence, decreased urine volume, and hematuria  HEMATOLOGIC/LYMPHATIC:  negative for easy bruising, bleeding and lymphadenopathy  ALLERGIC/IMMUNOLOGIC:  negative for recurrent infections, angioedema, anaphylaxis and drug reactions  ENDOCRINE:  negative for weight changes and diabetic symptoms including polyuria, polydipsia and polyphagia  MUSCULOSKELETAL:  negative for  pain, joint swelling,

## 2021-04-09 NOTE — DISCHARGE INSTR - COC
Continuity of Care Form    Patient Name: Wilma Hopper   :  1950  MRN:  [id number]    Admit date:  2021  Discharge date: 4/15/2021    Code Status Order: Full Code   Advance Directives:   [facility] Documentation       Date/Time Healthcare Directive Type of Healthcare Directive Copy in [address] Agent's Name Healthcare Agent's Phone Number    21 [phone]  No, patient does not have an advance directive for healthcare treatment -- -- -- -- --            Admitting Physician:  Hung Almanzar MD  PCP: Carlos Figueroa MD    Discharging Nurse: [facility] Unit/Room#: Z7W-0657/3580-86  Discharging Unit Phone Number: [phone]    Emergency Contact:   Extended Emergency Contact Information  Primary Emergency Contact: Mirta Andino [address] Phone: [phone]  Relation: Brother/Sister  Secondary Emergency Contact: Nelsyland Landau [place] of [address] Phone: [phone]  Relation: Child    Past Surgical History:  Past Surgical History:   Procedure Laterality Date    ABDOMEN SURGERY      BACK SURGERY      shunt to drain CSF    BIOPSY SHOULDER Left 2019    EXCISION OF LEFT SHOULDER MASS performed by Zarina Murphy MD at [facility]      crainotomy- remove fluid ? V-P SHUNT    BRONCHOSCOPY N/A 2020    BRONCHOSCOPY ALVEOLAR LAVAGE performed by Pradeep Ortega MD at [place] [address]      resection X2    COLONOSCOPY  11    BIOPSY AND POLYPECTOMY    COLONOSCOPY  2012    and ablation ERBE/APC    SHOULDER SURGERY      L        Immunization History:   Immunization History   Administered Date(s) Administered    COVID-19, Bocanegra Peter, PF, 30mcg/0.3mL 2021, 2021    DT (pediatric) 2010    Influenza Virus Vaccine 10/01/2012, 10/20/2015    Influenza Whole 10/01/2011    Influenza, High Dose (Fluzone 65 yrs and older) 10/06/2014, 10/19/2016, 11/16/2017, 11/05/2018    Influenza, [place], adjuvanted, 65 yrs +, IM, PF (Fluad) 10/29/2020    Influenza, Triv, inactivated, subunit, adjuvanted, IM (Fluad 65 yrs and older) 11/13/2019    Pneumococcal Conjugate 13-valent (Yoiicff64) 06/11/2015    Pneumococcal Conjugate 7-valent (Prevnar7) 02/07/2005, 10/01/2011    Pneumococcal Polysaccharide (Ufceydqid91) 06/15/2016    Zoster Live (Zostavax) 07/01/2013       Active Problems:  Patient Active Problem List   Diagnosis Code    Crohn disease ([facility] K50.90    Depression F32.9    Osteoarthritis M19.90    Lupus anticoagulant disorder ([facility] D68.62    Dysphagia R13.10    Syringomyelia ([facility] G95.0    Gastritis and gastroduodenitis K29.70, K29.90    Skin ulcer of shoulder ([facility] L98.499    Shortness of breath R06.02    Anemia D64.9    Centrilobular emphysema ([facility] J43.2    Pulmonary fibrosis ([facility]) J84.10    Chronic hypoxemic respiratory failure ([facility]) J96.11    Hiatal hernia K44.9    COPD, severe ([facility]) J44.9    Alpha thalassemia minor D56.3    Acute on chronic respiratory failure with hypoxia ([facility]) J96.21    Bronchiectasis with acute exacerbation ([facility]) J47.1    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    Osteoporosis of multiple sites M81.0    Gastroesophageal reflux disease without esophagitis K21.9    Sepsis ([facility]) A41.9    Bilateral pulmonary embolism ([facility]) I26.99    Acute deep vein thrombosis (DVT) of distal vein of both lower extremities ([facility]) I82.4Z3    History of pulmonary embolism Z86.711    SOB (shortness of breath) R06.02    Wound of left shoulder S41.002A    Hx pulmonary embolism Z86.711    COPD exacerbation ([facility]) J44.1    Cerebrovascular accident (CVA) ([facility]) I63.9    MARIS (acute kidney injury) ([facility] N17.9    History of DVT (deep vein thrombosis) Z86.718    Small vessel disease, cerebrovascular I67.9    Ex-smoker Z87.891    History of depression Z86.59    Overweight E66.3    HCAP (healthcare-associated pneumonia) V52.7    Metabolic acidosis I44.5    Long term (current) use of systemic steroids Z79.52    Essential hypertension I10    Stage 3b chronic kidney disease N18.32    Acute exacerbation of chronic obstructive pulmonary disease (COPD) ([facility]) J44.1    Acute renal failure superimposed on stage 3 chronic kidney disease (HCC) N17.9, N18.30       Isolation/Infection:   Isolation            Droplet Plus          Patient Infection Status       Infection Onset Added Last Indicated Last Indicated By Review Planned Expiration Resolved Resolved By    None active    Resolved    COVID-19 Rule Out 04/08/21 04/08/21 04/08/21 COVID-19 (Ordered)   04/09/21 Rule-Out Test Resulted    COVID-19 Rule Out 09/17/20 09/17/20 09/17/20 COVID-19 (Ordered)   09/17/20 Rule-Out Test Resulted    COVID-19 09/14/20 09/17/20 09/14/20 COVID-19   09/17/20 Celina Amaral RN    C-diff Rule Out 09/16/20 09/16/20 09/16/20 Clostridium difficile toxin/antigen (Ordered)   09/16/20 Rule-Out Test Resulted    COVID-19 Rule Out 09/14/20 09/14/20 09/14/20 COVID-19 (Ordered)   09/17/20 Rule-Out Test Resulted    COVID-19 Rule Out 08/28/20 08/28/20 08/28/20 COVID-19 (Ordered)   08/29/20 Rule-Out Test Resulted    VRE 01/13/20 01/16/20 01/13/20 URINE CULTURE   08/30/20 Celina Amaral RN            Nurse Assessment:  Last Vital Signs: /66   Pulse 96   Temp 97.7 °F (36.5 °C) (Temporal)   Resp 15   Ht 4' 11\" (1.499 m)   Wt 157 lb 8 oz (71.4 kg)   SpO2 94%   BMI 31.81 kg/m²     Last documented pain score (0-10 scale): Pain Level: 0  Last Weight:   Wt Readings from Last 1 Encounters:   04/09/21 157 lb 8 oz (71.4 kg)     Mental Status:  oriented    IV Access:  - None    Nursing Mobility/ADLs:  Walking   Independent  Transfer  Independent  Bathing  Assisted  Dressing  Assisted  Toileting  Independent  Feeding  [address]  Independent  Med Delivery   whole    Wound Care Documentation and Therapy:        Elimination:  Continence:   · Bowel: Yes  · Bladder: Yes  Urinary Catheter: None   Colostomy/Ileostomy/Ileal Conduit: No       Date of Last BM: 4/15/2021    Intake/Output Summary (Last 24 hours) at 4/9/2021 1153  Last data filed at 4/9/2021 0324  Gross per 24 hour   Intake 240 ml   Output --   Net 240 ml     I/O last 3 completed shifts: In: 240 [P.O.:240]  Out: -     Safety Concerns:     None    Impairments/Disabilities:      None    Nutrition Therapy:  Current Nutrition Therapy:   - Oral Diet:  General    Routes of Feeding: Oral  Liquids: Thin Liquids  Daily Fluid Restriction: no  Last Modified Barium Swallow with Video (Video Swallowing Test): not done    Treatments at the Time of Hospital Discharge:   Respiratory Treatments: Breathing treatments  Oxygen Therapy:  is not on home oxygen therapy.   Ventilator:    - No ventilator support    Rehab Therapies: Physical Therapy and Occupational Therapy  Weight Bearing Status/Restrictions: No weight bearing restirctions  Other Medical Equipment (for information only, NOT a DME order):  ***  Other Treatments: ***    Patient's personal belongings (please select all that are sent with patient):  Glasses, Dentures upper and lower    RN SIGNATURE:  Electronically signed by Farida Rubin RN on 4/15/21 at 12:08 PM EDT    CASE MANAGEMENT/SOCIAL WORK SECTION    Inpatient Status Date: ***    Readmission Risk Assessment Score:  Readmission Risk              Risk of Unplanned Readmission:        27           Discharging to Facility/ [address]  Phone: [phone]  Fax: [phone]      Dialysis Facility (if applicable)   · Name:  · Address:  · Dialysis Schedule:  · Phone:  · Fax:    / signature: Electronically signed by Suzy Benavidez RN on 4/15/21 at 12:00 PM EDT    PHYSICIAN SECTION    Prognosis: Fair    Condition at Discharge: Stable    Rehab Potential (if transferring to Rehab): Good    Recommended Labs or Other Treatments After Discharge:     Physician Certification: I certify the above information and transfer of Milena Alvares  is necessary for the continuing treatment of the diagnosis listed and that she requires Home Care for greater 30 days.      Update Admission H&P: No change in H&P    PHYSICIAN SIGNATURE:  Electronically signed by Yolanda Landers MD on 4/15/21 at 10:57 AM EDT

## 2021-04-09 NOTE — CONSULTS
Office : [phone]     Fax :[phone]       Nephrology Consult Note      Patient's Name: Kelly Whitaker  8:46 AM  4/9/2021    Reason for Consult:  Deny Falcon on CKD , metabolic acidosis       Requesting Physician:  Terrye Litten, MD      Chief Complaint:    Chief Complaint   Patient presents with    Shortness of Breath     since Easter. no cough. increase pain with breating         History of Present Ilness:    Kelly Whitaker is a 72 y.o. 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 She reports cough with no significant production. No fevers or chills. In the ER she had CT chest done which did not reveal any acute pathology  Laboratory work-up noted for increased in creatinine from a baseline of 1.5-2.0. EKG sinus rhythm with no ischemic changes. Troponin 0 0.03. I/O last 3 completed shifts:   In: 240 [P.O.:240]  Out: -     Past Medical History:   Diagnosis Date    Bullous emphysema (HCC)     CKD (chronic kidney disease) stage 3, GFR 30-59 ml/min     Clostridium difficile carrier 01/21/2019    COPD (chronic obstructive pulmonary disease) ([facility]     PFT done 7 years ago   Tina Martin Crohn's disease ([facility]     Crohn's disease    Depression 1/30/2011    pt states resolved as of 3-26-12    DVT (deep venous thrombosis) ([facility]     2012; first ocurrence     Hiatal hernia     Hx of blood clots     Kidney disease     Kidney insufficiency     Osteoporosis     Peripheral neuropathy     neuropathy in legs    Pneumonia     Postmenopausal     LMP in  40's    Pulmonary embolism ([facility]     2012; first ocurrence    Sepsis ([facility]     Syringomyelia ([facility]        Past Surgical History:   Procedure Laterality Date    ABDOMEN SURGERY      BACK SURGERY      shunt to drain CSF    BIOPSY SHOULDER Left 2/27/2019    EXCISION OF LEFT SHOULDER MASS performed by [facility]al MD at [facility]      crainotomy- remove fluid ? V-P SHUNT    BRONCHOSCOPY N/A 9/2/2020    BRONCHOSCOPY ALVEOLAR LAVAGE performed by Dion Burgos MD at [place] [address]      resection X2    COLONOSCOPY  12/5/11    BIOPSY AND POLYPECTOMY    COLONOSCOPY  4-2-2012    and ablation ERBE/APC    SHOULDER SURGERY      L        Family History   Problem Relation Age of Onset    Heart Disease Mother     High Blood Pressure Mother     High Cholesterol Mother     Early Death Mother 36        MI    Heart Disease Father     High Blood Pressure Father     High Cholesterol Father     Stroke Father 79    High Blood Pressure Sister     High Blood Pressure Brother         reports that she quit smoking about 6 years ago. Her smoking use included cigarettes. She has a 7.50 pack-year smoking history. She has never used smokeless tobacco. She reports that she does not drink alcohol or use drugs. Allergies:  Patient has no known allergies.     Current Medications:    sodium chloride flush 0.9 % injection 5-40 mL, 2 times per day  sodium chloride flush 0.9 % injection 5-40 mL, PRN  0.9 % sodium chloride infusion, PRN  pantoprazole (PROTONIX) tablet 40 mg, QAM AC  amitriptyline (ELAVIL) tablet 75 mg, Nightly  amLODIPine (NORVASC) tablet 5 mg, Daily  apixaban (ELIQUIS) tablet 5 mg, BID  potassium chloride (KLOR-CON M) extended release tablet 20 mEq, Daily  sodium chloride flush 0.9 % injection 5-40 mL, 2 times per day  sodium chloride flush 0.9 % injection 5-40 mL, PRN  0.9 % sodium chloride infusion, PRN  promethazine (PHENERGAN) tablet 12.5 mg, Q6H PRN    Or  ondansetron (ZOFRAN) injection 4 mg, Q6H PRN  polyethylene glycol (GLYCOLAX) packet 17 g, Daily PRN  acetaminophen (TYLENOL) tablet 650 mg, Q6H PRN    Or  acetaminophen (TYLENOL) suppository 650 mg, Q6H PRN  methylPREDNISolone sodium (SOLU-MEDROL) injection 40 mg, Q6H    Followed by  [START ON 4/11/2021] predniSONE (DELTASONE) tablet 40 mg, Daily  doxycycline hyclate (VIBRA-TABS) tablet 100 mg, Q12H  cholestyramine (QUESTRAN) packet 4 g, Daily  albuterol sulfate  (90 Base) MCG/ACT inhaler 2 puff, 4x daily    And  ipratropium (ATROVENT HFA) 17 MCG/ACT inhaler 2 puff, 4x daily  albuterol sulfate  (90 Base) MCG/ACT inhaler 2 puff, Q2H PRN        Review of Systems:   14 point ROS obtained but were negative except mentioned in HPI      Physical exam:     Vitals:  BP (!) 110/52   Pulse 109   Temp 97.7 °F (36.5 °C) (Temporal)   Resp 16   Ht 4' 11\" (1.499 m)   Wt 157 lb 8 oz (71.4 kg)   SpO2 96%   BMI 31.81 kg/m²   Constitutional:  OAA X3 NAD  Skin: no rash, turgor wnl  Heent:  eomi, mmm  Neck: no bruits or jvd noted  Cardiovascular:  S1, S2 without m/r/g  Respiratory: CTA B without w/r/r  Abdomen:  +bs, soft, nt, nd  Ext: no lower extremity edema  Psychiatric: mood and affect appropriate  Musculoskeletal:  Rom, muscular strength intact    Labs:  CBC:   Recent Labs     04/08/21  1335 04/09/21  0541   WBC 13.0* 15.5*   HGB 9.3* 9.0*    400     BMP:    Recent Labs     04/08/21  1335 04/09/21  0542    138   K 3.1* 5.3*    111*   CO2 17* 17*   BUN 17 32*   CREATININE 2.0* 2.1*   GLUCOSE 126* 134*     Ca/Mg/Phos:   Recent Labs     04/08/21  1335 04/09/21  0542   CALCIUM 8.4 8.1*   MG 2.00  --      Hepatic:   Recent Labs     04/08/21  1335   AST 13*   ALT 9*   BILITOT <0.2   ALKPHOS 88     Troponin:   Recent Labs     04/08/21  1335   TROPONINI 0.03*     BNP: No results for input(s): BNP in the last 72 hours. Lipids: No results for input(s): CHOL, TRIG, HDL, LDLCALC, LABVLDL in the last 72 hours. ABGs: No results for input(s): PHART, PO2ART, [id number] in the last 72 hours. INR: No results for input(s): INR in the last 72 hours. UA:No results for input(s): Willeen Guanakito, GLUCOSEU, BILIRUBINUR, Joanette Kimberley, BLOODU, PHUR, PROTEINU, UROBILINOGEN, NITRU, LEUKOCYTESUR, LABMICR, URINETYPE in the last 72 hours. Urine Microscopic: No results for input(s): LABCAST, BACTERIA, COMU, HYALCAST, WBCUA, RBCUA, EPIU in the last 72 hours. Urine Culture: No results for input(s): LABURIN in the last 72 hours. Urine Chemistry: No results for input(s): Jeremy Gosselin, PROTEINUR, NAUR in the last 72 hours. IMAGING:  XR CHEST (2 VW)   Final Result   No convincing evidence for acute cardiopulmonary pathology. CT CHEST WO CONTRAST   Preliminary Result   No acute abnormality identified in the chest.      The previously noted left upper lobe consolidation on the CT from 09/14/2020   has resolved with mild atelectasis and/or scarring remaining. Emphysematous changes are seen in the lungs. XR CHEST PORTABLE   Final Result   No acute process. NM LUNG VENT/PERFUSION (VQ)    (Results Pending)           Assessment/Plan :      1. Maris oN CKD Stage 3B  MARIS likely 2/2 hemodynamic changes   Recommend to dose adjust all medications  based on renal functions  Maintain SBP> 90 mmHg   Daily weights   AVOID NSAIDs  Avoid Nephrotoxins  Monitor Intake/Output  Call if significant decrease in urine output        2. HTN. Controlled     3. H/o COPD. Exacerbation now   On steroids     4. Acid- base disorder. Has chronic metabolic acidosis   Give calcium carbonate TID     5.  Electrolytes  Mild hyperkalemia   Low k diet     Check urine studies   Recommend to dose adjust all medications  based on renal functions  Maintain SBP> 90 mmHg   Daily weights   AVOID NSAIDs  Avoid Nephrotoxins  Monitor Intake/Output  Call if significant decrease in urine output           D/w primary team      Thank you for allowing us to participate in care of Anne Sawant         Electronically signed by: Janelle Hernandez MD, 4/9/2021, 8:46 AM      [facility] [address]  Office : [phone]  Fax :[phone]

## 2021-04-09 NOTE — PROGRESS NOTES
Patient alert and oriented through the night. VSS. Placed on 2L of oxygen when sleeping which is her home dose of O2. Ambulated to the bathroom as a SBA. Complains of SOB with ambulation. Lungs diminished.

## 2021-04-09 NOTE — PLAN OF CARE
Problem: Falls - Risk of:  Goal: Will remain free from falls  Description: Will remain free from falls  Outcome: Ongoing  Note: Neema Lazcano remained safe and free of falls during this shift, a bed alarm was used to prevent injury  Goal: Absence of physical injury  Description: Absence of physical injury  Outcome: Ongoing     Problem: Airway Clearance - Ineffective  Goal: Achieve or maintain patent airway  Outcome: Ongoing  Note: Susan's airway remained patent during this shift     Problem: Pain:  Goal: Pain level will decrease  Description: Pain level will decrease  Outcome: Ongoing  Note: Neema Lazcano reported no pain during this shift.    Goal: Control of acute pain  Description: Control of acute pain  Outcome: Ongoing  Goal: Control of chronic pain  Description: Control of chronic pain  Outcome: Ongoing

## 2021-04-09 NOTE — PLAN OF CARE
Problem: Falls - Risk of:  Goal: Will remain free from falls  Description: Will remain free from falls  Outcome: Met This Shift  Goal: Absence of physical injury  Description: Absence of physical injury  Outcome: Met This Shift  Note: Fall precautions in place     Problem: Airway Clearance - Ineffective  Goal: Achieve or maintain patent airway  Outcome: Met This Shift  Note: No o2 requirements during the day     Problem: Pain:  Goal: Pain level will decrease  Description: Pain level will decrease  Outcome: Not Met This Shift  Goal: Control of acute pain  Description: Control of acute pain  Outcome: Not Met This Shift  Goal: Control of chronic pain  Description: Control of chronic pain  Outcome: Not Met This Shift  Note: PRN pain medication given for back pain

## 2021-04-09 NOTE — ED NOTES
Report called to [facility] RN. This RN will catch up patient's medication and take her up to [facility].      Ami Goodson RN  04/08/21 2023

## 2021-04-09 NOTE — CARE COORDINATION
Discharge Planning Assessment    SW discharge planner met with patient/family member to discuss reason for admission, current living situation, and potential needs at the time of discharge. Demographics/Insurance verified: Yes  [place]   Patient from ECF/SW confirmed with: N/A  Living arrangements: Patient lives at home alone  Steps to enter home / inside home: 5-6 to enter  Hx of fall/s: None  Level of function/support: Independent with ADL's, receives assistance for IADL's (grocery shopping and cleaning). PCP: Mali Diaz  Last Visit to PCP: 2020  Pharmacy: [facility]  Medication compliance issues: None  DME: Juan Francisco Corrales  Legal Next of Kin: 2 daughters (live in [facility])    Active with any community resources/agencies/skilled home care: [facility] for home aides and Meals on Wheels. Skilled RN (twice a week) through [employer] (confirmed with [place]). Home O2 (baseline 2L), patient not sure who provider is. Financial issues that could impact healthcare: None    Transportation at time of d/c (Discussed w/pt/family that on the day of discharge home, tentative time of discharge will be between 10am and noon): TBD     discussed and provided facilities of choice if transition to a skilled nursing facility is required a the time of discharge. Tentative discharge plan: Home with resumption of [facility] and [facility]. SW rounded with multi-disciplinary team. Per team, patient is not medically ready for discharge, anticipated discharge in 1-2 days. Patient currently does not have therapy recommendations. Per chart, patient ambulates to bathroom with contact guard assist.    SW will continue to follow for any psychosocial, and discharge needs.     Kyree Carbajal MSW, [facility]

## 2021-04-09 NOTE — PROGRESS NOTES
Shift assessment complete. VSS. A&O X4. PCR covid neg. Patient removed from night oxygen of 2L NC. Currently Room air oxygen saturation 95%. All patient needs met at this time. Will continue to monitor.
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
## 2021-04-10 PROCEDURE — 94761 N-INVAS EAR/PLS OXIMETRY MLT: CPT

## 2021-04-10 PROCEDURE — 99233 SBSQ HOSP IP/OBS HIGH 50: CPT | Performed by: INTERNAL MEDICINE

## 2021-04-10 PROCEDURE — 2700000000 HC OXYGEN THERAPY PER DAY

## 2021-04-10 PROCEDURE — 6370000000 HC RX 637 (ALT 250 FOR IP): Performed by: HOSPITALIST

## 2021-04-10 PROCEDURE — 6370000000 HC RX 637 (ALT 250 FOR IP): Performed by: INTERNAL MEDICINE

## 2021-04-10 PROCEDURE — 2580000003 HC RX 258: Performed by: PHYSICIAN ASSISTANT

## 2021-04-10 PROCEDURE — 99233 SBSQ HOSP IP/OBS HIGH 50: CPT | Performed by: HOSPITALIST

## 2021-04-10 PROCEDURE — 2580000003 HC RX 258: Performed by: INTERNAL MEDICINE

## 2021-04-10 PROCEDURE — 2580000003 HC RX 258: Performed by: HOSPITALIST

## 2021-04-10 PROCEDURE — 2500000003 HC RX 250 WO HCPCS: Performed by: HOSPITALIST

## 2021-04-10 PROCEDURE — 94640 AIRWAY INHALATION TREATMENT: CPT

## 2021-04-10 PROCEDURE — 6360000002 HC RX W HCPCS: Performed by: INTERNAL MEDICINE

## 2021-04-10 PROCEDURE — 1200000000 HC SEMI PRIVATE

## 2021-04-10 RX ADMIN — SODIUM BICARBONATE: 84 INJECTION, SOLUTION INTRAVENOUS at 13:03

## 2021-04-10 RX ADMIN — TRAMADOL HYDROCHLORIDE 50 MG: 50 TABLET, FILM COATED ORAL at 13:03

## 2021-04-10 RX ADMIN — AMITRIPTYLINE HYDROCHLORIDE 75 MG: 25 TABLET, FILM COATED ORAL at 20:02

## 2021-04-10 RX ADMIN — Medication 10 ML: at 21:00

## 2021-04-10 RX ADMIN — IPRATROPIUM BROMIDE AND ALBUTEROL SULFATE 1 AMPULE: .5; 3 SOLUTION RESPIRATORY (INHALATION) at 16:30

## 2021-04-10 RX ADMIN — Medication 40 ML: at 09:43

## 2021-04-10 RX ADMIN — APIXABAN 5 MG: 5 TABLET, FILM COATED ORAL at 20:02

## 2021-04-10 RX ADMIN — ACETAMINOPHEN 650 MG: 325 TABLET ORAL at 16:53

## 2021-04-10 RX ADMIN — METHYLPREDNISOLONE SODIUM SUCCINATE 40 MG: 40 INJECTION, POWDER, FOR SOLUTION INTRAMUSCULAR; INTRAVENOUS at 15:40

## 2021-04-10 RX ADMIN — METHYLPREDNISOLONE SODIUM SUCCINATE 40 MG: 40 INJECTION, POWDER, FOR SOLUTION INTRAMUSCULAR; INTRAVENOUS at 09:43

## 2021-04-10 RX ADMIN — PANTOPRAZOLE SODIUM 40 MG: 40 TABLET, DELAYED RELEASE ORAL at 07:01

## 2021-04-10 RX ADMIN — ONDANSETRON 4 MG: 2 INJECTION INTRAMUSCULAR; INTRAVENOUS at 11:38

## 2021-04-10 RX ADMIN — IPRATROPIUM BROMIDE AND ALBUTEROL SULFATE 1 AMPULE: .5; 3 SOLUTION RESPIRATORY (INHALATION) at 20:30

## 2021-04-10 RX ADMIN — Medication 10 ML: at 09:43

## 2021-04-10 RX ADMIN — METHYLPREDNISOLONE SODIUM SUCCINATE 40 MG: 40 INJECTION, POWDER, FOR SOLUTION INTRAMUSCULAR; INTRAVENOUS at 04:15

## 2021-04-10 RX ADMIN — SODIUM ZIRCONIUM CYCLOSILICATE 5 G: 5 POWDER, FOR SUSPENSION ORAL at 13:03

## 2021-04-10 RX ADMIN — APIXABAN 5 MG: 5 TABLET, FILM COATED ORAL at 09:43

## 2021-04-10 RX ADMIN — AMLODIPINE BESYLATE 5 MG: 5 TABLET ORAL at 09:43

## 2021-04-10 RX ADMIN — IPRATROPIUM BROMIDE AND ALBUTEROL SULFATE 1 AMPULE: .5; 3 SOLUTION RESPIRATORY (INHALATION) at 09:45

## 2021-04-10 RX ADMIN — CHOLESTYRAMINE 4 G: 4 POWDER, FOR SUSPENSION ORAL at 09:43

## 2021-04-10 ASSESSMENT — PAIN SCALES - GENERAL
PAINLEVEL_OUTOF10: 0
PAINLEVEL_OUTOF10: 8
PAINLEVEL_OUTOF10: 4

## 2021-04-10 ASSESSMENT — PAIN DESCRIPTION - LOCATION: LOCATION: BACK

## 2021-04-10 NOTE — PROGRESS NOTES
Assessment completed, see flowsheet for details. Respiratory: Lung sounds show some mild inspirational wheezing bilaterally. GI: Bowel sounds are present in all quadrants. No N/V reported. : Within defined limits. Neuro: Pt A/O x4     Peripheral vascular: Pulses are palpable in all extremities. Skin: No evidence of new skin breakdown assessed during shift. /65   Pulse 102   Temp 97.2 °F (36.2 °C) (Temporal)   Resp 20   Ht 4' 11\" (1.499 m)   Wt 157 lb 8 oz (71.4 kg)   SpO2 95%   BMI 31.81 kg/m²      Call light within reach. Will continue to monitor.

## 2021-04-10 NOTE — PROGRESS NOTES
[facility] PROGRESS NOTE    4/10/2021 12:51 PM        Name: Temi Ortega . Admitted: 4/8/2021  Primary Care Provider: Jorgito Carlson MD (Tel: [phone])                        Subjective:  . No acute events overnight. Resting well. Pain control. Diet ok.    Labs reviewed  Still with shortness of breath    Reviewed interval ancillary notes    Current Medications  sodium zirconium cyclosilicate (LOKELMA) oral suspension 5 g, Once  sodium bicarbonate 75 mEq in dextrose 5 % 500 mL infusion, Continuous  sodium chloride flush 0.9 % injection 10 mL, PRN  ipratropium-albuterol (DUONEB) nebulizer solution 1 ampule, 4x daily  traMADol (ULTRAM) tablet 50 mg, Q12H PRN  sodium chloride flush 0.9 % injection 5-40 mL, 2 times per day  sodium chloride flush 0.9 % injection 5-40 mL, PRN  0.9 % sodium chloride infusion, PRN  pantoprazole (PROTONIX) tablet 40 mg, QAM AC  amitriptyline (ELAVIL) tablet 75 mg, Nightly  amLODIPine (NORVASC) tablet 5 mg, Daily  apixaban (ELIQUIS) tablet 5 mg, BID  sodium chloride flush 0.9 % injection 5-40 mL, 2 times per day  sodium chloride flush 0.9 % injection 5-40 mL, PRN  0.9 % sodium chloride infusion, PRN  promethazine (PHENERGAN) tablet 12.5 mg, Q6H PRN    Or  ondansetron (ZOFRAN) injection 4 mg, Q6H PRN  polyethylene glycol (GLYCOLAX) packet 17 g, Daily PRN  acetaminophen (TYLENOL) tablet 650 mg, Q6H PRN    Or  acetaminophen (TYLENOL) suppository 650 mg, Q6H PRN  methylPREDNISolone sodium (SOLU-MEDROL) injection 40 mg, Q6H    Followed by  Rekha Parks ON 4/11/2021] predniSONE (DELTASONE) tablet 40 mg, Daily  cholestyramine (QUESTRAN) packet 4 g, Daily  albuterol sulfate  (90 Base) MCG/ACT inhaler 2 puff, Q2H PRN        Objective:  /73   Pulse 98   Temp 98.2 °F (36.8 °C) (Temporal)   Resp 16   Ht 4' 11\" (1.499 m)   Wt 157 lb 9.6 oz

## 2021-04-10 NOTE — PROGRESS NOTES
P Pulmonary and Critical Care  Progress note      Reason for Consult: COPD exacerbation, chronic hypoxemic respiratory failure  Requesting Physician: Dr. Yina Tyler:   Gene Standard / HPI:                The patient is a 72 y.o. 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 01/21/2019    COPD (chronic obstructive pulmonary disease) ([address]     PFT done 7 years ago   Murrel Neither Crohn's disease ([address]     Crohn's disease    Depression 1/30/2011    pt states resolved as of 3-26-12    DVT (deep venous thrombosis) ([address]     2012; first ocurrence     Hiatal hernia     Hx of blood clots     Kidney disease     Kidney insufficiency     Osteoporosis     Peripheral neuropathy     neuropathy in legs    Pneumonia     Postmenopausal     LMP in  40's    Pulmonary embolism ([address]     2012; first ocurrence    Sepsis ([address]     Syringomyelia ([address]      Interval history: Patient is still complaining of feeling short of breath. Her work-up to date has been negative. She remains on IV Solu-Medrol. Doubt steroid dosing is the issue at this point      Past Surgical History:        Procedure Laterality Date    ABDOMEN SURGERY      BACK SURGERY      shunt to drain CSF    BIOPSY SHOULDER Left 2/27/2019    EXCISION OF LEFT SHOULDER MASS performed by Moni Walker MD at [address] [place] [address]      crainotomy- remove fluid ? V-P SHUNT    BRONCHOSCOPY N/A 9/2/2020    BRONCHOSCOPY ALVEOLAR LAVAGE performed by Marko Price MD at [facility]      resection X2    COLONOSCOPY  12/5/11    BIOPSY AND POLYPECTOMY    COLONOSCOPY  4-2-2012    and ablation ERBE/APC    SHOULDER SURGERY      L      Current Medications:    Current Facility-Administered Medications: sodium chloride flush 0.9 % injection 10 mL, 10 mL, Intravenous, Mother     Early Death Mother 36        MI    Heart Disease Father     High Blood Pressure Father     High Cholesterol Father     Stroke Father 79    High Blood Pressure Sister     High Blood Pressure Brother      REVIEW OF SYSTEMS:    CONSTITUTIONAL:  negative for fevers, chills, diaphoresis, activity change, appetite change, fatigue, night sweats and unexpected weight change. EYES:  negative for blurred vision, eye discharge, visual disturbance and icterus  HEENT:  negative for hearing loss, tinnitus, ear drainage, sinus pressure, nasal congestion, epistaxis and snoring  RESPIRATORY:  See HPI  CARDIOVASCULAR:  negative for chest pain, palpitations, exertional chest pressure/discomfort, edema, syncope  GASTROINTESTINAL:  negative for nausea, vomiting, diarrhea, constipation, blood in stool and abdominal pain  GENITOURINARY:  negative for frequency, dysuria, urinary incontinence, decreased urine volume, and hematuria  HEMATOLOGIC/LYMPHATIC:  negative for easy bruising, bleeding and lymphadenopathy  ALLERGIC/IMMUNOLOGIC:  negative for recurrent infections, angioedema, anaphylaxis and drug reactions  ENDOCRINE:  negative for weight changes and diabetic symptoms including polyuria, polydipsia and polyphagia  MUSCULOSKELETAL:  negative for  pain, joint swelling, decreased range of motion and muscle weakness  NEUROLOGICAL:  negative for headaches, slurred speech, unilateral weakness  PSYCHIATRIC/BEHAVIORAL: negative for hallucinations, behavioral problems, confusion and agitation.      Objective:   PHYSICAL EXAM:      VITALS:  /67   Pulse 89   Temp 97 °F (36.1 °C) (Temporal)   Resp 12   Ht 4' 11\" (1.499 m)   Wt 157 lb 9.6 oz (71.5 kg)   SpO2 99%   BMI 31.83 kg/m²      24HR INTAKE/OUTPUT:      Intake/Output Summary (Last 24 hours) at 4/10/2021 0904  Last data filed at 4/10/2021 0413  Gross per 24 hour   Intake --   Output 400 ml   Net -400 ml     CONSTITUTIONAL:  awake, alert, cooperative, no apparent distress, and appears stated age  NECK:  Supple, symmetrical, trachea midline, no adenopathy, thyroid symmetric, not enlarged and no tenderness, skin normal  LUNGS:  no increased work of breathing and clear to auscultation. No accessory muscle use  CARDIOVASCULAR: S1 and S2, no edema and no JVD  ABDOMEN:  normal bowel sounds, non-distended and no masses palpated, and no tenderness to palpation. No hepatospleenomegaly  LYMPHADENOPATHY:  no axillary or supraclavicular adenopathy. No cervical adnenopathy  PSYCHIATRIC: Oriented to person place and time. No obvious depression or anxiety. MUSCULOSKELETAL: No obvious misalignment or effusion of the joints. No clubbing, cyanosis of the digits. SKIN:  normal skin color, texture, turgor and no redness, warmth, or swelling. No palpable nodules    DATA:    Old records have been reviewed    CBC:  Recent Labs     04/08/21  1335 04/09/21  0541   WBC 13.0* 15.5*   RBC 4.52 4.41   HGB 9.3* 9.0*   HCT 30.0* 29.7*    400   MCV 66.4* 67.2*   MCH 20.6* 20.3*   MCHC 31.0 30.2*   RDW 16.6* 16.7*   BANDSPCT  --  3      BMP:  Recent Labs     04/08/21  1335 04/09/21  0542    138   K 3.1* 5.3*    111*   CO2 17* 17*   BUN 17 32*   CREATININE 2.0* 2.1*   CALCIUM 8.4 8.1*   GLUCOSE 126* 134*      ABG:  No results for input(s): PHART, CTT0WDY, PO2ART, BYE4FQK, X6IXDVYF, BEART in the last 72 hours. Procalcitonin  Recent Labs     04/08/21  1335   PROCAL 0.15       Lab Results   Component Value Date    BNP <15 08/06/2011     Lab Results   Component Value Date    CKTOTAL 29 08/07/2011    CKMB 0.88 08/07/2011    TROPONINI <0.01 04/09/2021           Radiology Review:  All pertinent images / reports were reviewed as a part of this visit. Assessment:     1. COPD exacerbation  2. Acute on chronic kidney disease  3.  Chronic hypoxemic respiratory failure      Plan:     I have reviewed laboratories, medical records and images for this visit  CT imaging did not reveal evidence of acute infiltrate. Because of her elevated creatinine, patient had V/Q scan to rule out PE. No convincing evidence of pulmonary embolism on V/Q scan. Her presentation appears to be related to acute exacerbation of COPD  Procalcitonin is normal and no infiltrate  No evidence of acute pulmonary infection  Echo is pending  May need a cardiac evaluation if we cannot define another pulmonary cause of her shortness of breath.

## 2021-04-10 NOTE — PROGRESS NOTES
Office : [phone]     Fax :[phone]       Nephrology  Note      Patient's Name: Iron Simon  9:16 AM  4/10/2021    Reason for Consult:  Art Salamanca on CKD , metabolic acidosis       Requesting Physician:  Janelle Sanchez MD      Chief Complaint:    Chief Complaint   Patient presents with    Shortness of Breath     since Easter. no cough. increase pain with breating         History of Present Ilness:    Iron Simon is a 72 y.o. 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 She reports cough with no significant production. No fevers or chills. In the ER she had CT chest done which did not reveal any acute pathology  Laboratory work-up noted for increased in creatinine from a baseline of 1.5-2.0. EKG sinus rhythm with no ischemic changes. Troponin 0 0.03.     INTERVAL HISTORY    Feels better  Shortness of breath: No   UOP: Fair  Creat: trending down  No diarrhea     I/O last 3 completed shifts:  In: -   Out: 400 [Urine:400]    Past Medical History:   Diagnosis Date    Bullous emphysema (HCC)     CKD (chronic kidney disease) stage 3, GFR 30-59 ml/min     Clostridium difficile carrier 01/21/2019    [facility]ch[facility]) [facility]     PFT done 7 years ago    Crohn's disease ([facility]     Crohn's disease    Depression 1/30/2011    pt states resolved as of 3-26-12    DVT (deep venous thrombosis) ([facility]     2012; first ocurrence     Hiatal hernia     Hx of blood clots     Kidney disease     Kidney insufficiency     Osteoporosis     Peripheral neuropathy     neuropathy in legs    Pneumonia     Postmenopausal     LMP in  40's    [facility]     2012; first ocurrence    Sepsis [facility]     Syringomyelia ([facility]        Past Surgical History:   Procedure Laterality Date    ABDOMEN SURGERY      BACK SURGERY      shunt to drain CSF    BIOPSY SHOULDER Left 2/27/2019    EXCISION OF LEFT SHOULDER MASS performed by Yrn Collazo MD at [address]      crainotomy- remove fluid ? V-P SHUNT    BRONCHOSCOPY N/A 9/2/2020    BRONCHOSCOPY ALVEOLAR LAVAGE performed by Luann Raya MD at [facility] 91      resection X2    COLONOSCOPY  12/5/11    BIOPSY AND POLYPECTOMY    COLONOSCOPY  4-2-2012    and ablation ERBE/APC    SHOULDER SURGERY      L        Family History   Problem Relation Age of Onset    Heart Disease Mother     High Blood Pressure Mother     High Cholesterol Mother     Early Death Mother 36        MI    Heart Disease Father     High Blood Pressure Father     High Cholesterol Father     Stroke Father 79    High Blood Pressure Sister     High Blood Pressure Brother         reports that she quit smoking about 6 years ago. Her smoking use included cigarettes. She has a 7.50 pack-year smoking history. She has never used smokeless tobacco. She reports that she does not drink alcohol or use drugs. Allergies:  Patient has no known allergies.     Current Medications:    sodium chloride flush 0.9 % injection 10 mL, PRN  ipratropium-albuterol (DUONEB) nebulizer solution 1 ampule, 4x daily  traMADol (ULTRAM) tablet 50 mg, Q12H PRN  sodium chloride flush 0.9 % injection 5-40 mL, 2 times per day  sodium chloride flush 0.9 % injection 5-40 mL, PRN  0.9 % sodium chloride infusion, PRN  pantoprazole (PROTONIX) tablet 40 mg, QAM AC  amitriptyline (ELAVIL) tablet 75 mg, Nightly  amLODIPine (NORVASC) tablet 5 mg, Daily  apixaban (ELIQUIS) tablet 5 mg, BID  sodium chloride flush 0.9 % injection 5-40 mL, 2 times per day  sodium chloride flush 0.9 % injection 5-40 mL, PRN  0.9 % sodium chloride infusion, PRN  promethazine (PHENERGAN) tablet 12.5 mg, Q6H PRN    Or  ondansetron (ZOFRAN) injection 4 mg, Q6H PRN  polyethylene glycol (GLYCOLAX) packet 17 g, Daily PRN  acetaminophen (TYLENOL) tablet 650 mg, Q6H PRN    Or  acetaminophen (TYLENOL) suppository 650 mg, Q6H PRN  methylPREDNISolone sodium (SOLU-MEDROL) injection 40 mg, Q6H    Followed by  Derl Peabody ON 4/11/2021] predniSONE (DELTASONE) tablet 40 mg, Daily  cholestyramine (QUESTRAN) packet 4 g, Daily  albuterol sulfate  (90 Base) MCG/ACT inhaler 2 puff, Q2H PRN        Review of Systems:   14 point ROS obtained but were negative except mentioned in HPI      Physical exam:     Vitals:  /67   Pulse 89   Temp 97 °F (36.1 °C) (Temporal)   Resp 12   Ht 4' 11\" (1.499 m)   Wt 157 lb 9.6 oz (71.5 kg)   SpO2 99%   BMI 31.83 kg/m²   Constitutional:  OAA X3 NAD  Skin: no rash, turgor wnl  Heent:  eomi, mmm  Neck: no bruits or jvd noted  Cardiovascular:  S1, S2 without m/r/g  Respiratory: CTA B without w/r/r  Abdomen:  +bs, soft, nt, nd  Ext: no lower extremity edema      Labs:  CBC:   Recent Labs     04/08/21  1335 04/09/21  0541   WBC 13.0* 15.5*   HGB 9.3* 9.0*    400     BMP:    Recent Labs     04/08/21  1335 04/09/21  0542    138   K 3.1* 5.3*    111*   CO2 17* 17*   BUN 17 32*   CREATININE 2.0* 2.1*   GLUCOSE 126* 134*     Ca/Mg/Phos:   Recent Labs     04/08/21  1335 04/09/21  0542   CALCIUM 8.4 8.1*   MG 2.00  --      Hepatic:   Recent Labs     04/08/21  1335   AST 13*   ALT 9*   BILITOT <0.2   ALKPHOS 88     Troponin:   Recent Labs     04/08/21  1335 04/09/21  1034   TROPONINI 0.03* <0.01     BNP: No results for input(s): BNP in the last 72 hours.   Lipids: No results for input(s): CHOL, TRIG, HDL, LDLCALC, LABVLDL in the last 72 hours. ABGs: No results for input(s): PHART, PO2ART, NNB6FSQ in the last 72 hours. INR: No results for input(s): INR in the last 72 hours. UA:No results for input(s): [place], GLUCOSEU, BILIRUBINUR, [place]o Rc, BLOODU, PHUR, PROTEINU, UROBILINOGEN, NITRU, LEUKOCYTESUR, LABMICR, URINETYPE in the last 72 hours. Urine Microscopic: No results for input(s): LABCAST, BACTERIA, COMU, HYALCAST, WBCUA, RBCUA, EPIU in the last 72 hours. Urine Culture: No results for input(s): LABURIN in the last 72 hours. Urine Chemistry: No results for input(s): Wilhemena Cliche, PROTEINUR, NAUR in the last 72 hours. IMAGING:  NM LUNG VENT/PERFUSION (VQ)   Final Result   Very low probability for pulmonary embolism. XR CHEST (2 VW)   Final Result   No convincing evidence for acute cardiopulmonary pathology. CT CHEST WO CONTRAST   Preliminary Result   No acute abnormality identified in the chest.      The previously noted left upper lobe consolidation on the CT from 09/14/2020   has resolved with mild atelectasis and/or scarring remaining. Emphysematous changes are seen in the lungs. XR CHEST PORTABLE   Final Result   No acute process. Assessment/Plan :      1. Maris oN CKD Stage 3B  MARIS likely 2/2 hemodynamic changes         2. HTN. Controlled     3. H/o COPD. Exacerbation now   On steroids     4. Acid- base disorder. Has chronic metabolic acidosis   Give calcium carbonate TID     5.  Electrolytes  Mild hyperkalemia   Low k diet   Lokelma  Sod bicarb    Check urine studies   Recommend to dose adjust all medications  based on renal functions  Maintain SBP> 90 mmHg   Daily weights   AVOID NSAIDs  Avoid Nephrotoxins  Monitor Intake/Output  Call if significant decrease in urine output                 Thank you for allowing us to participate in care of Vikki Arciniega         Electronically signed by: Logan Garland MD, 4/10/2021,

## 2021-04-11 ENCOUNTER — APPOINTMENT (OUTPATIENT)
Dept: GENERAL RADIOLOGY | Age: 71
DRG: 140 | End: 2021-04-11
Payer: MEDICARE

## 2021-04-11 LAB
ANION GAP SERPL CALCULATED.3IONS-SCNC: 11 MMOL/L (ref 3–16)
BILIRUBIN URINE: NEGATIVE
BLOOD, URINE: NEGATIVE
BUN BLDV-MCNC: 40 MG/DL (ref 7–20)
CALCIUM SERPL-MCNC: 8.7 MG/DL (ref 8.3–10.6)
CHLORIDE BLD-SCNC: 107 MMOL/L (ref 99–110)
CLARITY: CLEAR
CO2: 19 MMOL/L (ref 21–32)
COLOR: YELLOW
CREAT SERPL-MCNC: 2.1 MG/DL (ref 0.6–1.2)
EPITHELIAL CELLS, UA: 1 /HPF (ref 0–5)
GFR AFRICAN AMERICAN: 28
GFR NON-AFRICAN AMERICAN: 23
GLUCOSE BLD-MCNC: 113 MG/DL (ref 70–99)
GLUCOSE URINE: NEGATIVE MG/DL
HYALINE CASTS: 2 /LPF (ref 0–8)
KETONES, URINE: NEGATIVE MG/DL
LEUKOCYTE ESTERASE, URINE: NEGATIVE
MICROSCOPIC EXAMINATION: NORMAL
NITRITE, URINE: NEGATIVE
PH UA: 5 (ref 5–8)
POTASSIUM REFLEX MAGNESIUM: 5.2 MMOL/L (ref 3.5–5.1)
PROTEIN UA: NEGATIVE MG/DL
RBC UA: 2 /HPF (ref 0–4)
SODIUM BLD-SCNC: 137 MMOL/L (ref 136–145)
SPECIFIC GRAVITY UA: 1.01 (ref 1–1.03)
TROPONIN: 0.01 NG/ML
TROPONIN: 0.02 NG/ML
TROPONIN: <0.01 NG/ML
URINE TYPE: NORMAL
UROBILINOGEN, URINE: 0.2 E.U./DL
WBC UA: 1 /HPF (ref 0–5)

## 2021-04-11 PROCEDURE — 71045 X-RAY EXAM CHEST 1 VIEW: CPT

## 2021-04-11 PROCEDURE — 80048 BASIC METABOLIC PNL TOTAL CA: CPT

## 2021-04-11 PROCEDURE — 6370000000 HC RX 637 (ALT 250 FOR IP): Performed by: HOSPITALIST

## 2021-04-11 PROCEDURE — 6370000000 HC RX 637 (ALT 250 FOR IP): Performed by: INTERNAL MEDICINE

## 2021-04-11 PROCEDURE — 2580000003 HC RX 258: Performed by: INTERNAL MEDICINE

## 2021-04-11 PROCEDURE — 84484 ASSAY OF TROPONIN QUANT: CPT

## 2021-04-11 PROCEDURE — 84540 ASSAY OF URINE/UREA-N: CPT

## 2021-04-11 PROCEDURE — 93005 ELECTROCARDIOGRAM TRACING: CPT | Performed by: INTERNAL MEDICINE

## 2021-04-11 PROCEDURE — 94640 AIRWAY INHALATION TREATMENT: CPT

## 2021-04-11 PROCEDURE — 2700000000 HC OXYGEN THERAPY PER DAY

## 2021-04-11 PROCEDURE — 1200000000 HC SEMI PRIVATE

## 2021-04-11 PROCEDURE — 81001 URINALYSIS AUTO W/SCOPE: CPT

## 2021-04-11 PROCEDURE — 99233 SBSQ HOSP IP/OBS HIGH 50: CPT | Performed by: INTERNAL MEDICINE

## 2021-04-11 PROCEDURE — 36415 COLL VENOUS BLD VENIPUNCTURE: CPT

## 2021-04-11 PROCEDURE — 99233 SBSQ HOSP IP/OBS HIGH 50: CPT | Performed by: HOSPITALIST

## 2021-04-11 PROCEDURE — 82570 ASSAY OF URINE CREATININE: CPT

## 2021-04-11 PROCEDURE — 2580000003 HC RX 258: Performed by: PHYSICIAN ASSISTANT

## 2021-04-11 PROCEDURE — 94761 N-INVAS EAR/PLS OXIMETRY MLT: CPT

## 2021-04-11 RX ORDER — BUTALBITAL, ACETAMINOPHEN AND CAFFEINE 50; 325; 40 MG/1; MG/1; MG/1
1 TABLET ORAL EVERY 4 HOURS PRN
Status: DISCONTINUED | OUTPATIENT
Start: 2021-04-11 | End: 2021-04-15 | Stop reason: HOSPADM

## 2021-04-11 RX ORDER — MAGNESIUM HYDROXIDE/ALUMINUM HYDROXICE/SIMETHICONE 120; 1200; 1200 MG/30ML; MG/30ML; MG/30ML
30 SUSPENSION ORAL EVERY 6 HOURS PRN
Status: DISCONTINUED | OUTPATIENT
Start: 2021-04-11 | End: 2021-04-15 | Stop reason: HOSPADM

## 2021-04-11 RX ORDER — SODIUM BICARBONATE 650 MG/1
1300 TABLET ORAL 2 TIMES DAILY
Status: COMPLETED | OUTPATIENT
Start: 2021-04-11 | End: 2021-04-11

## 2021-04-11 RX ADMIN — CHOLESTYRAMINE 4 G: 4 POWDER, FOR SUSPENSION ORAL at 13:27

## 2021-04-11 RX ADMIN — SODIUM BICARBONATE 1300 MG: 650 TABLET ORAL at 13:28

## 2021-04-11 RX ADMIN — IPRATROPIUM BROMIDE AND ALBUTEROL SULFATE 1 AMPULE: .5; 3 SOLUTION RESPIRATORY (INHALATION) at 08:03

## 2021-04-11 RX ADMIN — IPRATROPIUM BROMIDE AND ALBUTEROL SULFATE 1 AMPULE: .5; 3 SOLUTION RESPIRATORY (INHALATION) at 20:54

## 2021-04-11 RX ADMIN — Medication 10 ML: at 09:30

## 2021-04-11 RX ADMIN — ALUMINUM HYDROXIDE, MAGNESIUM HYDROXIDE, AND SIMETHICONE 30 ML: 200; 200; 20 SUSPENSION ORAL at 13:28

## 2021-04-11 RX ADMIN — PREDNISONE 40 MG: 20 TABLET ORAL at 09:21

## 2021-04-11 RX ADMIN — Medication 10 ML: at 20:32

## 2021-04-11 RX ADMIN — IPRATROPIUM BROMIDE AND ALBUTEROL SULFATE 1 AMPULE: .5; 3 SOLUTION RESPIRATORY (INHALATION) at 15:17

## 2021-04-11 RX ADMIN — ACETAMINOPHEN 650 MG: 325 TABLET ORAL at 04:42

## 2021-04-11 RX ADMIN — SODIUM BICARBONATE 1300 MG: 650 TABLET ORAL at 20:29

## 2021-04-11 RX ADMIN — TRAMADOL HYDROCHLORIDE 50 MG: 50 TABLET, FILM COATED ORAL at 20:28

## 2021-04-11 RX ADMIN — AMITRIPTYLINE HYDROCHLORIDE 75 MG: 25 TABLET, FILM COATED ORAL at 20:28

## 2021-04-11 RX ADMIN — APIXABAN 5 MG: 5 TABLET, FILM COATED ORAL at 20:29

## 2021-04-11 RX ADMIN — SODIUM ZIRCONIUM CYCLOSILICATE 10 G: 5 POWDER, FOR SUSPENSION ORAL at 12:00

## 2021-04-11 RX ADMIN — AMLODIPINE BESYLATE 5 MG: 5 TABLET ORAL at 09:21

## 2021-04-11 RX ADMIN — APIXABAN 5 MG: 5 TABLET, FILM COATED ORAL at 09:21

## 2021-04-11 RX ADMIN — PANTOPRAZOLE SODIUM 40 MG: 40 TABLET, DELAYED RELEASE ORAL at 06:12

## 2021-04-11 RX ADMIN — IPRATROPIUM BROMIDE AND ALBUTEROL SULFATE 1 AMPULE: .5; 3 SOLUTION RESPIRATORY (INHALATION) at 11:24

## 2021-04-11 ASSESSMENT — PAIN DESCRIPTION - DESCRIPTORS: DESCRIPTORS: ACHING;SORE

## 2021-04-11 ASSESSMENT — PAIN DESCRIPTION - PAIN TYPE: TYPE: ACUTE PAIN

## 2021-04-11 ASSESSMENT — PAIN DESCRIPTION - LOCATION: LOCATION: BACK

## 2021-04-11 ASSESSMENT — PAIN SCALES - GENERAL: PAINLEVEL_OUTOF10: 4

## 2021-04-11 NOTE — PROGRESS NOTES
[facility] PROGRESS NOTE    4/11/2021 8:44 AM        Name: Wilma Hopper . Admitted: 4/8/2021  Primary Care Provider: Carlos Figueroa MD (Tel: [phone])                        Subjective:  . No acute events overnight. Resting well. Pain control. Diet ok.    Labs reviewed  Still with shortness of breath    Reviewed interval ancillary notes    Current Medications  butalbital-acetaminophen-caffeine (FIORICET, ESGIC) per tablet 1 tablet, Q4H PRN  aluminum & magnesium hydroxide-simethicone (MAALOX) 200-200-20 MG/5ML suspension 30 mL, Q6H PRN  sodium chloride flush 0.9 % injection 10 mL, PRN  ipratropium-albuterol (DUONEB) nebulizer solution 1 ampule, 4x daily  traMADol (ULTRAM) tablet 50 mg, Q12H PRN  sodium chloride flush 0.9 % injection 5-40 mL, 2 times per day  sodium chloride flush 0.9 % injection 5-40 mL, PRN  0.9 % sodium chloride infusion, PRN  pantoprazole (PROTONIX) tablet 40 mg, QAM AC  amitriptyline (ELAVIL) tablet 75 mg, Nightly  amLODIPine (NORVASC) tablet 5 mg, Daily  apixaban (ELIQUIS) tablet 5 mg, BID  sodium chloride flush 0.9 % injection 5-40 mL, 2 times per day  sodium chloride flush 0.9 % injection 5-40 mL, PRN  0.9 % sodium chloride infusion, PRN  promethazine (PHENERGAN) tablet 12.5 mg, Q6H PRN    Or  ondansetron (ZOFRAN) injection 4 mg, Q6H PRN  polyethylene glycol (GLYCOLAX) packet 17 g, Daily PRN  acetaminophen (TYLENOL) tablet 650 mg, Q6H PRN  predniSONE (DELTASONE) tablet 40 mg, Daily  cholestyramine (QUESTRAN) packet 4 g, Daily  albuterol sulfate  (90 Base) MCG/ACT inhaler 2 puff, Q2H PRN        Objective:  BP (!) 157/79   Pulse 105   Temp 98.4 °F (36.9 °C) (Temporal)   Resp 16   Ht 4' 11\" (1.499 m)   Wt 156 lb 9.6 oz (71 kg)   SpO2 97%   BMI 31.63 kg/m²     Intake/Output Summary (Last 24 hours) at 4/11/2021 0844  Last data filed at 4/10/2021 1730  Gross per 24 hour   Intake 600 ml   Output 550 ml   Net 50 ml      Wt Readings from Last 3 Encounters:   04/11/21 156 lb 9.6 oz (71 kg)   02/23/21 149 lb (67.6 kg)   12/17/20 141 lb (64 kg)       General appearance:  Appears comfortable  Eyes: Sclera clear. Pupils equal.  ENT: Moist oral mucosa. Trachea midline, no adenopathy. Cardiovascular: Regular rhythm, normal S1, S2. No murmur. No edema in lower extremities  Respiratory: Not using accessory muscles. Good inspiratory effort. Clear to auscultation bilaterally, no wheeze or crackles. GI: Abdomen soft, no tenderness, not distended, normal bowel sounds  Musculoskeletal: No cyanosis in digits, neck supple  Neurology: CN 2-12 grossly intact. No speech or motor deficits  Psych: Normal affect.  Alert and oriented in time, place and person  Skin: Warm, dry, normal turgor    Labs and Tests:  CBC:   Recent Labs     04/08/21  1335 04/09/21  0541   WBC 13.0* 15.5*   HGB 9.3* 9.0*    400     BMP:    Recent Labs     04/08/21  1335 04/09/21  0542 04/11/21  0514    138 137   K 3.1* 5.3* 5.2*    111* 107   CO2 17* 17* 19*   BUN 17 32* 40*   CREATININE 2.0* 2.1* 2.1*   GLUCOSE 126* 134* 113*     Hepatic:   Recent Labs     04/08/21  1335   AST 13*   ALT 9*   BILITOT <0.2   ALKPHOS 88       Discussed care with family and patient             Spent 30  minutes with patient and family at bedside and on unit reviewing medical records and labs, spent greater than 50% time counseling patient and family on diagnosis and plan   Problem List  Principal Problem:    Acute exacerbation of chronic obstructive pulmonary disease (COPD) ([address]  Active Problems:    Chronic hypoxemic respiratory failure (HCC)    Gastroesophageal reflux disease without esophagitis    History of pulmonary embolism    SOB (shortness of breath)    Long term (current) use of systemic steroids    Essential hypertension    Acute renal failure superimposed on stage 3 chronic kidney disease ([address]  Resolved Problems:    * No resolved hospital problems. *       Assessment & Plan:   1. Acute COPD exacerbation  2. -Patient still having significant shortness of breath unclear etiology no signs of any pneumonia. Echo has been ordered to rule out cardiac cause.   Continue-monitor    Chest pain  - this is likely secondary to her hiatal hernia  -Troponins ordered we will repeat checks x-ray    Acute on chronic kidney disease    Chronic hypoxic respiratory failure      Diet: DIET GENERAL;  Code:Full Code  DVT PPX lovenox       Elke Hernandez MD   4/11/2021 8:44 AM

## 2021-04-11 NOTE — PROGRESS NOTES
Pt in room resting quietly in bed. Pt clam and cooperative with staff and care, morning assessment done. Pt voiced no complaints at this time.    Temp: 98.2 °F (36.8 °C)  Temp Source: Temporal  Pulse: 102  Resp: 16  BP: 121/69  SpO2: 97 %

## 2021-04-11 NOTE — PROGRESS NOTES
P Pulmonary and Critical Care  Progress note      Reason for Consult: COPD exacerbation, chronic hypoxemic respiratory failure  Requesting Physician: Dr. [place], [place]:   [address] [facility] / HPI:                The patient is a 72 y.o. 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 01/21/2019    COPD (chronic obstructive pulmonary disease) ([address]     PFT done 7 years ago   Gaby Albarran Crohn's disease ([address]     Crohn's disease    Depression 1/30/2011    pt states resolved as of 3-26-12    DVT (deep venous thrombosis) ([address]     2012; first ocurrence     Hiatal hernia     Hx of blood clots     Kidney disease     Kidney insufficiency     Osteoporosis     Peripheral neuropathy     neuropathy in legs    Pneumonia     Postmenopausal     LMP in  40's    Pulmonary embolism ([address]     2012; first ocurrence    Sepsis ([address]     Syringomyelia ([address]      Interval history: This morning she is complaining of chest pain which she describes mostly as a pressure in the retrosternal region. She is not short of breath. She has also complaining of a headache. Past Surgical History:        Procedure Laterality Date    ABDOMEN SURGERY      BACK SURGERY      shunt to drain CSF    BIOPSY SHOULDER Left 2/27/2019    EXCISION OF LEFT SHOULDER MASS performed by Suzy Tate MD at [address] [place] [address]      crainotomy- remove fluid ? V-P SHUNT    BRONCHOSCOPY N/A 9/2/2020    BRONCHOSCOPY ALVEOLAR LAVAGE performed by Helena Watson MD at [place] [address]      resection X2    COLONOSCOPY  12/5/11    BIOPSY AND POLYPECTOMY    COLONOSCOPY  4-2-2012    and ablation ERBE/APC    SHOULDER SURGERY      L      Current Medications:    Current Facility-Administered Medications: sodium chloride flush 0.9 % injection 10 mL, 10 mL, Intravenous, PRN  ipratropium-albuterol (DUONEB) nebulizer solution 1 ampule, 1 ampule, Inhalation, 4x daily  traMADol (ULTRAM) tablet 50 mg, 50 mg, Oral, Q12H PRN  sodium chloride flush 0.9 % injection 5-40 mL, 5-40 mL, Intravenous, 2 times per day  sodium chloride flush 0.9 % injection 5-40 mL, 5-40 mL, Intravenous, PRN  0.9 % sodium chloride infusion, 25 mL, Intravenous, PRN  pantoprazole (PROTONIX) tablet 40 mg, 40 mg, Oral, QAM AC  amitriptyline (ELAVIL) tablet 75 mg, 75 mg, Oral, Nightly  amLODIPine (NORVASC) tablet 5 mg, 5 mg, Oral, Daily  apixaban (ELIQUIS) tablet 5 mg, 5 mg, Oral, BID  sodium chloride flush 0.9 % injection 5-40 mL, 5-40 mL, Intravenous, 2 times per day  sodium chloride flush 0.9 % injection 5-40 mL, 5-40 mL, Intravenous, PRN  0.9 % sodium chloride infusion, 25 mL, Intravenous, PRN  promethazine (PHENERGAN) tablet 12.5 mg, 12.5 mg, Oral, Q6H PRN **OR** ondansetron (ZOFRAN) injection 4 mg, 4 mg, Intravenous, Q6H PRN  polyethylene glycol (GLYCOLAX) packet 17 g, 17 g, Oral, Daily PRN  acetaminophen (TYLENOL) tablet 650 mg, 650 mg, Oral, Q6H PRN **OR** acetaminophen (TYLENOL) suppository 650 mg, 650 mg, Rectal, Q6H PRN  [COMPLETED] methylPREDNISolone sodium (SOLU-MEDROL) injection 40 mg, 40 mg, Intravenous, Q6H **FOLLOWED BY** predniSONE (DELTASONE) tablet 40 mg, 40 mg, Oral, Daily  cholestyramine (QUESTRAN) packet 4 g, 4 g, Oral, Daily  albuterol sulfate  (90 Base) MCG/ACT inhaler 2 puff, 2 puff, Inhalation, Q2H PRN    No Known Allergies    Social History:    TOBACCO:   reports that she quit smoking about 6 years ago. Her smoking use included cigarettes. She has a 7.50 pack-year smoking history. She has never used smokeless tobacco.  ETOH:   reports no history of alcohol use.   Patient currently lives independently  Environmental/chemical exposure: None known    Family History:       Problem Relation Age of Onset    Heart Disease Mother     High Blood Pressure Mother     High Cholesterol Mother     Early Death Mother 36        MI    Heart Disease Father     High Blood Pressure Father     High Cholesterol Father     Stroke Father 79    High Blood Pressure Sister     High Blood Pressure Brother      REVIEW OF SYSTEMS:    CONSTITUTIONAL:  negative for fevers, chills, diaphoresis, activity change, appetite change, fatigue, night sweats and unexpected weight change. EYES:  negative for blurred vision, eye discharge, visual disturbance and icterus  HEENT:  negative for hearing loss, tinnitus, ear drainage, sinus pressure, nasal congestion, epistaxis and snoring  RESPIRATORY:  See HPI  CARDIOVASCULAR:  negative for chest pain, palpitations, exertional chest pressure/discomfort, edema, syncope  GASTROINTESTINAL:  negative for nausea, vomiting, diarrhea, constipation, blood in stool and abdominal pain  GENITOURINARY:  negative for frequency, dysuria, urinary incontinence, decreased urine volume, and hematuria  HEMATOLOGIC/LYMPHATIC:  negative for easy bruising, bleeding and lymphadenopathy  ALLERGIC/IMMUNOLOGIC:  negative for recurrent infections, angioedema, anaphylaxis and drug reactions  ENDOCRINE:  negative for weight changes and diabetic symptoms including polyuria, polydipsia and polyphagia  MUSCULOSKELETAL:  negative for  pain, joint swelling, decreased range of motion and muscle weakness  NEUROLOGICAL:  negative for headaches, slurred speech, unilateral weakness  PSYCHIATRIC/BEHAVIORAL: negative for hallucinations, behavioral problems, confusion and agitation.      Objective:   PHYSICAL EXAM:      VITALS:  BP (!) 157/79   Pulse 105   Temp 98.4 °F (36.9 °C) (Temporal)   Resp 16   Ht 4' 11\" (1.499 m)   Wt 156 lb 9.6 oz (71 kg)   SpO2 100%   BMI 31.63 kg/m²      24HR INTAKE/OUTPUT:      Intake/Output Summary (Last 24 hours) at 4/11/2021 0731  Last data filed at 4/10/2021 1730  Gross per 24 hour   Intake 1100 ml   Output 550 ml   Net 550 ml     CONSTITUTIONAL:  awake, alert, records and images for this visit  CT imaging did not reveal evidence of acute infiltrate. Because of her elevated creatinine, patient had V/Q scan to rule out PE. No convincing evidence of pulmonary embolism on V/Q scan.   Her presentation appears to be related to acute exacerbation of COPD  Procalcitonin is normal and no infiltrate  No evidence of acute pulmonary infection  However she is now having some chest pain  Echo is pending  Obtain EKG and troponin

## 2021-04-11 NOTE — PROGRESS NOTES
Hiatal hernia     Hx of blood clots     Kidney disease     Kidney insufficiency     Osteoporosis     Peripheral neuropathy     neuropathy in legs    Pneumonia     Postmenopausal     LMP in  40's    Pulmonary embolism ([facility]     2012; first ocurrence    Sepsis ([facility]     Syringomyelia ([facility]        Past Surgical History:   Procedure Laterality Date    ABDOMEN SURGERY      BACK SURGERY      shunt to drain CSF    BIOPSY SHOULDER Left 2/27/2019    EXCISION OF LEFT SHOULDER MASS performed by Bree Bell MD at [address]      crainotomy- remove fluid ? V-P SHUNT    BRONCHOSCOPY N/A 9/2/2020    BRONCHOSCOPY ALVEOLAR LAVAGE performed by Gina Gonzáles MD at [facility]      resection X2    COLONOSCOPY  12/5/11    BIOPSY AND POLYPECTOMY    COLONOSCOPY  4-2-2012    and ablation ERBE/APC    SHOULDER SURGERY      L        Family History   Problem Relation Age of Onset    Heart Disease Mother     High Blood Pressure Mother     High Cholesterol Mother     Early Death Mother 36        MI    Heart Disease Father     High Blood Pressure Father     High Cholesterol Father     Stroke Father 79    High Blood Pressure Sister     High Blood Pressure Brother         reports that she quit smoking about 6 years ago. Her smoking use included cigarettes. She has a 7.50 pack-year smoking history. She has never used smokeless tobacco. She reports that she does not drink alcohol or use drugs. Allergies:  Patient has no known allergies.     Current Medications:    butalbital-acetaminophen-caffeine (FIORICET, ESGIC) per tablet 1 tablet, Q4H PRN  aluminum & magnesium hydroxide-simethicone (MAALOX) 200-200-20 MG/5ML suspension 30 mL, Q6H PRN  sodium chloride flush 0.9 % injection 10 mL, PRN  ipratropium-albuterol (DUONEB) nebulizer solution 1 ampule, 4x daily  traMADol (ULTRAM) tablet 50 mg, Q12H PRN  sodium chloride flush 0.9 % injection 5-40 last 72 hours. ABGs: No results for input(s): PHART, PO2ART, [id number] in the last 72 hours. INR: No results for input(s): INR in the last 72 hours. UA:No results for input(s): Stefanie [place], GLUCOSEU, BILIRUBINUR, [place], BLOODU, PHUR, PROTEINU, UROBILINOGEN, NITRU, LEUKOCYTESUR, LABMICR, URINETYPE in the last 72 hours. Urine Microscopic: No results for input(s): LABCAST, BACTERIA, COMU, HYALCAST, WBCUA, RBCUA, EPIU in the last 72 hours. Urine Culture: No results for input(s): LABURIN in the last 72 hours. Urine Chemistry: No results for input(s): Tori Challis, PROTEINUR, NAUR in the last 72 hours. IMAGING:  NM LUNG VENT/PERFUSION (VQ)   Final Result   Very low probability for pulmonary embolism. XR CHEST (2 VW)   Final Result   No convincing evidence for acute cardiopulmonary pathology. CT CHEST WO CONTRAST   Preliminary Result   No acute abnormality identified in the chest.      The previously noted left upper lobe consolidation on the CT from 09/14/2020   has resolved with mild atelectasis and/or scarring remaining. Emphysematous changes are seen in the lungs. XR CHEST PORTABLE   Final Result   No acute process. XR CHEST PORTABLE    (Results Pending)           Assessment/Plan :      1. MARIS oN CKD Stage 3B  MARIS likely 2/2 hemodynamic changes         2. HTN. Controlled     3. H/o COPD. Exacerbation now   On steroids     4. Acid- base disorder. Has chronic metabolic acidosis   Give calcium carbonate TID     5.  Electrolytes  Mild hyperkalemia   Low k diet   Lokelma  Sod bicarb       Recommend to dose adjust all medications  based on renal functions  Maintain SBP> 90 mmHg   Daily weights   AVOID NSAIDs  Avoid Nephrotoxins  Monitor Intake/Output  Call if significant decrease in urine output                 Thank you for allowing us to participate in care of Wilma Hopper         Electronically signed by: Haroon Wilson MD, 4/11/2021, [facility] [address]  Office : [phone]  Fax :[phone]

## 2021-04-12 LAB
ANION GAP SERPL CALCULATED.3IONS-SCNC: 13 MMOL/L (ref 3–16)
BLOOD CULTURE, ROUTINE: NORMAL
BUN BLDV-MCNC: 37 MG/DL (ref 7–20)
CALCIUM SERPL-MCNC: 8.5 MG/DL (ref 8.3–10.6)
CHLORIDE BLD-SCNC: 104 MMOL/L (ref 99–110)
CO2: 19 MMOL/L (ref 21–32)
CREAT SERPL-MCNC: 2 MG/DL (ref 0.6–1.2)
CREATININE URINE: 50.2 MG/DL (ref 28–259)
CULTURE, BLOOD 2: NORMAL
EKG ATRIAL RATE: 87 BPM
EKG DIAGNOSIS: NORMAL
EKG P AXIS: 52 DEGREES
EKG P-R INTERVAL: 132 MS
EKG Q-T INTERVAL: 356 MS
EKG QRS DURATION: 70 MS
EKG QTC CALCULATION (BAZETT): 428 MS
EKG R AXIS: 7 DEGREES
EKG T AXIS: 42 DEGREES
EKG VENTRICULAR RATE: 87 BPM
GFR AFRICAN AMERICAN: 30
GFR NON-AFRICAN AMERICAN: 25
GLUCOSE BLD-MCNC: 127 MG/DL (ref 70–99)
LV EF: 60 %
LVEF MODALITY: NORMAL
POTASSIUM REFLEX MAGNESIUM: 4.6 MMOL/L (ref 3.5–5.1)
SODIUM BLD-SCNC: 136 MMOL/L (ref 136–145)
TROPONIN: 0.01 NG/ML
TROPONIN: <0.01 NG/ML
UREA NITROGEN, UR: 435.1 MG/DL (ref 800–1666)

## 2021-04-12 PROCEDURE — 84484 ASSAY OF TROPONIN QUANT: CPT

## 2021-04-12 PROCEDURE — 2580000003 HC RX 258: Performed by: INTERNAL MEDICINE

## 2021-04-12 PROCEDURE — 1200000000 HC SEMI PRIVATE

## 2021-04-12 PROCEDURE — 93010 ELECTROCARDIOGRAM REPORT: CPT | Performed by: INTERNAL MEDICINE

## 2021-04-12 PROCEDURE — 2700000000 HC OXYGEN THERAPY PER DAY

## 2021-04-12 PROCEDURE — 6370000000 HC RX 637 (ALT 250 FOR IP): Performed by: INTERNAL MEDICINE

## 2021-04-12 PROCEDURE — 6370000000 HC RX 637 (ALT 250 FOR IP): Performed by: HOSPITALIST

## 2021-04-12 PROCEDURE — 80048 BASIC METABOLIC PNL TOTAL CA: CPT

## 2021-04-12 PROCEDURE — 94761 N-INVAS EAR/PLS OXIMETRY MLT: CPT

## 2021-04-12 PROCEDURE — 99233 SBSQ HOSP IP/OBS HIGH 50: CPT | Performed by: INTERNAL MEDICINE

## 2021-04-12 PROCEDURE — 94640 AIRWAY INHALATION TREATMENT: CPT

## 2021-04-12 PROCEDURE — 36415 COLL VENOUS BLD VENIPUNCTURE: CPT

## 2021-04-12 PROCEDURE — 93306 TTE W/DOPPLER COMPLETE: CPT

## 2021-04-12 RX ORDER — CALCIUM CARBONATE 200(500)MG
500 TABLET,CHEWABLE ORAL 3 TIMES DAILY
Status: DISCONTINUED | OUTPATIENT
Start: 2021-04-12 | End: 2021-04-15 | Stop reason: HOSPADM

## 2021-04-12 RX ADMIN — TRAMADOL HYDROCHLORIDE 50 MG: 50 TABLET, FILM COATED ORAL at 20:19

## 2021-04-12 RX ADMIN — CHOLESTYRAMINE 4 G: 4 POWDER, FOR SUSPENSION ORAL at 13:51

## 2021-04-12 RX ADMIN — AMITRIPTYLINE HYDROCHLORIDE 75 MG: 25 TABLET, FILM COATED ORAL at 20:20

## 2021-04-12 RX ADMIN — ANTACID TABLETS 500 MG: 500 TABLET, CHEWABLE ORAL at 12:42

## 2021-04-12 RX ADMIN — Medication 10 ML: at 20:20

## 2021-04-12 RX ADMIN — ANTACID TABLETS 500 MG: 500 TABLET, CHEWABLE ORAL at 20:20

## 2021-04-12 RX ADMIN — PREDNISONE 40 MG: 20 TABLET ORAL at 08:01

## 2021-04-12 RX ADMIN — APIXABAN 5 MG: 5 TABLET, FILM COATED ORAL at 20:20

## 2021-04-12 RX ADMIN — IPRATROPIUM BROMIDE AND ALBUTEROL SULFATE 1 AMPULE: .5; 3 SOLUTION RESPIRATORY (INHALATION) at 15:30

## 2021-04-12 RX ADMIN — IPRATROPIUM BROMIDE AND ALBUTEROL SULFATE 1 AMPULE: .5; 3 SOLUTION RESPIRATORY (INHALATION) at 19:55

## 2021-04-12 RX ADMIN — Medication 10 ML: at 08:02

## 2021-04-12 RX ADMIN — IPRATROPIUM BROMIDE AND ALBUTEROL SULFATE 1 AMPULE: .5; 3 SOLUTION RESPIRATORY (INHALATION) at 11:21

## 2021-04-12 RX ADMIN — PANTOPRAZOLE SODIUM 40 MG: 40 TABLET, DELAYED RELEASE ORAL at 05:10

## 2021-04-12 RX ADMIN — IPRATROPIUM BROMIDE AND ALBUTEROL SULFATE 1 AMPULE: .5; 3 SOLUTION RESPIRATORY (INHALATION) at 08:19

## 2021-04-12 RX ADMIN — AMLODIPINE BESYLATE 5 MG: 5 TABLET ORAL at 08:02

## 2021-04-12 RX ADMIN — APIXABAN 5 MG: 5 TABLET, FILM COATED ORAL at 08:01

## 2021-04-12 ASSESSMENT — PAIN SCALES - GENERAL
PAINLEVEL_OUTOF10: 0
PAINLEVEL_OUTOF10: 0

## 2021-04-12 ASSESSMENT — PAIN DESCRIPTION - LOCATION: LOCATION: HEAD

## 2021-04-12 NOTE — PLAN OF CARE
Problem: Falls - Risk of:  Goal: Will remain free from falls  Description: Will remain free from falls  Outcome: Ongoing  Goal: Absence of physical injury  Description: Absence of physical injury  Outcome: Ongoing     Problem: Airway Clearance - Ineffective  Goal: Achieve or maintain patent airway  Outcome: Ongoing     Problem: Pain:  Goal: Pain level will decrease  Description: Pain level will decrease  Outcome: Ongoing  Goal: Control of acute pain  Description: Control of acute pain  Outcome: Ongoing  Goal: Control of chronic pain  Description: Control of chronic pain  Outcome: Ongoing

## 2021-04-12 NOTE — PROGRESS NOTES
Assessment completed, see doc flowsheets. Pt is A&Ox4. Lung sounds are clear/fine crackles/wheezing. Pt is constantly moaning at end of each respiration and saying it's her habit. VSS. Tele on. Medication given per [facility]. Patient has no needs at this time. Call light within in reach, will continue to monitor.

## 2021-04-12 NOTE — PROGRESS NOTES
[facility] PROGRESS NOTE    4/12/2021 8:34 AM        Name: Wayne Carvalho . Admitted: 4/8/2021  Primary Care Provider: Tammy Truong MD (Tel: [phone])                        Subjective:  . No acute events overnight. Resting well. Pain control. Diet ok.    Labs reviewed  Still with shortness of breath    Reviewed interval ancillary notes    Current Medications  butalbital-acetaminophen-caffeine (FIORICET, ESGIC) per tablet 1 tablet, Q4H PRN  aluminum & magnesium hydroxide-simethicone (MAALOX) 200-200-20 MG/5ML suspension 30 mL, Q6H PRN  sodium chloride flush 0.9 % injection 10 mL, PRN  ipratropium-albuterol (DUONEB) nebulizer solution 1 ampule, 4x daily  traMADol (ULTRAM) tablet 50 mg, Q12H PRN  sodium chloride flush 0.9 % injection 5-40 mL, PRN  0.9 % sodium chloride infusion, PRN  pantoprazole (PROTONIX) tablet 40 mg, QAM AC  amitriptyline (ELAVIL) tablet 75 mg, Nightly  amLODIPine (NORVASC) tablet 5 mg, Daily  apixaban (ELIQUIS) tablet 5 mg, BID  sodium chloride flush 0.9 % injection 5-40 mL, 2 times per day  sodium chloride flush 0.9 % injection 5-40 mL, PRN  0.9 % sodium chloride infusion, PRN  promethazine (PHENERGAN) tablet 12.5 mg, Q6H PRN    Or  ondansetron (ZOFRAN) injection 4 mg, Q6H PRN  polyethylene glycol (GLYCOLAX) packet 17 g, Daily PRN  acetaminophen (TYLENOL) tablet 650 mg, Q6H PRN  predniSONE (DELTASONE) tablet 40 mg, Daily  cholestyramine (QUESTRAN) packet 4 g, Daily  albuterol sulfate  (90 Base) MCG/ACT inhaler 2 puff, Q2H PRN        Objective:  BP (!) 149/87   Pulse 109   Temp 96.4 °F (35.8 °C) (Temporal)   Resp 16   Ht 4' 11\" (1.499 m)   Wt 149 lb 8 oz (67.8 kg)   SpO2 98%   BMI 30.20 kg/m²     Intake/Output Summary (Last 24 hours) at 4/12/2021 0834  Last data filed at 4/12/2021 0811  Gross per 24 hour   Intake 100 ml Output 550 ml   Net -450 ml      Wt Readings from Last 3 Encounters:   04/12/21 149 lb 8 oz (67.8 kg)   02/23/21 149 lb (67.6 kg)   12/17/20 141 lb (64 kg)       General appearance:  Appears comfortable  Eyes: Sclera clear. Pupils equal.  ENT: Moist oral mucosa. Trachea midline, no adenopathy. Cardiovascular: Regular rhythm, normal S1, S2. No murmur. No edema in lower extremities  Respiratory: Not using accessory muscles. Good inspiratory effort. Clear to auscultation bilaterally, no wheeze or crackles. GI: Abdomen soft, no tenderness, not distended, normal bowel sounds  Musculoskeletal: No cyanosis in digits, neck supple  Neurology: CN 2-12 grossly intact. No speech or motor deficits  Psych: Normal affect. Alert and oriented in time, place and person  Skin: Warm, dry, normal turgor    Labs and Tests:  CBC:   No results for input(s): WBC, HGB, PLT in the last 72 hours. BMP:    Recent Labs     04/11/21  0514 04/12/21  0752    136   K 5.2* 4.6    104   CO2 19* 19*   BUN 40* 37*   CREATININE 2.1* 2.0*   GLUCOSE 113* 127*     Hepatic:   No results for input(s): AST, ALT, ALB, BILITOT, ALKPHOS in the last 72 hours. Discussed care with family and patient             Spent 30  minutes with patient and family at bedside and on unit reviewing medical records and labs, spent greater than 50% time counseling patient and family on diagnosis and plan   Problem List  Principal Problem:    Acute exacerbation of chronic obstructive pulmonary disease (COPD) ([facility]  Active Problems:    Chronic hypoxemic respiratory failure (HCC)    Gastroesophageal reflux disease without esophagitis    History of pulmonary embolism    SOB (shortness of breath)    Long term (current) use of systemic steroids    Essential hypertension    Acute renal failure superimposed on stage 3 chronic kidney disease ([address]  Resolved Problems:    * No resolved hospital problems. *       Assessment & Plan:   1.  Acute COPD exacerbation  -Patient still having significant shortness of breath unclear etiology no signs of any pneumonia. Echo has been ordered to rule out cardiac cause.   Continue-monitor  - supportive care  - need cardiac cause ruled out         Acute on chronic kidney disease    Chronic hypoxic respiratory failure      Diet: DIET GENERAL;  Code:Full Code  DVT PPX liz Butler MD   4/12/2021 8:34 AM

## 2021-04-12 NOTE — CARE COORDINATION
Discharge planning note:    Received phone call from AI at [address] [place] [address]. She will suspend patients services during hospitalization.   Please contact her at discharge so services can be re-started:    AI Tijerina RN BSN  Case Management

## 2021-04-12 NOTE — PROGRESS NOTES
Office : [phone]     Fax :[phone]       Nephrology  Note      Patient's Name: Kavita Logan  7:19 AM  4/12/2021    Reason for Consult:  Jaqueline Doe on CKD , metabolic acidosis     Chief Complaint:    Chief Complaint   Patient presents with    Shortness of Breath     since Easter. no cough. increase pain with breating         History of Present Ilness:    Kavita Logan is a 72 y.o. 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 She reports cough with no significant production. No fevers or chills. In the ER she had CT chest done which did not reveal any acute pathology  Laboratory work-up noted for increased in creatinine from a baseline of 1.5-2.0. EKG sinus rhythm with no ischemic changes. Troponin 0 0.03. INTERVAL HISTORY    Feels better  No SON   Creatinine slightly improved   Breathing better     I/O last 3 completed shifts:   In: 10 [I.V.:10]  Out: 200 [Urine:350]    Past Medical History:   Diagnosis Date    Bullous emphysema (HCC)     CKD (chronic kidney disease) stage 3, GFR 30-59 ml/min     Clostridium difficile carrier 01/21/2019    COPD (chronic obstructive pulmonary disease) ([facility]     PFT done 7 years ago   [facility] Crohn's disease ([facility]     Crohn's disease    Depression 1/30/2011    pt states resolved as of 3-26-12    DVT (deep venous thrombosis) ([facility]     2012; first ocurrence     Hiatal hernia     Hx of blood clots     Kidney disease     Kidney insufficiency     Osteoporosis     Peripheral neuropathy     neuropathy in legs    Pneumonia     Postmenopausal     LMP in  40's    Pulmonary embolism ([facility]     2012; first ocurrence    Sepsis ([facility]     Syringomyelia ([facility]        Past Surgical History:   Procedure Laterality Date    ABDOMEN SURGERY      BACK SURGERY      shunt to drain CSF    BIOPSY SHOULDER Left 2/27/2019    EXCISION OF LEFT SHOULDER MASS performed by Kaylyn Villafana MD at [address]      crainotomy- remove fluid ? V-P SHUNT    BRONCHOSCOPY N/A 9/2/2020    BRONCHOSCOPY ALVEOLAR LAVAGE performed by Marty Saldana MD at [facility]      resection X2    COLONOSCOPY  12/5/11    BIOPSY AND POLYPECTOMY    COLONOSCOPY  4-2-2012    and ablation ERBE/APC    SHOULDER SURGERY      L        Family History   Problem Relation Age of Onset    Heart Disease Mother     High Blood Pressure Mother     High Cholesterol Mother     Early Death Mother 36        MI    Heart Disease Father     High Blood Pressure Father     High Cholesterol Father     Stroke Father 79    High Blood Pressure Sister     High Blood Pressure Brother         reports that she quit smoking about 6 years ago. Her smoking use included cigarettes. She has a 7.50 pack-year smoking history. She has never used smokeless tobacco. She reports that she does not drink alcohol or use drugs. Allergies:  Patient has no known allergies.     Current Medications:    butalbital-acetaminophen-caffeine (FIORICET, ESGIC) per tablet 1 tablet, Q4H PRN  aluminum & magnesium hydroxide-simethicone (MAALOX) 200-200-20 MG/5ML suspension 30 mL, Q6H PRN  sodium chloride flush 0.9 % injection 10 mL, PRN  ipratropium-albuterol (DUONEB) nebulizer solution 1 ampule, 4x daily  traMADol (ULTRAM) tablet 50 mg, Q12H PRN  sodium chloride flush 0.9 % injection 5-40 mL, PRN  0.9 % sodium chloride infusion, PRN  pantoprazole (PROTONIX) Negative   KETUA Negative   SPECGRAV 1.012   BLOODU Negative   PHUR 5.0   PROTEINU Negative   UROBILINOGEN 0.2   NITRU Negative   LEUKOCYTESUR Negative   LABMICR Not Indicated   Gioia Mcburney NotGiven      Urine Microscopic:   Recent Labs     04/11/21  1725   HYALCAST 2   WBCUA 1   RBCUA 2   EPIU 1     Urine Culture: No results for input(s): Beryle Primmer in the last 72 hours. Urine Chemistry:   Recent Labs     04/11/21  1725   LABCREA 50.2              IMAGING:  XR CHEST PORTABLE   Final Result   Right-sided opacity concerning for infiltrate and pneumonia. Findings may be   accentuated by rotation. Consider imaging follow-up. NM LUNG VENT/PERFUSION (VQ)   Final Result   Very low probability for pulmonary embolism. XR CHEST (2 VW)   Final Result   No convincing evidence for acute cardiopulmonary pathology. CT CHEST WO CONTRAST   Final Result   No acute abnormality identified in the chest.      The previously noted left upper lobe consolidation on the CT from 09/14/2020   has resolved with mild atelectasis and/or scarring remaining. Emphysematous changes are seen in the lungs. XR CHEST PORTABLE   Final Result   No acute process. Assessment/Plan :      1. MARIS oN CKD Stage 3B  MARIS likely 2/2 hemodynamic changes   Improving   No edema     2. HTN. Controlled     3. H/o COPD. Exacerbation now   On steroids     4. Acid- base disorder. Has chronic metabolic acidosis   Give calcium carbonate TID     5.  Electrolytes  Mild hyperkalemia   Low k diet   Lokelma       Recommend to dose adjust all medications  based on renal functions  Maintain SBP> 90 mmHg   Daily weights   AVOID NSAIDs  Avoid Nephrotoxins  Monitor Intake/Output  Call if significant decrease in urine output                 Thank you for allowing us to participate in care of Kavita Logan         Electronically signed by: Jason Izquierdo MD, 4/12/2021,       [facility] [address]  Office :

## 2021-04-12 NOTE — PLAN OF CARE
Awake,alert  Stach 108/min  02 2L  PCXR results noted  Resp tx, prednisone 40 po given as ordered  K 5.2 bun 40 creat 2.1  Bp stable  Denies pain

## 2021-04-13 LAB
ANION GAP SERPL CALCULATED.3IONS-SCNC: 11 MMOL/L (ref 3–16)
BUN BLDV-MCNC: 37 MG/DL (ref 7–20)
CALCIUM SERPL-MCNC: 9.2 MG/DL (ref 8.3–10.6)
CHLORIDE BLD-SCNC: 105 MMOL/L (ref 99–110)
CO2: 23 MMOL/L (ref 21–32)
CREAT SERPL-MCNC: 2 MG/DL (ref 0.6–1.2)
GFR AFRICAN AMERICAN: 30
GFR NON-AFRICAN AMERICAN: 25
GLUCOSE BLD-MCNC: 81 MG/DL (ref 70–99)
POTASSIUM REFLEX MAGNESIUM: 4.7 MMOL/L (ref 3.5–5.1)
SODIUM BLD-SCNC: 139 MMOL/L (ref 136–145)
SODIUM URINE: 98 MMOL/L

## 2021-04-13 PROCEDURE — 94640 AIRWAY INHALATION TREATMENT: CPT

## 2021-04-13 PROCEDURE — 94761 N-INVAS EAR/PLS OXIMETRY MLT: CPT

## 2021-04-13 PROCEDURE — 6370000000 HC RX 637 (ALT 250 FOR IP): Performed by: HOSPITALIST

## 2021-04-13 PROCEDURE — 6370000000 HC RX 637 (ALT 250 FOR IP): Performed by: INTERNAL MEDICINE

## 2021-04-13 PROCEDURE — 99232 SBSQ HOSP IP/OBS MODERATE 35: CPT | Performed by: INTERNAL MEDICINE

## 2021-04-13 PROCEDURE — 2580000003 HC RX 258: Performed by: INTERNAL MEDICINE

## 2021-04-13 PROCEDURE — 84300 ASSAY OF URINE SODIUM: CPT

## 2021-04-13 PROCEDURE — 36415 COLL VENOUS BLD VENIPUNCTURE: CPT

## 2021-04-13 PROCEDURE — 99233 SBSQ HOSP IP/OBS HIGH 50: CPT | Performed by: INTERNAL MEDICINE

## 2021-04-13 PROCEDURE — 1200000000 HC SEMI PRIVATE

## 2021-04-13 PROCEDURE — 80048 BASIC METABOLIC PNL TOTAL CA: CPT

## 2021-04-13 PROCEDURE — 99222 1ST HOSP IP/OBS MODERATE 55: CPT | Performed by: INTERNAL MEDICINE

## 2021-04-13 RX ADMIN — IPRATROPIUM BROMIDE AND ALBUTEROL SULFATE 1 AMPULE: .5; 3 SOLUTION RESPIRATORY (INHALATION) at 16:34

## 2021-04-13 RX ADMIN — IPRATROPIUM BROMIDE AND ALBUTEROL SULFATE 1 AMPULE: .5; 3 SOLUTION RESPIRATORY (INHALATION) at 12:22

## 2021-04-13 RX ADMIN — Medication 10 ML: at 20:49

## 2021-04-13 RX ADMIN — PREDNISONE 40 MG: 20 TABLET ORAL at 08:48

## 2021-04-13 RX ADMIN — ACETAMINOPHEN 650 MG: 325 TABLET ORAL at 11:38

## 2021-04-13 RX ADMIN — TRAMADOL HYDROCHLORIDE 50 MG: 50 TABLET, FILM COATED ORAL at 20:49

## 2021-04-13 RX ADMIN — AMITRIPTYLINE HYDROCHLORIDE 75 MG: 25 TABLET, FILM COATED ORAL at 20:49

## 2021-04-13 RX ADMIN — IPRATROPIUM BROMIDE AND ALBUTEROL SULFATE 1 AMPULE: .5; 3 SOLUTION RESPIRATORY (INHALATION) at 08:50

## 2021-04-13 RX ADMIN — APIXABAN 5 MG: 5 TABLET, FILM COATED ORAL at 08:49

## 2021-04-13 RX ADMIN — PANTOPRAZOLE SODIUM 40 MG: 40 TABLET, DELAYED RELEASE ORAL at 05:05

## 2021-04-13 RX ADMIN — APIXABAN 5 MG: 5 TABLET, FILM COATED ORAL at 20:49

## 2021-04-13 RX ADMIN — IPRATROPIUM BROMIDE AND ALBUTEROL SULFATE 1 AMPULE: .5; 3 SOLUTION RESPIRATORY (INHALATION) at 19:39

## 2021-04-13 RX ADMIN — AMLODIPINE BESYLATE 5 MG: 5 TABLET ORAL at 08:49

## 2021-04-13 RX ADMIN — Medication 10 ML: at 08:49

## 2021-04-13 RX ADMIN — CHOLESTYRAMINE 4 G: 4 POWDER, FOR SUSPENSION ORAL at 11:39

## 2021-04-13 RX ADMIN — ANTACID TABLETS 500 MG: 500 TABLET, CHEWABLE ORAL at 08:48

## 2021-04-13 RX ADMIN — ANTACID TABLETS 500 MG: 500 TABLET, CHEWABLE ORAL at 20:49

## 2021-04-13 ASSESSMENT — PAIN SCALES - GENERAL
PAINLEVEL_OUTOF10: 0
PAINLEVEL_OUTOF10: 2
PAINLEVEL_OUTOF10: 8
PAINLEVEL_OUTOF10: 0

## 2021-04-13 ASSESSMENT — PAIN DESCRIPTION - LOCATION: LOCATION: BACK

## 2021-04-13 ASSESSMENT — PAIN DESCRIPTION - DESCRIPTORS: DESCRIPTORS: ACHING

## 2021-04-13 NOTE — CONSULTS
[address] [facility]  [phone]      Chief Complaint   Patient presents with    Shortness of Breath     since Easter. no cough. increase pain with breating            History of Present Illness:  Tom High is a 72 y.o. patient who presented to the hospital with complaints of SOB and Chest pain. She has severe dyspnea with any exertion. She had some R sided Chest pain. Pain is worse with deep inspiration A VQ scan showed low probability of PE. Troponin and lactate was elevated slightly on arrival. + family h/o heart disease. Former smoker. I have been asked to provide consultation regarding further management and testing. 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 Surgical History:   has a past surgical history that includes shoulder surgery; back surgery; brain surgery; Colon surgery; Colonoscopy (12/5/11); Colonoscopy (4-2-2012); Cholecystectomy; Abdomen surgery; Biopsy shoulder (Left, 2/27/2019); and bronchoscopy (N/A, 9/2/2020). Social History:   reports that she quit smoking about 6 years ago. Her smoking use included cigarettes. She has a 7.50 pack-year smoking history. She has never used smokeless tobacco. She reports that she does not drink alcohol or use drugs.      Family History:  family history includes Early Death (age of onset: 36) in her mother; Heart Disease in her father and mother; High Blood Pressure in her brother, father, mother, and sister; High Cholesterol in her father and mother; Stroke (age of onset: 79) in her father. Home Medications:  Were reviewed and are listed in nursing record. and/or listed below  Prior to Admission medications    Medication Sig Start Date End Date Taking?  Authorizing Provider   Adalimumab (HUMIRA PEN SC) Inject into the skin   Yes Historical Provider, MD   ELIQUIS 5 MG TABS tablet TAKE 1 TABLET BY MOUTH TWICE A DAY 1/15/21  Yes Darryle Innocent, MD   amLODIPine (NORVASC) 5 MG tablet Take 1 tablet by mouth daily 1/15/21  Yes Mu Brooks MD   albuterol (PROVENTIL) (2.5 MG/3ML) 0.083% nebulizer solution TAKE 3 MLS BY NEBULIZATION EVERY 6 HOURS AS NEEDED FOR WHEEZING DX: COPD ICD-10: J44.9 1/13/21  Yes Darryle Innocent, MD   SYMBICORT 160-4.5 MCG/ACT AERO TAKE 2 PUFFS BY MOUTH TWICE A DAY 1/11/21  Yes Mu Brooks MD   SPIRIVA RESPIMAT 2.5 MCG/ACT AERS inhaler INHALE 2 PUFFS BY MOUTH INTO THE LUNGS DAILY 12/18/20  Yes Mu Brooks MD   colestipol (COLESTID) 1 g tablet TAKE 1 TABLET BY MOUTH TWICE A DAY 10/7/20  Yes Historical Provider, MD   amitriptyline (ELAVIL) 150 MG tablet Take 0.5 tablets by mouth nightly 12/17/20  Yes Mu Brooks MD   predniSONE (DELTASONE) 10 MG tablet Take 1-1/2 tablets (15mg) every morning until seen 10/21/20  Yes Gunner Harding MD   potassium chloride (KLOR-CON M) 20 MEQ extended release tablet Take 1 tablet by mouth daily 10/18/20  Yes Cecy Hudson MD   cyclobenzaprine (FLEXERIL) 5 MG tablet Take 5 mg by mouth 4 times daily Every 6 hours for muscle spasms   Yes Historical Provider, MD   albuterol sulfate  (90 Base) MCG/ACT inhaler Inhale 2 puffs into the lungs every 6 hours as needed for Wheezing 7/28/20 7/28/21 Yes Darryle Innocent, MD   alendronate (FOSAMAX) 70 MG tablet Take 1 tablet by mouth every 7 days 4/6/19  Yes Prudence Orta MD   predniSONE (DELTASONE) 5 MG tablet Take 1 tablet by mouth 2 times daily 4/5/21 5/5/21  Darryle Innocent, MD   albuterol sulfate  (90 Base) MCG/ACT inhaler Inhale 2 puffs into the lungs every 6 hours as needed for Wheezing 3/10/21 3/10/22  Sherly Sales MD   Nutritional Supplements (ENSURE HIGH PROTEIN) LIQD Take 1 Can by mouth 2 times daily  Patient not taking: Reported on 2/23/2021 10/22/20   Bravo Dhillon MD   Amino Acids-Protein Hydrolys (PROTEINEX P18) LIQD Take 1 Bottle by mouth 2 times daily  Patient not taking: Reported on 2/23/2021 10/15/20   Bravo Dhillon MD   omeprazole (PRILOSEC) 20 MG delayed release capsule Take 20 mg by mouth Daily Takes as needed    Historical Provider, MD        Current Medications:  Current Facility-Administered Medications   Medication Dose Route Frequency Provider Last Rate Last Admin    regadenoson (LEXISCAN) injection 0.4 mg  0.4 mg Intravenous ONCE PRN Jayy Warren MD        calcium carbonate (TUMS) chewable tablet 500 mg  500 mg Oral TID Dee Paez MD   500 mg at 04/13/21 0848    butalbital-acetaminophen-caffeine (FIORICET, ESGIC) per tablet 1 tablet  1 tablet Oral Q4H PRN Rin Stone MD        aluminum & magnesium hydroxide-simethicone (MAALOX) [phone] MG/5ML suspension 30 mL  30 mL Oral Q6H PRN Ángela Da Silva MD   30 mL at 04/11/21 1328    sodium chloride flush 0.9 % injection 10 mL  10 mL Intravenous PRN Sathya Campos MD   10 mL at 04/09/21 0946    ipratropium-albuterol (DUONEB) nebulizer solution 1 ampule  1 ampule Inhalation 4x daily Rin Stone MD   1 ampule at 04/13/21 1222    traMADol (ULTRAM) tablet 50 mg  50 mg Oral Q12H PRN Rin Stone MD   50 mg at 04/12/21 2019    sodium chloride flush 0.9 % injection 5-40 mL  5-40 mL Intravenous PRN Fazal Hogan PA-C        0.9 % sodium chloride infusion  25 mL Intravenous PRN Fazal Hogan PA-C        pantoprazole (PROTONIX) tablet 40 mg  40 mg Oral QAM AC Esteban Clancy MD   40 mg at 04/13/21 0505    amitriptyline (ELAVIL) tablet 75 mg  75 mg Oral Nightly Esteban Clancy MD   75 mg at 04/12/21 2020    amLODIPine (NORVASC) tablet 5 mg  5 mg Oral Daily Esteban Clancy MD   5 mg at 04/13/21 0849    apixaban (ELIQUIS) tablet 5 mg  5 mg Oral BID Esteban Clancy MD   5 mg at 04/13/21 0849    sodium chloride flush 0.9 % injection 5-40 mL  5-40 mL Intravenous 2 times per day Gaby Mcdonald MD   10 mL at 04/13/21 0849    sodium chloride flush 0.9 % injection 5-40 mL  5-40 mL Intravenous PRN Esteban Clancy MD        0.9 % sodium chloride infusion  25 mL Intravenous PRN Esteban Clancy MD        promethazine (PHENERGAN) tablet 12.5 mg  12.5 mg Oral Q6H PRN Esteban Clancy MD        Or    ondansetron (ZOFRAN) injection 4 mg  4 mg Intravenous Q6H PRN Esteban Clancy MD   4 mg at 04/10/21 1138    polyethylene glycol (GLYCOLAX) packet 17 g  17 g Oral Daily PRN Esteban Clancy MD        acetaminophen (TYLENOL) tablet 650 mg  650 mg Oral Q6H PRN Esteban Clancy MD   650 mg at 04/13/21 1138    predniSONE (DELTASONE) tablet 40 mg  40 mg Oral Daily Jonathan Sharpe MD   40 mg at 04/13/21 0848    cholestyramine (QUESTRAN) packet 4 g  4 g Oral Daily Esteban Clancy MD   4 g at 04/13/21 1139    albuterol sulfate  (90 Base) MCG/ACT inhaler 2 puff  2 puff Inhalation Q2H PRN Sherly Sales MD            Allergies:  Patient has no known allergies. Review of Systems:     · Constitutional: there has been no unanticipated weight loss. There's been no change in energy level, sleep pattern, or activity level. · Eyes: No visual changes or diplopia. No scleral icterus. · ENT: No Headaches, hearing loss or vertigo. No mouth sores or sore throat. · Cardiovascular: Reviewed in HPI  · Respiratory: No cough or wheezing, no sputum production. No hematemesis. · Gastrointestinal: No abdominal pain, appetite loss, blood in stools. No change in bowel or bladder habits. · Genitourinary: No dysuria, trouble voiding, or hematuria. · Musculoskeletal:  No gait disturbance, weakness or joint complaints.   · Integumentary: No rash or pruritis. · Neurological: No headache, diplopia, change in muscle strength, numbness or tingling. No change in gait, balance, coordination, mood, affect, memory, mentation, behavior. · Psychiatric: No anxiety, no depression. · Endocrine: No malaise, fatigue or temperature intolerance. No excessive thirst, fluid intake, or urination. No tremor. · Hematologic/Lymphatic: No abnormal bruising or bleeding, blood clots or swollen lymph nodes. · Allergic/Immunologic: No nasal congestion or hives.   ·     Physical Examination:    Vitals:    04/13/21 1226   BP:    Pulse:    Resp:    Temp:    SpO2: 98%    Weight: 146 lb 9.6 oz (66.5 kg)         General Appearance:  Alert, cooperative, no distress, appears stated age   Head:  Normocephalic, without obvious abnormality, atraumatic   Eyes:  PERRL, conjunctiva/corneas clear       Nose: Nares normal, no drainage or sinus tenderness   Throat: Lips, mucosa, and tongue normal   Neck: Supple, symmetrical, trachea midline, no adenopathy, thyroid: not enlarged, symmetric, no tenderness/mass/nodules, no carotid bruit or JVD       Lungs:   Rhonchi to auscultation bilaterally, respirations unlabored   Chest Wall:  No tenderness or deformity   Heart:  Regular rate and rhythm, S1, S2 normal, no murmur, rub or gallop   Abdomen:   Soft, non-tender, bowel sounds active all four quadrants,  no masses, no organomegaly           Extremities: Extremities normal, atraumatic, no cyanosis or edema   Pulses: 2+ and symmetric   Skin: Skin color, texture, turgor normal, no rashes or lesions   Pysch: Normal mood and affect   Neurologic: Normal gross motor and sensory exam.         Labs  CBC:   Lab Results   Component Value Date    WBC 15.5 04/09/2021    RBC 4.41 04/09/2021    HGB 9.0 04/09/2021    HCT 29.7 04/09/2021    MCV 67.2 04/09/2021    RDW 16.7 04/09/2021     04/09/2021     CMP:    Lab Results   Component Value Date     04/13/2021    K 4.7 04/13/2021     04/13/2021 CO2 23 04/13/2021    BUN 37 04/13/2021    CREATININE 2.0 04/13/2021    GFRAA 30 04/13/2021    GFRAA >60 04/26/2013    AGRATIO 1.4 04/08/2021    LABGLOM 25 04/13/2021    GLUCOSE 81 04/13/2021    PROT 6.6 04/08/2021    PROT 7.1 11/13/2012    CALCIUM 9.2 04/13/2021    BILITOT <0.2 04/08/2021    ALKPHOS 88 04/08/2021    AST 13 04/08/2021    ALT 9 04/08/2021     PT/INR:  No results found for: PTINR  Lab Results   Component Value Date    CKTOTAL 29 08/07/2011    CKMB 0.88 08/07/2011    TROPONINI 0.01 04/12/2021       EKG:  I have reviewed EKG with the following interpretation:  Impression:  Normal sinus rhythmMinimal voltage criteria for LVH, may be normal variant    Pt has CAD with following history:  CABG: (date/details),   Valve replacement (date/details)   GXT/Myoview/Lexiscan: (date)  (results)   Stress/echo: (date) (result)   CATH/PCI:  (date)- (results)  - (# and type of stents) -   ECHO: (date) results EF    %. *Left ventricle - normal size, thickness and function with EF of 60%   *Mitral valve - annular calcification, mild regurgitation   *Tricuspid valve - mild regurgitation with PASP of 30mmHg  KIM (date) (results)  EP procedures/studies/ablation:  (specific data). Device information:  Event monitor: (date/results)    All testing and labs listed below were personally reviewed.     Assessment  Patient Active Problem List   Diagnosis    Crohn disease ([address]    Depression    Osteoarthritis    Lupus anticoagulant disorder ([address]    Dysphagia    Syringomyelia (HCC)    Gastritis and gastroduodenitis    Skin ulcer of shoulder (HCC)    Shortness of breath    Anemia    Centrilobular emphysema ([address]    Pulmonary fibrosis ([address]    Chronic hypoxemic respiratory failure (HCC)    Hiatal hernia    COPD, severe ([address]    Alpha thalassemia minor    Acute on chronic respiratory failure with hypoxia (HCC)    Bronchiectasis with acute exacerbation ([address]    Hemoglobin C trait ([address]    Osteoporosis of multiple sites    Gastroesophageal reflux disease without esophagitis    Sepsis ([facility]    Bilateral pulmonary embolism (HCC)    Acute deep vein thrombosis (DVT) of distal vein of both lower extremities (HCC)    History of pulmonary embolism    SOB (shortness of breath)    Wound of left shoulder    Hx pulmonary embolism    COPD exacerbation (HCC)    Cerebrovascular accident (CVA) ([facility]    [facility]    History of DVT (deep vein thrombosis)    Small vessel disease, cerebrovascular    Ex-smoker    History of depression    Overweight    HCAP (healthcare-associated pneumonia)    Metabolic acidosis    Long term (current) use of systemic steroids    Essential hypertension    Stage 3b chronic kidney disease    Acute exacerbation of chronic obstructive pulmonary disease (COPD) (HCC)    Acute renal failure superimposed on stage 3 chronic kidney disease ([facility]         Plan:    I had the opportunity to review the clinical symptoms and presentation of Jolly Finn. Assessment/Plan:  Principal Problem:    Acute exacerbation of chronic obstructive pulmonary disease (COPD) ([facility]  Plan: mgmt per pulm. Cont nebs and steroids  Active Problems:    Chronic hypoxemic respiratory failure (HCC)  Plan: steroids per pulmonary. History of pulmonary embolism  Plan: on eliquis    SOB (shortness of breath)  Plan: likely pulm vs anxiety    Essential hypertension  Plan: cont home meds. Norvasc. Acute renal failure superimposed on stage 3 chronic kidney disease ([facility]  Plan: stable, avoid nephrotoxins. Chest pain: non cardiac chest pain. Atypical in nature. Low probability of PE. No ACS. Recommend stress myoview. I will address the patient's cardiac risk factors and adjusted pharmacologic treatment as needed. In addition, I have reinforced the need for patient directed risk factor modification. Tobacco use was discussed with the patient and educated on the negative effects.  I have asked

## 2021-04-13 NOTE — PLAN OF CARE
Problem: Falls - Risk of:  Goal: Will remain free from falls  Description: Will remain free from falls  Outcome: Ongoing     Problem: Airway Clearance - Ineffective  Goal: Achieve or maintain patent airway  Outcome: Ongoing     Problem: Pain:  Goal: Pain level will decrease  Description: Pain level will decrease  Outcome: Ongoing     Problem: Cardiovascular  Goal: Hemodynamic stability  Outcome: Ongoing     Problem: Respiratory  Goal: No pulmonary complications  Outcome: Ongoing  Goal: O2 Sat > 90%  Outcome: Ongoing  Goal: Supplemental O2 requirements decreased  Outcome: Ongoing

## 2021-04-13 NOTE — PROGRESS NOTES
113* 127* 81     Hepatic:   No results for input(s): AST, ALT, ALB, BILITOT, ALKPHOS in the last 72 hours. Discussed care with family and patient             Spent 30  minutes with patient and family at bedside and on unit reviewing medical records and labs, spent greater than 50% time counseling patient and family on diagnosis and plan   Problem List  Principal Problem:    Acute exacerbation of chronic obstructive pulmonary disease (COPD) ([facility]  Active Problems:    Chronic hypoxemic respiratory failure (HCC)    Gastroesophageal reflux disease without esophagitis    History of pulmonary embolism    SOB (shortness of breath)    Long term (current) use of systemic steroids    Essential hypertension    Acute renal failure superimposed on stage 3 chronic kidney disease ([facility]  Resolved Problems:    * No resolved hospital problems.  *       Assessment & Plan:   61-year-old admitted with dyspnea on exertion    Dyspnea on exertion probable mild acute exacerbation of COPD  She does have a mild acute exacerbation which has been treated she is on oral steroids at this point I do not hear a lot of wheezing  She has had a VQ scan given her CKD with which was very low probability for PE  Her echocardiogram showed normal ejection fraction  She has no evidence of fluid overload  Seen by pulmonary  Cardiology consulted for possible rule out of coronary artery disease  Otherwise patient is pretty comfortable at rest not requiring any oxygen  Already fully anticoagulated  On prednisone 40 since the 11th will complete a 5-day course    Acute kidney injury on baseline chronic kidney disease stage III  Creatinine seems to be stable at around 2    Hypertension  On amlodipine      Acute on chronic kidney disease    Chronic hypoxic respiratory failure      Diet: DIET GENERAL;  Code:Full Code  DVT PPX lovenox       Jonathan Sharpe MD   4/13/2021 11:03 AM

## 2021-04-13 NOTE — FLOWSHEET NOTE
04/13/21 0830   Vital Signs   Temp 97.8 °F (36.6 °C)   Temp Source Oral   Pulse 100   Heart Rate Source Brachial   Resp 18   /73   BP Location Right upper arm   Patient Position Sitting   Level of Consciousness Alert (0)   MEWS Score 1   Patient Currently in Pain Denies   Pain Assessment   Pain Assessment 0-10   Pain Level 0   Non-Pharmaceutical Pain Intervention(s) Declines   Response to Pain Intervention Patient Satisfied   Oxygen Therapy   SpO2 96 %   Pulse Oximeter Device Mode Intermittent   Pulse Oximeter Device Location Finger   O2 Device None (Room air)     Shift assessment compete. VSS. Pt. Is alert and oriented resting comfortably in bed. Pt. Has complaints of SOB with exertion and continues to state that she wants to go home if \"we are not finding anything\". POC discussed with patient and patient states understanding and is in agreement with plan. Bed alarm engaged. Call light and bedside table within reach. No further needs expressed at this time.  Nori Fernández

## 2021-04-13 NOTE — PROGRESS NOTES
P Pulmonary and Critical Care  Progress note      Reason for Consult: COPD exacerbation, chronic hypoxemic respiratory failure  Requesting Physician: Dr. Jacqueline Choudhury:   [facility] / HPI:                The patient is a 72 y.o. 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 01/21/2019    COPD (chronic obstructive pulmonary disease) ([address]     PFT done 7 years ago   [place] Crohn's disease ([address]     Crohn's disease    Depression 1/30/2011    pt states resolved as of 3-26-12    DVT (deep venous thrombosis) ([address]     2012; first ocurrence     Hiatal hernia     Hx of blood clots     Kidney disease     Kidney insufficiency     Osteoporosis     Peripheral neuropathy     neuropathy in legs    Pneumonia     Postmenopausal     LMP in  40's    Pulmonary embolism ([address]     2012; first ocurrence    Sepsis ([address]     Syringomyelia ([address]      Interval history: She is pretty emotional this morning. Upset that she is not feeling any better. Short of breath with any activity. Continues to complain of some chest pain associated with shortness of breath. Past Surgical History:        Procedure Laterality Date    ABDOMEN SURGERY      BACK SURGERY      shunt to drain CSF    BIOPSY SHOULDER Left 2/27/2019    EXCISION OF LEFT SHOULDER MASS performed by Mikal Arias MD at [facility]      crainotomy- remove fluid ? V-P SHUNT    BRONCHOSCOPY N/A 9/2/2020    BRONCHOSCOPY ALVEOLAR LAVAGE performed by Vale Albarado MD at [place] [address]      resection X2    COLONOSCOPY  12/5/11    BIOPSY AND POLYPECTOMY    COLONOSCOPY  4-2-2012    and ablation ERBE/APC    SHOULDER SURGERY      L      Current Medications:    Current Facility-Administered Medications: calcium carbonate (TUMS) chewable tablet 500 mg, use.  Patient currently lives independently  Environmental/chemical exposure: None known    Family History:       Problem Relation Age of Onset    Heart Disease Mother     High Blood Pressure Mother     High Cholesterol Mother     Early Death Mother 36        MI    Heart Disease Father     High Blood Pressure Father     High Cholesterol Father     Stroke Father 79    High Blood Pressure Sister     High Blood Pressure Brother      REVIEW OF SYSTEMS:    CONSTITUTIONAL:  negative for fevers, chills, diaphoresis, activity change, appetite change, fatigue, night sweats and unexpected weight change. EYES:  negative for blurred vision, eye discharge, visual disturbance and icterus  HEENT:  negative for hearing loss, tinnitus, ear drainage, sinus pressure, nasal congestion, epistaxis and snoring  RESPIRATORY:  See HPI  CARDIOVASCULAR:  negative for chest pain, palpitations, exertional chest pressure/discomfort, edema, syncope  GASTROINTESTINAL:  negative for nausea, vomiting, diarrhea, constipation, blood in stool and abdominal pain  GENITOURINARY:  negative for frequency, dysuria, urinary incontinence, decreased urine volume, and hematuria  HEMATOLOGIC/LYMPHATIC:  negative for easy bruising, bleeding and lymphadenopathy  ALLERGIC/IMMUNOLOGIC:  negative for recurrent infections, angioedema, anaphylaxis and drug reactions  ENDOCRINE:  negative for weight changes and diabetic symptoms including polyuria, polydipsia and polyphagia  MUSCULOSKELETAL:  negative for  pain, joint swelling, decreased range of motion and muscle weakness  NEUROLOGICAL:  negative for headaches, slurred speech, unilateral weakness  PSYCHIATRIC/BEHAVIORAL: negative for hallucinations, behavioral problems, confusion and agitation.      Objective:   PHYSICAL EXAM:      VITALS:  /73   Pulse 96   Temp 97.8 °F (36.6 °C) (Oral)   Resp 20   Ht 4' 11\" (1.499 m)   Wt 146 lb 9.6 oz (66.5 kg)   SpO2 94%   BMI 29.61 kg/m²      24HR INTAKE/OUTPUT:      Intake/Output Summary (Last 24 hours) at 4/13/2021 1055  Last data filed at 4/13/2021 1953  Gross per 24 hour   Intake 240 ml   Output 850 ml   Net -610 ml     CONSTITUTIONAL:  awake, alert, cooperative, no apparent distress, and appears stated age  NECK:  Supple, symmetrical, trachea midline, no adenopathy, thyroid symmetric, not enlarged and no tenderness, skin normal  LUNGS:  no increased work of breathing and clear to auscultation. No accessory muscle use  CARDIOVASCULAR: S1 and S2, no edema and no JVD  ABDOMEN:  normal bowel sounds, non-distended and no masses palpated, and no tenderness to palpation. No hepatospleenomegaly  LYMPHADENOPATHY:  no axillary or supraclavicular adenopathy. No cervical adnenopathy  PSYCHIATRIC: Oriented to person place and time. No obvious depression or anxiety. MUSCULOSKELETAL: No obvious misalignment or effusion of the joints. No clubbing, cyanosis of the digits. SKIN:  normal skin color, texture, turgor and no redness, warmth, or swelling. No palpable nodules    DATA:    Old records have been reviewed    CBC:  No results for input(s): WBC, RBC, HGB, HCT, PLT, MCV, MCH, MCHC, RDW, NRBC, SEGSPCT, BANDSPCT in the last 72 hours. BMP:  Recent Labs     04/11/21  0514 04/12/21  0752 04/13/21  0530    136 139   K 5.2* 4.6 4.7    104 105   CO2 19* 19* 23   BUN 40* 37* 37*   CREATININE 2.1* 2.0* 2.0*   CALCIUM 8.7 8.5 9.2   GLUCOSE 113* 127* 81      ABG:  No results for input(s): PHART, BSY5QWV, PO2ART, UVI5STC, Y8TIZULS, BEART in the last 72 hours. Procalcitonin  No results for input(s): PROCAL in the last 72 hours. Lab Results   Component Value Date    BNP <15 08/06/2011     Lab Results   Component Value Date    CKTOTAL 29 08/07/2011    CKMB 0.88 08/07/2011    TROPONINI 0.01 04/12/2021           Radiology Review:  All pertinent images / reports were reviewed as a part of this visit. Assessment:     1. COPD exacerbation  2.  Acute on chronic kidney disease  3. Chronic hypoxemic respiratory failure      Plan:     I have reviewed laboratories, medical records and images for this visit  CT imaging did not reveal evidence of acute infiltrate. Because of her elevated creatinine, patient had V/Q scan to rule out PE. No convincing evidence of pulmonary embolism on V/Q scan.   Not wheezing on exam  Tolerating room air  Echo looked okay  Still concerned about cardiac contribution to her acute symptoms  Has cardiology to see her, ? cardiac cath

## 2021-04-14 LAB
ANION GAP SERPL CALCULATED.3IONS-SCNC: 13 MMOL/L (ref 3–16)
BUN BLDV-MCNC: 35 MG/DL (ref 7–20)
CALCIUM SERPL-MCNC: 8.7 MG/DL (ref 8.3–10.6)
CHLORIDE BLD-SCNC: 105 MMOL/L (ref 99–110)
CO2: 21 MMOL/L (ref 21–32)
CREAT SERPL-MCNC: 2.1 MG/DL (ref 0.6–1.2)
GFR AFRICAN AMERICAN: 28
GFR NON-AFRICAN AMERICAN: 23
GLUCOSE BLD-MCNC: 87 MG/DL (ref 70–99)
LV EF: 78 %
LVEF MODALITY: NORMAL
POTASSIUM REFLEX MAGNESIUM: 3.9 MMOL/L (ref 3.5–5.1)
SODIUM BLD-SCNC: 139 MMOL/L (ref 136–145)

## 2021-04-14 PROCEDURE — 6370000000 HC RX 637 (ALT 250 FOR IP): Performed by: INTERNAL MEDICINE

## 2021-04-14 PROCEDURE — 36569 INSJ PICC 5 YR+ W/O IMAGING: CPT

## 2021-04-14 PROCEDURE — 94640 AIRWAY INHALATION TREATMENT: CPT

## 2021-04-14 PROCEDURE — 6370000000 HC RX 637 (ALT 250 FOR IP): Performed by: HOSPITALIST

## 2021-04-14 PROCEDURE — 1200000000 HC SEMI PRIVATE

## 2021-04-14 PROCEDURE — 93017 CV STRESS TEST TRACING ONLY: CPT | Performed by: INTERNAL MEDICINE

## 2021-04-14 PROCEDURE — 02HV33Z INSERTION OF INFUSION DEVICE INTO SUPERIOR VENA CAVA, PERCUTANEOUS APPROACH: ICD-10-PCS | Performed by: HOSPITALIST

## 2021-04-14 PROCEDURE — 6360000002 HC RX W HCPCS: Performed by: INTERNAL MEDICINE

## 2021-04-14 PROCEDURE — 2580000003 HC RX 258: Performed by: INTERNAL MEDICINE

## 2021-04-14 PROCEDURE — A9502 TC99M TETROFOSMIN: HCPCS | Performed by: INTERNAL MEDICINE

## 2021-04-14 PROCEDURE — 94761 N-INVAS EAR/PLS OXIMETRY MLT: CPT

## 2021-04-14 PROCEDURE — 78452 HT MUSCLE IMAGE SPECT MULT: CPT

## 2021-04-14 PROCEDURE — 3430000000 HC RX DIAGNOSTIC RADIOPHARMACEUTICAL: Performed by: INTERNAL MEDICINE

## 2021-04-14 PROCEDURE — C1751 CATH, INF, PER/CENT/MIDLINE: HCPCS

## 2021-04-14 PROCEDURE — 80048 BASIC METABOLIC PNL TOTAL CA: CPT

## 2021-04-14 PROCEDURE — 99232 SBSQ HOSP IP/OBS MODERATE 35: CPT | Performed by: INTERNAL MEDICINE

## 2021-04-14 PROCEDURE — 99232 SBSQ HOSP IP/OBS MODERATE 35: CPT | Performed by: NURSE PRACTITIONER

## 2021-04-14 PROCEDURE — 99233 SBSQ HOSP IP/OBS HIGH 50: CPT | Performed by: INTERNAL MEDICINE

## 2021-04-14 PROCEDURE — 36415 COLL VENOUS BLD VENIPUNCTURE: CPT

## 2021-04-14 RX ORDER — SODIUM CHLORIDE 9 MG/ML
25 INJECTION, SOLUTION INTRAVENOUS PRN
Status: DISCONTINUED | OUTPATIENT
Start: 2021-04-14 | End: 2021-04-15 | Stop reason: HOSPADM

## 2021-04-14 RX ORDER — SODIUM CHLORIDE 0.9 % (FLUSH) 0.9 %
5-40 SYRINGE (ML) INJECTION PRN
Status: DISCONTINUED | OUTPATIENT
Start: 2021-04-14 | End: 2021-04-15 | Stop reason: HOSPADM

## 2021-04-14 RX ORDER — SODIUM CHLORIDE 0.9 % (FLUSH) 0.9 %
5-40 SYRINGE (ML) INJECTION EVERY 12 HOURS SCHEDULED
Status: DISCONTINUED | OUTPATIENT
Start: 2021-04-14 | End: 2021-04-15 | Stop reason: HOSPADM

## 2021-04-14 RX ORDER — LIDOCAINE HYDROCHLORIDE 10 MG/ML
5 INJECTION, SOLUTION EPIDURAL; INFILTRATION; INTRACAUDAL; PERINEURAL ONCE
Status: DISCONTINUED | OUTPATIENT
Start: 2021-04-14 | End: 2021-04-15 | Stop reason: HOSPADM

## 2021-04-14 RX ADMIN — TETROFOSMIN 10 MILLICURIE: 1.38 INJECTION, POWDER, LYOPHILIZED, FOR SOLUTION INTRAVENOUS at 10:21

## 2021-04-14 RX ADMIN — TRAMADOL HYDROCHLORIDE 50 MG: 50 TABLET, FILM COATED ORAL at 13:51

## 2021-04-14 RX ADMIN — ANTACID TABLETS 500 MG: 500 TABLET, CHEWABLE ORAL at 13:51

## 2021-04-14 RX ADMIN — Medication 10 ML: at 08:26

## 2021-04-14 RX ADMIN — CHOLESTYRAMINE 4 G: 4 POWDER, FOR SUSPENSION ORAL at 13:51

## 2021-04-14 RX ADMIN — APIXABAN 5 MG: 5 TABLET, FILM COATED ORAL at 20:32

## 2021-04-14 RX ADMIN — IPRATROPIUM BROMIDE AND ALBUTEROL SULFATE 1 AMPULE: .5; 3 SOLUTION RESPIRATORY (INHALATION) at 21:23

## 2021-04-14 RX ADMIN — Medication 10 ML: at 13:52

## 2021-04-14 RX ADMIN — TETROFOSMIN 30 MILLICURIE: 1.38 INJECTION, POWDER, LYOPHILIZED, FOR SOLUTION INTRAVENOUS at 14:47

## 2021-04-14 RX ADMIN — PANTOPRAZOLE SODIUM 40 MG: 40 TABLET, DELAYED RELEASE ORAL at 06:00

## 2021-04-14 RX ADMIN — Medication 10 ML: at 20:33

## 2021-04-14 RX ADMIN — ACETAMINOPHEN 650 MG: 325 TABLET ORAL at 20:32

## 2021-04-14 RX ADMIN — AMITRIPTYLINE HYDROCHLORIDE 75 MG: 25 TABLET, FILM COATED ORAL at 20:32

## 2021-04-14 RX ADMIN — REGADENOSON 0.4 MG: 0.08 INJECTION, SOLUTION INTRAVENOUS at 11:38

## 2021-04-14 RX ADMIN — Medication 10 ML: at 20:32

## 2021-04-14 RX ADMIN — ANTACID TABLETS 500 MG: 500 TABLET, CHEWABLE ORAL at 20:32

## 2021-04-14 RX ADMIN — PREDNISONE 40 MG: 20 TABLET ORAL at 08:26

## 2021-04-14 RX ADMIN — AMLODIPINE BESYLATE 5 MG: 5 TABLET ORAL at 08:26

## 2021-04-14 RX ADMIN — IPRATROPIUM BROMIDE AND ALBUTEROL SULFATE 1 AMPULE: .5; 3 SOLUTION RESPIRATORY (INHALATION) at 16:35

## 2021-04-14 RX ADMIN — APIXABAN 5 MG: 5 TABLET, FILM COATED ORAL at 08:26

## 2021-04-14 RX ADMIN — ANTACID TABLETS 500 MG: 500 TABLET, CHEWABLE ORAL at 08:26

## 2021-04-14 ASSESSMENT — PAIN DESCRIPTION - LOCATION: LOCATION: BACK

## 2021-04-14 ASSESSMENT — PAIN SCALES - GENERAL
PAINLEVEL_OUTOF10: 0
PAINLEVEL_OUTOF10: 0
PAINLEVEL_OUTOF10: 8
PAINLEVEL_OUTOF10: 7
PAINLEVEL_OUTOF10: 2
PAINLEVEL_OUTOF10: 0

## 2021-04-14 ASSESSMENT — PAIN DESCRIPTION - PAIN TYPE
TYPE: CHRONIC PAIN
TYPE: CHRONIC PAIN

## 2021-04-14 NOTE — PLAN OF CARE
Problem: Falls - Risk of:  Goal: Will remain free from falls  Description: Will remain free from falls  Outcome: Ongoing     Problem: Airway Clearance - Ineffective  Goal: Achieve or maintain patent airway  Outcome: Ongoing     Problem: Pain:  Goal: Pain level will decrease  Description: Pain level will decrease  Outcome: Ongoing  Goal: Control of chronic pain  Description: Control of chronic pain  Outcome: Ongoing     Problem: Cardiovascular  Goal: Hemodynamic stability  Outcome: Ongoing     Problem: Respiratory  Goal: No pulmonary complications  Outcome: Ongoing  Goal: O2 Sat > 90%  Outcome: Ongoing

## 2021-04-14 NOTE — PROGRESS NOTES
P Pulmonary and Critical Care  Progress note      Reason for Consult: COPD exacerbation, chronic hypoxemic respiratory failure  Requesting Physician: Dr. Nate Fofana:   [facility] / [facility]:                The patient is a 72 y.o. 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 01/21/2019    COPD (chronic obstructive pulmonary disease) ([address]     PFT done 7 years ago   Shruthi Garcia Crohn's disease ([address]     Crohn's disease    Depression 1/30/2011    pt states resolved as of 3-26-12    DVT (deep venous thrombosis) ([address]     2012; first ocurrence     Hiatal hernia     Hx of blood clots     Kidney disease     Kidney insufficiency     Osteoporosis     Peripheral neuropathy     neuropathy in legs    Pneumonia     Postmenopausal     LMP in  40's    Pulmonary embolism ([address]     2012; first ocurrence    Sepsis ([address]     Syringomyelia ([address]      Interval history: She states she feels about the same. Continued dyspnea. Continues to have some chest discomfort as well. Past Surgical History:        Procedure Laterality Date    ABDOMEN SURGERY      BACK SURGERY      shunt to drain CSF    BIOPSY SHOULDER Left 2/27/2019    EXCISION OF LEFT SHOULDER MASS performed by Helen Dasilva MD at [address] [place] [address]      crainotomy- remove fluid ? V-P SHUNT    BRONCHOSCOPY N/A 9/2/2020    BRONCHOSCOPY ALVEOLAR LAVAGE performed by Tono Ramos MD at [facility]      resection X2    COLONOSCOPY  12/5/11    BIOPSY AND POLYPECTOMY    COLONOSCOPY  4-2-2012    and ablation ERBE/APC    SHOULDER SURGERY      L      Current Medications:    Current Facility-Administered Medications: lidocaine PF 1 % injection 5 mL, 5 mL, Intradermal, Once  sodium chloride flush 0.9 % injection 5-40 mL, 5-40 mL, Intravenous, 2 times per day  sodium chloride flush 0.9 % injection 5-40 mL, 5-40 mL, Intravenous, PRN  0.9 % sodium chloride infusion, 25 mL, Intravenous, PRN  regadenoson (LEXISCAN) injection 0.4 mg, 0.4 mg, Intravenous, ONCE PRN  calcium carbonate (TUMS) chewable tablet 500 mg, 500 mg, Oral, TID  butalbital-acetaminophen-caffeine (FIORICET, ESGIC) per tablet 1 tablet, 1 tablet, Oral, Q4H PRN  aluminum & magnesium hydroxide-simethicone (MAALOX) 200-200-20 MG/5ML suspension 30 mL, 30 mL, Oral, Q6H PRN  sodium chloride flush 0.9 % injection 10 mL, 10 mL, Intravenous, PRN  ipratropium-albuterol (DUONEB) nebulizer solution 1 ampule, 1 ampule, Inhalation, 4x daily  traMADol (ULTRAM) tablet 50 mg, 50 mg, Oral, Q12H PRN  sodium chloride flush 0.9 % injection 5-40 mL, 5-40 mL, Intravenous, PRN  0.9 % sodium chloride infusion, 25 mL, Intravenous, PRN  pantoprazole (PROTONIX) tablet 40 mg, 40 mg, Oral, QAM AC  amitriptyline (ELAVIL) tablet 75 mg, 75 mg, Oral, Nightly  amLODIPine (NORVASC) tablet 5 mg, 5 mg, Oral, Daily  apixaban (ELIQUIS) tablet 5 mg, 5 mg, Oral, BID  sodium chloride flush 0.9 % injection 5-40 mL, 5-40 mL, Intravenous, 2 times per day  sodium chloride flush 0.9 % injection 5-40 mL, 5-40 mL, Intravenous, PRN  0.9 % sodium chloride infusion, 25 mL, Intravenous, PRN  promethazine (PHENERGAN) tablet 12.5 mg, 12.5 mg, Oral, Q6H PRN **OR** ondansetron (ZOFRAN) injection 4 mg, 4 mg, Intravenous, Q6H PRN  polyethylene glycol (GLYCOLAX) packet 17 g, 17 g, Oral, Daily PRN  acetaminophen (TYLENOL) tablet 650 mg, 650 mg, Oral, Q6H PRN **OR** [DISCONTINUED] acetaminophen (TYLENOL) suppository 650 mg, 650 mg, Rectal, Q6H PRN  [COMPLETED] methylPREDNISolone sodium (SOLU-MEDROL) injection 40 mg, 40 mg, Intravenous, Q6H **FOLLOWED BY** predniSONE (DELTASONE) tablet 40 mg, 40 mg, Oral, Daily  cholestyramine (QUESTRAN) packet 4 g, 4 g, Oral, Daily  albuterol sulfate  (90 Base) MCG/ACT inhaler 2 puff, 2 puff, Inhalation, Q2H PRN    No Known Allergies    Social History:    TOBACCO:   reports that she quit smoking about 6 years ago. Her smoking use included cigarettes. She has a 7.50 pack-year smoking history. She has never used smokeless tobacco.  ETOH:   reports no history of alcohol use. Patient currently lives independently  Environmental/chemical exposure: None known    Family History:       Problem Relation Age of Onset    Heart Disease Mother     High Blood Pressure Mother     High Cholesterol Mother     Early Death Mother 36        MI    Heart Disease Father     High Blood Pressure Father     High Cholesterol Father     Stroke Father 79    High Blood Pressure Sister     High Blood Pressure Brother      REVIEW OF SYSTEMS:    CONSTITUTIONAL:  negative for fevers, chills, diaphoresis, activity change, appetite change, fatigue, night sweats and unexpected weight change.    EYES:  negative for blurred vision, eye discharge, visual disturbance and icterus  HEENT:  negative for hearing loss, tinnitus, ear drainage, sinus pressure, nasal congestion, epistaxis and snoring  RESPIRATORY:  See HPI  CARDIOVASCULAR:  negative for chest pain, palpitations, exertional chest pressure/discomfort, edema, syncope  GASTROINTESTINAL:  negative for nausea, vomiting, diarrhea, constipation, blood in stool and abdominal pain  GENITOURINARY:  negative for frequency, dysuria, urinary incontinence, decreased urine volume, and hematuria  HEMATOLOGIC/LYMPHATIC:  negative for easy bruising, bleeding and lymphadenopathy  ALLERGIC/IMMUNOLOGIC:  negative for recurrent infections, angioedema, anaphylaxis and drug reactions  ENDOCRINE:  negative for weight changes and diabetic symptoms including polyuria, polydipsia and polyphagia  MUSCULOSKELETAL:  negative for  pain, joint swelling, decreased range of motion and muscle weakness  NEUROLOGICAL:  negative for headaches, slurred speech, unilateral weakness  PSYCHIATRIC/BEHAVIORAL: negative for hallucinations, behavioral problems, confusion and agitation. Objective:   PHYSICAL EXAM:      VITALS:  /74   Pulse 104   Temp 98.1 °F (36.7 °C) (Oral)   Resp 22   Ht 4' 11\" (1.499 m)   Wt 145 lb 14.4 oz (66.2 kg)   SpO2 96%   BMI 29.47 kg/m²      24HR INTAKE/OUTPUT:      Intake/Output Summary (Last 24 hours) at 4/14/2021 0914  Last data filed at 4/14/2021 0600  Gross per 24 hour   Intake 240 ml   Output 500 ml   Net -260 ml     CONSTITUTIONAL:  awake, alert, cooperative, no apparent distress, and appears stated age  NECK:  Supple, symmetrical, trachea midline, no adenopathy, thyroid symmetric, not enlarged and no tenderness, skin normal  LUNGS:  no increased work of breathing and clear to auscultation. No accessory muscle use  CARDIOVASCULAR: S1 and S2, no edema and no JVD  ABDOMEN:  normal bowel sounds, non-distended and no masses palpated, and no tenderness to palpation. No hepatospleenomegaly  LYMPHADENOPATHY:  no axillary or supraclavicular adenopathy. No cervical adnenopathy  PSYCHIATRIC: Oriented to person place and time. No obvious depression or anxiety. MUSCULOSKELETAL: No obvious misalignment or effusion of the joints. No clubbing, cyanosis of the digits. SKIN:  normal skin color, texture, turgor and no redness, warmth, or swelling. No palpable nodules    DATA:    Old records have been reviewed    CBC:  No results for input(s): WBC, RBC, HGB, HCT, PLT, MCV, MCH, MCHC, RDW, NRBC, SEGSPCT, BANDSPCT in the last 72 hours. BMP:  Recent Labs     04/12/21  0752 04/13/21  0530 04/14/21  0721    139 139   K 4.6 4.7 3.9    105 105   CO2 19* 23 21   BUN 37* 37* 35*   CREATININE 2.0* 2.0* 2.1*   CALCIUM 8.5 9.2 8.7   GLUCOSE 127* 81 87      ABG:  No results for input(s): PHART, QOD9DMF, PO2ART, ELM7QXY, U0CUNJRL, BEART in the last 72 hours. Procalcitonin  No results for input(s): PROCAL in the last 72 hours.     Lab Results   Component Value Date    BNP <15 08/06/2011     Lab Results   Component Value Date CKTOTAL 29 08/07/2011    CKMB 0.88 08/07/2011    TROPONINI 0.01 04/12/2021           Radiology Review:  All pertinent images / reports were reviewed as a part of this visit. Assessment:     1. COPD exacerbation  2. Acute on chronic kidney disease  3. Chronic hypoxemic respiratory failure      Plan:     I have reviewed laboratories, medical records and images for this visit  CT imaging did not reveal evidence of acute infiltrate. Because of her elevated creatinine, patient had V/Q scan to rule out PE. No convincing evidence of pulmonary embolism on V/Q scan. Not wheezing on exam  Tolerating room air  [address] cardiology consultation in  Echo looked okay  Myoview stress is planned  If cardiac work-up is negative, all her symptoms would be attributed to her severe COPD.

## 2021-04-14 NOTE — PROGRESS NOTES
Office : [phone]     Fax :[phone]       Nephrology  Note      Patient's Name: Vikki Arciniega  8:33 AM  4/14/2021    Reason for Consult:  Liz Hutchison on CKD , metabolic acidosis     Chief Complaint:    Chief Complaint   Patient presents with    Shortness of Breath     since Easter. no cough. increase pain with breating         History of Present Ilness:    Vikki Arciniega is a 72 y.o. 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 She reports cough with no significant production. No fevers or chills. In the ER she had CT chest done which did not reveal any acute pathology  Laboratory work-up noted for increased in creatinine from a baseline of 1.5-2.0. EKG sinus rhythm with no ischemic changes. Troponin 0 0.03. INTERVAL HISTORY    Feels better  No SOB  Creatinine stable   Breathing better     I/O last 3 completed shifts:   In: 18 [P.O.:480]  Out: 750 [Urine:750]    Past Medical History:   Diagnosis Date    Bullous emphysema (H[facility])     CKD (chronic kidney disease) stage 3, GFR 30-59 ml/min     Clostridium difficile carrier 01/21/2019    COPD [facility]ch[facility]) ([facility])     PFT done 7 years ago    Crohn's disease ([address]     Crohn's disease    Depression 1/30/2011    pt states resolved as of 3-26-12    DVT (deep venous thrombosis) ([address]     2012; first ocurrence     Hiatal hernia     Hx of blood clots     Kidney disease     Kidney insufficiency     2 times per day  sodium chloride flush 0.9 % injection 5-40 mL, PRN  0.9 % sodium chloride infusion, PRN  promethazine (PHENERGAN) tablet 12.5 mg, Q6H PRN    Or  ondansetron (ZOFRAN) injection 4 mg, Q6H PRN  polyethylene glycol (GLYCOLAX) packet 17 g, Daily PRN  acetaminophen (TYLENOL) tablet 650 mg, Q6H PRN  predniSONE (DELTASONE) tablet 40 mg, Daily  cholestyramine (QUESTRAN) packet 4 g, Daily  albuterol sulfate  (90 Base) MCG/ACT inhaler 2 puff, Q2H PRN            Physical exam:     Vitals:  /74   Pulse 104   Temp 98.1 °F (36.7 °C) (Oral)   Resp 22   Ht 4' 11\" (1.499 m)   Wt 145 lb 14.4 oz (66.2 kg)   SpO2 96%   BMI 29.47 kg/m²   Constitutional:  OAA X3 NAD  Skin: no rash, turgor wnl  Heent:  eomi, mmm  Neck: no bruits or jvd noted  Cardiovascular:  S1, S2 without m/r/g  Respiratory: CTA B without w/r/r  Abdomen:  +bs, soft, nt, nd  Ext: no lower extremity edema      Labs:  CBC:   No results for input(s): WBC, HGB, PLT in the last 72 hours. BMP:    Recent Labs     04/12/21  0752 04/13/21  0530 04/14/21  0721    139 139   K 4.6 4.7 3.9    105 105   CO2 19* 23 21   BUN 37* 37* 35*   CREATININE 2.0* 2.0* 2.1*   GLUCOSE 127* 81 87     Ca/Mg/Phos:   Recent Labs     04/12/21  0752 04/13/21  0530 04/14/21  0721   CALCIUM 8.5 9.2 8.7     Hepatic:   No results for input(s): AST, ALT, ALB, BILITOT, ALKPHOS in the last 72 hours. Troponin:   Recent Labs     04/11/21  1937 04/12/21  0128 04/12/21  0752   TROPONINI 0.01 <0.01 0.01     BNP: No results for input(s): BNP in the last 72 hours. Lipids: No results for input(s): CHOL, TRIG, HDL, LDLCALC, LABVLDL in the last 72 hours. ABGs: No results for input(s): PHART, PO2ART, IUH7EMF in the last 72 hours. INR: No results for input(s): INR in the last 72 hours.   UA:  Recent Labs     04/11/21  1725   COLORU YELLOW   CLARITYU Clear   GLUCOSEU Negative   BILIRUBINUR Negative   KETUA Negative   SPECGRAV 1.012   BLOODU Negative   PHUR 5.0   PROTEINU Negative   UROBILINOGEN 0.2   NITRU Negative   LEUKOCYTESUR Negative   LABMICR Not Indicated   Nadira Back NotGiven      Urine Microscopic:   Recent Labs     04/11/21  1725   HYALCAST 2   WBCUA 1   RBCUA 2   EPIU 1     Urine Culture: No results for input(s): Cleopatra Bret in the last 72 hours. Urine Chemistry:   Recent Labs     04/11/21  1725 04/13/21  0500   LABCREA 50.2  --    NAUR  --  80       IMAGING:  XR CHEST PORTABLE   Final Result   Right-sided opacity concerning for infiltrate and pneumonia. Findings may be   accentuated by rotation. Consider imaging follow-up. NM LUNG VENT/PERFUSION (VQ)   Final Result   Very low probability for pulmonary embolism. XR CHEST (2 VW)   Final Result   No convincing evidence for acute cardiopulmonary pathology. CT CHEST WO CONTRAST   Final Result   No acute abnormality identified in the chest.      The previously noted left upper lobe consolidation on the CT from 09/14/2020   has resolved with mild atelectasis and/or scarring remaining. Emphysematous changes are seen in the lungs. XR CHEST PORTABLE   Final Result   No acute process. NM Cardiac Stress Test Nuclear Imaging    (Results Pending)           Assessment/Plan :      1. MARIS oN CKD Stage 3B  MARIS likely 2/2 hemodynamic changes   Improving   No edema   Creat stable around 2.0     2. HTN. Controlled     3. H/o COPD. Exacerbation now . On steroids     4. Acid- base disorder. Has chronic metabolic acidosis . Improved     Continue  calcium carbonate TID     5. Electrolytes  Mild hyperkalemia   Low k diet        Recommend to dose adjust all medications  based on renal functions  Maintain SBP> 90 mmHg   Daily weights   AVOID NSAIDs  Avoid Nephrotoxins  Monitor Intake/Output  Call if significant decrease in urine output     Unable to get any IV access.   Okay to get midline  Going for cardiac stress test today      If plan is to discharge home later today then okay with  nephrology  will follow up in office in 1 week      Electronically signed by: Alfred Luna MD, 4/14/2021,       [facility] [address]  Office : [phone]  Fax :[phone]

## 2021-04-14 NOTE — PROGRESS NOTES
[address]   Cardiology Progress Note     Date: 4/14/2021  Admit Date: 4/8/2021     Reason for consultation: chest pain     Chief Complaint:   Chief Complaint   Patient presents with    Shortness of Breath     since Easter. no cough. increase pain with breating       History of Present Illness: History obtained from patient and medical record. Dorian Moritz is a 72 y.o. female presented to the hospital with complaints of SOB and Chest pain. She has severe dyspnea with any exertion. She had some R sided Chest pain. Pain is worse with deep inspiration A VQ scan showed low probability of PE. Troponin and lactate was elevated slightly on arrival. + family h/o heart disease. Former smoker. Interval Hx: Today, she had her stress test that showed normal myocardial perfusion. Her chest feels slightly better today. Stable shortness of breath, generally improved since admit. Tearful during visit because she doesn't like that she cannot do things for herself. Patient seen and examined. Clinical notes reviewed. Telemetry reviewed. No new complaints today. No major events overnight. Denies having chest pain, palpitations, orthopnea/PND, cough, or dizziness at the time of this visit.       Past Medical History:  Past Medical History:   Diagnosis Date    Bullous emphysema (HCC)     CKD (chronic kidney disease) stage 3, GFR 30-59 ml/min     Clostridium difficile carrier 01/21/2019    COPD (chronic obstructive pulmonary disease) ([address]     PFT done 7 years ago   Tristian iHckey Crohn's disease ([address]     Crohn's disease    Depression 1/30/2011    pt states resolved as of 3-26-12    DVT (deep venous thrombosis) ([address]     2012; first ocurrence     Hiatal hernia     Hx of blood clots     Kidney disease     Kidney insufficiency     Osteoporosis     Peripheral neuropathy     neuropathy in legs    Pneumonia     Postmenopausal     LMP in  40's    Pulmonary embolism ([address]     2012; first ocurrence    Sepsis ([address]  Syringomyelia ([facility]         Past Surgical History:    has a past surgical history that includes shoulder surgery; back surgery; brain surgery; Colon surgery; Colonoscopy (12/5/11); Colonoscopy (4-2-2012); Cholecystectomy; Abdomen surgery; Biopsy shoulder (Left, 2/27/2019); and bronchoscopy (N/A, 9/2/2020). Social History:  Reviewed. reports that she quit smoking about 6 years ago. Her smoking use included cigarettes. She has a 7.50 pack-year smoking history. She has never used smokeless tobacco. She reports that she does not drink alcohol or use drugs. Allergies:  No Known Allergies    Family History:  Reviewed. family history includes Early Death (age of onset: 36) in her mother; Heart Disease in her father and mother; High Blood Pressure in her brother, father, mother, and sister; High Cholesterol in her father and mother; Stroke (age of onset: 79) in her father. Denies family history of sudden cardiac death, arrhythmia, premature CAD    Home Meds:  Prior to Visit Medications    Medication Sig Taking?  Authorizing Provider   Adalimumab (HUMIRA PEN SC) Inject into the skin Yes Historical Provider, MD   ELIQUIS 5 MG TABS tablet TAKE 1 TABLET BY MOUTH TWICE A DAY Yes Brittany Bentley MD   amLODIPine (NORVASC) 5 MG tablet Take 1 tablet by mouth daily Yes Joe Mendez MD   albuterol (PROVENTIL) (2.5 MG/3ML) 0.083% nebulizer solution TAKE 3 MLS BY NEBULIZATION EVERY 6 HOURS AS NEEDED FOR WHEEZING DX: COPD ICD-10: J44.9 Yes Brittany Bentley MD   SYMBICORT 160-4.5 MCG/ACT AERO TAKE 2 PUFFS BY MOUTH TWICE A DAY Yes Joe Mendez MD   SPIRIVA RESPIMAT 2.5 MCG/ACT AERS inhaler INHALE 2 PUFFS BY MOUTH INTO THE LUNGS DAILY Yes Joe Mendez MD   colestipol (COLESTID) 1 g tablet TAKE 1 TABLET BY MOUTH TWICE A DAY Yes Historical Provider, MD   amitriptyline (ELAVIL) 150 MG tablet Take 0.5 tablets by mouth nightly Yes Joe Mendez MD   predniSONE (DELTASONE) 10 MG tablet Take 1-1/2 tablets (15mg) every morning until seen Yes Pauline Quijano MD   potassium chloride (KLOR-CON M) 20 MEQ extended release tablet Take 1 tablet by mouth daily Yes Marco Staton MD   cyclobenzaprine (FLEXERIL) 5 MG tablet Take 5 mg by mouth 4 times daily Every 6 hours for muscle spasms Yes Historical Provider, MD   albuterol sulfate  (90 Base) MCG/ACT inhaler Inhale 2 puffs into the lungs every 6 hours as needed for Wheezing Yes Dion Burgos MD   alendronate (FOSAMAX) 70 MG tablet Take 1 tablet by mouth every 7 days Yes Shade Murphy MD   predniSONE (DELTASONE) 5 MG tablet Take 1 tablet by mouth 2 times daily  Dion Burgos MD   albuterol sulfate  (90 Base) MCG/ACT inhaler Inhale 2 puffs into the lungs every 6 hours as needed for Wheezing  Dion Burgos MD   Nutritional Supplements (ENSURE HIGH PROTEIN) LIQD Take 1 Can by mouth 2 times daily  Patient not taking: Reported on 2/23/2021  Pauline Quijano MD   Amino Acids-Protein Hydrolys (PROTEINEX P18) LIQD Take 1 Bottle by mouth 2 times daily  Patient not taking: Reported on 2/23/2021  Pauline Quijano MD   omeprazole (PRILOSEC) 20 MG delayed release capsule Take 20 mg by mouth Daily Takes as needed  Historical Provider, MD        Scheduled Meds:   lidocaine 1 % injection  5 mL Intradermal Once    sodium chloride flush  5-40 mL Intravenous 2 times per day    calcium carbonate  500 mg Oral TID    ipratropium-albuterol  1 ampule Inhalation 4x daily    pantoprazole  40 mg Oral QAM AC    amitriptyline  75 mg Oral Nightly    amLODIPine  5 mg Oral Daily    apixaban  5 mg Oral BID    sodium chloride flush  5-40 mL Intravenous 2 times per day    predniSONE  40 mg Oral Daily    cholestyramine  4 g Oral Daily     Continuous Infusions:   sodium chloride      sodium chloride      sodium chloride       PRN Meds:sodium chloride flush, sodium chloride, butalbital-acetaminophen-caffeine, aluminum & magnesium hydroxide-simethicone, sodium chloride flush, traMADol, sodium chloride flush, sodium chloride, sodium chloride flush, sodium chloride, promethazine **OR** ondansetron, polyethylene glycol, acetaminophen **OR** [DISCONTINUED] acetaminophen, albuterol sulfate HFA     Review of Systems:  · Constitutional: Negative for fever, night sweats, chills,  · Skin: Negative for rash, pruritus, bleeding, or bruising    · HEENT: Negative for vision changes, ringing in the ears, dysphagia  · Respiratory: Reviewed in HPI  · Cardiovascular: Reviewed in HPI  · Gastrointestinal: Negative for abdominal pain, N/V/D, constipation, or black/tarry stools  · Genito-Urinary: Negative for dysuria, incontinence, or hematuria  · Musculoskeletal: Negative for joint swelling, muscle pain, or injuries  · Neurological/Psych: Negative for confusion, seizures, headaches, or TIA-like symptoms. No anxiety or depression. Physical Examination:  Vitals:    04/14/21 1338   BP: 133/85   Pulse: 98   Resp: 22   Temp: 97.6 °F (36.4 °C)   SpO2: 96%      In: 360 [P.O.:360]  Out: 1000    Wt Readings from Last 3 Encounters:   04/14/21 145 lb 14.4 oz (66.2 kg)   02/23/21 149 lb (67.6 kg)   12/17/20 141 lb (64 kg)       Intake/Output Summary (Last 24 hours) at 4/14/2021 1612  Last data filed at 4/14/2021 1414  Gross per 24 hour   Intake 360 ml   Output 1200 ml   Net -840 ml       Telemetry: Personally Reviewed  - Sinus rhythm   · Constitutional: Cooperative and in no apparent distress, and appears well nourished  · Skin: Warm and pink; no pallor, cyanosis, clubbing, or bruising   · HEENT: Symmetric and normocephalic. PERRL. Conjunctiva pink with clear sclera. Mucus membranes moist.   · Cardiovascular: Regular rate and rhythm. S1/S2 present without murmurs, no rubs or gallops. Peripheral pulses 2+, capillary refill < 3 seconds. No elevation of JVP. No peripheral edema  · Respiratory: Respirations symmetric and unlabored.  Lungs clear to auscultation bilaterally, no wheezing, crackles, or rhonchi  · Gastrointestinal: Abdomen soft and round. Bowel sounds active without tenderness. · Musculoskeletal: Bilateral upper and lower extremity strength with generalized weakness  · Neurologic/Psych: Awake and orientated to person, place and time. Calm affect, appropriate mood    Pertinent labs, diagnostic, device, and imaging results reviewed as a part of this visit    Labs:    BMP:   Recent Labs     21  0752 21  0530 21  0721    139 139   K 4.6 4.7 3.9    105 105   CO2 19* 23 21   BUN 37* 37* 35*   CREATININE 2.0* 2.0* 2.1*     CrCl cannot be calculated (Unknown ideal weight.). CBC: No results for input(s): WBC, HGB, HCT, MCV, PLT in the last 72 hours.   Thyroid:   Lab Results   Component Value Date    TSH 2.31 2016     Lipids:   Lab Results   Component Value Date    CHOL 194 2020     2020    HDL 75 2011    TRIG 185 2020     LFTS:   Lab Results   Component Value Date    ALT 9 2021    AST 13 2021    ALKPHOS 88 2021    PROT 6.6 2021    PROT 7.1 2012    AGRATIO 1.4 2021    BILITOT <0.2 2021     Cardiac Enzymes:   Lab Results   Component Value Date    CKTOTAL 29 2011    CKMB 0.88 2011    TROPONINI 0.01 2021    TROPONINI <0.01 2021    TROPONINI 0.01 2021     Coags:   Lab Results   Component Value Date    PROTIME 15.1 2020    PROTIME 12.9 2011    INR 1.30 2020     EC21  Normal sinus rhythm  Minimal voltage criteria for LVH, may be normal variant  Borderline ECG  When compared with ECG of 2021 12:48,  Nonspecific T wave abnormality has replaced inverted T waves in Lateral leads    ECHO:  21   Summary   *Left ventricle - normal size, thickness and function with EF of 60%   *Mitral valve - annular calcification, mild regurgitation   *Tricuspid valve - mild regurgitation with PASP of 30mmHg    Stress Test: 21  Summary    *No EKG evidence for ischemia with lexiscan    *Normal LV function with EF of 78%    *SPECT imaging with homogeneous tracer distribution throughout the    myocardium with stress and rest.        Impression    *No EKG evidence for ischemia with lexiscan    *Normal LV function with EF of 78%    *Normal myocardial perfusion       Problem List:   Patient Active Problem List    Diagnosis Date Noted    Acute exacerbation of chronic obstructive pulmonary disease (COPD) ([facility] 04/08/2021    Acute renal failure superimposed on stage 3 chronic kidney disease ([facility] 04/08/2021    Essential hypertension 02/24/2021    Stage 3b chronic kidney disease 34/80/2796    Metabolic acidosis     Long term (current) use of systemic steroids     HCAP (healthcare-associated pneumonia) 09/14/2020    History of DVT (deep vein thrombosis)     Small vessel disease, cerebrovascular     Ex-smoker     History of depression     Overweight     Cerebrovascular accident (CVA) ([address]     MARIS (acute kidney injury) ([facility]     COPD exacerbation ([facility] 05/22/2019    Wound of left shoulder     Hx pulmonary embolism     SOB (shortness of breath) 01/20/2019    History of pulmonary embolism     Acute deep vein thrombosis (DVT) of distal vein of both lower extremities ([address] 12/30/2017    Bilateral pulmonary embolism (HCC)     Sepsis ([address] 12/18/2017    Gastroesophageal reflux disease without esophagitis     Osteoporosis of multiple sites 06/22/2017    Hemoglobin C trait ([address] 05/12/2017    Acute on chronic respiratory failure with hypoxia (HCC)     Bronchiectasis with acute exacerbation ([address]     Alpha thalassemia minor 10/19/2016    COPD, severe ([address] 02/05/2016    Chronic hypoxemic respiratory failure ([address] 11/12/2015    Hiatal hernia 11/12/2015    Anemia 10/26/2015    Centrilobular emphysema ([address]     Pulmonary fibrosis (HCC)     Shortness of breath 10/25/2015    Skin ulcer of shoulder ([address] 03/20/2015    Gastritis and gastroduodenitis 04/29/2014    Syringomyelia ([facility]     Dysphagia 11/13/2012    Lupus anticoagulant disorder ([facility] 02/04/2011    Osteoarthritis 01/31/2011    Crohn disease ([facility] 01/30/2011    Depression 01/30/2011        Assessment and Plan:     Chest pain   ~ Atypical in nature. No ACS. ~ low probability of PE per VQ scan  ~ Myoview today with normal perfusion     Shortness of breath   ~ more likely pulmonary related vs anxiety   ~ Echo with EF 60%, PASP 30mmHg  ~ Myoview today with normal perfusion     COPD exacerbation   ~ on steroids and nebulizer per pulm     Chronic hypoxemic respiratory failure   ~ on room air     History of PE  ~ on eliquis     Hypertension   ~ controlled     Acute on CKD  ~ per nephrology      Pt stable from cardiac standpoint. Will sign off. Call with any further questions or concerns. Multiple medical conditions with risk of decompensation. All pertinent information and plan of care discussed with the physician. All questions and concerns were addressed to the patient. Alternatives to my treatment were discussed. I have discussed the above stated plan with patient and the nurse. The patient verbalized understanding and agreed with the plan. Thank you for allowing to us to participate in the care of Raina Claire. Total visit time > 35 minutes; > 50% spend counseling / coordinating care. I reviewed interval history, physical exam, review of data including labs, imaging, development and implementation of treatment plan and coordination of complex care.     Priya WEBER-CNP  [address]   Office: [phone]

## 2021-04-14 NOTE — PROGRESS NOTES
Hospitalist Progress Note      PCP: Yosef Sánchez MD    Date of Admission: 4/8/2021      Subjective:   Reports Sob, wheezing especially with ambulation. . Has pleuritic chest pain. . Overall getting better      Medications:  Reviewed    Infusion Medications    sodium chloride      sodium chloride      sodium chloride       Scheduled Medications    lidocaine 1 % injection  5 mL Intradermal Once    sodium chloride flush  5-40 mL Intravenous 2 times per day    calcium carbonate  500 mg Oral TID    ipratropium-albuterol  1 ampule Inhalation 4x daily    pantoprazole  40 mg Oral QAM AC    amitriptyline  75 mg Oral Nightly    amLODIPine  5 mg Oral Daily    apixaban  5 mg Oral BID    sodium chloride flush  5-40 mL Intravenous 2 times per day    predniSONE  40 mg Oral Daily    cholestyramine  4 g Oral Daily     PRN Meds: sodium chloride flush, sodium chloride, technetium tetrofosmin, regadenoson, butalbital-acetaminophen-caffeine, aluminum & magnesium hydroxide-simethicone, sodium chloride flush, traMADol, sodium chloride flush, sodium chloride, sodium chloride flush, sodium chloride, promethazine **OR** ondansetron, polyethylene glycol, acetaminophen **OR** [DISCONTINUED] acetaminophen, albuterol sulfate HFA      Intake/Output Summary (Last 24 hours) at 4/14/2021 1121  Last data filed at 4/14/2021 0600  Gross per 24 hour   Intake 240 ml   Output 500 ml   Net -260 ml       Exam:    /74   Pulse 104   Temp 98.1 °F (36.7 °C) (Oral)   Resp 22   Ht 4' 11\" (1.499 m)   Wt 145 lb 14.4 oz (66.2 kg)   SpO2 96%   BMI 29.47 kg/m²     General appearance: No apparent distress, appears stated age and cooperative. HEENT: Pupils equal, round, and reactive to light. Conjunctivae/corneas clear. Neck: Supple, with full range of motion. No jugular venous distention. Trachea midline. Respiratory:  Normal respiratory effort. Clear to auscultation, bilaterally without Rales/Wheezes/Rhonchi.   Cardiovascular: Regular rate and rhythm with normal S1/S2 without murmurs, rubs or gallops. Abdomen: Soft, non-tender, non-distended with normal bowel sounds. Musculoskeletal: No clubbing, cyanosis or edema bilaterally. Full range of motion without deformity. Skin: Skin color, texture, turgor normal.  No rashes or lesions. Neurologic:  Neurovascularly intact without any focal sensory/motor deficits. Cranial nerves: II-XII intact, grossly non-focal.  Psychiatric: Alert and oriented, thought content appropriate, normal insight  Capillary Refill: Brisk,< 3 seconds   Peripheral Pulses: +2 palpable, equal bilaterally       Labs:   No results for input(s): WBC, HGB, HCT, PLT in the last 72 hours. Recent Labs     04/12/21  0752 04/13/21  0530 04/14/21  0721    139 139   K 4.6 4.7 3.9    105 105   CO2 19* 23 21   BUN 37* 37* 35*   CREATININE 2.0* 2.0* 2.1*   CALCIUM 8.5 9.2 8.7     No results for input(s): AST, ALT, BILIDIR, BILITOT, ALKPHOS in the last 72 hours. No results for input(s): INR in the last 72 hours. Recent Labs     04/11/21  1937 04/12/21  0128 04/12/21  0752   TROPONINI 0.01 <0.01 0.01       Assessment/Plan:    -Severe COPD with acute exacerbation  -Chronic hypoxic respiratory failure -currently on room air  -Atypical chest pain related COPD exacerbation--VQ scan with very low PE probability. . Stress test is negative  -MARIS on CKD stage III--- creatinine stable  -Metabolic acidosis due to CKD- improved   -Chronic tobacco abuse-smoking cessation encouraged  -Essential hypertension-stable-on amlodipine  -History of DVT/PE-on Eliquis    Plan  Continue steroids, bronchodilators  Continue chronic medications  Appreciate all consult services  Discharge home in am if she continues to improve    DVT Prophylaxis: Eliquis  Diet: Diet NPO, After Midnight  Code Status: Full Code      Yolanda Landers MD

## 2021-04-14 NOTE — PROGRESS NOTES
Nutrition Assessment     Type and Reason for Visit: Initial(LOS)    Nutrition Recommendations/Plan:   No nutrition rec's @ this time. Nutrition Assessment:  Nutrition status is stable AEB po intake greater than 50% of meals & no significant wt changes per hx. Pt is NPO & in stress lab. At this time pt is at low nutrition risk.     Malnutrition Assessment:  Malnutrition Status: No malnutrition    Nutrition Related Findings: trace edema BUE; active BS; k+ 5.2 (4/11) & 3.9 (4/14)      Current Nutrition Therapies:    Diet NPO, After Midnight    Anthropometric Measures:  · Height: 4' 11\" (149.9 cm)  · Current Body Wt: 145 lb (65.8 kg)   · BMI: 29.3    Nutrition Diagnosis:   No nutrition diagnosis at this time    Nutrition Interventions:   Food and/or Nutrient Delivery:  Continue Current Diet(low k+ as appropriate)  Nutrition Education/Counseling:      Coordination of Nutrition Care:  Continue to monitor while inpatient    Goals:  po intake greater than 50% of meals       Nutrition Monitoring and Evaluation:   Behavioral-Environmental Outcomes:  None Identified   Food/Nutrient Intake Outcomes:  Food and Nutrient Intake  Physical Signs/Symptoms Outcomes:  Biochemical Data     Discharge Planning:    No discharge needs at this time     Electronically signed by Carmen Aldana RD, LD on 4/14/21 at 11:37 AM EDT    Contact: [phone]

## 2021-04-15 VITALS
SYSTOLIC BLOOD PRESSURE: 116 MMHG | HEART RATE: 104 BPM | BODY MASS INDEX: 29.6 KG/M2 | DIASTOLIC BLOOD PRESSURE: 68 MMHG | WEIGHT: 146.8 LBS | RESPIRATION RATE: 21 BRPM | TEMPERATURE: 97.7 F | HEIGHT: 59 IN | OXYGEN SATURATION: 91 %

## 2021-04-15 LAB
ANION GAP SERPL CALCULATED.3IONS-SCNC: 11 MMOL/L (ref 3–16)
BUN BLDV-MCNC: 34 MG/DL (ref 7–20)
CALCIUM SERPL-MCNC: 8.5 MG/DL (ref 8.3–10.6)
CHLORIDE BLD-SCNC: 103 MMOL/L (ref 99–110)
CO2: 23 MMOL/L (ref 21–32)
CREAT SERPL-MCNC: 2.1 MG/DL (ref 0.6–1.2)
GFR AFRICAN AMERICAN: 28
GFR NON-AFRICAN AMERICAN: 23
GLUCOSE BLD-MCNC: 126 MG/DL (ref 70–99)
POTASSIUM REFLEX MAGNESIUM: 4 MMOL/L (ref 3.5–5.1)
SODIUM BLD-SCNC: 137 MMOL/L (ref 136–145)

## 2021-04-15 PROCEDURE — 94640 AIRWAY INHALATION TREATMENT: CPT

## 2021-04-15 PROCEDURE — 6370000000 HC RX 637 (ALT 250 FOR IP): Performed by: INTERNAL MEDICINE

## 2021-04-15 PROCEDURE — 2580000003 HC RX 258: Performed by: INTERNAL MEDICINE

## 2021-04-15 PROCEDURE — 80048 BASIC METABOLIC PNL TOTAL CA: CPT

## 2021-04-15 PROCEDURE — 94760 N-INVAS EAR/PLS OXIMETRY 1: CPT

## 2021-04-15 PROCEDURE — 99232 SBSQ HOSP IP/OBS MODERATE 35: CPT | Performed by: INTERNAL MEDICINE

## 2021-04-15 PROCEDURE — 6370000000 HC RX 637 (ALT 250 FOR IP): Performed by: HOSPITALIST

## 2021-04-15 RX ADMIN — IPRATROPIUM BROMIDE AND ALBUTEROL SULFATE 1 AMPULE: .5; 3 SOLUTION RESPIRATORY (INHALATION) at 07:39

## 2021-04-15 RX ADMIN — PANTOPRAZOLE SODIUM 40 MG: 40 TABLET, DELAYED RELEASE ORAL at 05:36

## 2021-04-15 RX ADMIN — Medication 20 ML: at 08:23

## 2021-04-15 RX ADMIN — APIXABAN 5 MG: 5 TABLET, FILM COATED ORAL at 08:22

## 2021-04-15 RX ADMIN — AMLODIPINE BESYLATE 5 MG: 5 TABLET ORAL at 08:22

## 2021-04-15 RX ADMIN — ANTACID TABLETS 500 MG: 500 TABLET, CHEWABLE ORAL at 08:22

## 2021-04-15 RX ADMIN — PREDNISONE 40 MG: 20 TABLET ORAL at 08:22

## 2021-04-15 ASSESSMENT — PAIN SCALES - GENERAL: PAINLEVEL_OUTOF10: 0

## 2021-04-15 NOTE — DISCHARGE SUMMARY
Hospital Discharge Summary    Patient's PCP: Yosef Sánchez MD  Admit Date: 4/8/2021   Discharge Date: 4/15/2021    Admitting Physician: Dr. Tristan Escobedo MD  Discharge Physician: Dr. Filipe Crook   Consults: cardiology, pulmonary/intensive care and nephrology    Brief HPI/hospital course:         72 y.o. female with history of severe COPD and emphysema, hypertension, history of VTE with PE and DVT on Eliquis, GERD, stage III kidney disease presented with shortness of breath, chest pains. .In the ER she had CT chest done without contrast which did not reveal any acute pathology  Laboratory work-up noted for increased in creatinine from a baseline of 1.5-1.9. EKG sinus rhythm with no ischemic changes. Troponin 0 0.03. Treated for COPD exacerbation with steroids and bronchodilators. . Had a VQ scan which was low probability for PE. Nadyne Harleton Chest pain was believed to be atypical due to COPD. . Nuclear med stress test was normal.. Patient also had MARIS on CKD stage III, baseline 1.5-1.9, creatinine plateaued at 2.1.. Nephrology was following as well. .    Invasive procedures:  none    Discharge Diagnoses:   -Severe COPD with acute exacerbation  -Chronic hypoxic respiratory failure -currently on room air  -Atypical chest pain related COPD exacerbation--VQ scan with very low PE probability. . Stress test is negative  -MARIS on CKD stage III--- creatinine stable  -Metabolic acidosis due to CKD- improved   -Chronic tobacco abuse-smoking cessation encouraged  -Essential hypertension-stable-on amlodipine  -History of DVT/PE-on Eliquis  -Anemia of chronic kidney disease    Physical Exam: /68   Pulse 104   Temp 97.7 °F (36.5 °C) (Temporal)   Resp 21   Ht 4' 11\" (1.499 m)   Wt 146 lb 12.8 oz (66.6 kg)   SpO2 91% Comment: Patient eating and moving during this time  BMI 29.65 kg/m²   Gen/overall appearance: Not in acute distress. Alert.   Head: Normocephalic, atraumatic  Eyes: EOMI, good acuity  ENT:- Oral mucosa moist  Neck: No JVD, thyromegaly  CVS: Nml S1S2, no MRG, RRR  Pulm: Clear bilaterally. No crackles/wheezes  Gastrointestinal: Soft, NT/ND, +BS  Musculoskeletal: No edema. Warm  Neuro: No focal deficit. Moves extremity spontaneously. Psychiatry: Appropriate affect. Not agitated. Skin: Warm, dry with normal turgor. No rash        Significant Diagnostic Studies:    See above        Treatments: As above. Discharge Medications:     Medication List      CHANGE how you take these medications    * albuterol sulfate  (90 Base) MCG/ACT inhaler  Inhale 2 puffs into the lungs every 6 hours as needed for Wheezing  What changed: Another medication with the same name was removed. Continue taking this medication, and follow the directions you see here. * albuterol (2.5 MG/3ML) 0.083% nebulizer solution  Commonly known as: PROVENTIL  TAKE 3 MLS BY NEBULIZATION EVERY 6 HOURS AS NEEDED FOR WHEEZING DX: COPD ICD-10: J44.9  What changed: Another medication with the same name was removed. Continue taking this medication, and follow the directions you see here. predniSONE 5 MG tablet  Commonly known as: DELTASONE  Take 1 tablet by mouth 2 times daily  What changed: Another medication with the same name was removed. Continue taking this medication, and follow the directions you see here. * This list has 2 medication(s) that are the same as other medications prescribed for you. Read the directions carefully, and ask your doctor or other care provider to review them with you.             CONTINUE taking these medications    alendronate 70 MG tablet  Commonly known as: FOSAMAX  Take 1 tablet by mouth every 7 days     amitriptyline 150 MG tablet  Commonly known as: ELAVIL  Take 0.5 tablets by mouth nightly     amLODIPine 5 MG tablet  Commonly known as: NORVASC  Take 1 tablet by mouth daily     colestipol 1 g tablet  Commonly known as: COLESTID     cyclobenzaprine 5 MG tablet  Commonly known as: FLEXERIL Eliquis 5 MG Tabs tablet  Generic drug: apixaban  TAKE 1 TABLET BY MOUTH TWICE A DAY     Ensure High Protein Liqd  Take 1 Can by mouth 2 times daily     HUMIRA PEN SC     omeprazole 20 MG delayed release capsule  Commonly known as: PRILOSEC     potassium chloride 20 MEQ extended release tablet  Commonly known as: KLOR-CON M  Take 1 tablet by mouth daily     Proteinex P18 Liqd  Take 1 Bottle by mouth 2 times daily     Spiriva Respimat 2.5 MCG/ACT Aers inhaler  Generic drug: tiotropium  INHALE 2 PUFFS BY MOUTH INTO THE LUNGS DAILY     Symbicort 160-4.5 MCG/ACT Aero  Generic drug: budesonide-formoterol  TAKE 2 PUFFS BY MOUTH TWICE A DAY            Activity: activity as tolerated  Diet: DIET GENERAL;      Disposition: home  Discharged Condition: Stable  Follow Up:   Stanley Ascencio, [address] [address] [facility]  [phone]    Schedule an appointment as soon as possible for a visit      Efrain Richards MD  [place] [address]  [phone]    Schedule an appointment as soon as possible for a visit          Code status:  Full Code         Total time spent on discharge, finalizing medications, referrals and arranging outpatient follow up was more than 45 minutes      Thank you Dr. Stanley Ascencio MD for the opportunity to be involved in this patients care.

## 2021-04-15 NOTE — CARE COORDINATION
Patient discharged on 4/15/21 to home with skilled hhc services via  [facility]  agency    To resume the home care services with [facility]. Called & talked  her CM- Stephanie Bishop with [facility] -state the services will be  resumed as from today.     All discharge needs met per case management

## 2021-04-15 NOTE — PROGRESS NOTES
Patient notified of DC order - patient stated that ride will not be able to arrive until 12pm or 1pm.

## 2021-04-15 NOTE — CARE COORDINATION
Negrita Del Rosario with [facility] agency  informed of patient's d/c. Stated with resume hhc services tomorrow.

## 2021-04-15 NOTE — PROGRESS NOTES
Office : [phone]     Fax :[phone]       Nephrology  Note      Patient's Name: Wayne Carvalho  7:34 AM  4/15/2021    Reason for Consult:  Enid Sandy on CKD , metabolic acidosis     Chief Complaint:    Chief Complaint   Patient presents with    Shortness of Breath     since Easter. no cough. increase pain with breating         History of Present Ilness:    Wayne Carvalho is a 72 y.o. 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 She reports cough with no significant production. No fevers or chills. In the ER she had CT chest done which did not reveal any acute pathology  Laboratory work-up noted for increased in creatinine from a baseline of 1.5-2.0. EKG sinus rhythm with no ischemic changes. Troponin 0 0.03. INTERVAL HISTORY    Feels better  No SOB  Creatinine stable   Breathing better     I/O last 3 completed shifts:   In: 12 [P.O.:800]  Out: 56 [Urine:1150]    Past Medical History:   Diagnosis Date    Bullous emphysema (HCC)     CKD (chronic kidney disease) stage 3, GFR 30-59 ml/min     Clostridium difficile carrier 01/21/2019    COPD (chronic obstructive pulmonary disease) (HCC)     PFT done 7 years ago   Kelin Murguia Crohn's disease ([facility]     Crohn's disease    Depression 1/30/2011    pt states resolved as of 3-26-12    DVT (deep venous thrombosis) ([facility]     2012; first ocurrence     Hiatal hernia     Hx of blood clots     Kidney disease     Kidney insufficiency  Osteoporosis     Peripheral neuropathy     neuropathy in legs    Pneumonia     Postmenopausal     LMP in  40's    Pulmonary embolism ([facility]     2012; first ocurrence    Sepsis ([facility]     Syringomyelia ([facility]        Past Surgical History:   Procedure Laterality Date    ABDOMEN SURGERY      BACK SURGERY      shunt to drain CSF    BIOPSY SHOULDER Left 2/27/2019    EXCISION OF LEFT SHOULDER MASS performed by Genesis Aleman MD at [facility]      crainotomy- remove fluid ? V-P SHUNT    BRONCHOSCOPY N/A 9/2/2020    BRONCHOSCOPY ALVEOLAR LAVAGE performed by Flory Amaral MD at [place] [address]      resection X2    COLONOSCOPY  12/5/11    BIOPSY AND POLYPECTOMY    COLONOSCOPY  4-2-2012    and ablation ERBE/APC    SHOULDER SURGERY      L        Family History   Problem Relation Age of Onset    Heart Disease Mother     High Blood Pressure Mother     High Cholesterol Mother     Early Death Mother 36        MI    Heart Disease Father     High Blood Pressure Father     High Cholesterol Father     Stroke Father 79    High Blood Pressure Sister     High Blood Pressure Brother        Current Medications:    lidocaine PF 1 % injection 5 mL, Once  sodium chloride flush 0.9 % injection 5-40 mL, 2 times per day  sodium chloride flush 0.9 % injection 5-40 mL, PRN  0.9 % sodium chloride infusion, PRN  calcium carbonate (TUMS) chewable tablet 500 mg, TID  butalbital-acetaminophen-caffeine (FIORICET, ESGIC) per tablet 1 tablet, Q4H PRN  aluminum & magnesium hydroxide-simethicone (MAALOX) 200-200-20 MG/5ML suspension 30 mL, Q6H PRN  sodium chloride flush 0.9 % injection 10 mL, PRN  ipratropium-albuterol (DUONEB) nebulizer solution 1 ampule, 4x daily  traMADol (ULTRAM) tablet 50 mg, Q12H PRN  sodium chloride flush 0.9 % injection 5-40 mL, PRN  0.9 % sodium chloride infusion, PRN  pantoprazole (PROTONIX) tablet 40 mg, QAM AC  amitriptyline (ELAVIL) tablet 75 mg, Nightly  amLODIPine (NORVASC) tablet 5 mg, Daily  apixaban (ELIQUIS) tablet 5 mg, BID  sodium chloride flush 0.9 % injection 5-40 mL, 2 times per day  sodium chloride flush 0.9 % injection 5-40 mL, PRN  0.9 % sodium chloride infusion, PRN  promethazine (PHENERGAN) tablet 12.5 mg, Q6H PRN    Or  ondansetron (ZOFRAN) injection 4 mg, Q6H PRN  polyethylene glycol (GLYCOLAX) packet 17 g, Daily PRN  acetaminophen (TYLENOL) tablet 650 mg, Q6H PRN  predniSONE (DELTASONE) tablet 40 mg, Daily  cholestyramine (QUESTRAN) packet 4 g, Daily  albuterol sulfate  (90 Base) MCG/ACT inhaler 2 puff, Q2H PRN            Physical exam:     Vitals:  BP (!) 157/87   Pulse 64   Temp 98 °F (36.7 °C) (Oral)   Resp 18   Ht 4' 11\" (1.499 m)   Wt 146 lb 12.8 oz (66.6 kg)   SpO2 97%   BMI 29.65 kg/m²   Constitutional:  OAA X3 NAD  Skin: no rash, turgor wnl  Heent:  eomi, mmm  Neck: no bruits or jvd noted  Cardiovascular:  S1, S2 without m/r/g  Respiratory: CTA B without w/r/r  Abdomen:  +bs, soft, nt, nd  Ext: no lower extremity edema      Labs:  CBC:   No results for input(s): WBC, HGB, PLT in the last 72 hours. BMP:    Recent Labs     04/12/21  0752 04/13/21  0530 04/14/21  0721    139 139   K 4.6 4.7 3.9    105 105   CO2 19* 23 21   BUN 37* 37* 35*   CREATININE 2.0* 2.0* 2.1*   GLUCOSE 127* 81 87     Ca/Mg/Phos:   Recent Labs     04/12/21  0752 04/13/21  0530 04/14/21  0721   CALCIUM 8.5 9.2 8.7     Hepatic:   No results for input(s): AST, ALT, ALB, BILITOT, ALKPHOS in the last 72 hours. Troponin:   Recent Labs     04/12/21  0752   TROPONINI 0.01     BNP: No results for input(s): BNP in the last 72 hours. Lipids: No results for input(s): CHOL, TRIG, HDL, LDLCALC, LABVLDL in the last 72 hours. ABGs: No results for input(s): PHART, PO2ART, DXW5SHU in the last 72 hours. INR: No results for input(s): INR in the last 72 hours.   UA:  No results for input(s): ALFONSO Denson, [address] [place] [address], [address] [place] [address], Morena Drafts, UROBILINOGEN, NITRU, LEUKOCYTESUR, Idelia Imus in the last 72 hours. Urine Microscopic:   No results for input(s): LABCAST, BACTERIA, COMU, HYALCAST, WBCUA, RBCUA, EPIU in the last 72 hours. Urine Culture: No results for input(s): LABURIN in the last 72 hours. Urine Chemistry:   Recent Labs     04/13/21  0500   NAUR 80       IMAGING:  NM Cardiac Stress Test Nuclear Imaging   Final Result      XR CHEST PORTABLE   Final Result   Right-sided opacity concerning for infiltrate and pneumonia. Findings may be   accentuated by rotation. Consider imaging follow-up. NM LUNG VENT/PERFUSION (VQ)   Final Result   Very low probability for pulmonary embolism. XR CHEST (2 VW)   Final Result   No convincing evidence for acute cardiopulmonary pathology. CT CHEST WO CONTRAST   Final Result   No acute abnormality identified in the chest.      The previously noted left upper lobe consolidation on the CT from 09/14/2020   has resolved with mild atelectasis and/or scarring remaining. Emphysematous changes are seen in the lungs. XR CHEST PORTABLE   Final Result   No acute process. Assessment/Plan :      1. MARIS oN CKD Stage 3B  MARIS likely 2/2 hemodynamic changes   Improving   No edema   Creat stable around 2.0     2. HTN. Controlled     3. H/o COPD. Exacerbation now . On steroids     4. Acid- base disorder. Has chronic metabolic acidosis . Improved     Continue  calcium carbonate TID     5. Electrolytes  Mild hyperkalemia   Low k diet        Stable from nephrology standpoint. Will follow up in the office.         Electronically signed by: Sesar Arrieta MD, 4/15/2021,       [facility] [address]  Office : [phone]  Fax :[phone]

## 2021-04-15 NOTE — PLAN OF CARE
Problem: Airway Clearance - Ineffective  Goal: Achieve or maintain patent airway  Outcome: Ongoing     Problem: Pain:  Goal: Pain level will decrease  Description: Pain level will decrease  Outcome: Ongoing  Goal: Control of chronic pain  Description: Control of chronic pain  Outcome: Ongoing     Problem: Cardiovascular  Goal: Hemodynamic stability  Outcome: Ongoing     Problem: Respiratory  Goal: No pulmonary complications  Outcome: Ongoing  Goal: O2 Sat > 90%  Outcome: Ongoing

## 2021-04-15 NOTE — PROGRESS NOTES
P Pulmonary and Critical Care  Progress note      Reason for Consult: COPD exacerbation, chronic hypoxemic respiratory failure  Requesting Physician: Dr. Babcock [place]:   [facility] / HPI:                The patient is a 72 y.o. 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 01/21/2019    COPD (chronic obstructive pulmonary disease) ([address]     PFT done 7 years ago   Nadira Leda Crohn's disease ([address]     Crohn's disease    Depression 1/30/2011    pt states resolved as of 3-26-12    DVT (deep venous thrombosis) ([address]     2012; first ocurrence     Hiatal hernia     Hx of blood clots     Kidney disease     Kidney insufficiency     Osteoporosis     Peripheral neuropathy     neuropathy in legs    Pneumonia     Postmenopausal     LMP in  40's    Pulmonary embolism ([address]     2012; first ocurrence    Sepsis ([address]     Syringomyelia ([address]      Interval history: She is feeling better. Myoview stress was negative. Past Surgical History:        Procedure Laterality Date    ABDOMEN SURGERY      BACK SURGERY      shunt to drain CSF    BIOPSY SHOULDER Left 2/27/2019    EXCISION OF LEFT SHOULDER MASS performed by Arslan Rascon MD at [facility]      crainotomy- remove fluid ? V-P SHUNT    BRONCHOSCOPY N/A 9/2/2020    BRONCHOSCOPY ALVEOLAR LAVAGE performed by Tyson Macias [facility]      resection X2    COLONOSCOPY  12/5/11    BIOPSY AND POLYPECTOMY    COLONOSCOPY  4-2-2012    and ablation ERBE/APC    SHOULDER SURGERY      L      Current Medications:    Current Facility-Administered Medications: lidocaine PF 1 % injection 5 mL, 5 mL, Intradermal, Once  sodium chloride flush 0.9 % injection 5-40 mL, 5-40 mL, Intravenous, 2 times per day  sodium chloride flush 0.9 % injection 5-40 mL, 5-40 mL, Intravenous, PRN  0.9 % sodium chloride infusion, 25 mL, Intravenous, PRN  calcium carbonate (TUMS) chewable tablet 500 mg, 500 mg, Oral, TID  butalbital-acetaminophen-caffeine (FIORICET, ESGIC) per tablet 1 tablet, 1 tablet, Oral, Q4H PRN  aluminum & magnesium hydroxide-simethicone (MAALOX) 200-200-20 MG/5ML suspension 30 mL, 30 mL, Oral, Q6H PRN  sodium chloride flush 0.9 % injection 10 mL, 10 mL, Intravenous, PRN  ipratropium-albuterol (DUONEB) nebulizer solution 1 ampule, 1 ampule, Inhalation, 4x daily  traMADol (ULTRAM) tablet 50 mg, 50 mg, Oral, Q12H PRN  sodium chloride flush 0.9 % injection 5-40 mL, 5-40 mL, Intravenous, PRN  0.9 % sodium chloride infusion, 25 mL, Intravenous, PRN  pantoprazole (PROTONIX) tablet 40 mg, 40 mg, Oral, QAM AC  amitriptyline (ELAVIL) tablet 75 mg, 75 mg, Oral, Nightly  amLODIPine (NORVASC) tablet 5 mg, 5 mg, Oral, Daily  apixaban (ELIQUIS) tablet 5 mg, 5 mg, Oral, BID  sodium chloride flush 0.9 % injection 5-40 mL, 5-40 mL, Intravenous, 2 times per day  sodium chloride flush 0.9 % injection 5-40 mL, 5-40 mL, Intravenous, PRN  0.9 % sodium chloride infusion, 25 mL, Intravenous, PRN  promethazine (PHENERGAN) tablet 12.5 mg, 12.5 mg, Oral, Q6H PRN **OR** ondansetron (ZOFRAN) injection 4 mg, 4 mg, Intravenous, Q6H PRN  polyethylene glycol (GLYCOLAX) packet 17 g, 17 g, Oral, Daily PRN  acetaminophen (TYLENOL) tablet 650 mg, 650 mg, Oral, Q6H PRN **OR** [DISCONTINUED] acetaminophen (TYLENOL) suppository 650 mg, 650 mg, Rectal, Q6H PRN  cholestyramine (QUESTRAN) packet 4 g, 4 g, Oral, Daily  albuterol sulfate  (90 Base) MCG/ACT inhaler 2 puff, 2 puff, Inhalation, Q2H PRN    No Known Allergies    Social History:    TOBACCO:   reports that she quit smoking about 6 years ago. Her smoking use included cigarettes. She has a 7.50 pack-year smoking history. She has never used smokeless tobacco.  ETOH:   reports no history of alcohol use.   Patient currently lives independently  Environmental/chemical exposure: None known    Family History:       Problem Relation Age of Onset    Heart Disease Mother     High Blood Pressure Mother     High Cholesterol Mother     Early Death Mother 36        MI    Heart Disease Father     High Blood Pressure Father     High Cholesterol Father     Stroke Father 79    High Blood Pressure Sister     High Blood Pressure Brother      REVIEW OF SYSTEMS:    CONSTITUTIONAL:  negative for fevers, chills, diaphoresis, activity change, appetite change, fatigue, night sweats and unexpected weight change. EYES:  negative for blurred vision, eye discharge, visual disturbance and icterus  HEENT:  negative for hearing loss, tinnitus, ear drainage, sinus pressure, nasal congestion, epistaxis and snoring  RESPIRATORY:  See HPI  CARDIOVASCULAR:  negative for chest pain, palpitations, exertional chest pressure/discomfort, edema, syncope  GASTROINTESTINAL:  negative for nausea, vomiting, diarrhea, constipation, blood in stool and abdominal pain  GENITOURINARY:  negative for frequency, dysuria, urinary incontinence, decreased urine volume, and hematuria  HEMATOLOGIC/LYMPHATIC:  negative for easy bruising, bleeding and lymphadenopathy  ALLERGIC/IMMUNOLOGIC:  negative for recurrent infections, angioedema, anaphylaxis and drug reactions  ENDOCRINE:  negative for weight changes and diabetic symptoms including polyuria, polydipsia and polyphagia  MUSCULOSKELETAL:  negative for  pain, joint swelling, decreased range of motion and muscle weakness  NEUROLOGICAL:  negative for headaches, slurred speech, unilateral weakness  PSYCHIATRIC/BEHAVIORAL: negative for hallucinations, behavioral problems, confusion and agitation.      Objective:   PHYSICAL EXAM:      VITALS:  /68   Pulse 104   Temp 97.7 °F (36.5 °C) (Temporal)   Resp 21   Ht 4' 11\" (1.499 m)   Wt 146 lb 12.8 oz (66.6 kg)   SpO2 91% Comment: Patient eating and moving during this time  BMI 29.65 chronic kidney disease  3. Chronic hypoxemic respiratory failure      Plan:     I have reviewed laboratories, medical records and images for this visit  CT imaging did not reveal evidence of acute infiltrate. Because of her elevated creatinine, patient had V/Q scan to rule out PE. No convincing evidence of pulmonary embolism on V/Q scan.   Not wheezing on exam  Tolerating room air  Echo looked okay  Myoview stress is okay  Okay with me if she is discharged home  Follow-up with us in 2 weeks

## 2021-04-16 ENCOUNTER — TELEPHONE (OUTPATIENT)
Dept: PULMONOLOGY | Age: 71
End: 2021-04-16

## 2021-04-16 ENCOUNTER — TELEPHONE (OUTPATIENT)
Dept: INTERNAL MEDICINE CLINIC | Age: 71
End: 2021-04-16

## 2021-04-16 NOTE — TELEPHONE ENCOUNTER
[facility] Transitions Initial Follow Up Call    Outreach made within 2 business days of discharge: Yes    Patient: Wayne Carvalho Patient : 1950   MRN: [id number]  Reason for Admission: There are no discharge diagnoses documented for the most recent discharge.   Discharge Date: 4/15/21       Spoke with: unable to leave voicemail    Discharge department/facility: [facility]  Scheduled appointment with PCP within 7-14 days    Follow Up  Future Appointments   Date Time Provider Kia Guadarrama   2021  9:00 AM MD TATIANA Brantley  [place]   2021  1:30 PM Eulalia Love MD [facility]   2021  2:30 PM Eulalia Love MD [facility]   2021 11:15 AM Renee Taylor MD [facility]       Kole Enamorado MA

## 2021-04-19 ENCOUNTER — OFFICE VISIT (OUTPATIENT)
Dept: INTERNAL MEDICINE CLINIC | Age: 71
End: 2021-04-19
Payer: MEDICARE

## 2021-04-19 VITALS
DIASTOLIC BLOOD PRESSURE: 68 MMHG | SYSTOLIC BLOOD PRESSURE: 116 MMHG | WEIGHT: 149 LBS | HEART RATE: 81 BPM | BODY MASS INDEX: 30.09 KG/M2 | TEMPERATURE: 96.8 F

## 2021-04-19 DIAGNOSIS — N17.9 ACUTE RENAL FAILURE SUPERIMPOSED ON STAGE 3 CHRONIC KIDNEY DISEASE, UNSPECIFIED ACUTE RENAL FAILURE TYPE, UNSPECIFIED WHETHER STAGE 3A OR 3B CKD (HCC): ICD-10-CM

## 2021-04-19 DIAGNOSIS — J44.1 ACUTE EXACERBATION OF CHRONIC OBSTRUCTIVE PULMONARY DISEASE (COPD) (HCC): Primary | ICD-10-CM

## 2021-04-19 DIAGNOSIS — N18.30 ACUTE RENAL FAILURE SUPERIMPOSED ON STAGE 3 CHRONIC KIDNEY DISEASE, UNSPECIFIED ACUTE RENAL FAILURE TYPE, UNSPECIFIED WHETHER STAGE 3A OR 3B CKD (HCC): ICD-10-CM

## 2021-04-19 LAB
A/G RATIO: 1.8 (ref 1.1–2.2)
ALBUMIN SERPL-MCNC: 3.7 G/DL (ref 3.4–5)
ALP BLD-CCNC: 72 U/L (ref 40–129)
ALT SERPL-CCNC: 12 U/L (ref 10–40)
ANION GAP SERPL CALCULATED.3IONS-SCNC: 15 MMOL/L (ref 3–16)
AST SERPL-CCNC: 10 U/L (ref 15–37)
BILIRUB SERPL-MCNC: <0.2 MG/DL (ref 0–1)
BUN BLDV-MCNC: 26 MG/DL (ref 7–20)
CALCIUM SERPL-MCNC: 8.8 MG/DL (ref 8.3–10.6)
CHLORIDE BLD-SCNC: 100 MMOL/L (ref 99–110)
CO2: 21 MMOL/L (ref 21–32)
CREAT SERPL-MCNC: 2.2 MG/DL (ref 0.6–1.2)
GFR AFRICAN AMERICAN: 27
GFR NON-AFRICAN AMERICAN: 22
GLOBULIN: 2.1 G/DL
GLUCOSE BLD-MCNC: 96 MG/DL (ref 70–99)
POTASSIUM SERPL-SCNC: 4.2 MMOL/L (ref 3.5–5.1)
SODIUM BLD-SCNC: 136 MMOL/L (ref 136–145)
TOTAL PROTEIN: 5.8 G/DL (ref 6.4–8.2)

## 2021-04-19 PROCEDURE — 1111F DSCHRG MED/CURRENT MED MERGE: CPT | Performed by: INTERNAL MEDICINE

## 2021-04-19 PROCEDURE — 99496 TRANSJ CARE MGMT HIGH F2F 7D: CPT | Performed by: INTERNAL MEDICINE

## 2021-04-19 RX ORDER — POTASSIUM CHLORIDE 750 MG/1
10 TABLET, FILM COATED, EXTENDED RELEASE ORAL DAILY
COMMUNITY
End: 2021-11-23 | Stop reason: SDUPTHER

## 2021-04-19 ASSESSMENT — PATIENT HEALTH QUESTIONNAIRE - PHQ9
SUM OF ALL RESPONSES TO PHQ QUESTIONS 1-9: 0
SUM OF ALL RESPONSES TO PHQ QUESTIONS 1-9: 0
SUM OF ALL RESPONSES TO PHQ9 QUESTIONS 1 & 2: 0

## 2021-04-19 NOTE — PROGRESS NOTES
chronic kidney disease    Acute exacerbation of chronic obstructive pulmonary disease (COPD) ([address]    Acute renal failure superimposed on stage 3 chronic kidney disease (HCC)       No Known Allergies    Medications listed as ordered at the time of discharge from hospital   Nica Portillo COBY   Home Medication Instructions HAROON:    Printed on:04/19/21 0981   Medication Information                      Adalimumab (HUMIRA PEN SC)  Inject into the skin             albuterol (PROVENTIL) (2.5 MG/3ML) 0.083% nebulizer solution  TAKE 3 MLS BY NEBULIZATION EVERY 6 HOURS AS NEEDED FOR WHEEZING DX: COPD ICD-10: J44.9             albuterol sulfate  (90 Base) MCG/ACT inhaler  Inhale 2 puffs into the lungs every 6 hours as needed for Wheezing             alendronate (FOSAMAX) 70 MG tablet  Take 1 tablet by mouth every 7 days             Amino Acids-Protein Hydrolys (PROTEINEX P18) LIQD  Take 1 Bottle by mouth 2 times daily             amitriptyline (ELAVIL) 150 MG tablet  Take 0.5 tablets by mouth nightly             amLODIPine (NORVASC) 5 MG tablet  Take 1 tablet by mouth daily             colestipol (COLESTID) 1 g tablet  TAKE 1 TABLET BY MOUTH TWICE A DAY             cyclobenzaprine (FLEXERIL) 5 MG tablet  Take 5 mg by mouth 4 times daily Every 6 hours for muscle spasms             ELIQUIS 5 MG TABS tablet  TAKE 1 TABLET BY MOUTH TWICE A DAY             Nutritional Supplements (ENSURE HIGH PROTEIN) LIQD  Take 1 Can by mouth 2 times daily             omeprazole (PRILOSEC) 20 MG delayed release capsule  Take 20 mg by mouth Daily Takes as needed             potassium chloride (KLOR-CON) 10 MEQ extended release tablet  TAKE 1 TABLET BY MOUTH EVERY DAY             predniSONE (DELTASONE) 5 MG tablet  Take 1 tablet by mouth 2 times daily             SPIRIVA RESPIMAT 2.5 MCG/ACT AERS inhaler  INHALE 2 PUFFS BY MOUTH INTO THE LUNGS DAILY             SYMBICORT 160-4.5 MCG/ACT AERO  TAKE 2 PUFFS BY MOUTH TWICE A DAY Medications marked \"taking\" at this time  Outpatient Medications Marked as Taking for the 4/19/21 encounter (Office Visit) with Elsa Conner MD   Medication Sig Dispense Refill    Adalimumab (HUMIRA PEN SC) Inject into the skin      predniSONE (DELTASONE) 5 MG tablet Take 1 tablet by mouth 2 times daily 60 tablet 0    ELIQUIS 5 MG TABS tablet TAKE 1 TABLET BY MOUTH TWICE A DAY 60 tablet 6    amLODIPine (NORVASC) 5 MG tablet Take 1 tablet by mouth daily 30 tablet 5    albuterol (PROVENTIL) (2.5 MG/3ML) 0.083% nebulizer solution TAKE 3 MLS BY NEBULIZATION EVERY 6 HOURS AS NEEDED FOR WHEEZING DX: COPD ICD-10: J44.9 300 mL 6    SYMBICORT 160-4.5 MCG/ACT AERO TAKE 2 PUFFS BY MOUTH TWICE A DAY 30.6 Inhaler 3    SPIRIVA RESPIMAT 2.5 MCG/ACT AERS inhaler INHALE 2 PUFFS BY MOUTH INTO THE LUNGS DAILY 3 Inhaler 2    colestipol (COLESTID) 1 g tablet TAKE 1 TABLET BY MOUTH TWICE A DAY      amitriptyline (ELAVIL) 150 MG tablet Take 0.5 tablets by mouth nightly 45 tablet 3    Nutritional Supplements (ENSURE HIGH PROTEIN) LIQD Take 1 Can by mouth 2 times daily 60 Can 1    Amino Acids-Protein Hydrolys (PROTEINEX P18) LIQD Take 1 Bottle by mouth 2 times daily 30 Bottle 2    cyclobenzaprine (FLEXERIL) 5 MG tablet Take 5 mg by mouth 4 times daily Every 6 hours for muscle spasms      albuterol sulfate  (90 Base) MCG/ACT inhaler Inhale 2 puffs into the lungs every 6 hours as needed for Wheezing 1 Inhaler 11    alendronate (FOSAMAX) 70 MG tablet Take 1 tablet by mouth every 7 days 12 tablet 3    omeprazole (PRILOSEC) 20 MG delayed release capsule Take 20 mg by mouth Daily Takes as needed          Medications patient taking as of now reconciled against medications ordered at time of hospital discharge: Yes    Chief Complaint   Patient presents with    Follow-Up from Hospital     Pt states that she thought she had a blood clot however it was dx as COPD.  Pt is having trouble with her breathing and can't walk far       HPI    Inpatient course: Discharge summary reviewed- see chart. Interval history/Current status: patient was discharged on prednisone. She notes that her breathing is better but not  Back a baseline. She is not using her oxygen when she is out. Review of Systems    Vitals:    04/19/21 0901   BP: 116/68   Site: Left Upper Arm   Position: Sitting   Cuff Size: Medium Adult   Pulse: 81   Temp: 96.8 °F (36 °C)   TempSrc: Infrared   Weight: 149 lb (67.6 kg)     Body mass index is 30.09 kg/m². Wt Readings from Last 3 Encounters:   04/19/21 149 lb (67.6 kg)   04/15/21 146 lb 12.8 oz (66.6 kg)   02/23/21 149 lb (67.6 kg)     BP Readings from Last 3 Encounters:   04/19/21 116/68   04/15/21 116/68   02/23/21 (!) 164/75       Physical Exam  Constitutional:       Appearance: She is ill-appearing. HENT:      Right Ear: Tympanic membrane and ear canal normal.      Left Ear: Tympanic membrane and ear canal normal.      Nose: Nose normal. No congestion or rhinorrhea. Mouth/Throat:      Mouth: Mucous membranes are moist.      Pharynx: No oropharyngeal exudate or posterior oropharyngeal erythema. Eyes:      General:         Right eye: No discharge. Left eye: No discharge. Pupils: Pupils are equal, round, and reactive to light. Neck:      Musculoskeletal: Normal range of motion. No neck rigidity or muscular tenderness. Cardiovascular:      Rate and Rhythm: Normal rate and regular rhythm. Pulmonary:      Effort: No respiratory distress. Breath sounds: No stridor. Rhonchi present. No wheezing. Neurological:      Mental Status: She is alert. Assessment/Plan:  1. Acute exacerbation of chronic obstructive pulmonary disease (COPD) ([facility])  improved  - [place] DISCHARGE MEDS RECONCILED W/ CURRENT OUTPATIENT MED LIST  -  Continue inhalers and prednisone  -  Follow-up with pulmonary next week  -  Use oxygen    2.  Acute renal failure superimposed on stage 3 chronic kidney disease, unspecified acute renal failure type, unspecified whether stage 3a or 3b CKD ([facility]  rafa  - Comprehensive Metabolic Panel  -  Follow-up with Dr. Patrica Ceballos Making: high complexity

## 2021-04-29 ENCOUNTER — OFFICE VISIT (OUTPATIENT)
Dept: PULMONOLOGY | Age: 71
End: 2021-04-29
Payer: MEDICARE

## 2021-04-29 VITALS
TEMPERATURE: 96.8 F | DIASTOLIC BLOOD PRESSURE: 72 MMHG | SYSTOLIC BLOOD PRESSURE: 138 MMHG | HEART RATE: 85 BPM | OXYGEN SATURATION: 95 %

## 2021-04-29 DIAGNOSIS — R06.02 SOB (SHORTNESS OF BREATH): ICD-10-CM

## 2021-04-29 DIAGNOSIS — J44.9 COPD, SEVERE (HCC): ICD-10-CM

## 2021-04-29 DIAGNOSIS — J96.11 CHRONIC HYPOXEMIC RESPIRATORY FAILURE (HCC): Primary | ICD-10-CM

## 2021-04-29 PROCEDURE — 4040F PNEUMOC VAC/ADMIN/RCVD: CPT | Performed by: NURSE PRACTITIONER

## 2021-04-29 PROCEDURE — 1036F TOBACCO NON-USER: CPT | Performed by: NURSE PRACTITIONER

## 2021-04-29 PROCEDURE — 1090F PRES/ABSN URINE INCON ASSESS: CPT | Performed by: NURSE PRACTITIONER

## 2021-04-29 PROCEDURE — 1123F ACP DISCUSS/DSCN MKR DOCD: CPT | Performed by: NURSE PRACTITIONER

## 2021-04-29 PROCEDURE — G8427 DOCREV CUR MEDS BY ELIG CLIN: HCPCS | Performed by: NURSE PRACTITIONER

## 2021-04-29 PROCEDURE — 1111F DSCHRG MED/CURRENT MED MERGE: CPT | Performed by: NURSE PRACTITIONER

## 2021-04-29 PROCEDURE — G8417 CALC BMI ABV UP PARAM F/U: HCPCS | Performed by: NURSE PRACTITIONER

## 2021-04-29 PROCEDURE — G8399 PT W/DXA RESULTS DOCUMENT: HCPCS | Performed by: NURSE PRACTITIONER

## 2021-04-29 PROCEDURE — 99214 OFFICE O/P EST MOD 30 MIN: CPT | Performed by: NURSE PRACTITIONER

## 2021-04-29 PROCEDURE — G8926 SPIRO NO PERF OR DOC: HCPCS | Performed by: NURSE PRACTITIONER

## 2021-04-29 PROCEDURE — 3017F COLORECTAL CA SCREEN DOC REV: CPT | Performed by: NURSE PRACTITIONER

## 2021-04-29 PROCEDURE — 3023F SPIROM DOC REV: CPT | Performed by: NURSE PRACTITIONER

## 2021-04-29 RX ORDER — BUDESONIDE 0.5 MG/2ML
500 INHALANT ORAL 2 TIMES DAILY
Qty: 60 AMPULE | Refills: 6 | Status: SHIPPED | OUTPATIENT
Start: 2021-04-29 | End: 2021-06-01 | Stop reason: ALTCHOICE

## 2021-04-29 RX ORDER — ARFORMOTEROL TARTRATE 15 UG/2ML
1 SOLUTION RESPIRATORY (INHALATION) 2 TIMES DAILY
Qty: 120 ML | Refills: 6 | Status: SHIPPED | OUTPATIENT
Start: 2021-04-29 | End: 2021-06-01 | Stop reason: ALTCHOICE

## 2021-04-29 ASSESSMENT — ENCOUNTER SYMPTOMS
COLOR CHANGE: 0
SHORTNESS OF BREATH: 1
ABDOMINAL PAIN: 0
CONSTIPATION: 0
COUGH: 0

## 2021-04-29 NOTE — PROGRESS NOTES
[facility] Follow Up Note    Subjective:   CHIEF COMPLAINT / HPI: Recent hospitalization 4/8-4/15 AECOPD   The patient is 72 y.o. female who presents today for a routine follow up visit related to the above mentioned issues. There is a PMH significant for chron's disease, CKD, COPD, depression, DVT/PE on Eliquis and hiatal hernia. She presented to the hospital with worsening SOB. CT chest did not reveal evidence of infiltrate. V/Q scan was not convincing for PE. She was treated for AECOPD with systemic steroids. She had chronic oxygen requirement. She did undergo myoview stress test which revealed normal perfusion. Presently she reports her breathing has improved but is still not back to her baseline. Based on her last visit notes and her report, it sounds like her breathing has been worse for a while now. She denies much cough. There are no fevers or chills. She is compliant with Symbicort BID, Spiriva QD and ANTONIA. She is taking Prednisone 5 mg BID. She notes breathlessness just walking into the next room. She is wearing her O2 at night but sometimes finds herself feeling like she needs it during the day even when her oxygen saturations are in the mid 90s.         Past Medical History:   Diagnosis Date    Bullous emphysema (HCC)     CKD (chronic kidney disease) stage 3, GFR 30-59 ml/min     Clostridium difficile carrier 01/21/2019    COPD (chronic obstructive pulmonary disease) ([address]     PFT done 7 years ago   Hailey Kessler Crohn's disease ([address]     Crohn's disease    Depression 1/30/2011    pt states resolved as of 3-26-12    DVT (deep venous thrombosis) ([address]     2012; first ocurrence     Hiatal hernia     Hx of blood clots     Kidney disease     Kidney insufficiency     Osteoporosis     Peripheral neuropathy     neuropathy in legs    Pneumonia     Postmenopausal     LMP in  40's    Pulmonary embolism ([address]     2012; first ocurrence    Sepsis ([address]     Syringomyelia ([address]      Social History:    Social History     Tobacco Use   Smoking Status Former Smoker    Packs/day: 0.25    Years: 30.00    Pack years: 7.50    Types: Cigarettes    Quit date: 2015    Years since quittin.1   Smokeless Tobacco Never Used   Tobacco Comment    started smoking at age 26 / smoked up to 9 cigarettes a day      Current Medications:     Current Outpatient Medications on File Prior to Visit   Medication Sig Dispense Refill    potassium chloride (KLOR-CON) 10 MEQ extended release tablet TAKE 1 TABLET BY MOUTH EVERY DAY      Adalimumab (HUMIRA PEN SC) Inject into the skin      ELIQUIS 5 MG TABS tablet TAKE 1 TABLET BY MOUTH TWICE A DAY 60 tablet 6    amLODIPine (NORVASC) 5 MG tablet Take 1 tablet by mouth daily 30 tablet 5    albuterol (PROVENTIL) (2.5 MG/3ML) 0.083% nebulizer solution TAKE 3 MLS BY NEBULIZATION EVERY 6 HOURS AS NEEDED FOR WHEEZING DX: COPD ICD-10: J44.9 300 mL 6    SYMBICORT 160-4.5 MCG/ACT AERO TAKE 2 PUFFS BY MOUTH TWICE A DAY 30.6 Inhaler 3    SPIRIVA RESPIMAT 2.5 MCG/ACT AERS inhaler INHALE 2 PUFFS BY MOUTH INTO THE LUNGS DAILY 3 Inhaler 2    colestipol (COLESTID) 1 g tablet TAKE 1 TABLET BY MOUTH TWICE A DAY      amitriptyline (ELAVIL) 150 MG tablet Take 0.5 tablets by mouth nightly 45 tablet 3    Nutritional Supplements (ENSURE HIGH PROTEIN) LIQD Take 1 Can by mouth 2 times daily 60 Can 1    Amino Acids-Protein Hydrolys (PROTEINEX P18) LIQD Take 1 Bottle by mouth 2 times daily 30 Bottle 2    cyclobenzaprine (FLEXERIL) 5 MG tablet Take 5 mg by mouth 4 times daily Every 6 hours for muscle spasms      albuterol sulfate  (90 Base) MCG/ACT inhaler Inhale 2 puffs into the lungs every 6 hours as needed for Wheezing 1 Inhaler 11    alendronate (FOSAMAX) 70 MG tablet Take 1 tablet by mouth every 7 days 12 tablet 3    omeprazole (PRILOSEC) 20 MG delayed release capsule Take 20 mg by mouth Daily Takes as needed       No current facility-administered medications on file prior to visit. Review of Systems   Constitutional: Negative for chills and fever. HENT: Negative for congestion and postnasal drip. Respiratory: Positive for shortness of breath. Negative for cough. Cardiovascular: Negative for chest pain and leg swelling. Gastrointestinal: Negative for abdominal pain and constipation. Musculoskeletal: Negative for arthralgias and joint swelling. Skin: Negative for color change and pallor. Allergic/Immunologic: Negative for environmental allergies and food allergies. Psychiatric/Behavioral: Negative for agitation and confusion. Objective:       VITALS:  /72   Pulse 85   Temp 96.8 °F (36 °C) (Temporal)   SpO2 95% Comment: RA at rest     Physical Exam  Vitals signs reviewed. Constitutional:       General: She is not in acute distress. Appearance: She is well-developed. HENT:      Head: Normocephalic. Mouth/Throat:      Pharynx: No oropharyngeal exudate. Eyes:      General:         Right eye: No discharge. Left eye: No discharge. Conjunctiva/sclera: Conjunctivae normal.      Pupils: Pupils are equal, round, and reactive to light. Neck:      Musculoskeletal: Normal range of motion and neck supple. Trachea: No tracheal deviation. Cardiovascular:      Rate and Rhythm: Normal rate and regular rhythm. Heart sounds: No friction rub. Pulmonary:      Effort: Pulmonary effort is normal. No tachypnea, accessory muscle usage or respiratory distress. Breath sounds: No stridor. No decreased breath sounds, wheezing, rhonchi or rales. Chest:      Chest wall: No tenderness or crepitus. Abdominal:      General: Bowel sounds are normal. There is no distension. Palpations: Abdomen is soft. Tenderness: There is no abdominal tenderness. Musculoskeletal: Normal range of motion. Lymphadenopathy:      Cervical: No cervical adenopathy. Skin:     General: Skin is warm and dry. Findings: No erythema. Neurological:      Mental Status: She is alert and oriented to person, place, and time. Psychiatric:         Thought Content: Thought content normal.       DATA:      Radiology Review:  Pertinent images / reports were reviewed as a part of this visit. CT chest done 4/8/21 reveals the following:  No acute abnormality identified in the chest.   The previously noted left upper lobe consolidation on the CT from 09/14/2020 has resolved with mild atelectasis and/or scarring remaining. Emphysematous changes are seen in the lungs. Last PFTs done July 2019:  Spirometry reveals decreased FVC at 1.49 liters, which  is 57% predicted. FEV1 is decreased at 0.94 liters, which is 48%  predicted. FEV1/FVC ratio is reduced at 63%. Postbronchodilator study  is not performed. Lung volumes reveal normal total lung capacity. Residual volume is increased at 159% predicted. Diffusion capacity is  decreased at 27% predicted. In comparison to a study from 02/2017, FVC  has decreased by 21%. FEV1 is increased by 19% and residual volume is  increased by 17%. Diffusion capacity is decreased by 37%. IMPRESSION:  Severe obstructive lung disease with air trapping. There  has been significant worsening since the preceding study. Assessment / Plan:   1. Chronic hypoxemic respiratory failure (HCC)  - Good oxygen saturations on RA at rest today in the office  - She continues to benefit from O2 with sleep    2. COPD, severe ([address]  - SOB is really just worse than baseline and has been for awhile now  - She doesn't feel like her controllers are working as well as they had in the past  - Continue Spiriva  - Convert Symbicort to nebulized Pulmicort / Arielle Gill  - Continue Albuterol q4 WA  - Continue Prednisone at 10 mg daily for now    3.  SOB (shortness of breath)  - I do think this is somewhat multifactorial given severe COPD but also obesity and deconditioning     Return in about 4 weeks (around 5/27/2021) for short term follow up of symptoms. RTC sooner if symptoms worsen.     Xiomara Bauer MSN APRN-ACNP CCRN

## 2021-05-03 RX ORDER — AMLODIPINE BESYLATE 5 MG/1
TABLET ORAL
Qty: 90 TABLET | Refills: 1 | Status: SHIPPED | OUTPATIENT
Start: 2021-05-03 | End: 2021-08-19

## 2021-05-04 ENCOUNTER — TELEPHONE (OUTPATIENT)
Dept: PULMONOLOGY | Age: 71
End: 2021-05-04

## 2021-05-28 ENCOUNTER — VIRTUAL VISIT (OUTPATIENT)
Dept: PULMONOLOGY | Age: 71
End: 2021-05-28
Payer: MEDICARE

## 2021-05-28 DIAGNOSIS — J44.9 COPD, SEVERE (HCC): ICD-10-CM

## 2021-05-28 DIAGNOSIS — R06.02 SOB (SHORTNESS OF BREATH): Primary | ICD-10-CM

## 2021-05-28 DIAGNOSIS — J96.11 CHRONIC HYPOXEMIC RESPIRATORY FAILURE (HCC): ICD-10-CM

## 2021-05-28 DIAGNOSIS — J43.2 CENTRILOBULAR EMPHYSEMA (HCC): ICD-10-CM

## 2021-05-28 PROCEDURE — 99214 OFFICE O/P EST MOD 30 MIN: CPT | Performed by: INTERNAL MEDICINE

## 2021-05-28 PROCEDURE — G8427 DOCREV CUR MEDS BY ELIG CLIN: HCPCS | Performed by: INTERNAL MEDICINE

## 2021-05-28 PROCEDURE — G8399 PT W/DXA RESULTS DOCUMENT: HCPCS | Performed by: INTERNAL MEDICINE

## 2021-05-28 PROCEDURE — 1036F TOBACCO NON-USER: CPT | Performed by: INTERNAL MEDICINE

## 2021-05-28 PROCEDURE — 3023F SPIROM DOC REV: CPT | Performed by: INTERNAL MEDICINE

## 2021-05-28 PROCEDURE — 1123F ACP DISCUSS/DSCN MKR DOCD: CPT | Performed by: INTERNAL MEDICINE

## 2021-05-28 PROCEDURE — G8926 SPIRO NO PERF OR DOC: HCPCS | Performed by: INTERNAL MEDICINE

## 2021-05-28 PROCEDURE — 3017F COLORECTAL CA SCREEN DOC REV: CPT | Performed by: INTERNAL MEDICINE

## 2021-05-28 PROCEDURE — G8417 CALC BMI ABV UP PARAM F/U: HCPCS | Performed by: INTERNAL MEDICINE

## 2021-05-28 PROCEDURE — 4040F PNEUMOC VAC/ADMIN/RCVD: CPT | Performed by: INTERNAL MEDICINE

## 2021-05-28 PROCEDURE — 1090F PRES/ABSN URINE INCON ASSESS: CPT | Performed by: INTERNAL MEDICINE

## 2021-05-28 NOTE — PROGRESS NOTES
[facility]  Follow Up Note  Pat Winkler MD    Pulmonology Video Visit    Pursuant to the emergency declaration under the [facility] waiver authority and the Coronavirus Preparedness and Response Supplemental Appropriations Act this Video Visit was insisted, with patient's consent, to reduce the patient's risk of exposure to COVID-19 and provide continuity of care for an established patient. The patient was at home, while the provider was at the clinic. Services were provided through a synchronous discussion through a Video Visit to substitute for in-person clinic visit, and coded as such. Kavita Logan   YOB: 1950    Date of Visit:  5/28/2021    Assessment/Plan:  1. SOB (shortness of breath)  Worse. Had a cardiac work-up when she was in the hospital consisting of stress test and echo. These were unremarkable. The feeling is that shortness of breath is reflecting worsening underlying COPD/emphysema. 2. COPD, severe ([address]  Worse  PFT 7/19:  INTERPRETATION:  Spirometry reveals decreased FVC at 1.49 liters, which  is 57% predicted. FEV1 is decreased at 0.94 liters, which is 48%  predicted. FEV1/FVC ratio is reduced at 63%. Postbronchodilator study  is not performed. Lung volumes reveal normal total lung capacity. Residual volume is increased at 159% predicted. Diffusion capacity is  decreased at 27% predicted. In comparison to a study from 02/2017, FVC  has decreased by 21%. FEV1 is increased by 19% and residual volume is  increased by 17%. Diffusion capacity is decreased by 37%.     IMPRESSION:  Severe obstructive lung disease with air trapping. There  has been significant worsening since the preceding study. Medication Management:    Symbicort + Spiriva ==> Pulmicort + Brovana + Spiriva ==> Breztri 2 puffs BID  Albuterol HHN  Increase Prednisone to 20 mg per day    3. Centrilobular emphysema ([facility]  CT Chest 4/21:  Impression   No acute abnormality identified in the chest.       The previously noted left upper lobe consolidation on the CT from 09/14/2020   has resolved with mild atelectasis and/or scarring remaining.       Emphysematous changes are seen in the lungs.         Emphysema is moderate to severe    4. Chronic hypoxemic respiratory failure (HCC)  Oxygen saturation on room air with exertion in the office today is 84%. The patient would benefit from supplemental oxygen with exertion and sleep  The patient is independently mobile within the home and would benefit from a portable oxygen concentrator      6 months      Chief Complaint   Patient presents with    Shortness of Breath     4 week f.u. Ansley Almaraz started her on Prednisone 10 mg a day pt states she is doing about the same       HPI  The patient presents with a chief complaint of moderate shortness of breath related to severe COPD of many years duration. He has mild associated cough. Exertion is a modifying factor. She is struggling to even walk through her apartment without dyspnea. Using her oxygen 24 hours/day. She was hospitalized 4/21 with COPD exacerbation. She does not feel particularly better compared to that. She had Symbicort changed to Brovana and Pulmicort. This did not seem to make any difference. She remains on prednisone 10 mg a day. Review of Systems  No Chest pain, Nausea or vomiting reported      History  I have reviewed past medical, surgical, social and family history. This is documented elsewhere in the medical record. Physical Exam:   Video visit    No Known Allergies  Prior to Visit Medications    Medication Sig Taking?  Authorizing Provider   amLODIPine (NORVASC) 5 MG tablet TAKE 1 TABLET BY MOUTH EVERY DAY  Louise Tai MD   predniSONE (DELTASONE) 5 MG tablet TAKE 1 TABLET BY MOUTH 2 TIMES DAILY  Sloan Libman, MD   budesonide (PULMICORT) 0.5 MG/2ML nebulizer suspension Take 2 mLs by nebulization 2 times daily DX:COPD J44.9  Haroontha Hopper Rumpke, APRN - CNP   Arformoterol Tartrate (BROVANA) 15 MCG/2ML NEBU Take 1 ampule by nebulization 2 times daily DX:COPD J44.9  Dartha Hopper Rumpke, APRN - CNP   potassium chloride (KLOR-CON) 10 MEQ extended release tablet TAKE 1 TABLET BY MOUTH EVERY DAY  Historical Provider, MD   Adalimumab (HUMIRA PEN SC) Inject into the skin  Historical Provider, MD   ELIQUIS 5 MG TABS tablet TAKE 1 TABLET BY MOUTH TWICE A DAY  Jackey Duverney, MD   albuterol (PROVENTIL) (2.5 MG/3ML) 0.083% nebulizer solution TAKE 3 MLS BY NEBULIZATION EVERY 6 HOURS AS NEEDED FOR WHEEZING DX: COPD ICD-10: J44.9  Jackey Duverney, MD   SYMBICORT 160-4.5 MCG/ACT AERO TAKE 2 PUFFS BY MOUTH TWICE A DAY  Sarah Stevens MD   SPIRIVA RESPIMAT 2.5 MCG/ACT AERS inhaler INHALE 2 PUFFS BY MOUTH INTO THE LUNGS DAILY  Sarah Stevens MD   colestipol (COLESTID) 1 g tablet TAKE 1 TABLET BY MOUTH TWICE A DAY  Historical Provider, MD   amitriptyline (ELAVIL) 150 MG tablet Take 0.5 tablets by mouth nightly  Sarah Stevens MD   Nutritional Supplements (ENSURE HIGH PROTEIN) LIQD Take 1 Can by mouth 2 times daily  Mayra Arzate MD   Amino Acids-Protein Hydrolys (PROTEINEX P18) LIQD Take 1 Bottle by mouth 2 times daily  Mayra Arzate MD   cyclobenzaprine (FLEXERIL) 5 MG tablet Take 5 mg by mouth 4 times daily Every 6 hours for muscle spasms  Historical Provider, MD   albuterol sulfate  (90 Base) MCG/ACT inhaler Inhale 2 puffs into the lungs every 6 hours as needed for Wheezing  Jackey Duverney, MD   alendronate (FOSAMAX) 70 MG tablet Take 1 tablet by mouth every 7 days  Todd Ruiz MD   omeprazole (PRILOSEC) 20 MG delayed release capsule Take 20 mg by mouth Daily Takes as needed  Historical Provider, MD       There were no vitals filed for this visit. There is no height or weight on file to calculate BMI.      Wt Readings from Last 3 Encounters:   21 150 lb (68 kg)   21 149 lb (67.6 kg)   04/15/21 146 lb 12.8 oz (66.6 kg)     BP Readings from Last 3 Encounters:   21 (!) 142/87   21 138/72   21 116/68        Social History     Tobacco Use   Smoking Status Former Smoker    Packs/day: 0.25    Years: 30.00    Pack years: 7.50    Types: Cigarettes    Quit date: 2015    Years since quittin.2   Smokeless Tobacco Never Used   Tobacco Comment    started smoking at age 26 / smoked up to 9 cigarettes a day

## 2021-06-01 ENCOUNTER — TELEPHONE (OUTPATIENT)
Dept: PULMONOLOGY | Age: 71
End: 2021-06-01

## 2021-06-01 RX ORDER — BUDESONIDE, GLYCOPYRROLATE, AND FORMOTEROL FUMARATE 160; 9; 4.8 UG/1; UG/1; UG/1
2 AEROSOL, METERED RESPIRATORY (INHALATION) 2 TIMES DAILY
Qty: 1 INHALER | Refills: 6 | Status: SHIPPED | OUTPATIENT
Start: 2021-06-01 | End: 2021-11-23 | Stop reason: SDUPTHER

## 2021-06-01 RX ORDER — PREDNISONE 20 MG/1
20 TABLET ORAL DAILY
Qty: 30 TABLET | Refills: 0 | Status: SHIPPED | OUTPATIENT
Start: 2021-06-01 | End: 2021-06-22 | Stop reason: SDUPTHER

## 2021-06-01 NOTE — TELEPHONE ENCOUNTER
Per Dr Annmarie Miranda prescriptions sent for Prednisone 20 mg daily and [employer].  Fixed med list as well

## 2021-06-22 ENCOUNTER — OFFICE VISIT (OUTPATIENT)
Dept: INTERNAL MEDICINE CLINIC | Age: 71
End: 2021-06-22
Payer: MEDICARE

## 2021-06-22 ENCOUNTER — OFFICE VISIT (OUTPATIENT)
Dept: PULMONOLOGY | Age: 71
End: 2021-06-22
Payer: MEDICARE

## 2021-06-22 VITALS — SYSTOLIC BLOOD PRESSURE: 134 MMHG | OXYGEN SATURATION: 96 % | HEART RATE: 84 BPM | DIASTOLIC BLOOD PRESSURE: 79 MMHG

## 2021-06-22 VITALS
BODY MASS INDEX: 30.5 KG/M2 | OXYGEN SATURATION: 96 % | DIASTOLIC BLOOD PRESSURE: 80 MMHG | HEART RATE: 101 BPM | WEIGHT: 151 LBS | TEMPERATURE: 95.7 F | SYSTOLIC BLOOD PRESSURE: 138 MMHG

## 2021-06-22 DIAGNOSIS — J44.9 COPD, SEVERE (HCC): ICD-10-CM

## 2021-06-22 DIAGNOSIS — J96.11 CHRONIC HYPOXEMIC RESPIRATORY FAILURE (HCC): ICD-10-CM

## 2021-06-22 DIAGNOSIS — K50.00 CROHN'S DISEASE OF SMALL INTESTINE WITHOUT COMPLICATION (HCC): ICD-10-CM

## 2021-06-22 DIAGNOSIS — R06.02 SOB (SHORTNESS OF BREATH): Primary | ICD-10-CM

## 2021-06-22 DIAGNOSIS — J43.2 CENTRILOBULAR EMPHYSEMA (HCC): ICD-10-CM

## 2021-06-22 DIAGNOSIS — J44.9 COPD, SEVERE (HCC): Primary | ICD-10-CM

## 2021-06-22 DIAGNOSIS — Z23 NEED FOR VACCINATION AGAINST STREPTOCOCCUS PNEUMONIAE: ICD-10-CM

## 2021-06-22 PROCEDURE — 3023F SPIROM DOC REV: CPT | Performed by: INTERNAL MEDICINE

## 2021-06-22 PROCEDURE — G0009 ADMIN PNEUMOCOCCAL VACCINE: HCPCS | Performed by: INTERNAL MEDICINE

## 2021-06-22 PROCEDURE — G8399 PT W/DXA RESULTS DOCUMENT: HCPCS | Performed by: INTERNAL MEDICINE

## 2021-06-22 PROCEDURE — 1090F PRES/ABSN URINE INCON ASSESS: CPT | Performed by: INTERNAL MEDICINE

## 2021-06-22 PROCEDURE — 1036F TOBACCO NON-USER: CPT | Performed by: INTERNAL MEDICINE

## 2021-06-22 PROCEDURE — G8926 SPIRO NO PERF OR DOC: HCPCS | Performed by: INTERNAL MEDICINE

## 2021-06-22 PROCEDURE — 90732 PPSV23 VACC 2 YRS+ SUBQ/IM: CPT | Performed by: INTERNAL MEDICINE

## 2021-06-22 PROCEDURE — 1123F ACP DISCUSS/DSCN MKR DOCD: CPT | Performed by: INTERNAL MEDICINE

## 2021-06-22 PROCEDURE — 3017F COLORECTAL CA SCREEN DOC REV: CPT | Performed by: INTERNAL MEDICINE

## 2021-06-22 PROCEDURE — 4040F PNEUMOC VAC/ADMIN/RCVD: CPT | Performed by: INTERNAL MEDICINE

## 2021-06-22 PROCEDURE — G8417 CALC BMI ABV UP PARAM F/U: HCPCS | Performed by: INTERNAL MEDICINE

## 2021-06-22 PROCEDURE — G8427 DOCREV CUR MEDS BY ELIG CLIN: HCPCS | Performed by: INTERNAL MEDICINE

## 2021-06-22 PROCEDURE — 99214 OFFICE O/P EST MOD 30 MIN: CPT | Performed by: INTERNAL MEDICINE

## 2021-06-22 RX ORDER — PREDNISONE 20 MG/1
20 TABLET ORAL DAILY
Qty: 30 TABLET | Refills: 5 | Status: ON HOLD | OUTPATIENT
Start: 2021-06-22 | End: 2021-07-03 | Stop reason: SDUPTHER

## 2021-06-22 SDOH — ECONOMIC STABILITY: FOOD INSECURITY: WITHIN THE PAST 12 MONTHS, THE FOOD YOU BOUGHT JUST DIDN'T LAST AND YOU DIDN'T HAVE MONEY TO GET MORE.: NEVER TRUE

## 2021-06-22 SDOH — ECONOMIC STABILITY: FOOD INSECURITY: WITHIN THE PAST 12 MONTHS, YOU WORRIED THAT YOUR FOOD WOULD RUN OUT BEFORE YOU GOT MONEY TO BUY MORE.: NEVER TRUE

## 2021-06-22 ASSESSMENT — PATIENT HEALTH QUESTIONNAIRE - PHQ9
SUM OF ALL RESPONSES TO PHQ9 QUESTIONS 1 & 2: 0
2. FEELING DOWN, DEPRESSED OR HOPELESS: 0
1. LITTLE INTEREST OR PLEASURE IN DOING THINGS: 0
SUM OF ALL RESPONSES TO PHQ QUESTIONS 1-9: 0

## 2021-06-22 ASSESSMENT — SOCIAL DETERMINANTS OF HEALTH (SDOH): HOW HARD IS IT FOR YOU TO PAY FOR THE VERY BASICS LIKE FOOD, HOUSING, MEDICAL CARE, AND HEATING?: NOT HARD AT ALL

## 2021-06-22 NOTE — PROGRESS NOTES
oxygen with exertion and sleep  The patient is independently mobile within the home and would benefit from a portable oxygen concentrator      6 months      Chief Complaint   Patient presents with    Shortness of Breath     has noticed her breathing is worse over the last 3 days. Central air went out and using a portable one that sits on the floor Dr Aniya Veloz gave her pneumonia vaccine today       HPI  The patient presents with a chief complaint of moderate shortness of breath related to severe COPD of many years duration. He has mild associated cough. Exertion is a modifying factor. She is struggling to even walk through her apartment without dyspnea. Using her oxygen 24 hours/day. Her AC is out and she has portable units that keep part of the apartment too cold and part of it too hot. Review of Systems  No Chest pain, Nausea or vomiting reported      History  I have reviewed past medical, surgical, social and family history. This is documented elsewhere in the medical record. Physical Exam:  Well developed, well nourished  Alert and oriented  Sclera is clear  No cervical adenopathy  No JVD. Chest examination is clear. Cardiac examination reveals regular rate and rhythm without murmur, gallop or rub. The abdomen is soft, nontender and nondistended. There is no clubbing, cyanosis or edema of the extremities. There is no obvious skin rash. No focal neuro deficicts  Normal mood and affect      No Known Allergies  Prior to Visit Medications    Medication Sig Taking?  Authorizing Provider   Budeson-Glycopyrrol-Formoterol (BREZTRI AEROSPHERE) 160-9-4.8 MCG/ACT AERO Inhale 2 puffs into the lungs 2 times daily  Nanci Perez MD   predniSONE (DELTASONE) 20 MG tablet Take 1 tablet by mouth daily  Nanci Perez MD   amLODIPine (NORVASC) 5 MG tablet TAKE 1 TABLET BY MOUTH EVERY DAY  Niko León MD   potassium chloride (KLOR-CON) 10 MEQ extended release tablet TAKE 1 TABLET BY MOUTH EVERY DAY Historical Provider, MD   Adalimumab (HUMIRA PEN SC) Inject into the skin  Historical Provider, MD   ELIQUIS 5 MG TABS tablet TAKE 1 TABLET BY MOUTH TWICE A DAY  Flory Amaral MD   albuterol (PROVENTIL) (2.5 MG/3ML) 0.083% nebulizer solution TAKE 3 MLS BY NEBULIZATION EVERY 6 HOURS AS NEEDED FOR WHEEZING DX: COPD ICD-10: J44.9  Flory Amaral MD   colestipol (COLESTID) 1 g tablet TAKE 1 TABLET BY MOUTH TWICE A DAY  Historical Provider, MD   amitriptyline (ELAVIL) 150 MG tablet Take 0.5 tablets by mouth nightly  Elsa Conner MD   Nutritional Supplements (ENSURE HIGH PROTEIN) LIQD Take 1 Can by mouth 2 times daily  Azam Weston MD   Amino Acids-Protein Hydrolys (PROTEINEX P18) LIQD Take 1 Bottle by mouth 2 times daily  Azam Weston MD   cyclobenzaprine (FLEXERIL) 5 MG tablet Take 5 mg by mouth 4 times daily Every 6 hours for muscle spasms  Historical Provider, MD   albuterol sulfate  (90 Base) MCG/ACT inhaler Inhale 2 puffs into the lungs every 6 hours as needed for Wheezing  Flory Amaral MD   alendronate (FOSAMAX) 70 MG tablet Take 1 tablet by mouth every 7 days  Enedina Seaman MD   omeprazole (PRILOSEC) 20 MG delayed release capsule Take 20 mg by mouth Daily Takes as needed  Historical Provider, MD       Vitals:    21 1350   BP: 134/79   Pulse: 84   SpO2: 96%     There is no height or weight on file to calculate BMI.      Wt Readings from Last 3 Encounters:   21 151 lb (68.5 kg)   21 150 lb (68 kg)   21 149 lb (67.6 kg)     BP Readings from Last 3 Encounters:   21 134/79   21 138/80   21 (!) 142/87        Social History     Tobacco Use   Smoking Status Former Smoker    Packs/day: 0.25    Years: 30.00    Pack years: 7.50    Types: Cigarettes    Quit date: 2015    Years since quittin.3   Smokeless Tobacco Never Used   Tobacco Comment    started smoking at age 26 / smoked up to 9 cigarettes a day

## 2021-06-22 NOTE — Clinical Note
Leonila Alvarado saw Ms Darryle Ha today. She has struggled to improve since her hospitalization in April. Her cardiac w/y at that time was ok. My fear is that this represents progression of disease. I discussed that with her today. She is on max treatment and Prednisone 20. I am reluctant to taper her Prednisone as that seemed to precipitate her flare up in April. She may be a palliative care candidate if things stay as they are.     Tree Pichardo

## 2021-06-22 NOTE — Clinical Note
[address] [address]  Phone: [phone]  Fax: [phone]    Stanley Ascencio MD        June 22, 2021     Patient: John Dockery   YOB: 1950   Date of Visit: 6/22/2021       To Whom It May Concern: It is my medical opinion that Zina Hodgkins {Work release (duty restriction):22078}. If you have any questions or concerns, please don't hesitate to call.     Sincerely,        Stanley Ascencio MD

## 2021-06-30 ENCOUNTER — HOSPITAL ENCOUNTER (OUTPATIENT)
Age: 71
Setting detail: SPECIMEN
Discharge: HOME | DRG: 191 | End: 2021-06-30
Payer: MEDICARE

## 2021-06-30 LAB
A/G RATIO: 1.5 (ref 1.1–2.2)
ALBUMIN SERPL-MCNC: 3.5 G/DL (ref 3.4–5)
ALP BLD-CCNC: 64 U/L (ref 40–129)
ALT SERPL-CCNC: 10 U/L (ref 10–40)
ANION GAP SERPL CALCULATED.3IONS-SCNC: 15 MMOL/L (ref 3–16)
ANISOCYTOSIS: ABNORMAL
AST SERPL-CCNC: 11 U/L (ref 15–37)
BANDED NEUTROPHILS RELATIVE PERCENT: 1 % (ref 0–7)
BASOPHILS ABSOLUTE: 0 K/UL (ref 0–0.2)
BASOPHILS RELATIVE PERCENT: 0 %
BILIRUB SERPL-MCNC: <0.2 MG/DL (ref 0–1)
BUN BLDV-MCNC: 23 MG/DL (ref 7–20)
C-REACTIVE PROTEIN: 4.8 MG/L (ref 0–5.1)
CALCIUM SERPL-MCNC: 8.4 MG/DL (ref 8.3–10.6)
CHLORIDE BLD-SCNC: 105 MMOL/L (ref 99–110)
CO2: 18 MMOL/L (ref 21–32)
CREAT SERPL-MCNC: 2.1 MG/DL (ref 0.6–1.2)
EOSINOPHILS ABSOLUTE: 0 K/UL (ref 0–0.6)
EOSINOPHILS RELATIVE PERCENT: 0 %
FOLATE: 16.68 NG/ML (ref 4.78–24.2)
GFR AFRICAN AMERICAN: 28
GFR NON-AFRICAN AMERICAN: 23
GLOBULIN: 2.3 G/DL
GLUCOSE BLD-MCNC: 95 MG/DL (ref 70–99)
HCT VFR BLD CALC: 29.8 % (ref 36–48)
HEMATOLOGY PATH CONSULT: NO
HEMOGLOBIN: 9.4 G/DL (ref 12–16)
HYPOCHROMIA: ABNORMAL
LYMPHOCYTES ABSOLUTE: 1.1 K/UL (ref 1–5.1)
LYMPHOCYTES RELATIVE PERCENT: 7 %
MCH RBC QN AUTO: 20.8 PG (ref 26–34)
MCHC RBC AUTO-ENTMCNC: 31.5 G/DL (ref 31–36)
MCV RBC AUTO: 66.1 FL (ref 80–100)
METAMYELOCYTES RELATIVE PERCENT: 1 %
MICROCYTES: ABNORMAL
MONOCYTES ABSOLUTE: 0.2 K/UL (ref 0–1.3)
MONOCYTES RELATIVE PERCENT: 1 %
MYELOCYTE PERCENT: 1 %
NEUTROPHILS ABSOLUTE: 14.2 K/UL (ref 1.7–7.7)
NEUTROPHILS RELATIVE PERCENT: 89 %
OVALOCYTES: ABNORMAL
PDW BLD-RTO: 16.8 % (ref 12.4–15.4)
PLATELET # BLD: 357 K/UL (ref 135–450)
PLATELET SLIDE REVIEW: ADEQUATE
PMV BLD AUTO: 8.5 FL (ref 5–10.5)
POLYCHROMASIA: ABNORMAL
POTASSIUM SERPL-SCNC: 3.8 MMOL/L (ref 3.5–5.1)
RBC # BLD: 4.5 M/UL (ref 4–5.2)
SCHISTOCYTES: ABNORMAL
SEDIMENTATION RATE, ERYTHROCYTE: 23 MM/HR (ref 0–30)
SLIDE REVIEW: ABNORMAL
SODIUM BLD-SCNC: 138 MMOL/L (ref 136–145)
TOTAL PROTEIN: 5.8 G/DL (ref 6.4–8.2)
VITAMIN B-12: 171 PG/ML (ref 211–911)
VITAMIN D 25-HYDROXY: 23.5 NG/ML
WBC # BLD: 15.4 K/UL (ref 4–11)

## 2021-06-30 PROCEDURE — 86140 C-REACTIVE PROTEIN: CPT

## 2021-06-30 PROCEDURE — 82607 VITAMIN B-12: CPT

## 2021-06-30 PROCEDURE — 85652 RBC SED RATE AUTOMATED: CPT

## 2021-06-30 PROCEDURE — 80053 COMPREHEN METABOLIC PANEL: CPT

## 2021-06-30 PROCEDURE — 36415 COLL VENOUS BLD VENIPUNCTURE: CPT

## 2021-06-30 PROCEDURE — 82746 ASSAY OF FOLIC ACID SERUM: CPT

## 2021-06-30 PROCEDURE — 82306 VITAMIN D 25 HYDROXY: CPT

## 2021-06-30 PROCEDURE — 85025 COMPLETE CBC W/AUTO DIFF WBC: CPT

## 2021-07-01 ENCOUNTER — APPOINTMENT (OUTPATIENT)
Dept: GENERAL RADIOLOGY | Age: 71
DRG: 191 | End: 2021-07-01
Payer: MEDICARE

## 2021-07-01 ENCOUNTER — APPOINTMENT (OUTPATIENT)
Dept: CT IMAGING | Age: 71
DRG: 191 | End: 2021-07-01
Payer: MEDICARE

## 2021-07-01 ENCOUNTER — HOSPITAL ENCOUNTER (INPATIENT)
Age: 71
LOS: 2 days | Discharge: HOME | DRG: 191 | End: 2021-07-03
Attending: EMERGENCY MEDICINE | Admitting: FAMILY MEDICINE
Payer: MEDICARE

## 2021-07-01 DIAGNOSIS — R77.8 ELEVATED TROPONIN: ICD-10-CM

## 2021-07-01 DIAGNOSIS — R06.09 DOE (DYSPNEA ON EXERTION): ICD-10-CM

## 2021-07-01 DIAGNOSIS — S22.32XA CLOSED FRACTURE OF ONE RIB OF LEFT SIDE, INITIAL ENCOUNTER: ICD-10-CM

## 2021-07-01 DIAGNOSIS — J44.1 COPD WITH ACUTE EXACERBATION (HCC): Primary | ICD-10-CM

## 2021-07-01 DIAGNOSIS — S22.32XD CLOSED FRACTURE OF ONE RIB OF LEFT SIDE WITH ROUTINE HEALING: ICD-10-CM

## 2021-07-01 LAB
A/G RATIO: 1.1 (ref 1.1–2.2)
ALBUMIN SERPL-MCNC: 3.2 G/DL (ref 3.4–5)
ALP BLD-CCNC: 62 U/L (ref 40–129)
ALT SERPL-CCNC: 10 U/L (ref 10–40)
ANION GAP SERPL CALCULATED.3IONS-SCNC: 12 MMOL/L (ref 3–16)
ANISOCYTOSIS: ABNORMAL
APTT: 30.3 SEC (ref 26.2–38.6)
AST SERPL-CCNC: 10 U/L (ref 15–37)
BASOPHILS ABSOLUTE: 0 K/UL (ref 0–0.2)
BASOPHILS RELATIVE PERCENT: 0 %
BILIRUB SERPL-MCNC: <0.2 MG/DL (ref 0–1)
BUN BLDV-MCNC: 26 MG/DL (ref 7–20)
CALCIUM SERPL-MCNC: 8.5 MG/DL (ref 8.3–10.6)
CHLORIDE BLD-SCNC: 108 MMOL/L (ref 99–110)
CO2: 19 MMOL/L (ref 21–32)
CREAT SERPL-MCNC: 2.2 MG/DL (ref 0.6–1.2)
EKG ATRIAL RATE: 98 BPM
EKG DIAGNOSIS: NORMAL
EKG P AXIS: -8 DEGREES
EKG P-R INTERVAL: 144 MS
EKG Q-T INTERVAL: 348 MS
EKG QRS DURATION: 72 MS
EKG QTC CALCULATION (BAZETT): 444 MS
EKG R AXIS: 8 DEGREES
EKG T AXIS: 77 DEGREES
EKG VENTRICULAR RATE: 98 BPM
EOSINOPHILS ABSOLUTE: 0 K/UL (ref 0–0.6)
EOSINOPHILS RELATIVE PERCENT: 0 %
GFR AFRICAN AMERICAN: 27
GFR NON-AFRICAN AMERICAN: 22
GLOBULIN: 2.8 G/DL
GLUCOSE BLD-MCNC: 104 MG/DL (ref 70–99)
HCT VFR BLD CALC: 29.2 % (ref 36–48)
HEMOGLOBIN: 9.1 G/DL (ref 12–16)
HYPOCHROMIA: ABNORMAL
INR BLD: 1.07 (ref 0.88–1.12)
LACTIC ACID: 1.8 MMOL/L (ref 0.4–2)
LYMPHOCYTES ABSOLUTE: 4 K/UL (ref 1–5.1)
LYMPHOCYTES RELATIVE PERCENT: 27 %
MAGNESIUM: 2.2 MG/DL (ref 1.8–2.4)
MCH RBC QN AUTO: 20.6 PG (ref 26–34)
MCHC RBC AUTO-ENTMCNC: 31 G/DL (ref 31–36)
MCV RBC AUTO: 66.3 FL (ref 80–100)
MICROCYTES: ABNORMAL
MONOCYTES ABSOLUTE: 0.4 K/UL (ref 0–1.3)
MONOCYTES RELATIVE PERCENT: 3 %
NEUTROPHILS ABSOLUTE: 10.3 K/UL (ref 1.7–7.7)
NEUTROPHILS RELATIVE PERCENT: 70 %
OVALOCYTES: ABNORMAL
PDW BLD-RTO: 16.8 % (ref 12.4–15.4)
PLATELET # BLD: 333 K/UL (ref 135–450)
PMV BLD AUTO: 7.2 FL (ref 5–10.5)
POLYCHROMASIA: ABNORMAL
POTASSIUM SERPL-SCNC: 3.4 MMOL/L (ref 3.5–5.1)
PRO-BNP: 284 PG/ML (ref 0–124)
PROTHROMBIN TIME: 12.1 SEC (ref 9.9–12.7)
RBC # BLD: 4.41 M/UL (ref 4–5.2)
SODIUM BLD-SCNC: 139 MMOL/L (ref 136–145)
TARGET CELLS: ABNORMAL
TOTAL PROTEIN: 6 G/DL (ref 6.4–8.2)
TROPONIN: 0.02 NG/ML
URIC ACID, SERUM: 8.6 MG/DL (ref 2.6–6)
WBC # BLD: 14.7 K/UL (ref 4–11)

## 2021-07-01 PROCEDURE — 6360000002 HC RX W HCPCS: Performed by: PHYSICIAN ASSISTANT

## 2021-07-01 PROCEDURE — G0378 HOSPITAL OBSERVATION PER HR: HCPCS

## 2021-07-01 PROCEDURE — 6370000000 HC RX 637 (ALT 250 FOR IP): Performed by: PHYSICIAN ASSISTANT

## 2021-07-01 PROCEDURE — 85025 COMPLETE CBC W/AUTO DIFF WBC: CPT

## 2021-07-01 PROCEDURE — 84550 ASSAY OF BLOOD/URIC ACID: CPT

## 2021-07-01 PROCEDURE — 71045 X-RAY EXAM CHEST 1 VIEW: CPT

## 2021-07-01 PROCEDURE — 96376 TX/PRO/DX INJ SAME DRUG ADON: CPT

## 2021-07-01 PROCEDURE — 2580000003 HC RX 258: Performed by: FAMILY MEDICINE

## 2021-07-01 PROCEDURE — 6370000000 HC RX 637 (ALT 250 FOR IP): Performed by: FAMILY MEDICINE

## 2021-07-01 PROCEDURE — 85610 PROTHROMBIN TIME: CPT

## 2021-07-01 PROCEDURE — 96375 TX/PRO/DX INJ NEW DRUG ADDON: CPT

## 2021-07-01 PROCEDURE — 71250 CT THORAX DX C-: CPT

## 2021-07-01 PROCEDURE — 93970 EXTREMITY STUDY: CPT

## 2021-07-01 PROCEDURE — 2700000000 HC OXYGEN THERAPY PER DAY

## 2021-07-01 PROCEDURE — 94760 N-INVAS EAR/PLS OXIMETRY 1: CPT

## 2021-07-01 PROCEDURE — 96374 THER/PROPH/DIAG INJ IV PUSH: CPT

## 2021-07-01 PROCEDURE — 80053 COMPREHEN METABOLIC PANEL: CPT

## 2021-07-01 PROCEDURE — 94761 N-INVAS EAR/PLS OXIMETRY MLT: CPT

## 2021-07-01 PROCEDURE — 93005 ELECTROCARDIOGRAM TRACING: CPT | Performed by: EMERGENCY MEDICINE

## 2021-07-01 PROCEDURE — 1200000000 HC SEMI PRIVATE

## 2021-07-01 PROCEDURE — 94640 AIRWAY INHALATION TREATMENT: CPT

## 2021-07-01 PROCEDURE — 83605 ASSAY OF LACTIC ACID: CPT

## 2021-07-01 PROCEDURE — 83735 ASSAY OF MAGNESIUM: CPT

## 2021-07-01 PROCEDURE — 6360000002 HC RX W HCPCS: Performed by: FAMILY MEDICINE

## 2021-07-01 PROCEDURE — 93010 ELECTROCARDIOGRAM REPORT: CPT | Performed by: INTERNAL MEDICINE

## 2021-07-01 PROCEDURE — 84484 ASSAY OF TROPONIN QUANT: CPT

## 2021-07-01 PROCEDURE — 99285 EMERGENCY DEPT VISIT HI MDM: CPT

## 2021-07-01 PROCEDURE — 85730 THROMBOPLASTIN TIME PARTIAL: CPT

## 2021-07-01 PROCEDURE — 83880 ASSAY OF NATRIURETIC PEPTIDE: CPT

## 2021-07-01 PROCEDURE — 87040 BLOOD CULTURE FOR BACTERIA: CPT

## 2021-07-01 RX ORDER — CHOLESTYRAMINE 4 G/9G
4 POWDER, FOR SUSPENSION ORAL DAILY
Status: DISCONTINUED | OUTPATIENT
Start: 2021-07-01 | End: 2021-07-03 | Stop reason: HOSPADM

## 2021-07-01 RX ORDER — POLYETHYLENE GLYCOL 3350 17 G/17G
17 POWDER, FOR SOLUTION ORAL DAILY PRN
Status: DISCONTINUED | OUTPATIENT
Start: 2021-07-01 | End: 2021-07-03 | Stop reason: HOSPADM

## 2021-07-01 RX ORDER — ACETAMINOPHEN 325 MG/1
650 TABLET ORAL EVERY 6 HOURS PRN
Status: DISCONTINUED | OUTPATIENT
Start: 2021-07-01 | End: 2021-07-03 | Stop reason: HOSPADM

## 2021-07-01 RX ORDER — IPRATROPIUM BROMIDE AND ALBUTEROL SULFATE 2.5; .5 MG/3ML; MG/3ML
1 SOLUTION RESPIRATORY (INHALATION)
Status: DISCONTINUED | OUTPATIENT
Start: 2021-07-01 | End: 2021-07-03 | Stop reason: HOSPADM

## 2021-07-01 RX ORDER — OXYCODONE HYDROCHLORIDE AND ACETAMINOPHEN 5; 325 MG/1; MG/1
1 TABLET ORAL ONCE
Status: COMPLETED | OUTPATIENT
Start: 2021-07-01 | End: 2021-07-01

## 2021-07-01 RX ORDER — SODIUM CHLORIDE 9 MG/ML
25 INJECTION, SOLUTION INTRAVENOUS PRN
Status: DISCONTINUED | OUTPATIENT
Start: 2021-07-01 | End: 2021-07-03 | Stop reason: HOSPADM

## 2021-07-01 RX ORDER — POTASSIUM CHLORIDE 750 MG/1
10 TABLET, FILM COATED, EXTENDED RELEASE ORAL DAILY
Status: DISCONTINUED | OUTPATIENT
Start: 2021-07-02 | End: 2021-07-03 | Stop reason: HOSPADM

## 2021-07-01 RX ORDER — SODIUM CHLORIDE 0.9 % (FLUSH) 0.9 %
5-40 SYRINGE (ML) INJECTION PRN
Status: DISCONTINUED | OUTPATIENT
Start: 2021-07-01 | End: 2021-07-03 | Stop reason: HOSPADM

## 2021-07-01 RX ORDER — ONDANSETRON 2 MG/ML
4 INJECTION INTRAMUSCULAR; INTRAVENOUS EVERY 6 HOURS PRN
Status: DISCONTINUED | OUTPATIENT
Start: 2021-07-01 | End: 2021-07-03 | Stop reason: HOSPADM

## 2021-07-01 RX ORDER — ACETAMINOPHEN 650 MG/1
650 SUPPOSITORY RECTAL EVERY 6 HOURS PRN
Status: DISCONTINUED | OUTPATIENT
Start: 2021-07-01 | End: 2021-07-03 | Stop reason: HOSPADM

## 2021-07-01 RX ORDER — AMINO ACIDS/PROTEIN HYDROLYS 18 G-72/30
1 LIQUID (ML) ORAL 2 TIMES DAILY
Status: DISCONTINUED | OUTPATIENT
Start: 2021-07-01 | End: 2021-07-01 | Stop reason: RX

## 2021-07-01 RX ORDER — METHYLPREDNISOLONE SODIUM SUCCINATE 40 MG/ML
40 INJECTION, POWDER, LYOPHILIZED, FOR SOLUTION INTRAMUSCULAR; INTRAVENOUS EVERY 6 HOURS
Status: COMPLETED | OUTPATIENT
Start: 2021-07-01 | End: 2021-07-03

## 2021-07-01 RX ORDER — PANTOPRAZOLE SODIUM 40 MG/1
40 TABLET, DELAYED RELEASE ORAL
Status: DISCONTINUED | OUTPATIENT
Start: 2021-07-02 | End: 2021-07-03 | Stop reason: HOSPADM

## 2021-07-01 RX ORDER — IPRATROPIUM BROMIDE AND ALBUTEROL SULFATE 2.5; .5 MG/3ML; MG/3ML
1 SOLUTION RESPIRATORY (INHALATION) ONCE
Status: COMPLETED | OUTPATIENT
Start: 2021-07-01 | End: 2021-07-01

## 2021-07-01 RX ORDER — GUAIFENESIN 600 MG/1
1200 TABLET, EXTENDED RELEASE ORAL 2 TIMES DAILY
Status: DISCONTINUED | OUTPATIENT
Start: 2021-07-01 | End: 2021-07-03 | Stop reason: HOSPADM

## 2021-07-01 RX ORDER — HYDROCODONE BITARTRATE AND ACETAMINOPHEN 5; 325 MG/1; MG/1
2 TABLET ORAL EVERY 4 HOURS PRN
Status: DISCONTINUED | OUTPATIENT
Start: 2021-07-01 | End: 2021-07-03 | Stop reason: HOSPADM

## 2021-07-01 RX ORDER — MORPHINE SULFATE 2 MG/ML
2 INJECTION, SOLUTION INTRAMUSCULAR; INTRAVENOUS EVERY 4 HOURS PRN
Status: DISCONTINUED | OUTPATIENT
Start: 2021-07-01 | End: 2021-07-03 | Stop reason: HOSPADM

## 2021-07-01 RX ORDER — ASPIRIN 81 MG/1
324 TABLET, CHEWABLE ORAL ONCE
Status: COMPLETED | OUTPATIENT
Start: 2021-07-01 | End: 2021-07-01

## 2021-07-01 RX ORDER — CYCLOBENZAPRINE HCL 10 MG
5 TABLET ORAL 4 TIMES DAILY
Status: DISCONTINUED | OUTPATIENT
Start: 2021-07-01 | End: 2021-07-01 | Stop reason: ALTCHOICE

## 2021-07-01 RX ORDER — ONDANSETRON 4 MG/1
4 TABLET, ORALLY DISINTEGRATING ORAL EVERY 8 HOURS PRN
Status: DISCONTINUED | OUTPATIENT
Start: 2021-07-01 | End: 2021-07-03 | Stop reason: HOSPADM

## 2021-07-01 RX ORDER — POTASSIUM CHLORIDE 750 MG/1
10 TABLET, FILM COATED, EXTENDED RELEASE ORAL DAILY
Status: DISCONTINUED | OUTPATIENT
Start: 2021-07-01 | End: 2021-07-01 | Stop reason: SDUPTHER

## 2021-07-01 RX ORDER — SODIUM CHLORIDE 0.9 % (FLUSH) 0.9 %
5-40 SYRINGE (ML) INJECTION EVERY 12 HOURS SCHEDULED
Status: DISCONTINUED | OUTPATIENT
Start: 2021-07-01 | End: 2021-07-03 | Stop reason: HOSPADM

## 2021-07-01 RX ORDER — AMLODIPINE BESYLATE 5 MG/1
5 TABLET ORAL DAILY
Status: DISCONTINUED | OUTPATIENT
Start: 2021-07-02 | End: 2021-07-03 | Stop reason: HOSPADM

## 2021-07-01 RX ORDER — BUDESONIDE AND FORMOTEROL FUMARATE DIHYDRATE 160; 4.5 UG/1; UG/1
2 AEROSOL RESPIRATORY (INHALATION) 2 TIMES DAILY
Status: DISCONTINUED | OUTPATIENT
Start: 2021-07-01 | End: 2021-07-03 | Stop reason: HOSPADM

## 2021-07-01 RX ORDER — PREDNISONE 20 MG/1
40 TABLET ORAL DAILY
Status: DISCONTINUED | OUTPATIENT
Start: 2021-07-04 | End: 2021-07-03 | Stop reason: HOSPADM

## 2021-07-01 RX ORDER — METHYLPREDNISOLONE SODIUM SUCCINATE 125 MG/2ML
125 INJECTION, POWDER, LYOPHILIZED, FOR SOLUTION INTRAMUSCULAR; INTRAVENOUS ONCE
Status: COMPLETED | OUTPATIENT
Start: 2021-07-01 | End: 2021-07-01

## 2021-07-01 RX ORDER — HYDROCODONE BITARTRATE AND ACETAMINOPHEN 5; 325 MG/1; MG/1
1 TABLET ORAL EVERY 4 HOURS PRN
Status: DISCONTINUED | OUTPATIENT
Start: 2021-07-01 | End: 2021-07-03 | Stop reason: HOSPADM

## 2021-07-01 RX ADMIN — METHYLPREDNISOLONE SODIUM SUCCINATE 125 MG: 125 INJECTION, POWDER, FOR SOLUTION INTRAMUSCULAR; INTRAVENOUS at 14:24

## 2021-07-01 RX ADMIN — METHYLPREDNISOLONE SODIUM SUCCINATE 40 MG: 40 INJECTION, POWDER, FOR SOLUTION INTRAMUSCULAR; INTRAVENOUS at 17:59

## 2021-07-01 RX ADMIN — GUAIFENESIN 1200 MG: 600 TABLET, EXTENDED RELEASE ORAL at 20:03

## 2021-07-01 RX ADMIN — IPRATROPIUM BROMIDE AND ALBUTEROL SULFATE 1 AMPULE: .5; 3 SOLUTION RESPIRATORY (INHALATION) at 20:25

## 2021-07-01 RX ADMIN — APIXABAN 2.5 MG: 2.5 TABLET, FILM COATED ORAL at 20:04

## 2021-07-01 RX ADMIN — METHYLPREDNISOLONE SODIUM SUCCINATE 40 MG: 40 INJECTION, POWDER, FOR SOLUTION INTRAMUSCULAR; INTRAVENOUS at 23:30

## 2021-07-01 RX ADMIN — ASPIRIN 324 MG: 81 TABLET, CHEWABLE ORAL at 13:15

## 2021-07-01 RX ADMIN — AMITRIPTYLINE HYDROCHLORIDE 75 MG: 50 TABLET, FILM COATED ORAL at 20:03

## 2021-07-01 RX ADMIN — Medication 2 PUFF: at 20:25

## 2021-07-01 RX ADMIN — IPRATROPIUM BROMIDE AND ALBUTEROL SULFATE 1 AMPULE: .5; 3 SOLUTION RESPIRATORY (INHALATION) at 14:03

## 2021-07-01 RX ADMIN — MORPHINE SULFATE 2 MG: 2 INJECTION, SOLUTION INTRAMUSCULAR; INTRAVENOUS at 20:08

## 2021-07-01 RX ADMIN — CHOLESTYRAMINE 4 G: 4 POWDER, FOR SUSPENSION ORAL at 18:28

## 2021-07-01 RX ADMIN — OXYCODONE HYDROCHLORIDE AND ACETAMINOPHEN 1 TABLET: 5; 325 TABLET ORAL at 13:16

## 2021-07-01 RX ADMIN — POTASSIUM BICARBONATE 40 MEQ: 782 TABLET, EFFERVESCENT ORAL at 17:59

## 2021-07-01 RX ADMIN — HYDROCODONE BITARTRATE AND ACETAMINOPHEN 2 TABLET: 5; 325 TABLET ORAL at 17:59

## 2021-07-01 RX ADMIN — Medication 10 ML: at 20:04

## 2021-07-01 ASSESSMENT — ENCOUNTER SYMPTOMS
BACK PAIN: 0
TROUBLE SWALLOWING: 0
COUGH: 1
WHEEZING: 1
CONSTIPATION: 0
STRIDOR: 0
SORE THROAT: 0
DIARRHEA: 0
VOMITING: 0
VOICE CHANGE: 0
ABDOMINAL DISTENTION: 0
COLOR CHANGE: 0
ABDOMINAL PAIN: 0
SHORTNESS OF BREATH: 1
NAUSEA: 0

## 2021-07-01 ASSESSMENT — PAIN SCALES - GENERAL
PAINLEVEL_OUTOF10: 10
PAINLEVEL_OUTOF10: 9
PAINLEVEL_OUTOF10: 10
PAINLEVEL_OUTOF10: 7
PAINLEVEL_OUTOF10: 9

## 2021-07-01 ASSESSMENT — PAIN DESCRIPTION - LOCATION
LOCATION: BACK;RIB CAGE
LOCATION: LEG
LOCATION: BACK;RIB CAGE
LOCATION: BACK;LEG;RIB CAGE

## 2021-07-01 ASSESSMENT — PAIN DESCRIPTION - DESCRIPTORS: DESCRIPTORS: DISCOMFORT;TENDER

## 2021-07-01 ASSESSMENT — PAIN DESCRIPTION - ORIENTATION
ORIENTATION: RIGHT;LEFT

## 2021-07-01 ASSESSMENT — PAIN DESCRIPTION - PAIN TYPE
TYPE: ACUTE PAIN

## 2021-07-01 NOTE — ED PROVIDER NOTES
This patient was seen by the Mid-Level Provider. I have seen and examined the patient, agree with the workup, evaluation, management and diagnosis. Care plan has been discussed. My assessment reveals a 32-year-old female presents with shortness of breath. This is a 32-year-old female presents with shortness of breath and pain on the left side of her chest.  The patient has a history of severe COPD and actually sees our pulmonologist.  She states she is been very short of breath for the last week and cannot even walk from one room to another. She denies any fevers or chills. Radiology results:    CT CHEST WO CONTRAST   Final Result   1. Advanced pulmonary emphysema with no acute intrathoracic abnormality   2. Subacute healing anterior left 6th rib fracture         VL Extremity Venous Bilateral   Final Result      XR CHEST PORTABLE   Final Result   No acute process.       Bibasilar hypoaeration               LABS:    Labs Reviewed   CBC WITH AUTO DIFFERENTIAL - Abnormal; Notable for the following components:       Result Value    WBC 14.7 (*)     Hemoglobin 9.1 (*)     Hematocrit 29.2 (*)     MCV 66.3 (*)     MCH 20.6 (*)     RDW 16.8 (*)     Neutrophils Absolute 10.3 (*)     Anisocytosis 1+ (*)     Microcytes 1+ (*)     Polychromasia Occasional (*)     Hypochromia Occasional (*)     Ovalocytes Occasional (*)     Target Cells Occasional (*)     All other components within normal limits    Narrative:     Performed at:  [facility] [address], [place], [facility] [address]   Phone [phone]   COMPREHENSIVE METABOLIC PANEL - Abnormal; Notable for the following components:    Potassium 3.4 (*)     CO2 19 (*)     Glucose 104 (*)     BUN 26 (*)     CREATININE 2.2 (*)     GFR Non- 22 (*)     GFR  27 (*)     Total Protein 6.0 (*)     Albumin 3.2 (*)     AST 10 (*)     All other components within normal limits    Narrative:     Performed at:  [facility]  [address] [place],  [place], [address]   Phone [phone]   TROPONIN - Abnormal; Notable for the following components:    Troponin 0.02 (*)     All other components within normal limits    Narrative:     Performed at:  [facility]  [address],  [place], [address]   Phone [phone]   BRAIN NATRIURETIC PEPTIDE - Abnormal; Notable for the following components:    Pro- (*)     All other components within normal limits    Narrative:     Performed at:  [facility]  [address] [place],  [place], [address]   Phone [phone]   CULTURE, BLOOD 2   CULTURE, BLOOD 1   PROTIME-INR    Narrative:     Performed at:  [facility]  [address],  [place], [address]   Phone [phone]   APTT    Narrative:     Performed at:  [facility]  [address] [place],  [place], [address]   Phone [phone]   LACTIC ACID, PLASMA    Narrative:     Performed at:  [facility]  [address] [place] [place],  [place], [address]   Phone [phone]           EKG:    Sinus rhythm at a rate of 98 beats a minute with no acute ST elevations or depressions or pathologic Q waves. Normal axis. Exam:    Well-nourished female no acute distress. The patient had some pain about patient of her left anterior chest wall area. Medical decision makin-year-old female with a history of severe COPD who presents with increasing chest pain and shortness of breath. The pain appears to be related to a subacute left anterior sixth rib fracture. Patient also has very short of breath. The CT does not show any findings consistent with pneumonia. The patient does have risk factors for pulmonary embolism however, her lower extremity venous Dopplers are negative and she is on anticoagulants.   The patient does have a history of renal disease

## 2021-07-01 NOTE — ED NOTES
Bed: 10  Expected date:   Expected time:   Means of arrival:   Comments:  Africa Quevedo RN  07/01/21 1126

## 2021-07-01 NOTE — PROGRESS NOTES
Pt seen in  ED, admission completed. Pt is alert and oriented x 3., Pt lives at home alone, and is being admitted for COPD exacerbation, elevated troponin and closed fracture of left rib. Plan of care updated, all questions answered.

## 2021-07-01 NOTE — ED NOTES
Pt finally returned to ED room after being gone for multiple test.  C/o pain and wants to know the plan     Irene Roberts RN  07/01/21 [id number]

## 2021-07-01 NOTE — PROGRESS NOTES
.4 Eyes Skin Assessment     NAME:  Kilo Denson OF BIRTH:  1950  MEDICAL RECORD NUMBER:  [id number]    The patient is being assess for  Admission    I agree that 2 RN's have performed a thorough Head to Toe Skin Assessment on the patient. ALL assessment sites listed below have been assessed. Areas assessed by both nurses:    Head, Face, Ears, Shoulders, Back, Chest, Arms, Elbows, Hands, Sacrum. Buttock, Coccyx, Ischium and Legs. Feet and Heels        Does the Patient have a Wound?  No noted wound(s)       Anthony Prevention initiated:  Yes   Wound Care Orders initiated:  No    Pressure Injury (Stage 3,4, Unstageable, DTI, NWPT, and Complex wounds) if present place consult order under [de-identified] No    New and Established Ostomies if present place consult order under : No      Nurse 1 eSignature: Electronically signed by Rachel Sherwood RN on 7/1/21 at 5:38 PM EDT    **SHARE this note so that the co-signing nurse is able to place an eSignature**    Nurse 2 eSignature: Electronically signed by Tamiko Shetty RN on 7/1/21 at 5:43 PM EDT

## 2021-07-01 NOTE — PROGRESS NOTES
Pt c/o deidre leg, back, and L rib pain, rating it 8/10, PRN norco given. Pt expresses no further needs. Call light in reach. Fall precautions in place.

## 2021-07-01 NOTE — H&P
Hospital Medicine History and Physical    7/1/2021    Date of Admission: 7/1/2021    Date of Service: Pt seen/examined on 7/1/2021 and admitted to inpatient. Chief complaint:  Chief Complaint   Patient presents with    Shortness of Breath     pt coming in c/o sob, cough, bilat leg pain started last week, symptoms are getting worse over the past couple of days. History of Presenting Illness: This is a pleasant 72 y.o. female who presented to the emergency room with a 2 to 3-day history of worsening shortness of breath, congestion, bilateral leg pain, cough and a 1 week history of worsening left-sided pleuritic sharp chest pain. Patient states she has been having a difficult time doing anything without getting severely short of breath, leg pain is so bad that she is having trouble standing. She denies fevers, chills, nausea, vomiting, diarrhea, abdominal pain, discharge. Past Medical History:      Diagnosis Date    Bullous emphysema ([facility])     CKD (chronic kidney disease) stage 3, GFR 30-59 ml/min ([facility])     Clostridium difficile carrier 01/21/2019    COPD (chronic obstructive pulmonary disease) ([address]     PFT done 7 years ago   Danii Juan Crohn's disease ([address]     Crohn's disease    Depression 1/30/2011    pt states resolved as of 3-26-12    DVT (deep venous thrombosis) ([address]     2012; first ocurrence     Hiatal hernia     Hx of blood clots     Kidney disease     Kidney insufficiency     Osteoporosis     Peripheral neuropathy     neuropathy in legs    Pneumonia     Postmenopausal     LMP in  40's    Pulmonary embolism ([address]     2012; first ocurrence    Sepsis ([address]     Syringomyelia ([address]        Past Surgical History:      Procedure Laterality Date    ABDOMEN SURGERY      BACK SURGERY      shunt to drain CSF    BIOPSY SHOULDER Left 2/27/2019    EXCISION OF LEFT SHOULDER MASS performed by Og Mayorga MD at [address]      crainotomy- remove fluid ? V-P SHUNT  BRONCHOSCOPY N/A 9/2/2020    BRONCHOSCOPY ALVEOLAR LAVAGE performed by Cindi Torres MD at [facility]      resection X2    COLONOSCOPY  12/5/11    BIOPSY AND POLYPECTOMY    COLONOSCOPY  4-2-2012    and ablation ERBE/APC    SHOULDER SURGERY      L        Medications (prior to admission):  Prior to Admission medications    Medication Sig Start Date End Date Taking?  Authorizing Provider   predniSONE (DELTASONE) 20 MG tablet Take 1 tablet by mouth daily 6/22/21 7/22/21 Yes Cindi Torres MD   Budeson-Glycopyrrol-Formoterol (BREZTRI AEROSPHERE) 160-9-4.8 MCG/ACT AERO Inhale 2 puffs into the lungs 2 times daily 6/1/21  Yes Cindi Torres MD   amLODIPine (NORVASC) 5 MG tablet TAKE 1 TABLET BY MOUTH EVERY DAY 5/3/21  Yes Helen Kaiser MD   Adalimumab (HUMIRA PEN SC) Inject into the skin every 14 days    Yes Historical Provider, MD   ELIQUIS 5 MG TABS tablet TAKE 1 TABLET BY MOUTH TWICE A DAY 1/15/21  Yes Cindi Torres MD   albuterol (PROVENTIL) (2.5 MG/3ML) 0.083% nebulizer solution TAKE 3 MLS BY NEBULIZATION EVERY 6 HOURS AS NEEDED FOR WHEEZING DX: COPD ICD-10: J44.9 1/13/21  Yes Cindi Torres MD   colestipol (COLESTID) 1 g tablet TAKE 1 TABLET BY MOUTH TWICE A DAY 10/7/20  Yes Historical Provider, MD   amitriptyline (ELAVIL) 150 MG tablet Take 0.5 tablets by mouth nightly 12/17/20  Yes Helen Kaiser MD   Nutritional Supplements (ENSURE HIGH PROTEIN) LIQD Take 1 Can by mouth 2 times daily 10/22/20  Yes Rosy Gallardo MD   albuterol sulfate  (90 Base) MCG/ACT inhaler Inhale 2 puffs into the lungs every 6 hours as needed for Wheezing 7/28/20 7/28/21 Yes Cindi Torres MD   potassium chloride (KLOR-CON) 10 MEQ extended release tablet TAKE 1 TABLET BY MOUTH EVERY DAY 3/31/21   Historical Provider, MD   Amino Acids-Protein Hydrolys (PROTEINEX P18) LIQD Take 1 Bottle by mouth 2 times daily 10/15/20   Rosy Gallardo MD cyclobenzaprine (FLEXERIL) 5 MG tablet Take 5 mg by mouth 4 times daily Every 6 hours for muscle spasms    Historical Provider, MD   alendronate (FOSAMAX) 70 MG tablet Take 1 tablet by mouth every 7 days 4/6/19   Curt Gloria MD   omeprazole (PRILOSEC) 20 MG delayed release capsule Take 20 mg by mouth Daily Takes as needed    Historical Provider, MD       Allergy(ies):  Patient has no known allergies. Social History:  TOBACCO:  reports that she quit smoking about 6 years ago. Her smoking use included cigarettes. She has a 7.50 pack-year smoking history. She has never used smokeless tobacco.  ETOH:  reports no history of alcohol use. Family History:      Problem Relation Age of Onset    Heart Disease Mother     High Blood Pressure Mother     High Cholesterol Mother     Early Death Mother 36        MI    Heart Disease Father     High Blood Pressure Father     High Cholesterol Father     Stroke Father 79    High Blood Pressure Sister     High Blood Pressure Brother        Review of Systems:  All other systems are reviewed, positive and negative are noted in HPI. Vitals and physical examination:  BP (!) 161/96   Pulse 93   Temp 97.6 °F (36.4 °C) (Oral)   Resp 20   Ht 4' 11\" (1.499 m)   Wt 150 lb (68 kg)   LMP  (LMP Unknown)   SpO2 99%   BMI 30.30 kg/m²   Gen/overall appearance: Not in acute distress. Alert. Head: Normocephalic, atraumatic  Eyes: EOMI, good acuity  ENT:- Oral mucosa moist  Neck: No JVD, thyromegaly  CVS: Nml S1S2, no MRG, RRR, left sided Chest wall TTP  Pulm: diminished BS B/L, mild wheezes and rhonchi  Gastrointestinal: Soft, NT/ND, +BS  Musculoskeletal: No edema. Warm  Neuro: No focal deficit. Moves extremity spontaneously. Psychiatry: Appropriate affect. Not agitated. Skin: Warm, dry with normal turgor.  No rash    Labs/imaging/EKG:  CBC:   Recent Labs     06/30/21  1200 07/01/21  1110   WBC 15.4* 14.7*   HGB 9.4* 9.1*    333     BMP:    Recent Labs 06/30/21  1200 07/01/21  1110    139   K 3.8 3.4*    108   CO2 18* 19*   BUN 23* 26*   CREATININE 2.1* 2.2*   GLUCOSE 95 104*     Hepatic:   Recent Labs     06/30/21  1200 07/01/21  1110   AST 11* 10*   ALT 10 10   BILITOT <0.2 <0.2   ALKPHOS 64 62       CT CHEST WO CONTRAST    Result Date: 7/1/2021  EXAMINATION: CT OF THE CHEST WITHOUT CONTRAST 7/1/2021 12:58 pm TECHNIQUE: CT of the chest was performed without the administration of intravenous contrast. Multiplanar reformatted images are provided for review. Dose modulation, iterative reconstruction, and/or weight based adjustment of the mA/kV was utilized to reduce the radiation dose to as low as reasonably achievable. COMPARISON: 04/08/2021 HISTORY: ORDERING SYSTEM PROVIDED HISTORY: pleuritic left sided chest pain TECHNOLOGIST PROVIDED HISTORY: Reason for exam:->pleuritic left sided chest pain Decision Support Exception - unselect if not a suspected or confirmed emergency medical condition->Emergency Medical Condition (MA) Reason for Exam: pleuritic left sided chest pain Acuity: Acute Type of Exam: Initial FINDINGS: Mediastinum: No mediastinal adenopathy. Large densely calcified paratracheal and precarinal lymph nodes. No pericardial effusion. Tortuous normal caliber thoracic aorta Lungs/pleura: Advanced pulmonary emphysema with postinflammatory scarring in the left upper lobe. No acute infiltrate. No pleural effusion or pneumothorax Upper Abdomen: No significant abnormality of the upper abdomen Soft Tissues/Bones: Subacute healing fracture of the anterior left 6th rib. No acute rib fracture or destructive bone lesion. Pronounced kyphoscoliosis     1. Advanced pulmonary emphysema with no acute intrathoracic abnormality 2.  Subacute healing anterior left 6th rib fracture     XR CHEST PORTABLE    Result Date: 7/1/2021  EXAMINATION: ONE XRAY VIEW OF THE CHEST 7/1/2021 11:19 am COMPARISON: 04/11/2021 HISTORY: ORDERING SYSTEM PROVIDED HISTORY: SOB TECHNOLOGIST PROVIDED HISTORY: Reason for exam:->SOB Reason for Exam: Shortness of Breath (pt coming in c/o sob, cough, bilat leg pain started last week, symptoms are getting worse over the past couple of days. Acuity: Chronic Type of Exam: Ongoing FINDINGS: The lungs are without acute focal process. Bibasilar hypoaeration. There is no effusion or pneumothorax. The cardiomediastinal silhouette is stable. The osseous structures are stable. No acute process. Bibasilar hypoaeration     VL Extremity Venous Bilateral    Result Date: 7/1/2021  Lower Extremities DVT Study  Demographics   Patient Name       Zoltan Barcenas   Date of Study      07/01/2021         Gender              Female   Patient Number     [id number]         Date of Birth       1950   Visit Number       [id number]          Age                 72 year(s)   Accession Number   [id number]         Room Number         0010   Corporate ID       [id number]           Sonographer         Kwadwo Campuzano,                                                            [address]   Ordering Physician Conard Spatz    Interpreting        [facility]                     Dolores Sotelo PA-C       Physician           Remy Rojas MD,                                                            [facility] [place]  Procedure Type of Study:   Veins:Lower Extremities DVT Study, VASC EXTREMITY VENOUS DUPLEX BILATERAL. Vascular Sonographer Report  Additional Indications:Pain and swelling Impressions Right Impression No evidence of deep vein or superficial vein thrombosis involving the right lower extremity. Calf veins were not well visualized on the right. Left Impression No evidence of deep vein or superficial vein thrombosis involving the left lower extremity. Conclusions   Summary   There is no evidence of deep or superficial venous thrombosis involving the  bilateral lower extremities.    Signature   ------------------------------------------------------------------  Electronically signed !None      ! +------------------------+----------+---------------+----------+ ! Peroneal                !Partial   !Yes            ! None      ! +------------------------+----------+---------------+----------+ ! SSV                     ! Yes       ! Yes            ! None      ! +------------------------+----------+---------------+----------+ Right Doppler Measurements +------------------------+------+------+------------+ ! Location                ! Signal!Reflux! Reflux (sec)! +------------------------+------+------+------------+ ! Sapheno Femoral Junction! Phasic!      !            ! +------------------------+------+------+------------+ ! Common Femoral          !Phasic!      !            ! +------------------------+------+------+------------+ ! Prox Femoral            !Phasic!      !            ! +------------------------+------+------+------------+ ! Deep Femoral            !Phasic!      !            ! +------------------------+------+------+------------+ ! Popliteal               !Phasic!      !            ! +------------------------+------+------+------------+ Left Lower Extremities DVT Study Measurements Left 2D Measurements +------------------------+----------+---------------+----------+ ! Location                ! Visualized! Compressibility! Thrombosis! +------------------------+----------+---------------+----------+ ! Sapheno Femoral Junction! Yes       ! Yes            ! None      ! +------------------------+----------+---------------+----------+ ! GSV Thigh               ! Yes       ! Yes            ! None      ! +------------------------+----------+---------------+----------+ ! Common Femoral          !Yes       ! Yes            ! None      ! +------------------------+----------+---------------+----------+ ! Prox Femoral            !Yes       ! Yes            ! None      ! +------------------------+----------+---------------+----------+ ! Mid Femoral             !Yes       ! Yes            ! None      ! +------------------------+----------+---------------+----------+ ! Dist Femoral            !Yes       ! Yes            ! None      ! +------------------------+----------+---------------+----------+ ! Deep Femoral            !Yes       ! Yes            ! None      ! +------------------------+----------+---------------+----------+ ! Popliteal               !Yes       ! Yes            ! None      ! +------------------------+----------+---------------+----------+ ! GSV Below Knee          ! Yes       ! Yes            ! None      ! +------------------------+----------+---------------+----------+ ! Gastroc                 ! Yes       ! Yes            ! None      ! +------------------------+----------+---------------+----------+ ! PTV                     ! Yes       ! Yes            ! None      ! +------------------------+----------+---------------+----------+ ! Peroneal                !Yes       ! Yes            ! None      ! +------------------------+----------+---------------+----------+ ! SSV                     ! Yes       ! Yes            ! None      ! +------------------------+----------+---------------+----------+ Left Doppler Measurements +------------------------+------+------+------------+ ! Location                ! Signal!Reflux! Reflux (sec)! +------------------------+------+------+------------+ ! Sapheno Femoral Junction! Phasic!      !            ! +------------------------+------+------+------------+ ! Common Femoral          !Phasic!      !            ! +------------------------+------+------+------------+ ! Prox Femoral            !Phasic!      !            ! +------------------------+------+------+------------+ ! Deep Femoral            !Phasic!      !            ! +------------------------+------+------+------------+ ! Popliteal               !Phasic!      !            ! +------------------------+------+------+------------+          Discussed with Patient and ER provider.       Assessment:  Active Problems:    Crohn disease ([facility] Chronic hypoxemic respiratory failure (HCC)    COPD, severe (HCC)    Bronchiectasis with acute exacerbation (HCC)    Gastroesophageal reflux disease without esophagitis    History of pulmonary embolism    COPD exacerbation (HCC)    History of DVT (deep vein thrombosis)    Ex-smoker    Essential hypertension    Acute exacerbation of chronic obstructive pulmonary disease (COPD) ([facility]    Acute renal failure superimposed on stage 3 chronic kidney disease (HCC)    Closed fracture of one rib of left side with routine healing  Resolved Problems:    * No resolved hospital problems. *      Plan:  Acute exacerbation of COPD/severe COPD  -Start IV steroids, duo nebs, Mucinex  -Consult pulmonology      Chronic respiratory failure with hypoxia  -Currently on 2 L O2 nasal cannula continuously      Acute kidney injury superimposed on chronic kidney disease stage IIIb  -Consult nephrology, avoid nephrotoxins, continue to monitor          History of recurrent DVT and PE  -Continue Eliquis      Closed subacute fracture of left anterior sixth rib  -Continue as needed pain meds      Essential hypertension  -BP stable, CPM, CTM      Hypokalemia  -Replace, continue to monitor      History of tobacco dependence          Activities: Up with assist  Prophylaxis: eliquis  Code status: Full    ==========================================================          Thank you Marion Dodd MD for the opportunity to be involved in this patient's care.  If you have any questions or concerns please feel free to contact me at [phone].  -----------------------------  Christie Phoenix, MD  [facility] hospitalist

## 2021-07-01 NOTE — PROGRESS NOTES
Pt transported to unit from ED. Vitals obtained, pt alert, oriented and in stable condition. Pt oriented to room and instructed on how to use call light, pt verbalized understanding. Pt expresses no needs at this time. Call light in reach. Fall precautions in place.

## 2021-07-01 NOTE — ED PROVIDER NOTES
distention, abdominal pain, constipation, diarrhea, nausea and vomiting. Endocrine: Negative. Genitourinary: Negative. Musculoskeletal: Positive for myalgias. Negative for back pain, neck pain and neck stiffness. Skin: Negative for color change, pallor, rash and wound. Neurological: Negative. Psychiatric/Behavioral: Negative for confusion. All other systems reviewed and are negative. Positives and Pertinent negatives as per HPI. Except as noted above in the ROS, all other systems were reviewed and negative. PAST MEDICAL HISTORY     Past Medical History:   Diagnosis Date    Bullous emphysema (HCC)     CKD (chronic kidney disease) stage 3, GFR 30-59 ml/min (HCC)     Clostridium difficile carrier 01/21/2019    COPD (chronic obstructive pulmonary disease) ([address]     PFT done 7 years ago   [place] Crohn's disease ([address]     Crohn's disease    Depression 1/30/2011    pt states resolved as of 3-26-12    DVT (deep venous thrombosis) ([address]     2012; first ocurrence     Hiatal hernia     Hx of blood clots     Kidney disease     Kidney insufficiency     Osteoporosis     Peripheral neuropathy     neuropathy in legs    Pneumonia     Postmenopausal     LMP in  40's    Pulmonary embolism ([address]     2012; first ocurrence    Sepsis ([address]     Syringomyelia ([address]          SURGICAL HISTORY     Past Surgical History:   Procedure Laterality Date    ABDOMEN SURGERY      BACK SURGERY      shunt to drain CSF    BIOPSY SHOULDER Left 2/27/2019    EXCISION OF LEFT SHOULDER MASS performed by Eleazar Walden MD at [address] [place] [address]      crainotomy- remove fluid ? V-P SHUNT    BRONCHOSCOPY N/A 9/2/2020    BRONCHOSCOPY ALVEOLAR LAVAGE performed by Sherlie Lombard, MD at [facility]      resection X2    COLONOSCOPY  12/5/11    BIOPSY AND POLYPECTOMY    COLONOSCOPY  4-2-2012    and ablation ERBE/APC    SHOULDER SURGERY      L CURRENTMEDICATIONS       Previous Medications    ADALIMUMAB (HUMIRA PEN SC)    Inject into the skin    ALBUTEROL (PROVENTIL) (2.5 MG/3ML) 0.083% NEBULIZER SOLUTION    TAKE 3 MLS BY NEBULIZATION EVERY 6 HOURS AS NEEDED FOR WHEEZING DX: COPD ICD-10: J44.9    ALBUTEROL SULFATE  (90 BASE) MCG/ACT INHALER    Inhale 2 puffs into the lungs every 6 hours as needed for Wheezing    ALENDRONATE (FOSAMAX) 70 MG TABLET    Take 1 tablet by mouth every 7 days    AMINO ACIDS-PROTEIN HYDROLYS (PROTEINEX P18) LIQD    Take 1 Bottle by mouth 2 times daily    AMITRIPTYLINE (ELAVIL) 150 MG TABLET    Take 0.5 tablets by mouth nightly    AMLODIPINE (NORVASC) 5 MG TABLET    TAKE 1 TABLET BY MOUTH EVERY DAY    BUDESON-GLYCOPYRROL-FORMOTEROL (BREZTRI AEROSPHERE) 160-9-4.8 MCG/ACT AERO    Inhale 2 puffs into the lungs 2 times daily    COLESTIPOL (COLESTID) 1 G TABLET    TAKE 1 TABLET BY MOUTH TWICE A DAY    CYCLOBENZAPRINE (FLEXERIL) 5 MG TABLET    Take 5 mg by mouth 4 times daily Every 6 hours for muscle spasms    ELIQUIS 5 MG TABS TABLET    TAKE 1 TABLET BY MOUTH TWICE A DAY    NUTRITIONAL SUPPLEMENTS (ENSURE HIGH PROTEIN) LIQD    Take 1 Can by mouth 2 times daily    OMEPRAZOLE (PRILOSEC) 20 MG DELAYED RELEASE CAPSULE    Take 20 mg by mouth Daily Takes as needed    POTASSIUM CHLORIDE (KLOR-CON) 10 MEQ EXTENDED RELEASE TABLET    TAKE 1 TABLET BY MOUTH EVERY DAY    PREDNISONE (DELTASONE) 20 MG TABLET    Take 1 tablet by mouth daily         ALLERGIES     Patient has no known allergies.     FAMILYHISTORY       Family History   Problem Relation Age of Onset    Heart Disease Mother     High Blood Pressure Mother     High Cholesterol Mother     Early Death Mother 36        MI    Heart Disease Father     High Blood Pressure Father     High Cholesterol Father     Stroke Father 79    High Blood Pressure Sister     High Blood Pressure Brother           SOCIAL HISTORY       Social History     Tobacco Use    Smoking status: Former Smoker     Packs/day: 0.25     Years: 30.00     Pack years: 7.50     Types: Cigarettes     Quit date: 2015     Years since quittin.3    Smokeless tobacco: Never Used    Tobacco comment: started smoking at age 26 / smoked up to 9 cigarettes a day    Vaping Use    Vaping Use: Never used   Substance Use Topics    Alcohol use: No    Drug use: No       SCREENINGS             PHYSICAL EXAM    (up to 7 for level 4, 8 or more for level 5)     ED Triage Vitals [21 1048]   BP Temp Temp Source Pulse Resp SpO2 Height Weight   (!) 149/85 97.5 °F (36.4 °C) Oral 101 18 94 % 4' 11\" (1.499 m) 150 lb (68 kg)       Physical Exam  Vitals and nursing note reviewed. Constitutional:       Appearance: She is well-developed. She is not toxic-appearing or diaphoretic. HENT:      Head: Normocephalic and atraumatic. Right Ear: External ear normal.      Left Ear: External ear normal.      Nose: Nose normal.      Mouth/Throat:      Mouth: Mucous membranes are moist.      Pharynx: Oropharynx is clear. Eyes:      General: No scleral icterus. Right eye: No discharge. Left eye: No discharge. Extraocular Movements: Extraocular movements intact. Conjunctiva/sclera: Conjunctivae normal.      Pupils: Pupils are equal, round, and reactive to light. Cardiovascular:      Rate and Rhythm: Normal rate. Pulses:           Dorsalis pedis pulses are 2+ on the right side and 2+ on the left side. Posterior tibial pulses are 2+ on the right side and 2+ on the left side. Pulmonary:      Effort: Pulmonary effort is normal.      Breath sounds: No stridor. Wheezing present. No rhonchi or rales. Chest:      Chest wall: Tenderness (left lateral chest wall) present. Abdominal:      General: Bowel sounds are normal. There is no distension. Palpations: Abdomen is soft. Tenderness: There is no abdominal tenderness. There is no right CVA tenderness or left CVA tenderness. Musculoskeletal:         General: Normal range of motion. Cervical back: Normal range of motion. Comments: Posterior calf and thigh pain bilaterally without acute edema. Skin:     General: Skin is warm and dry. Capillary Refill: Capillary refill takes less than 2 seconds. Coloration: Skin is not jaundiced or pale. Findings: No bruising, erythema, lesion or rash. Neurological:      General: No focal deficit present. Mental Status: She is alert and oriented to person, place, and time.    Psychiatric:         Mood and Affect: Mood normal.         Behavior: Behavior normal.         DIAGNOSTIC RESULTS   LABS:    Labs Reviewed   CBC WITH AUTO DIFFERENTIAL - Abnormal; Notable for the following components:       Result Value    WBC 14.7 (*)     Hemoglobin 9.1 (*)     Hematocrit 29.2 (*)     MCV 66.3 (*)     MCH 20.6 (*)     RDW 16.8 (*)     Neutrophils Absolute 10.3 (*)     Anisocytosis 1+ (*)     Microcytes 1+ (*)     Polychromasia Occasional (*)     Hypochromia Occasional (*)     Ovalocytes Occasional (*)     Target Cells Occasional (*)     All other components within normal limits    Narrative:     Performed at:  [facility] [address], [place], [address] [facility]   Phone [phone]   COMPREHENSIVE METABOLIC PANEL - Abnormal; Notable for the following components:    Potassium 3.4 (*)     CO2 19 (*)     Glucose 104 (*)     BUN 26 (*)     CREATININE 2.2 (*)     GFR Non- 22 (*)     GFR  27 (*)     Total Protein 6.0 (*)     Albumin 3.2 (*)     AST 10 (*)     All other components within normal limits    Narrative:     Performed at:  [facility] [address], [place], [facility]   Phone [phone]   TROPONIN - Abnormal; Notable for the following components:    Troponin 0.02 (*)     All other components within normal limits    Narrative:     Performed at:  [facility]  [address] [facility],  [place], [address]   Phone [phone]   BRAIN NATRIURETIC PEPTIDE - Abnormal; Notable for the following components:    Pro- (*)     All other components within normal limits    Narrative:     Performed at:  [facility]  [address] [facility],  [place], [address]   Phone [phone]   CULTURE, BLOOD 2   CULTURE, BLOOD 1   PROTIME-INR    Narrative:     Performed at:  [facility]  [address] [place],  [place], [address]   Phone [phone]   APTT    Narrative:     Performed at:  [facility]  [address] [facility],  [place], [address]   Phone [phone]   LACTIC ACID, PLASMA    Narrative:     Performed at:  [facility]  [address] [facility],  [place], [address]   Phone [phone]       When ordered only abnormal lab results are displayed. All other labs were within normal range or not returned as of this dictation. EKG: When ordered, EKG's are interpreted by the Emergency Department Physician in the absence of a cardiologist.  Please see their note for interpretation of EKG. RADIOLOGY:   Non-plain film images such as CT, Ultrasound and MRI are read by the radiologist. Plain radiographic images are visualized and preliminarily interpreted by the ED Provider with the below findings:        Interpretation per the Radiologist below, if available at the time of this note:    CT CHEST WO CONTRAST   Final Result   1. Advanced pulmonary emphysema with no acute intrathoracic abnormality   2. Subacute healing anterior left 6th rib fracture         VL Extremity Venous Bilateral   Final Result      XR CHEST PORTABLE   Final Result   No acute process. Bibasilar hypoaeration                Summary        There is no evidence of deep or superficial venous thrombosis involving the    bilateral lower extremities.    PROCEDURES   Unless otherwise noted below, none     Procedures    CRITICAL CARE TIME   N/A    CONSULTS:  IP CONSULT TO HOSPITALIST      EMERGENCY DEPARTMENT COURSE and DIFFERENTIAL DIAGNOSIS/MDM:   Vitals:    Vitals:    07/01/21 1048 07/01/21 1315   BP: (!) 149/85 (!) 152/93   Pulse: 101    Resp: 18    Temp: 97.5 °F (36.4 °C)    TempSrc: Oral    SpO2: 94% 93%   Weight: 150 lb (68 kg)    Height: 4' 11\" (1.499 m)        Patient was given the following medications:  Medications   ipratropium-albuterol (DUONEB) nebulizer solution 1 ampule (has no administration in time range)   methylPREDNISolone sodium (SOLU-MEDROL) injection 125 mg (has no administration in time range)   oxyCODONE-acetaminophen (PERCOCET) 5-325 MG per tablet 1 tablet (1 tablet Oral Given 7/1/21 1316)   aspirin chewable tablet 324 mg (324 mg Oral Given 7/1/21 1315)         This patient presents to the emergency department with complaints of cough, shortness of breath, pleuritic left-sided chest pain. Also reports leg pain. Venous Doppler negative for DVT. She is anticoagulated. She has renal insufficiency which appears chronic and therefore did have a CT scan of the chest without contrast.  CT scan confirms left subacute appearing rib fracture. No overt evidence of infectious pathology. Troponin is minimally elevated, which may be linked to her chronic renal insufficiency. Will likely need to be trended. Patient understands and agrees to plan for admission. Hospitalist has agreed to admit. FINAL IMPRESSION      1. COPD with acute exacerbation ([address]    2. DURAND (dyspnea on exertion)    3. Closed fracture of one rib of left side, initial encounter    4. Elevated troponin          DISPOSITION/PLAN   DISPOSITION Decision To Admit 07/01/2021 01:32:26 PM      PATIENT REFERRED TO:  No follow-up provider specified.     DISCHARGE MEDICATIONS:  New Prescriptions    No medications on file       DISCONTINUED MEDICATIONS:  Discontinued Medications    No medications on file (Please note that portions of this note were completed with a voice recognition program.  Efforts were made to edit the dictations but occasionally words are mis-transcribed.)    Tylor Truong PA-C (electronically signed)           Tylor Truong PA-C  07/01/21 0421

## 2021-07-02 LAB
ANION GAP SERPL CALCULATED.3IONS-SCNC: 12 MMOL/L (ref 3–16)
BUN BLDV-MCNC: 27 MG/DL (ref 7–20)
CALCIUM SERPL-MCNC: 8.2 MG/DL (ref 8.3–10.6)
CHLORIDE BLD-SCNC: 105 MMOL/L (ref 99–110)
CO2: 19 MMOL/L (ref 21–32)
CREAT SERPL-MCNC: 1.7 MG/DL (ref 0.6–1.2)
GFR AFRICAN AMERICAN: 36
GFR NON-AFRICAN AMERICAN: 30
GLUCOSE BLD-MCNC: 118 MG/DL (ref 70–99)
HCT VFR BLD CALC: 28.9 % (ref 36–48)
HEMATOLOGY PATH CONSULT: NO
HEMOGLOBIN: 9.2 G/DL (ref 12–16)
MCH RBC QN AUTO: 20.9 PG (ref 26–34)
MCHC RBC AUTO-ENTMCNC: 31.9 G/DL (ref 31–36)
MCV RBC AUTO: 65.5 FL (ref 80–100)
PDW BLD-RTO: 17 % (ref 12.4–15.4)
PLATELET # BLD: 337 K/UL (ref 135–450)
PMV BLD AUTO: 7.2 FL (ref 5–10.5)
POTASSIUM REFLEX MAGNESIUM: 5 MMOL/L (ref 3.5–5.1)
PROCALCITONIN: 0.11 NG/ML (ref 0–0.15)
RBC # BLD: 4.42 M/UL (ref 4–5.2)
SODIUM BLD-SCNC: 136 MMOL/L (ref 136–145)
TROPONIN: <0.01 NG/ML
WBC # BLD: 20.4 K/UL (ref 4–11)

## 2021-07-02 PROCEDURE — 84145 PROCALCITONIN (PCT): CPT

## 2021-07-02 PROCEDURE — 1200000000 HC SEMI PRIVATE

## 2021-07-02 PROCEDURE — 6370000000 HC RX 637 (ALT 250 FOR IP): Performed by: FAMILY MEDICINE

## 2021-07-02 PROCEDURE — 2700000000 HC OXYGEN THERAPY PER DAY

## 2021-07-02 PROCEDURE — 96376 TX/PRO/DX INJ SAME DRUG ADON: CPT

## 2021-07-02 PROCEDURE — 99223 1ST HOSP IP/OBS HIGH 75: CPT | Performed by: INTERNAL MEDICINE

## 2021-07-02 PROCEDURE — 84484 ASSAY OF TROPONIN QUANT: CPT

## 2021-07-02 PROCEDURE — 94640 AIRWAY INHALATION TREATMENT: CPT

## 2021-07-02 PROCEDURE — 2580000003 HC RX 258: Performed by: FAMILY MEDICINE

## 2021-07-02 PROCEDURE — 6370000000 HC RX 637 (ALT 250 FOR IP): Performed by: INTERNAL MEDICINE

## 2021-07-02 PROCEDURE — 97530 THERAPEUTIC ACTIVITIES: CPT

## 2021-07-02 PROCEDURE — 97162 PT EVAL MOD COMPLEX 30 MIN: CPT

## 2021-07-02 PROCEDURE — 80048 BASIC METABOLIC PNL TOTAL CA: CPT

## 2021-07-02 PROCEDURE — 85027 COMPLETE CBC AUTOMATED: CPT

## 2021-07-02 PROCEDURE — G0378 HOSPITAL OBSERVATION PER HR: HCPCS

## 2021-07-02 PROCEDURE — 87449 NOS EACH ORGANISM AG IA: CPT

## 2021-07-02 PROCEDURE — 94761 N-INVAS EAR/PLS OXIMETRY MLT: CPT

## 2021-07-02 PROCEDURE — 92610 EVALUATE SWALLOWING FUNCTION: CPT

## 2021-07-02 PROCEDURE — 92526 ORAL FUNCTION THERAPY: CPT

## 2021-07-02 PROCEDURE — 36415 COLL VENOUS BLD VENIPUNCTURE: CPT

## 2021-07-02 PROCEDURE — 6360000002 HC RX W HCPCS: Performed by: FAMILY MEDICINE

## 2021-07-02 PROCEDURE — 97166 OT EVAL MOD COMPLEX 45 MIN: CPT

## 2021-07-02 RX ORDER — CALCIUM CARBONATE 200(500)MG
500 TABLET,CHEWABLE ORAL 3 TIMES DAILY
Status: DISCONTINUED | OUTPATIENT
Start: 2021-07-02 | End: 2021-07-03 | Stop reason: HOSPADM

## 2021-07-02 RX ADMIN — Medication 2 PUFF: at 20:22

## 2021-07-02 RX ADMIN — GUAIFENESIN 1200 MG: 600 TABLET, EXTENDED RELEASE ORAL at 08:39

## 2021-07-02 RX ADMIN — GUAIFENESIN 1200 MG: 600 TABLET, EXTENDED RELEASE ORAL at 22:51

## 2021-07-02 RX ADMIN — Medication 2 PUFF: at 09:28

## 2021-07-02 RX ADMIN — MORPHINE SULFATE 2 MG: 2 INJECTION, SOLUTION INTRAMUSCULAR; INTRAVENOUS at 11:58

## 2021-07-02 RX ADMIN — METHYLPREDNISOLONE SODIUM SUCCINATE 40 MG: 40 INJECTION, POWDER, FOR SOLUTION INTRAMUSCULAR; INTRAVENOUS at 11:58

## 2021-07-02 RX ADMIN — Medication 10 ML: at 08:44

## 2021-07-02 RX ADMIN — ANTACID TABLETS 500 MG: 500 TABLET, CHEWABLE ORAL at 14:39

## 2021-07-02 RX ADMIN — MORPHINE SULFATE 2 MG: 2 INJECTION, SOLUTION INTRAMUSCULAR; INTRAVENOUS at 17:39

## 2021-07-02 RX ADMIN — IPRATROPIUM BROMIDE AND ALBUTEROL SULFATE 1 AMPULE: .5; 3 SOLUTION RESPIRATORY (INHALATION) at 09:28

## 2021-07-02 RX ADMIN — MORPHINE SULFATE 2 MG: 2 INJECTION, SOLUTION INTRAMUSCULAR; INTRAVENOUS at 05:57

## 2021-07-02 RX ADMIN — APIXABAN 2.5 MG: 2.5 TABLET, FILM COATED ORAL at 08:39

## 2021-07-02 RX ADMIN — METHYLPREDNISOLONE SODIUM SUCCINATE 40 MG: 40 INJECTION, POWDER, FOR SOLUTION INTRAMUSCULAR; INTRAVENOUS at 22:51

## 2021-07-02 RX ADMIN — Medication 10 ML: at 22:52

## 2021-07-02 RX ADMIN — PANTOPRAZOLE SODIUM 40 MG: 40 TABLET, DELAYED RELEASE ORAL at 05:53

## 2021-07-02 RX ADMIN — CHOLESTYRAMINE 4 G: 4 POWDER, FOR SUSPENSION ORAL at 08:42

## 2021-07-02 RX ADMIN — APIXABAN 2.5 MG: 2.5 TABLET, FILM COATED ORAL at 22:51

## 2021-07-02 RX ADMIN — AMITRIPTYLINE HYDROCHLORIDE 75 MG: 50 TABLET, FILM COATED ORAL at 22:51

## 2021-07-02 RX ADMIN — HYDROCODONE BITARTRATE AND ACETAMINOPHEN 2 TABLET: 5; 325 TABLET ORAL at 14:39

## 2021-07-02 RX ADMIN — IPRATROPIUM BROMIDE AND ALBUTEROL SULFATE 1 AMPULE: .5; 3 SOLUTION RESPIRATORY (INHALATION) at 12:59

## 2021-07-02 RX ADMIN — IPRATROPIUM BROMIDE AND ALBUTEROL SULFATE 1 AMPULE: .5; 3 SOLUTION RESPIRATORY (INHALATION) at 17:13

## 2021-07-02 RX ADMIN — METHYLPREDNISOLONE SODIUM SUCCINATE 40 MG: 40 INJECTION, POWDER, FOR SOLUTION INTRAMUSCULAR; INTRAVENOUS at 17:39

## 2021-07-02 RX ADMIN — METHYLPREDNISOLONE SODIUM SUCCINATE 40 MG: 40 INJECTION, POWDER, FOR SOLUTION INTRAMUSCULAR; INTRAVENOUS at 05:53

## 2021-07-02 RX ADMIN — AMLODIPINE BESYLATE 5 MG: 5 TABLET ORAL at 08:39

## 2021-07-02 RX ADMIN — IPRATROPIUM BROMIDE AND ALBUTEROL SULFATE 1 AMPULE: .5; 3 SOLUTION RESPIRATORY (INHALATION) at 20:22

## 2021-07-02 RX ADMIN — POTASSIUM CHLORIDE 10 MEQ: 750 TABLET, FILM COATED, EXTENDED RELEASE ORAL at 08:39

## 2021-07-02 RX ADMIN — ANTACID TABLETS 500 MG: 500 TABLET, CHEWABLE ORAL at 22:51

## 2021-07-02 ASSESSMENT — PAIN SCALES - GENERAL
PAINLEVEL_OUTOF10: 9
PAINLEVEL_OUTOF10: 9
PAINLEVEL_OUTOF10: 0
PAINLEVEL_OUTOF10: 6
PAINLEVEL_OUTOF10: 8
PAINLEVEL_OUTOF10: 0
PAINLEVEL_OUTOF10: 7
PAINLEVEL_OUTOF10: 7

## 2021-07-02 ASSESSMENT — PAIN DESCRIPTION - LOCATION
LOCATION: BACK;RIB CAGE

## 2021-07-02 ASSESSMENT — PAIN DESCRIPTION - PAIN TYPE
TYPE: ACUTE PAIN

## 2021-07-02 NOTE — PROGRESS NOTES
Facility/Department: [address] ONCOLOGY  Initial Assessment  DYSPHAGIA BEDSIDE SWALLOW EVALUATION     Patient: Rose Mary Muller   : 1950   MRN: [id number]      Evaluation Date: 2021   Admitting Diagnosis: COPD exacerbation ([facility] [J44.1]  Pain: denied                         H&P: \"This is a pleasant 72 y.o. female who presented to the emergency room with a 2 to 3-day history of worsening shortness of breath, congestion, bilateral leg pain, cough and a 1 week history of worsening left-sided pleuritic sharp chest pain. Patient states she has been having a difficult time doing anything without getting severely short of breath, leg pain is so bad that she is having trouble standing. She denies fevers, chills, nausea, vomiting, diarrhea, abdominal pain, discharge. \"    Recent Chest xray: 21  Impression   No acute process.       Bibasilar hypoaeration     CT Chest: 21  Impression   1. Advanced pulmonary emphysema with no acute intrathoracic abnormality   2. Subacute healing anterior left 6th rib fracture     History/Prior Level of Function:   Living Status: home    Prior Dysphagia History: Pt has previously been seen by this speech department in  and 2020. Both times, a regular texture diet/thin liquids was recommended. Dysphagia Impressions/Diagnosis: Oropharyngeal Dysphagia   Pt presents with minimal oropharyngeal dysphagia. Pt seen sitting upright in bed, alert and cooperative. Pt is currently on 2L O2 via NC, which she indicated is her baseline. She is mildly SOB but she reports this had improved since admission and that it is not impacting her swallowing. Oral mech reveals lingual/labial strength/ROM/coordination WFL. Dentition is natural, pt is missing a few molars but reports she is still able to chew all foods. Pt was given trials of thin liquids, pureed solids, soft solids, regular textures, and mixed consistency.  Oral phase is characterized by min prolonged mastication and questionable premature spillage to pharynx. Good bolus manipulation/formation, rotary chew, and bolus propulsion. Complete oral clearance achieved. Pharyngeal phase is characterized by minimal delay in swallow initiation and suspected reduced breathing/swallowing coordination (likely related to her emphysema/COPD exacerbation at this time). When taking successive gulps of thin liquids via cup, pt was noted with slightly delayed belch in 2/2 attempts; however, this was not noted when drinking thins via cup with single sips. Pt demonstrated functional mastication with all consistencies, no overt s/s aspiration with any texture provided. At this time, recommend continue regular texture diet/thin liquids. Recommended Diet and Intervention 7/2/2021:  Diet Solids Recommendation:  Regular texture diet Liquid Consistency Recommendation: Thin liquids Recommended form of Meds:   Whole with water (a few at a time)     Compensatory Swallowing Strategies:  Upright as possible with all PO intake , Small bites/sips , Eat/feed slowly    SHORT TERM DYSPHAGIA GOALS/PLAN OF CARE: Speech therapy for dysphagia tx x1-2 follow ups to ensure diet tolerance during acute care stay.     Pt will functionally tolerate recommended diet with no overt clinical s/s of aspiration    Dysphagia Therapeutic Intervention:  Diet Tolerance Monitoring , Patient/Family Education     Discharge Recommendations: Likely will not require speech therapy for dysphagia therapy upon discharge from hospital    Patient Positioning: Upright in bed    Current Diet Level (prior to evaluation): regular texture/thin liquids    Respiratory Status:   []Room Air   [x]O2 via nasal cannula (2L via NC, which is baseline)   []Other:    Dentition:  [x]Adequate (missing only a few teeth)  []Dentures   []Missing Many Teeth  []Edentulous  []Other:    Baseline Vocal Quality:  [x]Normal  []Dysphonic   []Aphonic   []Hoarse  []Wet  []Weak  []Other:    Volitional Cough:  [x]Strong  []Weak  []Wet  []Absent  []Congested  []Other:    Volitional Swallow:   []Absent   [x]Delayed (min)   []Adequate     []Required use of drink     Oral Mechanism Exam:  [x]WFL []Mild   [] Moderate  []Severe  []To be assessed  Impaired:   []Left side      []Right side    []Labial ROM/Coordination    []Labial Symmetry   []Lingual ROM/Coordination   []Lingual Symmetry  []Gag  []Other:     Oral Phase: [x]WFL/minimal []Mild   [] Moderate  []Severe  []To be assessed   [x]Impaired/Prolonged Mastication:   []Spillage Left:   []Spillage Right:  []Pocketing Left:   []Pocketing Right:   []Decreased Anterior to Posterior Transit:   [x]Suspected Premature Bolus Loss:   []Lingual/Palatal Residue:   []Other:     Pharyngeal Phase: []WFL [x]minimal/Mild   [] Moderate  []Severe  []To be assessed   [x]Delayed Swallow:   []Suspected Pharyngeal Pooling:   []Decreased Laryngeal Elevation:   []Absent Swallow:  []Wet Vocal Quality:   []Throat Clearing-Immediate:   []Throat Clearing-Delayed:   []Cough-Immediate:   []Cough-Delayed:  []Change in Vital Signs:  []Suspected Delayed Pharyngeal Clearing:  [x]Other: reduced breathing/swallowing coordination      Eating Assistance:  [x]Independent  []Setup or clean-up assistance   [] Supervision or touching assistance   [] Partial or moderate assistance   [] Substantial or maximal assistance  [] Dependent       EDUCATION:   Provided education regarding role of SLP, results of assessment, recommendations and general speech pathology plan of care. [x] Pt verbalized understanding and agreement   [] Pt requires ongoing learning   [] No evidence of comprehension     If patient discharges prior to next visit, this note will serve as discharge. Timed Code Minutes: 0 minutes  Total Treatment Minutes: 23 minutes    Electronically signed by:     HEMAL Ames  Speech-Language Pathologist

## 2021-07-02 NOTE — PROGRESS NOTES
[facility]    7/2/2021 11:05 AM        Name: Nikunj Gonzales . Admitted: 7/1/2021  Primary Care Provider: Grace Sanchez MD (Tel: [phone])      Chief Complaint   Patient presents with    Shortness of Breath     pt coming in c/o sob, cough, bilat leg pain started last week, symptoms are getting worse over the past couple of days. Brief History: Patient is a 71 yo female with hx COPD, CKD stage III, DVT/PE on Eliquis, HTN. Patient presented to ER with worsening shortness of breath, cough, congestion, left sided pleuritic chest pain, bilateral leg pain. Imaging revealed subacute left anterior sixth rib fracture. Admitted with COPD exacerbation. Subjective:  Presently resting in bed. States breathing improved but not at baseline. Notes shortness of breath ambulating to bathroom which is not her baseline. Cough improved but nonproductive. Denies abdominal pain, nausea. Has Crohn's disease so she has frequent BMs but this is her usual. Left sided rib [ain controlled with pain med.       Reviewed interval ancillary notes    Current Medications  calcium carbonate (TUMS) chewable tablet 500 mg, TID  amitriptyline (ELAVIL) tablet 75 mg, Nightly  amLODIPine (NORVASC) tablet 5 mg, Daily  cholestyramine (QUESTRAN) packet 4 g, Daily  apixaban (ELIQUIS) tablet 2.5 mg, BID  pantoprazole (PROTONIX) tablet 40 mg, QAM AC  sodium chloride flush 0.9 % injection 5-40 mL, 2 times per day  sodium chloride flush 0.9 % injection 5-40 mL, PRN  0.9 % sodium chloride infusion, PRN  ondansetron (ZOFRAN-ODT) disintegrating tablet 4 mg, Q8H PRN   Or  ondansetron (ZOFRAN) injection 4 mg, Q6H PRN  polyethylene glycol (GLYCOLAX) packet 17 g, Daily PRN  acetaminophen (TYLENOL) tablet 650 mg, Q6H PRN   Or  acetaminophen (TYLENOL) suppository 650 mg, Q6H PRN  guaiFENesin (MUCINEX) extended release tablet 1,200 mg, BID  ipratropium-albuterol (DUONEB) nebulizer solution 1 ampule, Q4H WA  methylPREDNISolone sodium (SOLU-MEDROL) injection 40 mg, Q6H   Followed by  Dean Coil ON 7/4/2021] predniSONE (DELTASONE) tablet 40 mg, Daily  morphine (PF) injection 2 mg, Q4H PRN  HYDROcodone-acetaminophen (NORCO) 5-325 MG per tablet 1 tablet, Q4H PRN   Or  HYDROcodone-acetaminophen (NORCO) 5-325 MG per tablet 2 tablet, Q4H PRN  budesonide-formoterol (SYMBICORT) 160-4.5 MCG/ACT inhaler 2 puff, BID  potassium chloride (KLOR-CON) extended release tablet 10 mEq, Daily        Objective:  BP (!) 152/51   Pulse 100   Temp 97.9 °F (36.6 °C) (Oral)   Resp 20   Ht 4' 11\" (1.499 m)   Wt 151 lb 8 oz (68.7 kg)   LMP  (LMP Unknown)   SpO2 98%   BMI 30.60 kg/m²     Intake/Output Summary (Last 24 hours) at 7/2/2021 1105  Last data filed at 7/2/2021 0336  Gross per 24 hour   Intake    Output 600 ml   Net -600 ml      Wt Readings from Last 3 Encounters:   07/02/21 151 lb 8 oz (68.7 kg)   06/22/21 151 lb (68.5 kg)   05/03/21 150 lb (68 kg)       General appearance:  Appears comfortable  Eyes: Sclera clear. Pupils equal.  ENT: Moist oral mucosa. Trachea midline, no adenopathy. Cardiovascular: Regular rhythm, normal S1, S2. No murmur. No edema in lower extremities  Respiratory: Not using accessory muscles. Good airflow, few wheezes. GI: Abdomen soft, no tenderness, not distended, normal bowel sounds  Musculoskeletal: No cyanosis in digits, neck supple  Neurology: CN 2-12 grossly intact. No speech or motor deficits  Psych: Normal affect.  Alert and oriented in time, place and person  Skin: Warm, dry, normal turgor    Labs and Tests:  CBC:   Recent Labs     06/30/21  1200 07/01/21  1110 07/02/21  0546   WBC 15.4* 14.7* 20.4*   HGB 9.4* 9.1* 9.2*    333 337     BMP:    Recent Labs     06/30/21  1200 07/01/21  1110 07/02/21  0546    139 136   K 3.8 3.4* 5.0    108 105   CO2 18* 19* 19*   BUN 23* 26* 27*   CREATININE 2.1* 2.2* 1.7* DVT/PE. Continue apixaban. 8. Elevated troponin. Troponin 0.02, has been as high as 0.03 in past, no acute changes on EKG. In setting CKD. Diet: ADULT DIET;  Regular  Code:Full Code  DVT PPX: enoxaparin      GUS Castro - CNP   7/2/2021 11:05 AM

## 2021-07-02 NOTE — PROGRESS NOTES
PRN morphine given per patient request for complaints of 9/10 back and rib cage pain. Will continue to monitor.

## 2021-07-02 NOTE — PLAN OF CARE
Problem: Falls - Risk of:  Goal: Will remain free from falls  Description: Will remain free from falls  7/2/2021 1146 by Samir Bolden RN  Outcome: Ongoing  7/1/2021 2340 by Homero Sanchez RN  Outcome: Ongoing  Goal: Absence of physical injury  Description: Absence of physical injury  7/2/2021 1146 by Samir Bolden RN  Outcome: Ongoing  7/1/2021 2340 by Homero Sanchez RN  Outcome: Ongoing     Problem: Skin Integrity:  Goal: Will show no infection signs and symptoms  Description: Will show no infection signs and symptoms  7/2/2021 1146 by Samir Bolden RN  Outcome: Ongoing  7/1/2021 2340 by Homero Sanchez RN  Outcome: Ongoing  Goal: Absence of new skin breakdown  Description: Absence of new skin breakdown  7/2/2021 1146 by Samir Bolden RN  Outcome: Ongoing  7/1/2021 2340 by Homero Sanchez RN  Outcome: Ongoing     Problem: Pain:  Goal: Pain level will decrease  Description: Pain level will decrease  7/2/2021 1146 by Samir Bolden RN  Outcome: Ongoing  7/1/2021 2340 by Homero Sanchez RN  Outcome: Ongoing  Goal: Control of acute pain  Description: Control of acute pain  7/2/2021 1146 by Samir Bolden RN  Outcome: Ongoing  7/1/2021 2340 by Homero Sanchez RN  Outcome: Ongoing  Goal: Control of chronic pain  Description: Control of chronic pain  7/1/2021 2340 by Homero Sanchez RN  Outcome: Ongoing

## 2021-07-02 NOTE — FLOWSHEET NOTE
Patient's head to toe assessment complete at this time. Routine VSS. Patient is awake, alert and oriented. Respirations appear even and unlabored. Expiratory wheezes heard in bilateral lung bases. Daughter at bedside. All nightly medications given per MAR. Patient rates chronic back pain and rib pain a 10/10; PRN morphine given per MAR. Plan of care reviewed with patient and all questions answered. Informed patient that a urine sample is needed. No other needs expressed, call light within reach. Will continue to monitor.      Fall precautions in place: bed locked and in lowest position, bed alarm on, 2/4 side rails raised, non-slip socks on, overhead table within reach, patient knows when to appropriately call out for help.        07/01/21 2001   Vital Signs   Temp 97.9 °F (36.6 °C)   Temp Source Oral   Pulse 93   Heart Rate Source Monitor   Resp 20   BP (!) 152/93   BP Location Right upper arm   MAP (mmHg) 112   Patient Position Semi fowlers   Level of Consciousness Alert (0)   MEWS Score 1   Patient Currently in Pain Yes   Pain Assessment   Pain Assessment 0-10   Pain Level 10   Pain Type Acute pain   Pain Location Back;Rib cage   Pain Orientation Right;Left   Oxygen Therapy   SpO2 97 %   Pulse Oximeter Device Mode Intermittent   Pulse Oximeter Device Location Finger   O2 Device Nasal cannula   O2 Flow Rate (L/min) 2 L/min

## 2021-07-02 NOTE — PROGRESS NOTES
Physical Therapy    Facility/Department: [address] ONCOLOGY  Initial Assessment    NAME: Ora Muir  : 1950  MRN: [id number]    Date of Service: 2021    Discharge Recommendations:  Ora Muir scored a 21/24 on the AM-PAC short mobility form. Current research shows that an AM-PAC score of 18 or greater is typically associated with a discharge to the patient's home setting. Based on the patient's AM-PAC score and their current functional mobility deficits, it is recommended that the patient have 2-3 sessions per week of Physical Therapy at d/c to increase the patient's independence. At this time, this patient demonstrates the endurance and safety to discharge home with home health PT and a follow up treatment frequency of 2-3x/wk. Please see assessment section for further patient specific details. If patient discharges prior to next session this note will serve as a discharge summary. Please see below for the latest assessment towards goals. HOME HEALTH CARE: LEVEL 3 SAFETY  - Initial home health evaluation to occur within 24-48 hours, in patient home   - Therapy evaluations in home within 24-48 hours of discharge; including DME and home safety   - Frontload therapy 5 days, then 3x a week   - Therapy to evaluate if patient has [address] needs for personal care   -  evaluation within 24-48 hours, includes evaluation of resources and insurance to determine [place], [place], LTC, and Medicaid options     S Level 3, Home with assist PRN   PT Equipment Recommendations  Equipment Needed: No    Assessment   Body structures, Functions, Activity limitations: Decreased functional mobility ; Decreased balance;Decreased strength;Decreased endurance  Assessment: Patient presents with decreasing endurance, strength, and impaired functional mobility secondary to fatigue.   However, patient is still able to perform bed mobility and transfers on her own and verbalizes she has been able to ambulate to bathroom. She also has multiple neighbors, family, and Jain members who help her on a regular basis. Recommend prn assist and continued therapy at d/c. Treatment Diagnosis: functional mobility deficits related to COPD exacerbation  Prognosis: Good  Decision Making: Medium Complexity  History: As above. Exam: MMT, ROM, functional mobility assessment  Clinical Presentation: Stable currently. PT Education: Goals;PT Role;Plan of Care;Transfer Training;Energy Conservation; Functional Mobility Training  Patient Education: Patient verbalizes understanding. Barriers to Learning: None evident. REQUIRES PT FOLLOW UP: Yes  Activity Tolerance  Activity Tolerance: Patient limited by fatigue;Patient limited by endurance  Activity Tolerance: Patient with very limited tolerance to activity, SOB noted although O2 sats remained 95-98% during treatment, heart rate 105-115 bpm.       Patient Diagnosis(es): The primary encounter diagnosis was COPD with acute exacerbation ([address] Diagnoses of DURAND (dyspnea on exertion), Closed fracture of one rib of left side, initial encounter, and Elevated troponin were also pertinent to this visit. 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 has a past surgical history that includes shoulder surgery; back surgery; brain surgery; Colon surgery; Colonoscopy (12/5/11); Colonoscopy (4-2-2012); Cholecystectomy; Abdomen surgery; Biopsy shoulder (Left, 2/27/2019); and bronchoscopy (N/A, 9/2/2020).     Restrictions  Restrictions/Precautions  Restrictions/Precautions: Fall Risk (high fall risk)  Position Activity Restriction  Other position/activity restrictions: Jillian Mason is a 72 y.o. female who presents to the emergency department complaining of cough, congestion, shortness of breath, bilateral leg pain, and left-sided pleuritic sharp chest pain for 1 week. Reports that pain got worse over the past 2 days. Vision/Hearing  Vision: Impaired  Vision Exceptions: Wears glasses at all times  Hearing: Within functional limits       Subjective  General  Chart Reviewed: Yes  Patient assessed for rehabilitation services?: Yes  Additional Pertinent Hx: COPD  Response To Previous Treatment: Not applicable  Family / Caregiver Present: No  Diagnosis: COPD exacerbation, subacute L 6th rib fx  Follows Commands: Within Functional Limits  General Comment  Comments: Patient supine in bed, tearful. Subjective  Subjective: Patient states she has a lot on her mind right now. Her boyfriend of 30 years recently passed away.   Pain Screening  Patient Currently in Pain: Denies  Vital Signs  Patient Currently in Pain: Denies     Orientation  Orientation  Overall Orientation Status: Within Normal Limits     Social/Functional History  Social/Functional History  Lives With: Alone  Type of Home: Apartment  Home Layout: One level  Home Access: Stairs to enter with rails  Entrance Stairs - Number of Steps: 4  Entrance Stairs - Rails: None  Bathroom Shower/Tub: Tub/Shower unit  Bathroom Toilet: Standard  Bathroom Equipment: Grab bars in shower, Tub transfer bench  Bathroom Accessibility: Not accessible  Home Equipment: 4 wheeled walkerLatisha (transport chair)  Receives Help From: Family, Neighbor (home health aid 2x/week for laundry and cleaning)  ADL Assistance: Independent  Homemaking Assistance: Needs assistance  Homemaking Responsibilities: No  Ambulation Assistance: Independent  Transfer Assistance: Independent  Active : No  Patient's  Info: family, friends, neighbors assist with transport  Mode of Transportation: Friends, Family  Leisure & Hobbies: jaleel gaines  IADL Comments: assistance from family and friends  Additional Comments: history of at least one fall       Objective  AROM RLE (degrees)  RLE AROM: WNL  AROM LLE (degrees)  LLE AROM : WNL  Strength RLE  Strength RLE: WFL  Strength LLE  Strength LLE: WFL        Bed mobility  Supine to Sit: Modified independent  Scooting: Modified independent  Transfers  Sit to Stand: Stand by assistance  Stand to sit: Stand by assistance  Bed to Chair: Stand by assistance  Stand Pivot Transfers: Stand by assistance  Comment: Patient able to perform unassisted, steady, without assistive device. Ambulation  Ambulation?: No (Not performed d/t patient SOB/fatigue.)     Balance  Posture: Good  Sitting - Static: Good  Sitting - Dynamic: Good  Standing - Static: Good  Standing - Dynamic: Fair;+        Plan   Plan  Times per week: 3-5  Current Treatment Recommendations: Strengthening, Safety Education & Training, Endurance Training, Transfer Training  Safety Devices  Type of devices: All fall risk precautions in place, Call light within reach, Chair alarm in place, Patient at risk for falls, Left in chair, Nurse notified  Restraints  Initially in place: No           AM-PAC Score  AM-PAC Inpatient Mobility Raw Score : 21 (07/02/21 1554)  AM-PAC Inpatient T-Scale Score : 50.25 (07/02/21 1554)  Mobility Inpatient CMS 0-100% Score: 28.97 (07/02/21 1554)  Mobility Inpatient CMS G-Code Modifier : Casandra Max (07/02/21 1554)        Goals  Short term goals  Time Frame for Short term goals: Discharge. Short term goal 1: Patient will perform sit-stand MOD I w/ LRAD. Short term goal 2: Patient will transfer bed-chair MOD I w/ LRAD. Short term goal 3: Patient will ambulate 22' MOD I w/ LRAD. Patient Goals   Patient goals : Return home w/ home therapy.        Therapy Time   Individual Concurrent Group Co-treatment   Time In 1502         Time Out 4588 Minutes 43         Timed Code Treatment Minutes: [address] [facility], [address] [facility], DPT, ATC-R [id number]

## 2021-07-02 NOTE — DISCHARGE INSTR - COC
Continuity of Care Form    Patient Name: Meño Schmitz   :  1950  MRN:  [id number]    Admit date:  2021  Discharge date: 2021    Code Status Order: Full Code   Advance Directives:   Advance Care Flowsheet Documentation     Date/Time Healthcare Directive Type of Healthcare Directive Copy in [address] Agent's Name Healthcare Agent's Phone Number    21 0029  Yes, patient has an advance directive for healthcare treatment  Living will  No, copy requested from family  Vero Physician:  Tree Garland MD  PCP: Shashi Daugherty MD    Discharging Nurse: Delray Matters [address] Unit/Room#: 1UT-7894/3340-24  Discharging Unit Phone Number: [phone]    Emergency Contact:   Extended Emergency Contact Information  Primary Emergency Contact: Nehemiah Herrera [address] Phone: [phone]  Mobile Phone: [phone]  Relation: Brother/Sister  Secondary Emergency Contact: Carolynn Sal [address] Phone: [phone]  Relation: Child    Past Surgical History:  Past Surgical History:   Procedure Laterality Date    ABDOMEN SURGERY      BACK SURGERY      shunt to drain CSF    BIOPSY SHOULDER Left 2019    EXCISION OF LEFT SHOULDER MASS performed by Sheela Bass MD at [address] [place] [address]      crainotomy- remove fluid ? V-P SHUNT    BRONCHOSCOPY N/A 2020    BRONCHOSCOPY ALVEOLAR LAVAGE performed by Kris Spain MD at [facility]      resection X2    COLONOSCOPY  11    BIOPSY AND POLYPECTOMY    COLONOSCOPY  2012    and ablation ERBE/APC    SHOULDER SURGERY      L        Immunization History:   Immunization History   Administered Date(s) Administered    COVID-19, Bocanegra Peter, PF, 30mcg/0.3mL 2021, 2021    DT (pediatric) 2010    Influenza Virus Vaccine 10/01/2012, 10/20/2015    Influenza Whole 10/01/2011    Influenza, High Dose (Fluzone 65 yrs and older) 10/06/2014, 10/19/2016, 11/16/2017, 11/05/2018    Influenza, Quadv, adjuvanted, 65 yrs +, IM, PF (Fluad) 10/29/2020    Influenza, Triv, inactivated, subunit, adjuvanted, IM (Fluad 65 yrs and older) 11/13/2019    Pneumococcal Conjugate 13-valent (Wuyxovn81) 06/11/2015    Pneumococcal Conjugate 7-valent (Prevnar7) 02/07/2005, 10/01/2011    Pneumococcal Polysaccharide (Gcocoggsd73) 06/15/2016, 06/22/2021    Zoster Live (Zostavax) 07/01/2013       Active Problems:  Patient Active Problem List   Diagnosis Code    Crohn disease ([facility] K50.90    Depression F32.9    Osteoarthritis M19.90    Lupus anticoagulant disorder ([facility] D68.62    Dysphagia R13.10    Syringomyelia ([facility] G95.0    Gastritis and gastroduodenitis K29.70, K29.90    Skin ulcer of shoulder ([facility] L98.499    Shortness of breath R06.02    Anemia D64.9    Centrilobular emphysema ([facility]) J43.2    Pulmonary fibrosis ([facility]) J84.10    Chronic hypoxemic respiratory failure ([facility]) J96.11    Hiatal hernia K44.9    COPD, severe ([facility]) J44.9    Alpha thalassemia minor D56.3    Acute on chronic respiratory failure with hypoxia ([facility]) J96.21    Bronchiectasis with acute exacerbation ([facility]) J47.1    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    Osteoporosis of multiple sites M81.0    Gastroesophageal reflux disease without esophagitis K21.9    Sepsis ([facility]) A41.9    Bilateral pulmonary embolism ([facility]) I26.99    Acute deep vein thrombosis (DVT) of distal vein of both lower extremities ([facility]) I82.4Z3    History of pulmonary embolism Z86.711    SOB (shortness of breath) R06.02    Wound of left shoulder S41.002A    Hx pulmonary embolism Z86.711    COPD exacerbation ([facility]) J44.1    Cerebrovascular accident (CVA) ([facility]) I63.9    MARIS (acute kidney injury) ([facility] N17.9    History of DVT (deep vein thrombosis) Z86.718    Small vessel disease, cerebrovascular I67.9    Ex-smoker Z87.891    History of depression Z86.59    Overweight E66.3    HCAP (healthcare-associated pneumonia) B68.8    Metabolic acidosis M72.4    Long term (current) use of systemic steroids Z79.52    Essential hypertension I10    Stage 3b chronic kidney disease (HCC) N18.32    Acute exacerbation of chronic obstructive pulmonary disease (COPD) ([facility]) J44.1    Acute renal failure superimposed on stage 3 chronic kidney disease (HCC) N17.9, N18.30    Closed fracture of one rib of left side with routine healing S22. 32XD       Isolation/Infection:   Isolation          No Isolation        Patient Infection Status     Infection Onset Added Last Indicated Last Indicated By Review Planned Expiration Resolved Resolved By    None active    Resolved    COVID-19 Rule Out 04/08/21 04/08/21 04/08/21 COVID-19 (Ordered)   04/09/21 Rule-Out Test Resulted    COVID-19 Rule Out 09/17/20 09/17/20 09/17/20 COVID-19 (Ordered)   09/17/20 Rule-Out Test Resulted    COVID-19 09/14/20 09/17/20 09/14/20 COVID-19   09/17/20 Jennifer Rivera RN    C-diff Rule Out 09/16/20 09/16/20 09/16/20 Clostridium difficile toxin/antigen (Ordered)   09/16/20 Rule-Out Test Resulted    COVID-19 Rule Out 09/14/20 09/14/20 09/14/20 COVID-19 (Ordered)   09/17/20 Rule-Out Test Resulted    COVID-19 Rule Out 08/28/20 08/28/20 08/28/20 COVID-19 (Ordered)   08/29/20 Rule-Out Test Resulted    VRE 01/13/20 01/16/20 01/13/20 URINE CULTURE   08/30/20 Jennifer Rivera RN          Nurse Assessment:  Last Vital Signs: /82   Pulse 98   Temp 98.4 °F (36.9 °C) (Oral)   Resp 20   Ht 4' 11\" (1.499 m)   Wt 152 lb 9.6 oz (69.2 kg)   LMP  (LMP Unknown)   SpO2 95%   BMI 30.82 kg/m²     Last documented pain score (0-10 scale): Pain Level: 6  Last Weight:   Wt Readings from Last 1 Encounters:   07/03/21 152 lb 9.6 oz (69.2 kg)     Mental Status:  oriented and alert    IV Access:  - None    Nursing Mobility/ADLs:  Walking   Independent  Transfer  Independent  Bathing  Assisted  Dressing [facility] Delivery   whole    Wound Care Documentation and Therapy:        Elimination:  Continence:   · Bowel: Yes  · Bladder: Yes  Urinary Catheter: None   Colostomy/Ileostomy/Ileal Conduit: No       Date of Last BM: 2021    Intake/Output Summary (Last 24 hours) at 7/3/2021 1627  Last data filed at 7/3/2021 1509  Gross per 24 hour   Intake 970 ml   Output    Net 970 ml     I/O last 3 completed shifts: In: 56 [P.O.:480; I.V.:10]  Out: -     Safety Concerns: At Risk for Falls    Impairments/Disabilities:      None    Nutrition Therapy:  Current Nutrition Therapy: General      Routes of Feeding: Oral  Liquids: Thin Liquids  Daily Fluid Restriction: no  Last Modified Barium Swallow with Video (Video Swallowing Test): not done    Treatments at the Time of Hospital Discharge:   Respiratory Treatments: ***  Oxygen Therapy:  is on oxygen at 2 L/min per nasal cannula.   Ventilator:    - No ventilator support    Rehab Therapies: {THERAPEUTIC INTERVENTION:[id number]}  Weight Bearing Status/Restrictions: [address] [place] [address] Weight Bearin}  Other Medical Equipment (for information only, NOT a DME order):  {EQUIPMENT:[id number]}  Other Treatments: ***    Patient's personal belongings (please select all that are sent with patient):  {[facility] DME Belongings:[id number]}    RN SIGNATURE:  Electronically signed by Bo Avendano RN on 7/3/21 at 2:46 PM EDT    CASE MANAGEMENT/SOCIAL WORK SECTION    Inpatient Status Date: 21    Readmission Risk Assessment Score:  Readmission Risk              Risk of Unplanned Readmission:  28           Discharging to Facility/ Agency   · Name: [facility]  · Address:  · Phone: [phone]  · Fax: [phone]    / signature: Electronically signed by ESTRELLA Carty on 7/3/2021 at 4:27 PM      PHYSICIAN SECTION    Prognosis: Fair    Condition at Discharge: Stable    Rehab Potential (if transferring to Rehab): N/A    Recommended Labs or Other Treatments After Discharge: PT/OT    Physician Certification: I certify the above information and transfer of Xochilt Whititngton  is necessary for the continuing treatment of the diagnosis listed and that she requires Home Care for greater 30 days.      Update Admission H&P: No change in H&P    PHYSICIAN SIGNATURE:  Electronically signed by GUS New CNP on 7/3/21 at 1:51 PM EDT

## 2021-07-02 NOTE — CONSULTS
PULMONARY AND CRITICAL CARE MEDICINE CONSULTATION NOTE    CONSULTING PHYSICIAN:  Pepe Chapman MD    ADMISSION DATE: 7/1/2021  ADMISSION DIAGNOSIS: COPD exacerbation ([facility] [J44.1]    REASON FOR CONSULT:   Chief Complaint   Patient presents with    Shortness of Breath     pt coming in c/o sob, cough, bilat leg pain started last week, symptoms are getting worse over the past couple of days. DATE OF CONSULT: 7/1/2021    HISTORY OF PRESENT ILLNESS: 72y.o. year old female with a past medical history significant for severe COPD on home oxygen at 2 L/min, steroid-dependent, CKD, previous history of pulmonary embolism presented to the hospital with worsening shortness of breath the last 2 to 3 days. Patient reports over the last 3 days she has had progressive worsening of her shortness of breath associated with bilateral leg pain, intermittent coughing minimal expectoration. Her leg pain was severe and made it difficult for her to ambulate. Her oxygen requirements have been stable. She has been using her inhaler therapy including. She was recently transition to Juan 2 puff twice daily. She remains on prednisone 20 mg p.o. daily. At the time of interview patient lying in bed in no apparent respiratory distress. Reports that her breathing has improved but not all the way back to her baseline. Still has dyspnea on exertion. Oxygen requirement stable at 3 L/min. Denies any discolored expectoration. REVIEW OF SYSTEMS:     CONSTITUTIONAL SYMPTOMS: The patient denies fever, fatigue, night sweats, weight loss or weight gain. HEENT: No vision changes. No tinnitus, Denies sinus pain. No hoarseness, or dysphagia. NECK: Patient denies swelling in the neck. CARDIOVASCULAR: Denies chest pain, palpitation, syncope. RESPIRATORY: As per HPI. GASTROINTESTINAL: Denies nausea, abdominal pain or change in bowel function. GENITOURINARY: Denies obstructive symptoms.  No history of incontinence. BREASTS: No masses or lumps in the breasts. SKIN: No rashes or itching. MUSCULOSKELETAL: Denies weakness or bone pain. NEUROLOGICAL: No headaches or seizures. PSYCHIATRIC: Denies mood swings or depression. ENDOCRINE: Denies heat or cold intolerance or excessive thirst.  HEMATOLOGIC/LYMPHATIC: Denies easy bruising or lymph node swelling. ALLERGIC/IMMUNOLOGIC: No environmental allergies. PAST MEDICAL HISTORY:   Past Medical History:   Diagnosis Date    Bullous emphysema (HCC)     CKD (chronic kidney disease) stage 3, GFR 30-59 ml/min ([facility])     Clostridium difficile carrier 01/21/2019    COPD (chronic obstructive pulmonary disease) ([address]     PFT done 7 years ago   Floydene Ada Crohn's disease ([address]     Crohn's disease    Depression 1/30/2011    pt states resolved as of 3-26-12    DVT (deep venous thrombosis) ([address]     2012; first ocurrence     Hiatal hernia     Hx of blood clots     Kidney disease     Kidney insufficiency     Osteoporosis     Peripheral neuropathy     neuropathy in legs    Pneumonia     Postmenopausal     LMP in  40's    Pulmonary embolism ([address]     2012; first ocurrence    Sepsis ([address]     Syringomyelia ([address]        PAST SURGICAL HISTORY:   Past Surgical History:   Procedure Laterality Date    ABDOMEN SURGERY      BACK SURGERY      shunt to drain CSF    BIOPSY SHOULDER Left 2/27/2019    EXCISION OF LEFT SHOULDER MASS performed by Dario Angeles MD at [address] [place] [address]      crainotomy- remove fluid ? V-P SHUNT    BRONCHOSCOPY N/A 9/2/2020    BRONCHOSCOPY ALVEOLAR LAVAGE performed by Jose Howard MD at [place] [address]      resection X2    COLONOSCOPY  12/5/11    BIOPSY AND POLYPECTOMY    COLONOSCOPY  4-2-2012    and ablation ERBE/APC    SHOULDER SURGERY      L        SOCIAL HISTORY:   Social History     Tobacco Use    Smoking status: Former Smoker     Packs/day: 0.25     Years: 30.00     Pack years: 7.50     Types: Cigarettes     Quit date: 2015     Years since quittin.3    Smokeless tobacco: Never Used    Tobacco comment: started smoking at age 26 / smoked up to 9 cigarettes a day    Vaping Use    Vaping Use: Never used   Substance Use Topics    Alcohol use: No    Drug use: No       FAMILY HISTORY:   Family History   Problem Relation Age of Onset    Heart Disease Mother     High Blood Pressure Mother     High Cholesterol Mother     Early Death Mother 36        MI    Heart Disease Father     High Blood Pressure Father     High Cholesterol Father     Stroke Father 79    High Blood Pressure Sister     High Blood Pressure Brother        MEDICATIONS:     No current facility-administered medications on file prior to encounter.      Current Outpatient Medications on File Prior to Encounter   Medication Sig Dispense Refill    predniSONE (DELTASONE) 20 MG tablet Take 1 tablet by mouth daily 30 tablet 5    Budeson-Glycopyrrol-Formoterol (BREZTRI AEROSPHERE) 160-9-4.8 MCG/ACT AERO Inhale 2 puffs into the lungs 2 times daily 1 Inhaler 6    amLODIPine (NORVASC) 5 MG tablet TAKE 1 TABLET BY MOUTH EVERY DAY 90 tablet 1    potassium chloride (KLOR-CON) 10 MEQ extended release tablet TAKE 1 TABLET BY MOUTH EVERY DAY      Adalimumab (HUMIRA PEN SC) Inject into the skin every 14 days       ELIQUIS 5 MG TABS tablet TAKE 1 TABLET BY MOUTH TWICE A DAY 60 tablet 6    albuterol (PROVENTIL) (2.5 MG/3ML) 0.083% nebulizer solution TAKE 3 MLS BY NEBULIZATION EVERY 6 HOURS AS NEEDED FOR WHEEZING DX: COPD ICD-10: J44.9 300 mL 6    colestipol (COLESTID) 1 g tablet TAKE 1 TABLET BY MOUTH TWICE A DAY      amitriptyline (ELAVIL) 150 MG tablet Take 0.5 tablets by mouth nightly 45 tablet 3    Nutritional Supplements (ENSURE HIGH PROTEIN) LIQD Take 1 Can by mouth 2 times daily 60 Can 1    cyclobenzaprine (FLEXERIL) 5 MG tablet Take 5 mg by mouth 4 times daily Every 6 hours for muscle spasms      albuterol Lab Results   Component Value Date    CRP 4.8 06/30/2021      Lab Results   Component Value Date    BNP <15 08/06/2011      Lab Results   Component Value Date    DDIMER 321 (H) 11/26/2017      Lab Results   Component Value Date    FERRITIN 1,213.0 (H) 09/16/2020      Lab Results   Component Value Date    LACTA 1.8 07/01/2021           IMAGING:    Narrative   EXAMINATION:   CT OF THE CHEST WITHOUT CONTRAST 7/1/2021 12:58 pm           FINDINGS:   Mediastinum: No mediastinal adenopathy.  Large densely calcified paratracheal   and precarinal lymph nodes.  No pericardial effusion.  Tortuous normal   caliber thoracic aorta       Lungs/pleura: Advanced pulmonary emphysema with postinflammatory scarring in   the left upper lobe.  No acute infiltrate.  No pleural effusion or   pneumothorax       Upper Abdomen: No significant abnormality of the upper abdomen       Soft Tissues/Bones: Subacute healing fracture of the anterior left 6th rib. No acute rib fracture or destructive bone lesion.  Pronounced kyphoscoliosis           Impression   1. Advanced pulmonary emphysema with no acute intrathoracic abnormality   2. Subacute healing anterior left 6th rib fracture             IMPRESSION:     Severe COPD in acute exacerbation  Progressive emphysematous lung disease  Steroid dependence  Chronic hypoxic respiratory failure  Osteoporosis  Left rib fracture, healing  Previous history of tobacco abuse    RECOMMENDATION:     Patient has been readmitted with worsening shortness of breath. She had a recent hospitalization in April of this month. Similar symptomatology with chest imaging not showing any new features. Unfortunately this signifies that patient is having progression of her emphysematous lung disease. Patient remains dyspneic with minimal exertion and is also now steroid dependent. No focal consolidation seen. Leukocytosis seen today is due to steroids. Procalcitonin levels within normal limits.   No indications for antibiotics. Continue with Symbicort and round-the-clock DuoNeb nebulization. Today continue with Solu-Medrol but by tomorrow can decrease it to prednisone 40 mg p.o. daily which can be slowly tapered down. Oxygen supplementation to maintain SPO2 between 88 to 92%. It might be beneficial to get palliative care involved to discuss goals of care and CODE STATUS. Thank you for your consultation. We will continue to follow the patient. Allen Hernández MD  Pulmonary Critical Care and Sleep Medicine  7/1/2021, 12:42 PM    This note was completed using dragon medical speech recognition software. Grammatical errors, random word insertions, pronoun errors and incomplete sentences are occasional consequences of this technology due to software limitations. If there are questions or concerns about the content of this note of information contained within the body of this dictation they should be addressed with the provider for clarification.

## 2021-07-02 NOTE — CONSULTS
Office : [phone]     Fax :[phone]       Nephrology Consult Note      Patient's Name: Ora Muir  8:54 AM  7/2/2021    Reason for Consult: CKD stage 3b      Requesting Physician:  Elenita Law MD      Chief Complaint:    Chief Complaint   Patient presents with    Shortness of Breath     pt coming in c/o sob, cough, bilat leg pain started last week, symptoms are getting worse over the past couple of days.          History of Present iIlness:    Ora Muir is a 72 y.o. female with h/o ckd stage 3, COPD, Crohn's disease, depression, previous DVT/PE who was admitted with c/o SOB , cough and weakness  Has been admitted for COPD exacerbation   No edema LE     Had CT chest WO contrast.     I/O last 3 completed shifts:  In: -   Out: 600 [Urine:600]    Past Medical History:   Diagnosis Date    Bullous emphysema ([address]     CKD (chronic kidney disease) stage 3, GFR 30-59 ml/min ([facility])     Clostridium difficile carrier 01/21/2019    COPD (chronic obstructive pulmonary disease) ([address]     PFT done 7 years ago   [facility] Crohn's disease ([address]     Crohn's disease    Depression 1/30/2011    pt states resolved as of 3-26-12    DVT (deep venous thrombosis) ([address]     2012; first ocurrence     Hiatal hernia     Hx of blood clots     Kidney disease     Kidney insufficiency     Osteoporosis     Peripheral neuropathy     neuropathy in legs    Pneumonia     Postmenopausal     LMP in  40's    Pulmonary embolism ([address]     2012; first ocurrence    Sepsis ([address]     Syringomyelia ([address]        Past Surgical History:   Procedure Laterality Date    ABDOMEN SURGERY      BACK SURGERY      shunt to drain CSF    BIOPSY SHOULDER Left 2/27/2019    EXCISION OF LEFT SHOULDER MASS performed by Eleazar Walden MD at [address] [place] [address]      crainotomy- remove fluid ? V-P SHUNT    BRONCHOSCOPY N/A 9/2/2020    BRONCHOSCOPY ALVEOLAR LAVAGE performed by Sherlie Lombard, MD at [facility]      resection X2    COLONOSCOPY  12/5/11    BIOPSY AND POLYPECTOMY    COLONOSCOPY  4-2-2012    and ablation ERBE/APC    SHOULDER SURGERY      L        Family History   Problem Relation Age of Onset    Heart Disease Mother     High Blood Pressure Mother     High Cholesterol Mother     Early Death Mother 36        MI    Heart Disease Father     High Blood Pressure Father     High Cholesterol Father     Stroke Father 79    High Blood Pressure Sister     High Blood Pressure Brother         reports that she quit smoking about 6 years ago. Her smoking use included cigarettes. She has a 7.50 pack-year smoking history. She has never used smokeless tobacco. She reports that she does not drink alcohol and does not use drugs. Allergies:  Patient has no known allergies.     Current Medications:    amitriptyline (ELAVIL) tablet 75 mg, Nightly  amLODIPine (NORVASC) tablet 5 mg, Daily  cholestyramine (QUESTRAN) packet 4 g, Daily  apixaban (ELIQUIS) tablet 2.5 mg, BID  pantoprazole (PROTONIX) tablet 40 mg, QAM AC  sodium chloride flush 0.9 % injection 5-40 mL, 2 times per day  sodium chloride flush 0.9 % injection 5-40 mL, PRN  0.9 % sodium chloride infusion, PRN  ondansetron (ZOFRAN-ODT) disintegrating tablet 4 mg, Q8H PRN   Or  ondansetron (ZOFRAN) injection 4 mg, Q6H PRN  polyethylene glycol (GLYCOLAX) packet 17 g, Daily PRN  acetaminophen (TYLENOL) tablet 650 mg, Q6H PRN   Or  acetaminophen (TYLENOL) suppository 650 mg, Q6H PRN  guaiFENesin (MUCINEX) extended release tablet 1,200 mg, BID  ipratropium-albuterol (DUONEB) nebulizer solution 1 ampule, Q4H WA  methylPREDNISolone sodium (SOLU-MEDROL) injection 40 mg, Q6H   Followed by  [START ON 7/4/2021]

## 2021-07-02 NOTE — PROGRESS NOTES
Occupational Therapy   Occupational Therapy Initial Assessment  Date: 2021   Patient Name: Chantel Coto  MRN: [id number]     : 1950    Date of Service: 2021    Discharge Recommendations:  Chantel Coto scored a 21/24 on the AM-PAC ADL Inpatient form. Current research shows that an AM-PAC score of 18 or greater is typically associated with a discharge to the patient's home setting. Based on the patient's AM-PAC score, and their current ADL deficits, it is recommended that the patient have 2-3 sessions per week of Occupational Therapy at d/c to increase the patient's independence. At this time, this patient demonstrates the endurance and safety to discharge home with home services and a follow up treatment frequency of 2-3x/wk. Please see assessment section for further patient specific details. If patient discharges prior to next session this note will serve as a discharge summary. Please see below for the latest assessment towards goals. OT Equipment Recommendations  Equipment Needed: No    Assessment   Performance deficits / Impairments: Decreased functional mobility ; Decreased ADL status; Decreased endurance  Assessment: Pt is below her baseline level of occupational function, based on the above deficits associated with COPD exacerbation. Pt would benefit from continued skilled acute OT services to address these deficits. Treatment Diagnosis: Decreased ADL status, functional mobility and endurance associated with COPD exacerbation  Prognosis: Good  Decision Making: Low Complexity  History: As above  OT Education: OT Role;Plan of Care  Patient Education: D/C recommendation. Pt independently verbalized and demonstrated understanding. Barriers to Learning: None  Activity Tolerance  Activity Tolerance: Treatment limited secondary to medical complications (free text)  Activity Tolerance: Pt SOB with activity; I with pursed lip breathing. O2 in 90s on 2 L O2.   Safety Devices  Safety Devices in place: Yes  Type of devices: All fall risk precautions in place; Left in chair;Call light within reach;Nurse notified; Chair alarm in place;Gait belt           Patient Diagnosis(es): The primary encounter diagnosis was COPD with acute exacerbation ([facility] Diagnoses of DURAND (dyspnea on exertion), Closed fracture of one rib of left side, initial encounter, and Elevated troponin were also pertinent to this visit. has a past medical history of Bullous emphysema ([address] CKD (chronic kidney disease) stage 3, GFR 30-59 ml/min ([facility]), Clostridium difficile carrier, COPD (chronic obstructive pulmonary disease) ([address] Crohn's disease ([address] Depression, DVT (deep venous thrombosis) ([facility] Hiatal hernia, Hx of blood clots, Kidney disease, Kidney insufficiency, Osteoporosis, Peripheral neuropathy, Pneumonia, Postmenopausal, Pulmonary embolism ([address] Sepsis ([address] and Syringomyelia ([facility] has a past surgical history that includes shoulder surgery; back surgery; brain surgery; Colon surgery; Colonoscopy (12/5/11); Colonoscopy (4-2-2012); Cholecystectomy; Abdomen surgery; Biopsy shoulder (Left, 2/27/2019); and bronchoscopy (N/A, 9/2/2020). Treatment Diagnosis: Decreased ADL status, functional mobility and endurance associated with COPD exacerbation      Restrictions  Restrictions/Precautions  Restrictions/Precautions: Fall Risk (high fall risk)  Position Activity Restriction  Other position/activity restrictions: Lou Main is a 72 y.o. female who presents to the emergency department complaining of cough, congestion, shortness of breath, bilateral leg pain, and left-sided pleuritic sharp chest pain for 1 week. Reports that pain got worse over the past 2 days. Subjective   General  Chart Reviewed: Yes  Patient assessed for rehabilitation services?: Yes  Family / Caregiver Present: No  Diagnosis: COPD exacerbation  Subjective  Subjective: Pt supine in bed, agreeable to OT eval. Pt denies pain.  Pt tearful at times during session, stating she has a lot on her mind. Encouragement provided. Patient Currently in Pain: Denies  Pre Treatment Pain Screening  Intervention List: Patient able to continue with treatment  Vital Signs  Patient Currently in Pain: Denies  Social/Functional History  Social/Functional History  Lives With: Alone  Type of Home: Apartment  Home Layout: One level  Home Access: Stairs to enter with rails  Entrance Stairs - Number of Steps: 4  Entrance Stairs - Rails: None  Bathroom Shower/Tub: Tub/Shower unit  Bathroom Toilet: Standard  Bathroom Equipment: Grab bars in shower, Tub transfer bench  Bathroom Accessibility: Not accessible  Home Equipment: 4 wheeled walker, Cane, [address] (transport chair)  Receives Help From: Family, Neighbor (home health aid 2x/week for laundry and cleaning)  ADL Assistance: Independent  Homemaking Assistance: Needs assistance  Homemaking Responsibilities: No  Ambulation Assistance: Independent  Transfer Assistance: Independent  Active : No  Patient's  Info: family, friends, neighbors assist with transport  Mode of Transportation: Friends, Family  Leisure & Hobbies: jaleel gaines  IADL Comments: assistance from family and friends  Additional Comments: history of at least one fall       Objective   Vision: Impaired  Vision Exceptions: Wears glasses at all times  Hearing: Within functional limits    Orientation  Overall Orientation Status: Within Normal Limits     Balance  Sitting Balance: Supervision  Standing Balance: Stand by assistance  Standing Balance  Time: ~15 sec  Activity: stand step transfer EOB to recliner  Comment: No AD, no LOB  ADL  Grooming: Setup (wash face)  LE Dressing: Independent (don/doff socks seated on EOB)  Additional Comments: Pt denied need for further ADLs. Pt needed rest breaks d/t SOB. Pt independently performed pursed lip abreathing. SOP2 in 90s.   Tone RUE  RUE Tone: Normotonic  Tone LUE  LUE Tone: Normotonic  Coordination  Movements Are Fluid And Coordinated: No (Pt reports she can eat w/R hand, but has difficulty w/handwriting.)  Coordination and Movement description: Right UE     Bed mobility  Supine to Sit: Modified independent (HOB elevated)  Transfers  Stand Step Transfers: Stand by assistance  Sit to stand: Stand by assistance  Stand to sit: Stand by assistance     Cognition  Overall Cognitive Status: WNL  Perception  Overall Perceptual Status: WFL     Sensation  Overall Sensation Status: WFL        LUE AROM (degrees)  LUE AROM : Exceptions  LUE General AROM: shoulder flexion ~45 degrees, elbow to wrist WFL  Left Hand AROM (degrees)  Left Hand AROM: WFL  RUE AROM (degrees)  RUE AROM : Exceptions  RUE General AROM: shoulder flexion ~80 degrees, elbow to wrist WFL  Right Hand AROM (degrees)  Right Hand AROM: Exceptions  Right Hand General AROM: finger contractures at DIP joints  LUE Strength  Gross LUE Strength:  (4+/5 elbow, NT shoulder)  L Hand General: 4+/5  RUE Strength  Gross RUE Strength: WFL  R Hand General: 4+/5                   Plan   Plan  Times per week: 3-5  Times per day: Daily  Current Treatment Recommendations: Self-Care / ADL, Endurance Training, Functional Mobility Training      AM-PAC Score        AM-[facility] Inpatient Daily Activity Raw Score: 21 (07/02/21 1613)  AM-PAC Inpatient ADL T-Scale Score : 44.27 (07/02/21 1613)  ADL Inpatient CMS 0-100% Score: 32.79 (07/02/21 1613)  ADL Inpatient CMS G-Code Modifier : Waylon Bauman (07/02/21 1613)    Goals  Short term goals  Time Frame for Short term goals: Discharge  Short term goal 1: Mod I for functional transfers to ADL surfaces  Short term goal 2: I for functional mobility for ALD activity  Short term goal 3: I for UB ADLs  Short term goal 4: Mod I for LB ADLs  Short term goal 5: Mod I for toileting  Patient Goals   Patient goals :  To return home       Therapy Time   Individual Concurrent Group Co-treatment   Time In 1502         Time Out 1545         Minutes 43               Timed Code Treatment Minutes:   28 min    Total Treatment Minutes:  37 min    C/ [address], Doug 5422, Glorious Snellen., OTR/L, [id number]

## 2021-07-03 VITALS
BODY MASS INDEX: 30.76 KG/M2 | HEART RATE: 98 BPM | RESPIRATION RATE: 20 BRPM | DIASTOLIC BLOOD PRESSURE: 82 MMHG | TEMPERATURE: 98.4 F | WEIGHT: 152.6 LBS | OXYGEN SATURATION: 95 % | HEIGHT: 59 IN | SYSTOLIC BLOOD PRESSURE: 134 MMHG

## 2021-07-03 LAB
L. PNEUMOPHILA SEROGP 1 UR AG: NORMAL
STREP PNEUMONIAE ANTIGEN, URINE: NORMAL

## 2021-07-03 PROCEDURE — 94640 AIRWAY INHALATION TREATMENT: CPT

## 2021-07-03 PROCEDURE — 6370000000 HC RX 637 (ALT 250 FOR IP): Performed by: INTERNAL MEDICINE

## 2021-07-03 PROCEDURE — G0378 HOSPITAL OBSERVATION PER HR: HCPCS

## 2021-07-03 PROCEDURE — 99232 SBSQ HOSP IP/OBS MODERATE 35: CPT | Performed by: HOSPITALIST

## 2021-07-03 PROCEDURE — 94761 N-INVAS EAR/PLS OXIMETRY MLT: CPT

## 2021-07-03 PROCEDURE — 6370000000 HC RX 637 (ALT 250 FOR IP): Performed by: FAMILY MEDICINE

## 2021-07-03 PROCEDURE — 2700000000 HC OXYGEN THERAPY PER DAY

## 2021-07-03 PROCEDURE — 6360000002 HC RX W HCPCS: Performed by: FAMILY MEDICINE

## 2021-07-03 PROCEDURE — 2580000003 HC RX 258: Performed by: FAMILY MEDICINE

## 2021-07-03 PROCEDURE — 99232 SBSQ HOSP IP/OBS MODERATE 35: CPT | Performed by: INTERNAL MEDICINE

## 2021-07-03 PROCEDURE — 96376 TX/PRO/DX INJ SAME DRUG ADON: CPT

## 2021-07-03 RX ORDER — PREDNISONE 20 MG/1
TABLET ORAL
Qty: 30 TABLET | Refills: 5 | COMMUNITY
Start: 2021-07-03 | End: 2021-09-01 | Stop reason: CLARIF

## 2021-07-03 RX ORDER — HYDROCODONE BITARTRATE AND ACETAMINOPHEN 5; 325 MG/1; MG/1
1 TABLET ORAL EVERY 6 HOURS PRN
Qty: 12 TABLET | Refills: 0 | Status: SHIPPED | OUTPATIENT
Start: 2021-07-03 | End: 2021-07-06

## 2021-07-03 RX ORDER — PREDNISONE 20 MG/1
TABLET ORAL
Qty: 30 TABLET | Refills: 5 | Status: SHIPPED | OUTPATIENT
Start: 2021-07-03 | End: 2021-07-03 | Stop reason: SDUPTHER

## 2021-07-03 RX ADMIN — METHYLPREDNISOLONE SODIUM SUCCINATE 40 MG: 40 INJECTION, POWDER, FOR SOLUTION INTRAMUSCULAR; INTRAVENOUS at 06:42

## 2021-07-03 RX ADMIN — METHYLPREDNISOLONE SODIUM SUCCINATE 40 MG: 40 INJECTION, POWDER, FOR SOLUTION INTRAMUSCULAR; INTRAVENOUS at 11:01

## 2021-07-03 RX ADMIN — AMLODIPINE BESYLATE 5 MG: 5 TABLET ORAL at 09:19

## 2021-07-03 RX ADMIN — IPRATROPIUM BROMIDE AND ALBUTEROL SULFATE 1 AMPULE: .5; 3 SOLUTION RESPIRATORY (INHALATION) at 08:29

## 2021-07-03 RX ADMIN — PANTOPRAZOLE SODIUM 40 MG: 40 TABLET, DELAYED RELEASE ORAL at 06:42

## 2021-07-03 RX ADMIN — APIXABAN 2.5 MG: 2.5 TABLET, FILM COATED ORAL at 09:55

## 2021-07-03 RX ADMIN — HYDROCODONE BITARTRATE AND ACETAMINOPHEN 2 TABLET: 5; 325 TABLET ORAL at 06:41

## 2021-07-03 RX ADMIN — Medication 10 ML: at 11:02

## 2021-07-03 RX ADMIN — CHOLESTYRAMINE 4 G: 4 POWDER, FOR SUSPENSION ORAL at 11:01

## 2021-07-03 RX ADMIN — GUAIFENESIN 1200 MG: 600 TABLET, EXTENDED RELEASE ORAL at 09:19

## 2021-07-03 RX ADMIN — Medication 2 PUFF: at 08:29

## 2021-07-03 RX ADMIN — ANTACID TABLETS 500 MG: 500 TABLET, CHEWABLE ORAL at 15:09

## 2021-07-03 RX ADMIN — MORPHINE SULFATE 2 MG: 2 INJECTION, SOLUTION INTRAMUSCULAR; INTRAVENOUS at 09:18

## 2021-07-03 RX ADMIN — ANTACID TABLETS 500 MG: 500 TABLET, CHEWABLE ORAL at 09:18

## 2021-07-03 RX ADMIN — POTASSIUM CHLORIDE 10 MEQ: 750 TABLET, FILM COATED, EXTENDED RELEASE ORAL at 09:19

## 2021-07-03 RX ADMIN — IPRATROPIUM BROMIDE AND ALBUTEROL SULFATE 1 AMPULE: .5; 3 SOLUTION RESPIRATORY (INHALATION) at 11:58

## 2021-07-03 RX ADMIN — Medication 10 ML: at 09:19

## 2021-07-03 ASSESSMENT — PAIN DESCRIPTION - ORIENTATION: ORIENTATION: RIGHT

## 2021-07-03 ASSESSMENT — PAIN DESCRIPTION - DESCRIPTORS: DESCRIPTORS: ACHING

## 2021-07-03 ASSESSMENT — PAIN DESCRIPTION - PAIN TYPE: TYPE: ACUTE PAIN

## 2021-07-03 ASSESSMENT — PAIN SCALES - GENERAL
PAINLEVEL_OUTOF10: 6
PAINLEVEL_OUTOF10: 6
PAINLEVEL_OUTOF10: 10
PAINLEVEL_OUTOF10: 6
PAINLEVEL_OUTOF10: 10
PAINLEVEL_OUTOF10: 5

## 2021-07-03 ASSESSMENT — PAIN DESCRIPTION - LOCATION: LOCATION: BACK;RIB CAGE

## 2021-07-03 NOTE — PROGRESS NOTES
Patient has been discharged per MD orders. Patient verbalizes understanding of all instructions, medications, and follow up. IV has been removed, catheter intact, patient tolerated well. All belongings have been gathered and packed. Patient is tearful at time of discharge, though she denies further needs for discharge. Family at [facility]  to transport patient from hospital. PCA transports patient to awaiting vehicle.

## 2021-07-03 NOTE — PLAN OF CARE
Problem: Falls - Risk of:  Goal: Will remain free from falls  Description: Will remain free from falls  Outcome: Ongoing  Goal: Absence of physical injury  Description: Absence of physical injury  Outcome: Ongoing     Problem: Skin Integrity:  Goal: Will show no infection signs and symptoms  Description: Will show no infection signs and symptoms  Outcome: Ongoing  Goal: Absence of new skin breakdown  Description: Absence of new skin breakdown  Outcome: Ongoing     Problem: Pain:  Description: Pain management should include both nonpharmacologic and pharmacologic interventions. Goal: Pain level will decrease  Description: Pain level will decrease  Outcome: Ongoing  Goal: Control of acute pain  Description: Control of acute pain  Outcome: Ongoing  Goal: Control of chronic pain  Description: Control of chronic pain  Outcome: Ongoing     Problem: Falls - Risk of:  Goal: Will remain free from falls  Description: Will remain free from falls  7/3/2021 1123 by Martha Russell RN  Outcome: Ongoing  7/3/2021 1122 by Martha Russell RN  Outcome: Ongoing  Goal: Absence of physical injury  Description: Absence of physical injury  7/3/2021 1123 by Martha Russell RN  Outcome: Ongoing  7/3/2021 1122 by Martha Russell RN  Outcome: Ongoing     Problem: Skin Integrity:  Goal: Will show no infection signs and symptoms  Description: Will show no infection signs and symptoms  7/3/2021 1123 by Martha Russell RN  Outcome: Ongoing  7/3/2021 1122 by Martha Russell RN  Outcome: Ongoing  Goal: Absence of new skin breakdown  Description: Absence of new skin breakdown  7/3/2021 1123 by Martha Russell RN  Outcome: Ongoing  7/3/2021 1122 by Martha Russell RN  Outcome: Ongoing     Problem: Pain:  Description: Pain management should include both nonpharmacologic and pharmacologic interventions.   Goal: Pain level will decrease  Description: Pain level will decrease  7/3/2021 1123 by Martha Russell RN  Outcome: Ongoing  7/3/2021 1122 by Martha Russell RN  Outcome: Ongoing  Goal: Control of acute pain  Description: Control of acute pain  7/3/2021 1123 by Sonu Dorsey RN  Outcome: Ongoing  7/3/2021 1122 by Sonu Dorsey RN  Outcome: Ongoing  Goal: Control of chronic pain  Description: Control of chronic pain  7/3/2021 1123 by Sonu Dorsey RN  Outcome: Ongoing  7/3/2021 1122 by Sonu Dorsey RN  Outcome: Ongoing     Problem: Breathing Pattern - Ineffective:  Goal: Ability to achieve and maintain a regular respiratory rate will improve  Description: Ability to achieve and maintain a regular respiratory rate will improve  Outcome: Ongoing     Problem:  Activity:  Goal: Ability to tolerate increased activity will improve  Description: Ability to tolerate increased activity will improve  Outcome: Ongoing

## 2021-07-03 NOTE — CARE COORDINATION
Notified [facility] of discharge of discharge and home care order for resumption of care tomorrow.

## 2021-07-03 NOTE — PROGRESS NOTES
Office : [phone]     Fax :[phone]       Nephrology Progress  Note      Patient's Name: Erickson Avendano  1:18 PM  7/3/2021    Reason for Consult: CKD stage 3b      Requesting Physician:  Jyothi Powell MD      Chief Complaint:    Chief Complaint   Patient presents with    Shortness of Breath     pt coming in c/o sob, cough, bilat leg pain started last week, symptoms are getting worse over the past couple of days. Interval History :      SOB getting better    Serum cr stable    Making urine    No chest pain       History of Present iIlness:    Erickson Avendano is a 72 y.o. female with h/o ckd stage 3, COPD, Crohn's disease, depression, previous DVT/PE who was admitted with c/o SOB , cough and weakness  Has been admitted for COPD exacerbation   No edema LE     Had CT chest WO contrast.     No intake/output data recorded.     Past Medical History:   Diagnosis Date    Bullous emphysema (HCC)     CKD (chronic kidney disease) stage 3, GFR 30-59 ml/min ([facility])     Clostridium difficile carrier 01/21/2019    COPD (chronic obstructive pulmonary disease) ([address]     PFT done 7 years ago   Susan Me Crohn's disease ([address]     Crohn's disease    Depression 1/30/2011    pt states resolved as of 3-26-12    DVT (deep venous thrombosis) ([address]     2012; first ocurrence     Hiatal hernia     Hx of blood clots     Kidney disease     Kidney insufficiency     Osteoporosis     Peripheral neuropathy     neuropathy in legs    Pneumonia     Postmenopausal     LMP in  40's    Pulmonary embolism ([address]     2012; first ocurrence    Sepsis ([address]     Syringomyelia ([address]        Past Surgical History:   Procedure Laterality Date    ABDOMEN SURGERY      BACK SURGERY      shunt to drain CSF    BIOPSY SHOULDER Left 2/27/2019    EXCISION OF LEFT SHOULDER MASS performed by Yesica Solorio MD at [address]      crainotomy- remove fluid ? V-P SHUNT    BRONCHOSCOPY N/A 9/2/2020    BRONCHOSCOPY ALVEOLAR LAVAGE performed by Raysa Waggoner MD at [facility]      resection X2    COLONOSCOPY  12/5/11    BIOPSY AND POLYPECTOMY    COLONOSCOPY  4-2-2012    and ablation ERBE/APC    SHOULDER SURGERY      L        Family History   Problem Relation Age of Onset    Heart Disease Mother     High Blood Pressure Mother     High Cholesterol Mother     Early Death Mother 36        MI    Heart Disease Father     High Blood Pressure Father     High Cholesterol Father     Stroke Father 79    High Blood Pressure Sister     High Blood Pressure Brother         reports that she quit smoking about 6 years ago. Her smoking use included cigarettes. She has a 7.50 pack-year smoking history. She has never used smokeless tobacco. She reports that she does not drink alcohol and does not use drugs. Allergies:  Patient has no known allergies.     Current Medications:    calcium carbonate (TUMS) chewable tablet 500 mg, TID  amitriptyline (ELAVIL) tablet 75 mg, Nightly  amLODIPine (NORVASC) tablet 5 mg, Daily  cholestyramine (QUESTRAN) packet 4 g, Daily  apixaban (ELIQUIS) tablet 2.5 mg, BID  pantoprazole (PROTONIX) tablet 40 mg, QAM AC  sodium chloride flush 0.9 % injection 5-40 mL, 2 times per day  sodium chloride flush 0.9 % injection 5-40 mL, PRN  0.9 % sodium chloride infusion, PRN  ondansetron (ZOFRAN-ODT) disintegrating tablet 4 mg, Q8H PRN   Or  ondansetron (ZOFRAN) injection 4 mg, Q6H PRN  polyethylene glycol (GLYCOLAX) packet 17 g, Daily PRN  acetaminophen (TYLENOL) tablet 650 mg, Q6H PRN   Or  acetaminophen (TYLENOL) suppository 650 mg, Q6H PRN  guaiFENesin (MUCINEX) extended release tablet 1,200 mg, BID  ipratropium-albuterol (DUONEB) [place], BLOODU, PHUR, PROTEINU, UROBILINOGEN, NITRU, LEUKOCYTESUR, Ginger Sames in the last 72 hours. Urine Microscopic: No results for input(s): LABCAST, BACTERIA, COMU, HYALCAST, WBCUA, RBCUA, EPIU in the last 72 hours. Urine Culture: No results for input(s): LABURIN in the last 72 hours. Urine Chemistry: No results for input(s): Brendia Flavors, PROTEINUR, NAUR in the last 72 hours. IMAGING:  CT CHEST WO CONTRAST   Final Result   1. Advanced pulmonary emphysema with no acute intrathoracic abnormality   2. Subacute healing anterior left 6th rib fracture         VL Extremity Venous Bilateral   Final Result      XR CHEST PORTABLE   Final Result   No acute process. Bibasilar hypoaeration                   Assessment/Plan :      1. CKD stage 3 b     Stable     Continue current management    BMP labs  In am    Recommend to dose adjust all medications  based on renal functions  Maintain SBP> 90 mmHg   Daily weights   AVOID NSAIDs  Avoid Nephrotoxins  Monitor Intake/Output  Call if significant decrease in urine output     2. HTN. Continue amlodipine     3. Anemia. Hb 9.2  Has anemia of chronic ds     4. Metabolic acidosis   Continue calcium carbonate     5.  Electrolytes  Monitor             Thank you for allowing us to participate in care of Xochilt Whittington         Electronically signed by: Earnest Reyes MD, 7/3/2021, 1:18 PM      [facility] [address]  Office : [phone]  Fax :[phone]

## 2021-07-03 NOTE — DISCHARGE SUMMARY
[facility]    [facility] Demographics    Patient. Bobo Gorman  Date of Birth. 1950  MRN. [id number]     Primary care provider. Jennifer Bah MD  (Tel: [phone])    Admit date: 7/1/2021    Discharge date (blank if same as Note Date): Note Date: 7/3/2021     Reason for Hospitalization. Chief Complaint   Patient presents with    Shortness of Breath     pt coming in c/o sob, cough, bilat leg pain started last week, symptoms are getting worse over the past couple of days. Significant Findings. Active Problems:    Crohn disease ([address]    Chronic hypoxemic respiratory failure (HCC)    COPD, severe (HCC)    Bronchiectasis with acute exacerbation (HCC)    Gastroesophageal reflux disease without esophagitis    History of pulmonary embolism    COPD exacerbation (HCC)    History of DVT (deep vein thrombosis)    Ex-smoker    Essential hypertension    Acute exacerbation of chronic obstructive pulmonary disease (COPD) (HCC)    Acute renal failure superimposed on stage 3 chronic kidney disease (HCC)    Closed fracture of one rib of left side with routine healing  Resolved Problems:    * No resolved hospital problems. *       Problems and results from this hospitalization that need follow up. 1. Severe COPD, pulmonary follow up in 1 week. Significant test results and incidental findings. CXR 7/1/2021:  No acute process.       Bibasilar hypoaeration      Venous Doppler BLE 7/1/2021:  Summary        There is no evidence of deep or superficial venous thrombosis involving the    bilateral lower extremities.      CT Chest 7/1/2021:  1. Advanced pulmonary emphysema with no acute intrathoracic abnormality   2. Subacute healing anterior left 6th rib fracture          Invasive procedures and treatments. 1. None     Problem-based Hospital Course.   Patient is a 71 yo female with PULMONOLOGY  PULMONARY REHAB EVALUATION  IP CONSULT TO PALLIATIVE CARE  IP CONSULT TO HOME CARE NEEDS    Physical examination on discharge day. BP (!) 152/89   Pulse 99   Temp 98.2 °F (36.8 °C) (Oral)   Resp 22   Ht 4' 11\" (1.499 m)   Wt 152 lb 9.6 oz (69.2 kg)   LMP  (LMP Unknown)   SpO2 96%   BMI 30.82 kg/m²   General appearance. Alert. Looks comfortable. HEENT. Sclera clear. Moist mucus membranes. Cardiovascular. Regular rate and rhythm, normal S1, S2. No murmur. Respiratory. Not using accessory muscles. Diminished, no wheeze. Gastrointestinal. Abdomen soft, non-tender, not distended, normal bowel sounds  Neurology. Facial symmetry. No speech deficits. Moving all extremities equally. Extremities. No edema in lower extremities. Skin. Warm, dry, normal turgor    Condition at time of discharge stable    Medication instructions provided to patient at discharge. Medication List      START taking these medications    HYDROcodone-acetaminophen 5-325 MG per tablet  Commonly known as: NORCO  Take 1 tablet by mouth every 6 hours as needed for Pain for up to 3 days.         CHANGE how you take these medications    predniSONE 20 MG tablet  Commonly known as: Haydee Lo  What changed:   · how much to take  · how to take this  · when to take this  · additional instructions        CONTINUE taking these medications    * albuterol sulfate  (90 Base) MCG/ACT inhaler  Inhale 2 puffs into the lungs every 6 hours as needed for Wheezing     * albuterol (2.5 MG/3ML) 0.083% nebulizer solution  Commonly known as: PROVENTIL  TAKE 3 MLS BY NEBULIZATION EVERY 6 HOURS AS NEEDED FOR WHEEZING DX: COPD ICD-10: J44.9     alendronate 70 MG tablet  Commonly known as: FOSAMAX  Take 1 tablet by mouth every 7 days     amitriptyline 150 MG tablet  Commonly known as: ELAVIL  Take 0.5 tablets by mouth nightly     amLODIPine 5 MG tablet  Commonly known as: NORVASC  TAKE 1 TABLET BY MOUTH EVERY DAY     BreztrTurnTidephere 160-9-4.8 MCG/ACT Aero  Generic drug: Budeson-Glycopyrrol-Formoterol  Inhale 2 puffs into the lungs 2 times daily     colestipol 1 g tablet  Commonly known as: COLESTID     cyclobenzaprine 5 MG tablet  Commonly known as: FLEXERIL     Eliquis 5 MG Tabs tablet  Generic drug: apixaban  TAKE 1 TABLET BY MOUTH TWICE A DAY     Ensure High Protein Liqd  Take 1 Can by mouth 2 times daily     HUMIRA PEN SC     omeprazole 20 MG delayed release capsule  Commonly known as: PRILOSEC     potassium chloride 10 MEQ extended release tablet  Commonly known as: KLOR-CON     Proteinex P18 Liqd  Take 1 Bottle by mouth 2 times daily         * This list has 2 medication(s) that are the same as other medications prescribed for you. Read the directions carefully, and ask your doctor or other care provider to review them with you. Where to Get Your Medications      You can get these medications from any pharmacy    Bring a paper prescription for each of these medications  · HYDROcodone-acetaminophen 5-325 MG per tablet     PDMP reviewed    Discharge recommendations given to patient. Follow Up. Pulm in 1 week, PCP in 2 weeks   Disposition. Home with home care  Activity. activity as tolerated  Diet: ADULT DIET; Regular      Spent 45 minutes in discharge process.     Signed:  GUS Ramirez CNP     7/3/2021 2:02 PM

## 2021-07-03 NOTE — PROGRESS NOTES
P Pulmonary and Critical Care    Follow Up Note    Subjective:   CHIEF COMPLAINT / HPI:   Chief Complaint   Patient presents with    Shortness of Breath     pt coming in c/o sob, cough, bilat leg pain started last week, symptoms are getting worse over the past couple of days. Interval history: Patient again admitted with shortness of breath. Has severe, end-stage COPD/emphysema. Her living situation is also a contributor. She cannot run her air conditioner and her oxygen concentrator at the same time without blowing diffuse. She is considering moving in either with a friend or with her daughter in the near future. Past Medical History:    Reviewed; no changes    Social History:    Reviewed; no changes    REVIEW OF SYSTEMS:    CONSTITUTIONAL:  negative for fevers and chills  RESPIRATORY:  See HPI  CARDIOVASCULAR:  negative for chest pain, palpitations, edema  GASTROINTESTINAL:  negative for nausea, vomiting, diarrhea, constipation and abdominal pain    Objective:   PHYSICAL EXAM:        VITALS:  BP (!) 145/82   Pulse 106   Temp 98.1 °F (36.7 °C) (Oral)   Resp 22   Ht 4' 11\" (1.499 m)   Wt 152 lb 9.6 oz (69.2 kg)   LMP  (LMP Unknown)   SpO2 96%   BMI 30.82 kg/m²  on 3L NC    24HR INTAKE/OUTPUT:      Intake/Output Summary (Last 24 hours) at 7/3/2021 1102  Last data filed at 7/3/2021 0818  Gross per 24 hour   Intake 240 ml   Output    Net 240 ml       CONSTITUTIONAL:  awake, alert,  no apparent distress, and appears stated age  LUNGS:  No increased work of breathing and clear to auscultation, no crackles or wheezes  CARDIOVASCULAR: S1 and S2 and no JVD  ABDOMEN:  normal bowel sounds, non-distended and non-tender to palpation  EXT: No edema, no calf tenderness. Pulses are present bilaterally. NEUROLOGIC:  Mental Status Exam:  Level of Alertness:   awake  Orientation:   person, place, time.  Non focal  SKIN:  normal skin color, texture, turgor, no redness, warmth, or swelling at IV sites    DATA:    CBC:  Recent Labs     06/30/21  1200 07/01/21  1110 07/02/21  0546   WBC 15.4* 14.7* 20.4*   RBC 4.50 4.41 4.42   HGB 9.4* 9.1* 9.2*   HCT 29.8* 29.2* 28.9*    333 337   MCV 66.1* 66.3* 65.5*   MCH 20.8* 20.6* 20.9*   MCHC 31.5 31.0 31.9   RDW 16.8* 16.8* 17.0*   BANDSPCT 1  --   --       BMP:  Recent Labs     06/30/21  1200 07/01/21  1110 07/02/21  0546    139 136   K 3.8 3.4* 5.0    108 105   CO2 18* 19* 19*   BUN 23* 26* 27*   CREATININE 2.1* 2.2* 1.7*   CALCIUM 8.4 8.5 8.2*   GLUCOSE 95 104* 118*      ABG:  No results for input(s): PHART, FBG8APQ, PO2ART, VTV1LOJ, D3NFIRPH, BEART in the last 72 hours. Procalcitonin  Recent Labs     07/02/21  0546   PROCAL 0.11           Radiology Review:  Pertinent images / reports were reviewed as a part of this visit. Assessment:     1. COPD exacerbation  2. Centrilobular emphysema  3. Chronic hypoxemic respiratory failure  4. Osteoporosis with left rib fracture    Plan:     1. I have reviewed laboratories, medical records and images for this visit  2. Chest imaging reveals no acute disease  3. I think her underlying disease is progressing  4. I also think her social situation is suboptimal  5. She recognizes this and has plans to change her living situation in the near future  6.  Okay with me if she is discharged home when okay with rest of the treatment team.

## 2021-07-05 LAB
BLOOD CULTURE, ROUTINE: NORMAL
CULTURE, BLOOD 2: NORMAL

## 2021-07-06 ENCOUNTER — CARE COORDINATION (OUTPATIENT)
Dept: CASE MANAGEMENT | Age: 71
End: 2021-07-06

## 2021-07-06 DIAGNOSIS — J44.1 ACUTE EXACERBATION OF CHRONIC OBSTRUCTIVE PULMONARY DISEASE (COPD) (HCC): Primary | ICD-10-CM

## 2021-07-06 PROCEDURE — 1111F DSCHRG MED/CURRENT MED MERGE: CPT | Performed by: INTERNAL MEDICINE

## 2021-07-06 NOTE — CARE COORDINATION
Mariel [facility] Transitions Initial Follow Up Call:  Patient reports that she is resting in the [facility], her breathing is good and she is using 3 Lpm O2. [facility] has been out to visit and patient is using breathing treatments and inhalers as prescribed. Discussed discharge instructions and reviewed medications, 1111F completed. CTN attempted to make follow up appointment with Dr. Kolby Phipps, there is nothing available over the next two weeks, he is over 100% booked or unavailable. CTN encouraged patient to call today and let them know that she was just discharged from the hospital.  She verbalized understanding and agreed to do so. CTN will continue with outreach follow up calls. Call within 2 business days of discharge: Yes    Patient: Dung Campos Patient : 1950   MRN: [id number]  Reason for Admission: COPD exacerbation  Discharge Date: 7/3/21 RARS: Readmission Risk Score: 28      Last Discharge [facility]       Complaint Diagnosis Description Type Department Provider    21 Shortness of Breath COPD with acute exacerbation ([facility] . .. ED to Hosp-Admission (Discharged) (ADMITTED) [facility] Giuliana Luna MD; Vic Paris . .. Spoke with: Dung Campos      Transitions of Care Initial Call    Was this an external facility discharge? No Discharge Facility:     Challenges to be reviewed by the provider   Additional needs identified to be addressed with provider: No  none             Method of communication with provider : none      Advance Care Planning:   Does patient have an Advance Directive:  reviewed and current. Was this a readmission? No  Patient stated reason for admission: COPD  Patients top risk factors for readmission: medical condition-.    Care Transition Nurse (CTN) contacted the patient by telephone to perform post hospital discharge assessment. Verified name and  with patient as identifiers. Provided introduction to self, and explanation of the CTN role. CTN reviewed discharge instructions, medical action plan and red flags with patient who verbalized understanding. Patient given an opportunity to ask questions and does not have any further questions or concerns at this time. Were discharge instructions available to patient? Yes. Reviewed appropriate site of care based on symptoms and resources available to patient including: PCP, Urgent care clinics, Home health and When to call 911. The patient agrees to contact the PCP office for questions related to their healthcare. Medication reconciliation was performed with patient, who verbalizes understanding of administration of home medications. Advised obtaining a 90-day supply of all daily and as-needed medications. Reviewed and educated patient on any new and changed medications related to discharge diagnosis. Was patient discharged with a pulse oximeter? Yes  CTN provided contact information. Plan for follow-up call in 3-5 days based on severity of symptoms and risk factors.             Non-face-to-face services provided:  Obtained and reviewed discharge summary and/or continuity of care documents    Care Transitions 24 Hour Call    Do you have any ongoing symptoms?: No  Do you have a copy of your discharge instructions?: Yes  Do you have all of your prescriptions and are they filled?: Yes  Have you been contacted by a [address]?: No  Have you scheduled your follow up appointment?: No  Were you discharged with any Home Care or Post Acute Services: Yes  Post Acute Services: [address] you feel like you have everything you need to keep you well at home?: Yes  Care Transitions Interventions         Follow Up  Future Appointments   Date Time Provider Kia Guadarrama   8/9/2021  2:00 PM Shereen Case MD [facility]   10/22/2021  9:30 AM MD TATIANA Grove  [place] - [place]   12/15/2021  8:30 AM Adrien Matt MD [id number] RN

## 2021-07-12 ENCOUNTER — TELEPHONE (OUTPATIENT)
Dept: INTERNAL MEDICINE CLINIC | Age: 71
End: 2021-07-12

## 2021-07-12 ENCOUNTER — CARE COORDINATION (OUTPATIENT)
Dept: CASE MANAGEMENT | Age: 71
End: 2021-07-12

## 2021-07-12 NOTE — TELEPHONE ENCOUNTER
[facility] was calling for a recommendation Dr Hilary Burnham. Pt has left [facility] for a fractured left rib on 6-22-21. They only prescribed tylenol for pain for a few days and PT states they are not working. And would like recommendations for the pain management.

## 2021-07-13 RX ORDER — TIZANIDINE 4 MG/1
4 TABLET ORAL 3 TIMES DAILY PRN
Qty: 30 TABLET | Refills: 1 | Status: SHIPPED | OUTPATIENT
Start: 2021-07-13 | End: 2021-07-15 | Stop reason: SINTOL

## 2021-07-15 ENCOUNTER — TELEPHONE (OUTPATIENT)
Dept: INTERNAL MEDICINE CLINIC | Age: 71
End: 2021-07-15

## 2021-07-15 RX ORDER — CYCLOBENZAPRINE HCL 5 MG
5 TABLET ORAL 3 TIMES DAILY PRN
Qty: 30 TABLET | Refills: 0 | Status: SHIPPED | OUTPATIENT
Start: 2021-07-15 | End: 2021-07-25

## 2021-07-15 NOTE — TELEPHONE ENCOUNTER
Patient would like Dr Messi Diaz to know that the medication tiZANidine (Abundio Camara) 4 MG tablet  that she been taking since Tuesday 7/13/21 for left leg pain is not working she still hurting and cant hardly walk on the leg.      Please give Félix Sandra at [phone]    Thanks

## 2021-07-16 ENCOUNTER — CARE COORDINATION (OUTPATIENT)
Dept: CASE MANAGEMENT | Age: 71
End: 2021-07-16

## 2021-07-16 ENCOUNTER — TELEPHONE (OUTPATIENT)
Dept: INTERNAL MEDICINE CLINIC | Age: 71
End: 2021-07-16

## 2021-07-16 NOTE — TELEPHONE ENCOUNTER
Pt need a referral for Neurologist. Pt would like to receive injections for the pain she has in her left leg. Pt would like  Dr. Moni Izquierdo to recommend neurologist for her.  Pt would like a call back when the referral is placed at [phone]

## 2021-07-19 ENCOUNTER — TELEPHONE (OUTPATIENT)
Dept: INTERNAL MEDICINE CLINIC | Age: 71
End: 2021-07-19

## 2021-07-19 DIAGNOSIS — M17.12 LOCALIZED OSTEOARTHRITIS OF LEFT KNEE: Primary | ICD-10-CM

## 2021-07-19 NOTE — TELEPHONE ENCOUNTER
Pt is calling back stating she is having pain in her left knee, thigh, and side. Pt states that mediations that have been prescribed to her previously are not working. Pt can barley move, unable to walk without assistance and feels her knee is going to buckle any time she stands. Please reach out to pt via phone [phone].

## 2021-07-20 ENCOUNTER — TELEPHONE (OUTPATIENT)
Dept: INTERNAL MEDICINE CLINIC | Age: 71
End: 2021-07-20

## 2021-07-20 NOTE — TELEPHONE ENCOUNTER
Pt would like to discuss a pain management option with dr. Laly Bunch was given a muscle relaxer that has not helped. Pt would like to have a new script sent in for her to help her manage until she can get in to see Makayla Mora. Pt was verbally given info for Ortho on 7/20.      Pt can be reached at [phone]

## 2021-07-22 ENCOUNTER — TELEPHONE (OUTPATIENT)
Dept: INTERNAL MEDICINE CLINIC | Age: 71
End: 2021-07-22

## 2021-07-22 NOTE — TELEPHONE ENCOUNTER
Pts chronic wound on left shoulder has opened up. [facility] nurse would like to treat the wound with Betadine for 5 days. Pt was recommend to go to wound care clinic, but pt cant \"get out\" the house right now. Erica Martinez ,nurse, needs verbal order in order to begin treatment.      Give ariel a call back @[phone]

## 2021-07-23 ENCOUNTER — OFFICE VISIT (OUTPATIENT)
Dept: ORTHOPEDIC SURGERY | Age: 71
End: 2021-07-23
Payer: MEDICARE

## 2021-07-23 VITALS — HEIGHT: 59 IN | BODY MASS INDEX: 30.64 KG/M2 | WEIGHT: 152 LBS

## 2021-07-23 DIAGNOSIS — M54.50 CHRONIC MIDLINE LOW BACK PAIN WITHOUT SCIATICA: ICD-10-CM

## 2021-07-23 DIAGNOSIS — G89.29 CHRONIC MIDLINE LOW BACK PAIN WITHOUT SCIATICA: ICD-10-CM

## 2021-07-23 DIAGNOSIS — M16.12 ARTHRITIS OF LEFT HIP: Primary | ICD-10-CM

## 2021-07-23 PROCEDURE — 4040F PNEUMOC VAC/ADMIN/RCVD: CPT | Performed by: ORTHOPAEDIC SURGERY

## 2021-07-23 PROCEDURE — G8427 DOCREV CUR MEDS BY ELIG CLIN: HCPCS | Performed by: ORTHOPAEDIC SURGERY

## 2021-07-23 PROCEDURE — 1090F PRES/ABSN URINE INCON ASSESS: CPT | Performed by: ORTHOPAEDIC SURGERY

## 2021-07-23 PROCEDURE — 99204 OFFICE O/P NEW MOD 45 MIN: CPT | Performed by: ORTHOPAEDIC SURGERY

## 2021-07-23 PROCEDURE — 3017F COLORECTAL CA SCREEN DOC REV: CPT | Performed by: ORTHOPAEDIC SURGERY

## 2021-07-23 PROCEDURE — G8417 CALC BMI ABV UP PARAM F/U: HCPCS | Performed by: ORTHOPAEDIC SURGERY

## 2021-07-23 PROCEDURE — 1111F DSCHRG MED/CURRENT MED MERGE: CPT | Performed by: ORTHOPAEDIC SURGERY

## 2021-07-23 PROCEDURE — G8399 PT W/DXA RESULTS DOCUMENT: HCPCS | Performed by: ORTHOPAEDIC SURGERY

## 2021-07-23 PROCEDURE — 1123F ACP DISCUSS/DSCN MKR DOCD: CPT | Performed by: ORTHOPAEDIC SURGERY

## 2021-07-23 PROCEDURE — 1036F TOBACCO NON-USER: CPT | Performed by: ORTHOPAEDIC SURGERY

## 2021-07-23 NOTE — PROGRESS NOTES
ORTHOPAEDIC CONSULTATION NOTE    Chief Complaint   Patient presents with    New Patient     Left hip pain for about a month       HPI   7/23/21  72 y.o. female seen in consultation at the request of Katelynn Heart MD for evaluation of left hip/thigh pain    she reports her current symptoms started in the middle of June  There is no injury or trauma at the time  Pain is located over the lateral hip radiates down the lateral thigh and into the knee region  There is no radiation beyond the knee  She denies left leg/foot numbness and tingling  She does have history of chronic midline low back pain without history of radicular symptoms  Pain is rated as 9 out of 10  She has been using Aleve and Tylenol    She has history of COPD  Sees Dr. Denise Jimenez, her pulmonologist  She is on chronic prednisone 20 mg a day  She does have history of VTE, on Eliquis twice a day        Review of Systems  I have read over the ROS from the Patient History Form dated on 7/23/2021  Pertinent positives include weight change, headaches, shortness of breath, history of blood clots, peripheral edema, back pain  Rest of 13 point ROS otherwise negative except per HPI, and scanned into the patient's chart under the Media tab. No Known Allergies     Current Outpatient Medications   Medication Sig Dispense Refill    cyclobenzaprine (FLEXERIL) 5 MG tablet Take 1 tablet by mouth 3 times daily as needed for Muscle spasms 30 tablet 0    predniSONE (DELTASONE) 20 MG tablet Take 2 tabs daily for next 3 days then resume 20 mg daily dosing.  30 tablet 5    Budeson-Glycopyrrol-Formoterol (BREZTRI AEROSPHERE) 160-9-4.8 MCG/ACT AERO Inhale 2 puffs into the lungs 2 times daily 1 Inhaler 6    amLODIPine (NORVASC) 5 MG tablet TAKE 1 TABLET BY MOUTH EVERY DAY 90 tablet 1    potassium chloride (KLOR-CON) 10 MEQ extended release tablet TAKE 1 TABLET BY MOUTH EVERY DAY      Adalimumab (HUMIRA PEN SC) Inject into the skin every 14 days       ELIQUIS 5 MG TABS tablet TAKE 1 TABLET BY MOUTH TWICE A DAY 60 tablet 6    albuterol (PROVENTIL) (2.5 MG/3ML) 0.083% nebulizer solution TAKE 3 MLS BY NEBULIZATION EVERY 6 HOURS AS NEEDED FOR WHEEZING DX: COPD ICD-10: J44.9 300 mL 6    colestipol (COLESTID) 1 g tablet TAKE 1 TABLET BY MOUTH TWICE A DAY      amitriptyline (ELAVIL) 150 MG tablet Take 0.5 tablets by mouth nightly 45 tablet 3    Nutritional Supplements (ENSURE HIGH PROTEIN) LIQD Take 1 Can by mouth 2 times daily 60 Can 1    Amino Acids-Protein Hydrolys (PROTEINEX P18) LIQD Take 1 Bottle by mouth 2 times daily 30 Bottle 2    cyclobenzaprine (FLEXERIL) 5 MG tablet Take 5 mg by mouth 4 times daily Every 6 hours for muscle spasms      albuterol sulfate  (90 Base) MCG/ACT inhaler Inhale 2 puffs into the lungs every 6 hours as needed for Wheezing 1 Inhaler 11    alendronate (FOSAMAX) 70 MG tablet Take 1 tablet by mouth every 7 days 12 tablet 3    omeprazole (PRILOSEC) 20 MG delayed release capsule Take 20 mg by mouth Daily Takes as needed       No current facility-administered medications for this visit.        Past Medical History:   Diagnosis Date    Bullous emphysema ([facility]     CKD (chronic kidney disease) stage 3, GFR 30-59 ml/min ([facility])     Clostridium difficile carrier 01/21/2019    COPD [facility]chronic obstructive pulmonary disease) ([facility]     PFT done 7 years ago   Susan Me Crohn's disease ([facility]     Crohn's disease    Depression 1/30/2011    pt states resolved as of 3-26-12    DVT (deep venous thrombosis) ([facility]     2012; first ocurrence     Hiatal hernia     Hx of blood clots     Kidney disease     Kidney insufficiency     Osteoporosis     Peripheral neuropathy     neuropathy in legs    Pneumonia     Postmenopausal     LMP in  40's    Pulmonary embolism ([address]     2012; first ocurrence    Sepsis ([facility]     Syringomyelia ([facility]         Past Surgical History:   Procedure Laterality Date    ABDOMEN SURGERY      BACK SURGERY      shunt to drain CSF    BIOPSY SHOULDER Left 2019    EXCISION OF LEFT SHOULDER MASS performed by Santo Pedraza MD at [address]      crainotomy- remove fluid ? V-P SHUNT    BRONCHOSCOPY N/A 2020    BRONCHOSCOPY ALVEOLAR LAVAGE performed by Dangelo Weaver MD at [facility]      resection X2    COLONOSCOPY  11    BIOPSY AND POLYPECTOMY    COLONOSCOPY  2012    and ablation ERBE/APC    SHOULDER SURGERY      L        Family History   Problem Relation Age of Onset    Heart Disease Mother     High Blood Pressure Mother     High Cholesterol Mother     Early Death Mother 36        MI    Heart Disease Father     High Blood Pressure Father     High Cholesterol Father     Stroke Father 79    High Blood Pressure Sister     High Blood Pressure Brother        Social History     Socioeconomic History    Marital status: Single     Spouse name: Not on file    Number of children: 2    Years of education: 15    Highest education level: Not on file   Occupational History    Occupation: unemployed     Comment: disability [employer]   Tobacco Use    Smoking status: Former Smoker     Packs/day: 0.25     Years: 30.00     Pack years: 7.50     Types: Cigarettes     Quit date: 2015     Years since quittin.4    Smokeless tobacco: Never Used    Tobacco comment: started smoking at age 26 / smoked up to 9 cigarettes a day    Vaping Use    Vaping Use: Never used   Substance and Sexual Activity    Alcohol use: No    Drug use: No    Sexual activity: Not Currently   Other Topics Concern    Not on file   Social History Narrative    Not on file     Social Determinants of Health     Financial Resource Strain: Low Risk     Difficulty of Paying Living Expenses: Not hard at all   Food Insecurity: No Food Insecurity    Worried About Running Out of Food in the Last Year: Never true    Nathalie of Food in the Last Year: Never true   Transportation Needs:     Lack of Transportation (Medical):  Lack of Transportation (Non-Medical):    Physical Activity:     Days of Exercise per Week:     Minutes of Exercise per Session:    Stress:     Feeling of Stress :    Social Connections:     Frequency of Communication with Friends and Family:     Frequency of Social Gatherings with Friends and Family:     Attends [employer] Services:     Active Member of Clubs or Organizations:     Attends Club or Organization Meetings:     Marital Status:    Intimate Partner Violence:     Fear of Current or Ex-Partner:     Emotionally Abused:     Physically Abused:     Sexually Abused:         Vitals:    07/23/21 0951   Weight: 152 lb (68.9 kg)   Height: 4' 11\" (1.499 m)       Physical Exam  Constitutional  well-groomed, well-nourished, Body mass index is 30.7 kg/m². Psychiatric  pleasant, normal mood & affect  Cardiovascular  RRR, equivocal peripheral edema, no varicosities, DP pulse intact  Respiratory  respirations unlabored, on room air  Skin  no rashes, wounds, or lesions seen on exposed skin  Spine - no radicular pain with SLR. Neurological - SILT SP/DP/T/sural/saphenous nerve distributions; EHL/FHL/TA/GS intact  Left hip -    Inspection:  No obvious deformity/swelling/ecchymosis   Palpation:   moderate TTP greater trochanter region   Range of Motion:    Flexion:  110     IR:  20     ER:  50   Special tests:    positive Stinchfield test    No obvious crepitus with ROM        Imaging:  Images were personally reviewed by myself and discussed with the patient  AP pelvis and Left hip 2 views performed today in clinic   - Bones appear demineralized. No obvious fractures seen. Moderate to advanced articular height loss involving the left hip joint, consistent with arthritis. No obvious radiographic evidence of avascular necrosis such as crescent sign.           DEXA 2018  Impression   Osteoporosis by WHO criteria.  No significant change in bone mineral density

## 2021-07-26 ENCOUNTER — TELEPHONE (OUTPATIENT)
Dept: INTERNAL MEDICINE CLINIC | Age: 71
End: 2021-07-26

## 2021-07-27 RX ORDER — GABAPENTIN 300 MG/1
300 CAPSULE ORAL 2 TIMES DAILY
Qty: 180 CAPSULE | Refills: 1 | Status: SHIPPED | OUTPATIENT
Start: 2021-07-27 | End: 2021-11-23 | Stop reason: SDUPTHER

## 2021-07-27 NOTE — TELEPHONE ENCOUNTER
Dr. Jennifer Amaral, I don't know if you needed this message sent back to you, or just completed. Thanks.

## 2021-07-28 ENCOUNTER — OFFICE VISIT (OUTPATIENT)
Dept: INTERNAL MEDICINE CLINIC | Age: 71
End: 2021-07-28
Payer: MEDICARE

## 2021-07-28 VITALS
HEART RATE: 106 BPM | BODY MASS INDEX: 30.04 KG/M2 | DIASTOLIC BLOOD PRESSURE: 80 MMHG | WEIGHT: 149 LBS | HEIGHT: 59 IN | OXYGEN SATURATION: 95 % | TEMPERATURE: 97.3 F | SYSTOLIC BLOOD PRESSURE: 124 MMHG

## 2021-07-28 DIAGNOSIS — M17.12 LOCALIZED OSTEOARTHRITIS OF LEFT KNEE: Primary | ICD-10-CM

## 2021-07-28 DIAGNOSIS — M81.0 AGE RELATED OSTEOPOROSIS, UNSPECIFIED PATHOLOGICAL FRACTURE PRESENCE: ICD-10-CM

## 2021-07-28 DIAGNOSIS — S39.012D STRAIN OF LUMBAR REGION, SUBSEQUENT ENCOUNTER: ICD-10-CM

## 2021-07-28 PROCEDURE — G8427 DOCREV CUR MEDS BY ELIG CLIN: HCPCS | Performed by: INTERNAL MEDICINE

## 2021-07-28 PROCEDURE — 3017F COLORECTAL CA SCREEN DOC REV: CPT | Performed by: INTERNAL MEDICINE

## 2021-07-28 PROCEDURE — 1123F ACP DISCUSS/DSCN MKR DOCD: CPT | Performed by: INTERNAL MEDICINE

## 2021-07-28 PROCEDURE — 1036F TOBACCO NON-USER: CPT | Performed by: INTERNAL MEDICINE

## 2021-07-28 PROCEDURE — G8417 CALC BMI ABV UP PARAM F/U: HCPCS | Performed by: INTERNAL MEDICINE

## 2021-07-28 PROCEDURE — G8399 PT W/DXA RESULTS DOCUMENT: HCPCS | Performed by: INTERNAL MEDICINE

## 2021-07-28 PROCEDURE — 4040F PNEUMOC VAC/ADMIN/RCVD: CPT | Performed by: INTERNAL MEDICINE

## 2021-07-28 PROCEDURE — 1111F DSCHRG MED/CURRENT MED MERGE: CPT | Performed by: INTERNAL MEDICINE

## 2021-07-28 PROCEDURE — 1090F PRES/ABSN URINE INCON ASSESS: CPT | Performed by: INTERNAL MEDICINE

## 2021-07-28 PROCEDURE — 99214 OFFICE O/P EST MOD 30 MIN: CPT | Performed by: INTERNAL MEDICINE

## 2021-07-28 RX ORDER — OXYCODONE HYDROCHLORIDE AND ACETAMINOPHEN 5; 325 MG/1; MG/1
1 TABLET ORAL EVERY 6 HOURS PRN
Qty: 60 TABLET | Refills: 0 | Status: SHIPPED | OUTPATIENT
Start: 2021-07-28 | End: 2021-08-20 | Stop reason: SDUPTHER

## 2021-07-28 RX ORDER — TIZANIDINE 4 MG/1
4 TABLET ORAL EVERY 8 HOURS PRN
Qty: 60 TABLET | Refills: 1 | Status: SHIPPED | OUTPATIENT
Start: 2021-07-28 | End: 2021-08-05

## 2021-07-28 RX ORDER — ALENDRONATE SODIUM 70 MG/1
70 TABLET ORAL
Qty: 12 TABLET | Refills: 3 | Status: SHIPPED | OUTPATIENT
Start: 2021-07-28 | End: 2021-11-23 | Stop reason: SDUPTHER

## 2021-07-28 ASSESSMENT — PATIENT HEALTH QUESTIONNAIRE - PHQ9
SUM OF ALL RESPONSES TO PHQ9 QUESTIONS 1 & 2: 0
1. LITTLE INTEREST OR PLEASURE IN DOING THINGS: 0
2. FEELING DOWN, DEPRESSED OR HOPELESS: 0
SUM OF ALL RESPONSES TO PHQ QUESTIONS 1-9: 0

## 2021-07-28 NOTE — PROGRESS NOTES
Galileo Dia (:  1950) is a 72 y.o. female,Established patient, here for evaluation of the following chief complaint(s):  Leg Pain (left leg pain from knee to hip for about a month) and Results (labs)         ASSESSMENT/PLAN:  1. Localized osteoarthritis of left knee  -     oxyCODONE-acetaminophen (PERCOCET) 5-325 MG per tablet; Take 1 tablet by mouth every 6 hours as needed for Pain for up to 30 days. Intended supply: 3 days. Take lowest dose possible to manage pain, Disp-60 tablet, R-0Normal  2. Osteoporosis  -     alendronate (FOSAMAX) 70 MG tablet; Take 1 tablet by mouth every 7 days, Disp-12 tablet, R-3Normal  3. Strain of lumbar region, subsequent encounter  -     oxyCODONE-acetaminophen (PERCOCET) 5-325 MG per tablet; Take 1 tablet by mouth every 6 hours as needed for Pain for up to 30 days. Intended supply: 3 days. Take lowest dose possible to manage pain, Disp-60 tablet, R-0Normal  -     tiZANidine (ZANAFLEX) 4 MG tablet; Take 1 tablet by mouth every 8 hours as needed (muscle pain), Disp-60 tablet, R-1Normal      No follow-ups on file. Subjective   SUBJECTIVE/OBJECTIVE:  Knee Pain   The incident occurred more than 1 week ago. The incident occurred at home. The injury mechanism is unknown. The pain is present in the left knee. The pain is at a severity of 9/10. The pain is severe. The pain has been constant since onset. Associated symptoms include an inability to bear weight. She reports no foreign bodies present. The symptoms are aggravated by movement. She has tried acetaminophen for the symptoms. The treatment provided no relief. Back Pain  This is a chronic problem. The current episode started more than 1 year ago. The problem has been gradually worsening since onset. The pain is present in the lumbar spine. The pain radiates to the left foot and right foot. The pain is the same all the time. Associated symptoms include leg pain, paresthesias and weakness.  Pertinent negatives include no chest pain or dysuria. She has tried muscle relaxant for the symptoms. The treatment provided no relief. Review of Systems   Cardiovascular: Negative for chest pain. Genitourinary: Negative for dysuria. Musculoskeletal: Positive for back pain. Neurological: Positive for weakness and paresthesias. Objective    Vitals:    07/28/21 1630   BP: 124/80   Pulse: 106   Temp: 97.3 °F (36.3 °C)   SpO2: 95%   Weight: 149 lb (67.6 kg)   Height: 4' 11\" (1.499 m)      Wt Readings from Last 3 Encounters:   07/28/21 149 lb (67.6 kg)   07/23/21 152 lb (68.9 kg)   07/03/21 152 lb 9.6 oz (69.2 kg)     BP Readings from Last 3 Encounters:   07/28/21 124/80   07/03/21 134/82   06/22/21 134/79     Body mass index is 30.09 kg/m². Facility age limit for growth percentiles is 20 years. Physical Exam  Constitutional:       Appearance: She is ill-appearing. HENT:      Right Ear: Tympanic membrane and ear canal normal.      Left Ear: Tympanic membrane and ear canal normal.   Musculoskeletal:      Lumbar back: Bony tenderness present. No deformity, signs of trauma, lacerations or spasms. Decreased range of motion. Back:       Left knee: Bony tenderness present. Decreased range of motion. Tenderness present over the patellar tendon. Normal alignment and normal meniscus. An electronic signature was used to authenticate this note.     --Dennis Beauchamp MD

## 2021-07-29 ENCOUNTER — CARE COORDINATION (OUTPATIENT)
Dept: CASE MANAGEMENT | Age: 71
End: 2021-07-29

## 2021-07-29 NOTE — CARE COORDINATION
[facility] Transitions Follow Up Call    2021    Patient: Meño Schmitz  Patient : 1950   MRN: [id number]  Reason for Admission: COPD exacerbation  Discharge Date: 7/3/21 RARS: Readmission Risk Score: 29         Spoke with: [facility] Transitions Subsequent and Final Call    Subsequent and Final Calls  Do you have any ongoing symptoms?: No  Have your medications changed?: Yes  Do you have any questions related to your medications?: No  Do you currently have any active services?: Yes  Are you currently active with any services?: Home Health  Do you have any needs or concerns that I can assist you with?: No  Identified Barriers: None  Care Transitions Interventions  Other Interventions: Follow Up: Patient reports that she is breathing without difficulty and knee pain is more tolerable. Patient followed up with PCP yesterday and he prescribed some Percocet to help with her knee pain. She said it is effective and that it makes the pain tolerable for her to walk. He also resumed Fosamax, patient reports that she was off of it for some reason, she did not know why. She will follow up again 21. CTN will continue with outreach follow up calls.     Future Appointments   Date Time Provider Kia Guadarrama   2021  9:15 AM GUS Rasmussen - CNP [facility] WOUND [place]   2021 11:00 AM [facility] MRI RM 1 [facility] MRI [place]   2021  3:30 PM MD TATIANA Dominguez IM [place] - DYD   2021  2:00 PM Drew Treviño MD [facility] [place]   10/22/2021  9:30 AM MD TATIANA Dominguez IM [place] - DYD   12/15/2021  8:30 AM Kris Spain MD [facility] CC YUDELKA Lira RN

## 2021-08-01 ASSESSMENT — ENCOUNTER SYMPTOMS: BACK PAIN: 1

## 2021-08-02 ENCOUNTER — HOSPITAL ENCOUNTER (OUTPATIENT)
Dept: MRI IMAGING | Age: 71
Discharge: HOME | End: 2021-08-02
Payer: MEDICARE

## 2021-08-02 ENCOUNTER — HOSPITAL ENCOUNTER (OUTPATIENT)
Dept: WOUND CARE | Age: 71
Discharge: HOME | End: 2021-08-02
Payer: MEDICARE

## 2021-08-02 VITALS
HEIGHT: 59 IN | DIASTOLIC BLOOD PRESSURE: 91 MMHG | HEART RATE: 102 BPM | TEMPERATURE: 97.3 F | BODY MASS INDEX: 30.04 KG/M2 | RESPIRATION RATE: 18 BRPM | SYSTOLIC BLOOD PRESSURE: 136 MMHG | WEIGHT: 149 LBS

## 2021-08-02 DIAGNOSIS — S41.002A WOUND OF LEFT SHOULDER, INITIAL ENCOUNTER: Primary | ICD-10-CM

## 2021-08-02 DIAGNOSIS — M16.12 ARTHRITIS OF LEFT HIP: ICD-10-CM

## 2021-08-02 DIAGNOSIS — S41.102A OPEN WOUND OF LEFT UPPER ARM, INITIAL ENCOUNTER: ICD-10-CM

## 2021-08-02 PROCEDURE — 11042 DBRDMT SUBQ TIS 1ST 20SQCM/<: CPT

## 2021-08-02 PROCEDURE — 99202 OFFICE O/P NEW SF 15 MIN: CPT | Performed by: NURSE PRACTITIONER

## 2021-08-02 PROCEDURE — 11042 DBRDMT SUBQ TIS 1ST 20SQCM/<: CPT | Performed by: NURSE PRACTITIONER

## 2021-08-02 PROCEDURE — 73721 MRI JNT OF LWR EXTRE W/O DYE: CPT

## 2021-08-02 PROCEDURE — 99213 OFFICE O/P EST LOW 20 MIN: CPT

## 2021-08-02 RX ORDER — LIDOCAINE HYDROCHLORIDE 20 MG/ML
JELLY TOPICAL ONCE
Status: CANCELLED | OUTPATIENT
Start: 2021-08-02 | End: 2021-08-02

## 2021-08-02 RX ORDER — LIDOCAINE 40 MG/G
CREAM TOPICAL ONCE
Status: CANCELLED | OUTPATIENT
Start: 2021-08-02 | End: 2021-08-02

## 2021-08-02 RX ORDER — CLOBETASOL PROPIONATE 0.5 MG/G
OINTMENT TOPICAL ONCE
Status: CANCELLED | OUTPATIENT
Start: 2021-08-02 | End: 2021-08-02

## 2021-08-02 RX ORDER — GENTAMICIN SULFATE 1 MG/G
OINTMENT TOPICAL ONCE
Status: CANCELLED | OUTPATIENT
Start: 2021-08-02 | End: 2021-08-02

## 2021-08-02 RX ORDER — LIDOCAINE 50 MG/G
OINTMENT TOPICAL ONCE
Status: CANCELLED | OUTPATIENT
Start: 2021-08-02 | End: 2021-08-02

## 2021-08-02 RX ORDER — BACITRACIN ZINC AND POLYMYXIN B SULFATE 500; 1000 [USP'U]/G; [USP'U]/G
OINTMENT TOPICAL ONCE
Status: CANCELLED | OUTPATIENT
Start: 2021-08-02 | End: 2021-08-02

## 2021-08-02 RX ORDER — BETAMETHASONE DIPROPIONATE 0.05 %
OINTMENT (GRAM) TOPICAL ONCE
Status: CANCELLED | OUTPATIENT
Start: 2021-08-02 | End: 2021-08-02

## 2021-08-02 RX ORDER — LIDOCAINE HYDROCHLORIDE 40 MG/ML
SOLUTION TOPICAL ONCE
Status: CANCELLED | OUTPATIENT
Start: 2021-08-02 | End: 2021-08-02

## 2021-08-02 RX ORDER — GINSENG 100 MG
CAPSULE ORAL ONCE
Status: CANCELLED | OUTPATIENT
Start: 2021-08-02 | End: 2021-08-02

## 2021-08-02 RX ORDER — BACITRACIN, NEOMYCIN, POLYMYXIN B 400; 3.5; 5 [USP'U]/G; MG/G; [USP'U]/G
OINTMENT TOPICAL ONCE
Status: CANCELLED | OUTPATIENT
Start: 2021-08-02 | End: 2021-08-02

## 2021-08-02 NOTE — PROGRESS NOTES
[address] [facility]:      [address] f: [phone] f: [phone] p: [phone] [email or web address]     Ordering Center: [address]  [facility] [address]  [phone]  Dept: [phone]   Fax# [phone]    Patient Information:      Faith Saxena  [address] [place] [address] [address] [facility]   [phone]   : 1950  AGE: 72 y.o. GENDER: female   TODAYS DATE:  2021    Insurance:      PRIMARY INSURANCE:  Plan: [employer] COMPLETE  Coverage: [facility] MEDICARE  Effective Date: 2015  Group Number: [unfilled]  Subscriber Number: [id number] - (Medicare Managed)    Payor/Plan Subscr  Sex Relation Sub. Ins. ID Effective Group Num   1.  [facility] MEDICARE Shellye Randy D 1950 Female Self [id number] 1/1/15 1                                   [address]         Patient Wound Information:     Additional ICD-10 Codes: Burn    Patient Active Problem List   Diagnosis Code    Crohn disease ([address] K50.90    Depression F32.9    Osteoarthritis M19.90    Lupus anticoagulant disorder ([address] D68.62    Dysphagia R13.10    Syringomyelia ([address] G95.0    Gastritis and gastroduodenitis K29.70, K29.90    Skin ulcer of shoulder ([address] L98.499    Shortness of breath R06.02    Anemia D64.9    Centrilobular emphysema ([address] J43.2    Pulmonary fibrosis ([address] J84.10    Chronic hypoxemic respiratory failure ([address] J96.11    Hiatal hernia K44.9    COPD, severe ([address] J44.9    Alpha thalassemia minor D56.3    Acute on chronic respiratory failure with hypoxia (HCC) J96.21    Bronchiectasis with acute exacerbation (HCC) J47.1    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    Osteoporosis of multiple sites M81.0    Gastroesophageal reflux disease without esophagitis K21.9    Sepsis (HCC) A41.9    Bilateral pulmonary embolism (HCC) I26.99    Acute deep vein thrombosis (DVT) of distal vein of both lower extremities (HCC) I82.4Z3    History of pulmonary embolism Z86.711    SOB (shortness of breath) R06.02    Wound of left shoulder S41.002A    Hx pulmonary embolism Z86.711    COPD exacerbation ([facility]) J44.1    Cerebrovascular accident (CVA) ([facility]) I63.9    MARIS (acute kidney injury) ([facility] N17.9    History of DVT (deep vein thrombosis) Z86.718    Small vessel disease, cerebrovascular I67.9    Ex-smoker Z87.891    History of depression Z86.59    Overweight E66.3    HCAP (healthcare-associated pneumonia) V71.4    Metabolic acidosis W54.8    Long term (current) use of systemic steroids Z79.52    Essential hypertension I10    Stage 3b chronic kidney disease ([facility]) N18.32    Acute exacerbation of chronic obstructive pulmonary disease (COPD) ([facility]) J44.1    Acute renal failure superimposed on stage 3 chronic kidney disease ([facility]) N17.9, N18.30    Closed fracture of one rib of left side with routine healing S22. 32XD       WOUNDS REQUIRING DRESSING SUPPLIES:     Wound 08/02/21 Shoulder Left #1 (Active)   Wound Image   08/02/21 0919   Wound Etiology Burn 08/02/21 0919   Wound Cleansed Cleansed with saline 08/02/21 0919   Wound Length (cm) 2.8 cm 08/02/21 0919   Wound Width (cm) 3 cm 08/02/21 0919   Wound Depth (cm) 0.5 cm 08/02/21 0919   Wound Surface Area (cm^2) 8.4 cm^2 08/02/21 0919   Wound Volume (cm^3) 4.2 cm^3 08/02/21 0919   Post-Procedure Length (cm) 2.9 cm 08/02/21 0958   Post-Procedure Width (cm) 3.1 cm 08/02/21 0958   Post-Procedure Depth (cm) 0.6 cm 08/02/21 0958   Post-Procedure Surface Area (cm^2) 8.99 cm^2 08/02/21 0958   Post-Procedure Volume (cm^3) 5.394 cm^3 08/02/21 0958   Wound Assessment Bleeding 08/02/21 0958   Drainage Amount Moderate 08/02/21 0958   Drainage Description Serosanguinous 08/02/21 0958   Odor None 08/02/21 0919   Ginger-wound Assessment Intact 08/02/21 0919   Margins Attached edges; Defined edges 08/02/21 0919   Wound Thickness Description not for Pressure Injury Full thickness 08/02/21 0919 Number of days: 0          Supplies Requested :      WOUND #: 1   PRIMARY DRESSING:    None   Cover and Secure with: 4X4 gauze pad     FREQUENCY OF DRESSING CHANGES:  Daily    Wound Thickness [x] Full   []Partial                         Patient Wound(s) Debrided: [x] Yes   [] No    Debridement Date: 8/2/2021    Debribement Type: Excisional/Sharp    ADDITIONAL ITEMS:  [] Gloves Small  [x] Gloves Medium [] Gloves Large [] Gloves Sonali Hernandez  [] Paper Tape 1\" [] Paper Tape 2\" [] Paper Tape 3\"  [x] Medipore Tape 3\"  [x] Saline  [] Skin Prep   [] Adhesive Remover   [] Cotton Tip Applicators  [] Tubular Stocking   [] Size E  [] Size G  [] Other:    Patient currently being seen by Home Health: [] Yes   [x] No    Duration for needed supplies:  []15  [x]30  []60  []90 Days    Provider Information:      PROVIDER'S NAME/NPI  Antonina Richardson  [id number]    I give permission to coordinate the care for this patient

## 2021-08-02 NOTE — PROGRESS NOTES
[address], [address] [place] [address] Road  Telephone: [phone]        [facility] Fax # [phone]        Discharge Instructions         [address], [address] [place] [address]  Telephone: [phone]    Discharge Instructions    Important reminders:    1. Increase Protein intake for optimal wound healing  2. No added salt to reduce any swelling  3. If diabetic, maintain good glucose control  4. If you smoke, smoking prohibits wound healing, we ask that you refrain from smoking. 5. When taking antibiotics take the entire prescription as ordered. Do not stop taking until medication is all gone unless otherwise instructed. 6. Exercise as tolerated. 7. Keep weight off wounds and reposition every 2 hours if applicable. 8. If wound(s) is on your lower extremity, elevate legs to the level of the heart or above for 30 minutes 4-5 times a day and/or when sitting. Avoid standing for long periods of time. 9. Do not get wounds wet in bath or shower unless otherwise instructed by your physician. If your wound is on your foot or leg, you may purchase a cast bag. Please ask at the pharmacy. If Vascular testing is ordered, please call [facility] ([phone]) to schedule. Vascular tests ordered by Wound Care Physicians may take up to 2 hours to complete. Please keep that in mind when scheduling. If Vascular testing is scheduled, please bring supplies to replace your dressing after testing is done. The vascular department does not stock supplies. Wound: Shoulder    With each dressing change, rinse wounds with 0.9% Saline. (May use wound wash or soft contact solution. Both can be purchased at a local drug store). If unable to obtain saline, may use a gentle soap and water. Dressing care: Santyl and moist to dry dressing- Change daily. Use Polysporin until Santyl comes.   You have been given a prescription for Santyl. Santyl is an enzyme that dissolves dead tissue in the wound. Apply a small amount to the wound and cover with a slightly moist gauze. Home Care Agency: [facility]    Your wound-care supplies will be provided by: [facility]  Please note, depending on your insurance coverage, you may have out-of-pocket expenses for these supplies. Someone from the company should call you to confirm your order and discuss those potential costs before they ship your products -- please anticipate that call. If your out-of-pocket cost could be substantial, Many companies have financial hardship programs for patients who qualify, so please ask about that if you might need a hand. If you have any questions about your supplies or your potential out-of-pocket costs, or if you need to place an order for a refill of supplies (typically monthly), please call the company directly. Your  is Jillian John    Follow up with Armandina Lennox CNP In 1 week in the wound care center. Wound Care Center Information: Should you experience any significant changes in your wound(s) or have questions about your wound care, please contact the [facility] at [phone] Monday  - Thursday 8:00 am - 4:00 pm and Friday 8:00 am - 1:00pm. If you need help with your wound outside these hours and cannot wait until we are again available, contact your PCP or go to the hospital emergency room. PLEASE NOTE: IF YOU ARE UNABLE TO OBTAIN WOUND SUPPLIES, CONTINUE TO USE THE SUPPLIES YOU HAVE AVAILABLE UNTIL YOU ARE ABLE TO REACH US. IT IS MOST IMPORTANT TO KEEP THE WOUND COVERED AT ALL TIMES. Skilled nurse to evaluate and treat for wound care. Change dressing as ordered  once a day on Monday, Wednesday and Friday using clean technique. Patient/Family/caregiver may change dressings as needed as instructed when Home Care unavailable.     WOUNDS REQUIRING DRESSING Changes:     Wound 08/02/21 Shoulder Left #1 (Active)   Wound Image   08/02/21 0919   Wound Etiology Burn 08/02/21 0919   Wound Cleansed Cleansed with saline 08/02/21 0919   Wound Length (cm) 2.8 cm 08/02/21 0919   Wound Width (cm) 3 cm 08/02/21 0919   Wound Depth (cm) 0.5 cm 08/02/21 0919   Wound Surface Area (cm^2) 8.4 cm^2 08/02/21 0919   Wound Volume (cm^3) 4.2 cm^3 08/02/21 0919   Post-Procedure Length (cm) 2.9 cm 08/02/21 0958   Post-Procedure Width (cm) 3.1 cm 08/02/21 0958   Post-Procedure Depth (cm) 0.6 cm 08/02/21 0958   Post-Procedure Surface Area (cm^2) 8.99 cm^2 08/02/21 0958   Post-Procedure Volume (cm^3) 5.394 cm^3 08/02/21 0958   Wound Assessment Bleeding 08/02/21 0958   Drainage Amount Moderate 08/02/21 0958   Drainage Description Serosanguinous 08/02/21 0958   Odor None 08/02/21 0919   Ginger-wound Assessment Intact 08/02/21 0919   Margins Attached edges; Defined edges 08/02/21 0919   Wound Thickness Description not for Pressure Injury Full thickness 08/02/21 0919   Number of days: 0          Patient seen and treated on 8/2/2021    By [place]  [id number]   (provider/NPI)

## 2021-08-03 ENCOUNTER — TELEPHONE (OUTPATIENT)
Dept: ORTHOPEDIC SURGERY | Age: 71
End: 2021-08-03

## 2021-08-03 DIAGNOSIS — M16.12 ARTHRITIS OF LEFT HIP: Primary | ICD-10-CM

## 2021-08-03 PROBLEM — S41.102A OPEN WOUND OF LEFT UPPER ARM: Status: ACTIVE | Noted: 2021-08-03

## 2021-08-03 RX ORDER — LIDOCAINE 40 MG/G
CREAM TOPICAL ONCE
Status: CANCELLED | OUTPATIENT
Start: 2021-08-03 | End: 2021-08-03

## 2021-08-03 RX ORDER — BETAMETHASONE DIPROPIONATE 0.05 %
OINTMENT (GRAM) TOPICAL ONCE
Status: CANCELLED | OUTPATIENT
Start: 2021-08-03 | End: 2021-08-03

## 2021-08-03 RX ORDER — LIDOCAINE HYDROCHLORIDE 20 MG/ML
JELLY TOPICAL ONCE
Status: CANCELLED | OUTPATIENT
Start: 2021-08-03 | End: 2021-08-03

## 2021-08-03 RX ORDER — CLOBETASOL PROPIONATE 0.5 MG/G
OINTMENT TOPICAL ONCE
Status: CANCELLED | OUTPATIENT
Start: 2021-08-03 | End: 2021-08-03

## 2021-08-03 RX ORDER — GINSENG 100 MG
CAPSULE ORAL ONCE
Status: CANCELLED | OUTPATIENT
Start: 2021-08-03 | End: 2021-08-03

## 2021-08-03 RX ORDER — BACITRACIN, NEOMYCIN, POLYMYXIN B 400; 3.5; 5 [USP'U]/G; MG/G; [USP'U]/G
OINTMENT TOPICAL ONCE
Status: CANCELLED | OUTPATIENT
Start: 2021-08-03 | End: 2021-08-03

## 2021-08-03 RX ORDER — BACITRACIN ZINC AND POLYMYXIN B SULFATE 500; 1000 [USP'U]/G; [USP'U]/G
OINTMENT TOPICAL ONCE
Status: CANCELLED | OUTPATIENT
Start: 2021-08-03 | End: 2021-08-03

## 2021-08-03 RX ORDER — GENTAMICIN SULFATE 1 MG/G
OINTMENT TOPICAL ONCE
Status: CANCELLED | OUTPATIENT
Start: 2021-08-03 | End: 2021-08-03

## 2021-08-03 RX ORDER — LIDOCAINE HYDROCHLORIDE 40 MG/ML
SOLUTION TOPICAL ONCE
Status: CANCELLED | OUTPATIENT
Start: 2021-08-03 | End: 2021-08-03

## 2021-08-03 RX ORDER — LIDOCAINE 50 MG/G
OINTMENT TOPICAL ONCE
Status: CANCELLED | OUTPATIENT
Start: 2021-08-03 | End: 2021-08-03

## 2021-08-03 NOTE — PROGRESS NOTES
EXCISION OF LEFT SHOULDER MASS performed by Sheela Bass MD at [facility]      crainotomy- remove fluid ? V-P SHUNT    BRONCHOSCOPY N/A 2020    BRONCHOSCOPY ALVEOLAR LAVAGE performed by Kris Spain MD at [facility] 91      resection X2    COLONOSCOPY  11    BIOPSY AND POLYPECTOMY    COLONOSCOPY  2012    and ablation ERBE/APC    SHOULDER SURGERY      L        FAMILY HISTORY    Family History   Problem Relation Age of Onset    Heart Disease Mother     High Blood Pressure Mother     High Cholesterol Mother     Early Death Mother 36        MI    Heart Disease Father     High Blood Pressure Father     High Cholesterol Father     Stroke Father 79    High Blood Pressure Sister     High Blood Pressure Brother        SOCIAL HISTORY    Social History     Tobacco Use    Smoking status: Former Smoker     Packs/day: 0.25     Years: 30.00     Pack years: 7.50     Types: Cigarettes     Quit date: 2015     Years since quittin.4    Smokeless tobacco: Never Used    Tobacco comment: started smoking at age 26 / smoked up to 9 cigarettes a day    Vaping Use    Vaping Use: Never used   Substance Use Topics    Alcohol use: No    Drug use: No       ALLERGIES    No Known Allergies    MEDICATIONS    Current Outpatient Medications on File Prior to Encounter   Medication Sig Dispense Refill    alendronate (FOSAMAX) 70 MG tablet Take 1 tablet by mouth every 7 days 12 tablet 3    oxyCODONE-acetaminophen (PERCOCET) 5-325 MG per tablet Take 1 tablet by mouth every 6 hours as needed for Pain for up to 30 days. Intended supply: 3 days. Take lowest dose possible to manage pain 60 tablet 0    tiZANidine (ZANAFLEX) 4 MG tablet Take 1 tablet by mouth every 8 hours as needed (muscle pain) 60 tablet 1    gabapentin (NEURONTIN) 300 MG capsule Take 1 capsule by mouth 2 times daily for 180 days.  Intended supply: 90 days (Patient not taking: Reported on 7/28/2021) 180 capsule 1    predniSONE (DELTASONE) 20 MG tablet Take 2 tabs daily for next 3 days then resume 20 mg daily dosing. 30 tablet 5    Budeson-Glycopyrrol-Formoterol (BREZTRI AEROSPHERE) 160-9-4.8 MCG/ACT AERO Inhale 2 puffs into the lungs 2 times daily 1 Inhaler 6    amLODIPine (NORVASC) 5 MG tablet TAKE 1 TABLET BY MOUTH EVERY DAY 90 tablet 1    potassium chloride (KLOR-CON) 10 MEQ extended release tablet TAKE 1 TABLET BY MOUTH EVERY DAY      Adalimumab (HUMIRA PEN SC) Inject into the skin every 14 days       ELIQUIS 5 MG TABS tablet TAKE 1 TABLET BY MOUTH TWICE A DAY 60 tablet 6    albuterol (PROVENTIL) (2.5 MG/3ML) 0.083% nebulizer solution TAKE 3 MLS BY NEBULIZATION EVERY 6 HOURS AS NEEDED FOR WHEEZING DX: COPD ICD-10: J44.9 300 mL 6    colestipol (COLESTID) 1 g tablet TAKE 1 TABLET BY MOUTH TWICE A DAY      amitriptyline (ELAVIL) 150 MG tablet Take 0.5 tablets by mouth nightly 45 tablet 3    Nutritional Supplements (ENSURE HIGH PROTEIN) LIQD Take 1 Can by mouth 2 times daily (Patient not taking: Reported on 7/28/2021) 60 Can 1    Amino Acids-Protein Hydrolys (PROTEINEX P18) LIQD Take 1 Bottle by mouth 2 times daily (Patient not taking: Reported on 7/28/2021) 30 Bottle 2    cyclobenzaprine (FLEXERIL) 5 MG tablet Take 5 mg by mouth 4 times daily Every 6 hours for muscle spasms (Patient not taking: Reported on 7/28/2021)      albuterol sulfate  (90 Base) MCG/ACT inhaler Inhale 2 puffs into the lungs every 6 hours as needed for Wheezing 1 Inhaler 11    omeprazole (PRILOSEC) 20 MG delayed release capsule Take 20 mg by mouth Daily Takes as needed (Patient not taking: Reported on 7/28/2021)       No current facility-administered medications on file prior to encounter. REVIEW OF SYSTEMS    Pertinent items are noted in HPI.       Objective:      BP (!) 136/91   Pulse 102   Temp 97.3 °F (36.3 °C) (Infrared)   Resp 18   Ht 4' 11\" (1.499 m)   Wt 149 lb (67.6 kg) LMP  (LMP Unknown)   BMI 30.09 kg/m²     PHYSICAL EXAM    General Appearance: alert and oriented to person, place and time, well-developed and well-nourished, in no acute distress and with flat affect  Skin: warm and dry  Head: normocephalic and atraumatic  Eyes: pupils equal, round, and reactive to light  Pulmonary/Chest: clear to auscultation bilaterally- no wheezes, rales or rhonchi, normal air movement, no respiratory distress  Cardiovascular: normal rate and regular rhythm  Extremities: no cyanosis, no clubbing and no edema  Wound:  Large scar site with middle of wound with stringy yellow slough in middle of open wound      Assessment:     Patient Active Problem List   Diagnosis    Crohn disease ([address]    Depression    Osteoarthritis    Lupus anticoagulant disorder (HCC)    Dysphagia    Syringomyelia (HCC)    Gastritis and gastroduodenitis    Skin ulcer of shoulder (HCC)    Shortness of breath    Anemia    Centrilobular emphysema (HCC)    Pulmonary fibrosis (HCC)    Chronic hypoxemic respiratory failure (HCC)    Hiatal hernia    COPD, severe (HCC)    Alpha thalassemia minor    Acute on chronic respiratory failure with hypoxia (HCC)    Bronchiectasis with acute exacerbation (HCC)    Hemoglobin C trait (HCC)    Osteoporosis of multiple sites    Gastroesophageal reflux disease without esophagitis    Sepsis ([address]    Bilateral pulmonary embolism (HCC)    Acute deep vein thrombosis (DVT) of distal vein of both lower extremities (HCC)    History of pulmonary embolism    SOB (shortness of breath)    Wound of left shoulder    Hx pulmonary embolism    COPD exacerbation ([address]    Cerebrovascular accident (CVA) ([address]    MARIS (acute kidney injury) ([address]    History of DVT (deep vein thrombosis)    Small vessel disease, cerebrovascular    Ex-smoker    History of depression    Overweight    HCAP (healthcare-associated pneumonia)    Metabolic acidosis    Long term (current) use of systemic steroids    Essential hypertension    Stage 3b chronic kidney disease (HCC)    Acute exacerbation of chronic obstructive pulmonary disease (COPD) (HCC)    Acute renal failure superimposed on stage 3 chronic kidney disease (HCC)    Closed fracture of one rib of left side with routine healing    Open wound of left upper arm       Procedure Note    Performed by: GUS Roth CNP    Consent obtained: Yes    Time out taken:  Yes    Pain Control: Anesthetic  Anesthetic: 4% Lidocaine Cream     Debridement:Excisional Debridement    Using curette, #15 blade scalpel and forceps the wound was sharply debrided    down through and including the removal of epidermis, dermis and subcutaneous tissue. Devitalized Tissue Debrided:  fibrin, biofilm and slough    Pre Debridement Measurements:  Are located in the Wound Documentation Flow Sheet    Wound #: 1     Post  Debridement Measurements:  Wound 08/02/21 Shoulder Left #1 (Active)   Wound Image   08/02/21 0919   Wound Etiology Burn 08/02/21 0919   Wound Cleansed Cleansed with saline 08/02/21 0919   Wound Length (cm) 2.8 cm 08/02/21 0919   Wound Width (cm) 3 cm 08/02/21 0919   Wound Depth (cm) 0.5 cm 08/02/21 0919   Wound Surface Area (cm^2) 8.4 cm^2 08/02/21 0919   Wound Volume (cm^3) 4.2 cm^3 08/02/21 0919   Post-Procedure Length (cm) 4 cm 08/02/21 0958   Post-Procedure Width (cm) 4 cm 08/02/21 0958   Post-Procedure Depth (cm) 0.6 cm 08/02/21 0958   Post-Procedure Surface Area (cm^2) 16 cm^2 08/02/21 0958   Post-Procedure Volume (cm^3) 9.6 cm^3 08/02/21 0958   Wound Assessment Bleeding 08/02/21 0958   Drainage Amount Moderate 08/02/21 0958   Drainage Description Serosanguinous 08/02/21 0958   Odor None 08/02/21 0919   Ginger-wound Assessment Intact 08/02/21 0919   Margins Attached edges; Defined edges 08/02/21 0919   Wound Thickness Description not for Pressure Injury Full thickness 08/02/21 0919   Number of days: 1           Total Surface Area Debrided: 16 sq cm     Percentage of wound debrided 100%    Bleeding:  Minimal    Hemostasis Achieved:  by pressure    Procedural Pain:  2  / 10     Post Procedural Pain:  1 / 10     Response to treatment:  Well tolerated by patient. Plan:     The nature of the patient's condition was explained in depth. The patient was informed that their compliance to the treatment plan is paramount to successful healing and prevention of further ulceration and/or infection     Discharge Treatment   Dressing care: Santyl and moist to dry dressing- Change daily. Use Polysporin until Santyl comes.       Written Patient Discharge Instructions Given            Electronically signed by GUS Wills CNP on 8/3/2021 at 11:12 AM

## 2021-08-05 ENCOUNTER — CARE COORDINATION (OUTPATIENT)
Dept: CASE MANAGEMENT | Age: 71
End: 2021-08-05

## 2021-08-05 ENCOUNTER — OFFICE VISIT (OUTPATIENT)
Dept: INTERNAL MEDICINE CLINIC | Age: 71
End: 2021-08-05
Payer: MEDICARE

## 2021-08-05 VITALS
HEIGHT: 59 IN | WEIGHT: 149 LBS | OXYGEN SATURATION: 95 % | TEMPERATURE: 97.9 F | BODY MASS INDEX: 30.04 KG/M2 | SYSTOLIC BLOOD PRESSURE: 130 MMHG | DIASTOLIC BLOOD PRESSURE: 86 MMHG | HEART RATE: 105 BPM

## 2021-08-05 DIAGNOSIS — J43.2 CENTRILOBULAR EMPHYSEMA (HCC): ICD-10-CM

## 2021-08-05 DIAGNOSIS — M25.552 LEFT HIP PAIN: Primary | ICD-10-CM

## 2021-08-05 PROCEDURE — 3023F SPIROM DOC REV: CPT | Performed by: INTERNAL MEDICINE

## 2021-08-05 PROCEDURE — 1090F PRES/ABSN URINE INCON ASSESS: CPT | Performed by: INTERNAL MEDICINE

## 2021-08-05 PROCEDURE — 1123F ACP DISCUSS/DSCN MKR DOCD: CPT | Performed by: INTERNAL MEDICINE

## 2021-08-05 PROCEDURE — G8417 CALC BMI ABV UP PARAM F/U: HCPCS | Performed by: INTERNAL MEDICINE

## 2021-08-05 PROCEDURE — G8926 SPIRO NO PERF OR DOC: HCPCS | Performed by: INTERNAL MEDICINE

## 2021-08-05 PROCEDURE — 1036F TOBACCO NON-USER: CPT | Performed by: INTERNAL MEDICINE

## 2021-08-05 PROCEDURE — G8399 PT W/DXA RESULTS DOCUMENT: HCPCS | Performed by: INTERNAL MEDICINE

## 2021-08-05 PROCEDURE — 4040F PNEUMOC VAC/ADMIN/RCVD: CPT | Performed by: INTERNAL MEDICINE

## 2021-08-05 PROCEDURE — G8427 DOCREV CUR MEDS BY ELIG CLIN: HCPCS | Performed by: INTERNAL MEDICINE

## 2021-08-05 PROCEDURE — 99213 OFFICE O/P EST LOW 20 MIN: CPT | Performed by: INTERNAL MEDICINE

## 2021-08-05 PROCEDURE — 3017F COLORECTAL CA SCREEN DOC REV: CPT | Performed by: INTERNAL MEDICINE

## 2021-08-05 NOTE — PROGRESS NOTES
Jillian Mason (:  1950) is a 72 y.o. female,Established patient, here for evaluation of the following chief complaint(s): Other (Leg pain, needs lift chair prescription)         ASSESSMENT/PLAN:  1. Left hip pain  -     Lift Chair MISC; Starting u 2021, Disp-1 each, R-0, Print80 yo woman with severe COPD and Arthritis of the Left Hip. Please dispense a Lift Chair  -  Patient scheduled for joint injection    2. Centrilobular emphysema [facility])  -     Lift Chair MISC; Starting Thu 2021, Disp-1 each, R-0, Print80 yo woman with severe COPD and Arthritis of the Left Hip. Please dispense a Lift Chair      No follow-ups on file. Subjective   SUBJECTIVE/OBJECTIVE:  HPI patient comes in for left hip pain. She had the mri that showed some  Arthritis. She is schedule to get a hip injection. She is doing better with the  Pain medication. Review of Systems       Objective    Vitals:    21 1536   BP: 130/86   Site: Right Upper Arm   Position: Sitting   Cuff Size: Medium Adult   Pulse: 105   Temp: 97.9 °F (36.6 °C)   TempSrc: Infrared   SpO2: 95%   Weight: 149 lb (67.6 kg)   Height: 4' 11\" (1.499 m)      Wt Readings from Last 3 Encounters:   21 149 lb (67.6 kg)   21 149 lb (67.6 kg)   21 149 lb (67.6 kg)     BP Readings from Last 3 Encounters:   21 130/86   21 (!) 136/91   21 124/80     Body mass index is 30.09 kg/m². Facility age limit for growth percentiles is 20 years. Physical Exam  Constitutional:       Appearance: Normal appearance. HENT:      Head: Normocephalic and atraumatic. Right Ear: Tympanic membrane and ear canal normal.      Left Ear: Tympanic membrane and ear canal normal.      Nose: Nose normal. No congestion or rhinorrhea. Musculoskeletal:      Left hip: Tenderness and bony tenderness present. Decreased range of motion. Decreased strength. Legs:    Neurological:      Mental Status: She is alert.                 An electronic signature was used to authenticate this note.     --Catracho Hawkins MD

## 2021-08-05 NOTE — CARE COORDINATION
[facility] Transitions Follow Up Call    2021    Patient: Jayleen Jack  Patient : 1950   MRN: [id number]  Reason for Admission: COPD   Discharge Date: 7/3/21 RARS: Readmission Risk Score: 28         Spoke with: Jayleen Jack    Spoke with patient who reported she is doing ok. Patient reported she had some breathing issues today and just did her breathing treatment and inhaler and now feels better. Patient reported she slide off her bed today and had some trouble getting up on her own because she still has some leg weakness. Patient denied any injury, hitting her head, or loc. Patient reported she was able to get help off the floor and then did her breathing treatments to help with her breathing. Patient reported she has apt with PCP today and will discuss the above. Patient reported she will have a steroid injection in her hip and is hoping that will help with her leg weakness, however she reported she is a little anxious about having injection. Patient denied any other issues or concerns at this time. CTN advised patient of use of urgent care or physicians 24 hr access line if assistance is needed after hours. Also advised that they can always contact their home care provider to request a nurse visit even if it isn't their regularly scheduled day for their nurse to visit. Care Transitions Subsequent and Final Call    Subsequent and Final Calls  Have your medications changed?: No  Do you have any questions related to your medications?: No  Do you currently have any active services?: Yes  Are you currently active with any services?: Home Health  Do you have any needs or concerns that I can assist you with?: No  Identified Barriers: None  Care Transitions Interventions  Other Interventions:            Follow Up  Future Appointments   Date Time Provider Kia Guadarrama   2021  3:30 PM MD TATIANA Limaci - DYD   2021  2:00 PM Andres Blas MD [facility]

## 2021-08-09 ENCOUNTER — TELEPHONE (OUTPATIENT)
Dept: INTERNAL MEDICINE CLINIC | Age: 71
End: 2021-08-09

## 2021-08-09 RX ORDER — APIXABAN 5 MG/1
TABLET, FILM COATED ORAL
Qty: 60 TABLET | Refills: 6 | Status: SHIPPED | OUTPATIENT
Start: 2021-08-09 | End: 2022-03-16 | Stop reason: SDUPTHER

## 2021-08-09 NOTE — TELEPHONE ENCOUNTER
[facility] received an order from Dr. Mack Harada for a chair lift for PT Jere Pepe. They do not provide those; they handle wound care dressing. [facility] [phone].      [facility] was the pharmacy for the prescription, so I do not understand why and how the [employer] business received this request.

## 2021-08-12 ENCOUNTER — APPOINTMENT (OUTPATIENT)
Dept: WOUND CARE | Age: 71
End: 2021-08-12
Payer: MEDICARE

## 2021-08-12 ENCOUNTER — HOSPITAL ENCOUNTER (OUTPATIENT)
Dept: GENERAL RADIOLOGY | Age: 71
Discharge: HOME | End: 2021-08-12
Payer: MEDICARE

## 2021-08-12 DIAGNOSIS — M16.12 ARTHRITIS OF LEFT HIP: ICD-10-CM

## 2021-08-12 PROCEDURE — 20610 DRAIN/INJ JOINT/BURSA W/O US: CPT

## 2021-08-12 PROCEDURE — 77002 NEEDLE LOCALIZATION BY XRAY: CPT

## 2021-08-12 PROCEDURE — 2500000003 HC RX 250 WO HCPCS: Performed by: RADIOLOGY

## 2021-08-12 PROCEDURE — 6360000002 HC RX W HCPCS: Performed by: RADIOLOGY

## 2021-08-12 RX ORDER — METHYLPREDNISOLONE ACETATE 40 MG/ML
INJECTION, SUSPENSION INTRA-ARTICULAR; INTRALESIONAL; INTRAMUSCULAR; SOFT TISSUE
Status: DISPENSED
Start: 2021-08-12 | End: 2021-08-12

## 2021-08-12 RX ORDER — BUPIVACAINE HYDROCHLORIDE AND EPINEPHRINE 2.5; 5 MG/ML; UG/ML
10 INJECTION, SOLUTION EPIDURAL; INFILTRATION; INTRACAUDAL; PERINEURAL ONCE
Status: COMPLETED | OUTPATIENT
Start: 2021-08-12 | End: 2021-08-12

## 2021-08-12 RX ORDER — BUPIVACAINE HYDROCHLORIDE 2.5 MG/ML
INJECTION, SOLUTION EPIDURAL; INFILTRATION; INTRACAUDAL
Status: DISPENSED
Start: 2021-08-12 | End: 2021-08-12

## 2021-08-12 RX ORDER — METHYLPREDNISOLONE ACETATE 40 MG/ML
80 INJECTION, SUSPENSION INTRA-ARTICULAR; INTRALESIONAL; INTRAMUSCULAR; SOFT TISSUE ONCE
Status: COMPLETED | OUTPATIENT
Start: 2021-08-12 | End: 2021-08-12

## 2021-08-12 RX ADMIN — BUPIVACAINE HYDROCHLORIDE AND EPINEPHRINE 10 ML: 2.5; 5 INJECTION, SOLUTION EPIDURAL; INFILTRATION; INTRACAUDAL; PERINEURAL at 12:16

## 2021-08-12 RX ADMIN — METHYLPREDNISOLONE ACETATE 80 MG: 40 INJECTION, SUSPENSION INTRA-ARTICULAR; INTRALESIONAL; INTRAMUSCULAR; SOFT TISSUE at 12:17

## 2021-08-12 NOTE — BRIEF OP NOTE
Brief Postoperative Note    Dung Campos  YOB: 1950  [id number]    Pre-operative Diagnosis: Arthritis of left hip    Post-operative Diagnosis: Same    Procedure: Left hip injection    Anesthesia: Local    Surgeons/Assistants: Ayo Ambriz MD, RO PHILLIPS PA-C    Estimated Blood Loss: less than 5    Complications: None    Specimens: Was Not Obtained    Findings: Technically successful injection of left hip. Needle placement visualized with Fluoroscopy. Position verified with injection of isovue contrast.   Jointspace injected with 10cc of Bupivacaine 0.25 mixed with 80mg of Depo-Medrol.       Electronically signed by Vannessa Mckeon PA-C on 8/12/2021 at 12:09 PM

## 2021-08-16 ENCOUNTER — TELEPHONE (OUTPATIENT)
Dept: INTERNAL MEDICINE CLINIC | Age: 71
End: 2021-08-16

## 2021-08-16 NOTE — TELEPHONE ENCOUNTER
Patient received an injection in her left hip on 08/12. Patient said it has not helped alleviate the pain. Patient requesting to speak only w/Dr Kolby Phipps.

## 2021-08-17 ENCOUNTER — TELEPHONE (OUTPATIENT)
Dept: INTERNAL MEDICINE CLINIC | Age: 71
End: 2021-08-17

## 2021-08-17 NOTE — TELEPHONE ENCOUNTER
Patient would like for Dr Bonilla Yancey or Nurse to give her a call on her hip pain, she cant hardly walk. She had been taking the muscle relaxer is not helping only helping her to go to sleep. Say she been in bed and she cant get help or walk to the bathroom.      Please call Beto Jonas at [phone]    Thanks

## 2021-08-18 NOTE — TELEPHONE ENCOUNTER
Patient was called and I will change her appointment type. I will also send a message as Dr. Kendra Rizo will be increasing the gabapentin.

## 2021-08-18 NOTE — TELEPHONE ENCOUNTER
[facility] MS nurse would like to know if Dr Dania Tai can either increase the dosage of the gabapentin (NEURONTIN) 300 MG capsule or if patient can take 2 tablets of the oxyCODONE-acetaminophen (PERCOCET) 5-325 MG per tablet due to her hip pain. Stated patient rate her pain at 10.      Please call [phone]    Thanks

## 2021-08-18 NOTE — TELEPHONE ENCOUNTER
Mohamud Noel called in requesting to change her appointment on 8/20/21 from a in person office visit to a virtual visit.

## 2021-08-18 NOTE — TELEPHONE ENCOUNTER
I called pt regarding below. She states she can hardly walk. The muscle relaxer is not doing anything, but making her go to sleep. She states she is taking pain meds every six hours. She cannot even get up to go to the restroom. She is in tears all the time and can barely walk without holding on to things. She states the cortisone shot did nothing. Please advise.

## 2021-08-19 RX ORDER — AMLODIPINE BESYLATE 5 MG/1
TABLET ORAL
Qty: 90 TABLET | Refills: 1 | Status: ON HOLD | OUTPATIENT
Start: 2021-08-19 | End: 2021-09-05

## 2021-08-20 ENCOUNTER — TELEPHONE (OUTPATIENT)
Dept: INTERNAL MEDICINE CLINIC | Age: 71
End: 2021-08-20

## 2021-08-20 ENCOUNTER — VIRTUAL VISIT (OUTPATIENT)
Dept: INTERNAL MEDICINE CLINIC | Age: 71
End: 2021-08-20
Payer: MEDICARE

## 2021-08-20 DIAGNOSIS — M25.552 LEFT HIP PAIN: Primary | ICD-10-CM

## 2021-08-20 DIAGNOSIS — M17.12 LOCALIZED OSTEOARTHRITIS OF LEFT KNEE: ICD-10-CM

## 2021-08-20 DIAGNOSIS — S39.012D STRAIN OF LUMBAR REGION, SUBSEQUENT ENCOUNTER: ICD-10-CM

## 2021-08-20 PROCEDURE — G8427 DOCREV CUR MEDS BY ELIG CLIN: HCPCS | Performed by: INTERNAL MEDICINE

## 2021-08-20 PROCEDURE — 1090F PRES/ABSN URINE INCON ASSESS: CPT | Performed by: INTERNAL MEDICINE

## 2021-08-20 PROCEDURE — G8399 PT W/DXA RESULTS DOCUMENT: HCPCS | Performed by: INTERNAL MEDICINE

## 2021-08-20 PROCEDURE — 3017F COLORECTAL CA SCREEN DOC REV: CPT | Performed by: INTERNAL MEDICINE

## 2021-08-20 PROCEDURE — 99213 OFFICE O/P EST LOW 20 MIN: CPT | Performed by: INTERNAL MEDICINE

## 2021-08-20 PROCEDURE — 1123F ACP DISCUSS/DSCN MKR DOCD: CPT | Performed by: INTERNAL MEDICINE

## 2021-08-20 PROCEDURE — 4040F PNEUMOC VAC/ADMIN/RCVD: CPT | Performed by: INTERNAL MEDICINE

## 2021-08-20 RX ORDER — OXYCODONE HYDROCHLORIDE AND ACETAMINOPHEN 5; 325 MG/1; MG/1
1 TABLET ORAL EVERY 12 HOURS
Qty: 60 TABLET | Refills: 0 | Status: SHIPPED | OUTPATIENT
Start: 2021-08-20 | End: 2021-09-01 | Stop reason: DRUGHIGH

## 2021-08-20 NOTE — PROGRESS NOTES
Pilo Zeng (:  1950) is a 72 y.o. female,Established patient, here for evaluation of the following chief complaint(s): Hip Pain (Kelly Fermo down and got hurt in bathroom, left hip)         ASSESSMENT/PLAN:  1. Left hip pain  -     External Referral To Home Health  2. Localized osteoarthritis of left knee  -     oxyCODONE-acetaminophen (PERCOCET) 5-325 MG per tablet; Take 1 tablet by mouth every 12 hours for 30 days. Intended supply: 3 days. Take lowest dose possible to manage pain, Disp-60 tablet, R-0Normal  3. Strain of lumbar region, subsequent encounter  -     oxyCODONE-acetaminophen (PERCOCET) 5-325 MG per tablet; Take 1 tablet by mouth every 12 hours for 30 days. Intended supply: 3 days. Take lowest dose possible to manage pain, Disp-60 tablet, R-0Normal      Return in about 2 weeks (around 9/3/2021) for hip pain (video visit). SUBJECTIVE/OBJECTIVE:  HPI patient has worsening left hip pain. She had a steroid injection about 1 week ago. She had  No benefit. She notes worsening pain and weakness of the left hip pain. Review of Systems    Patient-Reported Vitals 2021   Patient-Reported Weight 148 lbs. Patient-Reported Height 4'11\"   Patient-Reported Systolic 631   Patient-Reported Diastolic 90        Physical Exam  Constitutional:       General: She is in acute distress. Appearance: She is obese. HENT:      Head: Normocephalic and atraumatic. Eyes:      General:         Right eye: No discharge. Left eye: No discharge. Pupils: Pupils are equal, round, and reactive to light. Musculoskeletal:      Cervical back: Normal range of motion. No rigidity or tenderness. Pilo Zeng, was evaluated through a synchronous (real-time) audio-video encounter. The patient (or guardian if applicable) is aware that this is a billable service. Verbal consent to proceed has been obtained within the past 12 months.  The visit was conducted pursuant to the emergency declaration under the [facility], [address] [place] [address] waiver authority and the [employer] General Act. Patient identification was verified, and a caregiver was present when appropriate. The patient was located in a state where the provider was credentialed to provide care. An electronic signature was used to authenticate this note.     --Alfred Whalen MD

## 2021-08-20 NOTE — TELEPHONE ENCOUNTER
----- Message from Dirk Rodriguez sent at 8/20/2021 10:53 AM EDT -----  Regarding: Medical Equitment Orders  Ganesh with [facility] is calling to let Dr. Kendra Rizo know that the patient fell last night, had no jury, but paramedics had to pick her up. She feels weak at times and felt an urgency to use the bathroom, so she rushed and she thinks that maybe why she fell. Ganesh is requesting to see if we can order a bedside commode, and a standard walker with no wheels. She gave me the number and fax to Gabby jackson.  Phone - [phone] and fax - [phone]

## 2021-08-25 ENCOUNTER — TELEPHONE (OUTPATIENT)
Dept: INTERNAL MEDICINE CLINIC | Age: 71
End: 2021-08-25

## 2021-08-25 NOTE — TELEPHONE ENCOUNTER
Patient was scheduled for a f/u.    Future Appointments   Date Time Provider Kia Chiara   8/26/2021  3:30 PM GUS Hoff - CNP [facility] WOUND [place] [facility]   9/3/2021  9:30 AM MD TATIANA Leon [facility] [place] - [facility]   10/22/2021  9:30 AM MD TATIANA Leon [facility] [place] - DYD   12/15/2021 12:30 PM Sydnee Montgomery MD [facility]

## 2021-08-26 ENCOUNTER — HOSPITAL ENCOUNTER (OUTPATIENT)
Dept: WOUND CARE | Age: 71
Discharge: HOME | End: 2021-08-26
Payer: MEDICARE

## 2021-08-26 VITALS
SYSTOLIC BLOOD PRESSURE: 143 MMHG | TEMPERATURE: 97.2 F | DIASTOLIC BLOOD PRESSURE: 81 MMHG | HEART RATE: 92 BPM | RESPIRATION RATE: 16 BRPM

## 2021-08-26 DIAGNOSIS — S41.002A WOUND OF LEFT SHOULDER, INITIAL ENCOUNTER: ICD-10-CM

## 2021-08-26 DIAGNOSIS — S41.102D OPEN WOUND OF LEFT UPPER ARM, SUBSEQUENT ENCOUNTER: ICD-10-CM

## 2021-08-26 DIAGNOSIS — L98.492 SKIN ULCER OF SHOULDER WITH FAT LAYER EXPOSED (HCC): ICD-10-CM

## 2021-08-26 DIAGNOSIS — Z79.52 LONG TERM (CURRENT) USE OF SYSTEMIC STEROIDS: Primary | ICD-10-CM

## 2021-08-26 PROCEDURE — 11042 DBRDMT SUBQ TIS 1ST 20SQCM/<: CPT

## 2021-08-26 PROCEDURE — 11042 DBRDMT SUBQ TIS 1ST 20SQCM/<: CPT | Performed by: NURSE PRACTITIONER

## 2021-08-26 RX ORDER — LIDOCAINE 40 MG/G
CREAM TOPICAL ONCE
Status: DISCONTINUED | OUTPATIENT
Start: 2021-08-26 | End: 2021-08-27 | Stop reason: HOSPADM

## 2021-08-26 RX ORDER — LIDOCAINE HYDROCHLORIDE 20 MG/ML
JELLY TOPICAL ONCE
OUTPATIENT
Start: 2021-08-26 | End: 2021-08-26

## 2021-08-26 RX ORDER — GENTAMICIN SULFATE 1 MG/G
OINTMENT TOPICAL ONCE
OUTPATIENT
Start: 2021-08-26 | End: 2021-08-26

## 2021-08-26 RX ORDER — BACITRACIN, NEOMYCIN, POLYMYXIN B 400; 3.5; 5 [USP'U]/G; MG/G; [USP'U]/G
OINTMENT TOPICAL ONCE
OUTPATIENT
Start: 2021-08-26 | End: 2021-08-26

## 2021-08-26 RX ORDER — CLOBETASOL PROPIONATE 0.5 MG/G
OINTMENT TOPICAL ONCE
OUTPATIENT
Start: 2021-08-26 | End: 2021-08-26

## 2021-08-26 RX ORDER — BACITRACIN ZINC AND POLYMYXIN B SULFATE 500; 1000 [USP'U]/G; [USP'U]/G
OINTMENT TOPICAL ONCE
OUTPATIENT
Start: 2021-08-26 | End: 2021-08-26

## 2021-08-26 RX ORDER — LIDOCAINE 40 MG/G
CREAM TOPICAL ONCE
Status: CANCELLED | OUTPATIENT
Start: 2021-08-26 | End: 2021-08-26

## 2021-08-26 RX ORDER — BETAMETHASONE DIPROPIONATE 0.05 %
OINTMENT (GRAM) TOPICAL ONCE
OUTPATIENT
Start: 2021-08-26 | End: 2021-08-26

## 2021-08-26 RX ORDER — LIDOCAINE HYDROCHLORIDE 40 MG/ML
SOLUTION TOPICAL ONCE
OUTPATIENT
Start: 2021-08-26 | End: 2021-08-26

## 2021-08-26 RX ORDER — GINSENG 100 MG
CAPSULE ORAL ONCE
OUTPATIENT
Start: 2021-08-26 | End: 2021-08-26

## 2021-08-26 RX ORDER — LIDOCAINE 50 MG/G
OINTMENT TOPICAL ONCE
OUTPATIENT
Start: 2021-08-26 | End: 2021-08-26

## 2021-08-26 NOTE — PROGRESS NOTES
[address], [address] [place] [address] Road  Telephone: [phone]        [facility] Fax # [phone]        Discharge Instructions       [address], [address] [place] [address] Road  Telephone: [phone]     Discharge Instructions     Important reminders:     1. Increase Protein intake for optimal wound healing  2. No added salt to reduce any swelling  3. If diabetic, maintain good glucose control  4. If you smoke, smoking prohibits wound healing, we ask that you refrain from smoking. 5. When taking antibiotics take the entire prescription as ordered. Do not stop taking until medication is all gone unless otherwise instructed. 6. Exercise as tolerated. 7. Keep weight off wounds and reposition every 2 hours if applicable. 8. If wound(s) is on your lower extremity, elevate legs to the level of the heart or above for 30 minutes 4-5 times a day and/or when sitting. Avoid standing for long periods of time. 9. Do not get wounds wet in bath or shower unless otherwise instructed by your physician. If your wound is on your foot or leg, you may purchase a cast bag. Please ask at the pharmacy.        If Vascular testing is ordered, please call [facility] ([phone]) to schedule.     Vascular tests ordered by Wound Care Physicians may take up to 2 hours to complete. Please keep that in mind when scheduling.      If Vascular testing is scheduled, please bring supplies to replace your dressing after testing is done. The vascular department does not stock supplies.      Wound: Shoulder     With each dressing change, rinse wounds with 0.9% Saline. (May use wound wash or soft contact solution. Both can be purchased at a local drug store). If unable to obtain saline, may use a gentle soap and water.     Dressing care: Santyl and moist to dry dressing ( tuck into undermining from 10:00-3:00)- Change daily.     You have been given a prescription for Santyl. Santyl is an enzyme that dissolves dead tissue in the wound. Apply a small amount to the wound and cover with a slightly moist gauze.     Home Care Agency: [facility]     Your wound-care supplies will be provided by: [employer]  Please note, depending on your insurance coverage, you may have out-of-pocket expenses for these supplies. Someone from the company should call you to confirm your order and discuss those potential costs before they ship your products -- please anticipate that call. If your out-of-pocket cost could be substantial, Many [employer] have financial hardship programs for patients who qualify, so please ask about that if you might need a hand. If you have any questions about your supplies or your potential out-of-pocket costs, or if you need to place an order for a refill of supplies (typically monthly), please call the company directly.      Your  is [place]     Follow up with Shirley Sandhu CNP In 1 week in the wound care center. ( Next Thursday)  [address] Information: Should you experience any significant changes in your wound(s) or have questions about your wound care, please contact the [facility] at [phone] Monday  - Thursday 8:00 am - 4:00 pm and Friday 8:00 am - 1:00pm. If you need help with your wound outside these hours and cannot wait until we are again available, contact your PCP or go to the hospital emergency room.      PLEASE NOTE: IF YOU ARE UNABLE TO OBTAIN WOUND SUPPLIES, CONTINUE TO USE THE SUPPLIES YOU HAVE AVAILABLE UNTIL YOU ARE ABLE TO [address]. IT IS MOST IMPORTANT TO KEEP THE WOUND COVERED AT ALL TIMES.           Skilled nurse to evaluate and treat for wound care. Change dressing as ordered  once a day on Monday, Wednesday and Friday using clean technique. Patient/Family/caregiver may change dressings as needed as instructed when Home Care unavailable.     WOUNDS REQUIRING DRESSING Changes:     Wound 08/02/21 Shoulder Left #1 (Active)   Wound Image   08/02/21 0919   Wound Etiology Burn 08/26/21 1538   Wound Cleansed Cleansed with saline 08/26/21 1538   Wound Length (cm) 3 cm 08/26/21 1538   Wound Width (cm) 2.1 cm 08/26/21 1538   Wound Depth (cm) 0.4 cm 08/26/21 1538   Wound Surface Area (cm^2) 6.3 cm^2 08/26/21 1538   Change in Wound Size % (l*w) 25 08/26/21 1538   Wound Volume (cm^3) 2.52 cm^3 08/26/21 1538   Wound Healing % 40 08/26/21 1538   Post-Procedure Length (cm) 3.1 cm 08/26/21 1547   Post-Procedure Width (cm) 2.2 cm 08/26/21 1547   Post-Procedure Depth (cm) 0.5 cm 08/26/21 1547   Post-Procedure Surface Area (cm^2) 6.82 cm^2 08/26/21 1547   Post-Procedure Volume (cm^3) 3.41 cm^3 08/26/21 1547   Undermining Starts ___ O'Clock 1100 08/26/21 1538   Undermining Ends___ O'Clock 1300 08/26/21 1538   Undermining Maxium Distance (cm) . 3 08/26/21 1538   Wound Assessment Bleeding 08/26/21 1547   Drainage Amount Moderate 08/26/21 1547   Drainage Description Serosanguinous 08/26/21 1547   Odor None 08/26/21 1538   Ginger-wound Assessment Intact 08/26/21 1538   Margins Undefined edges 08/26/21 1538   Wound Thickness Description not for Pressure Injury Full thickness 08/02/21 0919   Number of days: 24          Patient seen and treated on 8/26/2021    By Marichuy Katz  [id number]   (provider/NPI)
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
## 2021-09-01 ENCOUNTER — NURSE TRIAGE (OUTPATIENT)
Dept: OTHER | Facility: CLINIC | Age: 71
End: 2021-09-01

## 2021-09-01 ENCOUNTER — HOSPITAL ENCOUNTER (INPATIENT)
Age: 71
LOS: 5 days | Discharge: HOME HEALTH | DRG: 286 | End: 2021-09-06
Attending: EMERGENCY MEDICINE | Admitting: INTERNAL MEDICINE
Payer: MEDICARE

## 2021-09-01 ENCOUNTER — APPOINTMENT (OUTPATIENT)
Dept: GENERAL RADIOLOGY | Age: 71
DRG: 286 | End: 2021-09-01
Payer: MEDICARE

## 2021-09-01 DIAGNOSIS — R07.9 CHEST PAIN, UNSPECIFIED TYPE: Primary | ICD-10-CM

## 2021-09-01 DIAGNOSIS — I50.31 ACUTE DIASTOLIC CHF (CONGESTIVE HEART FAILURE) (HCC): ICD-10-CM

## 2021-09-01 DIAGNOSIS — N18.32 STAGE 3B CHRONIC KIDNEY DISEASE (HCC): ICD-10-CM

## 2021-09-01 LAB
ANION GAP SERPL CALCULATED.3IONS-SCNC: 12 MMOL/L (ref 3–16)
ANISOCYTOSIS: ABNORMAL
APTT: 26.6 SEC (ref 26.2–38.6)
BASE EXCESS VENOUS: 0.8 MMOL/L (ref -2–3)
BASOPHILS ABSOLUTE: 0 K/UL (ref 0–0.2)
BASOPHILS RELATIVE PERCENT: 0.2 %
BUN BLDV-MCNC: 28 MG/DL (ref 7–20)
CALCIUM SERPL-MCNC: 9.1 MG/DL (ref 8.3–10.6)
CARBOXYHEMOGLOBIN: 1.4 % (ref 0–1.5)
CHLORIDE BLD-SCNC: 99 MMOL/L (ref 99–110)
CO2: 24 MMOL/L (ref 21–32)
CREAT SERPL-MCNC: 1.7 MG/DL (ref 0.6–1.2)
D DIMER: 353 NG/ML DDU (ref 0–229)
EKG ATRIAL RATE: 91 BPM
EKG DIAGNOSIS: NORMAL
EKG P AXIS: 56 DEGREES
EKG P-R INTERVAL: 134 MS
EKG Q-T INTERVAL: 368 MS
EKG QRS DURATION: 64 MS
EKG QTC CALCULATION (BAZETT): 452 MS
EKG R AXIS: 12 DEGREES
EKG T AXIS: 76 DEGREES
EKG VENTRICULAR RATE: 91 BPM
EOSINOPHILS ABSOLUTE: 0 K/UL (ref 0–0.6)
EOSINOPHILS RELATIVE PERCENT: 0 %
GFR AFRICAN AMERICAN: 36
GFR NON-AFRICAN AMERICAN: 30
GLUCOSE BLD-MCNC: 106 MG/DL (ref 70–99)
GLUCOSE BLD-MCNC: 97 MG/DL (ref 70–99)
HCO3 VENOUS: 25.8 MMOL/L (ref 24–28)
HCT VFR BLD CALC: 26.1 % (ref 36–48)
HCT VFR BLD CALC: 27 % (ref 36–48)
HEMATOLOGY PATH CONSULT: YES
HEMOGLOBIN, VEN, REDUCED: 9 %
HEMOGLOBIN: 8.3 G/DL (ref 12–16)
HEMOGLOBIN: 8.5 G/DL (ref 12–16)
INR BLD: 0.97 (ref 0.88–1.12)
LYMPHOCYTES ABSOLUTE: 1.2 K/UL (ref 1–5.1)
LYMPHOCYTES RELATIVE PERCENT: 10.1 %
MCH RBC QN AUTO: 21.3 PG (ref 26–34)
MCH RBC QN AUTO: 21.6 PG (ref 26–34)
MCHC RBC AUTO-ENTMCNC: 31.6 G/DL (ref 31–36)
MCHC RBC AUTO-ENTMCNC: 32 G/DL (ref 31–36)
MCV RBC AUTO: 67.5 FL (ref 80–100)
MCV RBC AUTO: 67.5 FL (ref 80–100)
METHEMOGLOBIN VENOUS: 0.4 % (ref 0–1.5)
MICROCYTES: ABNORMAL
MONOCYTES ABSOLUTE: 0.2 K/UL (ref 0–1.3)
MONOCYTES RELATIVE PERCENT: 1.4 %
NEUTROPHILS ABSOLUTE: 10.4 K/UL (ref 1.7–7.7)
NEUTROPHILS RELATIVE PERCENT: 88.3 %
O2 SAT, VEN: 91 %
PCO2, VEN: 41.7 MMHG (ref 41–51)
PDW BLD-RTO: 17.1 % (ref 12.4–15.4)
PDW BLD-RTO: 17.6 % (ref 12.4–15.4)
PERFORMED ON: NORMAL
PH VENOUS: 7.4 (ref 7.35–7.45)
PLATELET # BLD: 286 K/UL (ref 135–450)
PLATELET # BLD: 329 K/UL (ref 135–450)
PLATELET SLIDE REVIEW: ADEQUATE
PMV BLD AUTO: 7.2 FL (ref 5–10.5)
PMV BLD AUTO: 7.8 FL (ref 5–10.5)
PO2, VEN: 60.9 MMHG (ref 25–40)
POIKILOCYTES: ABNORMAL
POLYCHROMASIA: ABNORMAL
POTASSIUM REFLEX MAGNESIUM: 4 MMOL/L (ref 3.5–5.1)
PRO-BNP: 434 PG/ML (ref 0–124)
PROTHROMBIN TIME: 11 SEC (ref 9.9–12.7)
RBC # BLD: 3.86 M/UL (ref 4–5.2)
RBC # BLD: 4 M/UL (ref 4–5.2)
SLIDE REVIEW: ABNORMAL
SODIUM BLD-SCNC: 135 MMOL/L (ref 136–145)
TARGET CELLS: ABNORMAL
TCO2 CALC VENOUS: 27 MMOL/L
TEAR DROP CELLS: ABNORMAL
TROPONIN: 0.1 NG/ML
TROPONIN: 0.11 NG/ML
WBC # BLD: 11.7 K/UL (ref 4–11)
WBC # BLD: 11.8 K/UL (ref 4–11)

## 2021-09-01 PROCEDURE — 94761 N-INVAS EAR/PLS OXIMETRY MLT: CPT

## 2021-09-01 PROCEDURE — 93005 ELECTROCARDIOGRAM TRACING: CPT | Performed by: EMERGENCY MEDICINE

## 2021-09-01 PROCEDURE — 71045 X-RAY EXAM CHEST 1 VIEW: CPT

## 2021-09-01 PROCEDURE — 6360000002 HC RX W HCPCS: Performed by: STUDENT IN AN ORGANIZED HEALTH CARE EDUCATION/TRAINING PROGRAM

## 2021-09-01 PROCEDURE — 84484 ASSAY OF TROPONIN QUANT: CPT

## 2021-09-01 PROCEDURE — 83550 IRON BINDING TEST: CPT

## 2021-09-01 PROCEDURE — 6370000000 HC RX 637 (ALT 250 FOR IP): Performed by: STUDENT IN AN ORGANIZED HEALTH CARE EDUCATION/TRAINING PROGRAM

## 2021-09-01 PROCEDURE — 99285 EMERGENCY DEPT VISIT HI MDM: CPT

## 2021-09-01 PROCEDURE — 36415 COLL VENOUS BLD VENIPUNCTURE: CPT

## 2021-09-01 PROCEDURE — 94640 AIRWAY INHALATION TREATMENT: CPT

## 2021-09-01 PROCEDURE — 83540 ASSAY OF IRON: CPT

## 2021-09-01 PROCEDURE — 84443 ASSAY THYROID STIM HORMONE: CPT

## 2021-09-01 PROCEDURE — 94664 DEMO&/EVAL PT USE INHALER: CPT

## 2021-09-01 PROCEDURE — 6370000000 HC RX 637 (ALT 250 FOR IP): Performed by: EMERGENCY MEDICINE

## 2021-09-01 PROCEDURE — 85027 COMPLETE CBC AUTOMATED: CPT

## 2021-09-01 PROCEDURE — 2580000003 HC RX 258: Performed by: STUDENT IN AN ORGANIZED HEALTH CARE EDUCATION/TRAINING PROGRAM

## 2021-09-01 PROCEDURE — 83880 ASSAY OF NATRIURETIC PEPTIDE: CPT

## 2021-09-01 PROCEDURE — 85379 FIBRIN DEGRADATION QUANT: CPT

## 2021-09-01 PROCEDURE — 80048 BASIC METABOLIC PNL TOTAL CA: CPT

## 2021-09-01 PROCEDURE — 82803 BLOOD GASES ANY COMBINATION: CPT

## 2021-09-01 PROCEDURE — 85610 PROTHROMBIN TIME: CPT

## 2021-09-01 PROCEDURE — 2700000000 HC OXYGEN THERAPY PER DAY

## 2021-09-01 PROCEDURE — 85025 COMPLETE CBC W/AUTO DIFF WBC: CPT

## 2021-09-01 PROCEDURE — 1200000000 HC SEMI PRIVATE

## 2021-09-01 PROCEDURE — 85730 THROMBOPLASTIN TIME PARTIAL: CPT

## 2021-09-01 RX ORDER — OXYCODONE HYDROCHLORIDE AND ACETAMINOPHEN 5; 325 MG/1; MG/1
1 TABLET ORAL 2 TIMES DAILY
Status: DISCONTINUED | OUTPATIENT
Start: 2021-09-01 | End: 2021-09-06 | Stop reason: HOSPADM

## 2021-09-01 RX ORDER — POLYETHYLENE GLYCOL 3350 17 G/17G
17 POWDER, FOR SOLUTION ORAL DAILY PRN
Status: DISCONTINUED | OUTPATIENT
Start: 2021-09-01 | End: 2021-09-06 | Stop reason: HOSPADM

## 2021-09-01 RX ORDER — HEPARIN SODIUM 1000 [USP'U]/ML
4000 INJECTION, SOLUTION INTRAVENOUS; SUBCUTANEOUS PRN
Status: DISCONTINUED | OUTPATIENT
Start: 2021-09-01 | End: 2021-09-05

## 2021-09-01 RX ORDER — PREDNISONE 20 MG/1
20 TABLET ORAL DAILY
Status: DISCONTINUED | OUTPATIENT
Start: 2021-09-02 | End: 2021-09-06 | Stop reason: HOSPADM

## 2021-09-01 RX ORDER — HEPARIN SODIUM 1000 [USP'U]/ML
4000 INJECTION, SOLUTION INTRAVENOUS; SUBCUTANEOUS ONCE
Status: COMPLETED | OUTPATIENT
Start: 2021-09-01 | End: 2021-09-01

## 2021-09-01 RX ORDER — HEPARIN SODIUM 10000 [USP'U]/100ML
336-1000 INJECTION, SOLUTION INTRAVENOUS CONTINUOUS
Status: DISCONTINUED | OUTPATIENT
Start: 2021-09-01 | End: 2021-09-02

## 2021-09-01 RX ORDER — ASPIRIN 81 MG/1
81 TABLET, CHEWABLE ORAL ONCE
Status: COMPLETED | OUTPATIENT
Start: 2021-09-01 | End: 2021-09-01

## 2021-09-01 RX ORDER — DEXTROSE MONOHYDRATE 25 G/50ML
12.5 INJECTION, SOLUTION INTRAVENOUS PRN
Status: DISCONTINUED | OUTPATIENT
Start: 2021-09-01 | End: 2021-09-06 | Stop reason: HOSPADM

## 2021-09-01 RX ORDER — OXYCODONE HYDROCHLORIDE AND ACETAMINOPHEN 5; 325 MG/1; MG/1
1 TABLET ORAL 2 TIMES DAILY PRN
COMMUNITY
End: 2021-12-08 | Stop reason: SDUPTHER

## 2021-09-01 RX ORDER — NICOTINE POLACRILEX 4 MG
15 LOZENGE BUCCAL PRN
Status: DISCONTINUED | OUTPATIENT
Start: 2021-09-01 | End: 2021-09-06 | Stop reason: HOSPADM

## 2021-09-01 RX ORDER — FUROSEMIDE 10 MG/ML
40 INJECTION INTRAMUSCULAR; INTRAVENOUS 2 TIMES DAILY
Status: DISCONTINUED | OUTPATIENT
Start: 2021-09-01 | End: 2021-09-02

## 2021-09-01 RX ORDER — BUDESONIDE 0.5 MG/2ML
0.5 INHALANT ORAL 2 TIMES DAILY
Status: DISCONTINUED | OUTPATIENT
Start: 2021-09-01 | End: 2021-09-06 | Stop reason: HOSPADM

## 2021-09-01 RX ORDER — GABAPENTIN 300 MG/1
300 CAPSULE ORAL 2 TIMES DAILY
Status: DISCONTINUED | OUTPATIENT
Start: 2021-09-01 | End: 2021-09-06 | Stop reason: HOSPADM

## 2021-09-01 RX ORDER — ONDANSETRON 4 MG/1
4 TABLET, ORALLY DISINTEGRATING ORAL EVERY 8 HOURS PRN
Status: DISCONTINUED | OUTPATIENT
Start: 2021-09-01 | End: 2021-09-06 | Stop reason: HOSPADM

## 2021-09-01 RX ORDER — INSULIN LISPRO 100 [IU]/ML
0-3 INJECTION, SOLUTION INTRAVENOUS; SUBCUTANEOUS NIGHTLY
Status: DISCONTINUED | OUTPATIENT
Start: 2021-09-01 | End: 2021-09-06 | Stop reason: HOSPADM

## 2021-09-01 RX ORDER — SODIUM CHLORIDE 0.9 % (FLUSH) 0.9 %
5-40 SYRINGE (ML) INJECTION PRN
Status: DISCONTINUED | OUTPATIENT
Start: 2021-09-01 | End: 2021-09-06 | Stop reason: HOSPADM

## 2021-09-01 RX ORDER — HEPARIN SODIUM 1000 [USP'U]/ML
2000 INJECTION, SOLUTION INTRAVENOUS; SUBCUTANEOUS PRN
Status: DISCONTINUED | OUTPATIENT
Start: 2021-09-01 | End: 2021-09-05

## 2021-09-01 RX ORDER — INSULIN LISPRO 100 [IU]/ML
0-6 INJECTION, SOLUTION INTRAVENOUS; SUBCUTANEOUS
Status: DISCONTINUED | OUTPATIENT
Start: 2021-09-02 | End: 2021-09-06 | Stop reason: HOSPADM

## 2021-09-01 RX ORDER — SODIUM CHLORIDE 9 MG/ML
25 INJECTION, SOLUTION INTRAVENOUS PRN
Status: DISCONTINUED | OUTPATIENT
Start: 2021-09-01 | End: 2021-09-06 | Stop reason: HOSPADM

## 2021-09-01 RX ORDER — ONDANSETRON 2 MG/ML
4 INJECTION INTRAMUSCULAR; INTRAVENOUS EVERY 6 HOURS PRN
Status: DISCONTINUED | OUTPATIENT
Start: 2021-09-01 | End: 2021-09-06 | Stop reason: HOSPADM

## 2021-09-01 RX ORDER — ACETAMINOPHEN 650 MG/1
650 SUPPOSITORY RECTAL EVERY 6 HOURS PRN
Status: DISCONTINUED | OUTPATIENT
Start: 2021-09-01 | End: 2021-09-06 | Stop reason: HOSPADM

## 2021-09-01 RX ORDER — SODIUM CHLORIDE 0.9 % (FLUSH) 0.9 %
5-40 SYRINGE (ML) INJECTION EVERY 12 HOURS SCHEDULED
Status: DISCONTINUED | OUTPATIENT
Start: 2021-09-01 | End: 2021-09-06 | Stop reason: HOSPADM

## 2021-09-01 RX ORDER — HEPARIN SODIUM 5000 [USP'U]/ML
5000 INJECTION, SOLUTION INTRAVENOUS; SUBCUTANEOUS EVERY 8 HOURS SCHEDULED
Status: DISCONTINUED | OUTPATIENT
Start: 2021-09-01 | End: 2021-09-01 | Stop reason: SDUPTHER

## 2021-09-01 RX ORDER — LABETALOL HYDROCHLORIDE 5 MG/ML
10 INJECTION, SOLUTION INTRAVENOUS PRN
Status: DISCONTINUED | OUTPATIENT
Start: 2021-09-01 | End: 2021-09-06 | Stop reason: HOSPADM

## 2021-09-01 RX ORDER — DEXTROSE MONOHYDRATE 50 MG/ML
100 INJECTION, SOLUTION INTRAVENOUS PRN
Status: DISCONTINUED | OUTPATIENT
Start: 2021-09-01 | End: 2021-09-06 | Stop reason: HOSPADM

## 2021-09-01 RX ORDER — ALBUTEROL SULFATE 2.5 MG/3ML
2.5 SOLUTION RESPIRATORY (INHALATION) EVERY 6 HOURS PRN
Status: DISCONTINUED | OUTPATIENT
Start: 2021-09-01 | End: 2021-09-06 | Stop reason: HOSPADM

## 2021-09-01 RX ORDER — ACETAMINOPHEN 325 MG/1
650 TABLET ORAL EVERY 6 HOURS PRN
Status: DISCONTINUED | OUTPATIENT
Start: 2021-09-01 | End: 2021-09-06 | Stop reason: HOSPADM

## 2021-09-01 RX ADMIN — GABAPENTIN 300 MG: 300 CAPSULE ORAL at 21:43

## 2021-09-01 RX ADMIN — TIOTROPIUM BROMIDE AND OLODATEROL 2 PUFF: 3.124; 2.736 SPRAY, METERED RESPIRATORY (INHALATION) at 22:54

## 2021-09-01 RX ADMIN — FUROSEMIDE 40 MG: 10 INJECTION, SOLUTION INTRAMUSCULAR; INTRAVENOUS at 21:43

## 2021-09-01 RX ADMIN — Medication 10 ML: at 21:44

## 2021-09-01 RX ADMIN — HEPARIN SODIUM 12 UNITS/KG/HR: 10000 INJECTION, SOLUTION INTRAVENOUS at 21:18

## 2021-09-01 RX ADMIN — BUDESONIDE 500 MCG: 0.5 SUSPENSION RESPIRATORY (INHALATION) at 22:32

## 2021-09-01 RX ADMIN — AMITRIPTYLINE HYDROCHLORIDE 75 MG: 50 TABLET, FILM COATED ORAL at 21:43

## 2021-09-01 RX ADMIN — HEPARIN SODIUM 4000 UNITS: 1000 INJECTION INTRAVENOUS; SUBCUTANEOUS at 21:20

## 2021-09-01 RX ADMIN — ALBUTEROL SULFATE 2.5 MG: 2.5 SOLUTION RESPIRATORY (INHALATION) at 22:30

## 2021-09-01 RX ADMIN — OXYCODONE HYDROCHLORIDE AND ACETAMINOPHEN 1 TABLET: 5; 325 TABLET ORAL at 21:43

## 2021-09-01 RX ADMIN — ASPIRIN 81 MG: 81 TABLET, CHEWABLE ORAL at 16:40

## 2021-09-01 ASSESSMENT — ENCOUNTER SYMPTOMS
VOMITING: 0
SHORTNESS OF BREATH: 1
DIARRHEA: 0
WHEEZING: 0
ABDOMINAL PAIN: 0
CONSTIPATION: 0
COUGH: 0
BACK PAIN: 0
NAUSEA: 0

## 2021-09-01 ASSESSMENT — PAIN DESCRIPTION - LOCATION
LOCATION: BACK
LOCATION: CHEST

## 2021-09-01 ASSESSMENT — PAIN DESCRIPTION - ORIENTATION
ORIENTATION: MID
ORIENTATION: LEFT

## 2021-09-01 ASSESSMENT — PAIN DESCRIPTION - FREQUENCY: FREQUENCY: CONTINUOUS

## 2021-09-01 ASSESSMENT — PAIN DESCRIPTION - DESCRIPTORS: DESCRIPTORS: TIGHTNESS

## 2021-09-01 ASSESSMENT — PAIN SCALES - GENERAL
PAINLEVEL_OUTOF10: 6
PAINLEVEL_OUTOF10: 0
PAINLEVEL_OUTOF10: 7
PAINLEVEL_OUTOF10: 8

## 2021-09-01 ASSESSMENT — PAIN DESCRIPTION - PAIN TYPE: TYPE: ACUTE PAIN

## 2021-09-01 NOTE — ED NOTES
Pt states feet have been swollen for about a week and a half, and has never had problems with this before. Started having chest tightness 3 days ago that has been continuous.       Jacinta Lazo, LEILANI  09/01/21 [id number]

## 2021-09-01 NOTE — ED NOTES
Home Medication List is complete. Spoke with patient in her ED room. She is knowledgeable about her medications; verified each medication with her. Patient states that she did have most of her morning medications today. Please note:    · Patient states she just moved. Some of her medications are still packed in a box that she cannot find. These include: Breztri inhaler, amitriptyline, and Percocet.       Changes made to the Home Medication List:   Removed:    Omeprazole   Cyclobenzaprine    Added:   Santyl oint    Changes:    Gabapentin -- pt has not yet started this at home   Percocet -- prescribed as BID, but patient only takes PRN   Prednisone -- confirmed 20mg daily dose        Santos Gardner PharmD., [facility]   9/1/2021 4:29 PM  Wireless: [phone]

## 2021-09-01 NOTE — ED PROVIDER NOTES
[address] [facility] ENCOUNTER          ATTENDING PHYSICIAN NOTE       Date of evaluation: 9/1/2021    ADDENDUM:      Care of this patient was assumed from Dr. Edin Salamanca. The patient was seen for Shortness of Breath and Chest Pain  . The patient's initial evaluation and plan have been discussed with the prior provider who initially evaluated the patient. Nursing Notes, Past Medical Hx, Past Surgical Hx, Social Hx, Allergies, and Family Hx were all reviewed. Diagnostic Results         RADIOLOGY:  XR CHEST PORTABLE   Final Result      Hyperinflation compatible with known emphysema, with associated mild diffuse interstitial prominence similar to 7/1/2021. No acute consolidation. Stable cardiac mediastinal silhouette.           LABS:   Results for orders placed or performed during the hospital encounter of 09/01/21   CBC Auto Differential   Result Value Ref Range    WBC 11.7 (H) 4.0 - 11.0 K/uL    RBC 4.00 4.00 - 5.20 M/uL    Hemoglobin 8.5 (L) 12.0 - 16.0 g/dL    Hematocrit 27.0 (L) 36.0 - 48.0 %    MCV 67.5 (L) 80.0 - 100.0 fL    MCH 21.3 (L) 26.0 - 34.0 pg    MCHC 31.6 31.0 - 36.0 g/dL    RDW 17.6 (H) 12.4 - 15.4 %    Platelets 966 836 - 325 K/uL    MPV 7.8 5.0 - 10.5 fL    Neutrophils % 88.3 %    Lymphocytes % 10.1 %    Monocytes % 1.4 %    Eosinophils % 0.0 %    Basophils % 0.2 %    Neutrophils Absolute 10.4 (H) 1.7 - 7.7 K/uL    Lymphocytes Absolute 1.2 1.0 - 5.1 K/uL    Monocytes Absolute 0.2 0.0 - 1.3 K/uL    Eosinophils Absolute 0.0 0.0 - 0.6 K/uL    Basophils Absolute 0.0 0.0 - 0.2 K/uL   Basic Metabolic Panel w/ Reflex to MG   Result Value Ref Range    Sodium 135 (L) 136 - 145 mmol/L    Potassium reflex Magnesium 4.0 3.5 - 5.1 mmol/L    Chloride 99 99 - 110 mmol/L    CO2 24 21 - 32 mmol/L    Anion Gap 12 3 - 16    Glucose 106 (H) 70 - 99 mg/dL    BUN 28 (H) 7 - 20 mg/dL    CREATININE 1.7 (H) 0.6 - 1.2 mg/dL    GFR Non-African American 30 (A) >60    GFR  36 (A) >60    Calcium 9.1 8.3 - 10.6 mg/dL   Brain Natriuretic Peptide   Result Value Ref Range    Pro- (H) 0 - 124 pg/mL   Troponin   Result Value Ref Range    Troponin 0.11 (H) <0.01 ng/mL   Blood Gas, Venous   Result Value Ref Range    pH, Oliver 7.399 7.350 - 7.450    pCO2, Oliver 41.7 41.0 - 51.0 mmHg    pO2, Oliver 60.9 (H) 25 - 40 mmHg    HCO3, Venous 25.8 24.0 - 28.0 mmol/L    Base Excess, Oliver 0.8 -2.0 - 3.0 mmol/L    O2 Sat, Oliver 91 Not established %    Carboxyhemoglobin 1.4 0.0 - 1.5 %    MetHgb, Oliver 0.4 0.0 - 1.5 %    TC02 (Calc), Oliver 27 mmol/L    Hemoglobin, Oliver, Reduced 9.00 %   EKG 12 Lead   Result Value Ref Range    Ventricular Rate 91 BPM    Atrial Rate 91 BPM    P-R Interval 134 ms    QRS Duration 64 ms    Q-T Interval 368 ms    QTc Calculation (Bazett) 452 ms    P Axis 56 degrees    R Axis 12 degrees    T Axis 76 degrees    Diagnosis       EKG performed in ER and to be interpreted by ER physician. Confirmed by MD, ER ([id number]),  Deng Kebede ([id number]) on 9/1/2021 1:30:59 PM       RECENT VITALS:  BP: (!) 142/94, Temp: 98.4 °F (36.9 °C), Pulse: 88, Resp: 23, SpO2: 98 %     Procedures         ED Course     The patient was given the following medications:  Orders Placed This Encounter   Medications    aspirin chewable tablet 81 mg       CONSULTS:  Max 26 / ASSESSMENT / Julien Nabor is a 72 y.o. female presenting with a complaint of shortness of breath and chest pain. She did endorse new lower extremity edema. She has a history of PE but is compliant with Eliquis, taking it this morning. She has no change of cough symptoms. Care was signed out from Dr. Edin Salamanca, and on reevaluation I find a comfortable woman, however who has new troponin elevation and bilateral lower extremity edema. Possible this is a new CHF exacerbation, less likely new submassive PE. However she has a history of DVT and PE and so a D-dimer was sent for restratification. However she is compliant with Eliquis and so will not be anticoagulated at this point. Care will be discussed with the hospitalist.      Clinical Impression     1. Chest pain, unspecified type        Disposition     PATIENT REFERRED TO:  No follow-up provider specified.     DISCHARGE MEDICATIONS:  New Prescriptions    No medications on file       DISPOSITION Decision To Admit 09/01/2021 03:29:38 PM          Fani Negron MD  09/01/21 1551

## 2021-09-01 NOTE — ED PROVIDER NOTES
[address] [facility]          ATTENDING PHYSICIAN NOTE       Date of evaluation: 9/1/2021    Chief Complaint     Shortness of Breath and Chest Pain      History of Present Illness     Ripley Harada is a 72 y.o. female who presents to the emergency room today complaining of about 1 week of trouble breathing. Patient reports she has been under a lot of stress recently, moving apartments and with family/friend stressors. She complains of a non-productive cough over this same time and some occasional tightness across her chest. She feels like this mostly comes on when she feels anxious. She denies fever at all. Reports no sick contacts that she is aware of. Review of Systems     Review of Systems  All systems were reviewed with the patient/family and negative except as otherwise documented in the HPI. 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 She has a past surgical history that includes shoulder surgery; back surgery; brain surgery; Colon surgery; Colonoscopy (12/5/11); Colonoscopy (4-2-2012); Cholecystectomy; Abdomen surgery; Biopsy shoulder (Left, 2/27/2019); and bronchoscopy (N/A, 9/2/2020). Her family history includes Early Death (age of onset: 36) in her mother; Heart Disease in her father and mother; High Blood Pressure in her brother, father, mother, and sister; High Cholesterol in her father and mother; Stroke (age of onset: 79) in her father.   She reports that she quit smoking about 6 years ago. Her smoking use included cigarettes. She has a 7.50 pack-year smoking history. She has never used smokeless tobacco. She reports that she does not drink alcohol and does not use drugs. Medications     Previous Medications    ADALIMUMAB (HUMIRA PEN SC)    Inject into the skin every 14 days     ALBUTEROL (PROVENTIL) (2.5 MG/3ML) 0.083% NEBULIZER SOLUTION    TAKE 3 MLS BY NEBULIZATION EVERY 6 HOURS AS NEEDED FOR WHEEZING DX: COPD ICD-10: J44.9    ALENDRONATE (FOSAMAX) 70 MG TABLET    Take 1 tablet by mouth every 7 days    AMITRIPTYLINE (ELAVIL) 150 MG TABLET    Take 0.5 tablets by mouth nightly    AMLODIPINE (NORVASC) 5 MG TABLET    TAKE 1 TABLET BY MOUTH EVERY DAY    BUDESON-GLYCOPYRROL-FORMOTEROL (BREZTRI AEROSPHERE) 160-9-4.8 MCG/ACT AERO    Inhale 2 puffs into the lungs 2 times daily    COLESTIPOL (COLESTID) 1 G TABLET    Take 1 g by mouth 2 times daily     COLLAGENASE 250 UNIT/GM OINTMENT    Apply topically daily Apply topically to L shoulder wound    ELIQUIS 5 MG TABS TABLET    TAKE 1 TABLET BY MOUTH TWICE A DAY    GABAPENTIN (NEURONTIN) 300 MG CAPSULE    Take 1 capsule by mouth 2 times daily for 180 days. Intended supply: 90 days    LIFT CHAIR MISC    by Does not apply route 71 yo woman with severe COPD and Arthritis of the Left Hip. Please dispense a Lift Chair    MISC. DEVICES (COMMODE BEDSIDE) MISC    Use as directed    MISC. DEVICES (WALKER) MISC    1 each by Does not apply route daily    OXYCODONE-ACETAMINOPHEN (PERCOCET) 5-325 MG PER TABLET    Take 1 tablet by mouth 2 times daily as needed for Pain. POTASSIUM CHLORIDE (KLOR-CON) 10 MEQ EXTENDED RELEASE TABLET    Take 10 mEq by mouth daily     PREDNISONE (DELTASONE) 10 MG TABLET    Take 2 tablets by mouth daily       Allergies     She has No Known Allergies.     Physical Exam     INITIAL VITALS: BP: (!) 153/101, Temp: 98.4 °F (36.9 °C), Pulse: 91, Resp: 14, SpO2: 92 %   Physical Exam  Constitutional:  WD/WN female, no distress, lying on stretcher   Eyes:  Sclera anicteric. HEENT:  Normocephalic, oropharynx moist.   Neck: normal range of motion, supple,no jVD. Respiratory:  Even, mildly-labored but speaking in full sentences with no distress   Cardiovascular:  Extremities warm, well perfused, equal pulses in all extremities  GI:  Soft, nondistended   Musculoskeletal:  Bilat LE edmea which is soft and pitting, no deformities. Skin:  No rash, no lesions, no pallor   Neurologic:  Awake and alert. Moving all extremities purposefully and with grossly normal coordination. Psychiatric:  Normal mood. Behavior appropriate. Diagnostic Results     EKG   NSR, normal axis, normal intervals, non-specific ST segment changes without baseline for comparison    RADIOLOGY:  XR CHEST PORTABLE   Final Result      Hyperinflation compatible with known emphysema, with associated mild diffuse interstitial prominence similar to 7/1/2021. No acute consolidation. Stable cardiac mediastinal silhouette.           LABS:   Results for orders placed or performed during the hospital encounter of 09/01/21   CBC Auto Differential   Result Value Ref Range    WBC 11.7 (H) 4.0 - 11.0 K/uL    RBC 4.00 4.00 - 5.20 M/uL    Hemoglobin 8.5 (L) 12.0 - 16.0 g/dL    Hematocrit 27.0 (L) 36.0 - 48.0 %    MCV 67.5 (L) 80.0 - 100.0 fL    MCH 21.3 (L) 26.0 - 34.0 pg    MCHC 31.6 31.0 - 36.0 g/dL    RDW 17.6 (H) 12.4 - 15.4 %    Platelets 756 366 - 154 K/uL    MPV 7.8 5.0 - 10.5 fL    PLATELET SLIDE REVIEW Adequate     SLIDE REVIEW see below     Path Consult Yes     Neutrophils % 88.3 %    Lymphocytes % 10.1 %    Monocytes % 1.4 %    Eosinophils % 0.0 %    Basophils % 0.2 %    Neutrophils Absolute 10.4 (H) 1.7 - 7.7 K/uL    Lymphocytes Absolute 1.2 1.0 - 5.1 K/uL    Monocytes Absolute 0.2 0.0 - 1.3 K/uL    Eosinophils Absolute 0.0 0.0 - 0.6 K/uL    Basophils Absolute 0.0 0.0 - 0.2 K/uL    Anisocytosis 1+ (A)     Microcytes 2+ (A)     Polychromasia Occasional (A)     Poikilocytes Occasional (A)     Target Cells Occasional (A)     Tear Drop Cells 1+ (A)    Basic Metabolic Panel w/ Reflex to MG   Result Value Ref Range    Sodium 135 (L) 136 - 145 mmol/L    Potassium reflex Magnesium 4.0 3.5 - 5.1 mmol/L    Chloride 99 99 - 110 mmol/L    CO2 24 21 - 32 mmol/L    Anion Gap 12 3 - 16    Glucose 106 (H) 70 - 99 mg/dL    BUN 28 (H) 7 - 20 mg/dL    CREATININE 1.7 (H) 0.6 - 1.2 mg/dL    GFR Non-African American 30 (A) >60    GFR  36 (A) >60    Calcium 9.1 8.3 - 10.6 mg/dL   Brain Natriuretic Peptide   Result Value Ref Range    Pro- (H) 0 - 124 pg/mL   Troponin   Result Value Ref Range    Troponin 0.11 (H) <0.01 ng/mL   Blood Gas, Venous   Result Value Ref Range    pH, Oliver 7.399 7.350 - 7.450    pCO2, Oliver 41.7 41.0 - 51.0 mmHg    pO2, Oliver 60.9 (H) 25 - 40 mmHg    HCO3, Venous 25.8 24.0 - 28.0 mmol/L    Base Excess, Oliver 0.8 -2.0 - 3.0 mmol/L    O2 Sat, Oliver 91 Not established %    Carboxyhemoglobin 1.4 0.0 - 1.5 %    MetHgb, Oliver 0.4 0.0 - 1.5 %    TC02 (Calc), Oliver 27 mmol/L    Hemoglobin, Oliver, Reduced 9.00 %   D-dimer, quantitative (Lab)   Result Value Ref Range    D-Dimer, Quant 353 (H) 0 - 229 ng/mL DDU   Troponin   Result Value Ref Range    Troponin 0.10 (H) <0.01 ng/mL   CBC   Result Value Ref Range    WBC 11.8 (H) 4.0 - 11.0 K/uL    RBC 3.86 (L) 4.00 - 5.20 M/uL    Hemoglobin 8.3 (L) 12.0 - 16.0 g/dL    Hematocrit 26.1 (L) 36.0 - 48.0 %    MCV 67.5 (L) 80.0 - 100.0 fL    MCH 21.6 (L) 26.0 - 34.0 pg    MCHC 32.0 31.0 - 36.0 g/dL    RDW 17.1 (H) 12.4 - 15.4 %    Platelets 765 429 - 910 K/uL    MPV 7.2 5.0 - 10.5 fL   APTT   Result Value Ref Range    aPTT 26.6 26.2 - 38.6 sec   Protime-INR   Result Value Ref Range    Protime 11.0 9.9 - 12.7 sec    INR 0.97 0.88 - 1.12   EKG 12 Lead   Result Value Ref Range    Ventricular Rate 91 BPM    Atrial Rate 91 BPM    P-R Interval 134 ms    QRS Duration 64 ms    Q-T Interval 368 ms    QTc Calculation (Justintt) 452 ms    P Axis 56 degrees    R Axis 12 degrees    T Axis 76 degrees    Diagnosis       EKG performed in ER and to be interpreted by ER physician. Confirmed by MD, ER ([id number]),  Marcela Armando ([id number]) on 9/1/2021 1:30:59 PM       RECENT VITALS:  BP: (!) 126/95,Temp: 98.4 °F (36.9 °C), Pulse: 91, Resp: 19, SpO2: 99 %     Procedures         ED Course     Nursing Notes, Past Medical Hx, Past Surgical Hx, Social Hx,Allergies, and Family Hx were reviewed. patient was given the following medications:  Orders Placed This Encounter   Medications    aspirin chewable tablet 81 mg    albuterol (PROVENTIL) nebulizer solution 2.5 mg     Order Specific Question:   Initiate RT Bronchodilator Protocol     Answer:    Yes    amitriptyline (ELAVIL) tablet 75 mg    Budeson-Glycopyrrol-Formoterol 160-9-4.8 MCG/ACT AERO 2 puff    gabapentin (NEURONTIN) capsule 300 mg    oxyCODONE-acetaminophen (PERCOCET) 5-325 MG per tablet 1 tablet    predniSONE (DELTASONE) tablet 20 mg    sodium chloride flush 0.9 % injection 5-40 mL    sodium chloride flush 0.9 % injection 5-40 mL    0.9 % sodium chloride infusion    OR Linked Order Group     ondansetron (ZOFRAN-ODT) disintegrating tablet 4 mg     ondansetron (ZOFRAN) injection 4 mg    polyethylene glycol (GLYCOLAX) packet 17 g    OR Linked Order Group     acetaminophen (TYLENOL) tablet 650 mg     acetaminophen (TYLENOL) suppository 650 mg    perflutren lipid microspheres (DEFINITY) injection 1.65 mg    heparin (porcine) injection 5,000 Units    furosemide (LASIX) injection 40 mg    glucose (GLUTOSE) 40 % oral gel 15 g    dextrose 50 % IV solution    glucagon (rDNA) injection 1 mg    dextrose 5 % solution    insulin lispro (1 Unit Dial) 0-6 Units    insulin lispro (1 Unit Dial) 0-3 Units    heparin (porcine) injection 4,030 Units    heparin (porcine) injection 4,030 Units    heparin (porcine) injection 2,010 Units    heparin 25,000 units in dextrose 5% 250 mL (premix) infusion    labetalol (NORMODYNE;TRANDATE) injection 10 mg       CONSULTS:  IP CONSULT TO HOSPITALIST  IP CONSULT TO HEART FAILURE NURSE/COORDINATOR  IP CONSULT TO DIETITIAN  IP CONSULT TO [address] [place] [address] DECISIONMAKING / ASSESSMENT / Gaby Jakob is a 72 y.o. female with complex pmhx incuding crohn's, COPD, DVT/PE who presents to the ED today with 1 week of cough, dyspnea and some intermittent CP. Pt arrives afebrile with unremarkable vitals, she has some LE edema on exam, not in respiratory distress. IV established and labs sent, CXR ordered. Pt signed out to oncoming provider awaiting reassessment and revaluation after these studies. Clinical Impression     1. Chest pain, unspecified type        Disposition     PATIENT REFERRED TO:  No follow-up provider specified.     DISCHARGE MEDICATIONS:  New Prescriptions    No medications on file       DISPOSITION Admitted 09/01/2021 07:35:54 PM       Malcom Alfaro MD  09/01/21 2030

## 2021-09-01 NOTE — ED TRIAGE NOTES
Pt states feet have been swollen for about a week and a half, and has never had problems with this before. Started having chest tightness 3 days ago that has been continuous.

## 2021-09-01 NOTE — H&P
([facility]     Syringomyelia ([facility]    ·     Past Surgical History:        Procedure Laterality Date    ABDOMEN SURGERY      BACK SURGERY      shunt to drain CSF    BIOPSY SHOULDER Left 2/27/2019    EXCISION OF LEFT SHOULDER MASS performed by Brook Vickers MD at [facility]      crainotomy- remove fluid ? V-P SHUNT    BRONCHOSCOPY N/A 9/2/2020    BRONCHOSCOPY ALVEOLAR LAVAGE performed by Pauline Bowen MD at [facility]      resection X2    COLONOSCOPY  12/5/11    BIOPSY AND POLYPECTOMY    COLONOSCOPY  4-2-2012    and ablation ERBE/APC    SHOULDER SURGERY      L    ·     Medications Priorto Admission:    · Not in a hospital admission. Allergies:  Patient has no known allergies. Social History:   · TOBACCO:   reports that she quit smoking about 6 years ago. Her smoking use included cigarettes. She has a 7.50 pack-year smoking history. She has never used smokeless tobacco.  · ETOH:   reports no history of alcohol use. · DRUGS : No  · Patient currently lives with a roommate  ·   Family History:       Problem Relation Age of Onset    Heart Disease Mother     High Blood Pressure Mother     High Cholesterol Mother     Early Death Mother 36        MI    Heart Disease Father     High Blood Pressure Father     High Cholesterol Father     Stroke Father 79    High Blood Pressure Sister     High Blood Pressure Brother    ·     Review of Systems   Constitutional: Negative for chills and fever. Eyes: Negative for visual disturbance. Respiratory: Positive for shortness of breath. Negative for cough and wheezing. Cardiovascular: Positive for chest pain and leg swelling. Negative for palpitations. Gastrointestinal: Negative for abdominal pain, constipation, diarrhea, nausea and vomiting. Musculoskeletal: Negative for arthralgias, back pain and myalgias. Skin: Positive for rash. Neurological: Positive for headaches.  Negative for weakness. ROS: A 10 point review of systems was conducted, significant findings as noted in HPI. Physical Exam  Constitutional:       Appearance: Normal appearance. HENT:      Head: Normocephalic and atraumatic. Eyes:      Extraocular Movements: Extraocular movements intact. Comments: By observation. Cardiovascular:      Heart sounds: No murmur heard. No friction rub. No gallop. Pulmonary:      Breath sounds: Wheezing present. No rhonchi or rales. Chest:      Chest wall: Tenderness present. Abdominal:      General: Bowel sounds are normal. There is no distension. Palpations: Abdomen is soft. Tenderness: There is no abdominal tenderness. There is no guarding. Musculoskeletal:      Right lower leg: Edema present. Left lower leg: Edema present. Skin:     Findings: Lesion present. Comments: Nonhealing wound on L shoulder   Neurological:      General: No focal deficit present. Mental Status: She is alert and oriented to person, place, and time. Physical exam:       Vitals:    09/01/21 1630   BP: (!) 159/100   Pulse: 100   Resp: 15   Temp:    SpO2:        DATA:    Labs:  CBC:   Recent Labs     09/01/21  1419   WBC 11.7*   HGB 8.5*   HCT 27.0*          BMP:   Recent Labs     09/01/21  1419   *   K 4.0   CL 99   CO2 24   BUN 28*   CREATININE 1.7*   GLUCOSE 106*     LFT's: No results for input(s): AST, ALT, ALB, BILITOT, ALKPHOS in the last 72 hours. Troponin:   Recent Labs     09/01/21  1419   TROPONINI 0.11*     BNP:No results for input(s): BNP in the last 72 hours. ABGs: No results for input(s): PHART, NGQ3UID, PO2ART in the last 72 hours. INR: No results for input(s): INR in the last 72 hours. U/A:No results for input(s): NITRITE, COLORU, PHUR, LABCAST, WBCUA, RBCUA, MUCUS, TRICHOMONAS, YEAST, BACTERIA, CLARITYU, SPECGRAV, LEUKOCYTESUR, UROBILINOGEN, BILIRUBINUR, BLOODU, GLUCOSEU, AMORPHOUS in the last 72 hours.     Invalid input(s): KETONESU    XR CHEST PORTABLE   Final Result      Hyperinflation compatible with known emphysema, with associated mild diffuse interstitial prominence similar to 7/1/2021. No acute consolidation. Stable cardiac mediastinal silhouette. ASSESSMENT AND PLAN:    New Onset CHF  Chest Pain  Troponinemia  Pt with two days duration of mid-sternal dull aching chest pain, SOB and pitting edema. Troponin 0.11, Pro-, EKG and CXR on admission had no pertinent findings. Last echo EF 60% on 04/2021. Stress test on 04/2021 displayed EF 78%, NL myocardial perfusion and no evidence for ischemia. Dx most likely new onset CHF, will trend troponin to r/o ACS as potential cause. Due to negative stress test on 4/2021 and echo displaying EF of 60% (04/21) and in light of pt's recent emotional distress of recent passing of her boyfriend and moving out of their home, will consider Takotsubo Cardiomyopathy in the differential as potential cause of new onset CHF. -Lasix 40 mg BID  -Echocardiogram  -Trend troponin q3h  -Telemetry  -Daily weight  -Strict I/O  -Low sodium diet  -Cardiology consulted    CKD  Pt has hx of CKD stage 3, baseling Cr 1.7. No change from baseline noted. -BMP daily  -Mg daily    Hx DVT/PE  Pt has a distant hx of DVT/PE, pt on Eliquis at home.    -Low Dose Heparin    COPD  Pt has hx of COPD, and is tx at home with inhaler, will continue medication. -Brestri inhaler    Crohn's Disease  Pt has chronic condition, and is followed by GI while outpatient. Will continue home medicaions.    -Prednisone    Depression  Hx of depression, with recent loss of partner.  Continue home rx.    -Amitriptyline    Will discuss with attending physician Dr. Ke Mac Status:Full code  FEN: No diet orders on file  PPX: Eliquis  DISPO: Alisha Gill DO, PGY-1  9/1/2021,  5:06 PM

## 2021-09-01 NOTE — TELEPHONE ENCOUNTER
Reason for Disposition   MODERATE swelling of both ankles (e.g., swelling extends up to the knees) AND new onset or worsening    Answer Assessment - Initial Assessment Questions  1. ONSET: \"When did the swelling start? \" (e.g., minutes, hours, days)      Noted on Monday by her home health nurse. 2. LOCATION: \"What part of the leg is swollen? \"  \"Are both legs swollen or just one leg? \"      +4 pitting edema in her bilateral lower extremities, +2 pitting edema from calfs and up to the knees    3. SEVERITY: \"How bad is the swelling? \" (e.g., localized; mild, moderate, severe)   - Localized - small area of swelling localized to one leg   - MILD pedal edema - swelling limited to foot and ankle, pitting edema < 1/4 inch (6 mm) deep, rest and elevation eliminate most or all swelling   - MODERATE edema - swelling of lower leg to knee, pitting edema > 1/4 inch (6 mm) deep, rest and elevation only partially reduce swelling   - SEVERE edema - swelling extends above knee, facial or hand swelling present       Moderate    4. REDNESS: \"Does the swelling look red or infected? \"      Denies redness or infection    5. PAIN: \"Is the swelling painful to touch? \" If so, ask: \"How painful is it? \"   (Scale 1-10; mild, moderate or severe)      Denies pain    6. FEVER: \"Do you have a fever? \" If so, ask: \"What is it, how was it measured, and when did it start? \"       Denies fever    7. CAUSE: \"What do you think is causing the leg swelling? \"      Unsure    8. MEDICAL HISTORY: \"Do you have a history of heart failure, kidney disease, liver failure, or cancer? \"      Denies heart failure, liver failure    9. RECURRENT SYMPTOM: \"Have you had leg swelling before? \" If so, ask: \"When was the last time? \" \"What happened that time? \"      Denies having leg swelling in the past    10. OTHER SYMPTOMS: \"Do you have any other symptoms? \" (e.g., chest pain, difficulty breathing)        Denies SOB different from baseline (COPD, wears oxygen at

## 2021-09-02 PROBLEM — N18.30 STAGE 3 CHRONIC KIDNEY DISEASE (HCC): Status: ACTIVE | Noted: 2021-02-24

## 2021-09-02 LAB
ANION GAP SERPL CALCULATED.3IONS-SCNC: 13 MMOL/L (ref 3–16)
APTT: 103.3 SEC (ref 26.2–38.6)
APTT: 134.7 SEC (ref 26.2–38.6)
APTT: 42.7 SEC (ref 26.2–38.6)
APTT: 67.2 SEC (ref 26.2–38.6)
BASOPHILS ABSOLUTE: 0 K/UL (ref 0–0.2)
BASOPHILS RELATIVE PERCENT: 0.4 %
BUN BLDV-MCNC: 26 MG/DL (ref 7–20)
CALCIUM SERPL-MCNC: 8.6 MG/DL (ref 8.3–10.6)
CHLORIDE BLD-SCNC: 102 MMOL/L (ref 99–110)
CHOLESTEROL, TOTAL: 148 MG/DL (ref 0–199)
CO2: 23 MMOL/L (ref 21–32)
CREAT SERPL-MCNC: 1.8 MG/DL (ref 0.6–1.2)
CREATININE URINE: 29.9 MG/DL (ref 28–259)
EOSINOPHILS ABSOLUTE: 0 K/UL (ref 0–0.6)
EOSINOPHILS RELATIVE PERCENT: 0.2 %
GFR AFRICAN AMERICAN: 34
GFR NON-AFRICAN AMERICAN: 28
GLUCOSE BLD-MCNC: 116 MG/DL (ref 70–99)
GLUCOSE BLD-MCNC: 158 MG/DL (ref 70–99)
GLUCOSE BLD-MCNC: 232 MG/DL (ref 70–99)
GLUCOSE BLD-MCNC: 82 MG/DL (ref 70–99)
GLUCOSE BLD-MCNC: 90 MG/DL (ref 70–99)
GLUCOSE BLD-MCNC: 96 MG/DL (ref 70–99)
HCT VFR BLD CALC: 27.7 % (ref 36–48)
HDLC SERPL-MCNC: 80 MG/DL (ref 40–60)
HEMATOLOGY PATH CONSULT: NORMAL
HEMOGLOBIN: 8.6 G/DL (ref 12–16)
IRON SATURATION: 11 % (ref 15–50)
IRON: 34 UG/DL (ref 37–145)
LDL CHOLESTEROL CALCULATED: 43 MG/DL
LV EF: 65 %
LVEF MODALITY: NORMAL
LYMPHOCYTES ABSOLUTE: 2.5 K/UL (ref 1–5.1)
LYMPHOCYTES RELATIVE PERCENT: 25.6 %
MAGNESIUM: 2.1 MG/DL (ref 1.8–2.4)
MCH RBC QN AUTO: 21.5 PG (ref 26–34)
MCHC RBC AUTO-ENTMCNC: 31 G/DL (ref 31–36)
MCV RBC AUTO: 69.3 FL (ref 80–100)
MICROALBUMIN UR-MCNC: <1.2 MG/DL
MICROALBUMIN/CREAT UR-RTO: NORMAL MG/G (ref 0–30)
MONOCYTES ABSOLUTE: 0.5 K/UL (ref 0–1.3)
MONOCYTES RELATIVE PERCENT: 5.1 %
NEUTROPHILS ABSOLUTE: 6.8 K/UL (ref 1.7–7.7)
NEUTROPHILS RELATIVE PERCENT: 68.7 %
PDW BLD-RTO: 17.6 % (ref 12.4–15.4)
PERFORMED ON: ABNORMAL
PERFORMED ON: NORMAL
PERFORMED ON: NORMAL
PLATELET # BLD: 292 K/UL (ref 135–450)
PMV BLD AUTO: 7.2 FL (ref 5–10.5)
POTASSIUM SERPL-SCNC: 3.5 MMOL/L (ref 3.5–5.1)
RBC # BLD: 3.99 M/UL (ref 4–5.2)
SODIUM BLD-SCNC: 138 MMOL/L (ref 136–145)
TOTAL IRON BINDING CAPACITY: 322 UG/DL (ref 260–445)
TRIGL SERPL-MCNC: 126 MG/DL (ref 0–150)
TROPONIN: 0.12 NG/ML
TSH REFLEX: 0.77 UIU/ML (ref 0.27–4.2)
VLDLC SERPL CALC-MCNC: 25 MG/DL
WBC # BLD: 9.8 K/UL (ref 4–11)

## 2021-09-02 PROCEDURE — 80061 LIPID PANEL: CPT

## 2021-09-02 PROCEDURE — 85025 COMPLETE CBC W/AUTO DIFF WBC: CPT

## 2021-09-02 PROCEDURE — 6370000000 HC RX 637 (ALT 250 FOR IP): Performed by: INTERNAL MEDICINE

## 2021-09-02 PROCEDURE — 93306 TTE W/DOPPLER COMPLETE: CPT

## 2021-09-02 PROCEDURE — 82043 UR ALBUMIN QUANTITATIVE: CPT

## 2021-09-02 PROCEDURE — 94761 N-INVAS EAR/PLS OXIMETRY MLT: CPT

## 2021-09-02 PROCEDURE — 82570 ASSAY OF URINE CREATININE: CPT

## 2021-09-02 PROCEDURE — 6360000002 HC RX W HCPCS: Performed by: STUDENT IN AN ORGANIZED HEALTH CARE EDUCATION/TRAINING PROGRAM

## 2021-09-02 PROCEDURE — 80048 BASIC METABOLIC PNL TOTAL CA: CPT

## 2021-09-02 PROCEDURE — 6370000000 HC RX 637 (ALT 250 FOR IP): Performed by: STUDENT IN AN ORGANIZED HEALTH CARE EDUCATION/TRAINING PROGRAM

## 2021-09-02 PROCEDURE — 99223 1ST HOSP IP/OBS HIGH 75: CPT | Performed by: INTERNAL MEDICINE

## 2021-09-02 PROCEDURE — 85730 THROMBOPLASTIN TIME PARTIAL: CPT

## 2021-09-02 PROCEDURE — 2580000003 HC RX 258: Performed by: STUDENT IN AN ORGANIZED HEALTH CARE EDUCATION/TRAINING PROGRAM

## 2021-09-02 PROCEDURE — 83735 ASSAY OF MAGNESIUM: CPT

## 2021-09-02 PROCEDURE — 2700000000 HC OXYGEN THERAPY PER DAY

## 2021-09-02 PROCEDURE — 1200000000 HC SEMI PRIVATE

## 2021-09-02 PROCEDURE — 94640 AIRWAY INHALATION TREATMENT: CPT

## 2021-09-02 PROCEDURE — 36415 COLL VENOUS BLD VENIPUNCTURE: CPT

## 2021-09-02 PROCEDURE — 6360000002 HC RX W HCPCS: Performed by: INTERNAL MEDICINE

## 2021-09-02 RX ORDER — LOSARTAN POTASSIUM 25 MG/1
12.5 TABLET ORAL DAILY
Status: DISCONTINUED | OUTPATIENT
Start: 2021-09-02 | End: 2021-09-06

## 2021-09-02 RX ORDER — IPRATROPIUM BROMIDE AND ALBUTEROL SULFATE 2.5; .5 MG/3ML; MG/3ML
1 SOLUTION RESPIRATORY (INHALATION)
Status: CANCELLED | OUTPATIENT
Start: 2021-09-02

## 2021-09-02 RX ORDER — ASPIRIN 81 MG/1
81 TABLET, CHEWABLE ORAL DAILY
Status: DISCONTINUED | OUTPATIENT
Start: 2021-09-02 | End: 2021-09-06 | Stop reason: HOSPADM

## 2021-09-02 RX ORDER — FUROSEMIDE 10 MG/ML
60 INJECTION INTRAMUSCULAR; INTRAVENOUS 2 TIMES DAILY
Status: DISCONTINUED | OUTPATIENT
Start: 2021-09-02 | End: 2021-09-03

## 2021-09-02 RX ORDER — POTASSIUM CHLORIDE 20 MEQ/1
40 TABLET, EXTENDED RELEASE ORAL ONCE
Status: COMPLETED | OUTPATIENT
Start: 2021-09-02 | End: 2021-09-02

## 2021-09-02 RX ORDER — HEPARIN SODIUM 10000 [USP'U]/100ML
5-30 INJECTION, SOLUTION INTRAVENOUS CONTINUOUS
Status: DISCONTINUED | OUTPATIENT
Start: 2021-09-02 | End: 2021-09-05

## 2021-09-02 RX ORDER — FUROSEMIDE 10 MG/ML
60 INJECTION INTRAMUSCULAR; INTRAVENOUS 2 TIMES DAILY
Status: DISCONTINUED | OUTPATIENT
Start: 2021-09-02 | End: 2021-09-02

## 2021-09-02 RX ADMIN — FUROSEMIDE 40 MG: 10 INJECTION, SOLUTION INTRAMUSCULAR; INTRAVENOUS at 10:07

## 2021-09-02 RX ADMIN — Medication 10 ML: at 20:19

## 2021-09-02 RX ADMIN — ASPIRIN 81 MG: 81 TABLET, CHEWABLE ORAL at 13:11

## 2021-09-02 RX ADMIN — HEPARIN SODIUM 16 UNITS/KG/HR: 10000 INJECTION, SOLUTION INTRAVENOUS at 12:30

## 2021-09-02 RX ADMIN — Medication 10 ML: at 10:08

## 2021-09-02 RX ADMIN — HEPARIN SODIUM 4000 UNITS: 1000 INJECTION INTRAVENOUS; SUBCUTANEOUS at 11:41

## 2021-09-02 RX ADMIN — INSULIN LISPRO 1 UNITS: 100 INJECTION, SOLUTION INTRAVENOUS; SUBCUTANEOUS at 20:12

## 2021-09-02 RX ADMIN — COLLAGENASE SANTYL: 250 OINTMENT TOPICAL at 18:06

## 2021-09-02 RX ADMIN — OXYCODONE HYDROCHLORIDE AND ACETAMINOPHEN 1 TABLET: 5; 325 TABLET ORAL at 20:19

## 2021-09-02 RX ADMIN — TIOTROPIUM BROMIDE AND OLODATEROL 2 PUFF: 3.124; 2.736 SPRAY, METERED RESPIRATORY (INHALATION) at 09:19

## 2021-09-02 RX ADMIN — PREDNISONE 20 MG: 20 TABLET ORAL at 10:07

## 2021-09-02 RX ADMIN — BUDESONIDE 500 MCG: 0.5 SUSPENSION RESPIRATORY (INHALATION) at 09:19

## 2021-09-02 RX ADMIN — AMITRIPTYLINE HYDROCHLORIDE 75 MG: 50 TABLET, FILM COATED ORAL at 21:05

## 2021-09-02 RX ADMIN — OXYCODONE HYDROCHLORIDE AND ACETAMINOPHEN 1 TABLET: 5; 325 TABLET ORAL at 10:07

## 2021-09-02 RX ADMIN — GABAPENTIN 300 MG: 300 CAPSULE ORAL at 10:07

## 2021-09-02 RX ADMIN — FUROSEMIDE 60 MG: 10 INJECTION, SOLUTION INTRAMUSCULAR; INTRAVENOUS at 18:32

## 2021-09-02 RX ADMIN — GABAPENTIN 300 MG: 300 CAPSULE ORAL at 20:19

## 2021-09-02 RX ADMIN — POTASSIUM CHLORIDE 40 MEQ: 1500 TABLET, EXTENDED RELEASE ORAL at 10:07

## 2021-09-02 ASSESSMENT — PAIN DESCRIPTION - ORIENTATION
ORIENTATION: LEFT

## 2021-09-02 ASSESSMENT — PAIN - FUNCTIONAL ASSESSMENT
PAIN_FUNCTIONAL_ASSESSMENT: PREVENTS OR INTERFERES SOME ACTIVE ACTIVITIES AND ADLS
PAIN_FUNCTIONAL_ASSESSMENT: ACTIVITIES ARE NOT PREVENTED
PAIN_FUNCTIONAL_ASSESSMENT: PREVENTS OR INTERFERES SOME ACTIVE ACTIVITIES AND ADLS

## 2021-09-02 ASSESSMENT — PAIN DESCRIPTION - PROGRESSION
CLINICAL_PROGRESSION: NOT CHANGED

## 2021-09-02 ASSESSMENT — PAIN DESCRIPTION - DESCRIPTORS
DESCRIPTORS: ACHING;DISCOMFORT
DESCRIPTORS: ACHING
DESCRIPTORS: ACHING

## 2021-09-02 ASSESSMENT — ENCOUNTER SYMPTOMS
GASTROINTESTINAL NEGATIVE: 1
NAUSEA: 0
WHEEZING: 0
ABDOMINAL PAIN: 0
VOMITING: 0
EYES NEGATIVE: 1
CHOKING: 0
SHORTNESS OF BREATH: 1
STRIDOR: 0
CHEST TIGHTNESS: 0
COUGH: 0
DIARRHEA: 0
CONSTIPATION: 0

## 2021-09-02 ASSESSMENT — PAIN DESCRIPTION - FREQUENCY
FREQUENCY: CONTINUOUS
FREQUENCY: INTERMITTENT
FREQUENCY: CONTINUOUS

## 2021-09-02 ASSESSMENT — PAIN DESCRIPTION - PAIN TYPE
TYPE: CHRONIC PAIN

## 2021-09-02 ASSESSMENT — PAIN DESCRIPTION - ONSET
ONSET: ON-GOING

## 2021-09-02 ASSESSMENT — PAIN SCALES - GENERAL
PAINLEVEL_OUTOF10: 5
PAINLEVEL_OUTOF10: 9
PAINLEVEL_OUTOF10: 9
PAINLEVEL_OUTOF10: 0
PAINLEVEL_OUTOF10: 6
PAINLEVEL_OUTOF10: 4

## 2021-09-02 ASSESSMENT — PAIN DESCRIPTION - LOCATION
LOCATION: RIB CAGE
LOCATION: RIB CAGE
LOCATION: FLANK
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
## 2021-09-02 NOTE — RT PROTOCOL NOTE
using Per Protocol order mode. Repeat RT Therapy Protocol Assessment with second treatment then BID and as needed. 11-13 - discontinue any other Inpatient aerosolized bronchodilator medication orders and enter DuoNeb Nebulizer order with QID frequency and an Albuterol Nebulizer order with frequency of every 2 hours PRN wheezing or increased work of breathing using Per Protocol order mode. Repeat RT Therapy Protocol Assessment with second treatment then QID and as needed. Greater than 13 - discontinue any other Inpatient aerosolized bronchodilator medication orders and enter a DuoNeb Nebulizer order with every 4 hours frequency and an Albuterol Nebulizer order with frequency of every 2 hours PRN wheezing or increased work of breathing using Per Protocol order mode. Repeat RT Therapy Protocol Assessment with second treatment then every 4 hours and as needed. RT to enter RT Home Evaluation for COPD & MDI Assessment order using Per Protocol order mode.     Electronically signed by Evie Leslie RCP on 9/2/2021 at 3:39 AM
Patient/Caregiver provided printed discharge information.

## 2021-09-02 NOTE — CARE COORDINATION
SW Following, Pt from home w/friends, Pt is active w/[employer] [facility] [employer] and [employer] for home aides 2x week for 4 hrs. Pt plans to return home at [place] and resume services, Pt will need transport. Pt is on a Lasix gtt and has an ECHO pending, card is following.      Electronically signed by NAEEM Alejandra, ESTRELLA on 9/2/2021 at 10:49 AM   [phone]
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
## 2021-09-02 NOTE — PROGRESS NOTES
Patient received to room 6322 from the ED. Pt A&O x4 upon arrival.Tele monitor applied, rate and rhythm verified with monitor reader. NSR. VSS. On 1 L O2 with SpO2 94%. Oriented to room, staff, and call system. Educated on fall protocol and hourly rounding. Assessment as documented. Heparin gtt infusing @ 12 units/kg/hr. Bed locked in low position. Pt informed to utilize call light with any needs, verbalized understanding. Call light within reach. Hourly rounding in place. Will continue to monitor.

## 2021-09-02 NOTE — PLAN OF CARE
Dressing removed from left shoulder. Santyl and moist dressing applied to chronic wound, per wound care nurse instructions. Tolerated well.

## 2021-09-02 NOTE — PROGRESS NOTES
Pt aPTT 67.2. No change in rate/dose of heparin gtt needed per [facility]. Heparin gtt still infusing @ 12 units/kg/hr. Next aPTT due at 0845.

## 2021-09-02 NOTE — CONSULTS
Nutrition Education    Received consult for diet education. RD visited pt in room. Discussed low sodium and low fat tips including healthier meal preparation methods, shopping tips, food label reading, and meal ideas. Provided handouts from [employer]: Low Sodium Diet Guidelines, Sodium-Free Flavoring Tips, and Sample Meal Ideas. · Verbally reviewed information with Patient  · Educated on low sodium diet guidelines and heart healthy diet tips. · Written educational materials provided. · Contact name and number provided. · Refer to Patient Education activity for more details.     Electronically signed by Verlene Fabry, MS, RD, LD on 9/2/21 at 3:48 PM EDT    Verlene Fabry, [address] [place] [address], LD:    [place]: [address] [place] [address]  Office:  [phone]

## 2021-09-03 ENCOUNTER — TELEPHONE (OUTPATIENT)
Dept: PULMONOLOGY | Age: 71
End: 2021-09-03

## 2021-09-03 LAB
ANION GAP SERPL CALCULATED.3IONS-SCNC: 11 MMOL/L (ref 3–16)
APTT: 72.9 SEC (ref 26.2–38.6)
APTT: 79.2 SEC (ref 26.2–38.6)
APTT: 84.8 SEC (ref 26.2–38.6)
BASOPHILS ABSOLUTE: 0.1 K/UL (ref 0–0.2)
BASOPHILS RELATIVE PERCENT: 0.8 %
BUN BLDV-MCNC: 30 MG/DL (ref 7–20)
CALCIUM SERPL-MCNC: 8.4 MG/DL (ref 8.3–10.6)
CHLORIDE BLD-SCNC: 100 MMOL/L (ref 99–110)
CO2: 26 MMOL/L (ref 21–32)
CREAT SERPL-MCNC: 2.4 MG/DL (ref 0.6–1.2)
EOSINOPHILS ABSOLUTE: 0 K/UL (ref 0–0.6)
EOSINOPHILS RELATIVE PERCENT: 0.2 %
GFR AFRICAN AMERICAN: 24
GFR NON-AFRICAN AMERICAN: 20
GLUCOSE BLD-MCNC: 101 MG/DL (ref 70–99)
GLUCOSE BLD-MCNC: 142 MG/DL (ref 70–99)
GLUCOSE BLD-MCNC: 253 MG/DL (ref 70–99)
GLUCOSE BLD-MCNC: 297 MG/DL (ref 70–99)
GLUCOSE BLD-MCNC: 98 MG/DL (ref 70–99)
HCT VFR BLD CALC: 25.7 % (ref 36–48)
HEMOGLOBIN: 8.1 G/DL (ref 12–16)
LYMPHOCYTES ABSOLUTE: 3.7 K/UL (ref 1–5.1)
LYMPHOCYTES RELATIVE PERCENT: 25.8 %
MAGNESIUM: 2 MG/DL (ref 1.8–2.4)
MCH RBC QN AUTO: 21.5 PG (ref 26–34)
MCHC RBC AUTO-ENTMCNC: 31.5 G/DL (ref 31–36)
MCV RBC AUTO: 68.2 FL (ref 80–100)
MONOCYTES ABSOLUTE: 0.6 K/UL (ref 0–1.3)
MONOCYTES RELATIVE PERCENT: 4.2 %
NEUTROPHILS ABSOLUTE: 9.8 K/UL (ref 1.7–7.7)
NEUTROPHILS RELATIVE PERCENT: 69 %
PDW BLD-RTO: 17.2 % (ref 12.4–15.4)
PERFORMED ON: ABNORMAL
PLATELET # BLD: 297 K/UL (ref 135–450)
PMV BLD AUTO: 7.4 FL (ref 5–10.5)
POTASSIUM SERPL-SCNC: 4.2 MMOL/L (ref 3.5–5.1)
RBC # BLD: 3.78 M/UL (ref 4–5.2)
SODIUM BLD-SCNC: 137 MMOL/L (ref 136–145)
WBC # BLD: 14.2 K/UL (ref 4–11)

## 2021-09-03 PROCEDURE — 2580000003 HC RX 258: Performed by: STUDENT IN AN ORGANIZED HEALTH CARE EDUCATION/TRAINING PROGRAM

## 2021-09-03 PROCEDURE — 6370000000 HC RX 637 (ALT 250 FOR IP): Performed by: STUDENT IN AN ORGANIZED HEALTH CARE EDUCATION/TRAINING PROGRAM

## 2021-09-03 PROCEDURE — 6360000002 HC RX W HCPCS: Performed by: STUDENT IN AN ORGANIZED HEALTH CARE EDUCATION/TRAINING PROGRAM

## 2021-09-03 PROCEDURE — 36415 COLL VENOUS BLD VENIPUNCTURE: CPT

## 2021-09-03 PROCEDURE — 83735 ASSAY OF MAGNESIUM: CPT

## 2021-09-03 PROCEDURE — 85730 THROMBOPLASTIN TIME PARTIAL: CPT

## 2021-09-03 PROCEDURE — 1200000000 HC SEMI PRIVATE

## 2021-09-03 PROCEDURE — 99233 SBSQ HOSP IP/OBS HIGH 50: CPT | Performed by: INTERNAL MEDICINE

## 2021-09-03 PROCEDURE — 6370000000 HC RX 637 (ALT 250 FOR IP): Performed by: INTERNAL MEDICINE

## 2021-09-03 PROCEDURE — 99232 SBSQ HOSP IP/OBS MODERATE 35: CPT | Performed by: NURSE PRACTITIONER

## 2021-09-03 PROCEDURE — 80048 BASIC METABOLIC PNL TOTAL CA: CPT

## 2021-09-03 PROCEDURE — 85025 COMPLETE CBC W/AUTO DIFF WBC: CPT

## 2021-09-03 PROCEDURE — 94761 N-INVAS EAR/PLS OXIMETRY MLT: CPT

## 2021-09-03 PROCEDURE — 94640 AIRWAY INHALATION TREATMENT: CPT

## 2021-09-03 RX ORDER — CLOPIDOGREL BISULFATE 75 MG/1
75 TABLET ORAL DAILY
Status: DISCONTINUED | OUTPATIENT
Start: 2021-09-04 | End: 2021-09-05

## 2021-09-03 RX ORDER — ATORVASTATIN CALCIUM 10 MG/1
10 TABLET, FILM COATED ORAL NIGHTLY
Status: DISCONTINUED | OUTPATIENT
Start: 2021-09-03 | End: 2021-09-06 | Stop reason: HOSPADM

## 2021-09-03 RX ORDER — CLOPIDOGREL BISULFATE 75 MG/1
300 TABLET ORAL ONCE
Status: COMPLETED | OUTPATIENT
Start: 2021-09-03 | End: 2021-09-03

## 2021-09-03 RX ADMIN — TIOTROPIUM BROMIDE AND OLODATEROL 2 PUFF: 3.124; 2.736 SPRAY, METERED RESPIRATORY (INHALATION) at 21:08

## 2021-09-03 RX ADMIN — PREDNISONE 20 MG: 20 TABLET ORAL at 08:39

## 2021-09-03 RX ADMIN — INSULIN LISPRO 3 UNITS: 100 INJECTION, SOLUTION INTRAVENOUS; SUBCUTANEOUS at 13:07

## 2021-09-03 RX ADMIN — Medication 10 ML: at 08:39

## 2021-09-03 RX ADMIN — COLLAGENASE SANTYL: 250 OINTMENT TOPICAL at 08:48

## 2021-09-03 RX ADMIN — GABAPENTIN 300 MG: 300 CAPSULE ORAL at 08:39

## 2021-09-03 RX ADMIN — BUDESONIDE 500 MCG: 0.5 SUSPENSION RESPIRATORY (INHALATION) at 08:13

## 2021-09-03 RX ADMIN — CLOPIDOGREL BISULFATE 300 MG: 75 TABLET ORAL at 12:54

## 2021-09-03 RX ADMIN — INSULIN LISPRO 1 UNITS: 100 INJECTION, SOLUTION INTRAVENOUS; SUBCUTANEOUS at 16:47

## 2021-09-03 RX ADMIN — OXYCODONE HYDROCHLORIDE AND ACETAMINOPHEN 1 TABLET: 5; 325 TABLET ORAL at 08:39

## 2021-09-03 RX ADMIN — BUDESONIDE 500 MCG: 0.5 SUSPENSION RESPIRATORY (INHALATION) at 21:07

## 2021-09-03 RX ADMIN — ASPIRIN 81 MG: 81 TABLET, CHEWABLE ORAL at 08:39

## 2021-09-03 RX ADMIN — TIOTROPIUM BROMIDE AND OLODATEROL 2 PUFF: 3.124; 2.736 SPRAY, METERED RESPIRATORY (INHALATION) at 08:13

## 2021-09-03 RX ADMIN — HEPARIN SODIUM 14 UNITS/KG/HR: 10000 INJECTION, SOLUTION INTRAVENOUS at 00:28

## 2021-09-03 ASSESSMENT — ENCOUNTER SYMPTOMS
SHORTNESS OF BREATH: 0
COUGH: 0
ABDOMINAL PAIN: 0
DIARRHEA: 0
RESPIRATORY NEGATIVE: 1
CONSTIPATION: 0
GASTROINTESTINAL NEGATIVE: 1
ABDOMINAL DISTENTION: 0
WHEEZING: 0
VOMITING: 0
NAUSEA: 0
STRIDOR: 0

## 2021-09-03 ASSESSMENT — PAIN SCALES - GENERAL
PAINLEVEL_OUTOF10: 0
PAINLEVEL_OUTOF10: 0
PAINLEVEL_OUTOF10: 6
PAINLEVEL_OUTOF10: 0

## 2021-09-03 ASSESSMENT — PAIN DESCRIPTION - PROGRESSION
CLINICAL_PROGRESSION: NOT CHANGED
CLINICAL_PROGRESSION: NOT CHANGED

## 2021-09-03 NOTE — PROGRESS NOTES
VSS overnight. NSR on tele. Pt has been NPO since midnight for [facility] today. Consent for procedure obtained and placed in pt chart. Heparin gtt still infusing @ 14 units/kg/hr. No acute events overnight.

## 2021-09-03 NOTE — PROGRESS NOTES
Pt aPTT 103. 3. Heparin gtt decreased by 2 units/kg/hr per MAR. Heparin gtt now infusing @ 14 units/kg/hr (9.81 mL/hr).

## 2021-09-03 NOTE — PROGRESS NOTES
Pt aPTT 84.8. No change in rate/dose of Heparin gtt needed per [facility]. Heparin gtt still infusing @ 14 units/kg/hr. Next aPTT scheduled for 0845.

## 2021-09-03 NOTE — PROGRESS NOTES
Progress Note    Admit Date: 9/1/2021  Day: 2  Diet: ADULT DIET; Regular; Low Sodium (2 gm); Low Potassium (Less than 3000 mg/day)    CC: SOB and Chest Pain    Interval history: No significant events over night. Pt still c/o chest pain in mid-sternal region that does not radiate, and is reproducible to palpation. Pt's SOB has improved, she is on her home regiment of 2L of supplemental oxygen. LE pitting edema has improved and not noticeable. Lasix discontinued, as pt not volume overloaded at this time. Pt had increase in her Cr in last 24 hours, Cardiology will defer heart catheter until Sunday in setting of MARIS.     Cr 2.4, increased from 1.8 on 9/2  UO: 1.875 L, with 2 unmeasured output      Medications:     Scheduled Meds:   clopidogrel  300 mg Oral Once    [START ON 9/4/2021] clopidogrel  75 mg Oral Daily    [Held by provider] losartan  12.5 mg Oral Daily    aspirin  81 mg Oral Daily    collagenase   Topical Daily    amitriptyline  75 mg Oral Nightly    gabapentin  300 mg Oral BID    oxyCODONE-acetaminophen  1 tablet Oral BID    predniSONE  20 mg Oral Daily    sodium chloride flush  5-40 mL IntraVENous 2 times per day    insulin lispro  0-6 Units SubCUTAneous TID WC    insulin lispro  0-3 Units SubCUTAneous Nightly    budesonide  0.5 mg Nebulization BID    tiotropium-olodaterol  2 puff Inhalation BID     Continuous Infusions:   heparin (PORCINE) Infusion 14 Units/kg/hr (09/03/21 0028)    sodium chloride      dextrose       PRN Meds:albuterol, sodium chloride flush, sodium chloride, ondansetron **OR** ondansetron, polyethylene glycol, acetaminophen **OR** acetaminophen, perflutren lipid microspheres, glucose, dextrose, glucagon (rDNA), dextrose, heparin (porcine), heparin (porcine), labetalol    Objective:   Vitals:   T-max:  Patient Vitals for the past 8 hrs:   BP Temp Temp src Pulse Resp SpO2 Weight   09/03/21 0839 117/70 98.3 °F (36.8 °C) Oral 86 18 98 %    09/03/21 0814      95 %  09/03/21 0600       153 lb (69.4 kg)   09/03/21 0455 (!) 158/93 98.2 °F (36.8 °C) Oral 84 18 96 %        Intake/Output Summary (Last 24 hours) at 9/3/2021 0947  Last data filed at 9/3/2021 0502  Gross per 24 hour   Intake 987.8 ml   Output 1875 ml   Net -887.2 ml       Review of Systems   Constitutional: Negative for chills, diaphoresis and fever. Eyes: Negative for visual disturbance. Respiratory: Negative for cough, shortness of breath and wheezing. Cardiovascular: Positive for chest pain. Negative for leg swelling. Gastrointestinal: Negative for abdominal pain, constipation, diarrhea, nausea and vomiting. Musculoskeletal: Negative for arthralgias and myalgias. Neurological: Negative for light-headedness and headaches. Physical Exam  Constitutional:       General: She is not in acute distress. Appearance: She is obese. She is not ill-appearing or diaphoretic. HENT:      Head: Normocephalic and atraumatic. Eyes:      Extraocular Movements: Extraocular movements intact. Comments: EOMI by observation   Cardiovascular:      Rate and Rhythm: Normal rate and regular rhythm. Heart sounds: No murmur heard. No friction rub. No gallop. Pulmonary:      Effort: Pulmonary effort is normal.      Breath sounds: Wheezing present. No rhonchi or rales. Abdominal:      General: Bowel sounds are normal. There is no distension. Palpations: Abdomen is soft. Tenderness: There is no abdominal tenderness. There is no guarding. Musculoskeletal:         General: No swelling. Right lower leg: No edema. Left lower leg: No edema. Neurological:      Mental Status: She is alert and oriented to person, place, and time.          LABS:    CBC:   Recent Labs     09/01/21  1942 09/02/21  0426 09/03/21  0314   WBC 11.8* 9.8 14.2*   HGB 8.3* 8.6* 8.1*   HCT 26.1* 27.7* 25.7*    292 297   MCV 67.5* 69.3* 68.2*     Renal:    Recent Labs     09/01/21  1419 09/02/21  0426 09/03/21  0314   * 138 137   K 4.0 3.5 4.2   CL 99 102 100   CO2 24 23 26   BUN 28* 26* 30*   CREATININE 1.7* 1.8* 2.4*   GLUCOSE 106* 82 98   CALCIUM 9.1 8.6 8.4   MG  --  2.10 2.00   ANIONGAP 12 13 11     Hepatic: No results for input(s): AST, ALT, BILITOT, BILIDIR, PROT, LABALBU, ALKPHOS in the last 72 hours. Troponin:   Recent Labs     09/01/21  1419 09/01/21  1934 09/01/21  2358   TROPONINI 0.11* 0.10* 0.12*     BNP: No results for input(s): BNP in the last 72 hours. Lipids:   Recent Labs     09/02/21  0426   CHOL 148   HDL 80*     ABGs:  No results for input(s): PHART, USL7JJM, PO2ART, VTB0VTP, BEART, THGBART, [id number], XYM0IXE in the last 72 hours. INR:   Recent Labs     09/01/21 1942   INR 0.97     Lactate: No results for input(s): LACTATE in the last 72 hours. Cultures:  -----------------------------------------------------------------  RAD:   XR CHEST PORTABLE   Final Result      Hyperinflation compatible with known emphysema, with associated mild diffuse interstitial prominence similar to 7/1/2021. No acute consolidation. Stable cardiac mediastinal silhouette. Assessment/Plan:     New Onset CHF  Chest Pain  Troponinemia  Pt with two days duration of mid-sternal dull aching chest pain, SOB and pitting edema. Troponin 0.12, 0.10, 0.11, Pro-, EKG and CXR on admission had no pertinent findings. Last echo EF 60% on 04/2021. Stress test on 04/2021 displayed EF 78%, NL myocardial perfusion and no evidence for ischemia. Echo from 9/02 unrevealing of etiology. Cardiology will do a heart cath on Sunday 9/05 and if results are unrevealing will consider her chronic conditions of her lungs and kidneys as the possible cause of pt's increase in SOB and edema.     -Discontinued Lasix 9/03  -Heart Cath Sunday 9/05  -Telemetry  -Daily weight  -Strict I/O  -Low sodium diet  -Cardiology following     MARIS on CKD  CKD  Cr 2.4 (9/03), increased from 1.8 on the prior day.  Pt likely has MARIS

## 2021-09-03 NOTE — TELEPHONE ENCOUNTER
Called pt She is in patient at [facility] pt moved last Thursday and she lost her inhaler and ? What she should do.  Told her to call pharmacy and let them know this to see if they do an override for her insurance to pay for a new one

## 2021-09-03 NOTE — PROGRESS NOTES
Cardiology Progress Note    Admit Date: 9/1/2021  IV Access:Peripheral  IV Fluids:None  Vasopressors:None                Antibiotics: None  Diet: Diet NPO Exceptions are: Sips of Water with Meds    CC: Chest pain     Interval history: No acute events reported overnight. She does have an MARIS. Breathing improved       Medications:     Scheduled Meds:   clopidogrel  300 mg Oral Once    [START ON 9/4/2021] clopidogrel  75 mg Oral Daily    [Held by provider] losartan  12.5 mg Oral Daily    aspirin  81 mg Oral Daily    collagenase   Topical Daily    amitriptyline  75 mg Oral Nightly    gabapentin  300 mg Oral BID    oxyCODONE-acetaminophen  1 tablet Oral BID    predniSONE  20 mg Oral Daily    sodium chloride flush  5-40 mL IntraVENous 2 times per day    insulin lispro  0-6 Units SubCUTAneous TID WC    insulin lispro  0-3 Units SubCUTAneous Nightly    budesonide  0.5 mg Nebulization BID    tiotropium-olodaterol  2 puff Inhalation BID     Continuous Infusions:   heparin (PORCINE) Infusion 14 Units/kg/hr (09/03/21 0028)    sodium chloride      dextrose       PRN Meds:albuterol, sodium chloride flush, sodium chloride, ondansetron **OR** ondansetron, polyethylene glycol, acetaminophen **OR** acetaminophen, perflutren lipid microspheres, glucose, dextrose, glucagon (rDNA), dextrose, heparin (porcine), heparin (porcine), labetalol    Objective:   Vitals:   T-max:  Patient Vitals for the past 8 hrs:   BP Temp Temp src Pulse Resp SpO2 Weight   09/03/21 0839 117/70 98.3 °F (36.8 °C) Oral 86 18 98 %    09/03/21 0814      95 %    09/03/21 0600       153 lb (69.4 kg)   09/03/21 0455 (!) 158/93 98.2 °F (36.8 °C) Oral 84 18 96 %        Intake/Output Summary (Last 24 hours) at 9/3/2021 0938  Last data filed at 9/3/2021 0502  Gross per 24 hour   Intake 987.8 ml   Output 1875 ml   Net -887.2 ml       Review of Systems   Constitutional: Negative. HENT: Negative. Respiratory: Negative.   Negative for addendum:  History and exam reviewed. Still intermittent pain. EF showed hyperdynamic systolic function. Breathing better. Edema much better. PE as above. A/P:  72 y.o. here for chest pain and chf. Has elevated Tn, recent neg stress. -NSTEMI/UA  -Acute diastolic chf  -COPD  -CKD  -DM  -DVT  -PE    Plan/Recommendations:  -Hold lasix  -Hold Eliquis  -ASA, load plavix with 300 mg daily and then add 75 mg daily  -Avoid nephrotoxins  -I'm not sure this is a \"true\" ARF; I wonder if her Cr is just in the mid 2's when she's euvolemic. Will watch Cr for a day, and plan for poss cath over the weekend. -D/W Dr. Bhuim Benavidez and Dr. Giana Fernández.  Cheikh Alvarado MD, [facility] [place] [place]

## 2021-09-03 NOTE — PROGRESS NOTES
CC: SOB and chest pain   HPI:     S: has intermittent chest pain. Breathing and edema improved. Tele: Sinus    O:  Physical Exam:  /70   Pulse 86   Temp 98.3 °F (36.8 °C) (Oral)   Resp 18   Ht 4' 11\" (1.499 m)   Wt 153 lb (69.4 kg)   LMP  (LMP Unknown)   SpO2 98%   BMI 30.90 kg/m²    General (appearance):  No acute distress  Eyes: anicteric   Neck: soft, No JVD  Ears/Nose/Mouth/Thorat: No cyanosis  CV: RRR   Respiratory:  RRR  GI: soft, non-tender, non-distended  Skin: Warm, dry. No rashes  Neuro/Psych: Alert and oriented x 3. Appropriate behavior  Ext:  No c/c. Pulses:  2+ radial     I.O's= -1.7 L     Weight  Admission: Weight: 148 lb (67.1 kg)   Today: Weight: 153 lb (69.4 kg)    CBC: Recent Labs     21  0314   WBC 11.8* 9.8 14.2*   HGB 8.3* 8.6* 8.1*   HCT 26.1* 27.7* 25.7*   MCV 67.5* 69.3* 68.2*    292 297     BMP:   Recent Labs     21  14121  0426 21  0314   * 138 137   K 4.0 3.5 4.2   CL 99 102 100   CO2 24 23 26   BUN 28* 26* 30*   CREATININE 1.7* 1.8* 2.4*     Mag:   Lab Results   Component Value Date    MG 2.00 2021     PT/INR:   Recent Labs     21   PROTIME 11.0   INR 0.97     Pro-BNP:   Lab Results   Component Value Date    PROBNP 434 2021    PROBNP 284 2021    PROBNP 259 2021     CARDIAC ENZYMES:   Recent Labs     21  1419 21  19321  2358   TROPONINI 0.11* 0.10* 0.12*         Imagin2021 Echo:   Technically difficult study. Small left ventricular cavity size. There is left ventricular hypertrophy. Overall left ventricular systolic function appears hyperdynamic with an   ejection fraction of >65%. No regional wall motion abnormalities are noted. Indeterminate diastolic function. No significant valvular regurgitation or stenosis was noted.     2021 Nuc stress:      *No EKG evidence for ischemia with lexiscan    *Normal LV function with EF of 78%  *Normal myocardial perfusion       Assessment:  72 y.o. with chest pain and CHF. Has elevated Tn. Recent neg stress test  Issues:  -NSTEMI/UA  -Acute HFpEF  -MARIS/CKD'  -COPD  -DM  -PE/DVT    Plan:  -Diuretics and losartan on hold d/t ARF  -Hold eliquis for cath. -ASA, plavix  -Heparin gtt  -Lipitor 10 mg po qHS. LDL 43    Discussed with Dr Latoya Choi and Dr Denver Hughes. No plan for cath today d/t elevated creatinine. We will continue to monitor. Plan for cath on Sunday if renal function reasonable.

## 2021-09-03 NOTE — PLAN OF CARE
Problem: OXYGENATION/RESPIRATORY FUNCTION  Goal: Patient will achieve/maintain normal respiratory rate/effort  Description: Respiratory rate and effort will be within normal limits for the patient  9/3/2021 0318 by Shelly Freeman RN  Outcome: Ongoing  Note: Pt remains on 2 L O2 with SpO2 95-98%. Expiratory wheezing noted to lungs bilaterally. Pt experiences SOB on exertion. Will continue to monitor. Problem: HEMODYNAMIC STATUS  Goal: Patient has stable vital signs and fluid balance  9/3/2021 0318 by Shelly Freeman RN  Outcome: Ongoing  Note: Pt has some pitting edema to BLE. Receiving IVP lasix bid. Pt has had adequate urine output so far throughout shift. Will continue to monitor. Problem: Pain:  Goal: Control of chronic pain  Description: Control of chronic pain  9/3/2021 0318 by Shelly Freeman RN  Outcome: Ongoing  Note: Pt receiving scheduled percocet for chronic pain to her left ribcage. Pain has been controlled by scheduled percocet. Pt has not c/o of any chest pain so far throughout shift. Will continue to monitor and reassess pain. Problem: Skin Integrity:  Goal: Absence of new skin breakdown  Description: Absence of new skin breakdown  9/3/2021 0318 by Shelly Freeman RN  Outcome: Ongoing  Note: Dressing to chronic wound on left shoulder remains clean, dry, and intact. Mepilex in place to posterior thighs remain dry and intact. No new signs of skin breakdown noted. Assisted with repositioning as needed. Will continue to monitor. Problem: Falls - Risk of:  Goal: Will remain free from falls  Description: Will remain free from falls  9/3/2021 0318 by Shelly Freeman RN  Outcome: Ongoing  Note: Fall precautions in place. Bed locked in lowest position with side rails up x2. Bed exit alarm in use. Nonskid footwear in use. Pt educated on call light system and instructed to use call light prior to getting up. Pt calls out appropriately for needs.  Call light and personal belongings within reach. Pt up with assist x1 to bedside commode and tolerates fairly well. Hourly rounding in place.

## 2021-09-03 NOTE — CARE COORDINATION
SW Following, Pt from home w/friends, Pt is active w/[facility] and [employer] for home aides 2x week for 4 hrs. Pt plans to return home at DC and resume services, Pt will need transport. Pt to have [facility] on Sunday, SW will continue to follow for DC needs/recs.     Electronically signed by NAEEM Piedra, LSW on 9/3/2021 at 12:23 PM   [phone]

## 2021-09-03 NOTE — TELEPHONE ENCOUNTER
Pt called and left vm asking for a call back.   Looks like pt is currently in hospital.     # [phone]

## 2021-09-03 NOTE — PROGRESS NOTES
Office : [phone]     Fax :[phone]         Renal Progress Note  Subjective:   Admit Date: 2021     HPI   Filiberto Elias is a 72 y.o. 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 Diff, OA and has non healing wound on her left shoulder, which is followed by outpatient wound care. The pt is presenting with chest pain and SOB of two days duration. The pt states the chest pain is located in the mid sternum, its quality is a dull ache, has been constant over the past two days, does not radiate, nothing alleviates it and nothing aggravates the chest pain. On exam, it is noted that chest pain is reproducible with palpation.     In addition to this, the pt endorses orthopnea, and has had bilateral swelling of her LE for the past week. The pt has recently moved during the past two days and states, \"I have been worked up for the past couple days, crying a lot. \" Pt just recently moved in with a friend, after the passing of her boyfriend. Interval History:  Continues to have leg swelling, but believes this has improved. Chest pain still present and unchanged from admission. Creatinine increased from 1.8 to 2.4, likely due to diuresis  No hx of heart failure although mom  of HF in her 42's  Endorses chest pain, hip pain  Denies fevers, chills, N/V, abdominal pain, light-headedness, SOB,      DIET Diet NPO Exceptions are: Sips of Water with Meds  ADULT DIET; Regular; Low Fat/Low Chol/High Fiber/2 gm Na;  Low Potassium (Less than 3000 mg/day)  Medications:   Scheduled Meds:   clopidogrel  300 mg Oral Once    [START ON 9/4/2021] clopidogrel  75 mg Oral Daily    [Held by provider] losartan  12.5 mg Oral Daily    aspirin  81 mg Oral Daily    collagenase   Topical Daily    amitriptyline  75 mg Oral Nightly    gabapentin  300 mg Oral BID    oxyCODONE-acetaminophen  1 tablet Oral BID    predniSONE  20 mg Oral Daily    sodium chloride flush  5-40 mL IntraVENous 2 times per day    insulin lispro  0-6 Units SubCUTAneous TID WC    insulin lispro  0-3 Units SubCUTAneous Nightly    budesonide  0.5 mg Nebulization BID    tiotropium-olodaterol  2 puff Inhalation BID     Continuous Infusions:   heparin (PORCINE) Infusion 14 Units/kg/hr (09/03/21 0028)    sodium chloride      dextrose         Labs:  CBC:   Recent Labs     09/01/21 1942 09/02/21 0426 09/03/21 0314   WBC 11.8* 9.8 14.2*   HGB 8.3* 8.6* 8.1*    292 297     BMP:    Recent Labs     09/01/21 1419 09/02/21 0426 09/03/21 0314   * 138 137   K 4.0 3.5 4.2   CL 99 102 100   CO2 24 23 26   BUN 28* 26* 30*   CREATININE 1.7* 1.8* 2.4*   GLUCOSE 106* 82 98     Ca/Mg/Phos:   Recent Labs     09/01/21 1419 09/02/21 0426 09/03/21 0314   CALCIUM 9.1 8.6 8.4   MG  --  2.10 2.00     Hepatic: No results for input(s): AST, ALT, ALB, BILITOT, ALKPHOS in the last 72 hours. Troponin:   Recent Labs     09/01/21 1419 09/01/21 1934 09/01/21  2358   TROPONINI 0.11* 0.10* 0.12*     BNP: No results for input(s): BNP in the last 72 hours. Lipids:   Recent Labs     09/02/21 0426   CHOL 148   TRIG 126   HDL 80*   LDLCALC 43   LABVLDL 25     ABGs: No results for input(s): PHART, PO2ART, YQR3CJS in the last 72 hours. INR:   Recent Labs     09/01/21 1942   INR 0.97     UA:  Recent Labs     09/02/21  1335   LABMICR <1.20      Urine Microscopic: No results for input(s): LABCAST, BACTERIA, COMU, HYALCAST, WBCUA, RBCUA, EPIU in the last 72 hours. Urine Culture: No results for input(s): LABURIN in the last 72 hours.   Urine Chemistry: Recent Labs     09/02/21  1335   LABCREA 29.9       Objective:   Vitals: /70   Pulse 86   Temp 98.3 °F (36.8 °C) (Oral)   Resp 18   Ht 4' 11\" (1.499 m)   Wt 153 lb (69.4 kg)   LMP  (LMP Unknown)   SpO2 98%   BMI 30.90 kg/m²    Wt Readings from Last 3 Encounters:   09/03/21 153 lb (69.4 kg)   08/05/21 149 lb (67.6 kg)   08/02/21 149 lb (67.6 kg)      24HR INTAKE/OUTPUT:      Intake/Output Summary (Last 24 hours) at 9/3/2021 1011  Last data filed at 9/3/2021 0502  Gross per 24 hour   Intake 987.8 ml   Output 1625 ml   Net -637.2 ml     Constitutional:  OAA X3 NAD  Skin: no rash, turgor wnl  Heent:  eomi, mmm  Neck: no bruits or jvd noted  Cardiovascular:  S1, S2 without m/r/g. Chest pain reproducible on palpation  Respiratory: CTA B, mild crackles and decreased breath sounds, but no stridor and improved from yesterday  Abdomen:  +bs, soft, nt, nd  Ext: pitting lower extremity edema present bilaterally. 2+ pitting edema in RLE, 1+ in LLE  Psychiatric: mood and affect appropriate  Musculoskeletal:  Rom, muscular strength intact    Assessment and Plan:       IMAGING:  XR CHEST PORTABLE   Final Result      Hyperinflation compatible with known emphysema, with associated mild diffuse interstitial prominence similar to 7/1/2021. No acute consolidation. Stable cardiac mediastinal silhouette. Assessment/Plan     1. CKD stage 3: baseline Cr 1.8. Creatinine increased 1.8 -> 2.4 overnight. Likely due to diuresis yesterday. Will hold Lasix, start renal diet, and give kidney time to recover before LHC as patient has mild risk for KEVEN. Avoid nephrotoxic agents.     2. Concern for HF: holding Lasix due to increase in creatinine. ECHO showed LV hypertrophy with EF >65%. Prior echo in 4/2021 was largely normal. Troponin persistently elevated >0.10, stable. Per cardiology, due to creatinine will postpone LHC until Sunday. NPO Saturday night.  Gabapentin and home amlodipine could be contributing to LE edema.     3. Acid- base/ Electrolyte imbalance: keep potassium >4, and magnesium >2.  Limit sodium and phosphorus in diet.             Hector Matos MD        Cr worse with diuresis  IVC not dilated on ECHO   Hold Diuresis   If cr better can undergo cath   VOl overload unlikely   Edema multifactorial sec to meds and COPD     D/w Dr Yunior Watters MD

## 2021-09-04 LAB
ALBUMIN SERPL-MCNC: 3.4 G/DL (ref 3.4–5)
ANION GAP SERPL CALCULATED.3IONS-SCNC: 12 MMOL/L (ref 3–16)
ANION GAP SERPL CALCULATED.3IONS-SCNC: 13 MMOL/L (ref 3–16)
APTT: 72.1 SEC (ref 26.2–38.6)
BASOPHILS ABSOLUTE: 0.1 K/UL (ref 0–0.2)
BASOPHILS RELATIVE PERCENT: 0.7 %
BUN BLDV-MCNC: 33 MG/DL (ref 7–20)
BUN BLDV-MCNC: 34 MG/DL (ref 7–20)
CALCIUM SERPL-MCNC: 8.3 MG/DL (ref 8.3–10.6)
CALCIUM SERPL-MCNC: 8.4 MG/DL (ref 8.3–10.6)
CHLORIDE BLD-SCNC: 100 MMOL/L (ref 99–110)
CHLORIDE BLD-SCNC: 100 MMOL/L (ref 99–110)
CO2: 26 MMOL/L (ref 21–32)
CO2: 27 MMOL/L (ref 21–32)
CREAT SERPL-MCNC: 2 MG/DL (ref 0.6–1.2)
CREAT SERPL-MCNC: 2 MG/DL (ref 0.6–1.2)
EOSINOPHILS ABSOLUTE: 0.2 K/UL (ref 0–0.6)
EOSINOPHILS RELATIVE PERCENT: 1.4 %
GFR AFRICAN AMERICAN: 30
GFR AFRICAN AMERICAN: 30
GFR NON-AFRICAN AMERICAN: 25
GFR NON-AFRICAN AMERICAN: 25
GLUCOSE BLD-MCNC: 103 MG/DL (ref 70–99)
GLUCOSE BLD-MCNC: 108 MG/DL (ref 70–99)
GLUCOSE BLD-MCNC: 113 MG/DL (ref 70–99)
GLUCOSE BLD-MCNC: 115 MG/DL (ref 70–99)
GLUCOSE BLD-MCNC: 156 MG/DL (ref 70–99)
GLUCOSE BLD-MCNC: 165 MG/DL (ref 70–99)
GLUCOSE BLD-MCNC: 83 MG/DL (ref 70–99)
HCT VFR BLD CALC: 25.5 % (ref 36–48)
HEMOGLOBIN: 8 G/DL (ref 12–16)
LYMPHOCYTES ABSOLUTE: 3.7 K/UL (ref 1–5.1)
LYMPHOCYTES RELATIVE PERCENT: 25.3 %
MAGNESIUM: 2 MG/DL (ref 1.8–2.4)
MCH RBC QN AUTO: 21 PG (ref 26–34)
MCHC RBC AUTO-ENTMCNC: 31.3 G/DL (ref 31–36)
MCV RBC AUTO: 67 FL (ref 80–100)
MONOCYTES ABSOLUTE: 0.6 K/UL (ref 0–1.3)
MONOCYTES RELATIVE PERCENT: 4.2 %
NEUTROPHILS ABSOLUTE: 9.9 K/UL (ref 1.7–7.7)
NEUTROPHILS RELATIVE PERCENT: 68.4 %
PDW BLD-RTO: 17.1 % (ref 12.4–15.4)
PERFORMED ON: ABNORMAL
PHOSPHORUS: 3.4 MG/DL (ref 2.5–4.9)
PLATELET # BLD: 325 K/UL (ref 135–450)
PMV BLD AUTO: 7.3 FL (ref 5–10.5)
POTASSIUM SERPL-SCNC: 3.6 MMOL/L (ref 3.5–5.1)
POTASSIUM SERPL-SCNC: 3.8 MMOL/L (ref 3.5–5.1)
PRO-BNP: 371 PG/ML (ref 0–124)
RBC # BLD: 3.81 M/UL (ref 4–5.2)
SODIUM BLD-SCNC: 139 MMOL/L (ref 136–145)
SODIUM BLD-SCNC: 139 MMOL/L (ref 136–145)
WBC # BLD: 14.5 K/UL (ref 4–11)

## 2021-09-04 PROCEDURE — 6370000000 HC RX 637 (ALT 250 FOR IP): Performed by: STUDENT IN AN ORGANIZED HEALTH CARE EDUCATION/TRAINING PROGRAM

## 2021-09-04 PROCEDURE — 80069 RENAL FUNCTION PANEL: CPT

## 2021-09-04 PROCEDURE — 83735 ASSAY OF MAGNESIUM: CPT

## 2021-09-04 PROCEDURE — 6370000000 HC RX 637 (ALT 250 FOR IP): Performed by: INTERNAL MEDICINE

## 2021-09-04 PROCEDURE — 99233 SBSQ HOSP IP/OBS HIGH 50: CPT | Performed by: INTERNAL MEDICINE

## 2021-09-04 PROCEDURE — 94640 AIRWAY INHALATION TREATMENT: CPT

## 2021-09-04 PROCEDURE — 2580000003 HC RX 258: Performed by: STUDENT IN AN ORGANIZED HEALTH CARE EDUCATION/TRAINING PROGRAM

## 2021-09-04 PROCEDURE — 94761 N-INVAS EAR/PLS OXIMETRY MLT: CPT

## 2021-09-04 PROCEDURE — 83880 ASSAY OF NATRIURETIC PEPTIDE: CPT

## 2021-09-04 PROCEDURE — 6370000000 HC RX 637 (ALT 250 FOR IP): Performed by: NURSE PRACTITIONER

## 2021-09-04 PROCEDURE — 6360000002 HC RX W HCPCS: Performed by: STUDENT IN AN ORGANIZED HEALTH CARE EDUCATION/TRAINING PROGRAM

## 2021-09-04 PROCEDURE — 85025 COMPLETE CBC W/AUTO DIFF WBC: CPT

## 2021-09-04 PROCEDURE — 85730 THROMBOPLASTIN TIME PARTIAL: CPT

## 2021-09-04 PROCEDURE — 1200000000 HC SEMI PRIVATE

## 2021-09-04 PROCEDURE — 36415 COLL VENOUS BLD VENIPUNCTURE: CPT

## 2021-09-04 PROCEDURE — 2700000000 HC OXYGEN THERAPY PER DAY

## 2021-09-04 RX ADMIN — TIOTROPIUM BROMIDE AND OLODATEROL 2 PUFF: 3.124; 2.736 SPRAY, METERED RESPIRATORY (INHALATION) at 09:10

## 2021-09-04 RX ADMIN — PREDNISONE 20 MG: 20 TABLET ORAL at 09:45

## 2021-09-04 RX ADMIN — ATORVASTATIN CALCIUM 10 MG: 10 TABLET, FILM COATED ORAL at 00:30

## 2021-09-04 RX ADMIN — BUDESONIDE 500 MCG: 0.5 SUSPENSION RESPIRATORY (INHALATION) at 20:42

## 2021-09-04 RX ADMIN — CLOPIDOGREL BISULFATE 75 MG: 75 TABLET ORAL at 09:45

## 2021-09-04 RX ADMIN — GABAPENTIN 300 MG: 300 CAPSULE ORAL at 09:45

## 2021-09-04 RX ADMIN — OXYCODONE HYDROCHLORIDE AND ACETAMINOPHEN 1 TABLET: 5; 325 TABLET ORAL at 09:44

## 2021-09-04 RX ADMIN — OXYCODONE HYDROCHLORIDE AND ACETAMINOPHEN 1 TABLET: 5; 325 TABLET ORAL at 00:34

## 2021-09-04 RX ADMIN — Medication 10 ML: at 09:45

## 2021-09-04 RX ADMIN — COLLAGENASE SANTYL: 250 OINTMENT TOPICAL at 09:45

## 2021-09-04 RX ADMIN — GABAPENTIN 300 MG: 300 CAPSULE ORAL at 00:30

## 2021-09-04 RX ADMIN — BUDESONIDE 500 MCG: 0.5 SUSPENSION RESPIRATORY (INHALATION) at 09:10

## 2021-09-04 RX ADMIN — AMITRIPTYLINE HYDROCHLORIDE 75 MG: 50 TABLET, FILM COATED ORAL at 20:51

## 2021-09-04 RX ADMIN — ATORVASTATIN CALCIUM 10 MG: 10 TABLET, FILM COATED ORAL at 20:51

## 2021-09-04 RX ADMIN — INSULIN LISPRO 1 UNITS: 100 INJECTION, SOLUTION INTRAVENOUS; SUBCUTANEOUS at 20:51

## 2021-09-04 RX ADMIN — Medication 10 ML: at 20:51

## 2021-09-04 RX ADMIN — INSULIN LISPRO 1 UNITS: 100 INJECTION, SOLUTION INTRAVENOUS; SUBCUTANEOUS at 17:03

## 2021-09-04 RX ADMIN — OXYCODONE HYDROCHLORIDE AND ACETAMINOPHEN 1 TABLET: 5; 325 TABLET ORAL at 20:50

## 2021-09-04 RX ADMIN — HEPARIN SODIUM 13.98 UNITS/KG/HR: 10000 INJECTION, SOLUTION INTRAVENOUS at 05:09

## 2021-09-04 RX ADMIN — ASPIRIN 81 MG: 81 TABLET, CHEWABLE ORAL at 09:45

## 2021-09-04 RX ADMIN — HEPARIN SODIUM 13.98 UNITS/KG/HR: 10000 INJECTION, SOLUTION INTRAVENOUS at 21:02

## 2021-09-04 RX ADMIN — GABAPENTIN 300 MG: 300 CAPSULE ORAL at 20:50

## 2021-09-04 RX ADMIN — TIOTROPIUM BROMIDE AND OLODATEROL 2 PUFF: 3.124; 2.736 SPRAY, METERED RESPIRATORY (INHALATION) at 20:43

## 2021-09-04 RX ADMIN — Medication 10 ML: at 00:31

## 2021-09-04 ASSESSMENT — PAIN DESCRIPTION - LOCATION
LOCATION: BACK
LOCATION: BACK;RIB CAGE

## 2021-09-04 ASSESSMENT — ENCOUNTER SYMPTOMS
SHORTNESS OF BREATH: 0
COUGH: 0
DIARRHEA: 0
WHEEZING: 0
NAUSEA: 0
ABDOMINAL PAIN: 0
VOMITING: 0
CONSTIPATION: 0

## 2021-09-04 ASSESSMENT — PAIN DESCRIPTION - PAIN TYPE
TYPE: CHRONIC PAIN
TYPE: CHRONIC PAIN

## 2021-09-04 ASSESSMENT — PAIN SCALES - GENERAL
PAINLEVEL_OUTOF10: 0
PAINLEVEL_OUTOF10: 4
PAINLEVEL_OUTOF10: 0
PAINLEVEL_OUTOF10: 0
PAINLEVEL_OUTOF10: 5
PAINLEVEL_OUTOF10: 5

## 2021-09-04 ASSESSMENT — PAIN DESCRIPTION - ORIENTATION
ORIENTATION: LEFT
ORIENTATION: LOWER

## 2021-09-04 ASSESSMENT — PAIN DESCRIPTION - DESCRIPTORS
DESCRIPTORS: ACHING
DESCRIPTORS: ACHING

## 2021-09-04 ASSESSMENT — PAIN DESCRIPTION - ONSET
ONSET: ON-GOING
ONSET: ON-GOING

## 2021-09-04 ASSESSMENT — PAIN DESCRIPTION - PROGRESSION
CLINICAL_PROGRESSION: NOT CHANGED
CLINICAL_PROGRESSION: GRADUALLY WORSENING

## 2021-09-04 ASSESSMENT — PAIN - FUNCTIONAL ASSESSMENT
PAIN_FUNCTIONAL_ASSESSMENT: PREVENTS OR INTERFERES SOME ACTIVE ACTIVITIES AND ADLS
PAIN_FUNCTIONAL_ASSESSMENT: ACTIVITIES ARE NOT PREVENTED

## 2021-09-04 ASSESSMENT — PAIN DESCRIPTION - FREQUENCY
FREQUENCY: CONTINUOUS
FREQUENCY: INTERMITTENT

## 2021-09-04 NOTE — PROGRESS NOTES
Office : [phone]     Fax :[phone]         Renal Progress Note  Subjective:   Admit Date: 9/1/2021     MARK Granda is a 72 y.o. female with pmhx of DVT/PE (2012) on Eliquis and is compliant with Rx, COPD, CKD Stage 3, Crohn's, syringomyelia, Hx of C. Diff, OA and has non healing wound on her left shoulder, which is followed by outpatient wound care. The pt is presenting with chest pain and SOB of two days duration. The pt states the chest pain is located in the mid sternum, its quality is a dull ache, has been constant over the past two days, does not radiate, nothing alleviates it and nothing aggravates the chest pain. On exam, it is noted that chest pain is reproducible with palpation.     In addition to this, the pt endorses orthopnea, and has had bilateral swelling of her LE for the past week. The pt has recently moved during the past two days and states, \"I have been worked up for the past couple days, crying a lot. \" Pt just recently moved in with a friend, after the passing of her boyfriend. Interval History:      Renal function improved   Edema decreased       DIET Diet NPO Exceptions are: Sips of Water with Meds  ADULT DIET; Regular; Low Fat/Low Chol/High Fiber/2 gm Na; Low Potassium (Less than 3000 mg/day); Low Phosphorus (Less than 1000 mg)  Adult Oral Nutrition Supplement;  Renal Oral Supplement  Medications:   Scheduled Meds:   clopidogrel  75 mg Oral Daily    atorvastatin  10 mg Oral Nightly    [Held by provider] losartan  12.5 mg Oral Daily    aspirin  81 mg Oral Daily    collagenase   Topical Daily    amitriptyline  75 mg Oral Nightly    gabapentin  300 mg Oral BID    oxyCODONE-acetaminophen  1 tablet Oral BID    predniSONE  20 mg Oral Daily    sodium chloride flush  5-40 mL IntraVENous 2 times per day    insulin lispro  0-6 Units SubCUTAneous TID WC    insulin lispro  0-3 Units SubCUTAneous Nightly    budesonide  0.5 mg Nebulization BID    tiotropium-olodaterol  2 puff Inhalation BID     Continuous Infusions:   heparin (PORCINE) Infusion 13.98 Units/kg/hr (09/04/21 0509)    sodium chloride      dextrose         Labs:  CBC:   Recent Labs     09/02/21 0426 09/03/21 0314 09/04/21  0645   WBC 9.8 14.2* 14.5*   HGB 8.6* 8.1* 8.0*    297 325     BMP:    Recent Labs     09/02/21 0426 09/03/21 0314 09/04/21  0645    137 139   K 3.5 4.2 3.8    100 100   CO2 23 26 27   BUN 26* 30* 34*   CREATININE 1.8* 2.4* 2.0*   GLUCOSE 82 98 83     Ca/Mg/Phos:   Recent Labs     09/02/21 0426 09/03/21 0314 09/04/21  0645   CALCIUM 8.6 8.4 8.4   MG 2.10 2.00 2.00   PHOS  --   --  3.4     Hepatic: No results for input(s): AST, ALT, ALB, BILITOT, ALKPHOS in the last 72 hours. Troponin:   Recent Labs     09/01/21  1419 09/01/21  1934 09/01/21  2358   TROPONINI 0.11* 0.10* 0.12*     BNP: No results for input(s): BNP in the last 72 hours. Lipids:   Recent Labs     09/02/21 0426   CHOL 148   TRIG 126   HDL 80*   LDLCALC 43   LABVLDL 25     ABGs: No results for input(s): PHART, PO2ART, BXH4THG in the last 72 hours. INR:   Recent Labs     09/01/21  1942   INR 0.97     UA:  Recent Labs     09/02/21  1335   LABMICR <1.20      Urine Microscopic: No results for input(s): LABCAST, BACTERIA, COMU, HYALCAST, WBCUA, RBCUA, EPIU in the last 72 hours. Urine Culture: No results for input(s): LABURIN in the last 72 hours.   Urine Chemistry:   Recent Labs     09/02/21  1335   LABCREA 29.9       Objective:   Vitals: /84   Pulse 78   Temp 97.9 °F (36.6 °C) (Oral)   Resp 20   Ht 4' 11\" (1.499 m)   Wt 150 lb 5.7 oz (68.2 kg)   LMP (LMP Unknown)   SpO2 98%   BMI 30.37 kg/m²    Wt Readings from Last 3 Encounters:   09/04/21 150 lb 5.7 oz (68.2 kg)   08/05/21 149 lb (67.6 kg)   08/02/21 149 lb (67.6 kg)      24HR INTAKE/OUTPUT:      Intake/Output Summary (Last 24 hours) at 9/4/2021 0928  Last data filed at 9/4/2021 0720  Gross per 24 hour   Intake 983.96 ml   Output 1275 ml   Net -291.04 ml     Constitutional:  OAA X3 NAD  Skin: no rash, turgor wnl  Heent:  eomi, mmm  Neck: no bruits or jvd noted  Cardiovascular:  S1, S2 without m/r/g. Chest pain reproducible on palpation  Respiratory: CTA B, mild crackles and decreased breath sounds, but no stridor and improved from yesterday  Abdomen:  +bs, soft, nt, nd  Ext: pitting lower extremity edema present bilaterally. 2+ pitting edema in RLE, 1+ in LLE  Psychiatric: mood and affect appropriate  Musculoskeletal:  Rom, muscular strength intact    Assessment and Plan:       IMAGING:  XR CHEST PORTABLE   Final Result      Hyperinflation compatible with known emphysema, with associated mild diffuse interstitial prominence similar to 7/1/2021. No acute consolidation. Stable cardiac mediastinal silhouette. Assessment/Plan     1. CKD stage 3: baseline Cr 1.8. Creatinine increased 1.8 -> 2.4 overnight. Likely due to diuresis yesterday. hold Lasix, hold losartan        2. Concern for HF: holding Lasix due to increase in creatinine. ECHO showed LV hypertrophy with EF >65%. Prior echo in 4/2021 was largely normal. Troponin persistently elevated >0.10, stable. Per cardiology, due to creatinine will postpone LHC until Sunday. NPO Saturday night. Gabapentin and home amlodipine could be contributing to LE edema.     3. Acid- base/ Electrolyte imbalance: keep potassium >4, and magnesium >2.  Limit sodium and phosphorus in diet.            Cr worse with diuresis  IVC not dilated on ECHO   Hold Diuresis   If cr better can undergo cath   VOl overload unlikely   Edema multifactorial sec to meds and COPD     Halima Colbert MD

## 2021-09-04 NOTE — PROGRESS NOTES
S: Still with intermittent cp. Had some sob that improved with nebs    O:  Blood pressure 136/84, pulse 78, temperature 97.9 °F (36.6 °C), temperature source Oral, resp. rate 20, height 4' 11\" (1.499 m), weight 150 lb 5.7 oz (68.2 kg), SpO2 98 %, not currently breastfeeding. General (appearance): Well devel. No distress  Eyes: anicteric. EOMI  Neck: Supple. No JVD  Ears/Nose/Mouth/Thorat: No cyanosis  CV: RRR   Respiratory:  Rhonchi. Normal respiratory effort  GI: abd s/nt/nd  Skin: Warm, dry. No rashes  Neuro/Psych: Alert and oriented x 3. Appropriate behavior  Ext:  No c/c. Pulses:  2+ radial    Lab Results   Component Value Date    WBC 14.2 (H) 09/03/2021    HGB 8.1 (L) 09/03/2021    HCT 25.7 (L) 09/03/2021    MCV 68.2 (L) 09/03/2021     09/03/2021     Lab Results   Component Value Date     09/03/2021    K 4.2 09/03/2021     09/03/2021    CO2 26 09/03/2021    BUN 30 (H) 09/03/2021    CREATININE 2.4 (H) 09/03/2021    GLUCOSE 98 09/03/2021    CALCIUM 8.4 09/03/2021    PROT 6.0 (L) 07/01/2021    LABALBU 3.2 (L) 07/01/2021    BILITOT <0.2 07/01/2021    ALKPHOS 62 07/01/2021    AST 10 (L) 07/01/2021    ALT 10 07/01/2021    LABGLOM 20 (A) 09/03/2021    GFRAA 24 (A) 09/03/2021    AGRATIO 1.1 07/01/2021    GLOB 2.8 07/01/2021     Lab Results   Component Value Date    INR 0.97 09/01/2021    INR 1.07 07/01/2021    INR 1.30 (H) 09/14/2020    PROTIME 11.0 09/01/2021    PROTIME 12.1 07/01/2021    PROTIME 15.1 (H) 09/14/2020 9/1/2021 CXR:  Hyperinflation compatible with known emphysema, with associated mild diffuse interstitial prominence similar to 7/1/2021.       No acute consolidation.       Stable cardiac mediastinal silhouette. ECG: NSR    A/P:  A/P:  72 y.o. here for chest pain and chf. Has elevated Tn, recent neg stress.   -NSTEMI/UA/elevated Troponin  -Acute diastolic chf  -COPD  -CKD  -DM  -DVT  -PE  -Anemia    Recs:  -Cont to hold eliquis; cont heparin  -ASA and statin  -Waiting on labs

## 2021-09-04 NOTE — PROGRESS NOTES
5.7 oz (68.2 kg)   09/04/21 0514 136/84 97.9 °F (36.6 °C) Oral 78 20 98 %        Intake/Output Summary (Last 24 hours) at 9/4/2021 0735  Last data filed at 9/4/2021 0720  Gross per 24 hour   Intake 983.96 ml   Output 1275 ml   Net -291.04 ml       Review of Systems   Constitutional: Negative for chills, diaphoresis and fever. Eyes: Negative for visual disturbance. Respiratory: Negative for cough, shortness of breath and wheezing. Cardiovascular: Positive for chest pain. Negative for leg swelling. Gastrointestinal: Negative for abdominal pain, constipation, diarrhea, nausea and vomiting. Musculoskeletal: Negative for arthralgias and myalgias. Neurological: Negative for light-headedness and headaches. Physical Exam  Constitutional:       General: She is not in acute distress. Appearance: She is obese. She is not ill-appearing or diaphoretic. HENT:      Head: Normocephalic and atraumatic. Eyes:      Extraocular Movements: Extraocular movements intact. Comments: EOMI by observation   Cardiovascular:      Rate and Rhythm: Normal rate and regular rhythm. Heart sounds: No murmur heard. No friction rub. No gallop. Pulmonary:      Effort: Pulmonary effort is normal.      Breath sounds: Wheezing present. No rhonchi or rales. Abdominal:      General: Bowel sounds are normal. There is no distension. Palpations: Abdomen is soft. Tenderness: There is no abdominal tenderness. There is no guarding. Musculoskeletal:         General: No swelling. Right lower leg: No edema. Left lower leg: No edema. Neurological:      Mental Status: She is alert and oriented to person, place, and time.          LABS:    CBC:   Recent Labs     09/01/21  1942 09/02/21 0426 09/03/21 0314   WBC 11.8* 9.8 14.2*   HGB 8.3* 8.6* 8.1*   HCT 26.1* 27.7* 25.7*    292 297   MCV 67.5* 69.3* 68.2*     Renal:    Recent Labs     09/01/21  1419 09/02/21 0426 09/03/21  0314   * 138 137 K 4.0 3.5 4.2   CL 99 102 100   CO2 24 23 26   BUN 28* 26* 30*   CREATININE 1.7* 1.8* 2.4*   GLUCOSE 106* 82 98   CALCIUM 9.1 8.6 8.4   MG  --  2.10 2.00   ANIONGAP 12 13 11     Hepatic: No results for input(s): AST, ALT, BILITOT, BILIDIR, PROT, LABALBU, ALKPHOS in the last 72 hours. Troponin:   Recent Labs     09/01/21  1419 09/01/21  1934 09/01/21  2358   TROPONINI 0.11* 0.10* 0.12*     BNP: No results for input(s): BNP in the last 72 hours. Lipids:   Recent Labs     09/02/21  0426   CHOL 148   HDL 80*     ABGs:  No results for input(s): PHART, CZQ2XSZ, PO2ART, HDH7FCB, BEART, THGBART, A8GYMUTV, HSU0JWQ in the last 72 hours. INR:   Recent Labs     09/01/21 1942   INR 0.97     Lactate: No results for input(s): LACTATE in the last 72 hours. Cultures:  -----------------------------------------------------------------  RAD:   XR CHEST PORTABLE   Final Result      Hyperinflation compatible with known emphysema, with associated mild diffuse interstitial prominence similar to 7/1/2021. No acute consolidation. Stable cardiac mediastinal silhouette. Assessment/Plan:     New Onset CHF  Chest Pain  Troponinemia  Pt with two days duration of mid-sternal dull aching chest pain, SOB and pitting edema. Troponin 0.12, 0.10, 0.11, Pro-, EKG and CXR on admission had no pertinent findings. Last echo EF 60% on 04/2021. Stress test on 04/2021 displayed EF 78%, NL myocardial perfusion and no evidence for ischemia. Echo from 9/02 unrevealing of etiology. Cardiology will do a heart cath on Sunday 9/05 and if results are unrevealing will consider the possibility the pt's chronic lung condition and home medication as the possible cause for her SOB and edema.     -NPO at midnight (9/04)  -Heart Cath Sunday 9/05  -Telemetry  -Daily weight  -Strict I/O  -Low sodium diet  -Cardiology following     MARIS on CKD  CKD  Cr 2.0, decreased from to 2.4 on 9/03. Pt likely has MARIS due to diuresis. Lasix stopped on 9/03. Pt has hx of CKD stage 3, base line around Cr 1.7, has ranged between 2.2 and 1.7 this past year. -RFP daily  -Mg daily  -Strict I/O  -Monitor UO  -Daily weight     Hx DVT/PE  Pt has a distant hx of DVT/PE, pt on Eliquis at home.     -Low Dose Heparin ACS     COPD  Pt has hx of COPD, and is tx at home with inhaler, will continue medication.     -Brestri inhaler     Crohn's Disease  Pt has chronic condition, and is followed by GI while outpatient. Will continue home medicaions. Home Humeria not ordered while inpatient.     -Prednisone    HTN  Chronic condition. Home Amlodipine is being held due to LE edema.     -Labetalol PRN, for SBP>160    Depression  Hx of depression, with recent loss of partner. Continue home rx.     -Amitriptyline    Code Status: Limited  FEN: Diet NPO Exceptions are: Sips of Water with Meds  ADULT DIET; Regular; Low Fat/Low Chol/High Fiber/2 gm Na; Low Potassium (Less than 3000 mg/day); Low Phosphorus (Less than 1000 mg)  Adult Oral Nutrition Supplement;  Renal Oral Supplement  PPX: Low Dose Heparin  DISPO: IP    Carine Ch DO, PGY-1  09/04/21  7:35 AM    This patient has been staffed and discussed with Rhoda Severance, MD.

## 2021-09-05 LAB
ANION GAP SERPL CALCULATED.3IONS-SCNC: 13 MMOL/L (ref 3–16)
ANISOCYTOSIS: ABNORMAL
APTT: 64 SEC (ref 26.2–38.6)
BASOPHILS ABSOLUTE: 0.2 K/UL (ref 0–0.2)
BASOPHILS RELATIVE PERCENT: 1 %
BUN BLDV-MCNC: 35 MG/DL (ref 7–20)
CALCIUM SERPL-MCNC: 8.6 MG/DL (ref 8.3–10.6)
CHLORIDE BLD-SCNC: 101 MMOL/L (ref 99–110)
CO2: 26 MMOL/L (ref 21–32)
CREAT SERPL-MCNC: 1.9 MG/DL (ref 0.6–1.2)
EOSINOPHILS ABSOLUTE: 0 K/UL (ref 0–0.6)
EOSINOPHILS RELATIVE PERCENT: 0 %
GFR AFRICAN AMERICAN: 32
GFR NON-AFRICAN AMERICAN: 26
GLUCOSE BLD-MCNC: 125 MG/DL (ref 70–99)
GLUCOSE BLD-MCNC: 189 MG/DL (ref 70–99)
GLUCOSE BLD-MCNC: 218 MG/DL (ref 70–99)
GLUCOSE BLD-MCNC: 87 MG/DL (ref 70–99)
GLUCOSE BLD-MCNC: 98 MG/DL (ref 70–99)
HCT VFR BLD CALC: 25.7 % (ref 36–48)
HEMOGLOBIN: 8 G/DL (ref 12–16)
LYMPHOCYTES ABSOLUTE: 4.1 K/UL (ref 1–5.1)
LYMPHOCYTES RELATIVE PERCENT: 25 %
MAGNESIUM: 2.2 MG/DL (ref 1.8–2.4)
MCH RBC QN AUTO: 21.2 PG (ref 26–34)
MCHC RBC AUTO-ENTMCNC: 31 G/DL (ref 31–36)
MCV RBC AUTO: 68.2 FL (ref 80–100)
MICROCYTES: ABNORMAL
MONOCYTES ABSOLUTE: 0.5 K/UL (ref 0–1.3)
MONOCYTES RELATIVE PERCENT: 3 %
NEUTROPHILS ABSOLUTE: 11.6 K/UL (ref 1.7–7.7)
NEUTROPHILS RELATIVE PERCENT: 71 %
PDW BLD-RTO: 17.1 % (ref 12.4–15.4)
PERFORMED ON: ABNORMAL
PERFORMED ON: NORMAL
PLATELET # BLD: 319 K/UL (ref 135–450)
PMV BLD AUTO: 7 FL (ref 5–10.5)
POLYCHROMASIA: ABNORMAL
POTASSIUM SERPL-SCNC: 4 MMOL/L (ref 3.5–5.1)
RBC # BLD: 3.78 M/UL (ref 4–5.2)
SCHISTOCYTES: ABNORMAL
SODIUM BLD-SCNC: 140 MMOL/L (ref 136–145)
TARGET CELLS: ABNORMAL
TEAR DROP CELLS: ABNORMAL
WBC # BLD: 16.3 K/UL (ref 4–11)

## 2021-09-05 PROCEDURE — 99152 MOD SED SAME PHYS/QHP 5/>YRS: CPT | Performed by: INTERNAL MEDICINE

## 2021-09-05 PROCEDURE — 6370000000 HC RX 637 (ALT 250 FOR IP): Performed by: PHYSICIAN ASSISTANT

## 2021-09-05 PROCEDURE — C1894 INTRO/SHEATH, NON-LASER: HCPCS

## 2021-09-05 PROCEDURE — 2700000000 HC OXYGEN THERAPY PER DAY

## 2021-09-05 PROCEDURE — B2111ZZ FLUOROSCOPY OF MULTIPLE CORONARY ARTERIES USING LOW OSMOLAR CONTRAST: ICD-10-PCS | Performed by: INTERNAL MEDICINE

## 2021-09-05 PROCEDURE — 80048 BASIC METABOLIC PNL TOTAL CA: CPT

## 2021-09-05 PROCEDURE — 1200000000 HC SEMI PRIVATE

## 2021-09-05 PROCEDURE — 4A023N7 MEASUREMENT OF CARDIAC SAMPLING AND PRESSURE, LEFT HEART, PERCUTANEOUS APPROACH: ICD-10-PCS | Performed by: INTERNAL MEDICINE

## 2021-09-05 PROCEDURE — 2580000003 HC RX 258: Performed by: INTERNAL MEDICINE

## 2021-09-05 PROCEDURE — C1769 GUIDE WIRE: HCPCS

## 2021-09-05 PROCEDURE — 94761 N-INVAS EAR/PLS OXIMETRY MLT: CPT

## 2021-09-05 PROCEDURE — 6370000000 HC RX 637 (ALT 250 FOR IP): Performed by: INTERNAL MEDICINE

## 2021-09-05 PROCEDURE — 2709999900 HC NON-CHARGEABLE SUPPLY

## 2021-09-05 PROCEDURE — 2580000003 HC RX 258: Performed by: STUDENT IN AN ORGANIZED HEALTH CARE EDUCATION/TRAINING PROGRAM

## 2021-09-05 PROCEDURE — 6370000000 HC RX 637 (ALT 250 FOR IP): Performed by: STUDENT IN AN ORGANIZED HEALTH CARE EDUCATION/TRAINING PROGRAM

## 2021-09-05 PROCEDURE — 6370000000 HC RX 637 (ALT 250 FOR IP): Performed by: NURSE PRACTITIONER

## 2021-09-05 PROCEDURE — 36415 COLL VENOUS BLD VENIPUNCTURE: CPT

## 2021-09-05 PROCEDURE — 6360000004 HC RX CONTRAST MEDICATION: Performed by: INTERNAL MEDICINE

## 2021-09-05 PROCEDURE — 99233 SBSQ HOSP IP/OBS HIGH 50: CPT | Performed by: INTERNAL MEDICINE

## 2021-09-05 PROCEDURE — C1887 CATHETER, GUIDING: HCPCS

## 2021-09-05 PROCEDURE — 93458 L HRT ARTERY/VENTRICLE ANGIO: CPT

## 2021-09-05 PROCEDURE — 85025 COMPLETE CBC W/AUTO DIFF WBC: CPT

## 2021-09-05 PROCEDURE — 99152 MOD SED SAME PHYS/QHP 5/>YRS: CPT

## 2021-09-05 PROCEDURE — 94640 AIRWAY INHALATION TREATMENT: CPT

## 2021-09-05 PROCEDURE — 6360000002 HC RX W HCPCS: Performed by: STUDENT IN AN ORGANIZED HEALTH CARE EDUCATION/TRAINING PROGRAM

## 2021-09-05 PROCEDURE — 2500000003 HC RX 250 WO HCPCS

## 2021-09-05 PROCEDURE — 93458 L HRT ARTERY/VENTRICLE ANGIO: CPT | Performed by: INTERNAL MEDICINE

## 2021-09-05 PROCEDURE — 6360000002 HC RX W HCPCS

## 2021-09-05 PROCEDURE — 83735 ASSAY OF MAGNESIUM: CPT

## 2021-09-05 PROCEDURE — 85730 THROMBOPLASTIN TIME PARTIAL: CPT

## 2021-09-05 RX ORDER — SODIUM CHLORIDE 0.9 % (FLUSH) 0.9 %
5-40 SYRINGE (ML) INJECTION PRN
Status: DISCONTINUED | OUTPATIENT
Start: 2021-09-05 | End: 2021-09-06 | Stop reason: HOSPADM

## 2021-09-05 RX ORDER — VERAPAMIL HYDROCHLORIDE 80 MG/1
80 TABLET ORAL EVERY 8 HOURS SCHEDULED
Status: DISCONTINUED | OUTPATIENT
Start: 2021-09-05 | End: 2021-09-06

## 2021-09-05 RX ORDER — ACETAMINOPHEN 325 MG/1
650 TABLET ORAL EVERY 4 HOURS PRN
Status: DISCONTINUED | OUTPATIENT
Start: 2021-09-05 | End: 2021-09-06 | Stop reason: HOSPADM

## 2021-09-05 RX ORDER — SODIUM CHLORIDE 9 MG/ML
INJECTION, SOLUTION INTRAVENOUS CONTINUOUS
Status: ACTIVE | OUTPATIENT
Start: 2021-09-05 | End: 2021-09-05

## 2021-09-05 RX ORDER — SODIUM CHLORIDE 0.9 % (FLUSH) 0.9 %
5-40 SYRINGE (ML) INJECTION EVERY 12 HOURS SCHEDULED
Status: DISCONTINUED | OUTPATIENT
Start: 2021-09-05 | End: 2021-09-06 | Stop reason: HOSPADM

## 2021-09-05 RX ORDER — SODIUM CHLORIDE 9 MG/ML
25 INJECTION, SOLUTION INTRAVENOUS PRN
Status: DISCONTINUED | OUTPATIENT
Start: 2021-09-05 | End: 2021-09-06 | Stop reason: HOSPADM

## 2021-09-05 RX ADMIN — HEPARIN SODIUM 13.98 UNITS/KG/HR: 10000 INJECTION, SOLUTION INTRAVENOUS at 05:02

## 2021-09-05 RX ADMIN — GABAPENTIN 300 MG: 300 CAPSULE ORAL at 08:29

## 2021-09-05 RX ADMIN — ACETAMINOPHEN 650 MG: 325 TABLET ORAL at 14:50

## 2021-09-05 RX ADMIN — COLLAGENASE SANTYL: 250 OINTMENT TOPICAL at 08:30

## 2021-09-05 RX ADMIN — ATORVASTATIN CALCIUM 10 MG: 10 TABLET, FILM COATED ORAL at 20:45

## 2021-09-05 RX ADMIN — VERAPAMIL HYDROCHLORIDE 80 MG: 80 TABLET ORAL at 21:46

## 2021-09-05 RX ADMIN — ASPIRIN 81 MG: 81 TABLET, CHEWABLE ORAL at 08:27

## 2021-09-05 RX ADMIN — IOHEXOL 20 ML: 350 INJECTION, SOLUTION INTRAVENOUS at 09:34

## 2021-09-05 RX ADMIN — Medication 10 ML: at 20:46

## 2021-09-05 RX ADMIN — Medication 10 ML: at 10:06

## 2021-09-05 RX ADMIN — INSULIN LISPRO 2 UNITS: 100 INJECTION, SOLUTION INTRAVENOUS; SUBCUTANEOUS at 17:15

## 2021-09-05 RX ADMIN — BUDESONIDE 500 MCG: 0.5 SUSPENSION RESPIRATORY (INHALATION) at 22:15

## 2021-09-05 RX ADMIN — OXYCODONE HYDROCHLORIDE AND ACETAMINOPHEN 1 TABLET: 5; 325 TABLET ORAL at 20:45

## 2021-09-05 RX ADMIN — TIOTROPIUM BROMIDE AND OLODATEROL 2 PUFF: 3.124; 2.736 SPRAY, METERED RESPIRATORY (INHALATION) at 08:05

## 2021-09-05 RX ADMIN — PREDNISONE 20 MG: 20 TABLET ORAL at 08:27

## 2021-09-05 RX ADMIN — OXYCODONE HYDROCHLORIDE AND ACETAMINOPHEN 1 TABLET: 5; 325 TABLET ORAL at 08:27

## 2021-09-05 RX ADMIN — AMITRIPTYLINE HYDROCHLORIDE 75 MG: 50 TABLET, FILM COATED ORAL at 20:45

## 2021-09-05 RX ADMIN — BUDESONIDE 500 MCG: 0.5 SUSPENSION RESPIRATORY (INHALATION) at 08:05

## 2021-09-05 RX ADMIN — SODIUM CHLORIDE: 9 INJECTION, SOLUTION INTRAVENOUS at 10:06

## 2021-09-05 RX ADMIN — Medication 10 ML: at 21:46

## 2021-09-05 RX ADMIN — VERAPAMIL HYDROCHLORIDE 80 MG: 80 TABLET ORAL at 12:06

## 2021-09-05 RX ADMIN — CLOPIDOGREL BISULFATE 75 MG: 75 TABLET ORAL at 08:27

## 2021-09-05 RX ADMIN — Medication 10 ML: at 08:27

## 2021-09-05 RX ADMIN — GABAPENTIN 300 MG: 300 CAPSULE ORAL at 20:46

## 2021-09-05 ASSESSMENT — PAIN - FUNCTIONAL ASSESSMENT: PAIN_FUNCTIONAL_ASSESSMENT: PREVENTS OR INTERFERES SOME ACTIVE ACTIVITIES AND ADLS

## 2021-09-05 ASSESSMENT — PAIN SCALES - GENERAL
PAINLEVEL_OUTOF10: 0
PAINLEVEL_OUTOF10: 5
PAINLEVEL_OUTOF10: 0
PAINLEVEL_OUTOF10: 1
PAINLEVEL_OUTOF10: 3
PAINLEVEL_OUTOF10: 0
PAINLEVEL_OUTOF10: 0
PAINLEVEL_OUTOF10: 4

## 2021-09-05 ASSESSMENT — ENCOUNTER SYMPTOMS
VOMITING: 0
DIARRHEA: 0
CONSTIPATION: 0
NAUSEA: 0
SHORTNESS OF BREATH: 0
COUGH: 0
WHEEZING: 0
ABDOMINAL PAIN: 0

## 2021-09-05 ASSESSMENT — PAIN DESCRIPTION - PROGRESSION: CLINICAL_PROGRESSION: NOT CHANGED

## 2021-09-05 ASSESSMENT — PAIN DESCRIPTION - DESCRIPTORS: DESCRIPTORS: ACHING

## 2021-09-05 ASSESSMENT — PAIN DESCRIPTION - LOCATION: LOCATION: WRIST

## 2021-09-05 ASSESSMENT — PAIN DESCRIPTION - DIRECTION: RADIATING_TOWARDS: ARM

## 2021-09-05 ASSESSMENT — PAIN DESCRIPTION - PAIN TYPE: TYPE: ACUTE PAIN

## 2021-09-05 ASSESSMENT — PAIN DESCRIPTION - FREQUENCY: FREQUENCY: CONTINUOUS

## 2021-09-05 ASSESSMENT — PAIN DESCRIPTION - ORIENTATION: ORIENTATION: RIGHT

## 2021-09-05 ASSESSMENT — PAIN DESCRIPTION - ONSET: ONSET: ON-GOING

## 2021-09-05 NOTE — PROCEDURES
[facility]  72 y.o.  [phone]    Referring MD:  Dr. Chloe Martinez    Indication:  Chest pain, elevated Tn/NSTEMI    Mallampati Class: 3    ASA Class: 3    After informed consent was obtained and history and exam reviewed, the patient was taken to the cardiac cath lab. The patient had both groins and the right wrist prepped and draped in sterile fashion. 1% Xylocaine was used to anesthetize the right wrist.  Using ultrasound guidance, a needle was inserted into the radial artery and a 5/6 Fr sheath was inserted. A solution of 2.5 mg verapamil, 200 mcg nitroglycerin, and 3,000U of heparin was administered via the sheath. Continuous pulse-oximetry and ECG monitoring was performed, and frequent blood pressure assessment was performed. An independent trained observer pushed medications at my direction. We monitored the patient's level of consciousness and vital signs/physiologic status throughout the procedure duration (see start and stop times below). A JR4 catheter was advanced through the sheath over a guide wire and advanced under fluoroscopic guidance into the ascending aorta. The wire was removed and the right coronary artery was engaged. Digital angiograms were recorded. Prior to engagement, the catheter prolapsed into the left ventricle, so pressure and pullback performed across aortic valve. After right coronary angiography, the catheter was then removed and a JL 3.5 catheter was then advanced over the wire into the ascending aorta. The wire was removed, and the left main coronary artery was engaged. Digital angiograms were recorded. The catheter was then removed. All catheter exchanges done over a wire. A femoral angiogram was not performed.     Hemostasis was obtained by TR Band    Complications: None    Estimated blood loss: < 50 mL    Sedation: 1 mg Versed, 50 mcg Fentanyl  Sedation start: 0920  Sedation stop: 0934    Angiographic Findings:  Right dominant system  Left Main: Normal    Left Anterior Descending:  Normal    Circumflex:  Normal    Right Coronary:  Proximal 20% stenosis. No obstructive disease.     Hemodynamics (mm Hg):  Left Ventricular Pressure:  95/5, 10  Central Aortic Pressure:  94/62    Conclusions:  -No significant CAD  -LVEDP 10  -~20 mL contrast used    Recommendations:  -Evaluate for non-cardiac cause of CP  -Not a true NSTEMI; suspect this was demand-related Tn bump/related to hyperdynamic LVEF and CKD  -Stop plavix  -Would start low-dose verapmil if HR and bp tolerate; avoid bb d/t lungs

## 2021-09-05 NOTE — PROGRESS NOTES
Progress Note    Admit Date: 9/1/2021  Diet: ADULT DIET; Regular; 3 carb choices (45 gm/meal); Low Fat/Low Chol/High Fiber/MARIPOSA; Low Sodium (2 gm)    CC: SOB and Chest Pain    Interval history: No significant events over night. Denies chest pain on exam today. Daughter at bedside and questions answered to her satisfaction. For [facility] this AM. Denies sob, fever, chills, nausea, vomiting, diarrhea, constipation.        Medications:     Scheduled Meds:   sodium chloride flush  5-40 mL IntraVENous 2 times per day    atorvastatin  10 mg Oral Nightly    [Held by provider] losartan  12.5 mg Oral Daily    aspirin  81 mg Oral Daily    collagenase   Topical Daily    amitriptyline  75 mg Oral Nightly    gabapentin  300 mg Oral BID    oxyCODONE-acetaminophen  1 tablet Oral BID    predniSONE  20 mg Oral Daily    sodium chloride flush  5-40 mL IntraVENous 2 times per day    insulin lispro  0-6 Units SubCUTAneous TID WC    insulin lispro  0-3 Units SubCUTAneous Nightly    budesonide  0.5 mg Nebulization BID    tiotropium-olodaterol  2 puff Inhalation BID     Continuous Infusions:   sodium chloride      sodium chloride      sodium chloride      dextrose       PRN Meds:sodium chloride flush, sodium chloride, acetaminophen, albuterol, sodium chloride flush, sodium chloride, ondansetron **OR** ondansetron, polyethylene glycol, acetaminophen **OR** acetaminophen, perflutren lipid microspheres, glucose, dextrose, glucagon (rDNA), dextrose, labetalol    Objective:   Vitals:   T-max:  Patient Vitals for the past 8 hrs:   BP Temp Temp src Pulse Resp SpO2 Weight   09/05/21 0945 (!) 146/90 97.3 °F (36.3 °C) Oral 83 16 98 %    09/05/21 0815 (!) 150/83 98.5 °F (36.9 °C) Oral 84 16 98 %    09/05/21 0807     16 94 %    09/05/21 0454 (!) 156/88 98.1 °F (36.7 °C) Oral 88 18 100 % 149 lb 14.6 oz (68 kg)       Intake/Output Summary (Last 24 hours) at 9/5/2021 1003  Last data filed at 9/5/2021 0456  Gross per 24 hour   Intake 550 ml   Output 1200 ml   Net -650 ml       Review of Systems   Constitutional: Negative for chills, diaphoresis and fever. Eyes: Negative for visual disturbance. Respiratory: Negative for cough, shortness of breath and wheezing. Cardiovascular: Positive for chest pain. Negative for leg swelling. Gastrointestinal: Negative for abdominal pain, constipation, diarrhea, nausea and vomiting. Musculoskeletal: Negative for arthralgias and myalgias. Neurological: Negative for light-headedness and headaches. Physical Exam  Constitutional:       General: She is not in acute distress. Appearance: She is obese. She is not ill-appearing or diaphoretic. HENT:      Head: Normocephalic and atraumatic. Eyes:      Extraocular Movements: Extraocular movements intact. Comments: EOMI by observation   Cardiovascular:      Rate and Rhythm: Normal rate and regular rhythm. Heart sounds: No murmur heard. No friction rub. No gallop. Pulmonary:      Effort: Pulmonary effort is normal.      Breath sounds: No wheezing, rhonchi or rales. Abdominal:      General: Bowel sounds are normal. There is no distension. Palpations: Abdomen is soft. Tenderness: There is no abdominal tenderness. There is no guarding. Musculoskeletal:         General: No swelling. Right lower leg: No edema. Left lower leg: No edema. Neurological:      Mental Status: She is alert and oriented to person, place, and time.          LABS:    CBC:   Recent Labs     09/03/21 0314 09/04/21  0645 09/05/21  0629   WBC 14.2* 14.5* 16.3*   HGB 8.1* 8.0* 8.0*   HCT 25.7* 25.5* 25.7*    325 319   MCV 68.2* 67.0* 68.2*     Renal:    Recent Labs     09/03/21 0314 09/03/21  0314 09/04/21  0645 09/04/21  0848 09/05/21  0629      < > 139 139 140   K 4.2   < > 3.8 3.6 4.0      < > 100 100 101   CO2 26   < > 27 26 26   BUN 30*   < > 34* 33* 35*   CREATININE 2.4*   < > 2.0* 2.0* 1.9*   GLUCOSE 98   < > 83 108* 87 CALCIUM 8.4   < > 8.4 8.3 8.6   MG 2.00  --  2.00  --  2.20   PHOS  --   --  3.4  --   --    ANIONGAP 11   < > 12 13 13    < > = values in this interval not displayed. Hepatic:   Recent Labs     09/04/21  0645   LABALBU 3.4     Troponin:   No results for input(s): TROPONINI in the last 72 hours. BNP: No results for input(s): BNP in the last 72 hours. Lipids:   No results for input(s): CHOL, HDL in the last 72 hours. Invalid input(s): LDLCALCU, TRIGLYCERIDE  ABGs:  No results for input(s): PHART, DFX2DCJ, PO2ART, JKU1MHI, BEART, THGBART, Y7TKAUGS, KEJ0BVE in the last 72 hours. INR:   No results for input(s): INR in the last 72 hours. Lactate: No results for input(s): LACTATE in the last 72 hours. Cultures:  -----------------------------------------------------------------  RAD:   XR CHEST PORTABLE   Final Result      Hyperinflation compatible with known emphysema, with associated mild diffuse interstitial prominence similar to 7/1/2021. No acute consolidation. Stable cardiac mediastinal silhouette. Assessment/Plan:     New Onset CHF  Chest Pain  Troponinemia  Pt with two days duration of mid-sternal dull aching chest pain, SOB and pitting edema. Troponin 0.12, 0.10, 0.11, Pro-, EKG and CXR on admission had no pertinent findings. Last echo EF 60% on 04/2021. Stress test on 04/2021 displayed EF 78%, NL myocardial perfusion and no evidence for ischemia. Echo from 9/02 unrevealing of etiology. Cardiology will do a heart cath on Sunday 9/05 and if results are unrevealing will consider the possibility the pt's chronic lung condition and home medication as the possible cause for her SOB and edema. -[facility] this AM without significant CAD. Cards recs stopping plavix and start low dose Verapamil. Suspects non cardiac CP   -Telemetry  -Daily weight. Strict I&O   -Low sodium diet  -Cardiology following and recs appreciated     MARIS on CKD  CKD  Cr 2.0, decreased from to 2.4 on 9/03.  Pt likely has MARIS due to diuresis. Lasix stopped on 9/03. Pt has hx of CKD stage 3, base line around Cr 1.7, has ranged between 2.2 and 1.7 this past year. 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  -Low Dose Heparin ACS     COPD  Pt has hx of COPD, and is tx at home with inhaler, will continue medication. -Brestri inhaler     Crohn's Disease  Pt has chronic condition, and is followed by GI while outpatient. Will continue home medicaions. Home Humeria not ordered while inpatient.  -Prednisone    HTN  Chronic condition. Home Amlodipine is being held due to LE edema.   -Labetalol PRN, for SBP>160    Depression  Hx of depression, with recent loss of partner. Continue home rx.  -Amitriptyline    Code Status: Limited  FEN: ADULT DIET; Regular; 3 carb choices (45 gm/meal);  Low Fat/Low Chol/High Fiber/MARIPOSA; Low Sodium (2 gm)  PPX: Low Dose Heparin  DISPO: Angel Luis Redd MD, PGY-2  09/05/21  10:03 AM    This patient has been staffed and discussed with Erma Booth MD.

## 2021-09-05 NOTE — PLAN OF CARE
Brief Pre-Op Note/Sedation Assessment      [facility]  1950  [id number]  9:08 AM    Planned Procedure: Cardiac Catheterization Procedure  Post Procedure Plan: Return to same level of care  Consent: I have discussed with the patient and/or the patient representative the indication, alternatives, and the possible risks and/or complications of the planned procedure and the anesthesia methods. The patient and/or patient representative appear to understand and agree to proceed. Chief Complaint:   Chest Pain/Pressure  NSTEMI      Indications for Cath Procedure:  1. Presentation:  Suspected CAD  2. Anginal Classification within 2 weeks:  CCS III - Symptoms with everyday living activities, i.e., moderate limitation  3. Angina Symptoms Assessment:  Typical Chest Pain  4. Heart Failure Class within last 2 weeks:  Yes:  Heart Failure Type: Diastolic Severity:  Class III - Symptoms of HF on less-than-ordinary exertion  5. Cardiovascular Instability:  Yes:  Persistent ischemic symptoms (CP, ST Elevation)    Prior Ischemic Workup/Eval:  1. Pre-Procedural Medications: Aspirin, statin, ARB ordered  2. Stress Test Completed? No    Does Patient need surgery?   Cath Valve Surgery:  No    Pre-Procedure Medical History:  Vital Signs:  BP (!) 150/83   Pulse 84   Temp 98.5 °F (36.9 °C) (Oral)   Resp 16   Ht 4' 11\" (1.499 m)   Wt 149 lb 14.6 oz (68 kg)   LMP  (LMP Unknown)   SpO2 98%   BMI 30.28 kg/m²     Allergies:  No Known Allergies  Medications:    Current Facility-Administered Medications   Medication Dose Route Frequency Provider Last Rate Last Admin    clopidogrel (PLAVIX) tablet 75 mg  75 mg Oral Daily Vik Carlton MD   75 mg at 09/05/21 0827    atorvastatin (LIPITOR) tablet 10 mg  10 mg Oral Nightly [place] Standard, APRN - CNP   10 mg at 09/04/21 2051    [Held by provider] losartan (COZAAR) tablet 12.5 mg  12.5 mg Oral Daily Naga Bowling MD        heparin 25,000 units in dextrose 5% 250 mL (premix) infusion  5-30 Units/kg/hr IntraVENous Continuous Jeff Andrewl, DO 9.8 mL/hr at 09/05/21 0800 13.98 Units/kg/hr at 09/05/21 0800    aspirin chewable tablet 81 mg  81 mg Oral Daily Norita Mcardle, MD   81 mg at 09/05/21 0827    collagenase ointment   Topical Daily Davy Cordova MD   Given at 09/05/21 0830    albuterol (PROVENTIL) nebulizer solution 2.5 mg  2.5 mg Nebulization Q6H PRN Jeff Moralesl, DO   2.5 mg at 09/01/21 2230    amitriptyline (ELAVIL) tablet 75 mg  75 mg Oral Nightly Jeff Hail, DO   75 mg at 09/04/21 2051    gabapentin (NEURONTIN) capsule 300 mg  300 mg Oral BID Jeff Moralesl, DO   300 mg at 09/05/21 0829    oxyCODONE-acetaminophen (PERCOCET) 5-325 MG per tablet 1 tablet  1 tablet Oral BID Jeff Moralesl, DO   1 tablet at 09/05/21 0827    predniSONE (DELTASONE) tablet 20 mg  20 mg Oral Daily Jeff Hail, DO   20 mg at 09/05/21 0827    sodium chloride flush 0.9 % injection 5-40 mL  5-40 mL IntraVENous 2 times per day Jeff Moralesl, DO   10 mL at 09/05/21 0827    sodium chloride flush 0.9 % injection 5-40 mL  5-40 mL IntraVENous PRN Jeff Moralesl, DO        0.9 % sodium chloride infusion  25 mL IntraVENous PRN Jeff Moralesl, DO        ondansetron (ZOFRAN-ODT) disintegrating tablet 4 mg  4 mg Oral Q8H PRN Jeff Hail, DO        Or    ondansetron [facility]) injection 4 mg  4 mg IntraVENous Q6H PRN Jeff Hail, DO        polyethylene glycol (GLYCOLAX) packet 17 g  17 g Oral Daily PRN Jeff Hail, DO        acetaminophen (TYLENOL) tablet 650 mg  650 mg Oral Q6H PRN Jeff Hail, DO        Or    acetaminophen (TYLENOL) suppository 650 mg  650 mg Rectal Q6H PRN Jeff Hail, DO        perflutren lipid microspheres (DEFINITY) injection 1.65 mg  1.5 mL IntraVENous ONCE PRN Jeff Liu, DO        glucose (GLUTOSE) 40 % oral gel 15 g  15 g Oral PRN Jeff Liu, DO        dextrose 50 % IV solution  12.5 g IntraVENous PRN Jeff Liu, DO        glucagon (rDNA) injection 1 mg  1 Sandra Colbert MD at [address] [place] [address]      crainotomy- remove fluid ? V-P SHUNT    BRONCHOSCOPY N/A 9/2/2020    BRONCHOSCOPY ALVEOLAR LAVAGE performed by Layla Abrams MD at [facility] 91      resection X2    COLONOSCOPY  12/5/11    BIOPSY AND POLYPECTOMY    COLONOSCOPY  4-2-2012    and ablation ERBE/APC    SHOULDER SURGERY      L              Pre-Sedation:  Pre-Sedation Documentation and Exam:  I have personally completed a history, physical exam & review of systems for this patient (see notes). Prior History of Anesthesia Complications:   none    Modified Mallampati:  III (soft palate, base of uvula visible)    ASA Classification:  Class 3 - A patient with severe systemic disease that limits activity but is not incapacitating    Sumanth Scale: Activity:  2 - Able to move 4 extremities voluntarily on command  Respiration:  2 - Able to breathe deeply and cough freely  Circulation:  1 - BP+/- 20-50mmHg of normal  Consciousness:  2 - Fully awake  Oxygen Saturation (color):  2 - Able to maintain oxygen saturation >92% on room air    Sedation/Anesthesia Plan:  Guard the patient's safety and welfare. Minimize physical discomfort and pain. Minimize negative psychological responses to treatment by providing sedation and analgesia and maximize the potential amnesia. Patient to meet pre-procedure discharge plan. Medication Planned:  midazolam intravenously and fentanyl intravenously    Patient is an appropriate candidate for plan of sedation:   yes    Discussed code status. She is full code for procedure.     Electronically signed by Julia Winkler MD on 9/5/2021 at 9:08 AM

## 2021-09-05 NOTE — PROGRESS NOTES
Office : [phone]     Fax :[phone]         Renal Progress Note  Subjective:   Admit Date: 9/1/2021     HPI   Samuel Antunez is a 72 y.o. female with pmhx of DVT/PE (2012) on Eliquis and is compliant with Rx, COPD, CKD Stage 3, Crohn's, syringomyelia, Hx of C. Diff, OA and has non healing wound on her left shoulder, which is followed by outpatient wound care. The pt is presenting with chest pain and SOB of two days duration. The pt states the chest pain is located in the mid sternum, its quality is a dull ache, has been constant over the past two days, does not radiate, nothing alleviates it and nothing aggravates the chest pain. On exam, it is noted that chest pain is reproducible with palpation.     In addition to this, the pt endorses orthopnea, and has had bilateral swelling of her LE for the past week. The pt has recently moved during the past two days and states, \"I have been worked up for the past couple days, crying a lot. \" Pt just recently moved in with a friend, after the passing of her boyfriend.      Interval History:      Renal function improved   Edema decreased   No SOB     Mild ankle edema     DIET Diet NPO Exceptions are: Sips of Water with Meds  Medications:   Scheduled Meds:   iohexol  150 mL IntraVENous Once    clopidogrel  75 mg Oral Daily    atorvastatin  10 mg Oral Nightly    [Held by provider] losartan  12.5 mg Oral Daily    aspirin  81 mg Oral Daily    collagenase   Topical Daily    amitriptyline  75 mg Oral Nightly    gabapentin  300 mg Oral BID    oxyCODONE-acetaminophen  1 tablet Oral BID    predniSONE  20 mg Oral Daily  sodium chloride flush  5-40 mL IntraVENous 2 times per day    insulin lispro  0-6 Units SubCUTAneous TID WC    insulin lispro  0-3 Units SubCUTAneous Nightly    budesonide  0.5 mg Nebulization BID    tiotropium-olodaterol  2 puff Inhalation BID     Continuous Infusions:   heparin (PORCINE) Infusion 13.98 Units/kg/hr (09/05/21 0800)    sodium chloride      dextrose         Labs:  CBC:   Recent Labs     09/03/21 0314 09/04/21 0645 09/05/21  0629   WBC 14.2* 14.5* 16.3*   HGB 8.1* 8.0* 8.0*    325 319     BMP:    Recent Labs     09/04/21 0645 09/04/21  0848 09/05/21  0629    139 140   K 3.8 3.6 4.0    100 101   CO2 27 26 26   BUN 34* 33* 35*   CREATININE 2.0* 2.0* 1.9*   GLUCOSE 83 108* 87     Ca/Mg/Phos:   Recent Labs     09/03/21 0314 09/03/21 0314 09/04/21 0645 09/04/21  0848 09/05/21  0629   CALCIUM 8.4   < > 8.4 8.3 8.6   MG 2.00  --  2.00  --  2.20   PHOS  --   --  3.4  --   --     < > = values in this interval not displayed. Hepatic: No results for input(s): AST, ALT, ALB, BILITOT, ALKPHOS in the last 72 hours. Troponin:   No results for input(s): TROPONINI in the last 72 hours. BNP: No results for input(s): BNP in the last 72 hours. Lipids:   No results for input(s): CHOL, TRIG, HDL, LDLCALC, LABVLDL in the last 72 hours. ABGs: No results for input(s): PHART, PO2ART, DGV1CSR in the last 72 hours. INR:   No results for input(s): INR in the last 72 hours. UA:  Recent Labs     09/02/21  1335   LABMICR <1.20      Urine Microscopic: No results for input(s): LABCAST, BACTERIA, COMU, HYALCAST, WBCUA, RBCUA, EPIU in the last 72 hours. Urine Culture: No results for input(s): LABURIN in the last 72 hours.   Urine Chemistry:   Recent Labs     09/02/21  1335   LABCREA 29.9       Objective:   Vitals: BP (!) 150/83   Pulse 84   Temp 98.5 °F (36.9 °C) (Oral)   Resp 16   Ht 4' 11\" (1.499 m)   Wt 149 lb 14.6 oz (68 kg)   LMP  (LMP Unknown)   SpO2 98%   BMI 30.28 kg/m²    Wt Readings from Last 3 Encounters:   09/05/21 149 lb 14.6 oz (68 kg)   08/05/21 149 lb (67.6 kg)   08/02/21 149 lb (67.6 kg)      24HR INTAKE/OUTPUT:      Intake/Output Summary (Last 24 hours) at 9/5/2021 0934  Last data filed at 9/5/2021 0456  Gross per 24 hour   Intake 670 ml   Output 1200 ml   Net -530 ml     Constitutional:  OAA X3 NAD  Skin: no rash, turgor wnl  Heent:  eomi, mmm  Neck: no bruits or jvd noted  Cardiovascular:  S1, S2 without m/r/g. Chest pain reproducible on palpation  Respiratory: CTA B, mild crackles and decreased breath sounds, but no stridor and improved from yesterday  Abdomen:  +bs, soft, nt, nd  Ext: pitting lower extremity edema present bilaterally. 2+ pitting edema in RLE, 1+ in LLE  Psychiatric: mood and affect appropriate  Musculoskeletal:  Rom, muscular strength intact    Assessment and Plan:       IMAGING:  XR CHEST PORTABLE   Final Result      Hyperinflation compatible with known emphysema, with associated mild diffuse interstitial prominence similar to 7/1/2021. No acute consolidation. Stable cardiac mediastinal silhouette. Assessment/Plan     1. CKD stage 3: baseline Cr 1.8. Creatinine increased 1.8 -> 2.4 overnight. Likely due to diuresis yesterday. hold Lasix, hold losartan        2. Concern for HF: holding Lasix due to increase in creatinine. ECHO showed LV hypertrophy with EF >65%. Prior echo in 4/2021 was largely normal. Troponin persistently elevated >0.10, stable. Per cardiology, due to creatinine will postpone LHC until Sunday. NPO Saturday night. Gabapentin and home amlodipine could be contributing to LE edema.     3. Acid- base/ Electrolyte imbalance: keep potassium >4, and magnesium >2.  Limit sodium and phosphorus in diet.            Hold Diuresis today   If cr better can undergo cath       Rolly Elder MD

## 2021-09-05 NOTE — PLAN OF CARE
Problem: Falls - Risk of:  Goal: Will remain free from falls  Description: Will remain free from falls  Outcome: Met This Shift  Note: Pt has remained free from falls during shift. Pt's bed is locked in lowest position with alarm on, call light, bedside table, and personal belongings within reach. Problem: Falls - Risk of:  Goal: Absence of physical injury  Description: Absence of physical injury  Outcome: Met This Shift  Note: Pt has remained free from physical injury during shift. Problem: Skin Integrity:  Goal: Absence of new skin breakdown  Description: Absence of new skin breakdown  Outcome: Met This Shift  Note: Pt has remained free from new skin breakdown during shift. Problem: Pain:  Goal: Control of acute pain  Description: Control of acute pain  Outcome: Met This Shift  Note: Pt has not complained of any acute pain during shift. Problem: OXYGENATION/RESPIRATORY FUNCTION  Goal: Patient will maintain patent airway  Outcome: Met This Shift  Note: Pt has maintained a patent airway during shift. Problem: Skin Integrity:  Goal: Will show no infection signs and symptoms  Description: Will show no infection signs and symptoms  Outcome: Ongoing  Note: Pt's vital signs stable, afebrile, with slightly elevated WBC. Pt is not receiving antibiotics at this time, will continue to closely monitor. Problem: Pain:  Goal: Pain level will decrease  Description: Pain level will decrease  Outcome: Ongoing  Note: Pt complained of chronic back pain during shift. Pt received scheduled dose of percocet, see MAR, and has not complained of pain since. Problem: Pain:  Goal: Control of chronic pain  Description: Control of chronic pain  Outcome: Ongoing  Note: Pt complained of chronic back pain during shift. Pt received scheduled dose of percocet, see MAR, and has not complained of pain since.      Problem: OXYGENATION/RESPIRATORY FUNCTION  Goal: Patient will achieve/maintain normal respiratory rate/effort  Description: Respiratory rate and effort will be within normal limits for the patient  Outcome: Ongoing  Note: Pt's respiratory rate normal during shift. Pt complains of dyspnea and shortness of breath with exertion. Pt on 2L/min O2 via NC, this is patient's baseline oxygen requirements.

## 2021-09-05 NOTE — PROGRESS NOTES
Patient's last dose of apixaban was 9/1 AM. Will adjust heparin drip protocol back to anti-Xa monitoring per [facility] policy. New heparin protocol:    **Use original weight used to start heparin for all adjustments**  Maximum initial rate = 1000 units/hr    AntiXa < 0.10 Units/mL   Bolus = 60 units/kg      Increase infusion by 4 units/kg/hr   (Maximum bolus = 4,000 units)    0.1-0.29  Units/mL   Bolus = 30 units/kg        Increase infusion by 2 units/kg/hr   (Maximum bolus = 2,000 units)                      Increase infusion by 2 units/kg/hr  0.3-0.7 International Units/mL   No bolus           No change in infusion                           0. 71-0.80 International Units/mL   No bolus       Decrease infusion by 1 unit/kg/hr  0.81-0.99 International Units/mL   No bolus      Decrease infusion by 2 units/kg/hr  1.0 International Units/mL or greater Hold heparin for 1 hour       Decrease infusion by 3 units/kg/hr    Anti Xa levels 6 hours after any rate change  When 2 successive Anti Xa's are at goal   monitor Anti Xa level at least daily    Please call with any questions.   Joie Abernathy, PharmD, BCPS  Wireless: [id number]  Main pharmacy: [id number]  9/5/2021 9:42 AM

## 2021-09-05 NOTE — DISCHARGE SUMMARY
INTERNAL MEDICINE DEPARTMENT AT [address] [place] [address]  DISCHARGE SUMMARY    Patient ID: Chantel Coto                                             Discharge Date: 9/5/2021   Patient's PCP: Ronald Mckinnon MD                                          Discharge Physician: Medhat Michel DO  Admit Date: 9/1/2021   Admitting Physician: Mary Beth Diaz MD    PROBLEMS DURING HOSPITALIZATION:  Present on Admission:   Chest pain   Stage 3 chronic kidney disease ([facility]   History of DVT (deep vein thrombosis)   Microcytic anemia   NSTEMI (non-ST elevated myocardial infarction) ([facility] [address] [facility]   Acute diastolic CHF (congestive heart failure) ([facility] [address] [facility]      DISCHARGE DIAGNOSES: Chest pain non-cardiac etiology, HTN, CKD, COPD    HPI: Pt is a 71 y/o female with a pmhx of DVT/PE (2012) on Eliquis and is compliant with Rx, COPD, CKD Stage 3, Crohn's, syringomyelia, Hx of C. Diff, OA and has non healing wound on her left shoulder, which is followed by outpatient wound care. The pt is presenting with chest pain and SOB of two days duration. The pt states the chest pain is located in the mid sternum, its quality is a dull ache, has been constant over the past two days, does not radiate, nothing alleviates it and nothing aggravates the chest pain. On exam, it is noted that chest pain is reproducible with palpation.     In addition to this, the pt endorses orthopnea, and has had bilateral swelling of her LE for the past week. The pt has recently moved during the past two days and states, \"I have been worked up for the past couple days, crying a lot. \" Pt just recently moved in with a friend, after the passing of her boyfriend.     Pt is vaccinated for covid, denies any recent sick contacts, fevers, chills, cough and GI symptoms.   Copied from the admission H&P on 9/01/2021    The following issues were addressed during hospitalization: Chest Pain, MARIS, HTN, LE edema    New Onset CHF, suspected  Chest Pain  Troponinemia  Pt with two days duration of mid-sternal dull aching chest pain, SOB and pitting edema. Troponin 0.12, 0.10, 0.11, Pro-, EKG and CXR on admission had no pertinent findings. Last echo EF 60% on 04/2021. Stress test on 04/2021 displayed EF 78%, NL myocardial perfusion and no evidence for ischemia. Echo from 9/02 unrevealing of etiology. Cardiology conducted a heart cath on Sunday 9/05, results unrevealing, not a true NSTEMI, suspect demand-related Tn bump related to hyperdynamic LVEF and CKD. -Low-dose Verapamil started  -Amlodipine discontinued  -Loaded with Plavix and had LHC 9/05, without significant CAD. -Telemetry  -Daily weight. Strict I&O   -Low sodium diet  -Cardiology consulted     MARIS on CKD  CKD  Pt has hx of CKD stage 3, base line around Cr 1.7, has ranged between 2.2 and 1.7 this past year. Cr 2.4 on 9/03, pt likely had MARIS due to diuresis. Lasix stopped on 9/03, Cr trended to 1.9, near pt's baseline level on 9/05. Cr 1.7, at pt's baseline on 9/06/2021.    -Monitored RFP daily  -Monitored Mg daily  -Strict I/O, Daily weight     HTN  Chronic condition. Home Amlodipine was held during stay and wasn't resumed following due to LE edema. Home losartan held during stay, and will continue to be held until post hospital f/u with outpatient providers. Can continue home Lasix PRN following hospitalization.    -Started Verapamil 80 mg TID  -Labetalol PRN, for SBP>160  -Hold Losartan  -Hold Amlodipine    Hx DVT/PE  Pt has a distant hx of DVT/PE, pt on Eliquis at home. Eliquis held during hospital stay. Pt to resume eliquis upon returning home.    -Started Low Dose Heparin ACS while in hospital  -Eliquis held     On day of discharge pt was evaluated and deemed to be stable to discharge back to her home.     Physical Exam:  /80   Pulse 93   Temp 97 °F (36.1 °C) (Oral)   Resp 16   Ht 4' 11\" (1.499 m)   Wt 149 lb 14.6 oz (68 kg)   LMP  (LMP Unknown)   SpO2 95%   BMI 30.28 kg/m²   Const: alert, Ox3  Head: atraumatic, normocephalic  Eyes: EOMI by observation  Cards: rrr, NL S1 and S2, no rubs, murmurs or gallops  Pulm: Mild diffuse wheezes. No rales or rhonchi. Abd: soft, nd, nt, with NL bowel sounds   Ext: no LE edema. Skin: Nonhealing wound on posterior left shoulder. Pt was evaluated and found to be stable to discharge back to her home. Consults: cardiology, nephrology, Dietary  Significant Diagnostic Studies:  chest x-ray, [facility]  Treatments: Lasix, Plavix, Verapamil, Low dose Heparin  Disposition: home  Discharged Condition: Stable  Follow Up: Primary Care Physician in two weeks and f/u with cardiology PRN. DISCHARGE MEDICATION:     Medication List      ASK your doctor about these medications    albuterol (2.5 MG/3ML) 0.083% nebulizer solution  Commonly known as: PROVENTIL  TAKE 3 MLS BY NEBULIZATION EVERY 6 HOURS AS NEEDED FOR WHEEZING DX: COPD ICD-10: J44.9     alendronate 70 MG tablet  Commonly known as: FOSAMAX  Take 1 tablet by mouth every 7 days     amitriptyline 150 MG tablet  Commonly known as: ELAVIL  Take 0.5 tablets by mouth nightly     amLODIPine 5 MG tablet  Commonly known as: NORVASC  TAKE 1 TABLET BY MOUTH EVERY DAY     Breztri Aerosphere 160-9-4.8 MCG/ACT Aero  Generic drug: Budeson-Glycopyrrol-Formoterol  Inhale 2 puffs into the lungs 2 times daily     colestipol 1 g tablet  Commonly known as: COLESTID     collagenase 250 UNIT/GM ointment     * Commode Bedside Misc  Use as directed     * Walker Misc  1 each by Does not apply route daily     Eliquis 5 MG Tabs tablet  Generic drug: apixaban  TAKE 1 TABLET BY MOUTH TWICE A DAY     gabapentin 300 MG capsule  Commonly known as: NEURONTIN  Take 1 capsule by mouth 2 times daily for 180 days. Intended supply: 90 days     HUMIRA PEN SC     Lift Chair Misc  by Does not apply route 71 yo woman with severe COPD and Arthritis of the Left Hip.     Please dispense a Lift Chair     oxyCODONE-acetaminophen 5-325 MG per tablet  Commonly known as: PERCOCET  Ask about: Which instructions should I use? potassium chloride 10 MEQ extended release tablet  Commonly known as: KLOR-CON     predniSONE 10 MG tablet  Commonly known as: DELTASONE  Take 2 tablets by mouth daily  Ask about: Which instructions should I use? * This list has 2 medication(s) that are the same as other medications prescribed for you. Read the directions carefully, and ask your doctor or other care provider to review them with you. Activity: activity as tolerated  Diet: cardiac diet and renal diet  Wound Care: keep wound clean and dry    Pt instructed to f/u with her PCP following discharge and to f/u with cardiology PRN.     Time Spent on discharge is more than 30 minutes    Signed:  Bryn Duverney, DO, PGY-1  9/5/2021

## 2021-09-05 NOTE — PROGRESS NOTES
Pt returned from cath lab, A&O X4, calm, pleasant, cooperative, VSS, no c/o any at this time. R radial puncture site evaluated with 2 cath lab nurses at bedside. Site c/d/i, no s/s hematoma noted at this time, TR band and wrist splint in place. Call light in reach, bed alarm on for safety, pt voiced understanding of 2 hr bedrest. Will cont to monitor.

## 2021-09-06 VITALS
DIASTOLIC BLOOD PRESSURE: 68 MMHG | HEART RATE: 97 BPM | WEIGHT: 156.53 LBS | TEMPERATURE: 97.3 F | SYSTOLIC BLOOD PRESSURE: 107 MMHG | BODY MASS INDEX: 31.56 KG/M2 | HEIGHT: 59 IN | RESPIRATION RATE: 16 BRPM | OXYGEN SATURATION: 97 %

## 2021-09-06 LAB
ANION GAP SERPL CALCULATED.3IONS-SCNC: 13 MMOL/L (ref 3–16)
ANISOCYTOSIS: ABNORMAL
BASOPHILS ABSOLUTE: 0 K/UL (ref 0–0.2)
BASOPHILS RELATIVE PERCENT: 0 %
BUN BLDV-MCNC: 35 MG/DL (ref 7–20)
CALCIUM SERPL-MCNC: 8.3 MG/DL (ref 8.3–10.6)
CHLORIDE BLD-SCNC: 104 MMOL/L (ref 99–110)
CO2: 22 MMOL/L (ref 21–32)
CREAT SERPL-MCNC: 1.7 MG/DL (ref 0.6–1.2)
EOSINOPHILS ABSOLUTE: 0.2 K/UL (ref 0–0.6)
EOSINOPHILS RELATIVE PERCENT: 1 %
GFR AFRICAN AMERICAN: 36
GFR NON-AFRICAN AMERICAN: 30
GLUCOSE BLD-MCNC: 76 MG/DL (ref 70–99)
GLUCOSE BLD-MCNC: 86 MG/DL (ref 70–99)
HCT VFR BLD CALC: 25.8 % (ref 36–48)
HEMOGLOBIN: 8 G/DL (ref 12–16)
LYMPHOCYTES ABSOLUTE: 5.3 K/UL (ref 1–5.1)
LYMPHOCYTES RELATIVE PERCENT: 28 %
MAGNESIUM: 2.3 MG/DL (ref 1.8–2.4)
MCH RBC QN AUTO: 21 PG (ref 26–34)
MCHC RBC AUTO-ENTMCNC: 31.1 G/DL (ref 31–36)
MCV RBC AUTO: 67.5 FL (ref 80–100)
MICROCYTES: ABNORMAL
MONOCYTES ABSOLUTE: 0.4 K/UL (ref 0–1.3)
MONOCYTES RELATIVE PERCENT: 2 %
NEUTROPHILS ABSOLUTE: 13 K/UL (ref 1.7–7.7)
NEUTROPHILS RELATIVE PERCENT: 69 %
NUCLEATED RED BLOOD CELLS: 2 /100 WBC
PDW BLD-RTO: 17.4 % (ref 12.4–15.4)
PERFORMED ON: NORMAL
PLATELET # BLD: 315 K/UL (ref 135–450)
PMV BLD AUTO: 7.2 FL (ref 5–10.5)
POLYCHROMASIA: ABNORMAL
POTASSIUM SERPL-SCNC: 4.5 MMOL/L (ref 3.5–5.1)
RBC # BLD: 3.83 M/UL (ref 4–5.2)
SCHISTOCYTES: ABNORMAL
SODIUM BLD-SCNC: 139 MMOL/L (ref 136–145)
STOMATOCYTES: ABNORMAL
TARGET CELLS: ABNORMAL
TEAR DROP CELLS: ABNORMAL
WBC # BLD: 18.8 K/UL (ref 4–11)

## 2021-09-06 PROCEDURE — 6370000000 HC RX 637 (ALT 250 FOR IP): Performed by: PHYSICIAN ASSISTANT

## 2021-09-06 PROCEDURE — 6370000000 HC RX 637 (ALT 250 FOR IP): Performed by: STUDENT IN AN ORGANIZED HEALTH CARE EDUCATION/TRAINING PROGRAM

## 2021-09-06 PROCEDURE — 80048 BASIC METABOLIC PNL TOTAL CA: CPT

## 2021-09-06 PROCEDURE — 2580000003 HC RX 258: Performed by: INTERNAL MEDICINE

## 2021-09-06 PROCEDURE — 6370000000 HC RX 637 (ALT 250 FOR IP): Performed by: INTERNAL MEDICINE

## 2021-09-06 PROCEDURE — 6360000002 HC RX W HCPCS: Performed by: STUDENT IN AN ORGANIZED HEALTH CARE EDUCATION/TRAINING PROGRAM

## 2021-09-06 PROCEDURE — 99232 SBSQ HOSP IP/OBS MODERATE 35: CPT | Performed by: INTERNAL MEDICINE

## 2021-09-06 PROCEDURE — 85025 COMPLETE CBC W/AUTO DIFF WBC: CPT

## 2021-09-06 PROCEDURE — 36415 COLL VENOUS BLD VENIPUNCTURE: CPT

## 2021-09-06 PROCEDURE — 99233 SBSQ HOSP IP/OBS HIGH 50: CPT | Performed by: INTERNAL MEDICINE

## 2021-09-06 PROCEDURE — 94640 AIRWAY INHALATION TREATMENT: CPT

## 2021-09-06 PROCEDURE — 83735 ASSAY OF MAGNESIUM: CPT

## 2021-09-06 RX ORDER — ATORVASTATIN CALCIUM 10 MG/1
10 TABLET, FILM COATED ORAL NIGHTLY
Qty: 30 TABLET | Refills: 3 | Status: SHIPPED | OUTPATIENT
Start: 2021-09-06 | End: 2021-09-15

## 2021-09-06 RX ORDER — FUROSEMIDE 20 MG/1
20 TABLET ORAL DAILY PRN
Qty: 60 TABLET | Refills: 3 | Status: SHIPPED | OUTPATIENT
Start: 2021-09-06 | End: 2021-11-23 | Stop reason: SDUPTHER

## 2021-09-06 RX ADMIN — VERAPAMIL HYDROCHLORIDE 80 MG: 80 TABLET ORAL at 06:39

## 2021-09-06 RX ADMIN — OXYCODONE HYDROCHLORIDE AND ACETAMINOPHEN 1 TABLET: 5; 325 TABLET ORAL at 09:39

## 2021-09-06 RX ADMIN — ASPIRIN 81 MG: 81 TABLET, CHEWABLE ORAL at 09:39

## 2021-09-06 RX ADMIN — COLLAGENASE SANTYL: 250 OINTMENT TOPICAL at 09:40

## 2021-09-06 RX ADMIN — BUDESONIDE 500 MCG: 0.5 SUSPENSION RESPIRATORY (INHALATION) at 08:36

## 2021-09-06 RX ADMIN — Medication 10 ML: at 09:39

## 2021-09-06 RX ADMIN — PREDNISONE 20 MG: 20 TABLET ORAL at 09:39

## 2021-09-06 RX ADMIN — TIOTROPIUM BROMIDE AND OLODATEROL 2 PUFF: 3.124; 2.736 SPRAY, METERED RESPIRATORY (INHALATION) at 08:36

## 2021-09-06 RX ADMIN — GABAPENTIN 300 MG: 300 CAPSULE ORAL at 09:40

## 2021-09-06 ASSESSMENT — PAIN SCALES - GENERAL
PAINLEVEL_OUTOF10: 0
PAINLEVEL_OUTOF10: 4

## 2021-09-06 NOTE — PROGRESS NOTES
Pt d/c to home with daughter via private vehicle. AVS reviewed and signed by pt, no questions/ concerns at this time. All personal belongings taken from room by pt. Pt escorted by wheelchair to main entrance and assisted into vehicle. Tolerated well. No

## 2021-09-06 NOTE — PLAN OF CARE
Problem: Falls - Risk of:  Goal: Will remain free from falls  Description: Will remain free from falls  Outcome: Completed  Goal: Absence of physical injury  Description: Absence of physical injury  Outcome: Completed     Problem: Skin Integrity:  Goal: Will show no infection signs and symptoms  Description: Will show no infection signs and symptoms  Outcome: Completed  Goal: Absence of new skin breakdown  Description: Absence of new skin breakdown  Outcome: Completed     Problem: Pain:  Goal: Pain level will decrease  Description: Pain level will decrease  Outcome: Completed  Goal: Control of acute pain  Description: Control of acute pain  Outcome: Completed  Goal: Control of chronic pain  Description: Control of chronic pain  Outcome: Completed     Problem: OXYGENATION/RESPIRATORY FUNCTION  Goal: Patient will maintain patent airway  Outcome: Completed  Goal: Patient will achieve/maintain normal respiratory rate/effort  Description: Respiratory rate and effort will be within normal limits for the patient  Outcome: Completed     Problem: HEMODYNAMIC STATUS  Goal: Patient has stable vital signs and fluid balance  Outcome: Completed     Problem: FLUID AND ELECTROLYTE IMBALANCE  Goal: Fluid and electrolyte balance are achieved/maintained  Outcome: Completed     Problem: ACTIVITY INTOLERANCE/IMPAIRED MOBILITY  Goal: Mobility/activity is maintained at optimum level for patient  Outcome: Completed

## 2021-09-06 NOTE — PROGRESS NOTES
S: No angina. SOB unchanged/baseline. O:  Blood pressure 107/68, pulse 97, temperature 97.3 °F (36.3 °C), temperature source Oral, resp. rate 18, height 4' 11\" (1.499 m), weight 156 lb 8.4 oz (71 kg), SpO2 96 %, not currently breastfeeding. General (appearance): Well devel. No distress  Eyes: Anicteric. EOMI  Neck: no JVD  Ears/Nose/Mouth/Thorat: No cyanosis  CV: RRR   Respiratory:  Coarse/rhonchi, normal respiratory effort. O2 on. GI: Abd s/nt/nd  Skin: Warm, dry. No rashes  Neuro/Psych: Alert and oriented x 3. Appropriate behavior  Ext:  No c/c.    Pulses:  2+ R radial    Lab Results   Component Value Date    WBC 18.8 (H) 09/06/2021    HGB 8.0 (L) 09/06/2021    HCT 25.8 (L) 09/06/2021    MCV 67.5 (L) 09/06/2021     09/06/2021     Lab Results   Component Value Date     09/06/2021    K 4.5 09/06/2021     09/06/2021    CO2 22 09/06/2021    BUN 35 (H) 09/06/2021    CREATININE 1.7 (H) 09/06/2021    GLUCOSE 76 09/06/2021    CALCIUM 8.3 09/06/2021    PROT 6.0 (L) 07/01/2021    LABALBU 3.4 09/04/2021    BILITOT <0.2 07/01/2021    ALKPHOS 62 07/01/2021    AST 10 (L) 07/01/2021    ALT 10 07/01/2021    LABGLOM 30 (A) 09/06/2021    GFRAA 36 (A) 09/06/2021    AGRATIO 1.1 07/01/2021    GLOB 2.8 07/01/2021       Lab Results   Component Value Date    INR 0.97 09/01/2021    INR 1.07 07/01/2021    INR 1.30 (H) 09/14/2020    PROTIME 11.0 09/01/2021    PROTIME 12.1 07/01/2021    PROTIME 15.1 (H) 09/14/2020     Lab Results   Component Value Date    CHOL 148 09/02/2021    CHOL 194 12/24/2020    CHOL 130 01/21/2015     Lab Results   Component Value Date    TRIG 126 09/02/2021    TRIG 185 (H) 12/24/2020    TRIG 116 01/21/2015     Lab Results   Component Value Date    HDL 80 (H) 09/02/2021     (H) 12/24/2020    HDL 77 (H) 01/21/2015     Lab Results   Component Value Date    LDLCALC 43 09/02/2021    LDLCALC 32 12/24/2020    LDLCALC 30 01/21/2015     Lab Results   Component Value Date    LABVLDL 25 09/02/2021 LABVLDL 37 12/24/2020    LABVLDL 23 01/21/2015     No results found for: CHOLHDLRATIO    9/5/2021 LHC: No sig CAD. Normal LVEDP    9/2/2021 Echo: Hyperdynamic LVEF (>65%) with small cavity size. No sig valve disease    A/P:  70 y.o. here for chest pain and chf. Has elevated Tn, recent neg stress. -NSTEMI/UA/elevated Troponin  -Acute diastolic chf  -COPD  -CKD  -DM  -DVT  -PE  -Anemia    Recs:  -Hold ARB  -Decrease Verapamil Rx to 120 mg daily (ER) if HR/BP tolerate. I cancleed the 80 mg tid dose  -ASA 81 mg daily  -Lipitor 10    Wrist is good. Followup with cardiology PRN. See PCP next week for bp check and renal panel to assess for KEVEN. Please call with questions. D/W Pt.  D/W Dr. Jose Foster.  Dinesh Limon MD, [facility]

## 2021-09-06 NOTE — PROGRESS NOTES
pt homecare company called requesting an order to resume home care orders to [phone]. Dr Mariam Amaya notified via perfect serve.

## 2021-09-06 NOTE — PROGRESS NOTES
Office : [phone]     Fax :[phone]         Renal Progress Note  Subjective:   Admit Date: 9/1/2021     MARK Muir is a 72 y.o. female with pmhx of DVT/PE (2012) on Eliquis and is compliant with Rx, COPD, CKD Stage 3, Crohn's, syringomyelia, Hx of C. Diff, OA and has non healing wound on her left shoulder, which is followed by outpatient wound care. The pt is presenting with chest pain and SOB of two days duration. The pt states the chest pain is located in the mid sternum, its quality is a dull ache, has been constant over the past two days, does not radiate, nothing alleviates it and nothing aggravates the chest pain. On exam, it is noted that chest pain is reproducible with palpation.     In addition to this, the pt endorses orthopnea, and has had bilateral swelling of her LE for the past week. The pt has recently moved during the past two days and states, \"I have been worked up for the past couple days, crying a lot. \" Pt just recently moved in with a friend, after the passing of her boyfriend. Interval History:      Renal function improved   Edema decreased   No SOB     Mild ankle edema     DIET ADULT DIET; Regular; 3 carb choices (45 gm/meal);  Low Fat/Low Chol/High Fiber/MARIPOSA; Low Sodium (2 gm)  Medications:   Scheduled Meds:   sodium chloride flush  5-40 mL IntraVENous 2 times per day    verapamil  80 mg Oral 3 times per day    atorvastatin  10 mg Oral Nightly    aspirin  81 mg Oral Daily    collagenase   Topical Daily    amitriptyline  75 mg Oral Nightly    gabapentin  300 mg Oral BID    oxyCODONE-acetaminophen  1 tablet Oral BID 97.3 °F (36.3 °C) (Oral)   Resp 16   Ht 4' 11\" (1.499 m)   Wt 156 lb 8.4 oz (71 kg)   LMP  (LMP Unknown)   SpO2 96%   BMI 31.61 kg/m²    Wt Readings from Last 3 Encounters:   09/06/21 156 lb 8.4 oz (71 kg)   08/05/21 149 lb (67.6 kg)   08/02/21 149 lb (67.6 kg)      24HR INTAKE/OUTPUT:      Intake/Output Summary (Last 24 hours) at 9/6/2021 0914  Last data filed at 9/6/2021 0650  Gross per 24 hour   Intake 480 ml   Output 350 ml   Net 130 ml     Constitutional:  OAA X3 NAD  Skin: no rash, turgor wnl  Heent:  eomi, mmm  Neck: no bruits or jvd noted  Cardiovascular:  S1, S2 without m/r/g. Chest pain reproducible on palpation  Respiratory: CTA B, mild crackles and decreased breath sounds, but no stridor and improved from yesterday  Abdomen:  +bs, soft, nt, nd  Ext: pitting lower extremity edema present bilaterally. 2+ pitting edema in RLE, 1+ in LLE  Psychiatric: mood and affect appropriate  Musculoskeletal:  Rom, muscular strength intact    Assessment and Plan:       IMAGING:  XR CHEST PORTABLE   Final Result      Hyperinflation compatible with known emphysema, with associated mild diffuse interstitial prominence similar to 7/1/2021. No acute consolidation. Stable cardiac mediastinal silhouette. Assessment/Plan     1. CKD stage 3: baseline Cr 1.8. Creatinine increased 1.8 -> 2.4 overnight. Likely due to diuresis yesterday. hold losartan   Lasix as need bases      2.  HTN      3. Acid- base/ Electrolyte imbalance:   Monitor             Laurel Sarabia MD
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
## 2021-09-07 ENCOUNTER — TELEPHONE (OUTPATIENT)
Dept: INTERNAL MEDICINE CLINIC | Age: 71
End: 2021-09-07

## 2021-09-07 ENCOUNTER — CARE COORDINATION (OUTPATIENT)
Dept: CASE MANAGEMENT | Age: 71
End: 2021-09-07

## 2021-09-07 DIAGNOSIS — I50.31 ACUTE DIASTOLIC CHF (CONGESTIVE HEART FAILURE) (HCC): Primary | ICD-10-CM

## 2021-09-07 NOTE — TELEPHONE ENCOUNTER
Romulo Mena from [address] would like a new Skilled Nursing order so they can continue there services Pt was discharge last night from [facility] to go home.       Romulo Mena [phone]

## 2021-09-08 ENCOUNTER — FOLLOWUP TELEPHONE ENCOUNTER (OUTPATIENT)
Dept: INPATIENT UNIT | Age: 71
End: 2021-09-08

## 2021-09-08 NOTE — TELEPHONE ENCOUNTER
At this point it is not clear what cause the fluid to build up in her lungs. The cardiac catherization did not show any disease. We will have her follow-up with cardiology on 9/15. She should continue the lasix and labetalol and use the lasix as needed.

## 2021-09-09 ENCOUNTER — FOLLOWUP TELEPHONE ENCOUNTER (OUTPATIENT)
Dept: INPATIENT UNIT | Age: 71
End: 2021-09-09

## 2021-09-09 NOTE — TELEPHONE ENCOUNTER
Called patient and informed her of what Dr. Dutch Harris stated and she will call us to let us know if anything changes.

## 2021-09-09 NOTE — PROGRESS NOTES
Second attempt made to reach patient. (First attempt was 9/8 1030 am -- unable to edit that encounter -- [facility] aware and has ticket on issue). Both calls went to . HIPAA compliant message left requesting call back to HF RN office phone. Will attempt again later.

## 2021-09-10 ENCOUNTER — TELEPHONE (OUTPATIENT)
Dept: INTERNAL MEDICINE CLINIC | Age: 71
End: 2021-09-10

## 2021-09-10 NOTE — PROGRESS NOTES
Third and final attempt to reach patient for 72 hour HF hospital follow up. Call went to . Message left requesting nonurgent call back to HF RN office phone.

## 2021-09-10 NOTE — TELEPHONE ENCOUNTER
Patricia with [facility] calling to confirm if Dr. Marcus Finch will sign for home physical therapy orders.

## 2021-09-11 NOTE — CONSULTS
HF RN consult received from Dr Jose Arboleda as part of HF order set. Chart reviewed and pt followed peripherally during hospital stay. Pt presented to ED with c/o CP and SOB x 2 days. She shares her boyfriend  and she has had to move from their shared home. She has been under a lot of emotional stress. Pt has no prior hx of HF and no established cardiologist.    Echo done  showed preserved EF with LVH. Pt developed MARIS after Lasix and LHC was delayed until . This was done via radial approach by Dr Peri Saucedo. No significant CAD was found and pt was instructed to follow up with cardiology as needed. Pt was discharged Mon  (Labor Day holiday). She was seen by [address] with [facility] set for 21. Enrollment was confirmed by [facility].

## 2021-09-14 ENCOUNTER — TELEPHONE (OUTPATIENT)
Dept: INTERNAL MEDICINE CLINIC | Age: 71
End: 2021-09-14

## 2021-09-14 NOTE — TELEPHONE ENCOUNTER
Renna Opitz, occupational therapist at [facility], calling to request a verbal order for OT services 2 times a week for 4 weeks. Please call [phone] to give the verbal order.

## 2021-09-15 ENCOUNTER — OFFICE VISIT (OUTPATIENT)
Dept: CARDIOLOGY CLINIC | Age: 71
End: 2021-09-15
Payer: MEDICARE

## 2021-09-15 VITALS
OXYGEN SATURATION: 95 % | DIASTOLIC BLOOD PRESSURE: 70 MMHG | HEIGHT: 59 IN | HEART RATE: 95 BPM | WEIGHT: 144.8 LBS | BODY MASS INDEX: 29.19 KG/M2 | SYSTOLIC BLOOD PRESSURE: 126 MMHG

## 2021-09-15 DIAGNOSIS — Z98.890 S/P CORONARY ANGIOGRAM: ICD-10-CM

## 2021-09-15 DIAGNOSIS — I10 ESSENTIAL HYPERTENSION: ICD-10-CM

## 2021-09-15 DIAGNOSIS — I21.4 NSTEMI (NON-ST ELEVATED MYOCARDIAL INFARCTION) (HCC): Primary | ICD-10-CM

## 2021-09-15 DIAGNOSIS — Z86.718 HX OF DEEP VENOUS THROMBOSIS: ICD-10-CM

## 2021-09-15 PROCEDURE — 99214 OFFICE O/P EST MOD 30 MIN: CPT | Performed by: NURSE PRACTITIONER

## 2021-09-15 PROCEDURE — 1111F DSCHRG MED/CURRENT MED MERGE: CPT | Performed by: NURSE PRACTITIONER

## 2021-09-15 PROCEDURE — 1123F ACP DISCUSS/DSCN MKR DOCD: CPT | Performed by: NURSE PRACTITIONER

## 2021-09-15 PROCEDURE — 4040F PNEUMOC VAC/ADMIN/RCVD: CPT | Performed by: NURSE PRACTITIONER

## 2021-09-15 PROCEDURE — G8417 CALC BMI ABV UP PARAM F/U: HCPCS | Performed by: NURSE PRACTITIONER

## 2021-09-15 PROCEDURE — 1036F TOBACCO NON-USER: CPT | Performed by: NURSE PRACTITIONER

## 2021-09-15 PROCEDURE — 3017F COLORECTAL CA SCREEN DOC REV: CPT | Performed by: NURSE PRACTITIONER

## 2021-09-15 PROCEDURE — G8399 PT W/DXA RESULTS DOCUMENT: HCPCS | Performed by: NURSE PRACTITIONER

## 2021-09-15 PROCEDURE — 1090F PRES/ABSN URINE INCON ASSESS: CPT | Performed by: NURSE PRACTITIONER

## 2021-09-15 PROCEDURE — G8427 DOCREV CUR MEDS BY ELIG CLIN: HCPCS | Performed by: NURSE PRACTITIONER
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
## 2021-09-16 ENCOUNTER — HOSPITAL ENCOUNTER (OUTPATIENT)
Dept: WOUND CARE | Age: 71
Discharge: HOME | End: 2021-09-16
Payer: MEDICARE

## 2021-09-16 VITALS
RESPIRATION RATE: 16 BRPM | DIASTOLIC BLOOD PRESSURE: 83 MMHG | HEART RATE: 103 BPM | SYSTOLIC BLOOD PRESSURE: 134 MMHG | TEMPERATURE: 97.6 F

## 2021-09-16 DIAGNOSIS — L90.5: ICD-10-CM

## 2021-09-16 DIAGNOSIS — L98.492 SKIN ULCER OF SHOULDER WITH FAT LAYER EXPOSED (HCC): ICD-10-CM

## 2021-09-16 DIAGNOSIS — S41.002A WOUND OF LEFT SHOULDER, INITIAL ENCOUNTER: ICD-10-CM

## 2021-09-16 DIAGNOSIS — Z79.52 LONG TERM (CURRENT) USE OF SYSTEMIC STEROIDS: Primary | ICD-10-CM

## 2021-09-16 PROCEDURE — 99213 OFFICE O/P EST LOW 20 MIN: CPT

## 2021-09-16 PROCEDURE — 16020 DRESS/DEBRID P-THICK BURN S: CPT | Performed by: NURSE PRACTITIONER

## 2021-09-16 RX ORDER — BACITRACIN, NEOMYCIN, POLYMYXIN B 400; 3.5; 5 [USP'U]/G; MG/G; [USP'U]/G
OINTMENT TOPICAL ONCE
OUTPATIENT
Start: 2021-09-16 | End: 2021-09-16

## 2021-09-16 RX ORDER — GENTAMICIN SULFATE 1 MG/G
OINTMENT TOPICAL ONCE
OUTPATIENT
Start: 2021-09-16 | End: 2021-09-16

## 2021-09-16 RX ORDER — GINSENG 100 MG
CAPSULE ORAL ONCE
OUTPATIENT
Start: 2021-09-16 | End: 2021-09-16

## 2021-09-16 RX ORDER — BETAMETHASONE DIPROPIONATE 0.05 %
OINTMENT (GRAM) TOPICAL ONCE
OUTPATIENT
Start: 2021-09-16 | End: 2021-09-16

## 2021-09-16 RX ORDER — LIDOCAINE 50 MG/G
OINTMENT TOPICAL ONCE
OUTPATIENT
Start: 2021-09-16 | End: 2021-09-16

## 2021-09-16 RX ORDER — LIDOCAINE HYDROCHLORIDE 20 MG/ML
JELLY TOPICAL ONCE
OUTPATIENT
Start: 2021-09-16 | End: 2021-09-16

## 2021-09-16 RX ORDER — LIDOCAINE 40 MG/G
CREAM TOPICAL ONCE
OUTPATIENT
Start: 2021-09-16 | End: 2021-09-16

## 2021-09-16 RX ORDER — LIDOCAINE HYDROCHLORIDE 40 MG/ML
SOLUTION TOPICAL ONCE
OUTPATIENT
Start: 2021-09-16 | End: 2021-09-16

## 2021-09-16 RX ORDER — BACITRACIN ZINC AND POLYMYXIN B SULFATE 500; 1000 [USP'U]/G; [USP'U]/G
OINTMENT TOPICAL ONCE
OUTPATIENT
Start: 2021-09-16 | End: 2021-09-16

## 2021-09-16 RX ORDER — LIDOCAINE 40 MG/G
CREAM TOPICAL ONCE
Status: DISCONTINUED | OUTPATIENT
Start: 2021-09-16 | End: 2021-09-17 | Stop reason: HOSPADM

## 2021-09-16 RX ORDER — CLOBETASOL PROPIONATE 0.5 MG/G
OINTMENT TOPICAL ONCE
OUTPATIENT
Start: 2021-09-16 | End: 2021-09-16

## 2021-09-16 NOTE — PROGRESS NOTES
[address], [address] [place] [address] Road  Telephone: [phone]        [facility] Fax # [phone]      Discharge Instructions       [address], [address] [place] [address] Road  Telephone: [phone]     FAX [phone]     Discharge Instructions     Important reminders:     1. Increase Protein intake for optimal wound healing  2. No added salt to reduce any swelling  3. If diabetic, maintain good glucose control  4. If you smoke, smoking prohibits wound healing, we ask that you refrain from smoking. 5. When taking antibiotics take the entire prescription as ordered. Do not stop taking until medication is all gone unless otherwise instructed. 6. Exercise as tolerated. 7. Keep weight off wounds and reposition every 2 hours if applicable. 8. If wound(s) is on your lower extremity, elevate legs to the level of the heart or above for 30 minutes 4-5 times a day and/or when sitting. Avoid standing for long periods of time. 9. Do not get wounds wet in bath or shower unless otherwise instructed by your physician. If your wound is on your foot or leg, you may purchase a cast bag. Please ask at the pharmacy.        If Vascular testing is ordered, please call [address] [place] [address] [phone]) to schedule.     Vascular tests ordered by Wound Care Physicians may take up to 2 hours to complete. Please keep that in mind when scheduling.      If Vascular testing is scheduled, please bring supplies to replace your dressing after testing is done. The vascular department does not stock supplies.      Wound: Shoulder     With each dressing change, rinse wounds with 0.9% Saline. (May use wound wash or soft contact solution. Both can be purchased at a local drug store).  If unable to obtain saline, may use a gentle soap and water.     Dressing care: Chelo, dry dressing- Change Monday, Wednesday, & Friday. [address]: [facility]     Your wound-care supplies will be provided by: [address] f: [phone] f: [phone] p: [phone] [email or web address]    Please note, depending on your insurance coverage, you may have out-of-pocket expenses for these supplies. Someone from the company should call you to confirm your order and discuss those potential costs before they ship your products -- please anticipate that call. If your out-of-pocket cost could be substantial, Many companies have financial hardship programs for patients who qualify, so please ask about that if you might need a hand. If you have any questions about your supplies or your potential out-of-pocket costs, or if you need to place an order for a refill of supplies (typically monthly), please call the company directly.      Your  is [place]     Follow up with Remigio Brunson CNP In 2 week in the wound care center.   [address] Information: Should you experience any significant changes in your wound(s) or have questions about your wound care, please contact the [facility]  [phone] Monday  - Thursday 8:00 am - 4:00 pm and Friday 8:00 am - 1:00pm. If you need help with your wound outside these hours and cannot wait until we are again available, contact your PCP or go to the hospital emergency room.      PLEASE NOTE: IF YOU ARE UNABLE TO OBTAIN WOUND SUPPLIES, CONTINUE TO USE THE SUPPLIES YOU HAVE AVAILABLE UNTIL YOU ARE ABLE TO [facility]. IT IS MOST IMPORTANT TO KEEP THE WOUND COVERED AT ALL TIMES.           Skilled nurse to evaluate and treat for wound care. Change dressing as ordered  once a day on Monday, Wednesday and Friday using clean technique. Patient/Family/caregiver may change dressings as needed as instructed when Home Care unavailable.     WOUNDS REQUIRING DRESSING Changes:     Wound 08/02/21 Shoulder Left #1 (Active)   Wound Image   09/16/21 9325   Wound Etiology Burn 09/16/21 1537   Dressing Status New dressing applied; Old drainage noted 09/06/21 0833   Wound Cleansed Cleansed with saline 09/16/21 1623   Dressing/Treatment Dry dressing; Pharmaceutical agent (see MAR) 09/06/21 0833   Wound Length (cm) 1.1 cm 09/16/21 1537   Wound Width (cm) 1 cm 09/16/21 1537   Wound Depth (cm) 0.2 cm 09/16/21 1537   Wound Surface Area (cm^2) 1.1 cm^2 09/16/21 1537   Change in Wound Size % (l*w) 86.9 09/16/21 1537   Wound Volume (cm^3) 0.22 cm^3 09/16/21 1537   Wound Healing % 95 09/16/21 1537   Post-Procedure Length (cm) 1.2 cm 09/16/21 1623   Post-Procedure Width (cm) 1.1 cm 09/16/21 1623   Post-Procedure Depth (cm) 0.25 cm 09/16/21 1623   Post-Procedure Surface Area (cm^2) 1.32 cm^2 09/16/21 1623   Post-Procedure Volume (cm^3) 0.33 cm^3 09/16/21 1623   Undermining Starts ___ O'Clock 1100 08/26/21 1538   Undermining Ends___ O'Clock 1300 08/26/21 1538   Undermining Maxium Distance (cm) . 3 08/26/21 1538   Wound Assessment Bleeding 09/16/21 1623   Drainage Amount Moderate 09/16/21 1623   Drainage Description Serosanguinous 09/16/21 1537   Odor None 09/16/21 1537   Ginger-wound Assessment Intact 09/16/21 1537   Margins Attached edges; Defined edges 09/16/21 1537   Wound Thickness Description not for Pressure Injury Full thickness 08/02/21 0919   Number of days: 45          Patient seen and treated on 9/16/2021    By Kayden Ornelas CNP NPI: [id number] (provider/NPI)

## 2021-09-16 NOTE — PLAN OF CARE
Discharge instructions given. Patient verbalized understanding. Return to [facility] in 1 week(s).   Continue

## 2021-09-16 NOTE — PLAN OF CARE
Discharge instructions given. Patient verbalized understanding. Return to [address] [place] [address],[address] in 2 week(s).   Called/faxed orders to  [facility]

## 2021-09-17 PROBLEM — L90.5: Status: ACTIVE | Noted: 2021-09-17
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
## 2021-09-21 ENCOUNTER — CARE COORDINATION (OUTPATIENT)
Dept: CASE MANAGEMENT | Age: 71
End: 2021-09-21

## 2021-09-21 NOTE — CARE COORDINATION
You Patient resolved from the Care Transitions episode on 09/21/2021    Patient/family has been provided the following resources and education related to COVID-19:                         Signs, symptoms and red flags related to COVID-19            CDC exposure and quarantine guidelines            [facility] exposure contact - [phone]            Contact for their local Department of Health                 Patient currently reports that the following symptoms have improved:  no new/worsening symptoms. Patient states she is doing well, reporting no issues or concerns. Patient is still being followed by [address] and has services through [address]. No other issues or concerns, no new or changed medications at this time. No further outreach scheduled with this CTN/ACM. Episode of Care resolved. Patient has this CTN/ACM contact information if future needs arise.     Thank Krish Galvan RN  Care Transition Coordinator  Contact ARMEN:[phone]

## 2021-09-22 ENCOUNTER — TELEPHONE (OUTPATIENT)
Dept: CARDIOLOGY CLINIC | Age: 71
End: 2021-09-22

## 2021-09-22 NOTE — TELEPHONE ENCOUNTER
Shima Rao  with [facility]. # [phone]  Patients heart rate runs  103 and 106 at resting. PT and OT has noticed it also. Please call steph she needs to know what to do about this and why this is happening.

## 2021-09-22 NOTE — TELEPHONE ENCOUNTER
Giovany Triana called back. Gave her instructions per CG. She said patient will need new prescription for verapamil called into [facility] in [place] phone# [phone] since she will be taking 2 tablets now.

## 2021-09-23 RX ORDER — VERAPAMIL HYDROCHLORIDE 240 MG/1
120 TABLET, FILM COATED, EXTENDED RELEASE ORAL DAILY
Qty: 60 TABLET | Refills: 3 | Status: SHIPPED | OUTPATIENT
Start: 2021-09-23 | End: 2021-11-23 | Stop reason: SDUPTHER

## 2021-09-26 ENCOUNTER — HOSPITAL ENCOUNTER (EMERGENCY)
Age: 71
Discharge: HOME | End: 2021-09-26
Attending: STUDENT IN AN ORGANIZED HEALTH CARE EDUCATION/TRAINING PROGRAM
Payer: MEDICARE

## 2021-09-26 ENCOUNTER — APPOINTMENT (OUTPATIENT)
Dept: GENERAL RADIOLOGY | Age: 71
End: 2021-09-26
Payer: MEDICARE

## 2021-09-26 ENCOUNTER — APPOINTMENT (OUTPATIENT)
Dept: CT IMAGING | Age: 71
End: 2021-09-26
Payer: MEDICARE

## 2021-09-26 ENCOUNTER — TELEPHONE (OUTPATIENT)
Dept: OTHER | Facility: CLINIC | Age: 71
End: 2021-09-26

## 2021-09-26 VITALS
SYSTOLIC BLOOD PRESSURE: 152 MMHG | DIASTOLIC BLOOD PRESSURE: 95 MMHG | HEIGHT: 59 IN | OXYGEN SATURATION: 100 % | WEIGHT: 145 LBS | BODY MASS INDEX: 29.23 KG/M2 | TEMPERATURE: 98.5 F | RESPIRATION RATE: 18 BRPM | HEART RATE: 95 BPM

## 2021-09-26 DIAGNOSIS — M25.552 LEFT HIP PAIN: Primary | ICD-10-CM

## 2021-09-26 PROCEDURE — 73700 CT LOWER EXTREMITY W/O DYE: CPT

## 2021-09-26 PROCEDURE — 73502 X-RAY EXAM HIP UNI 2-3 VIEWS: CPT

## 2021-09-26 PROCEDURE — 99285 EMERGENCY DEPT VISIT HI MDM: CPT

## 2021-09-26 PROCEDURE — 6370000000 HC RX 637 (ALT 250 FOR IP): Performed by: STUDENT IN AN ORGANIZED HEALTH CARE EDUCATION/TRAINING PROGRAM

## 2021-09-26 PROCEDURE — 71046 X-RAY EXAM CHEST 2 VIEWS: CPT

## 2021-09-26 RX ORDER — ACETAMINOPHEN 325 MG/1
650 TABLET ORAL ONCE
Status: COMPLETED | OUTPATIENT
Start: 2021-09-26 | End: 2021-09-26

## 2021-09-26 RX ORDER — LIDOCAINE 4 G/G
1 PATCH TOPICAL ONCE
Status: DISCONTINUED | OUTPATIENT
Start: 2021-09-26 | End: 2021-09-26 | Stop reason: HOSPADM

## 2021-09-26 RX ADMIN — ACETAMINOPHEN 650 MG: 325 TABLET ORAL at 08:25

## 2021-09-26 ASSESSMENT — PAIN DESCRIPTION - LOCATION: LOCATION: LEG;HIP;RIB CAGE

## 2021-09-26 ASSESSMENT — PAIN DESCRIPTION - ORIENTATION: ORIENTATION: LEFT

## 2021-09-26 ASSESSMENT — PAIN SCALES - GENERAL
PAINLEVEL_OUTOF10: 10
PAINLEVEL_OUTOF10: 10

## 2021-09-26 ASSESSMENT — PAIN DESCRIPTION - PAIN TYPE: TYPE: ACUTE PAIN

## 2021-09-26 NOTE — ED NOTES
Discharge instructions reviewed with patient and family. Both discharged home with family. Patient states that she normally wears oxygen at home but that she would be fine for the 10 minutes home. Patient insisted that she would be fine, despite RN questioning. Patient's family told to drive directly home and that she would need her oxygen ASAP. Family agreeable but states that she should be fine until she gets there.          Robert Nice RN  09/26/21 0102

## 2021-09-26 NOTE — ED NOTES
Bed: A04-04  Expected date:   Expected time:   Means of arrival:   Comments:  Farzaneh Justice RN  09/26/21 8072

## 2021-09-26 NOTE — TELEPHONE ENCOUNTER
Writer contacted Dr. Solomon Bamberger to inform of 30 day readmission risk. Dr. Solomon Bamberger informed writer of intention to discharge and recommended follow up with PCP.

## 2021-09-26 NOTE — ED PROVIDER NOTES
[address] [facility]          ATTENDING PHYSICIAN NOTE       Date of evaluation: 9/26/2021    Chief Complaint     Left hip pain    History of Present Illness     Sam Engel is a 80 y.o. female who presents chief complaint of left hip pain. Patient had a mechanical fall yesterday and landed on her left side. Now complains of pain primarily over left hip and also left rib cage. She denies any shortness of breath. She states that fall was purely mechanical.  She has a history of COPD and Crohn's disease, is on 2 L home oxygen at all times. Did not have any syncopal type symptoms. Palpation make her pain worse, has had some difficulty bearing weight since the fall. Pain is achy in nature  Review of Systems     REVIEW OF SYSTEMS  GENERAL: Negative for any fevers, chills, or weight loss. HEENT: Negative for any head trauma, neck trauma, neck stiffness, photophobia, phonophobia, sinusitis, rhinitis. CARDIAC: Negative for any chest pain, palpitations  PULMONARY: Negative for any shortness of breath, cough, wheezing  GASTROINTESTINAL: Negative for any abdominal pain, nausea, vomiting,   GENITOURINARY: Negative for any dysuria, hematuria, incontinence. SKIN: Negative for any rashes, wounds  MSK: Per HPI  HEMATOLOGIC: Negative for any abnormal bruising, frequent infections or bleeding.   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 She has a past surgical history that includes shoulder surgery; back surgery; brain surgery; Colon surgery; Colonoscopy (12/5/11); Colonoscopy (4-2-2012); Cholecystectomy; Abdomen surgery; Biopsy shoulder (Left, 2/27/2019); and bronchoscopy (N/A, 9/2/2020). Her family history includes Early Death (age of onset: 36) in her mother; Heart Disease in her father and mother; High Blood Pressure in her brother, father, mother, and sister; High Cholesterol in her father and mother; Stroke (age of onset: 79) in her father. She reports that she quit smoking about 6 years ago. Her smoking use included cigarettes. She has a 7.50 pack-year smoking history. She has never used smokeless tobacco. She reports that she does not drink alcohol and does not use drugs. Medications     Previous Medications    ADALIMUMAB (HUMIRA PEN SC)    Inject into the skin every 14 days     ALBUTEROL (PROVENTIL) (2.5 MG/3ML) 0.083% NEBULIZER SOLUTION    TAKE 3 MLS BY NEBULIZATION EVERY 6 HOURS AS NEEDED FOR WHEEZING DX: COPD ICD-10: J44.9    ALENDRONATE (FOSAMAX) 70 MG TABLET    Take 1 tablet by mouth every 7 days    AMITRIPTYLINE (ELAVIL) 150 MG TABLET    Take 0.5 tablets by mouth nightly    BUDESON-GLYCOPYRROL-FORMOTEROL (BREZTRI AEROSPHERE) 160-9-4.8 MCG/ACT AERO    Inhale 2 puffs into the lungs 2 times daily    COLESTIPOL (COLESTID) 1 G TABLET    Take 1 g by mouth 2 times daily     COLLAGENASE 250 UNIT/GM OINTMENT    Apply topically daily Apply topically to L shoulder wound    ELIQUIS 5 MG TABS TABLET    TAKE 1 TABLET BY MOUTH TWICE A DAY    FUROSEMIDE (LASIX) 20 MG TABLET    Take 1 tablet by mouth daily as needed (edema, weight gain >3lbs in 1 day)    GABAPENTIN (NEURONTIN) 300 MG CAPSULE    Take 1 capsule by mouth 2 times daily for 180 days.  Intended supply: 90 days    LIFT CHAIR MISC    by Does not apply route 73 yo woman with severe COPD and Arthritis of the Left Hip. Please dispense a Lift Chair    MISC. DEVICES (COMMODE BEDSIDE) MISC    Use as directed    MISC. DEVICES (WALKER) MISC    1 each by Does not apply route daily    OXYCODONE-ACETAMINOPHEN (PERCOCET) 5-325 MG PER TABLET    Take 1 tablet by mouth 2 times daily as needed for Pain. POTASSIUM CHLORIDE (KLOR-CON) 10 MEQ EXTENDED RELEASE TABLET    Take 10 mEq by mouth daily     PREDNISONE (DELTASONE) 10 MG TABLET    Take 2 tablets by mouth daily    VERAPAMIL (CALAN SR) 240 MG EXTENDED RELEASE TABLET    Take 0.5 tablets by mouth daily       Allergies     She is allergic to statins. Physical Exam     INITIAL VITALS: BP: (!) 149/101, Temp: 98.5 °F (36.9 °C), Pulse: 95, Resp: 18, SpO2: 96 %   Physical Exam  Vitals and nursing note reviewed. Constitutional:       Appearance: Normal appearance. HENT:      Head: Normocephalic and atraumatic. Eyes:      Conjunctiva/sclera: Conjunctivae normal.      Pupils: Pupils are equal, round, and reactive to light. Neck:      Comments: No cervical step-offs, tenderness or deformities  Cardiovascular:      Rate and Rhythm: Normal rate and regular rhythm. Pulses: Normal pulses. Heart sounds: Normal heart sounds. Pulmonary:      Effort: Pulmonary effort is normal.      Breath sounds: Normal breath sounds. Abdominal:      General: Abdomen is flat. Palpations: Abdomen is soft. Tenderness: There is no abdominal tenderness. Musculoskeletal:         General: Normal range of motion. Cervical back: Normal range of motion and neck supple. Comments: Tenderness palpation over left hip without obvious gross bony deformity, tenderness palpation of her left ribs. Pulses intact and equal, sensation intact in L4-S1 distribution. No thoracic or lumbar step-offs, tenderness or deformities   Skin:     General: Skin is warm and dry. Neurological:      General: No focal deficit present.       Mental Status: She is alert and

## 2021-09-26 NOTE — ED TRIAGE NOTES
Patient arrives c/o left rib and left hip pain following a fall last night. Patient states that she was going to get something to eat when her socks slipped on the floor and pulled her legs out from under her, causing her to fall. Patient states that she now hurts \"from my armpit down to my leg. \"

## 2021-09-27 ENCOUNTER — TELEPHONE (OUTPATIENT)
Dept: INTERNAL MEDICINE CLINIC | Age: 71
End: 2021-09-27

## 2021-09-27 NOTE — TELEPHONE ENCOUNTER
Patient will 9/26/21 and sent to ER. No fx but muscle strains. Pt cancelled services for today. Daughter is coming down to take care of the patient. Patient was bending to unwrap her O2 tubing and she fell (she the slips), hurt left side, hip and ribs. Pt has some bruising and can not put pressure on left leg.   All services will restart on Wednesday 9/29/21

## 2021-09-28 ENCOUNTER — TELEPHONE (OUTPATIENT)
Dept: OTHER | Facility: CLINIC | Age: 71
End: 2021-09-28

## 2021-09-28 NOTE — TELEPHONE ENCOUNTER
Nurse Access contacted Dr. Sung Thomason Office to schedule ED follow up appt. Spoke with Marielle Robbins, she sent a message back to the office.

## 2021-09-30 ENCOUNTER — TELEPHONE (OUTPATIENT)
Dept: INTERNAL MEDICINE CLINIC | Age: 71
End: 2021-09-30

## 2021-09-30 NOTE — TELEPHONE ENCOUNTER
----- Message from Oleksandr Oliveira sent at 9/28/2021  2:03 PM EDT -----  Subject: Appointment Request    Reason for Call: Routine ED Follow Up Visit    QUESTIONS  Type of Appointment? Established Patient  Reason for appointment request? No appointments available during search  Additional Information for Provider? Spoke with Nurse Devonte Chacon to schedule ED   follow up appt. Patient was seen on 9/26 at [facility] ED due to fall   & hip pain. Please call patient to schedule as no appts available in   system. ---------------------------------------------------------------------------  --------------  [place] INFO  What is the best way for the office to contact you? OK to leave message on   voicemail  Preferred Call Back Phone Number? [phone]  ---------------------------------------------------------------------------  --------------  SCRIPT ANSWERS  Relationship to Patient? Third Party  Representative Name? Awilda - Nurse  (Patient requests to see provider urgently. )? No  Do you have any questions for your primary care provider that need to be   answered prior to your appointment? No  Have you been diagnosed with, awaiting test results for, or told that you   are suspected of having COVID-19 (Coronavirus)? (If patient has tested   negative or was tested as a requirement for work, school, or travel and   not based on symptoms, answer no)? No  Within the past two weeks have you developed any of the following symptoms   (answer no if symptoms have been present longer than 2 weeks or began   more than 2 weeks ago)? Fever or Chills, Cough, Shortness of breath or   difficulty breathing, Loss of taste or smell, Sore throat, Nasal   congestion, Sneezing or runny nose, Fatigue or generalized body aches   (answer no if pain is specific to a body part e.g. back pain), Diarrhea,   Headache? No  Have you had close contact with someone with COVID-19 in the last 14 days?    No  (Service Expert  click yes below to proceed with [employer]ual   Scheduling)?  Yes

## 2021-10-12 ENCOUNTER — HOSPITAL ENCOUNTER (OUTPATIENT)
Dept: WOUND CARE | Age: 71
Discharge: HOME | End: 2021-10-12
Payer: MEDICARE

## 2021-10-25 PROBLEM — N18.30 ACUTE RENAL FAILURE SUPERIMPOSED ON STAGE 3 CHRONIC KIDNEY DISEASE (HCC): Status: RESOLVED | Noted: 2021-04-08 | Resolved: 2021-01-01

## 2021-10-25 PROBLEM — J44.1 COPD EXACERBATION (HCC): Status: RESOLVED | Noted: 2019-05-22 | Resolved: 2021-10-25

## 2021-10-25 PROBLEM — J44.1 ACUTE EXACERBATION OF CHRONIC OBSTRUCTIVE PULMONARY DISEASE (COPD) (HCC): Status: RESOLVED | Noted: 2021-04-08 | Resolved: 2021-10-25

## 2021-10-25 PROBLEM — N17.9 ACUTE RENAL FAILURE SUPERIMPOSED ON STAGE 3 CHRONIC KIDNEY DISEASE (HCC): Status: RESOLVED | Noted: 2021-04-08 | Resolved: 2021-01-01

## 2021-10-28 ENCOUNTER — TELEPHONE (OUTPATIENT)
Dept: INTERNAL MEDICINE CLINIC | Age: 71
End: 2021-10-28

## 2021-10-28 NOTE — TELEPHONE ENCOUNTER
----- Message from Nayla Naomi sent at 10/27/2021  3:47 PM EDT -----  Subject: Appointment Request    Reason for Call: Routine Hospital Follow Up    QUESTIONS  Type of Appointment? Established Patient  Reason for appointment request? No appointments available during search  Additional Information for Provider? Pt will be discharged from rehab and   provider would prefer she can be seen in person and not via video for her   appointment since she is finishing up her meds from being in the hospital   rehab phone number is [phone] ext [phone]   ---------------------------------------------------------------------------  --------------  CALL BACK INFO  What is the best way for the office to contact you? Do not leave any   message, patient will call back for answer  Preferred Call Back Phone Number? [phone]  ---------------------------------------------------------------------------  --------------  SCRIPT ANSWERS  Relationship to Patient? Third Party  Representative Name? Waleska Pineda  (Patient requests to see provider urgently. )? No  (Has the patient been discharged from the hospital within 2 business days   AND does not have a Telephone Encounter  Follow Up From [address] [place] [address]   documented in [address]?)? No  Do you have any questions for your primary care provider that need to be   answered prior to your appointment? (Use RN Triage if question pertains to   anything on the red flag list)? No  (Patient needs follow up visit after hospital discharge) Book first   available appointment within 7 days OF DISCHARGE, if no appt, proceed to   book the next available time slot within 14 days OF DISCHARGE AND Send   Message to Provider. [facility] Follow Up appointment cannot be booked   beyond 14 Days and should result in a Message to Provider. ? Yes   Have you been diagnosed with, awaiting test results for, or told that you   are suspected of having COVID-19 (Coronavirus)?  (If patient has tested   negative or was tested as a requirement for work, school, or travel and   not based on symptoms, answer no)? No  Within the past two weeks have you developed any of the following symptoms   (answer no if symptoms have been present longer than 2 weeks or began   more than 2 weeks ago)? Fever or Chills, Cough, Shortness of breath or   difficulty breathing, Loss of taste or smell, Sore throat, Nasal   congestion, Sneezing or runny nose, Fatigue or generalized body aches   (answer no if pain is specific to a body part e.g. back pain), Diarrhea,   Headache? No  Have you had close contact with someone with COVID-19 in the last 14 days? No  (Service Expert  click yes below to proceed with [employer]ual   Scheduling)?  Yes

## 2021-10-29 ENCOUNTER — TELEPHONE (OUTPATIENT)
Dept: INTERNAL MEDICINE CLINIC | Age: 71
End: 2021-10-29

## 2021-10-29 NOTE — TELEPHONE ENCOUNTER
Spoke w/Giovanna @ [facility]. Appointment changed to 11/04/21.

## 2021-10-29 NOTE — TELEPHONE ENCOUNTER
----- [facility] [place] sent at 10/29/2021 12:48 PM EDT -----  Subject: Message to Provider    QUESTIONS  Information for Provider? Charmayne Brooks from [facility] called to schedule Pt   her rehab/ hosp f/u. needed to be seen in person and unsure what the   office had in mind but Pt is scheduled for 11/10 with nurse Lindy Valentine. Pt   is spending the holidays with her family up [facility] and is asking to get in   next week before she leaves. Please call Charmayne Brooks at [phone] if   today. Taniya Lundberg if next week  ---------------------------------------------------------------------------  --------------  CALL BACK INFO  What is the best way for the office to contact you? OK to leave message on   voicemail  Preferred Call Back Phone Number? [phone]  ---------------------------------------------------------------------------  --------------  SCRIPT ANSWERS  Relationship to Patient?  Third Party  Representative Name? Randolph Gaona

## 2021-11-04 ENCOUNTER — VIRTUAL VISIT (OUTPATIENT)
Dept: INTERNAL MEDICINE CLINIC | Age: 71
End: 2021-11-04
Payer: MEDICARE

## 2021-11-04 DIAGNOSIS — J44.9 COPD, SEVERE (HCC): Primary | ICD-10-CM

## 2021-11-04 DIAGNOSIS — A04.72 CLOSTRIDIUM DIFFICILE DIARRHEA: ICD-10-CM

## 2021-11-04 DIAGNOSIS — K50.00 CROHN'S DISEASE OF SMALL INTESTINE WITHOUT COMPLICATION (HCC): Chronic | ICD-10-CM

## 2021-11-04 PROCEDURE — 1111F DSCHRG MED/CURRENT MED MERGE: CPT | Performed by: INTERNAL MEDICINE

## 2021-11-04 PROCEDURE — 99214 OFFICE O/P EST MOD 30 MIN: CPT | Performed by: INTERNAL MEDICINE

## 2021-11-04 RX ORDER — LINACLOTIDE 145 UG/1
145 CAPSULE, GELATIN COATED ORAL
Qty: 30 CAPSULE | Refills: 5 | Status: SHIPPED | OUTPATIENT
Start: 2021-11-04 | End: 2022-01-06 | Stop reason: ALTCHOICE

## 2021-11-04 RX ORDER — LINACLOTIDE 145 UG/1
CAPSULE, GELATIN COATED ORAL
COMMUNITY
Start: 2021-10-06 | End: 2021-11-04 | Stop reason: ALTCHOICE

## 2021-11-04 RX ORDER — ONDANSETRON 4 MG/1
4 TABLET, FILM COATED ORAL EVERY 8 HOURS PRN
COMMUNITY
Start: 2021-10-07 | End: 2021-12-22

## 2021-11-04 RX ORDER — ATORVASTATIN CALCIUM 10 MG/1
10 TABLET, FILM COATED ORAL DAILY
COMMUNITY
End: 2021-11-23 | Stop reason: SDUPTHER

## 2021-11-04 NOTE — PROGRESS NOTES
Post-Discharge Transitional Care Management Services or Hospital Follow Up      Akhil Stevens   YOB: 1950    Date of Office Visit:  11/4/2021  Date of Hospital Admission: 10/16/21  Date of Hospital Discharge:10/30/21  Readmission Risk Score(high >=14%.  Medium >=10%):Readmission Risk Score: 36      Care management risk score Rising risk (score 2-5) and Complex Care (Scores >=6): 11     Non face to face  following discharge, date last encounter closed (first attempt may have been earlier): *No documented post hospital discharge outreach found in the last 14 days *No documented post hospital discharge outreach found in the last 14 days    Call initiated 2 business days of discharge: *No response recorded in the last 14 days     Patient Active Problem List   Diagnosis    Crohn disease ([address]    Depression    Osteoarthritis    Lupus anticoagulant disorder ([address]    Dysphagia    Syringomyelia ([address]    Gastritis and gastroduodenitis    Skin ulcer of shoulder ([address]    Shortness of breath    Microcytic anemia    Pulmonary fibrosis ([address]    Chronic hypoxemic respiratory failure ([address]    Hiatal hernia    COPD, severe ([address]    Alpha thalassemia minor    Acute on chronic respiratory failure with hypoxia ([address]    Bronchiectasis with acute exacerbation ([address]    Hemoglobin C trait ([address]    Osteoporosis of multiple sites    Gastroesophageal reflux disease without esophagitis    Sepsis ([address]    Bilateral pulmonary embolism (HCC)    Acute deep vein thrombosis (DVT) of distal vein of both lower extremities (HCC)    SOB (shortness of breath)    Wound of left shoulder    Hx pulmonary embolism    Cerebrovascular accident (CVA) ([address]    History of DVT (deep vein thrombosis)    Small vessel disease, cerebrovascular    Ex-smoker    History of depression    Overweight    HCAP (healthcare-associated pneumonia)    Metabolic acidosis    Long term (current) use of systemic steroids    Essential hypertension    Stage 3 chronic kidney disease (HCC)    Closed fracture of one rib of left side with routine healing    Open wound of left upper arm    Chest pain    NSTEMI (non-ST elevated myocardial infarction) ([facility])    Acute diastolic CHF (congestive heart failure) ([facility])    Scar of shoulder       Allergies   Allergen Reactions    Statins Other (See Comments)     Upset stomach and muscle pains        Medications listed as ordered at the time of discharge from hospital   Khadar Fanny TENORIO   Home Medication Instructions HARO[facility]:    Printed on:11/04/21 4910   Medication Information                      Adalimumab (HUMIRA PEN SC)  Inject into the skin every 14 days              albuterol (PROVENTIL) (2.5 MG/3ML) 0.083% nebulizer solution  TAKE 3 MLS BY NEBULIZATION EVERY 6 HOURS AS NEEDED FOR WHEEZING DX: COPD ICD-10: J44.9             alendronate (FOSAMAX) 70 MG tablet  Take 1 tablet by mouth every 7 days             amitriptyline (ELAVIL) 150 MG tablet  Take 0.5 tablets by mouth nightly             atorvastatin (LIPITOR) 10 MG tablet  Take 10 mg by mouth daily             Budeson-Glycopyrrol-Formoterol (BREZTRI AEROSPHERE) 160-9-4.8 MCG/ACT AERO  Inhale 2 puffs into the lungs 2 times daily             Budeson-Glycopyrrol-Formoterol (BREZTRI AEROSPHERE) 160-9-4.8 MCG/ACT AERO  Inhale 2 puffs into the lungs 2 times daily             colestipol (COLESTID) 1 g tablet  Take 1 g by mouth 2 times daily              collagenase 250 UNIT/GM ointment  Apply topically daily Apply topically to L shoulder wound             ELIQUIS 5 MG TABS tablet  TAKE 1 TABLET BY MOUTH TWICE A DAY             furosemide (LASIX) 20 MG tablet  Take 1 tablet by mouth daily as needed (edema, weight gain >3lbs in 1 day)             gabapentin (NEURONTIN) 300 MG capsule  Take 1 capsule by mouth 2 times daily for 180 days.  Intended supply: 90 days             Lift Chair MISC  by Does not apply route 71 yo woman with severe COPD and Arthritis of the Left Hip. Please dispense a Lift Chair             Misc. Devices (COMMODE BEDSIDE) MISC  Use as directed             Misc. Devices (WALKER) MISC  1 each by Does not apply route daily             ondansetron (ZOFRAN) 4 MG tablet  Take 4 mg by mouth every 8 hours as needed             oxyCODONE-acetaminophen (PERCOCET) 5-325 MG per tablet  Take 1 tablet by mouth 2 times daily as needed for Pain.             potassium chloride (KLOR-CON) 10 MEQ extended release tablet  Take 10 mEq by mouth daily              predniSONE (DELTASONE) 10 MG tablet  Take 2 tablets by mouth daily             tiotropium (SPIRIVA) 18 MCG inhalation capsule  Inhale 18 mcg into the lungs daily             verapamil (CALAN SR) 240 MG extended release tablet  Take 0.5 tablets by mouth daily                   Medications marked \"taking\" at this time  Outpatient Medications Marked as Taking for the 11/4/21 encounter (Virtual Visit) with Cam Ruiz MD   Medication Sig Dispense Refill    atorvastatin (LIPITOR) 10 MG tablet Take 10 mg by mouth daily      ondansetron (ZOFRAN) 4 MG tablet Take 4 mg by mouth every 8 hours as needed      tiotropium (SPIRIVA) 18 MCG inhalation capsule Inhale 18 mcg into the lungs daily      Budeson-Glycopyrrol-Formoterol (BREZTRI AEROSPHERE) 160-9-4.8 MCG/ACT AERO Inhale 2 puffs into the lungs 2 times daily 2 each 0    verapamil (CALAN SR) 240 MG extended release tablet Take 0.5 tablets by mouth daily 60 tablet 3    furosemide (LASIX) 20 MG tablet Take 1 tablet by mouth daily as needed (edema, weight gain >3lbs in 1 day) 60 tablet 3    oxyCODONE-acetaminophen (PERCOCET) 5-325 MG per tablet Take 1 tablet by mouth 2 times daily as needed for Pain.  collagenase 250 UNIT/GM ointment Apply topically daily Apply topically to L shoulder wound      Misc. Devices (COMMODE BEDSIDE) MISC Use as directed 1 each 0    Misc.  Devices (WALKER) MISC 1 each by Does not apply route daily 1 each 0  predniSONE (DELTASONE) 10 MG tablet Take 2 tablets by mouth daily 60 tablet 4    ELIQUIS 5 MG TABS tablet TAKE 1 TABLET BY MOUTH TWICE A DAY 60 tablet 6    Lift Chair MISC by Does not apply route 73 yo woman with severe COPD and Arthritis of the Left Hip. Please dispense a Lift Chair 1 each 0    alendronate (FOSAMAX) 70 MG tablet Take 1 tablet by mouth every 7 days 12 tablet 3    gabapentin (NEURONTIN) 300 MG capsule Take 1 capsule by mouth 2 times daily for 180 days. Intended supply: 90 days 180 capsule 1    Budeson-Glycopyrrol-Formoterol (BREZTRI AEROSPHERE) 160-9-4.8 MCG/ACT AERO Inhale 2 puffs into the lungs 2 times daily 1 Inhaler 6    potassium chloride (KLOR-CON) 10 MEQ extended release tablet Take 10 mEq by mouth daily       Adalimumab (HUMIRA PEN SC) Inject into the skin every 14 days       albuterol (PROVENTIL) (2.5 MG/3ML) 0.083% nebulizer solution TAKE 3 MLS BY NEBULIZATION EVERY 6 HOURS AS NEEDED FOR WHEEZING DX: COPD ICD-10: J44.9 300 mL 6    colestipol (COLESTID) 1 g tablet Take 1 g by mouth 2 times daily       amitriptyline (ELAVIL) 150 MG tablet Take 0.5 tablets by mouth nightly 45 tablet 3        Medications patient taking as of now reconciled against medications ordered at time of hospital discharge: Yes    Chief Complaint   Patient presents with    Follow-Up from Hospital     lightheaded and dizzy       HPI    Inpatient course: Discharge summary reviewed- see chart. Interval history/Current status: patient continues to have some difficulty breathing. She states that the abdominal pain and diarrhea are improved. She is taking prednisone which is helping with her abdominal. She is feeling better. Her daughter is on the call and plans to have her come to [place]. Review of Systems   Respiratory: Positive for cough and shortness of breath. Gastrointestinal: Positive for abdominal pain. Negative for diarrhea and nausea.        There were no vitals filed for this visit.  There is no height or weight on file to calculate BMI. Wt Readings from Last 3 Encounters:   09/26/21 145 lb (65.8 kg)   09/15/21 144 lb 12.8 oz (65.7 kg)   09/15/21 148 lb (67.1 kg)     BP Readings from Last 3 Encounters:   09/26/21 (!) 152/95   09/16/21 134/83   09/15/21 126/70       Physical Exam  Constitutional:       General: She is not in acute distress. Appearance: She is ill-appearing. HENT:      Head: Normocephalic. Right Ear: Tympanic membrane and ear canal normal.      Left Ear: Tympanic membrane and ear canal normal.   Eyes:      General:         Right eye: No discharge. Left eye: No discharge. Conjunctiva/sclera: Conjunctivae normal.   Pulmonary:      Breath sounds: Wheezing (aubibly wheezing) present. Neurological:      Mental Status: She is alert. Assessment/Plan:  1. Crohn's disease of small intestine without complication ([address]  improved  - CT DISCHARGE MEDS RECONCILED W/ CURRENT OUTPATIENT MED LIST  - External Referral To Home Health    2. Clostridium difficile diarrhea  improved  - CT DISCHARGE MEDS RECONCILED W/ CURRENT OUTPATIENT MED LIST  - External Referral To Home Health    3. COPD, severe ([address]  stable  - External Referral To Home Health        Medical Decision Making: moderate complexity      Reford Cram, was evaluated through a synchronous (real-time) audio-video encounter. The patient (or guardian if applicable) is aware that this is a billable service. Verbal consent to proceed has been obtained within the past 12 months. The visit was conducted pursuant to the emergency declaration under the [facility], [address] [place] [address] authority and the [employer] General Act. Patient identification was verified, and a caregiver was present when appropriate. The patient was located in a state where the provider was credentialed to provide care.     Total time spent for this encounter: Not billed by time    --Sundar Lange MD on 11/4/2021 at 8:48 AM    An electronic signature was used to authenticate this note.

## 2021-11-08 ENCOUNTER — TELEPHONE (OUTPATIENT)
Dept: ADMINISTRATIVE | Age: 71
End: 2021-11-08

## 2021-11-08 NOTE — TELEPHONE ENCOUNTER
Ashley Gaston / [facility]      [facility] patient is going with daughter today 11/9/21 will be back after Thanksgiving     They will need to discharge as of today from home care services until patient get backs.     Please call [phone] if any questions

## 2021-11-09 LAB
BASOPHILS ABSOLUTE: 0 K/UL (ref 0–0.2)
BASOPHILS RELATIVE PERCENT: 0.1 %
EOSINOPHILS ABSOLUTE: 0 K/UL (ref 0–0.6)
EOSINOPHILS RELATIVE PERCENT: 0.1 %
FERRITIN: 104 NG/ML (ref 15–150)
HCT VFR BLD CALC: 26.3 % (ref 36–48)
HEMATOLOGY PATH CONSULT: NO
HEMOGLOBIN: 8.1 G/DL (ref 12–16)
IRON SATURATION: 13 % (ref 15–50)
IRON: 41 UG/DL (ref 37–145)
LYMPHOCYTES ABSOLUTE: 1.5 K/UL (ref 1–5.1)
LYMPHOCYTES RELATIVE PERCENT: 9.3 %
MCH RBC QN AUTO: 20.4 PG (ref 26–34)
MCHC RBC AUTO-ENTMCNC: 30.6 G/DL (ref 31–36)
MCV RBC AUTO: 66.6 FL (ref 80–100)
MONOCYTES ABSOLUTE: 0.3 K/UL (ref 0–1.3)
MONOCYTES RELATIVE PERCENT: 2.1 %
NEUTROPHILS ABSOLUTE: 14.5 K/UL (ref 1.7–7.7)
NEUTROPHILS RELATIVE PERCENT: 88.4 %
PDW BLD-RTO: 22.2 % (ref 12.4–15.4)
PLATELET # BLD: 285 K/UL (ref 135–450)
PMV BLD AUTO: 8.6 FL (ref 5–10.5)
RBC # BLD: 3.95 M/UL (ref 4–5.2)
TOTAL IRON BINDING CAPACITY: 318 UG/DL (ref 260–445)
VITAMIN B-12: 810 PG/ML (ref 211–911)
VITAMIN D 25-HYDROXY: 22.7 NG/ML
WBC # BLD: 16.4 K/UL (ref 4–11)

## 2021-11-17 ASSESSMENT — ENCOUNTER SYMPTOMS
DIARRHEA: 0
COUGH: 1
NAUSEA: 0
SHORTNESS OF BREATH: 1
ABDOMINAL PAIN: 1

## 2021-11-22 ENCOUNTER — TELEPHONE (OUTPATIENT)
Dept: ORTHOPEDIC SURGERY | Age: 71
End: 2021-11-22

## 2021-11-22 NOTE — TELEPHONE ENCOUNTER
PLEASE SEE ADDENDUM IN THE CHART FOR MRI LEFT HIP AND FINDINGS TO PROCEED WITH PT CARE. .. VERBAL CALLED TODAY,  RESULTS SENT VIA FAX .[phone].     SEE REPORT MEDIA TAB: ADDENDUM MRI

## 2021-11-23 ENCOUNTER — TELEPHONE (OUTPATIENT)
Dept: ORTHOPEDIC SURGERY | Age: 71
End: 2021-11-23

## 2021-11-23 ENCOUNTER — OFFICE VISIT (OUTPATIENT)
Dept: INTERNAL MEDICINE CLINIC | Age: 71
End: 2021-11-23
Payer: MEDICARE

## 2021-11-23 VITALS
OXYGEN SATURATION: 97 % | DIASTOLIC BLOOD PRESSURE: 68 MMHG | BODY MASS INDEX: 30.04 KG/M2 | HEIGHT: 59 IN | TEMPERATURE: 98 F | SYSTOLIC BLOOD PRESSURE: 134 MMHG | HEART RATE: 76 BPM | WEIGHT: 149 LBS

## 2021-11-23 DIAGNOSIS — J44.9 COPD, SEVERE (HCC): Primary | ICD-10-CM

## 2021-11-23 DIAGNOSIS — D50.9 MICROCYTIC ANEMIA: ICD-10-CM

## 2021-11-23 DIAGNOSIS — M81.0 AGE RELATED OSTEOPOROSIS, UNSPECIFIED PATHOLOGICAL FRACTURE PRESENCE: ICD-10-CM

## 2021-11-23 DIAGNOSIS — I10 ESSENTIAL HYPERTENSION: ICD-10-CM

## 2021-11-23 PROCEDURE — 99214 OFFICE O/P EST MOD 30 MIN: CPT | Performed by: INTERNAL MEDICINE

## 2021-11-23 PROCEDURE — 3023F SPIROM DOC REV: CPT | Performed by: INTERNAL MEDICINE

## 2021-11-23 PROCEDURE — 1123F ACP DISCUSS/DSCN MKR DOCD: CPT | Performed by: INTERNAL MEDICINE

## 2021-11-23 PROCEDURE — 1090F PRES/ABSN URINE INCON ASSESS: CPT | Performed by: INTERNAL MEDICINE

## 2021-11-23 PROCEDURE — 1036F TOBACCO NON-USER: CPT | Performed by: INTERNAL MEDICINE

## 2021-11-23 PROCEDURE — 4040F PNEUMOC VAC/ADMIN/RCVD: CPT | Performed by: INTERNAL MEDICINE

## 2021-11-23 PROCEDURE — G8427 DOCREV CUR MEDS BY ELIG CLIN: HCPCS | Performed by: INTERNAL MEDICINE

## 2021-11-23 PROCEDURE — G8399 PT W/DXA RESULTS DOCUMENT: HCPCS | Performed by: INTERNAL MEDICINE

## 2021-11-23 PROCEDURE — G8484 FLU IMMUNIZE NO ADMIN: HCPCS | Performed by: INTERNAL MEDICINE

## 2021-11-23 PROCEDURE — G8417 CALC BMI ABV UP PARAM F/U: HCPCS | Performed by: INTERNAL MEDICINE

## 2021-11-23 PROCEDURE — G8926 SPIRO NO PERF OR DOC: HCPCS | Performed by: INTERNAL MEDICINE

## 2021-11-23 PROCEDURE — 3017F COLORECTAL CA SCREEN DOC REV: CPT | Performed by: INTERNAL MEDICINE

## 2021-11-23 RX ORDER — BUDESONIDE, GLYCOPYRROLATE, AND FORMOTEROL FUMARATE 160; 9; 4.8 UG/1; UG/1; UG/1
2 AEROSOL, METERED RESPIRATORY (INHALATION) 2 TIMES DAILY
Qty: 10.7 G | Refills: 11 | Status: SHIPPED | OUTPATIENT
Start: 2021-11-23 | End: 2022-03-16 | Stop reason: SDUPTHER

## 2021-11-23 RX ORDER — FUROSEMIDE 20 MG/1
20 TABLET ORAL DAILY PRN
Qty: 60 TABLET | Refills: 3 | Status: SHIPPED | OUTPATIENT
Start: 2021-11-23 | End: 2022-03-16 | Stop reason: SDUPTHER

## 2021-11-23 RX ORDER — ATORVASTATIN CALCIUM 10 MG/1
10 TABLET, FILM COATED ORAL DAILY
Qty: 30 TABLET | Refills: 5 | Status: SHIPPED | OUTPATIENT
Start: 2021-11-23 | End: 2021-12-29 | Stop reason: SDUPTHER

## 2021-11-23 RX ORDER — GABAPENTIN 300 MG/1
300 CAPSULE ORAL 2 TIMES DAILY
Qty: 180 CAPSULE | Refills: 1 | Status: SHIPPED | OUTPATIENT
Start: 2021-11-23 | End: 2021-12-29 | Stop reason: SDUPTHER

## 2021-11-23 RX ORDER — AMITRIPTYLINE HYDROCHLORIDE 150 MG/1
75 TABLET, FILM COATED ORAL NIGHTLY
Qty: 45 TABLET | Refills: 3 | Status: SHIPPED | OUTPATIENT
Start: 2021-11-23 | End: 2022-03-16 | Stop reason: SDUPTHER

## 2021-11-23 RX ORDER — PREDNISONE 10 MG/1
20 TABLET ORAL DAILY
Qty: 60 TABLET | Refills: 4 | Status: SHIPPED | OUTPATIENT
Start: 2021-11-23 | End: 2022-03-10 | Stop reason: SDUPTHER

## 2021-11-23 RX ORDER — POTASSIUM CHLORIDE 750 MG/1
10 TABLET, FILM COATED, EXTENDED RELEASE ORAL DAILY
Qty: 60 TABLET | Refills: 5 | Status: SHIPPED | OUTPATIENT
Start: 2021-11-23 | End: 2022-03-16 | Stop reason: SDUPTHER

## 2021-11-23 RX ORDER — LANOLIN ALCOHOL/MO/W.PET/CERES
325 CREAM (GRAM) TOPICAL
Qty: 90 TABLET | Refills: 1 | Status: SHIPPED | OUTPATIENT
Start: 2021-11-23 | End: 2021-12-21 | Stop reason: SDUPTHER

## 2021-11-23 RX ORDER — VERAPAMIL HYDROCHLORIDE 240 MG/1
120 TABLET, FILM COATED, EXTENDED RELEASE ORAL DAILY
Qty: 60 TABLET | Refills: 3 | Status: SHIPPED | OUTPATIENT
Start: 2021-11-23 | End: 2021-12-29 | Stop reason: SDUPTHER

## 2021-11-23 RX ORDER — ALENDRONATE SODIUM 70 MG/1
70 TABLET ORAL
Qty: 12 TABLET | Refills: 3 | Status: SHIPPED | OUTPATIENT
Start: 2021-11-23 | End: 2021-12-22 | Stop reason: SDUPTHER

## 2021-11-23 NOTE — TELEPHONE ENCOUNTER
Telephone note  I received a MRI left hip interpretation addendum from the radiologist 3-1/2 months after her study, now reading possible stress injury at the subtrochanteric region    I called Babita Maurer to let her know  She reports her left hip/thigh is not hurting her at this time  She did receive a steroid injection in mid August, which she states did seem to help  Chart review shows she did go to [facility] ED in September for left hip pain  Radiographs and a CT scan were performed at that time and negative    Nonetheless, she denies pain today during her call  She also reports she has since moved to [place]  I informed her should her symptoms return, she should seek out care immediately with an orthopedic surgeon up in [place], and they should have access to her MRI findings in [facility]  Call or return with any questions    Jeffery Prado MD        Signed by Taylor Posada MD on 11/21/21 [id number]    ADDENDUM:   There is subtle low T1 signal and high corresponding STIR signal on image    21,   series 4 and series 5 which could represent a subtrochanteric proximal    left   femoral stress injury to include incomplete stress fracture or stress    related   marrow edema. Prompt follow-up MR is recommended to reassess these findings. The findings were sent to the Radiology Results [id number] at    6:50   pm on 11/21/2021to be communicated to a licensed caregiver.         Narrative   EXAMINATION:   MRI OF THE LEFT HIP WITHOUT CONTRAST, 8/2/2021 11:40 am       TECHNIQUE:   Multiplanar multisequence MRI of the hip was performed without the   administration of intravenous contrast.       COMPARISON:   Left hip plain radiographs from 07/23/2021       HISTORY:   ORDERING SYSTEM PROVIDED HISTORY: Arthritis of left hip   TECHNOLOGIST PROVIDED HISTORY:   Is the patient claustrophobic ? no   Does the patient have any stents or clips ? no   Does the patient have metal anywhere in the body including possibly in the   eye ? no   Is the patient on kidney dialysis ? no   Does the patient have a pacemaker or defibrillator ? no   Is the patient from a nursing home or group home ? no   Does the patient speak English ? yes   Location of test [facility]   Best days of the week any   Best times of the day [address] phone number to reach patient [phone]   Reason for exam:->hip pain   Reason for Exam: NO KNOWN INJURY TO LEFT HIP   Acuity: Acute   Type of Exam: Initial   Additional signs and symptoms: NO KNOWN INJURY TO LEFT HIP   Relevant Medical/Surgical History: NO KNOWN INJURY TO LEFT HIP       64-year-old female with left hip arthritis       FINDINGS:   BONE MARROW: Mild degenerative changes in the lower lumbar spine.       Visualized sacral ala, lower lumbar spine, iliac wings, acetabula, pubic   rami, and proximal femurs demonstrate normal bone marrow signal intensity. No signal changes at the femoral heads to suggest femoral head AVN.        HIP JOINT: Both femoral heads are properly located within the bilateral   acetabula without clear evidence for acute fracture, dislocation or femoral   head flattening.  Mild narrowing of the bilateral hip joint spaces.       LABRUM: No left acetabular labral tear or paralabral cyst formation.       BURSAE: Trace fluid in the left greater trochanteric bursa.  No significant   fluid in the bilateral iliopsoas or right greater trochanteric bursa.       SCIATIC NERVE: Proximal sciatic nerves demonstrate normal course, contour,   and caliber on limited coronal T1 weighted imaging.       MUSCLES / TENDONS: Mild edema within the partially visualized left rectus   femoris and vastus intermedius muscles as seen on image 36, series 6 and   image 21, series 7.       Visualized muscles/tendons otherwise appear grossly intact without evidence   of tearing.       INTRAPELVIC CONTENTS / SOFT TISSUES: Limited images of the intrapelvic   contents demonstrate no acute abnormality.           Impression   1. Nonspecific edema likely grade 1 strain of the partially visualized left   rectus femoris and vastus intermedius muscles.  Consider complete evaluation   with MR left femur. 2. Mild bilateral hip osteoarthrosis. 3. Mild degenerative changes in the lower lumbar spine. 4. No acute fracture or dislocation.  No femoral head AVN. 5. Trace fluid in the left greater trochanteric bursa.

## 2021-11-23 NOTE — PROGRESS NOTES
Gina Gaspar (:  1950) is a 80 y.o. female,Established patient, here for evaluation of the following chief complaint(s):  Pneumonia (Breathing problems, also possible hospice, physical paper scripts) and Other (Feet & hand swelling)         ASSESSMENT/PLAN:  1. COPD, severe ([address]  -stable  -  Continue oxygen and inhaler therapy  -  Patient moving to [place] with daughters  -  Trial of prednisone    2. Age related osteoporosis, unspecified pathological fracture presence  -     alendronate (FOSAMAX) 70 MG tablet; Take 1 tablet by mouth every 7 days, Disp-12 tablet, R-3Normal  3. Essential hypertension  Stable  -  Continue verapamil    4. Microcytic anemia  Not at goal  -  Start iron therapy  -  Will likely need an infusion    Return in about 2 months (around 2022) for copd 30 min ([employer] virtual). Subjective   SUBJECTIVE/OBJECTIVE:  HPI COPD:  Current treatment includes combined beta agonist/steroid inhaler, anticholinergic inhaler, N/C oxygen 3 liters/min. Residual symptoms: chronic dyspnea, inability to climb stairs, non-productive cough and wheezing. She denies any other symptoms. She requires her rescue inhaler 4 time(s) per day. Review of Systems       Objective    Vitals:    21 1230   BP: 134/68   Site: Right Upper Arm   Position: Sitting   Cuff Size: Medium Adult   Pulse: 76   Temp: 98 °F (36.7 °C)   TempSrc: Infrared   SpO2: 97%   Weight: 149 lb (67.6 kg)   Height: 4' 11\" (1.499 m)      Wt Readings from Last 3 Encounters:   21 149 lb (67.6 kg)   21 145 lb (65.8 kg)   09/15/21 144 lb 12.8 oz (65.7 kg)     BP Readings from Last 3 Encounters:   21 134/68   21 (!) 152/95   21 134/83     Body mass index is 30.09 kg/m². Facility age limit for growth percentiles is 20 years. Physical Exam  Constitutional:       Appearance: She is ill-appearing. HENT:      Head: Normocephalic and atraumatic. Nose: Nose normal. No congestion or rhinorrhea. Pulmonary:      Effort: Prolonged expiration present. No respiratory distress or retractions. Breath sounds: Decreased air movement present. Examination of the right-upper field reveals rhonchi. Examination of the left-upper field reveals rhonchi. Examination of the left-middle field reveals decreased breath sounds. Examination of the right-lower field reveals decreased breath sounds. Examination of the left-lower field reveals decreased breath sounds. Decreased breath sounds and rhonchi present. No wheezing or rales. Skin:                Lab Results   Component Value Date    WBC 16.4 (H) 11/08/2021    HGB 8.1 (L) 11/08/2021    HCT 26.3 (L) 11/08/2021    MCV 66.6 (L) 11/08/2021     11/08/2021          An electronic signature was used to authenticate this note.     --John Benavidez MD

## 2021-12-15 ENCOUNTER — TELEPHONE (OUTPATIENT)
Dept: INTERNAL MEDICINE CLINIC | Age: 71
End: 2021-12-15

## 2021-12-15 DIAGNOSIS — I50.31 ACUTE DIASTOLIC CHF (CONGESTIVE HEART FAILURE) (HCC): Primary | ICD-10-CM

## 2021-12-15 DIAGNOSIS — J44.9 COPD, SEVERE (HCC): ICD-10-CM

## 2021-12-15 NOTE — TELEPHONE ENCOUNTER
----- Message from Marlojulia Arizmendi sent at 12/14/2021  9:41 AM EST -----  Subject: Message to Provider    QUESTIONS  Information for Provider? patient has requested for motorized wheelchair,   spoken to pcp, not seeing an order in system--Can someone from Dr. Rkeha Carroll   office please respond to confirm the orders for my mom motorized   wheelchair has been sent to her  Martha Lopez at [facility]?----   FAX number needed   ---------------------------------------------------------------------------  --------------  CALL BACK INFO  What is the best way for the office to contact you? OK to leave message on   voicemail  Preferred Call Back Phone Number? [phone]  ---------------------------------------------------------------------------  --------------  SCRIPT ANSWERS  Relationship to Patient?  Self

## 2021-12-15 NOTE — TELEPHONE ENCOUNTER
Usually it is required that paperwork be sent in for the motorized wheel chair. I will place an order, but they will need to send paperwork for us to complete.

## 2021-12-17 ENCOUNTER — TELEPHONE (OUTPATIENT)
Dept: INTERNAL MEDICINE CLINIC | Age: 71
End: 2021-12-17

## 2021-12-17 DIAGNOSIS — J96.11 CHRONIC HYPOXEMIC RESPIRATORY FAILURE (HCC): ICD-10-CM

## 2021-12-17 DIAGNOSIS — J44.9 COPD, SEVERE (HCC): Primary | ICD-10-CM

## 2021-12-17 NOTE — TELEPHONE ENCOUNTER
Dominga Ch @ [facility] regarding referral for services    Palliative Care will follow up with patient  -goals of care  -resources  education    Phone: [phone]

## 2021-12-20 NOTE — TELEPHONE ENCOUNTER
Patient is requesting a refill of their prescription.     Requested Prescriptions     Pending Prescriptions Disp Refills    ferrous sulfate (FE TABS 325) 325 (65 Fe) MG EC tablet 90 tablet 1     Sig: Take 1 tablet by mouth 3 times daily (with meals)        Recent Visits  Date Type Provider Dept   11/23/21 Office Visit Ariela Morales MD [facility]   08/05/21 Office Visit Ariela Morales MD [facility]   07/28/21 Office Visit Ariela Morales MD [facility]   06/22/21 Office Visit Ariela Morales MD [facility]   04/19/21 Office Visit Ariela Morales MD [facility]   12/17/20 Office Visit Ariela Morales MD [facility]   08/13/20 Office Visit Ariela Morales MD [facility]   Showing recent visits within past 540 days with a meds authorizing provider and meeting all other requirements  Future Appointments  Date Type Provider Dept   12/22/21 Appointment Ariela Morales MD [facility]   Showing future appointments within next 150 days with a meds authorizing provider and meeting all other requirements     11/23/2021

## 2021-12-21 RX ORDER — LANOLIN ALCOHOL/MO/W.PET/CERES
325 CREAM (GRAM) TOPICAL
Qty: 90 TABLET | Refills: 1 | Status: SHIPPED | OUTPATIENT
Start: 2021-12-21 | End: 2022-03-16 | Stop reason: SDUPTHER

## 2021-12-22 ENCOUNTER — VIRTUAL VISIT (OUTPATIENT)
Dept: INTERNAL MEDICINE CLINIC | Age: 71
End: 2021-12-22
Payer: MEDICARE

## 2021-12-22 DIAGNOSIS — D50.9 MICROCYTIC ANEMIA: ICD-10-CM

## 2021-12-22 DIAGNOSIS — N18.32 STAGE 3B CHRONIC KIDNEY DISEASE (HCC): ICD-10-CM

## 2021-12-22 DIAGNOSIS — J44.9 COPD, SEVERE (HCC): ICD-10-CM

## 2021-12-22 DIAGNOSIS — N18.4 CHRONIC KIDNEY DISEASE, STAGE 4 (SEVERE) (HCC): ICD-10-CM

## 2021-12-22 DIAGNOSIS — J96.21 ACUTE ON CHRONIC RESPIRATORY FAILURE WITH HYPOXIA (HCC): Primary | ICD-10-CM

## 2021-12-22 DIAGNOSIS — M81.0 AGE RELATED OSTEOPOROSIS, UNSPECIFIED PATHOLOGICAL FRACTURE PRESENCE: ICD-10-CM

## 2021-12-22 PROCEDURE — 1090F PRES/ABSN URINE INCON ASSESS: CPT | Performed by: INTERNAL MEDICINE

## 2021-12-22 PROCEDURE — 3023F SPIROM DOC REV: CPT | Performed by: INTERNAL MEDICINE

## 2021-12-22 PROCEDURE — 1123F ACP DISCUSS/DSCN MKR DOCD: CPT | Performed by: INTERNAL MEDICINE

## 2021-12-22 PROCEDURE — 1036F TOBACCO NON-USER: CPT | Performed by: INTERNAL MEDICINE

## 2021-12-22 PROCEDURE — G8399 PT W/DXA RESULTS DOCUMENT: HCPCS | Performed by: INTERNAL MEDICINE

## 2021-12-22 PROCEDURE — 3017F COLORECTAL CA SCREEN DOC REV: CPT | Performed by: INTERNAL MEDICINE

## 2021-12-22 PROCEDURE — G8926 SPIRO NO PERF OR DOC: HCPCS | Performed by: INTERNAL MEDICINE

## 2021-12-22 PROCEDURE — G8427 DOCREV CUR MEDS BY ELIG CLIN: HCPCS | Performed by: INTERNAL MEDICINE

## 2021-12-22 PROCEDURE — G8482 FLU IMMUNIZE ORDER/ADMIN: HCPCS | Performed by: INTERNAL MEDICINE

## 2021-12-22 PROCEDURE — 99214 OFFICE O/P EST MOD 30 MIN: CPT | Performed by: INTERNAL MEDICINE

## 2021-12-22 PROCEDURE — 1111F DSCHRG MED/CURRENT MED MERGE: CPT | Performed by: INTERNAL MEDICINE

## 2021-12-22 PROCEDURE — 4040F PNEUMOC VAC/ADMIN/RCVD: CPT | Performed by: INTERNAL MEDICINE

## 2021-12-22 PROCEDURE — G8417 CALC BMI ABV UP PARAM F/U: HCPCS | Performed by: INTERNAL MEDICINE

## 2021-12-22 RX ORDER — MONTELUKAST SODIUM 4 MG/1
1 TABLET, CHEWABLE ORAL 2 TIMES DAILY
Qty: 60 TABLET | Refills: 3 | Status: SHIPPED | OUTPATIENT
Start: 2021-12-22 | End: 2022-03-16 | Stop reason: SDUPTHER

## 2021-12-22 RX ORDER — ALENDRONATE SODIUM 70 MG/1
70 TABLET ORAL
Qty: 12 TABLET | Refills: 3 | Status: SHIPPED | OUTPATIENT
Start: 2021-12-22 | End: 2022-03-16 | Stop reason: SDUPTHER

## 2021-12-22 NOTE — PROGRESS NOTES
of one rib of left side with routine healing    Open wound of left upper arm    Chest pain    NSTEMI (non-ST elevated myocardial infarction) [facility]    Acute diastolic CHF (congestive heart failure) ([facility])    Scar of shoulder       Allergies   Allergen Reactions    Statins Other (See Comments)     Upset stomach and muscle pains        Medications listed as ordered at the time of discharge from hospital      Medications marked \"taking\" at this time  Outpatient Medications Marked as Taking for the 12/22/21 encounter (Virtual Visit) with Rios Espino MD   Medication Sig Dispense Refill    alendronate (FOSAMAX) 70 MG tablet Take 1 tablet by mouth every 7 days 12 tablet 3    ferrous sulfate (FE TABS 325) 325 (65 Fe) MG EC tablet Take 1 tablet by mouth 3 times daily (with meals) 90 tablet 1    oxyCODONE-acetaminophen (PERCOCET) 5-325 MG per tablet Take 1 tablet by mouth 2 times daily as needed for Pain for up to 30 days. 60 tablet 0    predniSONE (DELTASONE) 10 MG tablet Take 2 tablets by mouth daily 60 tablet 4    tiotropium (SPIRIVA) 18 MCG inhalation capsule Inhale 1 capsule into the lungs daily 30 capsule 5    atorvastatin (LIPITOR) 10 MG tablet Take 1 tablet by mouth daily 30 tablet 5    verapamil (CALAN SR) 240 MG extended release tablet Take 0.5 tablets by mouth daily 60 tablet 3    furosemide (LASIX) 20 MG tablet Take 1 tablet by mouth daily as needed (edema, weight gain >3lbs in 1 day) 60 tablet 3    gabapentin (NEURONTIN) 300 MG capsule Take 1 capsule by mouth 2 times daily for 180 days.  Intended supply: 90 days 180 capsule 1    Budeson-Glycopyrrol-Formoterol (BREZTRI AEROSPHERE) 160-9-4.8 MCG/ACT AERO Inhale 2 puffs into the lungs 2 times daily 10.7 g 11    potassium chloride (KLOR-CON) 10 MEQ extended release tablet Take 1 tablet by mouth daily 60 tablet 5    amitriptyline (ELAVIL) 150 MG tablet Take 0.5 tablets by mouth nightly 45 tablet 3    LINZESS 145 MCG capsule Take 1 capsule by mouth every morning (before breakfast) 30 capsule 5    Misc. Devices (COMMODE BEDSIDE) MISC Use as directed 1 each 0    Misc. Devices (WALKER) MISC 1 each by Does not apply route daily 1 each 0    ELIQUIS 5 MG TABS tablet TAKE 1 TABLET BY MOUTH TWICE A DAY 60 tablet 6    Lift Chair MISC by Does not apply route 73 yo woman with severe COPD and Arthritis of the Left Hip. Please dispense a Lift Chair 1 each 0    Adalimumab (HUMIRA PEN SC) Inject into the skin every 14 days       albuterol (PROVENTIL) (2.5 MG/3ML) 0.083% nebulizer solution TAKE 3 MLS BY NEBULIZATION EVERY 6 HOURS AS NEEDED FOR WHEEZING DX: COPD ICD-10: J44.9 300 mL 6    colestipol (COLESTID) 1 g tablet Take 1 g by mouth 2 times daily           Medications patient taking as of now reconciled against medications ordered at time of hospital discharge: Yes    Chief Complaint   Patient presents with    Follow-Up from Hospital     Pt was in the hospital then went to rehab. D/C from rehab 12/14/21       History of Present illness - Follow up of Hospital diagnosis(es): severe copd with worsening chronic respiratory failure    Inpatient course: Discharge summary reviewed- see chart. Interval history/Current status: patient still has some difficulty breathing and is on 3-4 liters/min of oxygen. She is looking at palliative care. She would like to have a hooveround chair. There were no vitals filed for this visit. There is no height or weight on file to calculate BMI.    Wt Readings from Last 3 Encounters:   11/23/21 149 lb (67.6 kg)   09/26/21 145 lb (65.8 kg)   09/15/21 144 lb 12.8 oz (65.7 kg)     BP Readings from Last 3 Encounters:   11/23/21 134/68   09/26/21 (!) 152/95   09/16/21 134/83       A comprehensive review of systems was negative except for: Respiratory: positive for chronic bronchitis, cough and dyspnea on exertion  Musculoskeletal: positive for bilateral feet swelling    Physical Exam:  General Appearance: very ill appearing with oxygen  Skin: no suspicious lesions noted  Head: normocephalic and atraumatic  Eyes: pupils equal, round, and reactive to light, extraocular eye movements intact, conjunctivae normal  Extremities: bilateral swelling of both feet with signicant indentations    Assessment/Plan:  1. Acute on chronic respiratory failure with hypoxia (HCC)  worseing  -  Continue oxygen  - LA DISCHARGE MEDS RECONCILED W/ CURRENT OUTPATIENT MED LIST    2. COPD, severe ([facility]  Severe  -  Patient is unable to walk due to her copd  -  I recommend that she receive a Hooveraound chair  - LA DISCHARGE MEDS RECONCILED W/ CURRENT OUTPATIENT MED LIST    3. Age related osteoporosis, unspecified pathological fracture presence  worsening  - alendronate (FOSAMAX) 70 MG tablet; Take 1 tablet by mouth every 7 days  Dispense: 12 tablet; Refill: 3    4. Stage 3b chronic kidney disease ([facility]  worsening  - Comprehensive Metabolic Panel; Future  - Lipid Panel; Future    5. Microcytic anemia  stable  - CBC Auto Differential; Future    6. Chronic kidney disease, stage 4 (severe) (HCC)   worsening  - Lipid Panel;  Future        Medical Decision Making: high complexity

## 2021-12-28 ENCOUNTER — PATIENT MESSAGE (OUTPATIENT)
Dept: INTERNAL MEDICINE CLINIC | Age: 71
End: 2021-12-28

## 2021-12-28 NOTE — TELEPHONE ENCOUNTER
From: Rebecca Markham  To: Dr. Merary Conteh: 12/28/2021 10:19 AM EST  Subject: Need a prescription sent to pharmacy please    Hi Dr. Carolyn Bar,    Mom needs a refilled on gabapentin 300mg capsule, verapamil 120mg tablets and atorvastatin shereen 10mg tabs. Please send her prescription to the new [facility] location provided below. My sister has moved. This location is closer to her new address. Thank you so much.     [facility] [id number] (new location)

## 2021-12-28 NOTE — TELEPHONE ENCOUNTER
Patient is requesting a refill of their prescription. Requested Prescriptions     Pending Prescriptions Disp Refills    atorvastatin (LIPITOR) 10 MG tablet 30 tablet 5     Sig: Take 1 tablet by mouth daily    gabapentin (NEURONTIN) 300 MG capsule 180 capsule 1     Sig: Take 1 capsule by mouth 2 times daily for 180 days.  Intended supply: 90 days    verapamil (CALAN SR) 240 MG extended release tablet 60 tablet 3     Sig: Take 0.5 tablets by mouth daily        Recent Visits  Date Type Provider Dept   11/23/21 Office Visit Ana Brown MD [facility]   08/05/21 Office Visit Ana Brown MD [facility]   07/28/21 Office Visit Ana Brown MD [facility]   06/22/21 Office Visit Ana Brown MD [facility]   04/19/21 Office Visit Ana Brown MD [facility]   12/17/20 Office Visit Ana Brown MD [facility]   08/13/20 Office Visit Ana Brown MD [facility]   Showing recent visits within past 540 days with a meds authorizing provider and meeting all other requirements  Future Appointments  Date Type Provider Dept   01/06/22 Appointment Ana Brown MD [facility]   Showing future appointments within next 150 days with a meds authorizing provider and meeting all other requirements     12/22/2021

## 2021-12-29 RX ORDER — ATORVASTATIN CALCIUM 10 MG/1
10 TABLET, FILM COATED ORAL DAILY
Qty: 30 TABLET | Refills: 5 | Status: SHIPPED | OUTPATIENT
Start: 2021-12-29 | End: 2022-03-16 | Stop reason: SDUPTHER

## 2021-12-29 RX ORDER — VERAPAMIL HYDROCHLORIDE 240 MG/1
120 TABLET, FILM COATED, EXTENDED RELEASE ORAL DAILY
Qty: 60 TABLET | Refills: 3 | Status: SHIPPED | OUTPATIENT
Start: 2021-12-29 | End: 2022-01-06 | Stop reason: SDUPTHER

## 2021-12-29 RX ORDER — GABAPENTIN 300 MG/1
300 CAPSULE ORAL 2 TIMES DAILY
Qty: 180 CAPSULE | Refills: 1 | Status: SHIPPED | OUTPATIENT
Start: 2021-12-29 | End: 2022-03-16 | Stop reason: SDUPTHER

## 2022-01-01 NOTE — PROGRESS NOTES
4 Eyes Admission Assessment     I agree as the admission nurse that 2 RN's have performed a thorough Head to Toe Skin Assessment on the patient. ALL assessment sites listed below have been assessed on admission. Areas assessed by both nurses:  [x]   Head, Face, and Ears   [x]   Shoulders, Back, and Chest  [x]   Arms, Elbows, and Hands   [x]   Coccyx, Sacrum, and Ischum  [x]   Legs, Feet, and Heels        Does the Patient have Skin Breakdown? No    Pt has a non-healing wound to her L shoulder from a previous surgery - dry dressing in place.          Anthony Prevention initiated:  NA   Wound Care Orders initiated:  NA      [facility] nurse consulted for Pressure Injury (Stage 3,4, Unstageable, DTI, NWPT, and Complex wounds):  No      Nurse 1 eSignature: Electronically signed by Benoit Kaufman RN on 9/2/21 at 3:48 AM EDT    **SHARE this note so that the co-signing nurse is able to place an eSignature**    Nurse 2 eSignature: {Esignature:[id number]} knowledge deficit

## 2022-01-04 ENCOUNTER — TELEPHONE (OUTPATIENT)
Dept: INTERNAL MEDICINE CLINIC | Age: 72
End: 2022-01-04

## 2022-01-04 ENCOUNTER — VIRTUAL VISIT (OUTPATIENT)
Dept: PULMONOLOGY | Age: 72
End: 2022-01-04
Payer: MEDICARE

## 2022-01-04 DIAGNOSIS — R06.02 SHORTNESS OF BREATH: Primary | ICD-10-CM

## 2022-01-04 DIAGNOSIS — J44.9 COPD, SEVERE (HCC): ICD-10-CM

## 2022-01-04 DIAGNOSIS — J96.11 CHRONIC HYPOXEMIC RESPIRATORY FAILURE (HCC): ICD-10-CM

## 2022-01-04 DIAGNOSIS — J47.1 BRONCHIECTASIS WITH ACUTE EXACERBATION (HCC): ICD-10-CM

## 2022-01-04 PROCEDURE — 99214 OFFICE O/P EST MOD 30 MIN: CPT | Performed by: INTERNAL MEDICINE

## 2022-01-04 PROCEDURE — 4040F PNEUMOC VAC/ADMIN/RCVD: CPT | Performed by: INTERNAL MEDICINE

## 2022-01-04 PROCEDURE — 1123F ACP DISCUSS/DSCN MKR DOCD: CPT | Performed by: INTERNAL MEDICINE

## 2022-01-04 PROCEDURE — G8427 DOCREV CUR MEDS BY ELIG CLIN: HCPCS | Performed by: INTERNAL MEDICINE

## 2022-01-04 PROCEDURE — 1090F PRES/ABSN URINE INCON ASSESS: CPT | Performed by: INTERNAL MEDICINE

## 2022-01-04 PROCEDURE — 3017F COLORECTAL CA SCREEN DOC REV: CPT | Performed by: INTERNAL MEDICINE

## 2022-01-04 PROCEDURE — G8399 PT W/DXA RESULTS DOCUMENT: HCPCS | Performed by: INTERNAL MEDICINE

## 2022-01-04 NOTE — PROGRESS NOTES
[facility]  Follow Up Note  Javier Chen MD    Pulmonology Video Visit    Pursuant to the emergency declaration under the [facility] Pending sale to [facility] waiver authority and the Coronavirus Preparedness and Response Supplemental Appropriations Act this Video Visit was insisted, with patient's consent, to reduce the patient's risk of exposure to COVID-19 and provide continuity of care for an established patient. The patient was at home, while the provider was at the clinic. Services were provided through a synchronous discussion through a Video Visit to substitute for in-person clinic visit, and coded as such. Evangelina Pham   YOB: 1950    Date of Visit:  1/4/2022    Assessment/Plan:  1. SOB (shortness of breath)  Worse. SOB and deconditioned. \"Can't do anything\"  Go to start some home therapy. She is now living with her daughter in [facility] and ultimately will transfer her care up there. 2. COPD, severe ([address]  Worse  PFT 7/19:  INTERPRETATION:  Spirometry reveals decreased FVC at 1.49 liters, which  is 57% predicted. FEV1 is decreased at 0.94 liters, which is 48%  predicted. FEV1/FVC ratio is reduced at 63%. Postbronchodilator study  is not performed. Lung volumes reveal normal total lung capacity. Residual volume is increased at 159% predicted. Diffusion capacity is  decreased at 27% predicted. In comparison to a study from 02/2017, FVC  has decreased by 21%. FEV1 is increased by 19% and residual volume is  increased by 17%. Diffusion capacity is decreased by 37%.     IMPRESSION:  Severe obstructive lung disease with air trapping. There  has been significant worsening since the preceding study. Medication Management:    Symbicort + Spiriva ==> Pulmicort + Brovana + Spiriva ==> Breztri 2 puffs BID  Albuterol HHN -- about twice per day. Increase Prednisone to 20 mg per day    3.  Centrilobular emphysema ([address]  CT Chest 4/21:  Impression   No acute abnormality identified in the chest.       The previously noted left upper lobe consolidation on the CT from 09/14/2020   has resolved with mild atelectasis and/or scarring remaining.       Emphysematous changes are seen in the lungs.         Emphysema is moderate to severe    4. Chronic hypoxemic respiratory failure (HCC)  Oxygen saturation on room air with exertion in the office today is 84%. The patient would benefit from supplemental oxygen with exertion and sleep  The patient is independently mobile within the home and would benefit from a portable oxygen concentrator. She is on 2 LPM all the time but she is only ordered with sleep. Her saturations are usually in the low 90s.      6 months      Chief Complaint   Patient presents with    Shortness of Breath     now in [facility] with her daughter Was in Rehab a few times and her breathing is off Retainning alot of fluid Wearing O2 all the time     Discuss Medications     using her nebulizer twice a day and Breztri twice a day       HPI  The patient presents with a chief complaint of moderate shortness of breath related to severe COPD of many years duration. He has mild associated cough. Exertion is a modifying factor. She has been hospitalized a couple times and in rehab a couple times. She is now with her daughter in [facility] because she can't be at home. She complains of swelling and she is on Lasix. Review of Systems  No Chest pain, Nausea or vomiting reported      History  I have reviewed past medical, surgical, social and family history. This is documented elsewhere in the medical record. Physical Exam:   Video visit    Allergies   Allergen Reactions    Statins Other (See Comments)     Upset stomach and muscle pains      Prior to Visit Medications    Medication Sig Taking?  Authorizing Provider   atorvastatin (LIPITOR) 10 MG tablet Take 1 tablet by mouth daily  Flory Cabrera MD gabapentin (NEURONTIN) 300 MG capsule Take 1 capsule by mouth 2 times daily for 180 days. Intended supply: 90 days  Sandra Lovell MD   verapamil (CALAN SR) 240 MG extended release tablet Take 0.5 tablets by mouth daily  Sandra Lovell MD   alendronate (FOSAMAX) 70 MG tablet Take 1 tablet by mouth every 7 days  Sandra Lovell MD   colestipol (COLESTID) 1 g tablet Take 1 tablet by mouth 2 times daily  Sandra Lovell MD   ferrous sulfate (FE TABS 325) 325 (65 Fe) MG EC tablet Take 1 tablet by mouth 3 times daily (with meals)  Sandra Lovell MD   oxyCODONE-acetaminophen (PERCOCET) 5-325 MG per tablet Take 1 tablet by mouth 2 times daily as needed for Pain for up to 30 days. Sandra Lovell MD   predniSONE (DELTASONE) 10 MG tablet Take 2 tablets by mouth daily  Sandra Lovell MD   tiotropium [facility]) 18 MCG inhalation capsule Inhale 1 capsule into the lungs daily  Sandra Lovell MD   furosemide (LASIX) 20 MG tablet Take 1 tablet by mouth daily as needed (edema, weight gain >3lbs in 1 day)  Sandra Lovell MD   Budeson-Glycopyrrol-Formoterol (BREZTRI AEROSPHERE) 160-9-4.8 MCG/ACT AERO Inhale 2 puffs into the lungs 2 times daily  Sandra Lovell MD   potassium chloride (KLOR-CON) 10 MEQ extended release tablet Take 1 tablet by mouth daily  Sandra Lovell MD   amitriptyline (ELAVIL) 150 MG tablet Take 0.5 tablets by mouth nightly  Sandra Lovell MD   LINZESS 145 MCG capsule Take 1 capsule by mouth every morning (before breakfast)  Sandra Lovell MD   Misc. Devices (COMMODE BEDSIDE) MISC Use as directed  Sandra Lovell MD   Misc. Devices Milta Lover) MISC 1 each by Does not apply route daily  Sandra Lovell MD   ELIQUIS 5 MG TABS tablet TAKE 1 TABLET BY MOUTH TWICE A DAY  Marko Price MD   Lift Chair MISC by Does not apply route 73 yo woman with severe COPD and Arthritis of the Left Hip.     Please dispense a Lift Chair  Anne Cantor MD   Adalimumab (HUMIRA PEN SC) Inject into the skin every 14 days   Historical Provider, MD   albuterol (PROVENTIL) (2.5 MG/3ML) 0.083% nebulizer solution TAKE 3 MLS BY NEBULIZATION EVERY 6 HOURS AS NEEDED FOR WHEEZING DX: COPD ICD-10: J44.9  Talia Cobb MD       There were no vitals filed for this visit. There is no height or weight on file to calculate BMI.      Wt Readings from Last 3 Encounters:   21 149 lb (67.6 kg)   21 145 lb (65.8 kg)   09/15/21 144 lb 12.8 oz (65.7 kg)     BP Readings from Last 3 Encounters:   21 134/68   21 (!) 152/95   21 134/83        Social History     Tobacco Use   Smoking Status Former Smoker    Packs/day: 0.25    Years: 30.00    Pack years: 7.50    Types: Cigarettes    Quit date: 2015    Years since quittin.8   Smokeless Tobacco Never Used   Tobacco Comment    started smoking at age 26 / smoked up to 9 cigarettes a day

## 2022-01-04 NOTE — TELEPHONE ENCOUNTER
Please Advise    Geremias Marks from [facility] would like to know if you would follow the Pt and sign  home care for Skilled Nursing,OT and PT    Geremias Marks [phone]

## 2022-01-06 ENCOUNTER — VIRTUAL VISIT (OUTPATIENT)
Dept: INTERNAL MEDICINE CLINIC | Age: 72
End: 2022-01-06
Payer: MEDICARE

## 2022-01-06 DIAGNOSIS — J44.9 COPD, SEVERE (HCC): Primary | ICD-10-CM

## 2022-01-06 DIAGNOSIS — N18.4 CHRONIC KIDNEY DISEASE, STAGE 4 (SEVERE) (HCC): ICD-10-CM

## 2022-01-06 DIAGNOSIS — L98.492 SKIN ULCER OF SHOULDER WITH FAT LAYER EXPOSED (HCC): ICD-10-CM

## 2022-01-06 DIAGNOSIS — I26.99 BILATERAL PULMONARY EMBOLISM (HCC): ICD-10-CM

## 2022-01-06 PROCEDURE — 1036F TOBACCO NON-USER: CPT | Performed by: INTERNAL MEDICINE

## 2022-01-06 PROCEDURE — 1123F ACP DISCUSS/DSCN MKR DOCD: CPT | Performed by: INTERNAL MEDICINE

## 2022-01-06 PROCEDURE — G8427 DOCREV CUR MEDS BY ELIG CLIN: HCPCS | Performed by: INTERNAL MEDICINE

## 2022-01-06 PROCEDURE — G8399 PT W/DXA RESULTS DOCUMENT: HCPCS | Performed by: INTERNAL MEDICINE

## 2022-01-06 PROCEDURE — 3023F SPIROM DOC REV: CPT | Performed by: INTERNAL MEDICINE

## 2022-01-06 PROCEDURE — G8482 FLU IMMUNIZE ORDER/ADMIN: HCPCS | Performed by: INTERNAL MEDICINE

## 2022-01-06 PROCEDURE — 99214 OFFICE O/P EST MOD 30 MIN: CPT | Performed by: INTERNAL MEDICINE

## 2022-01-06 PROCEDURE — G8417 CALC BMI ABV UP PARAM F/U: HCPCS | Performed by: INTERNAL MEDICINE

## 2022-01-06 PROCEDURE — 3017F COLORECTAL CA SCREEN DOC REV: CPT | Performed by: INTERNAL MEDICINE

## 2022-01-06 PROCEDURE — 4040F PNEUMOC VAC/ADMIN/RCVD: CPT | Performed by: INTERNAL MEDICINE

## 2022-01-06 PROCEDURE — 1090F PRES/ABSN URINE INCON ASSESS: CPT | Performed by: INTERNAL MEDICINE

## 2022-01-06 RX ORDER — VERAPAMIL HYDROCHLORIDE 240 MG/1
120 TABLET, FILM COATED, EXTENDED RELEASE ORAL DAILY
Qty: 45 TABLET | Refills: 3 | Status: SHIPPED | OUTPATIENT
Start: 2022-01-06 | End: 2022-03-16 | Stop reason: SDUPTHER

## 2022-01-06 ASSESSMENT — PATIENT HEALTH QUESTIONNAIRE - PHQ9
2. FEELING DOWN, DEPRESSED OR HOPELESS: 0
1. LITTLE INTEREST OR PLEASURE IN DOING THINGS: 0
SUM OF ALL RESPONSES TO PHQ9 QUESTIONS 1 & 2: 0
SUM OF ALL RESPONSES TO PHQ QUESTIONS 1-9: 0

## 2022-01-06 NOTE — PROGRESS NOTES
Jerri Chavez (:  1950) is a 80 y.o. female,Established patient, here for evaluation of the following chief complaint(s): COPD         ASSESSMENT/PLAN:  1. COPD, severe ([address]  Worsening  -  Continue breztri  -  Continue prednisone    2. Chronic kidney disease, stage 4 (severe) (HCC)  -  Continue to monitor kidney function  -  Blood pressure control    3. Skin ulcer of shoulder with fat layer exposed ([address]  Stable  -  Continue to monitor  -  Home health as need    4. Bilateral pulmonary embolism (HCC)  Stable  -  Continue eliquis    Return for copd 30 min. SUBJECTIVE/OBJECTIVE:  HPI COPD:  Current treatment includes combined beta agonist/steroid inhaler, anticholinergic inhaler, home 02- 3 liter. Residual symptoms: chronic dyspnea, non-productive cough and wheezing. She denies any other symptoms. She requires her rescue inhaler 4 time(s) per day. Review of Systems   Constitutional: Negative for diaphoresis and fatigue. HENT: Negative for drooling and ear discharge. Eyes: Negative for pain. Respiratory: Positive for cough and shortness of breath. Gastrointestinal: Negative for abdominal pain and anal bleeding. Musculoskeletal: Positive for back pain and gait problem. Patient-Reported Vitals 2021   Patient-Reported Weight 148 lbs. Patient-Reported Height 4'11\"   Patient-Reported Systolic 977   Patient-Reported Diastolic 90        Physical Exam  Constitutional:       Appearance: She is ill-appearing. HENT:      Mouth/Throat:      Mouth: Mucous membranes are moist.      Pharynx: No oropharyngeal exudate or posterior oropharyngeal erythema. Pulmonary:      Effort: Respiratory distress present. Breath sounds: Wheezing present. Musculoskeletal:      Cervical back: No rigidity or tenderness. Jerri Chavez, was evaluated through a synchronous (real-time) audio-video encounter.  The patient (or guardian if applicable) is aware that this is a billable service. Verbal consent to proceed has been obtained within the past 12 months. The visit was conducted pursuant to the emergency declaration under the [address] [facility] authority and the [employer] [employer] [place] [employer] Act. Patient identification was verified, and a caregiver was present when appropriate. The patient was located in a state where the provider was credentialed to provide care. An electronic signature was used to authenticate this note.     --Yessy Canseco MD

## 2022-01-15 ASSESSMENT — ENCOUNTER SYMPTOMS
EYE PAIN: 0
ANAL BLEEDING: 0
SHORTNESS OF BREATH: 1
BACK PAIN: 1
COUGH: 1
ABDOMINAL PAIN: 0

## 2022-01-21 ENCOUNTER — PATIENT MESSAGE (OUTPATIENT)
Dept: INTERNAL MEDICINE CLINIC | Age: 72
End: 2022-01-21

## 2022-01-21 NOTE — TELEPHONE ENCOUNTER
Spoke with the pt. Pt's appt moved up to 1/26/22 per Dr Corey Oh. Also, [facility] didn't send us anything to fill out except for a request for a last note for F2F. We will need to reach out to them to see what needs to completed.

## 2022-01-21 NOTE — TELEPHONE ENCOUNTER
From: Sherice Woods  To: Dr. Joe Mendez  Sent: 1/21/2022 9:38 AM EST  Subject: Confirmation of office visit for 1/26/22    Hi Dr. Modesta Harris,     At our last appointment, we discussed when mom is down on Wednesday 1/26 for her to come see you prior to you office taking lunch. It was either a 12 or 12:30 appointment. Can you please confirm because we called yesterday and we were told she has no appointment?

## 2022-01-26 ENCOUNTER — OFFICE VISIT (OUTPATIENT)
Dept: PULMONOLOGY | Age: 72
End: 2022-01-26
Payer: MEDICARE

## 2022-01-26 ENCOUNTER — HOSPITAL ENCOUNTER (OUTPATIENT)
Age: 72
Discharge: HOME | End: 2022-01-26
Payer: MEDICARE

## 2022-01-26 ENCOUNTER — OFFICE VISIT (OUTPATIENT)
Dept: INTERNAL MEDICINE CLINIC | Age: 72
End: 2022-01-26
Payer: MEDICARE

## 2022-01-26 VITALS
BODY MASS INDEX: 30.04 KG/M2 | HEIGHT: 59 IN | WEIGHT: 149 LBS | TEMPERATURE: 97.8 F | HEART RATE: 49 BPM | SYSTOLIC BLOOD PRESSURE: 106 MMHG | OXYGEN SATURATION: 94 % | DIASTOLIC BLOOD PRESSURE: 66 MMHG

## 2022-01-26 VITALS — OXYGEN SATURATION: 99 % | HEART RATE: 88 BPM

## 2022-01-26 DIAGNOSIS — K50.00 CROHN'S DISEASE OF SMALL INTESTINE WITHOUT COMPLICATION (HCC): ICD-10-CM

## 2022-01-26 DIAGNOSIS — J96.11 CHRONIC HYPOXEMIC RESPIRATORY FAILURE (HCC): ICD-10-CM

## 2022-01-26 DIAGNOSIS — D50.9 MICROCYTIC ANEMIA: ICD-10-CM

## 2022-01-26 DIAGNOSIS — R06.02 SOB (SHORTNESS OF BREATH): Primary | ICD-10-CM

## 2022-01-26 DIAGNOSIS — N18.32 STAGE 3B CHRONIC KIDNEY DISEASE (HCC): ICD-10-CM

## 2022-01-26 DIAGNOSIS — J44.9 COPD, SEVERE (HCC): Primary | ICD-10-CM

## 2022-01-26 DIAGNOSIS — K21.9 GASTROESOPHAGEAL REFLUX DISEASE WITHOUT ESOPHAGITIS: ICD-10-CM

## 2022-01-26 DIAGNOSIS — G95.0 SYRINGOMYELIA (HCC): ICD-10-CM

## 2022-01-26 DIAGNOSIS — R14.3 INTESTINAL GAS EXCRETION: ICD-10-CM

## 2022-01-26 DIAGNOSIS — N18.4 CHRONIC KIDNEY DISEASE, STAGE 4 (SEVERE) (HCC): ICD-10-CM

## 2022-01-26 DIAGNOSIS — J47.1 BRONCHIECTASIS WITH ACUTE EXACERBATION (HCC): ICD-10-CM

## 2022-01-26 DIAGNOSIS — J44.9 COPD, SEVERE (HCC): ICD-10-CM

## 2022-01-26 DIAGNOSIS — D58.2 HEMOGLOBIN C TRAIT (HCC): ICD-10-CM

## 2022-01-26 DIAGNOSIS — I50.31 ACUTE DIASTOLIC (CONGESTIVE) HEART FAILURE (HCC): ICD-10-CM

## 2022-01-26 LAB
A/G RATIO: 1.5 (ref 1.1–2.2)
ALBUMIN SERPL-MCNC: 3.7 G/DL (ref 3.4–5)
ALP BLD-CCNC: 88 U/L (ref 40–129)
ALT SERPL-CCNC: 19 U/L (ref 10–40)
ANION GAP SERPL CALCULATED.3IONS-SCNC: 19 MMOL/L (ref 3–16)
AST SERPL-CCNC: 17 U/L (ref 15–37)
BASOPHILS ABSOLUTE: 0 K/UL (ref 0–0.2)
BASOPHILS RELATIVE PERCENT: 0.2 %
BILIRUB SERPL-MCNC: 0.3 MG/DL (ref 0–1)
BUN BLDV-MCNC: 28 MG/DL (ref 7–20)
CALCIUM SERPL-MCNC: 8.9 MG/DL (ref 8.3–10.6)
CHLORIDE BLD-SCNC: 99 MMOL/L (ref 99–110)
CHOLESTEROL, TOTAL: 139 MG/DL (ref 0–199)
CO2: 19 MMOL/L (ref 21–32)
CREAT SERPL-MCNC: 2.1 MG/DL (ref 0.6–1.2)
EOSINOPHILS ABSOLUTE: 0 K/UL (ref 0–0.6)
EOSINOPHILS RELATIVE PERCENT: 0.1 %
GFR AFRICAN AMERICAN: 28
GFR NON-AFRICAN AMERICAN: 23
GLUCOSE BLD-MCNC: 100 MG/DL (ref 70–99)
HCT VFR BLD CALC: 28.3 % (ref 36–48)
HDLC SERPL-MCNC: 99 MG/DL (ref 40–60)
HEMOGLOBIN: 8.6 G/DL (ref 12–16)
LDL CHOLESTEROL CALCULATED: 21 MG/DL
LYMPHOCYTES ABSOLUTE: 1.8 K/UL (ref 1–5.1)
LYMPHOCYTES RELATIVE PERCENT: 12.7 %
MCH RBC QN AUTO: 21.4 PG (ref 26–34)
MCHC RBC AUTO-ENTMCNC: 30.3 G/DL (ref 31–36)
MCV RBC AUTO: 70.7 FL (ref 80–100)
MONOCYTES ABSOLUTE: 0.2 K/UL (ref 0–1.3)
MONOCYTES RELATIVE PERCENT: 1.2 %
NEUTROPHILS ABSOLUTE: 12.3 K/UL (ref 1.7–7.7)
NEUTROPHILS RELATIVE PERCENT: 85.8 %
PDW BLD-RTO: 17.6 % (ref 12.4–15.4)
PLATELET # BLD: 318 K/UL (ref 135–450)
PMV BLD AUTO: 8.3 FL (ref 5–10.5)
POTASSIUM SERPL-SCNC: 4.6 MMOL/L (ref 3.5–5.1)
RBC # BLD: 4.01 M/UL (ref 4–5.2)
SODIUM BLD-SCNC: 137 MMOL/L (ref 136–145)
TOTAL PROTEIN: 6.1 G/DL (ref 6.4–8.2)
TRIGL SERPL-MCNC: 95 MG/DL (ref 0–150)
VLDLC SERPL CALC-MCNC: 19 MG/DL
WBC # BLD: 14.4 K/UL (ref 4–11)

## 2022-01-26 PROCEDURE — 3023F SPIROM DOC REV: CPT | Performed by: INTERNAL MEDICINE

## 2022-01-26 PROCEDURE — 3017F COLORECTAL CA SCREEN DOC REV: CPT | Performed by: INTERNAL MEDICINE

## 2022-01-26 PROCEDURE — 1123F ACP DISCUSS/DSCN MKR DOCD: CPT | Performed by: INTERNAL MEDICINE

## 2022-01-26 PROCEDURE — 80061 LIPID PANEL: CPT

## 2022-01-26 PROCEDURE — 36415 COLL VENOUS BLD VENIPUNCTURE: CPT

## 2022-01-26 PROCEDURE — 1036F TOBACCO NON-USER: CPT | Performed by: INTERNAL MEDICINE

## 2022-01-26 PROCEDURE — G8482 FLU IMMUNIZE ORDER/ADMIN: HCPCS | Performed by: INTERNAL MEDICINE

## 2022-01-26 PROCEDURE — 1090F PRES/ABSN URINE INCON ASSESS: CPT | Performed by: INTERNAL MEDICINE

## 2022-01-26 PROCEDURE — G8427 DOCREV CUR MEDS BY ELIG CLIN: HCPCS | Performed by: INTERNAL MEDICINE

## 2022-01-26 PROCEDURE — 85025 COMPLETE CBC W/AUTO DIFF WBC: CPT

## 2022-01-26 PROCEDURE — 99214 OFFICE O/P EST MOD 30 MIN: CPT | Performed by: INTERNAL MEDICINE

## 2022-01-26 PROCEDURE — 4040F PNEUMOC VAC/ADMIN/RCVD: CPT | Performed by: INTERNAL MEDICINE

## 2022-01-26 PROCEDURE — 80053 COMPREHEN METABOLIC PANEL: CPT

## 2022-01-26 PROCEDURE — G8417 CALC BMI ABV UP PARAM F/U: HCPCS | Performed by: INTERNAL MEDICINE

## 2022-01-26 PROCEDURE — G8399 PT W/DXA RESULTS DOCUMENT: HCPCS | Performed by: INTERNAL MEDICINE

## 2022-01-26 RX ORDER — LACTOBACILLUS RHAMNOSUS GG 10B CELL
1 CAPSULE ORAL DAILY
Qty: 30 CAPSULE | Refills: 5 | Status: SHIPPED | OUTPATIENT
Start: 2022-01-26 | End: 2022-03-16 | Stop reason: SDUPTHER

## 2022-01-26 ASSESSMENT — PATIENT HEALTH QUESTIONNAIRE - PHQ9
1. LITTLE INTEREST OR PLEASURE IN DOING THINGS: 0
SUM OF ALL RESPONSES TO PHQ QUESTIONS 1-9: 0
2. FEELING DOWN, DEPRESSED OR HOPELESS: 0
SUM OF ALL RESPONSES TO PHQ QUESTIONS 1-9: 0
SUM OF ALL RESPONSES TO PHQ9 QUESTIONS 1 & 2: 0

## 2022-01-26 NOTE — PROGRESS NOTES
Anna Ardon (:  1950) is a 80 y.o. female,Established patient, here for evaluation of the following chief complaint(s):  COPD         ASSESSMENT/PLAN:  1. COPD, severe (HCC)  Worsening  -  Continue steroids, inhalers and oxygen  -  Follow-up with Dr. Liss Ruiz  -  Patient requires a Hoveraound Mobility chair to move around in her home    2. Intestinal gas excretion  -     Lactobacillus (CULTURELLE DIGESTIVE WOMENS) CAPS; Take 1 capsule by mouth daily, Disp-30 capsule, R-5Normal    3. Bronchiectasis with acute exacerbation (HCC)  Worsening  -  Continue inhalers and steroids  -   Antibiotics as needed    4. Syringomyelia (HCC)  Stable    5. Acute diastolic (congestive) heart failure (HCC)  Stable  -  Continue lasix and verapamil    6. Crohn's disease of small intestine without complication (HCC)  Stable  - cnitnue the humira    7. Hemoglobin C trait ([facility]  stable    Return in about 2 months (around 3/26/2022) for copd 30 min. Subjective   SUBJECTIVE/OBJECTIVE:  HPI   COPD:  Current treatment includes combined beta agonist/steroid inhaler, anticholinergic inhaler, oral steroid- prednsione, home 02- 4 liters. Residual symptoms: chronic dyspnea, dyspnea after 1 blocks and chest tightness. She denies sore throat, weight loss. She requires her rescue inhaler 4 time(s) per day. Patient is scheduled to see Dr. Liss Ruiz for continued management of her pulmonary disease. She has moved to [place] to stay with her sisters. At some point we will need to transition care up to [place]. While she is in the state of [place], though we can provide some virtual visits as necessary. Review of Systems   Constitutional: Positive for fatigue. HENT: Negative for drooling and ear discharge. Respiratory: Positive for cough, shortness of breath and wheezing.            Objective    Vitals:    22 1134   BP: 106/66   Site: Left Upper Arm   Position: Sitting   Cuff Size: Medium Adult   Pulse: (!) 49 Temp: 97.8 °F (36.6 °C)   TempSrc: Infrared   SpO2: 94%   Weight: 149 lb (67.6 kg)   Height: 4' 11\" (1.499 m)      Wt Readings from Last 3 Encounters:   01/26/22 149 lb (67.6 kg)   11/23/21 149 lb (67.6 kg)   09/26/21 145 lb (65.8 kg)     BP Readings from Last 3 Encounters:   01/26/22 106/66   11/23/21 134/68   09/26/21 (!) 152/95     Body mass index is 30.09 kg/m². Facility age limit for growth percentiles is 20 years. Physical Exam  Constitutional:       Appearance: She is obese. She is ill-appearing. HENT:      Right Ear: Tympanic membrane and ear canal normal.      Left Ear: Tympanic membrane and ear canal normal.      Nose: No congestion or rhinorrhea. Mouth/Throat:      Mouth: Mucous membranes are moist.      Pharynx: No oropharyngeal exudate or posterior oropharyngeal erythema. Eyes:      General:         Right eye: No discharge. Left eye: No discharge. Pupils: Pupils are equal, round, and reactive to light. Cardiovascular:      Rate and Rhythm: Normal rate and regular rhythm. Pulmonary:      Effort: Respiratory distress present. Breath sounds: Wheezing and rhonchi present. Musculoskeletal:      Right shoulder: Decreased range of motion. Decreased strength. Left shoulder: Decreased range of motion. Decreased strength. Arms:       Cervical back: Normal range of motion. No rigidity or tenderness. Right hip: Decreased range of motion. Decreased strength. Left hip: Decreased range of motion. Decreased strength. Legs:    Neurological:      Motor: Weakness present. Gait: Gait abnormal (patient cannot walk more than 5 steps without assistance). An electronic signature was used to authenticate this note.     --Rowan Min MD

## 2022-01-26 NOTE — PROGRESS NOTES
[facility]  Follow Up Note  Darryle Innocent, MD Vaughan Dallas   YOB: 1950    Date of Visit:  1/26/2022    Assessment/Plan:  1. SOB (shortness of breath)  Worse. SOB and deconditioned. \"Can't do anything\"  Go to start some home therapy. She is now living with her daughter in [address] [place] [address] and ultimately will transfer her care up there. 2. COPD, severe ([address]  Worse  PFT 7/19:  INTERPRETATION:  Spirometry reveals decreased FVC at 1.49 liters, which  is 57% predicted. FEV1 is decreased at 0.94 liters, which is 48%  predicted. FEV1/FVC ratio is reduced at 63%. Postbronchodilator study  is not performed. Lung volumes reveal normal total lung capacity. Residual volume is increased at 159% predicted. Diffusion capacity is  decreased at 27% predicted. In comparison to a study from 02/2017, FVC  has decreased by 21%. FEV1 is increased by 19% and residual volume is  increased by 17%. Diffusion capacity is decreased by 37%.     IMPRESSION:  Severe obstructive lung disease with air trapping. There  has been significant worsening since the preceding study. Medication Management:    Symbicort + Spiriva ==> Pulmicort + Brovana + Spiriva ==> Breztri 2 puffs BID  Albuterol HHN -- about twice per day. Increase Prednisone to 20 mg per day    3. Centrilobular emphysema ([address]  CT Chest 4/21:  Impression   No acute abnormality identified in the chest.       The previously noted left upper lobe consolidation on the CT from 09/14/2020   has resolved with mild atelectasis and/or scarring remaining.       Emphysematous changes are seen in the lungs.         Emphysema is moderate to severe    4. Chronic hypoxemic respiratory failure (HCC)  Oxygen saturation on room air at rest in the office today is 87%.   The patient would benefit from supplemental oxygen with exertion and sleep  The patient is independently mobile within the home and would benefit from a portable oxygen concentrator. Recommend that she be on 2 to 3 L oxygen supplement 24 hours/day      6 months      Chief Complaint   Patient presents with    Shortness of Breath     O2 sat RA at rest 92% O2 sat RA walking/exercising 87% O2 sat 2 liters walking/exercising 95% O2 sat 99% 2 liters at rest       HPI  The patient presents with a chief complaint of moderate shortness of breath related to severe COPD of many years duration. He has mild associated cough. Exertion is a modifying factor. She has been hospitalized a couple times and in rehab a couple times. She is now with her daughter in [facility] because she can't be at home. She complains of swelling and she is on Lasix. She also has CKD. Follows with nephrology    Review of Systems  No Chest pain, Nausea or vomiting reported      History  I have reviewed past medical, surgical, social and family history. This is documented elsewhere in the medical record. Physical Exam:  Well developed, well nourished  Alert and oriented  Sclera is clear  No cervical adenopathy  No JVD. Chest examination is clear. Cardiac examination reveals regular rate and rhythm without murmur, gallop or rub. The abdomen is soft, nontender and nondistended. There is no clubbing, cyanosis or edema of the extremities. There is no obvious skin rash. No focal neuro deficicts  Normal mood and affect      Allergies   Allergen Reactions    Statins Other (See Comments)     Upset stomach and muscle pains      Prior to Visit Medications    Medication Sig Taking?  Authorizing Provider   Lactobacillus (CULTURELLE DIGESTIVE WOMENS) CAPS Take 1 capsule by mouth daily  Reinaldo Mcclendon MD   verapamil (CALAN SR) 240 MG extended release tablet Take 0.5 tablets by mouth daily  Reinaldo Mcclendon MD   atorvastatin (LIPITOR) 10 MG tablet Take 1 tablet by mouth daily  Reinaldo Mcclendon MD   gabapentin (NEURONTIN) 300 MG capsule Take 1 capsule by mouth 2 times daily for 180 days. Intended supply: 90 days  Harrison Smith MD   alendronate (FOSAMAX) 70 MG tablet Take 1 tablet by mouth every 7 days  Harrison Smith MD   colestipol (COLESTID) 1 g tablet Take 1 tablet by mouth 2 times daily  Harrison Smith MD   ferrous sulfate (FE TABS 325) 325 (65 Fe) MG EC tablet Take 1 tablet by mouth 3 times daily (with meals)  Harrison Smith MD   predniSONE (DELTASONE) 10 MG tablet Take 2 tablets by mouth daily  Harrison Smith MD   furosemide (LASIX) 20 MG tablet Take 1 tablet by mouth daily as needed (edema, weight gain >3lbs in 1 day)  Harrison Smith MD   Budeson-Glycopyrrol-Formoterol (BREZTRI AEROSPHERE) 160-9-4.8 MCG/ACT AERO Inhale 2 puffs into the lungs 2 times daily  Harrison Smith MD   potassium chloride (KLOR-CON) 10 MEQ extended release tablet Take 1 tablet by mouth daily  Harrison Smith MD   amitriptyline (ELAVIL) 150 MG tablet Take 0.5 tablets by mouth nightly  Harrison Smith MD   Misc. Devices (COMMODE BEDSIDE) MISC Use as directed  Harrison Smith MD   Misc. Devices [facility]) MISC 1 each by Does not apply route daily  Harrison Smith MD   ELIQUIS 5 MG TABS tablet TAKE 1 TABLET BY MOUTH TWICE A DAY  Luann Raya MD   Lift Chair MISC by Does not apply route 71 yo woman with severe COPD and Arthritis of the Left Hip. Please dispense a Lift Chair  Harrison Smith MD   Adalimumab (HUMIRA PEN SC) Inject into the skin every 14 days   Historical Provider, MD   albuterol (PROVENTIL) (2.5 MG/3ML) 0.083% nebulizer solution TAKE 3 MLS BY NEBULIZATION EVERY 6 HOURS AS NEEDED FOR WHEEZING DX: COPD ICD-10: J44.9  Luann Raya MD       Vitals:    01/26/22 1337   Pulse: 88   SpO2: 99%     There is no height or weight on file to calculate BMI.      Wt Readings from Last 3 Encounters:   01/26/22 149 lb (67.6 kg)   11/23/21 149 lb (67.6 kg)   09/26/21 145 lb (65.8 kg)     BP Readings from Last 3 Encounters: 22 106/66   21 134/68   21 (!) 152/95        Social History     Tobacco Use   Smoking Status Former Smoker    Packs/day: 0.25    Years: 30.00    Pack years: 7.50    Types: Cigarettes    Quit date: 2015    Years since quittin.9   Smokeless Tobacco Never Used   Tobacco Comment    started smoking at age 26 / smoked up to 9 cigarettes a day

## 2022-01-26 NOTE — PATIENT INSTRUCTIONS
Patient Education        Learning About High-Potassium Foods  What foods are high in potassium? The foods you eat contain nutrients, such as vitamins and minerals. Potassium is a nutrient. Your body needs the right amount to stay healthy and work as it should. You can use the list below to help you make choices about which foods to eat. The foods in this list have at least 200 milligrams (mg) of potassium per serving. Fruits  · Apricots (dried), ¼ cup  · Avocado, ½ fruit  · Banana, 1 medium  · [place], 1 fruit  · Nectarine, 1 fruit  · Orange, 1 fruit  · Prunes, 5 fruits  · Raisins, ¼ cup  Vegetables  · Artichoke, 1 medium  · Beets, ½ cup  · Broccoli, ½ cup  · [place] sprouts, ½ cup  · Potato with skin, 1 medium  · Spinach, ½ cup  · Sweet potato, 1 medium  · Tomato sauce, ½ cup  · Zucchini, ½ cup  Dairy and dairy alternatives  · Milk, 1 cup  · Soy milk, 1 cup  · Yogurt, 6 oz  Meats and other protein foods  · Beans (lima, navy, white), ½ cup  · Beef, ground, 3 oz  · Chicken, 3 oz  · Fish (halibut, tuna, cod, snapper), 3 oz  · Nuts (almonds, hazelnuts, [place], cashew, pistachios), 1 oz  · Peanut butter, 2 Tbsp  · Peanuts, 1 oz  · [place] 3 oz  Seasonings  · Salt substitutes  Work with your doctor to find out how much of this nutrient you need. Depending on your health, you may need more or less of it in your diet. Where can you learn more? Go to [email or web address] and sign in to your [employer] account. Enter P450 in the [facility] box to learn more about \"Learning About High-Potassium Foods. \"     If you do not have an account, please click on the \"Sign Up Now\" link. Current as of: September 8, 2021               Content Version: 13.1  © 4538-2366 Healthwise, [employer]. Care instructions adapted under license by [facility] ([facility]).  If you have questions about a medical condition or this instruction, always ask your healthcare professional. Kamilah [place] any September 8, 2021               Content Version: 13.1  © 0444-2893 Healthwise, Incorporated. Care instructions adapted under license by [facility] ([facility]). If you have questions about a medical condition or this instruction, always ask your healthcare professional. [address] any warranty or liability for your use of this information.

## 2022-01-31 ENCOUNTER — TELEPHONE (OUTPATIENT)
Dept: PULMONOLOGY | Age: 72
End: 2022-01-31

## 2022-01-31 NOTE — TELEPHONE ENCOUNTER
Pt called and wants to if we sent O2 order to [employer] [facility] in [facility]. Call her to let her know.     Ph # [phone]

## 2022-02-10 ASSESSMENT — ENCOUNTER SYMPTOMS
SHORTNESS OF BREATH: 1
COUGH: 1
WHEEZING: 1

## 2022-02-14 ENCOUNTER — PATIENT MESSAGE (OUTPATIENT)
Dept: INTERNAL MEDICINE CLINIC | Age: 72
End: 2022-02-14

## 2022-02-14 DIAGNOSIS — M25.552 LEFT HIP PAIN: ICD-10-CM

## 2022-02-14 NOTE — TELEPHONE ENCOUNTER
From: Sherice Woods  To: Dr. Joe Mendez  Sent: 2/14/2022 2:46 PM EST  Subject: Prescription request    Hi Dr. Modesta Harris,    Mom oxycodone prescription is very low. On the bottle it has up to 6 refill however the pharmacy shared the prescription needs to come from you. Can you please send in an order for her to have this medicine? She has enough until Thursday.      Thank you sooo much,  Sal Matt

## 2022-02-14 NOTE — TELEPHONE ENCOUNTER
Patient is requesting a refill of their prescription. Requested Prescriptions     Pending Prescriptions Disp Refills    oxyCODONE-acetaminophen (PERCOCET) 5-325 MG per tablet 60 tablet 0     Sig: Take 1 tablet by mouth 2 times daily as needed for Pain for up to 30 days.         Recent Visits  Date Type Provider Dept   01/26/22 Office Visit Tammy Truong MD [facility]   11/23/21 Office Visit Tammy Truong MD [facility]   08/05/21 Office Visit Tammy Truong MD [facility]   07/28/21 Office Visit Tammy Truong MD [facility]   06/22/21 Office Visit Tammy Truong MD [facility]   04/19/21 Office Visit Tammy Truong MD [facility]   12/17/20 Office Visit Tammy Truong MD [facility]   Showing recent visits within past 540 days with a meds authorizing provider and meeting all other requirements  Future Appointments  Date Type Provider Dept   03/16/22 Appointment Tammy Truong MD [facility]   Showing future appointments within next 150 days with a meds authorizing provider and meeting all other requirements     1/26/2022

## 2022-02-15 ENCOUNTER — TELEPHONE (OUTPATIENT)
Dept: INTERNAL MEDICINE CLINIC | Age: 72
End: 2022-02-15

## 2022-02-15 RX ORDER — OXYCODONE HYDROCHLORIDE AND ACETAMINOPHEN 5; 325 MG/1; MG/1
1 TABLET ORAL 2 TIMES DAILY PRN
Qty: 60 TABLET | Refills: 0 | Status: SHIPPED | OUTPATIENT
Start: 2022-02-15 | End: 2022-03-10 | Stop reason: SDUPTHER

## 2022-02-16 ENCOUNTER — TELEPHONE (OUTPATIENT)
Dept: INTERNAL MEDICINE CLINIC | Age: 72
End: 2022-02-16

## 2022-02-16 NOTE — TELEPHONE ENCOUNTER
Called [facility]. Apparently they were needing to get a prescription for the [employer] chair written as well as questionnaires being answered. I had them fax it over to me to my attention. I will fill out the questions and send a message to Provider to write a script. Also, I left the patient a voicemail informing her of the situation to then go forward and work on the papers.

## 2022-02-19 NOTE — TELEPHONE ENCOUNTER
I completed the form and updated the note. Please fax the note and addendum along with the form. Please fax both to [facility].

## 2022-02-19 NOTE — PROGRESS NOTES
Mrs. Kita Hess has severe COPD with chronic respiratory failure(on 4 liters/min of oxygen) and with Congestive Heart Failure (Class III). As a result of her disease she has difficulty completing the following Activities of Daily Living:    -  She needs a PMD to get to the bathroom to toilet and to get to the      Bedroom to groom and dress. She does not have the ability to prepare       Meals.    -  Ms. Kita Hess lower body strength and upper body strength are too limited to      allow for  her to ambulate with a cane. In addition, she could not use a cane      and carry her oxygent container.    -  Xavier Gross does not have the upper body strength to propel a           manual wheelchair. Due to the small size of her home, she would not      Be able to use a motorized scooter (POV). -   While Ms. Kita Hess has profound upper and lower body weakness, she      Is mentally able to safely operate a power mobility device. She does have       Enough strength in her hands and wrists to operate the power maneuver device.

## 2022-03-07 ENCOUNTER — PATIENT MESSAGE (OUTPATIENT)
Dept: INTERNAL MEDICINE CLINIC | Age: 72
End: 2022-03-07

## 2022-03-07 DIAGNOSIS — M25.552 LEFT HIP PAIN: ICD-10-CM

## 2022-03-07 NOTE — TELEPHONE ENCOUNTER
From: Ayan Barnes  To: Dr. Yun Silk: 3/7/2022 11:32 AM EST  Subject: Switching Pharmacy     Hi Dr. Galilea Goldberg,    We need to switch pharmacy because the location near my sister house is horrible. They will not fill the oxycodone. They are very rude. They pick and choose which prescription they want to fill. Can we switch to a pharmacy near my house? I go to [address] at [employer]. Their address is [address] [place] [address]    Can you we do this?

## 2022-03-09 ENCOUNTER — PATIENT MESSAGE (OUTPATIENT)
Dept: PULMONOLOGY | Age: 72
End: 2022-03-09

## 2022-03-10 RX ORDER — PREDNISONE 10 MG/1
20 TABLET ORAL DAILY
Qty: 60 TABLET | Refills: 1 | Status: SHIPPED | OUTPATIENT
Start: 2022-03-10 | End: 2022-03-10 | Stop reason: SDUPTHER

## 2022-03-10 RX ORDER — PREDNISONE 10 MG/1
20 TABLET ORAL DAILY
Qty: 60 TABLET | Refills: 1 | Status: SHIPPED | OUTPATIENT
Start: 2022-03-10 | End: 2022-05-03

## 2022-03-10 RX ORDER — OXYCODONE HYDROCHLORIDE AND ACETAMINOPHEN 5; 325 MG/1; MG/1
1 TABLET ORAL 2 TIMES DAILY PRN
Qty: 60 TABLET | Refills: 0 | Status: SHIPPED | OUTPATIENT
Start: 2022-03-10 | End: 2022-03-16 | Stop reason: SDUPTHER

## 2022-03-10 NOTE — TELEPHONE ENCOUNTER
From: Enid Vallecillo  To: Dr. Lopez Curet: 3/9/2022 2:41 PM EST  Subject: Refill of prednisone     Hi Dr. Sagrario Moffett,    My mom asked to send you a message requesting a refill of her 20 mg prednisone. Can you please send a prescription to [address] at the below location;  [address] [place] [address] Anahy العراقي phone number [phone].     Thank you, Zehra Graciaatul her daughter

## 2022-03-16 ENCOUNTER — TELEPHONE (OUTPATIENT)
Dept: INTERNAL MEDICINE CLINIC | Age: 72
End: 2022-03-16

## 2022-03-16 ENCOUNTER — OFFICE VISIT (OUTPATIENT)
Dept: CARDIOLOGY CLINIC | Age: 72
End: 2022-03-16
Payer: MEDICARE

## 2022-03-16 ENCOUNTER — OFFICE VISIT (OUTPATIENT)
Dept: INTERNAL MEDICINE CLINIC | Age: 72
End: 2022-03-16
Payer: MEDICARE

## 2022-03-16 VITALS
SYSTOLIC BLOOD PRESSURE: 138 MMHG | BODY MASS INDEX: 31.1 KG/M2 | HEART RATE: 84 BPM | DIASTOLIC BLOOD PRESSURE: 80 MMHG | WEIGHT: 154 LBS

## 2022-03-16 VITALS
HEART RATE: 79 BPM | TEMPERATURE: 97.8 F | SYSTOLIC BLOOD PRESSURE: 126 MMHG | DIASTOLIC BLOOD PRESSURE: 80 MMHG | HEIGHT: 59 IN | WEIGHT: 149 LBS | OXYGEN SATURATION: 97 % | BODY MASS INDEX: 30.04 KG/M2

## 2022-03-16 DIAGNOSIS — F33.1 MAJOR DEPRESSIVE DISORDER, RECURRENT, MODERATE (HCC): ICD-10-CM

## 2022-03-16 DIAGNOSIS — I10 ESSENTIAL HYPERTENSION: ICD-10-CM

## 2022-03-16 DIAGNOSIS — F33.41 RECURRENT MAJOR DEPRESSIVE DISORDER, IN PARTIAL REMISSION (HCC): ICD-10-CM

## 2022-03-16 DIAGNOSIS — I50.31 ACUTE DIASTOLIC CHF (CONGESTIVE HEART FAILURE) (HCC): Primary | ICD-10-CM

## 2022-03-16 DIAGNOSIS — F33.42 RECURRENT MAJOR DEPRESSIVE DISORDER, IN FULL REMISSION (HCC): ICD-10-CM

## 2022-03-16 DIAGNOSIS — Z86.718 HX OF DEEP VENOUS THROMBOSIS: ICD-10-CM

## 2022-03-16 DIAGNOSIS — F33.0 MAJOR DEPRESSIVE DISORDER, RECURRENT, MILD (HCC): ICD-10-CM

## 2022-03-16 DIAGNOSIS — J44.9 COPD, SEVERE (HCC): Primary | ICD-10-CM

## 2022-03-16 DIAGNOSIS — M25.552 LEFT HIP PAIN: ICD-10-CM

## 2022-03-16 DIAGNOSIS — R14.3 INTESTINAL GAS EXCRETION: ICD-10-CM

## 2022-03-16 DIAGNOSIS — M81.0 AGE RELATED OSTEOPOROSIS, UNSPECIFIED PATHOLOGICAL FRACTURE PRESENCE: ICD-10-CM

## 2022-03-16 PROBLEM — F33.9 MAJOR DEPRESSIVE DISORDER, RECURRENT, UNSPECIFIED (HCC): Status: ACTIVE | Noted: 2022-01-01

## 2022-03-16 PROCEDURE — G8428 CUR MEDS NOT DOCUMENT: HCPCS | Performed by: NURSE PRACTITIONER

## 2022-03-16 PROCEDURE — G8399 PT W/DXA RESULTS DOCUMENT: HCPCS | Performed by: INTERNAL MEDICINE

## 2022-03-16 PROCEDURE — 99214 OFFICE O/P EST MOD 30 MIN: CPT | Performed by: INTERNAL MEDICINE

## 2022-03-16 PROCEDURE — G8427 DOCREV CUR MEDS BY ELIG CLIN: HCPCS | Performed by: INTERNAL MEDICINE

## 2022-03-16 PROCEDURE — 4040F PNEUMOC VAC/ADMIN/RCVD: CPT | Performed by: INTERNAL MEDICINE

## 2022-03-16 PROCEDURE — 3017F COLORECTAL CA SCREEN DOC REV: CPT | Performed by: NURSE PRACTITIONER

## 2022-03-16 PROCEDURE — 1036F TOBACCO NON-USER: CPT | Performed by: INTERNAL MEDICINE

## 2022-03-16 PROCEDURE — G8417 CALC BMI ABV UP PARAM F/U: HCPCS | Performed by: NURSE PRACTITIONER

## 2022-03-16 PROCEDURE — 1090F PRES/ABSN URINE INCON ASSESS: CPT | Performed by: NURSE PRACTITIONER

## 2022-03-16 PROCEDURE — G8482 FLU IMMUNIZE ORDER/ADMIN: HCPCS | Performed by: INTERNAL MEDICINE

## 2022-03-16 PROCEDURE — 4040F PNEUMOC VAC/ADMIN/RCVD: CPT | Performed by: NURSE PRACTITIONER

## 2022-03-16 PROCEDURE — 1090F PRES/ABSN URINE INCON ASSESS: CPT | Performed by: INTERNAL MEDICINE

## 2022-03-16 PROCEDURE — 99214 OFFICE O/P EST MOD 30 MIN: CPT | Performed by: NURSE PRACTITIONER

## 2022-03-16 PROCEDURE — G8417 CALC BMI ABV UP PARAM F/U: HCPCS | Performed by: INTERNAL MEDICINE

## 2022-03-16 PROCEDURE — G8482 FLU IMMUNIZE ORDER/ADMIN: HCPCS | Performed by: NURSE PRACTITIONER

## 2022-03-16 PROCEDURE — 3017F COLORECTAL CA SCREEN DOC REV: CPT | Performed by: INTERNAL MEDICINE

## 2022-03-16 PROCEDURE — 3023F SPIROM DOC REV: CPT | Performed by: INTERNAL MEDICINE

## 2022-03-16 PROCEDURE — 1036F TOBACCO NON-USER: CPT | Performed by: NURSE PRACTITIONER

## 2022-03-16 PROCEDURE — 1123F ACP DISCUSS/DSCN MKR DOCD: CPT | Performed by: NURSE PRACTITIONER

## 2022-03-16 PROCEDURE — G8399 PT W/DXA RESULTS DOCUMENT: HCPCS | Performed by: NURSE PRACTITIONER

## 2022-03-16 PROCEDURE — 1123F ACP DISCUSS/DSCN MKR DOCD: CPT | Performed by: INTERNAL MEDICINE

## 2022-03-16 RX ORDER — BUDESONIDE, GLYCOPYRROLATE, AND FORMOTEROL FUMARATE 160; 9; 4.8 UG/1; UG/1; UG/1
2 AEROSOL, METERED RESPIRATORY (INHALATION) 2 TIMES DAILY
Qty: 10.7 G | Refills: 11 | Status: SHIPPED | OUTPATIENT
Start: 2022-03-16

## 2022-03-16 RX ORDER — ATORVASTATIN CALCIUM 10 MG/1
10 TABLET, FILM COATED ORAL DAILY
Qty: 30 TABLET | Refills: 5 | Status: SHIPPED | OUTPATIENT
Start: 2022-03-16 | End: 2022-06-06

## 2022-03-16 RX ORDER — OXYCODONE HYDROCHLORIDE AND ACETAMINOPHEN 5; 325 MG/1; MG/1
1 TABLET ORAL 2 TIMES DAILY PRN
Qty: 60 TABLET | Refills: 0 | Status: SHIPPED | OUTPATIENT
Start: 2022-03-16 | End: 2022-04-15

## 2022-03-16 RX ORDER — OXYCODONE HYDROCHLORIDE AND ACETAMINOPHEN 5; 325 MG/1; MG/1
1 TABLET ORAL 2 TIMES DAILY PRN
Qty: 60 TABLET | Refills: 0 | Status: SHIPPED | OUTPATIENT
Start: 2022-05-16 | End: 2022-06-15

## 2022-03-16 RX ORDER — OXYCODONE HYDROCHLORIDE AND ACETAMINOPHEN 5; 325 MG/1; MG/1
1 TABLET ORAL 2 TIMES DAILY PRN
Qty: 60 TABLET | Refills: 0 | Status: SHIPPED | OUTPATIENT
Start: 2022-04-16 | End: 2022-05-16

## 2022-03-16 RX ORDER — MONTELUKAST SODIUM 4 MG/1
1 TABLET, CHEWABLE ORAL 2 TIMES DAILY
Qty: 60 TABLET | Refills: 3 | Status: SHIPPED | OUTPATIENT
Start: 2022-03-16 | End: 2022-03-21

## 2022-03-16 RX ORDER — ALBUTEROL SULFATE 2.5 MG/3ML
2.5 SOLUTION RESPIRATORY (INHALATION) EVERY 6 HOURS PRN
Qty: 300 ML | Refills: 6 | Status: SHIPPED | OUTPATIENT
Start: 2022-03-16

## 2022-03-16 RX ORDER — POTASSIUM CHLORIDE 750 MG/1
10 TABLET, FILM COATED, EXTENDED RELEASE ORAL DAILY
Qty: 60 TABLET | Refills: 5 | Status: SHIPPED | OUTPATIENT
Start: 2022-03-16

## 2022-03-16 RX ORDER — LANOLIN ALCOHOL/MO/W.PET/CERES
325 CREAM (GRAM) TOPICAL
Qty: 90 TABLET | Refills: 1 | Status: SHIPPED | OUTPATIENT
Start: 2022-03-16

## 2022-03-16 RX ORDER — FUROSEMIDE 20 MG/1
20 TABLET ORAL DAILY PRN
Qty: 60 TABLET | Refills: 3 | Status: SHIPPED | OUTPATIENT
Start: 2022-03-16

## 2022-03-16 RX ORDER — AMITRIPTYLINE HYDROCHLORIDE 150 MG/1
75 TABLET, FILM COATED ORAL NIGHTLY
Qty: 45 TABLET | Refills: 3 | Status: SHIPPED | OUTPATIENT
Start: 2022-03-16

## 2022-03-16 RX ORDER — VERAPAMIL HYDROCHLORIDE 240 MG/1
120 TABLET, FILM COATED, EXTENDED RELEASE ORAL DAILY
Qty: 45 TABLET | Refills: 3 | Status: SHIPPED | OUTPATIENT
Start: 2022-03-16 | End: 2022-03-17 | Stop reason: SDUPTHER

## 2022-03-16 RX ORDER — GABAPENTIN 300 MG/1
300 CAPSULE ORAL 2 TIMES DAILY
Qty: 180 CAPSULE | Refills: 1 | Status: SHIPPED | OUTPATIENT
Start: 2022-03-16 | End: 2022-09-12

## 2022-03-16 RX ORDER — LACTOBACILLUS RHAMNOSUS GG 10B CELL
1 CAPSULE ORAL DAILY
Qty: 30 CAPSULE | Refills: 5 | Status: SHIPPED | OUTPATIENT
Start: 2022-03-16

## 2022-03-16 RX ORDER — ALENDRONATE SODIUM 70 MG/1
70 TABLET ORAL
Qty: 12 TABLET | Refills: 3 | Status: SHIPPED | OUTPATIENT
Start: 2022-03-16

## 2022-03-16 ASSESSMENT — PATIENT HEALTH QUESTIONNAIRE - PHQ9
1. LITTLE INTEREST OR PLEASURE IN DOING THINGS: 0
SUM OF ALL RESPONSES TO PHQ QUESTIONS 1-9: 0
2. FEELING DOWN, DEPRESSED OR HOPELESS: 0
SUM OF ALL RESPONSES TO PHQ QUESTIONS 1-9: 0
SUM OF ALL RESPONSES TO PHQ9 QUESTIONS 1 & 2: 0
SUM OF ALL RESPONSES TO PHQ QUESTIONS 1-9: 0
SUM OF ALL RESPONSES TO PHQ QUESTIONS 1-9: 0

## 2022-03-16 NOTE — TELEPHONE ENCOUNTER
Please Advise       Pharmacy called and would like to know if you can change the verapamil medication to 120mg daily to make it easier for the Pt

## 2022-03-16 NOTE — PROGRESS NOTES
Jolene Phillips (:  1950) is a 80 y.o. female,Established patient, here for evaluation of the following chief complaint(s):  COPD         ASSESSMENT/PLAN:  1. COPD, severe ([address]  Worsening  -  Continue oxygen, albuterol and breztri  2. Intestinal gas excretion  -     Lactobacillus (CULTURELLE DIGESTIVE WOMENS) CAPS; Take 1 capsule by mouth daily, Disp-30 capsule, R-5Normal  3. Age related osteoporosis, unspecified pathological fracture presence  -     alendronate (FOSAMAX) 70 MG tablet; Take 1 tablet by mouth every 7 days, Disp-12 tablet, R-3Normal  4. Major depressive disorder, recurrent, mild  5. Major depressive disorder, recurrent, moderate  6. Recurrent major depressive disorder, in full remission ([address]  7. Recurrent major depressive disorder, in partial remission ([address]  8. Left hip pain  -     oxyCODONE-acetaminophen (PERCOCET) 5-325 MG per tablet; Take 1 tablet by mouth 2 times daily as needed for Pain for up to 30 days. , Disp-60 tablet, R-0Normal  -     oxyCODONE-acetaminophen (PERCOCET) 5-325 MG per tablet; Take 1 tablet by mouth 2 times daily as needed for Pain for up to 30 days. , Disp-60 tablet, R-0Normal  -     oxyCODONE-acetaminophen (PERCOCET) 5-325 MG per tablet; Take 1 tablet by mouth 2 times daily as needed for Pain for up to 30 days. , Disp-60 tablet, R-0Normal      Return in about 3 months (around 2022) for copd 30 min. Subjective   SUBJECTIVE/OBJECTIVE:  HPI COPD:  Current treatment includes combined beta agonist/steroid inhaler, home 02- 3 liters. Residual symptoms: chronic dyspnea, chest tightness, inability to climb stairs, non-productive cough, wheezing and fever. She denies any other symptoms. She requires her rescue inhaler 4 time(s) per day.       Review of Systems       Objective    Vitals:    22 1229   BP: 126/80   Site: Right Upper Arm   Position: Sitting   Cuff Size: Large Adult   Pulse: 79   Temp: 97.8 °F (36.6 °C)   TempSrc: Infrared   SpO2: 97% Weight: 149 lb (67.6 kg)   Height: 4' 11\" (1.499 m)      Wt Readings from Last 3 Encounters:   03/16/22 154 lb (69.9 kg)   03/16/22 149 lb (67.6 kg)   01/26/22 149 lb (67.6 kg)     BP Readings from Last 3 Encounters:   03/16/22 138/80   03/16/22 126/80   01/26/22 106/66     Body mass index is 30.09 kg/m². Facility age limit for growth percentiles is 20 years. Physical Exam  Constitutional:       General: She is not in acute distress. Appearance: She is ill-appearing. HENT:      Head: Normocephalic. Right Ear: Tympanic membrane and ear canal normal.      Left Ear: Tympanic membrane and ear canal normal.   Eyes:      General:         Right eye: No discharge. Left eye: No discharge. Conjunctiva/sclera: Conjunctivae normal.   Cardiovascular:      Heart sounds: No murmur heard. No friction rub. Pulmonary:      Effort: Respiratory distress present. Breath sounds: Wheezing (aubibly wheezing) and rhonchi present. Musculoskeletal:      Comments: Wheelchair bound   Neurological:      Mental Status: She is alert. An electronic signature was used to authenticate this note.     --Rayo Perry MD
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
## 2022-03-17 ENCOUNTER — TELEPHONE (OUTPATIENT)
Dept: INTERNAL MEDICINE CLINIC | Age: 72
End: 2022-03-17

## 2022-03-17 NOTE — TELEPHONE ENCOUNTER
Jessy @ [facility] regarding the following the following prescription:  oxyCODONE-acetaminophen (PERCOCET) 5-325 MG per tablet     Patient will not be able to received the 30 day count. First time patient on get a 7 day supply. The MME should be no more than 30.     And    verapamil (CALAN SR) 120 MG extended release tablet     Please change to 1x daily with a 1.20 mg    Phone: [phone]

## 2022-03-21 RX ORDER — MONTELUKAST SODIUM 4 MG/1
TABLET, CHEWABLE ORAL
Qty: 180 TABLET | Refills: 1 | Status: SHIPPED | OUTPATIENT
Start: 2022-03-21

## 2022-03-21 NOTE — TELEPHONE ENCOUNTER
Patient is requesting a refill of their prescription. Requested Prescriptions     Pending Prescriptions Disp Refills    colestipol (COLESTID) 1 g tablet [Pharmacy Med Name: COLESTIPOL HCL 1 GM TABLET] 180 tablet 1     Sig: TAKE 1 TABLET BY MOUTH TWICE A DAY        Recent Visits  Date Type Provider Dept   03/16/22 Office Visit Viviana Molina MD [facility]   01/26/22 Office Visit Viviana Molina MD [facility]   11/23/21 Office Visit Viviana Molina MD [facility]   08/05/21 Office Visit Viviana Molina MD [facility]   07/28/21 Office Visit Viviana Molina MD [facility]   06/22/21 Office Visit Viviana Molina MD [facility]   04/19/21 Office Visit Viviana Molina MD [facility]   12/17/20 Office Visit Viviana Molina MD [facility]   Showing recent visits within past 540 days with a meds authorizing provider and meeting all other requirements  Future Appointments  No visits were found meeting these conditions.   Showing future appointments within next 150 days with a meds authorizing provider and meeting all other requirements     3/16/2022

## 2022-03-30 RX ORDER — OXYCODONE HYDROCHLORIDE AND ACETAMINOPHEN 5; 325 MG/1; MG/1
1 TABLET ORAL EVERY 12 HOURS PRN
Qty: 14 TABLET | Refills: 0 | Status: SHIPPED | OUTPATIENT
Start: 2022-03-30 | End: 2022-04-06

## 2022-03-31 RX ORDER — OXYCODONE HYDROCHLORIDE AND ACETAMINOPHEN 5; 325 MG/1; MG/1
1 TABLET ORAL 2 TIMES DAILY PRN
Qty: 14 TABLET | Refills: 0 | Status: SHIPPED | OUTPATIENT
Start: 2022-04-07 | End: 2022-04-14

## 2022-04-27 RX ORDER — OXYCODONE HYDROCHLORIDE AND ACETAMINOPHEN 5; 325 MG/1; MG/1
1 TABLET ORAL EVERY 12 HOURS PRN
Qty: 14 TABLET | Refills: 0 | Status: SHIPPED | OUTPATIENT
Start: 2022-04-27 | End: 2022-05-31 | Stop reason: SDUPTHER

## 2022-04-27 RX ORDER — OXYCODONE HYDROCHLORIDE AND ACETAMINOPHEN 5; 325 MG/1; MG/1
1 TABLET ORAL EVERY 12 HOURS PRN
Qty: 14 TABLET | Refills: 0 | Status: SHIPPED | OUTPATIENT
Start: 2022-05-04 | End: 2022-04-27 | Stop reason: SDUPTHER

## 2022-04-27 RX ORDER — OXYCODONE HYDROCHLORIDE AND ACETAMINOPHEN 5; 325 MG/1; MG/1
1 TABLET ORAL EVERY 12 HOURS PRN
Qty: 14 TABLET | Refills: 0 | Status: SHIPPED | OUTPATIENT
Start: 2022-05-04 | End: 2022-05-11

## 2022-04-27 RX ORDER — OXYCODONE HYDROCHLORIDE AND ACETAMINOPHEN 5; 325 MG/1; MG/1
1 TABLET ORAL EVERY 12 HOURS PRN
Qty: 14 TABLET | Refills: 0 | Status: SHIPPED | OUTPATIENT
Start: 2022-04-27 | End: 2022-04-27 | Stop reason: SDUPTHER

## 2022-05-03 RX ORDER — PREDNISONE 10 MG/1
TABLET ORAL
Qty: 60 TABLET | Refills: 2 | Status: SHIPPED | OUTPATIENT
Start: 2022-05-03
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
## 2022-05-23 NOTE — TELEPHONE ENCOUNTER
I called the pharmacy and gave them the okay to fill the prescription. They stated they would be able to go through with filling it.

## 2022-05-26 ENCOUNTER — PATIENT MESSAGE (OUTPATIENT)
Dept: INTERNAL MEDICINE CLINIC | Age: 72
End: 2022-05-26

## 2022-05-26 DIAGNOSIS — M25.552 LEFT HIP PAIN: ICD-10-CM

## 2022-05-27 NOTE — TELEPHONE ENCOUNTER
From: Gabrielle Choe  To: Dr. Morgan Pickett: 5/26/2022 11:35 AM EDT  Subject: Request for oxycodone prescription     Hi Dr. Glenn Olivas,    Please send mom another prescription. We use the last prescription. Can you also send me a list we discussion of facilities? I am trying to have a place for mom in [place]. As I shared, I must move her again. She wants to remain your patient.      We can discuss more but the relocation did not

## 2022-05-31 RX ORDER — OXYCODONE HYDROCHLORIDE AND ACETAMINOPHEN 5; 325 MG/1; MG/1
1 TABLET ORAL EVERY 12 HOURS PRN
Qty: 14 TABLET | Refills: 0 | Status: SHIPPED | OUTPATIENT
Start: 2022-05-31 | End: 2022-06-07

## 2022-06-06 RX ORDER — ATORVASTATIN CALCIUM 10 MG/1
TABLET, FILM COATED ORAL
Qty: 90 TABLET | Refills: 1 | Status: SHIPPED | OUTPATIENT
Start: 2022-06-06

## 2022-06-06 NOTE — TELEPHONE ENCOUNTER
Patient is requesting a refill of their prescription. Requested Prescriptions     Pending Prescriptions Disp Refills    atorvastatin (LIPITOR) 10 MG tablet [Pharmacy Med Name: ATORVASTATIN 10 MG TABLET] 90 tablet 1     Sig: TAKE 1 TABLET BY MOUTH EVERY DAY        Recent Visits  Date Type Provider Dept   03/16/22 Office Visit Tylor Campbell MD [facility]   01/26/22 Office Visit Tylor Campbell MD [facility]   11/23/21 Office Visit Tylor Campbell MD [facility]   08/05/21 Office Visit Tylor Campbell MD [facility]   07/28/21 Office Visit Tylor Campbell MD [facility]   06/22/21 Office Visit Tylor Campbell MD [facility]   04/19/21 Office Visit Tylor Campbell MD [facility]   12/17/20 Office Visit Tylor Campbell MD [facility]   Showing recent visits within past 540 days with a meds authorizing provider and meeting all other requirements  Future Appointments  No visits were found meeting these conditions.   Showing future appointments within next 150 days with a meds authorizing provider and meeting all other requirements     3/16/2022

## 2023-06-20 ENCOUNTER — TELEPHONE (OUTPATIENT)
Dept: INTERNAL MEDICINE CLINIC | Age: 73
End: 2023-06-20

## 2024-05-10 NOTE — PROGRESS NOTES
Ct abdomen ordered by night shift physician and complete, PO medication also ordered see MAR for constipation; Will continue to monitor. Updated patient via MyChart of [facility] clinic opening up for Nov 1 and rescheduling in genetic clinic per Dr. Maria's request.  Dorita Lama RN, BSN, PHN  [facility] Nephrology Nurse Navigator  [phone]

## (undated) DEVICE — TRAY PREP DRY W/ PREM GLV 2 APPL 6 SPNG 2 UNDPD 1 OVERWRAP

## (undated) DEVICE — CONMED CHANNEL MASTER PULMONARY AND PEDIATRIC CLEANING BRUSH, 160 CM X 2.0 MM: Brand: CONMED

## (undated) DEVICE — SINGLE USE BIOPSY VALVE MAJ-210: Brand: SINGLE USE BIOPSY VALVE (STERILE)

## (undated) DEVICE — SYRINGE MED 10ML POLYPR LUERSLIP TIP FLAT TOP W/O SFTY DISP

## (undated) DEVICE — SPECIMEN TRAP: Brand: ARGYLE

## (undated) DEVICE — COVER LT HNDL BLU PLAS

## (undated) DEVICE — TOWEL,OR,DSP,ST,BLUE,STD,4/PK,20PK/CS: Brand: MEDLINE

## (undated) DEVICE — GAUZE,SPONGE,4"X4",8PLY,STRL,LF,10/TRAY: Brand: MEDLINE

## (undated) DEVICE — DRAPE ADOLESCENT  LAPAROTOMY

## (undated) DEVICE — CONTROL SYRINGE LUER-LOCK TIP: Brand: MONOJECT

## (undated) DEVICE — GLOVE SURG SZ 6.5 L11.2IN FNGR THK9.8MIL STRW LTX POLYMER

## (undated) DEVICE — PAD,ABDOMINAL,8"X10",ST,LF: Brand: MEDLINE

## (undated) DEVICE — SOLUTION IV IRRIG 500ML 0.9% SODIUM CHL 2F7123

## (undated) DEVICE — SYRINGE MED 50ML LUERLOCK TIP

## (undated) DEVICE — PROCEDURE KIT ENDOSCP CUST

## (undated) DEVICE — GOWN AURORA NONREINF LG: Brand: MEDLINE INDUSTRIES, INC.

## (undated) DEVICE — SINGLE USE SUCTION VALVE MAJ-209: Brand: SINGLE USE SUCTION VALVE (STERILE)

## (undated) DEVICE — KIT OR ROOM TURNOVER W/STRAP

## (undated) DEVICE — AIRWAY PHGEAL SZ 9 9CM PNK AD ORAL FBROPT INTUB PLAS DISP

## (undated) DEVICE — Device